# Patient Record
Sex: FEMALE | Race: WHITE | NOT HISPANIC OR LATINO | Employment: OTHER | URBAN - METROPOLITAN AREA
[De-identification: names, ages, dates, MRNs, and addresses within clinical notes are randomized per-mention and may not be internally consistent; named-entity substitution may affect disease eponyms.]

---

## 2017-01-25 ENCOUNTER — COMMUNICATION - HEALTHEAST (OUTPATIENT)
Dept: INTERNAL MEDICINE | Facility: CLINIC | Age: 72
End: 2017-01-25

## 2017-02-09 ENCOUNTER — COMMUNICATION - HEALTHEAST (OUTPATIENT)
Dept: SCHEDULING | Facility: CLINIC | Age: 72
End: 2017-02-09

## 2017-02-24 ENCOUNTER — COMMUNICATION - HEALTHEAST (OUTPATIENT)
Dept: INTERNAL MEDICINE | Facility: CLINIC | Age: 72
End: 2017-02-24

## 2017-02-26 ENCOUNTER — COMMUNICATION - HEALTHEAST (OUTPATIENT)
Dept: INTERNAL MEDICINE | Facility: CLINIC | Age: 72
End: 2017-02-26

## 2017-03-02 ENCOUNTER — COMMUNICATION - HEALTHEAST (OUTPATIENT)
Dept: INTERNAL MEDICINE | Facility: CLINIC | Age: 72
End: 2017-03-02

## 2017-03-20 ENCOUNTER — COMMUNICATION - HEALTHEAST (OUTPATIENT)
Dept: PULMONOLOGY | Facility: OTHER | Age: 72
End: 2017-03-20

## 2017-04-07 ENCOUNTER — RECORDS - HEALTHEAST (OUTPATIENT)
Dept: ADMINISTRATIVE | Facility: OTHER | Age: 72
End: 2017-04-07

## 2017-04-09 ENCOUNTER — COMMUNICATION - HEALTHEAST (OUTPATIENT)
Dept: INTERNAL MEDICINE | Facility: CLINIC | Age: 72
End: 2017-04-09

## 2017-04-16 ENCOUNTER — COMMUNICATION - HEALTHEAST (OUTPATIENT)
Dept: INTERNAL MEDICINE | Facility: CLINIC | Age: 72
End: 2017-04-16

## 2017-04-21 ENCOUNTER — RECORDS - HEALTHEAST (OUTPATIENT)
Dept: ADMINISTRATIVE | Facility: OTHER | Age: 72
End: 2017-04-21

## 2017-05-05 ENCOUNTER — COMMUNICATION - HEALTHEAST (OUTPATIENT)
Dept: PULMONOLOGY | Facility: OTHER | Age: 72
End: 2017-05-05

## 2017-05-12 ENCOUNTER — OFFICE VISIT - HEALTHEAST (OUTPATIENT)
Dept: PULMONOLOGY | Facility: OTHER | Age: 72
End: 2017-05-12

## 2017-05-12 DIAGNOSIS — J47.9 BRONCHIECTASIS WITHOUT COMPLICATION (H): ICD-10-CM

## 2017-05-12 DIAGNOSIS — J96.11 CHRONIC RESPIRATORY FAILURE WITH HYPOXIA (H): ICD-10-CM

## 2017-05-12 DIAGNOSIS — R06.09 DYSPNEA ON EXERTION: ICD-10-CM

## 2017-05-24 ENCOUNTER — COMMUNICATION - HEALTHEAST (OUTPATIENT)
Dept: INTERNAL MEDICINE | Facility: CLINIC | Age: 72
End: 2017-05-24

## 2017-05-31 ENCOUNTER — COMMUNICATION - HEALTHEAST (OUTPATIENT)
Dept: INTERNAL MEDICINE | Facility: CLINIC | Age: 72
End: 2017-05-31

## 2017-06-10 ENCOUNTER — COMMUNICATION - HEALTHEAST (OUTPATIENT)
Dept: SCHEDULING | Facility: CLINIC | Age: 72
End: 2017-06-10

## 2017-06-12 ENCOUNTER — COMMUNICATION - HEALTHEAST (OUTPATIENT)
Dept: INTERNAL MEDICINE | Facility: CLINIC | Age: 72
End: 2017-06-12

## 2017-06-13 ENCOUNTER — OFFICE VISIT - HEALTHEAST (OUTPATIENT)
Dept: INTERNAL MEDICINE | Facility: CLINIC | Age: 72
End: 2017-06-13

## 2017-06-13 ENCOUNTER — COMMUNICATION - HEALTHEAST (OUTPATIENT)
Dept: INTERNAL MEDICINE | Facility: CLINIC | Age: 72
End: 2017-06-13

## 2017-06-13 ENCOUNTER — RECORDS - HEALTHEAST (OUTPATIENT)
Dept: GENERAL RADIOLOGY | Facility: CLINIC | Age: 72
End: 2017-06-13

## 2017-06-13 DIAGNOSIS — M25.552 PAIN IN LEFT HIP: ICD-10-CM

## 2017-06-13 DIAGNOSIS — M19.90 OSTEOARTHRITIS: ICD-10-CM

## 2017-06-13 DIAGNOSIS — J47.9 BRONCHIECTASIS WITHOUT COMPLICATION (H): ICD-10-CM

## 2017-06-13 DIAGNOSIS — M25.552 LEFT HIP PAIN: ICD-10-CM

## 2017-06-13 DIAGNOSIS — M85.80 OSTEOPENIA: ICD-10-CM

## 2017-06-13 DIAGNOSIS — Z00.00 ROUTINE GENERAL MEDICAL EXAMINATION AT A HEALTH CARE FACILITY: ICD-10-CM

## 2017-06-13 DIAGNOSIS — Z79.899 MEDICATION MANAGEMENT: ICD-10-CM

## 2017-06-13 LAB
CHOLEST SERPL-MCNC: 172 MG/DL
FASTING STATUS PATIENT QL REPORTED: YES
HDLC SERPL-MCNC: 57 MG/DL
LDLC SERPL CALC-MCNC: 97 MG/DL
TRIGL SERPL-MCNC: 89 MG/DL

## 2017-06-13 ASSESSMENT — MIFFLIN-ST. JEOR: SCORE: 1035.66

## 2017-06-14 ENCOUNTER — AMBULATORY - HEALTHEAST (OUTPATIENT)
Dept: INTERNAL MEDICINE | Facility: CLINIC | Age: 72
End: 2017-06-14

## 2017-06-14 DIAGNOSIS — M81.0 POSTMENOPAUSAL OSTEOPOROSIS: ICD-10-CM

## 2017-06-17 ENCOUNTER — COMMUNICATION - HEALTHEAST (OUTPATIENT)
Dept: INTERNAL MEDICINE | Facility: CLINIC | Age: 72
End: 2017-06-17

## 2017-07-05 ENCOUNTER — COMMUNICATION - HEALTHEAST (OUTPATIENT)
Dept: INTERNAL MEDICINE | Facility: CLINIC | Age: 72
End: 2017-07-05

## 2017-07-05 ENCOUNTER — RECORDS - HEALTHEAST (OUTPATIENT)
Dept: BONE DENSITY | Facility: CLINIC | Age: 72
End: 2017-07-05

## 2017-07-05 DIAGNOSIS — M81.0 AGE-RELATED OSTEOPOROSIS WITHOUT CURRENT PATHOLOGICAL FRACTURE: ICD-10-CM

## 2017-07-15 ENCOUNTER — COMMUNICATION - HEALTHEAST (OUTPATIENT)
Dept: INTERNAL MEDICINE | Facility: CLINIC | Age: 72
End: 2017-07-15

## 2017-07-23 ENCOUNTER — COMMUNICATION - HEALTHEAST (OUTPATIENT)
Dept: INTERNAL MEDICINE | Facility: CLINIC | Age: 72
End: 2017-07-23

## 2017-07-27 ENCOUNTER — RECORDS - HEALTHEAST (OUTPATIENT)
Dept: ADMINISTRATIVE | Facility: OTHER | Age: 72
End: 2017-07-27

## 2017-07-29 ENCOUNTER — COMMUNICATION - HEALTHEAST (OUTPATIENT)
Dept: INTERNAL MEDICINE | Facility: CLINIC | Age: 72
End: 2017-07-29

## 2017-07-31 ENCOUNTER — COMMUNICATION - HEALTHEAST (OUTPATIENT)
Dept: INTERNAL MEDICINE | Facility: CLINIC | Age: 72
End: 2017-07-31

## 2017-07-31 ENCOUNTER — RECORDS - HEALTHEAST (OUTPATIENT)
Dept: ADMINISTRATIVE | Facility: OTHER | Age: 72
End: 2017-07-31

## 2017-07-31 RX ORDER — SUMATRIPTAN 50 MG/1
50 TABLET, FILM COATED ORAL
Qty: 30 TABLET | Refills: 1 | Status: SHIPPED | OUTPATIENT
Start: 2017-07-31 | End: 2022-10-10

## 2017-08-01 ENCOUNTER — COMMUNICATION - HEALTHEAST (OUTPATIENT)
Dept: INTERNAL MEDICINE | Facility: CLINIC | Age: 72
End: 2017-08-01

## 2017-08-09 ENCOUNTER — COMMUNICATION - HEALTHEAST (OUTPATIENT)
Dept: PULMONOLOGY | Facility: OTHER | Age: 72
End: 2017-08-09

## 2017-08-09 DIAGNOSIS — J47.9 BRONCHIECTASIS (H): ICD-10-CM

## 2017-08-25 ENCOUNTER — COMMUNICATION - HEALTHEAST (OUTPATIENT)
Dept: INTERNAL MEDICINE | Facility: CLINIC | Age: 72
End: 2017-08-25

## 2017-08-26 ENCOUNTER — COMMUNICATION - HEALTHEAST (OUTPATIENT)
Dept: INTERNAL MEDICINE | Facility: CLINIC | Age: 72
End: 2017-08-26

## 2017-08-26 DIAGNOSIS — J47.9 BRONCHIECTASIS (H): ICD-10-CM

## 2017-08-27 ENCOUNTER — COMMUNICATION - HEALTHEAST (OUTPATIENT)
Dept: INTERNAL MEDICINE | Facility: CLINIC | Age: 72
End: 2017-08-27

## 2017-08-28 ENCOUNTER — RECORDS - HEALTHEAST (OUTPATIENT)
Dept: MAMMOGRAPHY | Facility: CLINIC | Age: 72
End: 2017-08-28

## 2017-08-28 ENCOUNTER — RECORDS - HEALTHEAST (OUTPATIENT)
Dept: ADMINISTRATIVE | Facility: OTHER | Age: 72
End: 2017-08-28

## 2017-08-28 DIAGNOSIS — Z12.31 ENCOUNTER FOR SCREENING MAMMOGRAM FOR MALIGNANT NEOPLASM OF BREAST: ICD-10-CM

## 2017-09-02 ENCOUNTER — COMMUNICATION - HEALTHEAST (OUTPATIENT)
Dept: INTERNAL MEDICINE | Facility: CLINIC | Age: 72
End: 2017-09-02

## 2017-09-08 ENCOUNTER — COMMUNICATION - HEALTHEAST (OUTPATIENT)
Dept: INTERNAL MEDICINE | Facility: CLINIC | Age: 72
End: 2017-09-08

## 2017-09-19 ENCOUNTER — COMMUNICATION - HEALTHEAST (OUTPATIENT)
Dept: INTERNAL MEDICINE | Facility: CLINIC | Age: 72
End: 2017-09-19

## 2017-09-20 ENCOUNTER — COMMUNICATION - HEALTHEAST (OUTPATIENT)
Dept: INTERNAL MEDICINE | Facility: CLINIC | Age: 72
End: 2017-09-20

## 2017-10-07 ENCOUNTER — COMMUNICATION - HEALTHEAST (OUTPATIENT)
Dept: INTERNAL MEDICINE | Facility: CLINIC | Age: 72
End: 2017-10-07

## 2017-10-12 ENCOUNTER — COMMUNICATION - HEALTHEAST (OUTPATIENT)
Dept: INTERNAL MEDICINE | Facility: CLINIC | Age: 72
End: 2017-10-12

## 2017-10-12 ENCOUNTER — RECORDS - HEALTHEAST (OUTPATIENT)
Dept: ADMINISTRATIVE | Facility: OTHER | Age: 72
End: 2017-10-12

## 2017-10-14 ENCOUNTER — COMMUNICATION - HEALTHEAST (OUTPATIENT)
Dept: PULMONOLOGY | Facility: OTHER | Age: 72
End: 2017-10-14

## 2017-10-15 ENCOUNTER — COMMUNICATION - HEALTHEAST (OUTPATIENT)
Dept: INTERNAL MEDICINE | Facility: CLINIC | Age: 72
End: 2017-10-15

## 2017-10-16 ENCOUNTER — COMMUNICATION - HEALTHEAST (OUTPATIENT)
Dept: PULMONOLOGY | Facility: OTHER | Age: 72
End: 2017-10-16

## 2017-10-16 ENCOUNTER — COMMUNICATION - HEALTHEAST (OUTPATIENT)
Dept: INTERNAL MEDICINE | Facility: CLINIC | Age: 72
End: 2017-10-16

## 2017-10-17 ENCOUNTER — RECORDS - HEALTHEAST (OUTPATIENT)
Dept: ADMINISTRATIVE | Facility: OTHER | Age: 72
End: 2017-10-17

## 2017-11-02 ENCOUNTER — RECORDS - HEALTHEAST (OUTPATIENT)
Dept: ADMINISTRATIVE | Facility: OTHER | Age: 72
End: 2017-11-02

## 2017-11-08 ENCOUNTER — COMMUNICATION - HEALTHEAST (OUTPATIENT)
Dept: INTERNAL MEDICINE | Facility: CLINIC | Age: 72
End: 2017-11-08

## 2017-11-08 ENCOUNTER — AMBULATORY - HEALTHEAST (OUTPATIENT)
Dept: INTERNAL MEDICINE | Facility: CLINIC | Age: 72
End: 2017-11-08

## 2017-11-15 ENCOUNTER — RECORDS - HEALTHEAST (OUTPATIENT)
Dept: ADMINISTRATIVE | Facility: OTHER | Age: 72
End: 2017-11-15

## 2017-11-17 ENCOUNTER — OFFICE VISIT - HEALTHEAST (OUTPATIENT)
Dept: PULMONOLOGY | Facility: OTHER | Age: 72
End: 2017-11-17

## 2017-11-17 DIAGNOSIS — J96.11 CHRONIC RESPIRATORY FAILURE WITH HYPOXIA (H): ICD-10-CM

## 2017-11-17 DIAGNOSIS — J47.1 BRONCHIECTASIS WITH ACUTE EXACERBATION (H): ICD-10-CM

## 2017-11-26 ENCOUNTER — COMMUNICATION - HEALTHEAST (OUTPATIENT)
Dept: INTERNAL MEDICINE | Facility: CLINIC | Age: 72
End: 2017-11-26

## 2018-02-14 ENCOUNTER — COMMUNICATION - HEALTHEAST (OUTPATIENT)
Dept: INTERNAL MEDICINE | Facility: CLINIC | Age: 73
End: 2018-02-14

## 2018-02-16 ENCOUNTER — COMMUNICATION - HEALTHEAST (OUTPATIENT)
Dept: INTERNAL MEDICINE | Facility: CLINIC | Age: 73
End: 2018-02-16

## 2018-02-18 ENCOUNTER — COMMUNICATION - HEALTHEAST (OUTPATIENT)
Dept: INTERNAL MEDICINE | Facility: CLINIC | Age: 73
End: 2018-02-18

## 2018-02-19 ENCOUNTER — COMMUNICATION - HEALTHEAST (OUTPATIENT)
Dept: INTERNAL MEDICINE | Facility: CLINIC | Age: 73
End: 2018-02-19

## 2018-02-20 ENCOUNTER — COMMUNICATION - HEALTHEAST (OUTPATIENT)
Dept: INTERNAL MEDICINE | Facility: CLINIC | Age: 73
End: 2018-02-20

## 2018-03-13 ENCOUNTER — COMMUNICATION - HEALTHEAST (OUTPATIENT)
Dept: INTERNAL MEDICINE | Facility: CLINIC | Age: 73
End: 2018-03-13

## 2018-03-17 ENCOUNTER — COMMUNICATION - HEALTHEAST (OUTPATIENT)
Dept: INTERNAL MEDICINE | Facility: CLINIC | Age: 73
End: 2018-03-17

## 2018-03-19 ENCOUNTER — AMBULATORY - HEALTHEAST (OUTPATIENT)
Dept: INTERNAL MEDICINE | Facility: CLINIC | Age: 73
End: 2018-03-19

## 2018-03-21 ENCOUNTER — COMMUNICATION - HEALTHEAST (OUTPATIENT)
Dept: INTERNAL MEDICINE | Facility: CLINIC | Age: 73
End: 2018-03-21

## 2018-03-22 ENCOUNTER — RECORDS - HEALTHEAST (OUTPATIENT)
Dept: ADMINISTRATIVE | Facility: OTHER | Age: 73
End: 2018-03-22

## 2018-03-26 ENCOUNTER — COMMUNICATION - HEALTHEAST (OUTPATIENT)
Dept: INTERNAL MEDICINE | Facility: CLINIC | Age: 73
End: 2018-03-26

## 2018-04-04 ENCOUNTER — COMMUNICATION - HEALTHEAST (OUTPATIENT)
Dept: INTERNAL MEDICINE | Facility: CLINIC | Age: 73
End: 2018-04-04

## 2018-04-06 ENCOUNTER — COMMUNICATION - HEALTHEAST (OUTPATIENT)
Dept: INTERNAL MEDICINE | Facility: CLINIC | Age: 73
End: 2018-04-06

## 2018-04-11 ENCOUNTER — COMMUNICATION - HEALTHEAST (OUTPATIENT)
Dept: INTERNAL MEDICINE | Facility: CLINIC | Age: 73
End: 2018-04-11

## 2018-04-11 ENCOUNTER — AMBULATORY - HEALTHEAST (OUTPATIENT)
Dept: INTERNAL MEDICINE | Facility: CLINIC | Age: 73
End: 2018-04-11

## 2018-04-19 ENCOUNTER — AMBULATORY - HEALTHEAST (OUTPATIENT)
Dept: INTERNAL MEDICINE | Facility: CLINIC | Age: 73
End: 2018-04-19

## 2018-04-19 ENCOUNTER — COMMUNICATION - HEALTHEAST (OUTPATIENT)
Dept: INTERNAL MEDICINE | Facility: CLINIC | Age: 73
End: 2018-04-19

## 2018-04-20 ENCOUNTER — RECORDS - HEALTHEAST (OUTPATIENT)
Dept: ADMINISTRATIVE | Facility: OTHER | Age: 73
End: 2018-04-20

## 2018-05-04 ENCOUNTER — OFFICE VISIT - HEALTHEAST (OUTPATIENT)
Dept: PULMONOLOGY | Facility: OTHER | Age: 73
End: 2018-05-04

## 2018-05-04 DIAGNOSIS — J96.11 CHRONIC RESPIRATORY FAILURE WITH HYPOXIA (H): ICD-10-CM

## 2018-05-04 DIAGNOSIS — J47.9 BRONCHIECTASIS WITHOUT COMPLICATION (H): ICD-10-CM

## 2018-05-04 DIAGNOSIS — R06.09 DYSPNEA ON EXERTION: ICD-10-CM

## 2018-05-11 ENCOUNTER — COMMUNICATION - HEALTHEAST (OUTPATIENT)
Dept: PULMONOLOGY | Facility: OTHER | Age: 73
End: 2018-05-11

## 2018-05-12 ENCOUNTER — COMMUNICATION - HEALTHEAST (OUTPATIENT)
Dept: PULMONOLOGY | Facility: OTHER | Age: 73
End: 2018-05-12

## 2018-05-13 ENCOUNTER — COMMUNICATION - HEALTHEAST (OUTPATIENT)
Dept: PULMONOLOGY | Facility: OTHER | Age: 73
End: 2018-05-13

## 2018-05-14 ENCOUNTER — AMBULATORY - HEALTHEAST (OUTPATIENT)
Dept: INTENSIVE CARE | Facility: CLINIC | Age: 73
End: 2018-05-14

## 2018-05-14 DIAGNOSIS — J47.9 BRONCHIECTASIS WITHOUT COMPLICATION (H): ICD-10-CM

## 2018-05-15 ENCOUNTER — COMMUNICATION - HEALTHEAST (OUTPATIENT)
Dept: PULMONOLOGY | Facility: OTHER | Age: 73
End: 2018-05-15

## 2018-05-17 ENCOUNTER — COMMUNICATION - HEALTHEAST (OUTPATIENT)
Dept: PULMONOLOGY | Facility: OTHER | Age: 73
End: 2018-05-17

## 2018-05-18 ENCOUNTER — AMBULATORY - HEALTHEAST (OUTPATIENT)
Dept: PULMONOLOGY | Facility: OTHER | Age: 73
End: 2018-05-18

## 2018-05-19 ENCOUNTER — COMMUNICATION - HEALTHEAST (OUTPATIENT)
Dept: INTERNAL MEDICINE | Facility: CLINIC | Age: 73
End: 2018-05-19

## 2018-05-21 ENCOUNTER — COMMUNICATION - HEALTHEAST (OUTPATIENT)
Dept: PULMONOLOGY | Facility: OTHER | Age: 73
End: 2018-05-21

## 2018-05-24 ENCOUNTER — RECORDS - HEALTHEAST (OUTPATIENT)
Dept: ADMINISTRATIVE | Facility: OTHER | Age: 73
End: 2018-05-24

## 2018-05-29 ENCOUNTER — COMMUNICATION - HEALTHEAST (OUTPATIENT)
Dept: PULMONOLOGY | Facility: OTHER | Age: 73
End: 2018-05-29

## 2018-05-29 ENCOUNTER — COMMUNICATION - HEALTHEAST (OUTPATIENT)
Dept: INTERNAL MEDICINE | Facility: CLINIC | Age: 73
End: 2018-05-29

## 2018-06-07 ENCOUNTER — COMMUNICATION - HEALTHEAST (OUTPATIENT)
Dept: INTERNAL MEDICINE | Facility: CLINIC | Age: 73
End: 2018-06-07

## 2018-06-11 ENCOUNTER — AMBULATORY - HEALTHEAST (OUTPATIENT)
Dept: INTERNAL MEDICINE | Facility: CLINIC | Age: 73
End: 2018-06-11

## 2018-06-11 DIAGNOSIS — G62.9 PERIPHERAL NEUROPATHY: ICD-10-CM

## 2018-06-14 ENCOUNTER — COMMUNICATION - HEALTHEAST (OUTPATIENT)
Dept: INTERNAL MEDICINE | Facility: CLINIC | Age: 73
End: 2018-06-14

## 2018-06-15 ENCOUNTER — COMMUNICATION - HEALTHEAST (OUTPATIENT)
Dept: INTERNAL MEDICINE | Facility: CLINIC | Age: 73
End: 2018-06-15

## 2018-06-17 ENCOUNTER — COMMUNICATION - HEALTHEAST (OUTPATIENT)
Dept: INTERNAL MEDICINE | Facility: CLINIC | Age: 73
End: 2018-06-17

## 2018-06-18 ENCOUNTER — COMMUNICATION - HEALTHEAST (OUTPATIENT)
Dept: INTERNAL MEDICINE | Facility: CLINIC | Age: 73
End: 2018-06-18

## 2018-06-18 ENCOUNTER — AMBULATORY - HEALTHEAST (OUTPATIENT)
Dept: INTERNAL MEDICINE | Facility: CLINIC | Age: 73
End: 2018-06-18

## 2018-06-18 ENCOUNTER — OFFICE VISIT - HEALTHEAST (OUTPATIENT)
Dept: INTERNAL MEDICINE | Facility: CLINIC | Age: 73
End: 2018-06-18

## 2018-06-18 DIAGNOSIS — R53.82 CHRONIC FATIGUE: ICD-10-CM

## 2018-06-18 DIAGNOSIS — G62.9 PERIPHERAL NEUROPATHY: ICD-10-CM

## 2018-06-18 DIAGNOSIS — Z00.00 ROUTINE GENERAL MEDICAL EXAMINATION AT A HEALTH CARE FACILITY: ICD-10-CM

## 2018-06-18 DIAGNOSIS — J47.9 BRONCHIECTASIS WITHOUT COMPLICATION (H): ICD-10-CM

## 2018-06-18 DIAGNOSIS — M85.89 OSTEOPENIA OF MULTIPLE SITES: ICD-10-CM

## 2018-06-18 LAB
ALBUMIN SERPL-MCNC: 3.7 G/DL (ref 3.5–5)
ALBUMIN UR-MCNC: NEGATIVE MG/DL
ALP SERPL-CCNC: 93 U/L (ref 45–120)
ALT SERPL W P-5'-P-CCNC: 18 U/L (ref 0–45)
ANION GAP SERPL CALCULATED.3IONS-SCNC: 11 MMOL/L (ref 5–18)
APPEARANCE UR: CLEAR
AST SERPL W P-5'-P-CCNC: 28 U/L (ref 0–40)
BASOPHILS # BLD AUTO: 0 THOU/UL (ref 0–0.2)
BASOPHILS NFR BLD AUTO: 1 % (ref 0–2)
BILIRUB SERPL-MCNC: 0.6 MG/DL (ref 0–1)
BILIRUB UR QL STRIP: NEGATIVE
BUN SERPL-MCNC: 15 MG/DL (ref 8–28)
CALCIUM SERPL-MCNC: 10.5 MG/DL (ref 8.5–10.5)
CHLORIDE BLD-SCNC: 103 MMOL/L (ref 98–107)
CHOLEST SERPL-MCNC: 181 MG/DL
CO2 SERPL-SCNC: 27 MMOL/L (ref 22–31)
COLOR UR AUTO: YELLOW
CREAT SERPL-MCNC: 0.72 MG/DL (ref 0.6–1.1)
EOSINOPHIL # BLD AUTO: 0.3 THOU/UL (ref 0–0.4)
EOSINOPHIL NFR BLD AUTO: 4 % (ref 0–6)
ERYTHROCYTE [DISTWIDTH] IN BLOOD BY AUTOMATED COUNT: 11.8 % (ref 11–14.5)
FASTING STATUS PATIENT QL REPORTED: YES
GFR SERPL CREATININE-BSD FRML MDRD: >60 ML/MIN/1.73M2
GLUCOSE BLD-MCNC: 90 MG/DL (ref 70–125)
GLUCOSE UR STRIP-MCNC: NEGATIVE MG/DL
HCT VFR BLD AUTO: 41.2 % (ref 35–47)
HDLC SERPL-MCNC: 59 MG/DL
HGB BLD-MCNC: 14 G/DL (ref 12–16)
HGB UR QL STRIP: NEGATIVE
KETONES UR STRIP-MCNC: NEGATIVE MG/DL
LDLC SERPL CALC-MCNC: 104 MG/DL
LEUKOCYTE ESTERASE UR QL STRIP: NEGATIVE
LYMPHOCYTES # BLD AUTO: 1.1 THOU/UL (ref 0.8–4.4)
LYMPHOCYTES NFR BLD AUTO: 17 % (ref 20–40)
MCH RBC QN AUTO: 30.4 PG (ref 27–34)
MCHC RBC AUTO-ENTMCNC: 34 G/DL (ref 32–36)
MCV RBC AUTO: 89 FL (ref 80–100)
MONOCYTES # BLD AUTO: 0.5 THOU/UL (ref 0–0.9)
MONOCYTES NFR BLD AUTO: 8 % (ref 2–10)
NEUTROPHILS # BLD AUTO: 4.7 THOU/UL (ref 2–7.7)
NEUTROPHILS NFR BLD AUTO: 71 % (ref 50–70)
NITRATE UR QL: NEGATIVE
PH UR STRIP: 7.5 [PH] (ref 5–8)
PLATELET # BLD AUTO: 324 THOU/UL (ref 140–440)
PMV BLD AUTO: 7.2 FL (ref 7–10)
POTASSIUM BLD-SCNC: 4.1 MMOL/L (ref 3.5–5)
PROT SERPL-MCNC: 7.4 G/DL (ref 6–8)
RBC # BLD AUTO: 4.6 MILL/UL (ref 3.8–5.4)
SODIUM SERPL-SCNC: 141 MMOL/L (ref 136–145)
SP GR UR STRIP: 1.01 (ref 1–1.03)
TRIGL SERPL-MCNC: 88 MG/DL
TSH SERPL DL<=0.005 MIU/L-ACNC: 3.92 UIU/ML (ref 0.3–5)
UROBILINOGEN UR STRIP-ACNC: NORMAL
VIT B12 SERPL-MCNC: 645 PG/ML (ref 213–816)
WBC: 6.7 THOU/UL (ref 4–11)

## 2018-06-18 ASSESSMENT — MIFFLIN-ST. JEOR: SCORE: 1019.22

## 2018-06-19 ENCOUNTER — COMMUNICATION - HEALTHEAST (OUTPATIENT)
Dept: INTERNAL MEDICINE | Facility: CLINIC | Age: 73
End: 2018-06-19

## 2018-06-19 LAB — 25(OH)D3 SERPL-MCNC: 58 NG/ML (ref 30–80)

## 2018-06-20 ENCOUNTER — AMBULATORY - HEALTHEAST (OUTPATIENT)
Dept: PULMONOLOGY | Facility: OTHER | Age: 73
End: 2018-06-20

## 2018-06-20 LAB
ALBUMIN PERCENT: 59.4 % (ref 51–67)
ALBUMIN SERPL ELPH-MCNC: 4.2 G/DL (ref 3.2–4.7)
ALPHA 1 PERCENT: 2.5 % (ref 2–4)
ALPHA 2 PERCENT: 11.6 % (ref 5–13)
ALPHA1 GLOB SERPL ELPH-MCNC: 0.2 G/DL (ref 0.1–0.3)
ALPHA2 GLOB SERPL ELPH-MCNC: 0.8 G/DL (ref 0.4–0.9)
B-GLOBULIN SERPL ELPH-MCNC: 1 G/DL (ref 0.7–1.2)
BETA PERCENT: 13.9 % (ref 10–17)
GAMMA GLOB SERPL ELPH-MCNC: 0.9 G/DL (ref 0.6–1.4)
GAMMA GLOBULIN PERCENT: 12.6 % (ref 9–20)
PATH ICD:: NORMAL
PROT PATTERN SERPL ELPH-IMP: NORMAL
PROT SERPL-MCNC: 7.1 G/DL (ref 6–8)
REVIEWING PATHOLOGIST: NORMAL

## 2018-06-21 ENCOUNTER — RECORDS - HEALTHEAST (OUTPATIENT)
Dept: ADMINISTRATIVE | Facility: OTHER | Age: 73
End: 2018-06-21

## 2018-06-21 ENCOUNTER — COMMUNICATION - HEALTHEAST (OUTPATIENT)
Dept: INTERNAL MEDICINE | Facility: CLINIC | Age: 73
End: 2018-06-21

## 2018-06-21 ENCOUNTER — COMMUNICATION - HEALTHEAST (OUTPATIENT)
Dept: PULMONOLOGY | Facility: OTHER | Age: 73
End: 2018-06-21

## 2018-07-01 ENCOUNTER — COMMUNICATION - HEALTHEAST (OUTPATIENT)
Dept: INTERNAL MEDICINE | Facility: CLINIC | Age: 73
End: 2018-07-01

## 2018-07-01 ENCOUNTER — COMMUNICATION - HEALTHEAST (OUTPATIENT)
Dept: PULMONOLOGY | Facility: OTHER | Age: 73
End: 2018-07-01

## 2018-07-02 ENCOUNTER — COMMUNICATION - HEALTHEAST (OUTPATIENT)
Dept: INTERNAL MEDICINE | Facility: CLINIC | Age: 73
End: 2018-07-02

## 2018-07-05 ENCOUNTER — OFFICE VISIT - HEALTHEAST (OUTPATIENT)
Dept: INTERNAL MEDICINE | Facility: CLINIC | Age: 73
End: 2018-07-05

## 2018-07-05 DIAGNOSIS — H81.13 BENIGN PAROXYSMAL POSITIONAL VERTIGO DUE TO BILATERAL VESTIBULAR DISORDER: ICD-10-CM

## 2018-07-05 DIAGNOSIS — J47.9 BRONCHIECTASIS WITHOUT COMPLICATION (H): ICD-10-CM

## 2018-07-05 ASSESSMENT — MIFFLIN-ST. JEOR: SCORE: 1015.22

## 2018-07-06 ENCOUNTER — COMMUNICATION - HEALTHEAST (OUTPATIENT)
Dept: INTERNAL MEDICINE | Facility: CLINIC | Age: 73
End: 2018-07-06

## 2018-07-10 ENCOUNTER — RECORDS - HEALTHEAST (OUTPATIENT)
Dept: ADMINISTRATIVE | Facility: OTHER | Age: 73
End: 2018-07-10

## 2018-07-10 ENCOUNTER — COMMUNICATION - HEALTHEAST (OUTPATIENT)
Dept: PULMONOLOGY | Facility: OTHER | Age: 73
End: 2018-07-10

## 2018-07-11 ENCOUNTER — AMBULATORY - HEALTHEAST (OUTPATIENT)
Dept: PULMONOLOGY | Facility: OTHER | Age: 73
End: 2018-07-11

## 2018-07-11 DIAGNOSIS — J47.9 BRONCHIECTASIS (H): ICD-10-CM

## 2018-07-14 ENCOUNTER — COMMUNICATION - HEALTHEAST (OUTPATIENT)
Dept: INTERNAL MEDICINE | Facility: CLINIC | Age: 73
End: 2018-07-14

## 2018-07-16 ENCOUNTER — COMMUNICATION - HEALTHEAST (OUTPATIENT)
Dept: INTERNAL MEDICINE | Facility: CLINIC | Age: 73
End: 2018-07-16

## 2018-07-19 ENCOUNTER — COMMUNICATION - HEALTHEAST (OUTPATIENT)
Dept: PULMONOLOGY | Facility: OTHER | Age: 73
End: 2018-07-19

## 2018-07-19 DIAGNOSIS — J47.9 BRONCHIECTASIS WITHOUT COMPLICATION (H): ICD-10-CM

## 2018-07-30 ENCOUNTER — COMMUNICATION - HEALTHEAST (OUTPATIENT)
Dept: PULMONOLOGY | Facility: OTHER | Age: 73
End: 2018-07-30

## 2018-08-03 ENCOUNTER — RECORDS - HEALTHEAST (OUTPATIENT)
Dept: ADMINISTRATIVE | Facility: OTHER | Age: 73
End: 2018-08-03

## 2018-08-07 ENCOUNTER — COMMUNICATION - HEALTHEAST (OUTPATIENT)
Dept: INTERNAL MEDICINE | Facility: CLINIC | Age: 73
End: 2018-08-07

## 2018-08-08 ENCOUNTER — COMMUNICATION - HEALTHEAST (OUTPATIENT)
Dept: INTERNAL MEDICINE | Facility: CLINIC | Age: 73
End: 2018-08-08

## 2018-08-11 ENCOUNTER — COMMUNICATION - HEALTHEAST (OUTPATIENT)
Dept: INTERNAL MEDICINE | Facility: CLINIC | Age: 73
End: 2018-08-11

## 2018-08-22 ENCOUNTER — COMMUNICATION - HEALTHEAST (OUTPATIENT)
Dept: INTERNAL MEDICINE | Facility: CLINIC | Age: 73
End: 2018-08-22

## 2018-08-27 ENCOUNTER — COMMUNICATION - HEALTHEAST (OUTPATIENT)
Dept: INTERNAL MEDICINE | Facility: CLINIC | Age: 73
End: 2018-08-27

## 2018-08-31 ENCOUNTER — RECORDS - HEALTHEAST (OUTPATIENT)
Dept: MAMMOGRAPHY | Facility: CLINIC | Age: 73
End: 2018-08-31

## 2018-08-31 ENCOUNTER — OFFICE VISIT - HEALTHEAST (OUTPATIENT)
Dept: PULMONOLOGY | Facility: OTHER | Age: 73
End: 2018-08-31

## 2018-08-31 ENCOUNTER — HOSPITAL ENCOUNTER (OUTPATIENT)
Dept: LAB | Age: 73
Setting detail: SPECIMEN
Discharge: HOME | End: 2018-08-31

## 2018-08-31 DIAGNOSIS — J44.9 CHRONIC OBSTRUCTIVE PULMONARY DISEASE, UNSPECIFIED COPD TYPE (H): ICD-10-CM

## 2018-08-31 DIAGNOSIS — Z12.31 ENCOUNTER FOR SCREENING MAMMOGRAM FOR MALIGNANT NEOPLASM OF BREAST: ICD-10-CM

## 2018-08-31 DIAGNOSIS — K44.9 HIATAL HERNIA WITH GERD: ICD-10-CM

## 2018-08-31 DIAGNOSIS — J47.9 BRONCHIECTASIS WITHOUT ACUTE EXACERBATION (H): ICD-10-CM

## 2018-08-31 DIAGNOSIS — K21.9 HIATAL HERNIA WITH GERD: ICD-10-CM

## 2018-08-31 LAB
BASOPHILS # BLD AUTO: 0 THOU/UL (ref 0–0.2)
BASOPHILS NFR BLD AUTO: 0 % (ref 0–2)
EOSINOPHIL # BLD AUTO: 0.2 THOU/UL (ref 0–0.4)
EOSINOPHIL NFR BLD AUTO: 2 % (ref 0–6)
ERYTHROCYTE [DISTWIDTH] IN BLOOD BY AUTOMATED COUNT: 13.2 % (ref 11–14.5)
HCT VFR BLD AUTO: 41.6 % (ref 35–47)
HGB BLD-MCNC: 13.5 G/DL (ref 12–16)
IGA SERPL-MCNC: 1040 MG/DL (ref 700–1700)
IGA SERPL-MCNC: 510 MG/DL (ref 65–400)
IGM SERPL-MCNC: 79 MG/DL (ref 60–280)
LYMPHOCYTES # BLD AUTO: 1.1 THOU/UL (ref 0.8–4.4)
LYMPHOCYTES NFR BLD AUTO: 15 % (ref 20–40)
MCH RBC QN AUTO: 29.9 PG (ref 27–34)
MCHC RBC AUTO-ENTMCNC: 32.5 G/DL (ref 32–36)
MCV RBC AUTO: 92 FL (ref 80–100)
MONOCYTES # BLD AUTO: 0.7 THOU/UL (ref 0–0.9)
MONOCYTES NFR BLD AUTO: 9 % (ref 2–10)
NEUTROPHILS # BLD AUTO: 5.2 THOU/UL (ref 2–7.7)
NEUTROPHILS NFR BLD AUTO: 73 % (ref 50–70)
PLATELET # BLD AUTO: 316 THOU/UL (ref 140–440)
PMV BLD AUTO: 10.5 FL (ref 8.5–12.5)
RBC # BLD AUTO: 4.52 MILL/UL (ref 3.8–5.4)
RHEUMATOID FACT SERPL-ACNC: <15 IU/ML (ref 0–30)
WBC: 7.2 THOU/UL (ref 4–11)

## 2018-08-31 ASSESSMENT — MIFFLIN-ST. JEOR: SCORE: 992

## 2018-09-01 ENCOUNTER — COMMUNICATION - HEALTHEAST (OUTPATIENT)
Dept: PULMONOLOGY | Facility: OTHER | Age: 73
End: 2018-09-01

## 2018-09-03 ENCOUNTER — COMMUNICATION - HEALTHEAST (OUTPATIENT)
Dept: INTERNAL MEDICINE | Facility: CLINIC | Age: 73
End: 2018-09-03

## 2018-09-04 ENCOUNTER — COMMUNICATION - HEALTHEAST (OUTPATIENT)
Dept: PULMONOLOGY | Facility: OTHER | Age: 73
End: 2018-09-04

## 2018-09-04 DIAGNOSIS — J47.1 BRONCHIECTASIS WITH ACUTE EXACERBATION (H): ICD-10-CM

## 2018-09-04 LAB
BACTERIA SPEC CULT: ABNORMAL
BACTERIA SPEC CULT: ABNORMAL
CCP AB SER IA-ACNC: 0.6 U/ML
GRAM STAIN RESULT: ABNORMAL

## 2018-09-17 ENCOUNTER — COMMUNICATION - HEALTHEAST (OUTPATIENT)
Dept: PULMONOLOGY | Facility: OTHER | Age: 73
End: 2018-09-17

## 2018-09-17 LAB — BACTERIA SPEC CULT: ABNORMAL

## 2018-09-18 ENCOUNTER — AMBULATORY - HEALTHEAST (OUTPATIENT)
Dept: PULMONOLOGY | Facility: OTHER | Age: 73
End: 2018-09-18

## 2018-09-18 DIAGNOSIS — J47.1 BRONCHIECTASIS WITH ACUTE EXACERBATION (H): ICD-10-CM

## 2018-09-19 ENCOUNTER — COMMUNICATION - HEALTHEAST (OUTPATIENT)
Dept: INTERNAL MEDICINE | Facility: CLINIC | Age: 73
End: 2018-09-19

## 2018-10-04 ENCOUNTER — RECORDS - HEALTHEAST (OUTPATIENT)
Dept: ADMINISTRATIVE | Facility: OTHER | Age: 73
End: 2018-10-04

## 2018-10-14 LAB
ACID FAST STN SPEC QL: NORMAL
BACTERIA SPEC CULT: NORMAL

## 2018-10-15 ENCOUNTER — COMMUNICATION - HEALTHEAST (OUTPATIENT)
Dept: PULMONOLOGY | Facility: OTHER | Age: 73
End: 2018-10-15

## 2018-11-15 ENCOUNTER — COMMUNICATION - HEALTHEAST (OUTPATIENT)
Dept: INTERNAL MEDICINE | Facility: CLINIC | Age: 73
End: 2018-11-15

## 2018-11-16 ENCOUNTER — COMMUNICATION - HEALTHEAST (OUTPATIENT)
Dept: INTERNAL MEDICINE | Facility: CLINIC | Age: 73
End: 2018-11-16

## 2018-11-16 ENCOUNTER — OFFICE VISIT - HEALTHEAST (OUTPATIENT)
Dept: PULMONOLOGY | Facility: OTHER | Age: 73
End: 2018-11-16

## 2018-11-16 ENCOUNTER — RECORDS - HEALTHEAST (OUTPATIENT)
Dept: ADMINISTRATIVE | Facility: OTHER | Age: 73
End: 2018-11-16

## 2018-11-16 DIAGNOSIS — J44.9 CHRONIC OBSTRUCTIVE PULMONARY DISEASE, UNSPECIFIED COPD TYPE (H): ICD-10-CM

## 2018-11-16 DIAGNOSIS — J84.10 PULMONARY FIBROSIS (H): ICD-10-CM

## 2018-11-17 ENCOUNTER — COMMUNICATION - HEALTHEAST (OUTPATIENT)
Dept: INTERNAL MEDICINE | Facility: CLINIC | Age: 73
End: 2018-11-17

## 2018-11-19 ENCOUNTER — COMMUNICATION - HEALTHEAST (OUTPATIENT)
Dept: INTERNAL MEDICINE | Facility: CLINIC | Age: 73
End: 2018-11-19

## 2018-11-19 ENCOUNTER — RECORDS - HEALTHEAST (OUTPATIENT)
Dept: ADMINISTRATIVE | Facility: OTHER | Age: 73
End: 2018-11-19

## 2018-11-19 DIAGNOSIS — J47.9 BRONCHIECTASIS (H): ICD-10-CM

## 2018-11-29 ENCOUNTER — COMMUNICATION - HEALTHEAST (OUTPATIENT)
Dept: PULMONOLOGY | Facility: OTHER | Age: 73
End: 2018-11-29

## 2018-11-29 ENCOUNTER — AMBULATORY - HEALTHEAST (OUTPATIENT)
Dept: PULMONOLOGY | Facility: OTHER | Age: 73
End: 2018-11-29

## 2018-11-30 ENCOUNTER — HOSPITAL ENCOUNTER (OUTPATIENT)
Dept: CT IMAGING | Facility: CLINIC | Age: 73
Discharge: HOME | End: 2018-11-30
Attending: INTERNAL MEDICINE

## 2018-11-30 DIAGNOSIS — J84.10 PULMONARY FIBROSIS (H): ICD-10-CM

## 2018-12-03 ENCOUNTER — COMMUNICATION - HEALTHEAST (OUTPATIENT)
Dept: PULMONOLOGY | Facility: OTHER | Age: 73
End: 2018-12-03

## 2018-12-05 ENCOUNTER — COMMUNICATION - HEALTHEAST (OUTPATIENT)
Dept: PULMONOLOGY | Facility: OTHER | Age: 73
End: 2018-12-05

## 2018-12-06 ENCOUNTER — RECORDS - HEALTHEAST (OUTPATIENT)
Dept: ADMINISTRATIVE | Facility: OTHER | Age: 73
End: 2018-12-06

## 2018-12-13 ENCOUNTER — RECORDS - HEALTHEAST (OUTPATIENT)
Dept: ADMINISTRATIVE | Facility: OTHER | Age: 73
End: 2018-12-13

## 2019-01-03 ENCOUNTER — COMMUNICATION - HEALTHEAST (OUTPATIENT)
Dept: PULMONOLOGY | Facility: OTHER | Age: 74
End: 2019-01-03

## 2019-01-03 DIAGNOSIS — J44.9 COPD (CHRONIC OBSTRUCTIVE PULMONARY DISEASE) (H): ICD-10-CM

## 2019-01-11 ENCOUNTER — COMMUNICATION - HEALTHEAST (OUTPATIENT)
Dept: INTERNAL MEDICINE | Facility: CLINIC | Age: 74
End: 2019-01-11

## 2019-02-05 ENCOUNTER — COMMUNICATION - HEALTHEAST (OUTPATIENT)
Dept: PULMONOLOGY | Facility: OTHER | Age: 74
End: 2019-02-05

## 2019-02-05 DIAGNOSIS — J47.9 BRONCHIECTASIS WITHOUT ACUTE EXACERBATION (H): ICD-10-CM

## 2019-02-09 ENCOUNTER — AMBULATORY - HEALTHEAST (OUTPATIENT)
Dept: OTHER | Facility: CLINIC | Age: 74
End: 2019-02-09

## 2019-02-11 ENCOUNTER — COMMUNICATION - HEALTHEAST (OUTPATIENT)
Dept: PULMONOLOGY | Facility: OTHER | Age: 74
End: 2019-02-11

## 2019-02-11 DIAGNOSIS — J47.9 BRONCHIECTASIS (H): ICD-10-CM

## 2019-02-11 DIAGNOSIS — J44.1 COPD EXACERBATION (H): ICD-10-CM

## 2019-02-12 ENCOUNTER — COMMUNICATION - HEALTHEAST (OUTPATIENT)
Dept: PULMONOLOGY | Facility: OTHER | Age: 74
End: 2019-02-12

## 2019-02-12 ENCOUNTER — COMMUNICATION - HEALTHEAST (OUTPATIENT)
Dept: INTERNAL MEDICINE | Facility: CLINIC | Age: 74
End: 2019-02-12

## 2019-02-15 ENCOUNTER — COMMUNICATION - HEALTHEAST (OUTPATIENT)
Dept: INTERNAL MEDICINE | Facility: CLINIC | Age: 74
End: 2019-02-15

## 2019-02-16 ENCOUNTER — COMMUNICATION - HEALTHEAST (OUTPATIENT)
Dept: INTERNAL MEDICINE | Facility: CLINIC | Age: 74
End: 2019-02-16

## 2019-02-16 DIAGNOSIS — J47.9 BRONCHIECTASIS WITHOUT COMPLICATION (H): ICD-10-CM

## 2019-02-18 ENCOUNTER — COMMUNICATION - HEALTHEAST (OUTPATIENT)
Dept: INTERNAL MEDICINE | Facility: CLINIC | Age: 74
End: 2019-02-18

## 2019-02-18 DIAGNOSIS — J47.9 BRONCHIECTASIS (H): ICD-10-CM

## 2019-02-20 ENCOUNTER — COMMUNICATION - HEALTHEAST (OUTPATIENT)
Dept: INTERNAL MEDICINE | Facility: CLINIC | Age: 74
End: 2019-02-20

## 2019-02-23 ENCOUNTER — COMMUNICATION - HEALTHEAST (OUTPATIENT)
Dept: PULMONOLOGY | Facility: OTHER | Age: 74
End: 2019-02-23

## 2019-02-23 ENCOUNTER — COMMUNICATION - HEALTHEAST (OUTPATIENT)
Dept: INTERNAL MEDICINE | Facility: CLINIC | Age: 74
End: 2019-02-23

## 2019-02-25 ENCOUNTER — AMBULATORY - HEALTHEAST (OUTPATIENT)
Dept: PULMONOLOGY | Facility: OTHER | Age: 74
End: 2019-02-25

## 2019-02-25 ENCOUNTER — COMMUNICATION - HEALTHEAST (OUTPATIENT)
Dept: INTERNAL MEDICINE | Facility: CLINIC | Age: 74
End: 2019-02-25

## 2019-02-25 DIAGNOSIS — J47.9 BRONCHIECTASIS WITHOUT COMPLICATION (H): ICD-10-CM

## 2019-02-28 ENCOUNTER — RECORDS - HEALTHEAST (OUTPATIENT)
Dept: ADMINISTRATIVE | Facility: OTHER | Age: 74
End: 2019-02-28

## 2019-03-04 ENCOUNTER — HOSPITAL ENCOUNTER (OUTPATIENT)
Dept: LAB | Age: 74
Setting detail: SPECIMEN
Discharge: HOME | End: 2019-03-04

## 2019-03-04 ENCOUNTER — OFFICE VISIT - HEALTHEAST (OUTPATIENT)
Dept: PULMONOLOGY | Facility: OTHER | Age: 74
End: 2019-03-04

## 2019-03-04 DIAGNOSIS — J47.1 BRONCHIECTASIS WITH ACUTE EXACERBATION (H): ICD-10-CM

## 2019-03-07 ENCOUNTER — RECORDS - HEALTHEAST (OUTPATIENT)
Dept: ADMINISTRATIVE | Facility: OTHER | Age: 74
End: 2019-03-07

## 2019-03-07 ENCOUNTER — AMBULATORY - HEALTHEAST (OUTPATIENT)
Dept: INTENSIVE CARE | Facility: CLINIC | Age: 74
End: 2019-03-07

## 2019-03-07 ENCOUNTER — COMMUNICATION - HEALTHEAST (OUTPATIENT)
Dept: PULMONOLOGY | Facility: OTHER | Age: 74
End: 2019-03-07

## 2019-03-07 DIAGNOSIS — J47.9 BRONCHIECTASIS WITHOUT ACUTE EXACERBATION (H): ICD-10-CM

## 2019-03-07 LAB
BACTERIA SPEC CULT: ABNORMAL
BACTERIA SPEC CULT: ABNORMAL
GRAM STAIN RESULT: ABNORMAL

## 2019-03-08 ENCOUNTER — COMMUNICATION - HEALTHEAST (OUTPATIENT)
Dept: PULMONOLOGY | Facility: OTHER | Age: 74
End: 2019-03-08

## 2019-03-21 ENCOUNTER — AMBULATORY - HEALTHEAST (OUTPATIENT)
Dept: PULMONOLOGY | Facility: OTHER | Age: 74
End: 2019-03-21

## 2019-03-21 ENCOUNTER — OFFICE VISIT - HEALTHEAST (OUTPATIENT)
Dept: INFECTIOUS DISEASES | Facility: CLINIC | Age: 74
End: 2019-03-21

## 2019-03-21 ENCOUNTER — AMBULATORY - HEALTHEAST (OUTPATIENT)
Dept: INFECTIOUS DISEASES | Facility: CLINIC | Age: 74
End: 2019-03-21

## 2019-03-21 DIAGNOSIS — J47.9 BRONCHIECTASIS WITHOUT COMPLICATION (H): ICD-10-CM

## 2019-03-26 ENCOUNTER — COMMUNICATION - HEALTHEAST (OUTPATIENT)
Dept: PULMONOLOGY | Facility: OTHER | Age: 74
End: 2019-03-26

## 2019-03-26 LAB — BACTERIA SPEC CULT: ABNORMAL

## 2019-03-27 ENCOUNTER — COMMUNICATION - HEALTHEAST (OUTPATIENT)
Dept: INFECTIOUS DISEASES | Facility: CLINIC | Age: 74
End: 2019-03-27

## 2019-03-27 ENCOUNTER — COMMUNICATION - HEALTHEAST (OUTPATIENT)
Dept: PULMONOLOGY | Facility: OTHER | Age: 74
End: 2019-03-27

## 2019-03-28 ENCOUNTER — COMMUNICATION - HEALTHEAST (OUTPATIENT)
Dept: PULMONOLOGY | Facility: OTHER | Age: 74
End: 2019-03-28

## 2019-03-29 ENCOUNTER — AMBULATORY - HEALTHEAST (OUTPATIENT)
Dept: INTENSIVE CARE | Facility: CLINIC | Age: 74
End: 2019-03-29

## 2019-03-29 ENCOUNTER — COMMUNICATION - HEALTHEAST (OUTPATIENT)
Dept: INFECTIOUS DISEASES | Facility: CLINIC | Age: 74
End: 2019-03-29

## 2019-03-29 ENCOUNTER — COMMUNICATION - HEALTHEAST (OUTPATIENT)
Dept: PULMONOLOGY | Facility: OTHER | Age: 74
End: 2019-03-29

## 2019-03-29 DIAGNOSIS — J47.9 BRONCHIECTASIS (H): ICD-10-CM

## 2019-03-29 DIAGNOSIS — J44.1 COPD EXACERBATION (H): ICD-10-CM

## 2019-03-29 LAB
BACTERIA SPEC CULT: ABNORMAL
BACTERIA SPEC CULT: ABNORMAL
GRAM STAIN RESULT: ABNORMAL
GRAM STAIN RESULT: ABNORMAL

## 2019-04-01 ENCOUNTER — RECORDS - HEALTHEAST (OUTPATIENT)
Dept: ADMINISTRATIVE | Facility: OTHER | Age: 74
End: 2019-04-01

## 2019-04-01 ENCOUNTER — COMMUNICATION - HEALTHEAST (OUTPATIENT)
Dept: INFECTIOUS DISEASES | Facility: CLINIC | Age: 74
End: 2019-04-01

## 2019-04-01 ENCOUNTER — COMMUNICATION - HEALTHEAST (OUTPATIENT)
Dept: PULMONOLOGY | Facility: OTHER | Age: 74
End: 2019-04-01

## 2019-04-01 DIAGNOSIS — J47.1 BRONCHIECTASIS WITH ACUTE EXACERBATION (H): ICD-10-CM

## 2019-04-16 ENCOUNTER — COMMUNICATION - HEALTHEAST (OUTPATIENT)
Dept: PULMONOLOGY | Facility: OTHER | Age: 74
End: 2019-04-16

## 2019-04-16 LAB
ACID FAST STN SPEC QL: NORMAL
BACTERIA SPEC CULT: NORMAL

## 2019-04-17 ENCOUNTER — COMMUNICATION - HEALTHEAST (OUTPATIENT)
Dept: PULMONOLOGY | Facility: OTHER | Age: 74
End: 2019-04-17

## 2019-04-18 ENCOUNTER — AMBULATORY - HEALTHEAST (OUTPATIENT)
Dept: PULMONOLOGY | Facility: OTHER | Age: 74
End: 2019-04-18

## 2019-04-18 DIAGNOSIS — J47.9 BRONCHIECTASIS (H): ICD-10-CM

## 2019-04-27 ENCOUNTER — COMMUNICATION - HEALTHEAST (OUTPATIENT)
Dept: PULMONOLOGY | Facility: OTHER | Age: 74
End: 2019-04-27

## 2019-04-29 ENCOUNTER — COMMUNICATION - HEALTHEAST (OUTPATIENT)
Dept: PULMONOLOGY | Facility: OTHER | Age: 74
End: 2019-04-29

## 2019-05-13 ENCOUNTER — COMMUNICATION - HEALTHEAST (OUTPATIENT)
Dept: INTERNAL MEDICINE | Facility: CLINIC | Age: 74
End: 2019-05-13

## 2019-05-13 DIAGNOSIS — G62.9 PERIPHERAL POLYNEUROPATHY: ICD-10-CM

## 2019-05-29 ENCOUNTER — COMMUNICATION - HEALTHEAST (OUTPATIENT)
Dept: PULMONOLOGY | Facility: OTHER | Age: 74
End: 2019-05-29

## 2019-05-29 DIAGNOSIS — J47.9 BRONCHIECTASIS (H): ICD-10-CM

## 2019-06-05 ENCOUNTER — COMMUNICATION - HEALTHEAST (OUTPATIENT)
Dept: PULMONOLOGY | Facility: OTHER | Age: 74
End: 2019-06-05

## 2019-06-07 ENCOUNTER — COMMUNICATION - HEALTHEAST (OUTPATIENT)
Dept: PULMONOLOGY | Facility: OTHER | Age: 74
End: 2019-06-07

## 2019-06-07 ENCOUNTER — COMMUNICATION - HEALTHEAST (OUTPATIENT)
Dept: INTERNAL MEDICINE | Facility: CLINIC | Age: 74
End: 2019-06-07

## 2019-06-07 DIAGNOSIS — J47.9 BRONCHIECTASIS (H): ICD-10-CM

## 2019-06-07 DIAGNOSIS — J44.9 COPD (CHRONIC OBSTRUCTIVE PULMONARY DISEASE) (H): ICD-10-CM

## 2019-06-18 ENCOUNTER — COMMUNICATION - HEALTHEAST (OUTPATIENT)
Dept: INTERNAL MEDICINE | Facility: CLINIC | Age: 74
End: 2019-06-18

## 2019-06-19 ENCOUNTER — COMMUNICATION - HEALTHEAST (OUTPATIENT)
Dept: INTERNAL MEDICINE | Facility: CLINIC | Age: 74
End: 2019-06-19

## 2019-06-19 ENCOUNTER — OFFICE VISIT - HEALTHEAST (OUTPATIENT)
Dept: INTERNAL MEDICINE | Facility: CLINIC | Age: 74
End: 2019-06-19

## 2019-06-19 DIAGNOSIS — G60.9 IDIOPATHIC PERIPHERAL NEUROPATHY: ICD-10-CM

## 2019-06-19 DIAGNOSIS — J47.9 BRONCHIECTASIS WITHOUT COMPLICATION (H): ICD-10-CM

## 2019-06-19 DIAGNOSIS — M85.89 OSTEOPENIA OF MULTIPLE SITES: ICD-10-CM

## 2019-06-19 DIAGNOSIS — R06.09 DYSPNEA ON EXERTION: ICD-10-CM

## 2019-06-19 DIAGNOSIS — Z00.00 ROUTINE GENERAL MEDICAL EXAMINATION AT A HEALTH CARE FACILITY: ICD-10-CM

## 2019-06-19 LAB
ALBUMIN SERPL-MCNC: 3.6 G/DL (ref 3.5–5)
ALBUMIN UR-MCNC: NEGATIVE MG/DL
ALP SERPL-CCNC: 84 U/L (ref 45–120)
ALT SERPL W P-5'-P-CCNC: 17 U/L (ref 0–45)
ANION GAP SERPL CALCULATED.3IONS-SCNC: 7 MMOL/L (ref 5–18)
APPEARANCE UR: CLEAR
AST SERPL W P-5'-P-CCNC: 24 U/L (ref 0–40)
BASOPHILS # BLD AUTO: 0.1 THOU/UL (ref 0–0.2)
BASOPHILS NFR BLD AUTO: 1 % (ref 0–2)
BILIRUB SERPL-MCNC: 0.6 MG/DL (ref 0–1)
BILIRUB UR QL STRIP: NEGATIVE
BUN SERPL-MCNC: 17 MG/DL (ref 8–28)
CALCIUM SERPL-MCNC: 10.5 MG/DL (ref 8.5–10.5)
CHLORIDE BLD-SCNC: 104 MMOL/L (ref 98–107)
CHOLEST SERPL-MCNC: 192 MG/DL
CO2 SERPL-SCNC: 30 MMOL/L (ref 22–31)
COLOR UR AUTO: YELLOW
CREAT SERPL-MCNC: 0.75 MG/DL (ref 0.6–1.1)
EOSINOPHIL # BLD AUTO: 0.2 THOU/UL (ref 0–0.4)
EOSINOPHIL NFR BLD AUTO: 2 % (ref 0–6)
ERYTHROCYTE [DISTWIDTH] IN BLOOD BY AUTOMATED COUNT: 11.8 % (ref 11–14.5)
FASTING STATUS PATIENT QL REPORTED: YES
GFR SERPL CREATININE-BSD FRML MDRD: >60 ML/MIN/1.73M2
GLUCOSE BLD-MCNC: 84 MG/DL (ref 70–125)
GLUCOSE UR STRIP-MCNC: NEGATIVE MG/DL
HCT VFR BLD AUTO: 40.5 % (ref 35–47)
HDLC SERPL-MCNC: 69 MG/DL
HGB BLD-MCNC: 13.6 G/DL (ref 12–16)
HGB UR QL STRIP: NEGATIVE
KETONES UR STRIP-MCNC: NEGATIVE MG/DL
LDLC SERPL CALC-MCNC: 105 MG/DL
LEUKOCYTE ESTERASE UR QL STRIP: NEGATIVE
LYMPHOCYTES # BLD AUTO: 1.5 THOU/UL (ref 0.8–4.4)
LYMPHOCYTES NFR BLD AUTO: 14 % (ref 20–40)
MCH RBC QN AUTO: 29.9 PG (ref 27–34)
MCHC RBC AUTO-ENTMCNC: 33.5 G/DL (ref 32–36)
MCV RBC AUTO: 89 FL (ref 80–100)
MONOCYTES # BLD AUTO: 0.9 THOU/UL (ref 0–0.9)
MONOCYTES NFR BLD AUTO: 9 % (ref 2–10)
NEUTROPHILS # BLD AUTO: 7.7 THOU/UL (ref 2–7.7)
NEUTROPHILS NFR BLD AUTO: 74 % (ref 50–70)
NITRATE UR QL: NEGATIVE
PH UR STRIP: 7 [PH] (ref 5–8)
PLATELET # BLD AUTO: 322 THOU/UL (ref 140–440)
PMV BLD AUTO: 7 FL (ref 7–10)
POTASSIUM BLD-SCNC: 3.7 MMOL/L (ref 3.5–5)
PROT SERPL-MCNC: 7.1 G/DL (ref 6–8)
RBC # BLD AUTO: 4.55 MILL/UL (ref 3.8–5.4)
SODIUM SERPL-SCNC: 141 MMOL/L (ref 136–145)
SP GR UR STRIP: 1.01 (ref 1–1.03)
TRIGL SERPL-MCNC: 92 MG/DL
TSH SERPL DL<=0.005 MIU/L-ACNC: 3.94 UIU/ML (ref 0.3–5)
UROBILINOGEN UR STRIP-ACNC: NORMAL
WBC: 10.3 THOU/UL (ref 4–11)

## 2019-06-19 ASSESSMENT — MIFFLIN-ST. JEOR: SCORE: 991.54

## 2019-06-20 ENCOUNTER — COMMUNICATION - HEALTHEAST (OUTPATIENT)
Dept: INTERNAL MEDICINE | Facility: CLINIC | Age: 74
End: 2019-06-20

## 2019-06-20 LAB — 25(OH)D3 SERPL-MCNC: 40.5 NG/ML (ref 30–80)

## 2019-06-24 LAB — A1AT SERPL-MCNC: 115 MG/DL (ref 80–200)

## 2019-07-17 ENCOUNTER — RECORDS - HEALTHEAST (OUTPATIENT)
Dept: ADMINISTRATIVE | Facility: OTHER | Age: 74
End: 2019-07-17

## 2019-07-19 ENCOUNTER — COMMUNICATION - HEALTHEAST (OUTPATIENT)
Dept: PULMONOLOGY | Facility: OTHER | Age: 74
End: 2019-07-19

## 2019-07-19 DIAGNOSIS — K44.9 HIATAL HERNIA WITH GERD: ICD-10-CM

## 2019-07-19 DIAGNOSIS — K21.9 HIATAL HERNIA WITH GERD: ICD-10-CM

## 2019-07-25 ENCOUNTER — COMMUNICATION - HEALTHEAST (OUTPATIENT)
Dept: PULMONOLOGY | Facility: OTHER | Age: 74
End: 2019-07-25

## 2019-07-29 ENCOUNTER — AMBULATORY - HEALTHEAST (OUTPATIENT)
Dept: PULMONOLOGY | Facility: OTHER | Age: 74
End: 2019-07-29

## 2019-07-29 ENCOUNTER — COMMUNICATION - HEALTHEAST (OUTPATIENT)
Dept: PULMONOLOGY | Facility: OTHER | Age: 74
End: 2019-07-29

## 2019-07-29 DIAGNOSIS — J47.9 BRONCHIECTASIS (H): ICD-10-CM

## 2019-08-07 ENCOUNTER — RECORDS - HEALTHEAST (OUTPATIENT)
Dept: PULMONOLOGY | Facility: OTHER | Age: 74
End: 2019-08-07

## 2019-08-07 ENCOUNTER — OFFICE VISIT - HEALTHEAST (OUTPATIENT)
Dept: PULMONOLOGY | Facility: OTHER | Age: 74
End: 2019-08-07

## 2019-08-07 ENCOUNTER — COMMUNICATION - HEALTHEAST (OUTPATIENT)
Dept: PULMONOLOGY | Facility: OTHER | Age: 74
End: 2019-08-07

## 2019-08-07 ENCOUNTER — RECORDS - HEALTHEAST (OUTPATIENT)
Dept: ADMINISTRATIVE | Facility: OTHER | Age: 74
End: 2019-08-07

## 2019-08-07 DIAGNOSIS — J47.1 BRONCHIECTASIS WITH (ACUTE) EXACERBATION (H): ICD-10-CM

## 2019-08-07 DIAGNOSIS — J47.9 BRONCHIECTASIS WITHOUT COMPLICATION (H): ICD-10-CM

## 2019-08-07 DIAGNOSIS — J96.11 CHRONIC RESPIRATORY FAILURE WITH HYPOXIA (H): ICD-10-CM

## 2019-08-07 DIAGNOSIS — R06.09 DYSPNEA ON EXERTION: ICD-10-CM

## 2019-08-07 LAB — HGB BLD-MCNC: 12.7 G/DL

## 2019-08-08 ENCOUNTER — COMMUNICATION - HEALTHEAST (OUTPATIENT)
Dept: INTERNAL MEDICINE | Facility: CLINIC | Age: 74
End: 2019-08-08

## 2019-08-08 ENCOUNTER — COMMUNICATION - HEALTHEAST (OUTPATIENT)
Dept: PULMONOLOGY | Facility: OTHER | Age: 74
End: 2019-08-08

## 2019-08-26 ENCOUNTER — COMMUNICATION - HEALTHEAST (OUTPATIENT)
Dept: PULMONOLOGY | Facility: OTHER | Age: 74
End: 2019-08-26

## 2019-08-26 DIAGNOSIS — J47.9 BRONCHIECTASIS (H): ICD-10-CM

## 2019-08-30 ENCOUNTER — COMMUNICATION - HEALTHEAST (OUTPATIENT)
Dept: PULMONOLOGY | Facility: OTHER | Age: 74
End: 2019-08-30

## 2019-08-31 ENCOUNTER — COMMUNICATION - HEALTHEAST (OUTPATIENT)
Dept: INTERNAL MEDICINE | Facility: CLINIC | Age: 74
End: 2019-08-31

## 2019-09-03 ENCOUNTER — AMBULATORY - HEALTHEAST (OUTPATIENT)
Dept: PULMONOLOGY | Facility: OTHER | Age: 74
End: 2019-09-03

## 2019-09-03 DIAGNOSIS — K44.9 HIATAL HERNIA WITH GERD: ICD-10-CM

## 2019-09-03 DIAGNOSIS — K21.9 HIATAL HERNIA WITH GERD: ICD-10-CM

## 2019-09-05 ENCOUNTER — RECORDS - HEALTHEAST (OUTPATIENT)
Dept: BONE DENSITY | Facility: CLINIC | Age: 74
End: 2019-09-05

## 2019-09-05 ENCOUNTER — RECORDS - HEALTHEAST (OUTPATIENT)
Dept: ADMINISTRATIVE | Facility: OTHER | Age: 74
End: 2019-09-05

## 2019-09-05 ENCOUNTER — RECORDS - HEALTHEAST (OUTPATIENT)
Dept: MAMMOGRAPHY | Facility: CLINIC | Age: 74
End: 2019-09-05

## 2019-09-05 DIAGNOSIS — M85.89 OTHER SPECIFIED DISORDERS OF BONE DENSITY AND STRUCTURE, MULTIPLE SITES: ICD-10-CM

## 2019-09-05 DIAGNOSIS — Z12.31 ENCOUNTER FOR SCREENING MAMMOGRAM FOR MALIGNANT NEOPLASM OF BREAST: ICD-10-CM

## 2019-09-15 ENCOUNTER — COMMUNICATION - HEALTHEAST (OUTPATIENT)
Dept: INTERNAL MEDICINE | Facility: CLINIC | Age: 74
End: 2019-09-15

## 2019-09-16 ENCOUNTER — AMBULATORY - HEALTHEAST (OUTPATIENT)
Dept: INTERNAL MEDICINE | Facility: CLINIC | Age: 74
End: 2019-09-16

## 2019-09-16 DIAGNOSIS — M85.88 OSTEOPENIA OF LUMBAR SPINE: ICD-10-CM

## 2019-09-18 ENCOUNTER — COMMUNICATION - HEALTHEAST (OUTPATIENT)
Dept: PULMONOLOGY | Facility: OTHER | Age: 74
End: 2019-09-18

## 2019-09-20 ENCOUNTER — COMMUNICATION - HEALTHEAST (OUTPATIENT)
Dept: PULMONOLOGY | Facility: OTHER | Age: 74
End: 2019-09-20

## 2019-09-24 ENCOUNTER — COMMUNICATION - HEALTHEAST (OUTPATIENT)
Dept: PULMONOLOGY | Facility: OTHER | Age: 74
End: 2019-09-24

## 2019-09-30 ENCOUNTER — COMMUNICATION - HEALTHEAST (OUTPATIENT)
Dept: PULMONOLOGY | Facility: OTHER | Age: 74
End: 2019-09-30

## 2019-09-30 ENCOUNTER — AMBULATORY - HEALTHEAST (OUTPATIENT)
Dept: LAB | Facility: CLINIC | Age: 74
End: 2019-09-30

## 2019-09-30 DIAGNOSIS — J47.9 BRONCHIECTASIS (H): ICD-10-CM

## 2019-10-01 ENCOUNTER — AMBULATORY - HEALTHEAST (OUTPATIENT)
Dept: LAB | Facility: CLINIC | Age: 74
End: 2019-10-01

## 2019-10-01 ENCOUNTER — COMMUNICATION - HEALTHEAST (OUTPATIENT)
Dept: PULMONOLOGY | Facility: OTHER | Age: 74
End: 2019-10-01

## 2019-10-01 DIAGNOSIS — J47.9 BRONCHIECTASIS (H): ICD-10-CM

## 2019-10-02 LAB
BACTERIA SPEC CULT: ABNORMAL
BACTERIA SPEC CULT: ABNORMAL
GRAM STAIN RESULT: ABNORMAL

## 2019-10-09 ENCOUNTER — COMMUNICATION - HEALTHEAST (OUTPATIENT)
Dept: PULMONOLOGY | Facility: OTHER | Age: 74
End: 2019-10-09

## 2019-10-09 ENCOUNTER — AMBULATORY - HEALTHEAST (OUTPATIENT)
Dept: PULMONOLOGY | Facility: OTHER | Age: 74
End: 2019-10-09

## 2019-10-09 DIAGNOSIS — J44.9 COPD (CHRONIC OBSTRUCTIVE PULMONARY DISEASE) (H): ICD-10-CM

## 2019-10-09 DIAGNOSIS — J47.9 BRONCHIECTASIS (H): ICD-10-CM

## 2019-10-21 ENCOUNTER — RECORDS - HEALTHEAST (OUTPATIENT)
Dept: ADMINISTRATIVE | Facility: OTHER | Age: 74
End: 2019-10-21

## 2019-10-21 ENCOUNTER — COMMUNICATION - HEALTHEAST (OUTPATIENT)
Dept: INTERNAL MEDICINE | Facility: CLINIC | Age: 74
End: 2019-10-21

## 2019-10-21 DIAGNOSIS — G62.9 PERIPHERAL POLYNEUROPATHY: ICD-10-CM

## 2019-11-11 ENCOUNTER — RECORDS - HEALTHEAST (OUTPATIENT)
Dept: ADMINISTRATIVE | Facility: OTHER | Age: 74
End: 2019-11-11

## 2019-11-13 ENCOUNTER — COMMUNICATION - HEALTHEAST (OUTPATIENT)
Dept: PULMONOLOGY | Facility: OTHER | Age: 74
End: 2019-11-13

## 2019-11-13 LAB
ACID FAST STN SPEC QL: NORMAL
ACID FAST STN SPEC QL: NORMAL
BACTERIA SPEC CULT: NORMAL
BACTERIA SPEC CULT: NORMAL

## 2019-11-17 ENCOUNTER — COMMUNICATION - HEALTHEAST (OUTPATIENT)
Dept: INTERNAL MEDICINE | Facility: CLINIC | Age: 74
End: 2019-11-17

## 2019-11-17 DIAGNOSIS — J47.9 BRONCHIECTASIS (H): ICD-10-CM

## 2019-11-17 DIAGNOSIS — M15.0 PRIMARY OSTEOARTHRITIS INVOLVING MULTIPLE JOINTS: ICD-10-CM

## 2019-11-19 ENCOUNTER — DOCUMENTATION ONLY (OUTPATIENT)
Dept: OTHER | Facility: CLINIC | Age: 74
End: 2019-11-19

## 2019-11-19 ENCOUNTER — AMBULATORY - HEALTHEAST (OUTPATIENT)
Dept: OTHER | Facility: CLINIC | Age: 74
End: 2019-11-19

## 2019-12-05 ENCOUNTER — COMMUNICATION - HEALTHEAST (OUTPATIENT)
Dept: INTERNAL MEDICINE | Facility: CLINIC | Age: 74
End: 2019-12-05

## 2019-12-05 DIAGNOSIS — G89.29 CHRONIC MIDLINE LOW BACK PAIN WITHOUT SCIATICA: ICD-10-CM

## 2019-12-05 DIAGNOSIS — M54.50 CHRONIC MIDLINE LOW BACK PAIN WITHOUT SCIATICA: ICD-10-CM

## 2019-12-15 ENCOUNTER — COMMUNICATION - HEALTHEAST (OUTPATIENT)
Dept: INTERNAL MEDICINE | Facility: CLINIC | Age: 74
End: 2019-12-15

## 2019-12-16 ENCOUNTER — COMMUNICATION - HEALTHEAST (OUTPATIENT)
Dept: INTERNAL MEDICINE | Facility: CLINIC | Age: 74
End: 2019-12-16

## 2019-12-16 ENCOUNTER — AMBULATORY - HEALTHEAST (OUTPATIENT)
Dept: INTERNAL MEDICINE | Facility: CLINIC | Age: 74
End: 2019-12-16

## 2019-12-16 DIAGNOSIS — L28.0 NEURODERMATITIS: ICD-10-CM

## 2020-01-24 ENCOUNTER — COMMUNICATION - HEALTHEAST (OUTPATIENT)
Dept: PULMONOLOGY | Facility: OTHER | Age: 75
End: 2020-01-24

## 2020-01-24 DIAGNOSIS — J47.9 BRONCHIECTASIS WITHOUT COMPLICATION (H): ICD-10-CM

## 2020-01-30 ENCOUNTER — COMMUNICATION - HEALTHEAST (OUTPATIENT)
Dept: PULMONOLOGY | Facility: OTHER | Age: 75
End: 2020-01-30

## 2020-02-19 ENCOUNTER — RECORDS - HEALTHEAST (OUTPATIENT)
Dept: ADMINISTRATIVE | Facility: OTHER | Age: 75
End: 2020-02-19

## 2020-02-24 ENCOUNTER — COMMUNICATION - HEALTHEAST (OUTPATIENT)
Dept: INTERNAL MEDICINE | Facility: CLINIC | Age: 75
End: 2020-02-24

## 2020-02-24 ENCOUNTER — COMMUNICATION - HEALTHEAST (OUTPATIENT)
Dept: PULMONOLOGY | Facility: OTHER | Age: 75
End: 2020-02-24

## 2020-02-24 DIAGNOSIS — G62.9 PERIPHERAL POLYNEUROPATHY: ICD-10-CM

## 2020-02-24 DIAGNOSIS — J47.9 BRONCHIECTASIS WITHOUT COMPLICATION (H): ICD-10-CM

## 2020-03-04 ENCOUNTER — RECORDS - HEALTHEAST (OUTPATIENT)
Dept: ADMINISTRATIVE | Facility: OTHER | Age: 75
End: 2020-03-04

## 2020-05-16 ENCOUNTER — COMMUNICATION - HEALTHEAST (OUTPATIENT)
Dept: INTERNAL MEDICINE | Facility: CLINIC | Age: 75
End: 2020-05-16

## 2020-05-16 DIAGNOSIS — M85.88 OSTEOPENIA OF LUMBAR SPINE: ICD-10-CM

## 2020-06-11 ENCOUNTER — COMMUNICATION - HEALTHEAST (OUTPATIENT)
Dept: PULMONOLOGY | Facility: OTHER | Age: 75
End: 2020-06-11

## 2020-06-26 ENCOUNTER — RECORDS - HEALTHEAST (OUTPATIENT)
Dept: ADMINISTRATIVE | Facility: OTHER | Age: 75
End: 2020-06-26

## 2020-07-10 ENCOUNTER — COMMUNICATION - HEALTHEAST (OUTPATIENT)
Dept: PULMONOLOGY | Facility: OTHER | Age: 75
End: 2020-07-10

## 2020-07-20 ENCOUNTER — COMMUNICATION - HEALTHEAST (OUTPATIENT)
Dept: INTERNAL MEDICINE | Facility: CLINIC | Age: 75
End: 2020-07-20

## 2020-07-20 ENCOUNTER — AMBULATORY - HEALTHEAST (OUTPATIENT)
Dept: PULMONOLOGY | Facility: OTHER | Age: 75
End: 2020-07-20

## 2020-07-20 ENCOUNTER — COMMUNICATION - HEALTHEAST (OUTPATIENT)
Dept: PULMONOLOGY | Facility: OTHER | Age: 75
End: 2020-07-20

## 2020-07-20 DIAGNOSIS — J47.9 BRONCHIECTASIS WITHOUT COMPLICATION (H): ICD-10-CM

## 2020-07-20 DIAGNOSIS — G62.9 PERIPHERAL POLYNEUROPATHY: ICD-10-CM

## 2020-07-22 ENCOUNTER — COMMUNICATION - HEALTHEAST (OUTPATIENT)
Dept: INTERNAL MEDICINE | Facility: CLINIC | Age: 75
End: 2020-07-22

## 2020-07-27 ENCOUNTER — AMBULATORY - HEALTHEAST (OUTPATIENT)
Dept: INTERNAL MEDICINE | Facility: CLINIC | Age: 75
End: 2020-07-27

## 2020-07-27 DIAGNOSIS — F41.0 PANIC ATTACK: ICD-10-CM

## 2020-08-03 ENCOUNTER — COMMUNICATION - HEALTHEAST (OUTPATIENT)
Dept: PULMONOLOGY | Facility: OTHER | Age: 75
End: 2020-08-03

## 2020-08-03 DIAGNOSIS — J47.9 BRONCHIECTASIS (H): ICD-10-CM

## 2020-08-03 DIAGNOSIS — J44.9 COPD (CHRONIC OBSTRUCTIVE PULMONARY DISEASE) (H): ICD-10-CM

## 2020-08-06 ENCOUNTER — OFFICE VISIT - HEALTHEAST (OUTPATIENT)
Dept: PULMONOLOGY | Facility: OTHER | Age: 75
End: 2020-08-06

## 2020-08-06 ENCOUNTER — COMMUNICATION - HEALTHEAST (OUTPATIENT)
Dept: INTERNAL MEDICINE | Facility: CLINIC | Age: 75
End: 2020-08-06

## 2020-08-06 DIAGNOSIS — R06.09 DYSPNEA ON EXERTION: ICD-10-CM

## 2020-08-06 DIAGNOSIS — J47.9 BRONCHIECTASIS WITHOUT COMPLICATION (H): ICD-10-CM

## 2020-08-18 ENCOUNTER — COMMUNICATION - HEALTHEAST (OUTPATIENT)
Dept: INTERNAL MEDICINE | Facility: CLINIC | Age: 75
End: 2020-08-18

## 2020-08-18 ENCOUNTER — COMMUNICATION - HEALTHEAST (OUTPATIENT)
Dept: PULMONOLOGY | Facility: OTHER | Age: 75
End: 2020-08-18

## 2020-08-18 DIAGNOSIS — J47.9 BRONCHIECTASIS (H): ICD-10-CM

## 2020-09-03 ENCOUNTER — COMMUNICATION - HEALTHEAST (OUTPATIENT)
Dept: INTERNAL MEDICINE | Facility: CLINIC | Age: 75
End: 2020-09-03

## 2020-09-20 ENCOUNTER — COMMUNICATION - HEALTHEAST (OUTPATIENT)
Dept: PULMONOLOGY | Facility: OTHER | Age: 75
End: 2020-09-20

## 2020-09-20 DIAGNOSIS — J47.9 BRONCHIECTASIS (H): ICD-10-CM

## 2020-09-23 ENCOUNTER — COMMUNICATION - HEALTHEAST (OUTPATIENT)
Dept: INTERNAL MEDICINE | Facility: CLINIC | Age: 75
End: 2020-09-23

## 2020-09-23 ENCOUNTER — OFFICE VISIT - HEALTHEAST (OUTPATIENT)
Dept: INTERNAL MEDICINE | Facility: CLINIC | Age: 75
End: 2020-09-23

## 2020-09-23 DIAGNOSIS — G60.9 IDIOPATHIC PERIPHERAL NEUROPATHY: ICD-10-CM

## 2020-09-23 DIAGNOSIS — R06.09 DYSPNEA ON EXERTION: ICD-10-CM

## 2020-09-23 DIAGNOSIS — M54.50 CHRONIC BILATERAL LOW BACK PAIN WITHOUT SCIATICA: ICD-10-CM

## 2020-09-23 DIAGNOSIS — Z11.59 ENCOUNTER FOR HEPATITIS C SCREENING TEST FOR LOW RISK PATIENT: ICD-10-CM

## 2020-09-23 DIAGNOSIS — G89.29 CHRONIC BILATERAL LOW BACK PAIN WITHOUT SCIATICA: ICD-10-CM

## 2020-09-23 DIAGNOSIS — Z00.00 HEALTHCARE MAINTENANCE: ICD-10-CM

## 2020-09-23 DIAGNOSIS — J47.9 BRONCHIECTASIS WITHOUT COMPLICATION (H): ICD-10-CM

## 2020-09-23 DIAGNOSIS — M85.89 OSTEOPENIA OF MULTIPLE SITES: ICD-10-CM

## 2020-09-23 LAB
ALBUMIN SERPL-MCNC: 3.9 G/DL (ref 3.5–5)
ALBUMIN UR-MCNC: NEGATIVE MG/DL
ALP SERPL-CCNC: 96 U/L (ref 45–120)
ALT SERPL W P-5'-P-CCNC: 21 U/L (ref 0–45)
ANION GAP SERPL CALCULATED.3IONS-SCNC: 8 MMOL/L (ref 5–18)
APPEARANCE UR: CLEAR
AST SERPL W P-5'-P-CCNC: 25 U/L (ref 0–40)
BASOPHILS # BLD AUTO: 0 THOU/UL (ref 0–0.2)
BASOPHILS NFR BLD AUTO: 0 % (ref 0–2)
BILIRUB SERPL-MCNC: 0.5 MG/DL (ref 0–1)
BILIRUB UR QL STRIP: NEGATIVE
BUN SERPL-MCNC: 16 MG/DL (ref 8–28)
CALCIUM SERPL-MCNC: 9.9 MG/DL (ref 8.5–10.5)
CHLORIDE BLD-SCNC: 100 MMOL/L (ref 98–107)
CHOLEST SERPL-MCNC: 191 MG/DL
CO2 SERPL-SCNC: 30 MMOL/L (ref 22–31)
COLOR UR AUTO: YELLOW
CREAT SERPL-MCNC: 0.66 MG/DL (ref 0.6–1.1)
EOSINOPHIL # BLD AUTO: 0 THOU/UL (ref 0–0.4)
EOSINOPHIL NFR BLD AUTO: 0 % (ref 0–6)
ERYTHROCYTE [DISTWIDTH] IN BLOOD BY AUTOMATED COUNT: 13 % (ref 11–14.5)
FASTING STATUS PATIENT QL REPORTED: YES
GFR SERPL CREATININE-BSD FRML MDRD: >60 ML/MIN/1.73M2
GLUCOSE BLD-MCNC: 98 MG/DL (ref 70–125)
GLUCOSE UR STRIP-MCNC: NEGATIVE MG/DL
HCT VFR BLD AUTO: 42.4 % (ref 35–47)
HDLC SERPL-MCNC: 78 MG/DL
HGB BLD-MCNC: 13.6 G/DL (ref 12–16)
HGB UR QL STRIP: NEGATIVE
IMM GRANULOCYTES # BLD: 0.1 THOU/UL
IMM GRANULOCYTES NFR BLD: 1 %
KETONES UR STRIP-MCNC: NEGATIVE MG/DL
LDLC SERPL CALC-MCNC: 91 MG/DL
LEUKOCYTE ESTERASE UR QL STRIP: NEGATIVE
LYMPHOCYTES # BLD AUTO: 0.8 THOU/UL (ref 0.8–4.4)
LYMPHOCYTES NFR BLD AUTO: 5 % (ref 20–40)
MCH RBC QN AUTO: 29.7 PG (ref 27–34)
MCHC RBC AUTO-ENTMCNC: 32.1 G/DL (ref 32–36)
MCV RBC AUTO: 93 FL (ref 80–100)
MONOCYTES # BLD AUTO: 0.5 THOU/UL (ref 0–0.9)
MONOCYTES NFR BLD AUTO: 3 % (ref 2–10)
NEUTROPHILS # BLD AUTO: 13.9 THOU/UL (ref 2–7.7)
NEUTROPHILS NFR BLD AUTO: 91 % (ref 50–70)
NITRATE UR QL: NEGATIVE
PH UR STRIP: 7 [PH] (ref 5–8)
PLATELET # BLD AUTO: 312 THOU/UL (ref 140–440)
PMV BLD AUTO: 10.2 FL (ref 8.5–12.5)
POTASSIUM BLD-SCNC: 4.1 MMOL/L (ref 3.5–5)
PROT SERPL-MCNC: 7.4 G/DL (ref 6–8)
RBC # BLD AUTO: 4.58 MILL/UL (ref 3.8–5.4)
SODIUM SERPL-SCNC: 138 MMOL/L (ref 136–145)
SP GR UR STRIP: 1.01 (ref 1–1.03)
TRIGL SERPL-MCNC: 111 MG/DL
UROBILINOGEN UR STRIP-ACNC: NORMAL
WBC: 15.3 THOU/UL (ref 4–11)

## 2020-09-23 ASSESSMENT — MIFFLIN-ST. JEOR: SCORE: 959.78

## 2020-09-24 ENCOUNTER — COMMUNICATION - HEALTHEAST (OUTPATIENT)
Dept: INTERNAL MEDICINE | Facility: CLINIC | Age: 75
End: 2020-09-24

## 2020-09-24 LAB — HCV AB SERPL QL IA: NEGATIVE

## 2020-09-29 ENCOUNTER — COMMUNICATION - HEALTHEAST (OUTPATIENT)
Dept: PULMONOLOGY | Facility: OTHER | Age: 75
End: 2020-09-29

## 2020-09-29 ENCOUNTER — COMMUNICATION - HEALTHEAST (OUTPATIENT)
Dept: INTERNAL MEDICINE | Facility: CLINIC | Age: 75
End: 2020-09-29

## 2020-09-29 DIAGNOSIS — J44.9 COPD (CHRONIC OBSTRUCTIVE PULMONARY DISEASE) (H): ICD-10-CM

## 2020-10-13 ENCOUNTER — COMMUNICATION - HEALTHEAST (OUTPATIENT)
Dept: PULMONOLOGY | Facility: OTHER | Age: 75
End: 2020-10-13

## 2020-10-14 ENCOUNTER — COMMUNICATION - HEALTHEAST (OUTPATIENT)
Dept: PULMONOLOGY | Facility: OTHER | Age: 75
End: 2020-10-14

## 2020-10-14 DIAGNOSIS — J47.1 BRONCHIECTASIS WITH (ACUTE) EXACERBATION (H): ICD-10-CM

## 2020-10-21 ENCOUNTER — HOSPITAL ENCOUNTER (OUTPATIENT)
Dept: CT IMAGING | Facility: CLINIC | Age: 75
Discharge: HOME | End: 2020-10-21
Attending: INTERNAL MEDICINE

## 2020-10-21 DIAGNOSIS — J47.1 BRONCHIECTASIS WITH (ACUTE) EXACERBATION (H): ICD-10-CM

## 2020-10-22 ENCOUNTER — COMMUNICATION - HEALTHEAST (OUTPATIENT)
Dept: PULMONOLOGY | Facility: OTHER | Age: 75
End: 2020-10-22

## 2020-10-22 ENCOUNTER — AMBULATORY - HEALTHEAST (OUTPATIENT)
Dept: PULMONOLOGY | Facility: OTHER | Age: 75
End: 2020-10-22

## 2020-10-22 DIAGNOSIS — J47.1 BRONCHIECTASIS WITH ACUTE EXACERBATION (H): ICD-10-CM

## 2020-10-23 ENCOUNTER — AMBULATORY - HEALTHEAST (OUTPATIENT)
Dept: LAB | Facility: CLINIC | Age: 75
End: 2020-10-23

## 2020-10-23 DIAGNOSIS — J47.1 BRONCHIECTASIS WITH ACUTE EXACERBATION (H): ICD-10-CM

## 2020-10-26 ENCOUNTER — COMMUNICATION - HEALTHEAST (OUTPATIENT)
Dept: PULMONOLOGY | Facility: OTHER | Age: 75
End: 2020-10-26

## 2020-10-26 DIAGNOSIS — J47.1 BRONCHIECTASIS WITH ACUTE EXACERBATION (H): ICD-10-CM

## 2020-10-26 LAB
BACTERIA SPEC CULT: ABNORMAL
BACTERIA SPEC CULT: ABNORMAL
GRAM STAIN RESULT: ABNORMAL

## 2020-10-27 ENCOUNTER — AMBULATORY - HEALTHEAST (OUTPATIENT)
Dept: PULMONOLOGY | Facility: OTHER | Age: 75
End: 2020-10-27

## 2020-10-28 ENCOUNTER — COMMUNICATION - HEALTHEAST (OUTPATIENT)
Dept: PULMONOLOGY | Facility: OTHER | Age: 75
End: 2020-10-28

## 2020-10-30 ENCOUNTER — COMMUNICATION - HEALTHEAST (OUTPATIENT)
Dept: PULMONOLOGY | Facility: OTHER | Age: 75
End: 2020-10-30

## 2020-10-31 ENCOUNTER — COMMUNICATION - HEALTHEAST (OUTPATIENT)
Dept: INTERNAL MEDICINE | Facility: CLINIC | Age: 75
End: 2020-10-31

## 2020-10-31 DIAGNOSIS — M85.88 OSTEOPENIA OF LUMBAR SPINE: ICD-10-CM

## 2020-11-02 RX ORDER — ALENDRONATE SODIUM 70 MG/1
70 TABLET ORAL
Qty: 12 TABLET | Refills: 3 | Status: SHIPPED | OUTPATIENT
Start: 2020-11-02 | End: 2021-10-04

## 2020-11-09 ENCOUNTER — COMMUNICATION - HEALTHEAST (OUTPATIENT)
Dept: INTERNAL MEDICINE | Facility: CLINIC | Age: 75
End: 2020-11-09

## 2020-11-09 DIAGNOSIS — J47.9 BRONCHIECTASIS (H): ICD-10-CM

## 2020-11-14 ENCOUNTER — COMMUNICATION - HEALTHEAST (OUTPATIENT)
Dept: INTERNAL MEDICINE | Facility: CLINIC | Age: 75
End: 2020-11-14

## 2020-11-14 DIAGNOSIS — M15.0 PRIMARY OSTEOARTHRITIS INVOLVING MULTIPLE JOINTS: ICD-10-CM

## 2020-11-16 ENCOUNTER — RECORDS - HEALTHEAST (OUTPATIENT)
Dept: ADMINISTRATIVE | Facility: OTHER | Age: 75
End: 2020-11-16

## 2020-11-27 ENCOUNTER — COMMUNICATION - HEALTHEAST (OUTPATIENT)
Dept: INTERNAL MEDICINE | Facility: CLINIC | Age: 75
End: 2020-11-27

## 2020-11-28 ENCOUNTER — AMBULATORY - HEALTHEAST (OUTPATIENT)
Dept: INTERNAL MEDICINE | Facility: CLINIC | Age: 75
End: 2020-11-28

## 2020-11-28 DIAGNOSIS — M54.50 CHRONIC MIDLINE LOW BACK PAIN WITHOUT SCIATICA: ICD-10-CM

## 2020-11-28 DIAGNOSIS — M15.0 PRIMARY OSTEOARTHRITIS INVOLVING MULTIPLE JOINTS: ICD-10-CM

## 2020-11-28 DIAGNOSIS — G62.9 PERIPHERAL POLYNEUROPATHY: ICD-10-CM

## 2020-11-28 DIAGNOSIS — G89.29 CHRONIC MIDLINE LOW BACK PAIN WITHOUT SCIATICA: ICD-10-CM

## 2020-11-28 DIAGNOSIS — J47.9 BRONCHIECTASIS (H): ICD-10-CM

## 2020-11-28 RX ORDER — TRAMADOL HYDROCHLORIDE 50 MG/1
TABLET ORAL
Qty: 60 TABLET | Refills: 0 | Status: SHIPPED | OUTPATIENT
Start: 2020-11-28 | End: 2021-11-08

## 2020-11-28 RX ORDER — CELECOXIB 200 MG/1
200 CAPSULE ORAL DAILY
Qty: 90 CAPSULE | Refills: 3 | Status: SHIPPED | OUTPATIENT
Start: 2020-11-28 | End: 2021-12-17

## 2020-11-30 ENCOUNTER — COMMUNICATION - HEALTHEAST (OUTPATIENT)
Dept: INTERNAL MEDICINE | Facility: CLINIC | Age: 75
End: 2020-11-30

## 2020-12-07 LAB
ACID FAST STN SPEC QL: NORMAL
BACTERIA SPEC CULT: NORMAL

## 2020-12-08 ENCOUNTER — COMMUNICATION - HEALTHEAST (OUTPATIENT)
Dept: INTERNAL MEDICINE | Facility: CLINIC | Age: 75
End: 2020-12-08

## 2020-12-16 ENCOUNTER — COMMUNICATION - HEALTHEAST (OUTPATIENT)
Dept: PULMONOLOGY | Facility: OTHER | Age: 75
End: 2020-12-16

## 2020-12-28 ENCOUNTER — COMMUNICATION - HEALTHEAST (OUTPATIENT)
Dept: PULMONOLOGY | Facility: OTHER | Age: 75
End: 2020-12-28

## 2021-01-04 ENCOUNTER — OFFICE VISIT - HEALTHEAST (OUTPATIENT)
Dept: INTERNAL MEDICINE | Facility: CLINIC | Age: 76
End: 2021-01-04

## 2021-01-04 DIAGNOSIS — M54.50 CHRONIC MIDLINE LOW BACK PAIN WITHOUT SCIATICA: ICD-10-CM

## 2021-01-04 DIAGNOSIS — G43.809 OTHER MIGRAINE WITHOUT STATUS MIGRAINOSUS, NOT INTRACTABLE: ICD-10-CM

## 2021-01-04 DIAGNOSIS — R90.89 ABNORMAL BRAIN MRI: ICD-10-CM

## 2021-01-04 DIAGNOSIS — J96.11 CHRONIC RESPIRATORY FAILURE WITH HYPOXIA, ON HOME O2 THERAPY (H): ICD-10-CM

## 2021-01-04 DIAGNOSIS — J47.9 BRONCHIECTASIS WITHOUT COMPLICATION (H): ICD-10-CM

## 2021-01-04 DIAGNOSIS — G89.29 CHRONIC MIDLINE LOW BACK PAIN WITHOUT SCIATICA: ICD-10-CM

## 2021-01-04 DIAGNOSIS — M85.89 OSTEOPENIA OF MULTIPLE SITES: ICD-10-CM

## 2021-01-04 DIAGNOSIS — J44.9 CHRONIC OBSTRUCTIVE PULMONARY DISEASE, UNSPECIFIED COPD TYPE (H): ICD-10-CM

## 2021-01-04 DIAGNOSIS — Z99.81 CHRONIC RESPIRATORY FAILURE WITH HYPOXIA, ON HOME O2 THERAPY (H): ICD-10-CM

## 2021-01-11 ENCOUNTER — AMBULATORY - HEALTHEAST (OUTPATIENT)
Dept: PULMONOLOGY | Facility: OTHER | Age: 76
End: 2021-01-11

## 2021-01-11 ENCOUNTER — COMMUNICATION - HEALTHEAST (OUTPATIENT)
Dept: PULMONOLOGY | Facility: OTHER | Age: 76
End: 2021-01-11

## 2021-01-11 ENCOUNTER — COMMUNICATION - HEALTHEAST (OUTPATIENT)
Dept: INTERNAL MEDICINE | Facility: CLINIC | Age: 76
End: 2021-01-11

## 2021-01-11 DIAGNOSIS — J47.9 BRONCHIECTASIS (H): ICD-10-CM

## 2021-01-18 ENCOUNTER — COMMUNICATION - HEALTHEAST (OUTPATIENT)
Dept: PULMONOLOGY | Facility: OTHER | Age: 76
End: 2021-01-18

## 2021-01-18 ENCOUNTER — AMBULATORY - HEALTHEAST (OUTPATIENT)
Dept: PULMONOLOGY | Facility: OTHER | Age: 76
End: 2021-01-18

## 2021-01-18 DIAGNOSIS — J44.1 COPD EXACERBATION (H): ICD-10-CM

## 2021-01-21 ENCOUNTER — OFFICE VISIT - HEALTHEAST (OUTPATIENT)
Dept: PULMONOLOGY | Facility: OTHER | Age: 76
End: 2021-01-21

## 2021-01-21 DIAGNOSIS — J47.1 BRONCHIECTASIS WITH ACUTE EXACERBATION (H): ICD-10-CM

## 2021-01-21 DIAGNOSIS — J44.9 CHRONIC OBSTRUCTIVE PULMONARY DISEASE, UNSPECIFIED COPD TYPE (H): ICD-10-CM

## 2021-01-21 RX ORDER — SODIUM CHLORIDE FOR INHALATION 3 %
3 VIAL, NEBULIZER (ML) INHALATION 2 TIMES DAILY
Qty: 180 ML | Refills: 11 | Status: SHIPPED | OUTPATIENT
Start: 2021-01-21 | End: 2022-02-22

## 2021-01-21 ASSESSMENT — MIFFLIN-ST. JEOR: SCORE: 959.78

## 2021-01-22 ENCOUNTER — COMMUNICATION - HEALTHEAST (OUTPATIENT)
Dept: PULMONOLOGY | Facility: OTHER | Age: 76
End: 2021-01-22

## 2021-01-25 ENCOUNTER — AMBULATORY - HEALTHEAST (OUTPATIENT)
Dept: PULMONOLOGY | Facility: OTHER | Age: 76
End: 2021-01-25

## 2021-01-25 DIAGNOSIS — J44.1 COPD EXACERBATION (H): ICD-10-CM

## 2021-01-25 DIAGNOSIS — J47.9 BRONCHIECTASIS (H): ICD-10-CM

## 2021-01-25 RX ORDER — PREDNISONE 20 MG/1
TABLET ORAL
Qty: 7 TABLET | Refills: 0 | Status: SHIPPED | OUTPATIENT
Start: 2021-01-25 | End: 2021-10-13

## 2021-01-28 ENCOUNTER — COMMUNICATION - HEALTHEAST (OUTPATIENT)
Dept: PULMONOLOGY | Facility: OTHER | Age: 76
End: 2021-01-28

## 2021-02-01 ENCOUNTER — COMMUNICATION - HEALTHEAST (OUTPATIENT)
Dept: PULMONOLOGY | Facility: OTHER | Age: 76
End: 2021-02-01

## 2021-02-06 ENCOUNTER — COMMUNICATION - HEALTHEAST (OUTPATIENT)
Dept: INTERNAL MEDICINE | Facility: CLINIC | Age: 76
End: 2021-02-06

## 2021-02-06 DIAGNOSIS — J47.9 BRONCHIECTASIS (H): ICD-10-CM

## 2021-02-08 RX ORDER — MONTELUKAST SODIUM 10 MG/1
TABLET ORAL
Qty: 90 TABLET | Refills: 3 | Status: SHIPPED | OUTPATIENT
Start: 2021-02-08 | End: 2021-12-08

## 2021-02-10 ENCOUNTER — COMMUNICATION - HEALTHEAST (OUTPATIENT)
Dept: PULMONOLOGY | Facility: OTHER | Age: 76
End: 2021-02-10

## 2021-02-10 ENCOUNTER — COMMUNICATION - HEALTHEAST (OUTPATIENT)
Dept: INTERNAL MEDICINE | Facility: CLINIC | Age: 76
End: 2021-02-10

## 2021-02-15 ENCOUNTER — COMMUNICATION - HEALTHEAST (OUTPATIENT)
Dept: PULMONOLOGY | Facility: OTHER | Age: 76
End: 2021-02-15

## 2021-02-16 ENCOUNTER — COMMUNICATION - HEALTHEAST (OUTPATIENT)
Dept: PULMONOLOGY | Facility: OTHER | Age: 76
End: 2021-02-16

## 2021-02-17 ENCOUNTER — COMMUNICATION - HEALTHEAST (OUTPATIENT)
Dept: PULMONOLOGY | Facility: OTHER | Age: 76
End: 2021-02-17

## 2021-02-22 ENCOUNTER — AMBULATORY - HEALTHEAST (OUTPATIENT)
Dept: PULMONOLOGY | Facility: OTHER | Age: 76
End: 2021-02-22

## 2021-02-22 ENCOUNTER — COMMUNICATION - HEALTHEAST (OUTPATIENT)
Dept: PULMONOLOGY | Facility: OTHER | Age: 76
End: 2021-02-22

## 2021-02-22 DIAGNOSIS — J47.9 BRONCHIECTASIS WITHOUT COMPLICATION (H): ICD-10-CM

## 2021-02-22 RX ORDER — ALBUTEROL SULFATE 90 UG/1
2 AEROSOL, METERED RESPIRATORY (INHALATION) EVERY 6 HOURS PRN
Qty: 18 G | Refills: 11 | Status: SHIPPED | OUTPATIENT
Start: 2021-02-22 | End: 2022-03-03

## 2021-02-24 ENCOUNTER — COMMUNICATION - HEALTHEAST (OUTPATIENT)
Dept: PULMONOLOGY | Facility: OTHER | Age: 76
End: 2021-02-24

## 2021-03-14 ENCOUNTER — COMMUNICATION - HEALTHEAST (OUTPATIENT)
Dept: PULMONOLOGY | Facility: OTHER | Age: 76
End: 2021-03-14

## 2021-03-15 ENCOUNTER — COMMUNICATION - HEALTHEAST (OUTPATIENT)
Dept: PULMONOLOGY | Facility: OTHER | Age: 76
End: 2021-03-15

## 2021-03-15 ENCOUNTER — AMBULATORY - HEALTHEAST (OUTPATIENT)
Dept: MULTI SPECIALTY CLINIC | Facility: CLINIC | Age: 76
End: 2021-03-15

## 2021-03-15 DIAGNOSIS — J47.9 BRONCHIECTASIS WITHOUT COMPLICATION (H): ICD-10-CM

## 2021-03-28 ENCOUNTER — COMMUNICATION - HEALTHEAST (OUTPATIENT)
Dept: PULMONOLOGY | Facility: OTHER | Age: 76
End: 2021-03-28

## 2021-03-31 ENCOUNTER — COMMUNICATION - HEALTHEAST (OUTPATIENT)
Dept: PULMONOLOGY | Facility: OTHER | Age: 76
End: 2021-03-31

## 2021-03-31 ENCOUNTER — AMBULATORY - HEALTHEAST (OUTPATIENT)
Dept: PULMONOLOGY | Facility: OTHER | Age: 76
End: 2021-03-31

## 2021-03-31 DIAGNOSIS — J44.1 COPD EXACERBATION (H): ICD-10-CM

## 2021-04-17 ENCOUNTER — COMMUNICATION - HEALTHEAST (OUTPATIENT)
Dept: INTERNAL MEDICINE | Facility: CLINIC | Age: 76
End: 2021-04-17

## 2021-04-17 DIAGNOSIS — G62.9 PERIPHERAL POLYNEUROPATHY: ICD-10-CM

## 2021-04-20 RX ORDER — PREGABALIN 50 MG/1
CAPSULE ORAL
Qty: 90 CAPSULE | Refills: 4 | Status: SHIPPED | OUTPATIENT
Start: 2021-04-20 | End: 2021-09-15

## 2021-04-22 ENCOUNTER — COMMUNICATION - HEALTHEAST (OUTPATIENT)
Dept: PULMONOLOGY | Facility: OTHER | Age: 76
End: 2021-04-22

## 2021-04-26 ENCOUNTER — COMMUNICATION - HEALTHEAST (OUTPATIENT)
Dept: PULMONOLOGY | Facility: OTHER | Age: 76
End: 2021-04-26

## 2021-04-27 ENCOUNTER — OFFICE VISIT - HEALTHEAST (OUTPATIENT)
Dept: PULMONOLOGY | Facility: OTHER | Age: 76
End: 2021-04-27

## 2021-04-27 DIAGNOSIS — J47.9 BRONCHIECTASIS WITHOUT COMPLICATION (H): ICD-10-CM

## 2021-04-27 ASSESSMENT — MIFFLIN-ST. JEOR: SCORE: 959.78

## 2021-05-03 ENCOUNTER — COMMUNICATION - HEALTHEAST (OUTPATIENT)
Dept: INTERNAL MEDICINE | Facility: CLINIC | Age: 76
End: 2021-05-03

## 2021-05-16 ENCOUNTER — COMMUNICATION - HEALTHEAST (OUTPATIENT)
Dept: INTERNAL MEDICINE | Facility: CLINIC | Age: 76
End: 2021-05-16

## 2021-05-25 ENCOUNTER — RECORDS - HEALTHEAST (OUTPATIENT)
Dept: ADMINISTRATIVE | Facility: CLINIC | Age: 76
End: 2021-05-25

## 2021-05-26 ENCOUNTER — RECORDS - HEALTHEAST (OUTPATIENT)
Dept: ADMINISTRATIVE | Facility: CLINIC | Age: 76
End: 2021-05-26

## 2021-05-27 NOTE — TELEPHONE ENCOUNTER
I called the pt.  She is likely colonized.  She feels ok now.  She can take macrolide prn in future if gets worse.     KEELEY tierney

## 2021-05-27 NOTE — TELEPHONE ENCOUNTER
Pt called back, she has not been able to collect any sputum, however is aware we are awaiting this for the culture.

## 2021-05-28 NOTE — TELEPHONE ENCOUNTER
Fiordaliza is going to wait until Dr. Lorenz comes back to schedule. I offered her appointments in June and she will be on a retreat. She will call us in late June to see if Dr. Lorenz schedule is open.

## 2021-05-28 NOTE — TELEPHONE ENCOUNTER
Controlled Substance Refill Request  Medication:   Requested Prescriptions     Pending Prescriptions Disp Refills     pregabalin (LYRICA) 50 MG capsule [Pharmacy Med Name: LYRICA 50 MG CAPSULE 50 CAP] 90 capsule 11     Sig: TAKE THREE CAPSULES BY MOUTH DAILY AS DIRECTED.     Date Last Fill: 1/14/19  Pharmacy: [id number]   Submit electronically to pharmacy  Controlled Substance Agreement on File:   Encounter-Level CSA Scan Date:    There are no encounter-level csa scan date.       Last office visit: Last office visit pertaining to requested medication was 7/5/18.

## 2021-05-29 NOTE — PROGRESS NOTES
Assessment and Plan:   74-year-old female in for annual wellness visit and exam.  Generally doing well.  She has bronchiectasis to COPD and does have some mild shortness of breath with exertion.  She uses oxygen 2 L/min as needed.    1. Routine general medical examination at a health care facility  Still has significant spinal deformities but she gets along okay.  She uses a walker most of the time.  - Lipid Cascade  - Urinalysis-UC if Indicated    2. Bronchiectasis without complication (H)  Oxygen with activity.  She uses antibiotics PRN for any fever or increased sputum.  Prednisone is also use as needed.  She sees a pulmonologist.  - HM1(CBC and Differential)  - HM1 (CBC with Diff)    3. Dyspnea on exertion  This is related to her bronchiectasis.  No recent changes.    4. Osteopenia of multiple sites  He is due for a bone density.  She takes vitamin D daily.  No fractures  - Vitamin D, Total (25-Hydroxy)  - DXA Bone Density Scan; Future    5. Idiopathic peripheral neuropathy  Uses Lyrica with good relief of some of her symptoms.  Still has burning discomfort in the buttock region.  - Comprehensive Metabolic Panel  - Thyroid Cascade        The patient's current medical problems were reviewed.    I have had an Advance Directives discussion with the patient.  The following health maintenance schedule was reviewed with the patient and provided in printed form in the after visit summary:   Health Maintenance   Topic Date Due     FALL RISK ASSESSMENT  06/18/2019     DXA SCAN  08/28/2019     TD 18+ HE  07/21/2020     MAMMOGRAM  08/31/2020     COLONOSCOPY  06/24/2021     ADVANCE DIRECTIVES DISCUSSED WITH PATIENT  02/09/2024     PNEUMOCOCCAL POLYSACCHARIDE VACCINE AGE 65 AND OVER  Completed     INFLUENZA VACCINE RULE BASED  Completed     PNEUMOCOCCAL CONJUGATE VACCINE FOR ADULTS (PCV13 OR PREVNAR)  Completed     ZOSTER VACCINES  Completed        Subjective:   Chief Complaint: Fiordaliza Najera is an 74 y.o. female here for  an Annual Wellness visit.   HPI: 74-year-old woman for annual well exam.  No chest pain or orthopnea  Positive bronchiectasis-COPD.  Chronic sputum production.  Uses albuterol-saline nebs daily  No dysphasia.  No change in bowel habits.  No melena  Minor stress incontinence no hematuria dysuria  No significant tremor, TIAs, epilepsy.  Does have peripheral neuropathy.  No dermatologic issues.  No polydipsia or polyuria.  No history of diabetes      Review of Systems:    Please see above.  The rest of the review of systems are negative for all systems.    Patient Care Team:  Tavo Bassett MD as PCP - General (Internal Medicine)     Patient Active Problem List   Diagnosis     Osteopenia     Bronchiectasis without complication (H)     Dyspnea on exertion     Past Medical History:   Diagnosis Date     Chronic low back pain      COPD (chronic obstructive pulmonary disease) (H)      Diverticulosis      Hiatal hernia      Mild asthma      Neuropathy (H)      Osteopenia      Temporomandibular joint (TMJ) pain       Past Surgical History:   Procedure Laterality Date     ANTERIOR / POSTERIOR COMBINED FUSION LUMBAR SPINE       HYSTERECTOMY       RETINAL LASER PROCEDURE Left 10/04/2016     SALPINGOOPHORECTOMY       TOTAL KNEE ARTHROPLASTY  2008      Family History   Problem Relation Age of Onset     Dementia Mother         passed age 88     COPD Father         passed age 78      Social History     Socioeconomic History     Marital status: Single     Spouse name: Not on file     Number of children: Not on file     Years of education: Not on file     Highest education level: Not on file   Occupational History     Not on file   Social Needs     Financial resource strain: Not on file     Food insecurity:     Worry: Not on file     Inability: Not on file     Transportation needs:     Medical: Not on file     Non-medical: Not on file   Tobacco Use     Smoking status: Never Smoker     Smokeless tobacco: Never Used   Substance and  Sexual Activity     Alcohol use: No     Drug use: Not on file     Sexual activity: Not on file   Lifestyle     Physical activity:     Days per week: Not on file     Minutes per session: Not on file     Stress: Not on file   Relationships     Social connections:     Talks on phone: Not on file     Gets together: Not on file     Attends L[employer]-day service: Not on file     Active member of club or organization: Not on file     Attends meetings of clubs or organizations: Not on file     Relationship status: Not on file     Intimate partner violence:     Fear of current or ex partner: Not on file     Emotionally abused: Not on file     Physically abused: Not on file     Forced sexual activity: Not on file   Other Topics Concern     Not on file   Social History Narrative    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 1 Inhaler 3     BIOTIN ORAL Take by mouth.       calcium-vitamin D (CALCIUM-VITAMIN D) 500 mg(1,250mg) -200 unit per tablet Take 1 tablet by mouth daily.       celecoxib (CELEBREX) 200 MG capsule Take 1 capsule (200 mg total) by mouth daily. 90 capsule 3     cholecalciferol, vitamin D3, (VITAMIN D3) 1,000 unit capsule Take 1,000 Units by mouth daily.       famotidine (PEPCID) 40 MG tablet Take 1 tablet (40 mg total) by mouth at bedtime. 30 tablet 11     INCRUSE ELLIPTA 62.5 mcg/actuation DsDv inhaler INHALE ONE PUFF BY MOUTH ONCE DAILY 30 each 11     Lactobacillus rhamnosus GG (CULTURELLE) 10-15 Billion cell capsule Take 1 capsule by mouth daily.       montelukast (SINGULAIR) 10 mg tablet TAKE 1 TABLET(10 MG) BY MOUTH AT BEDTIME 90 tablet 3     multivitamin therapeutic (THERAGRAN) tablet Take 1 tablet by mouth daily.       naproxen sodium (ALEVE) 220 MG tablet Take 220 mg by mouth as needed for pain.        "nystatin (MYCOSTATIN) cream   1     pregabalin (LYRICA) 50 MG capsule TAKE THREE CAPSULES BY MOUTH DAILY AS DIRECTED. 90 capsule 11     sodium chloride 3 % nebulizer solution Take 3 mL by nebulization daily. 15 mL 11     SUMAtriptan (IMITREX) 50 MG tablet Take 1 tablet (50 mg total) by mouth every 2 (two) hours as needed for migraine. 30 tablet 1     traMADol (ULTRAM) 50 mg tablet Take 1 tablet (50 mg total) by mouth every 8 (eight) hours as needed. 60 tablet 0     amoxicillin (AMOXIL) 500 MG capsule Take 4 tabs 1 hour prior to dental procedure 8 capsule 1     fluticasone-salmeterol (ADVAIR) 100-50 mcg/dose DISKUS Inhale 1 puff 2 (two) times a day. 60 each 11     No current facility-administered medications for this visit.       Objective:   Vital Signs:   Visit Vitals  Pulse 80   Ht 5' 2.25\" (1.581 m)   Wt 120 lb (54.4 kg)   LMP  (LMP Unknown)   SpO2 91%   BMI 21.77 kg/m         VisionScreening:  No exam data present     PHYSICAL EXAM    EYES: Eyelids, conjunctiva, and sclera were normal. Pupils were normal. Cornea, iris, and lens were normal bilaterally.  HEAD, EARS, NOSE, MOUTH, AND THROAT: Head and face were normal. Hearing was normal to voice and the ears were normal to external exam. Nose appearance was normal and there was no discharge. Oropharynx was normal.  Cataract surgery in the past  NECK: Neck appearance was normal. There were no neck masses and the thyroid was not enlarged.  No bruits.  RESPIRATORY: Breathing pattern was normal and the chest moved symmetrically.  Percussion/auscultatory percussion was normal.  Lung sounds are abnormal.  Bilateral squeaks and rhonchi at the lung bases consistent with her diagnosis of bronchiectasis.  CARDIOVASCULAR: Heart rate and rhythm were normal.  S1 and S2 were normal and there were no extra sounds or murmurs. Peripheral pulses in arms and legs were normal.  Jugular venous pressure was normal.  There was no peripheral edema.  Probable mid systolic " click  GASTROINTESTINAL: The abdomen was normal in contour.  Bowel sounds were present.  Percussion detected no organ enlargement or tenderness.  Palpation detected no tenderness, mass, or enlarged organs.   MUSCULOSKELETAL: Skeletal configuration was normal and muscle mass was normal for age. Joint appearance was overall normal.  Both knees surgically replaced.  Good range of motion to hips and knees.  LYMPHATIC: There were no enlarged nodes.  SKIN/HAIR/NAILS: Skin color was normal.  There were no skin lesions.  Hair and nails were normal.  NEUROLOGIC: The patient was alert and oriented to person, place, time, and circumstance. Speech was normal. Cranial nerves were normal. Motor strength was normal for age. The patient was normally coordinated.  Normal monofilament fiber sensation in hands and feet.  PSYCHIATRIC:  Mood and affect were normal and the patient had normal recent and remote memory. The patient's judgment and insight were normal.    ADDITIONAL VITAL SIGNS: Oxygen saturation 91%  CHEST WALL/BREASTS: No palpable masses  RECTAL: Not checked  GENITAL/URINARY: Normal female; manual pelvic exam not done    Thin woman who is in no distress.  She ambulates with a walker.  She uses oxygen with activity.  2 L/min.  Blood pressure 124/78.  Pulse is 80.  Oxygen saturations on room air 91%.    Assessment Results 6/19/2019   Activities of Daily Living No help needed   Instrumental Activities of Daily Living No help needed   Get Up and Go Score Less than 12 seconds   Mini Cog Total Score 5   Some recent data might be hidden     A Mini-Cog score of 0-2 suggests the possibility of dementia, score of 3-5 suggests no dementia    Identified Health Risks:     The patient was provided with suggestions to help her develop a healthy physical lifestyle.   Information on urinary incontinence and treatment options given to patient.  Patient's advanced directive was discussed and I am comfortable with the patient's wishes.

## 2021-05-29 NOTE — PATIENT INSTRUCTIONS - HE
Patient Education     Your Health Risk Assessment indicates you feel you are not in good physical health.    A healthy lifestyle helps keep the body fit and the mind alert. It helps protect you from disease, helps you fight disease, and helps prevent chronic disease (disease that doesn't go away) from getting worse. This is important as you get older and begin to notice twinges in muscles and joints and a decline in the strength and stamina you once took for granted. A healthy lifestyle includes good healthcare, good nutrition, weight control, recreation, and regular exercise. Avoid harmful substances and do what you can to keep safe. Another part of a healthy lifestyle is stay mentally active and socially involved.    Good healthcare     Have a wellness visit every year.     If you have new symptoms, let us know right away. Don't wait until the next checkup.     Take medicines exactly as prescribed and keep your medicines in a safe place. Tell us if your medicine causes problems.   Healthy diet and weight control     Eat 3 or 4 small, nutritious, low-fat, high-fiber meals a day. Include a variety of fruits, vegetables, and whole-grain foods.     Make sure you get enough calcium in your diet. Calcium, vitamin D, and exercise help prevent osteoporosis (bone thinning).     If you live alone, try eating with others when you can. That way you get a good meal and have company while you eat it.     Try to keep a healthy weight. If you eat more calories than your body uses for energy, it will be stored as fat and you will gain weight.     Recreation   Recreation is not limited to sports and team events. It includes any activity that provides relaxation, interest, enjoyment, and exercise. Recreation provides an outlet for physical, mental, and social energy. It can give a sense of worth and achievement. It can help you stay healthy.       Patient Education   Urinary Incontinence, Female (Adult)  Urinary incontinence means  loss of control of the bladder. This problem affects many women, especially as they get older. If you have incontinence, you may be embarrassed to ask for help. But know that this problem can be treated.  Types of Incontinence  There are different types of incontinence. Two of the main types are described here. You can have more than one type.    Stress incontinence. With this type, urine leaks when pressure (stress) is put on the bladder. This may happen when you cough, sneeze, or laugh. Stress incontinence most often occurs because the pelvic floor muscles that support the bladder and urethra are weak. This can happen after pregnancy and vaginal childbirth or a hysterectomy. It can also be due to excess body weight or hormone changes.    Urge incontinence (also called overactive bladder). With this type, a sudden urge to urinate is felt often. This may happen even though there may not be much urine in the bladder. The need to urinate often during the night is common. Urge incontinence most often occurs because of bladder spasms. This may be due to bladder irritation or infection. Damage to bladder nerves or pelvic muscles, constipation, and certain medicines can also lead to urge incontinence.  Treatment of urinary incontinence depends on the cause. Further evaluation is needed to find the type you have. This will likely include an exam and certain tests. Based on the results, you and your healthcare provider can then plan treatment. Until a diagnosis is made, the home care tips below can help relieve symptoms.  Home care    Do pelvic floor muscle exercises, if they are prescribed. The pelvic floor muscles help support the bladder and urethra. Many women find that their symptoms improve when doing special exercises that strengthen these muscles. To do the exercises contract the muscles you would use to stop your stream of urine, but do this when you re not urinating. Hold for 10 seconds, then relax. Repeat 10 to  20 times in a row, at least 3 times a day. Your provider may give you other instructions for how to do the exercises and how often.    Keep a bladder diary. This helps track how often and how much you urinate over a set period of time. Bring this diary with you to your next visit with the provider. The information can help your provider learn more about your bladder problem.    Lose weight, if advised to by your provider. Excess weight puts pressure on the bladder. Your provider can help you create a weight-loss plan that s right for you. This may include exercising more and making certain diet changes.    Don't consume foods and drinks that may irritate the bladder. These can include alcohol and caffeinated drinks.    Quit smoking. Smoking and other tobacco use can lead to chronic cough that strains the pelvic floor muscles. Smoking may also damage the bladder and urethra. Talk with your provider about treatments or methods you can use to quit smoking.    If drinking large amounts of fluid causes you to have symptoms, you may be advised to limit your fluid intake. You may also be advised to drink most of your fluids during the day and to limit fluids at night.    If you re worried about urine leakage or accidents, you may wear absorbent pads to catch urine. Change the pads often. This helps reduce discomfort. It may also reduce the risk of skin or bladder infections.  Follow-up care  Follow up with your healthcare provider, or as directed. It may take some to find the right treatment for your problem. Your treatment plan may include special therapies or medicines. Certain procedures or surgery may also be options. Be sure to discuss any questions you have with your provider.  When to seek medical advice  Call the healthcare provider right away if any of these occur:    Fever of 100.4 F (38 C) or higher, or as directed by your provider    Bladder pain or fullness    Abdominal swelling    Nausea or vomiting    Back  pain    Weakness, dizziness or fainting  Date Last Reviewed: 10/1/2017    1766-8397 [employer]. [address] [place] [address], [place] [address]. All rights reserved. This information is not intended as a substitute for professional medical care. Always follow your healthcare professional's instructions.       Advance Directive  Patient s advance directive was discussed and I am comfortable with the patient s wishes.  Patient Education   Personalized Prevention Plan  You are due for the preventive services outlined below.  Your care team is available to assist you in scheduling these services.  If you have already completed any of these items, please share that information with your care team to update in your medical record.  Health Maintenance   Topic Date Due     FALL RISK ASSESSMENT  06/18/2019     DXA SCAN  08/28/2019     TD 18+ HE  07/21/2020     MAMMOGRAM  08/31/2020     COLONOSCOPY  06/24/2021     ADVANCE DIRECTIVES DISCUSSED WITH PATIENT  02/09/2024     PNEUMOCOCCAL POLYSACCHARIDE VACCINE AGE 65 AND OVER  Completed     INFLUENZA VACCINE RULE BASED  Completed     PNEUMOCOCCAL CONJUGATE VACCINE FOR ADULTS (PCV13 OR PREVNAR)  Completed     ZOSTER VACCINES  Completed

## 2021-05-30 VITALS — BODY MASS INDEX: 22.32 KG/M2 | WEIGHT: 126 LBS

## 2021-05-31 ENCOUNTER — RECORDS - HEALTHEAST (OUTPATIENT)
Dept: ADMINISTRATIVE | Facility: CLINIC | Age: 76
End: 2021-05-31

## 2021-05-31 VITALS — BODY MASS INDEX: 22.32 KG/M2 | WEIGHT: 126 LBS | HEIGHT: 63 IN

## 2021-05-31 VITALS — WEIGHT: 125.3 LBS | BODY MASS INDEX: 22.2 KG/M2

## 2021-05-31 NOTE — PROGRESS NOTES
Assessment/Plan:        Diagnoses and all orders for this visit:    Bronchiectasis without complication (H)    Chronic respiratory failure with hypoxia (H)    Dyspnea on exertion     71-year-old female with COPD and bronchiectasis with respiratory failure.  She is clinically been doing okay but continued to have slowly worsening exercise capacity.    Continue control inhalers.  Continue oxygen 2 L/min with activity and at night.  Continue regular exercise.    Greater than 25 minutes spent, greater than 50% counseling and coordination of care.                Subjective:    Patient ID: Fiordaliza Najera is a 74 y.o. female.        Bronchiectasis: Overall this is stable.  She is having a lot of sputum production.  Symptoms typically get worse with humid weather and heat.  Symptoms localized to the chest.  No other provocative palliative or localizing factors.  No hemoptysis or fevers.    Chronic hypoxic respiratory failure: The setting of exercise.  Tolerates portable concentrator.  At this point she cannot walk very far at all without oxygen.  She is using her oxygen a little bit more.    Shortness of breath: This has progressed.  She feels like she is a little bit more limited.  This is due in part to have more difficulty breathing, and is also due in part to more logistics of having a walker and an oxygen concentrator.  She is frustrated with this.      Review of Systems positive for cough shortness of breath joint muscle pain.  Remainder of a 12 point review systems is negative.          Objective:    Physical Exam   Constitutional: She is oriented to person, place, and time. She appears well-developed and well-nourished. No distress.   HENT:   Head: Normocephalic and atraumatic.   Nose: Nose normal.   Eyes: Pupils are equal, round, and reactive to light. Conjunctivae are normal. Right eye exhibits no discharge. Left eye exhibits no discharge. No scleral icterus.   Neck: Normal range of motion. No JVD present. No  tracheal deviation present. No thyromegaly present.   Cardiovascular: Normal rate, regular rhythm and normal heart sounds. Exam reveals no gallop.   No murmur heard.  Pulmonary/Chest: Effort normal and breath sounds normal. No stridor. No respiratory distress.   Mild crackles.   Musculoskeletal: She exhibits no edema or deformity.   Lymphadenopathy:     She has no cervical adenopathy.   Neurological: She is alert and oriented to person, place, and time. No cranial nerve deficit. Coordination normal.   Skin: Skin is warm and dry. She is not diaphoretic. No erythema.   Psychiatric: She has a normal mood and affect. Her behavior is normal. Thought content normal.   Nursing note and vitals reviewed.  BP 98/58   Pulse 76   Resp 20   Wt 120 lb 1.6 oz (54.5 kg)   LMP  (LMP Unknown)   SpO2 90% Comment: MIGUEL  BMI 21.79 kg/m            Current Outpatient Medications on File Prior to Visit   Medication Sig Dispense Refill     albuterol (PROAIR HFA;PROVENTIL HFA;VENTOLIN HFA) 90 mcg/actuation inhaler Inhale 2 puffs every 6 (six) hours as needed for wheezing. 1 Inhaler 3     amoxicillin (AMOXIL) 500 MG capsule Take 4 tabs 1 hour prior to dental procedure 8 capsule 1     BIOTIN ORAL Take by mouth.       calcium-vitamin D (CALCIUM-VITAMIN D) 500 mg(1,250mg) -200 unit per tablet Take 1 tablet by mouth daily.       celecoxib (CELEBREX) 200 MG capsule Take 1 capsule (200 mg total) by mouth daily. 90 capsule 3     cholecalciferol, vitamin D3, (VITAMIN D3) 1,000 unit capsule Take 1,000 Units by mouth daily.       famotidine (PEPCID) 40 MG tablet TAKE 1 TABLET BY MOUTH AT BEDTIME 30 tablet 5     Lactobacillus rhamnosus GG (CULTURELLE) 10-15 Billion cell capsule Take 1 capsule by mouth daily.       montelukast (SINGULAIR) 10 mg tablet TAKE 1 TABLET(10 MG) BY MOUTH AT BEDTIME 90 tablet 3     multivitamin therapeutic (THERAGRAN) tablet Take 1 tablet by mouth daily.       naproxen sodium (ALEVE) 220 MG tablet Take 220 mg by mouth as  needed for pain.       nystatin (MYCOSTATIN) cream   1     OXYGEN-AIR DELIVERY SYSTEMS [facility] Use 2 L As Directed. 2L with activity and at night  [employer]       pregabalin (LYRICA) 50 MG capsule TAKE THREE CAPSULES BY MOUTH DAILY AS DIRECTED. 90 capsule 11     sodium chloride 3 % nebulizer solution Take 3 mL by nebulization daily. 15 mL 11     SUMAtriptan (IMITREX) 50 MG tablet Take 1 tablet (50 mg total) by mouth every 2 (two) hours as needed for migraine. 30 tablet 1     traMADol (ULTRAM) 50 mg tablet Take 1 tablet (50 mg total) by mouth every 8 (eight) hours as needed. 60 tablet 0     umeclidinium (INCRUSE ELLIPTA) 62.5 mcg/actuation DsDv inhaler Inhale 1 puff daily. 30 each 1     fluticasone-salmeterol (ADVAIR) 100-50 mcg/dose DISKUS Inhale 1 puff 2 (two) times a day. 60 each 11     No current facility-administered medications on file prior to visit.      BP 98/58   Pulse 76   Resp 20   Wt 120 lb 1.6 oz (54.5 kg)   LMP  (LMP Unknown)   SpO2 90% Comment: RA  BMI 21.79 kg/m      Medical History  Active Ambulatory (Non-Hospital) Problems    Diagnosis     Idiopathic peripheral neuropathy     Bronchiectasis without complication (H)     Dyspnea on exertion     Osteopenia     Past Medical History:   Diagnosis Date     Chronic low back pain      COPD (chronic obstructive pulmonary disease) (H)      Diverticulosis      Hiatal hernia      Mild asthma      Neuropathy      Osteopenia      Temporomandibular joint (TMJ) pain            Social history reviewed today: She is still teaching but only as a volunteer..   Allergies  Allergies   Allergen Reactions     Codeine      Morphine Itching                              Data Review - imaging, labs, and ekgs listed below were reviewed by me.  Chest XRay and chest CT images and EKG tracings interpreted personally.     Past Labs  Ancillary Procedure on 08/07/2019   Component Date Value     Hgb 08/07/2019 12.7    Physical on 06/19/2019   Component Date Value     Sodium  06/19/2019 141      Potassium 06/19/2019 3.7      Chloride 06/19/2019 104      CO2 06/19/2019 30      Anion Gap, Calculation 06/19/2019 7      Glucose 06/19/2019 84      BUN 06/19/2019 17      Creatinine 06/19/2019 0.75      GFR MDRD Af Amer 06/19/2019 >60      GFR MDRD Non Af Amer 06/19/2019 >60      Bilirubin, Total 06/19/2019 0.6      Calcium 06/19/2019 10.5      Protein, Total 06/19/2019 7.1      Albumin 06/19/2019 3.6      Alkaline Phosphatase 06/19/2019 84      AST 06/19/2019 24      ALT 06/19/2019 17      Cholesterol 06/19/2019 192      Triglycerides 06/19/2019 92      HDL Cholesterol 06/19/2019 69      LDL Calculated 06/19/2019 105      Patient Fasting > 8hrs? 06/19/2019 Yes      TSH 06/19/2019 3.94      Color, UA 06/19/2019 Yellow      Clarity, UA 06/19/2019 Clear      Glucose, UA 06/19/2019 Negative      Bilirubin, UA 06/19/2019 Negative      Ketones, UA 06/19/2019 Negative      Specific Gravity, UA 06/19/2019 1.015      Blood, UA 06/19/2019 Negative      pH, UA 06/19/2019 7.0      Protein, UA 06/19/2019 Negative      Urobilinogen, UA 06/19/2019 0.2 E.U./dL      Nitrite, UA 06/19/2019 Negative      Leukocytes, UA 06/19/2019 Negative      Vitamin D, Total (25-Hyd* 06/19/2019 40.5      WBC 06/19/2019 10.3      RBC 06/19/2019 4.55      Hemoglobin 06/19/2019 13.6      Hematocrit 06/19/2019 40.5      MCV 06/19/2019 89      MCH 06/19/2019 29.9      MCHC 06/19/2019 33.5      RDW 06/19/2019 11.8      Platelets 06/19/2019 322      MPV 06/19/2019 7.0      Neutrophils % 06/19/2019 74*     Lymphocytes % 06/19/2019 14*     Monocytes % 06/19/2019 9      Eosinophils % 06/19/2019 2      Basophils % 06/19/2019 1      Neutrophils Absolute 06/19/2019 7.7      Lymphocytes Absolute 06/19/2019 1.5      Monocytes Absolute 06/19/2019 0.9      Eosinophils Absolute 06/19/2019 0.2      Basophils Absolute 06/19/2019 0.1        Pulmonary function test raw data and tracings reviewed and interpreted by me: Severe obstruction, preserved  lung volumes, severe reduction in diffusion capacity.

## 2021-06-01 VITALS — BODY MASS INDEX: 22.84 KG/M2 | HEIGHT: 62 IN | WEIGHT: 124.12 LBS

## 2021-06-01 VITALS — BODY MASS INDEX: 21.9 KG/M2 | HEIGHT: 62 IN | WEIGHT: 119 LBS

## 2021-06-01 VITALS — WEIGHT: 125 LBS | HEIGHT: 62 IN | BODY MASS INDEX: 23 KG/M2

## 2021-06-01 VITALS — WEIGHT: 124.3 LBS | BODY MASS INDEX: 22.02 KG/M2

## 2021-06-01 NOTE — TELEPHONE ENCOUNTER
Call from Sr. Asking about orders for sputum?      Per Dr. Lorenz:  Please get 2 sputums for AFB and one for culture.

## 2021-06-01 NOTE — TELEPHONE ENCOUNTER
Called and spoke with patient. Per Dr. Lorenz.  Cultures show H influenza.  She should take 10 days of doxycycline. She started this on Monday and will continue for the 10 days as instructed.

## 2021-06-02 ENCOUNTER — RECORDS - HEALTHEAST (OUTPATIENT)
Dept: ADMINISTRATIVE | Facility: CLINIC | Age: 76
End: 2021-06-02

## 2021-06-02 VITALS — BODY MASS INDEX: 22.03 KG/M2 | WEIGHT: 121.4 LBS

## 2021-06-02 VITALS — WEIGHT: 120 LBS | BODY MASS INDEX: 21.77 KG/M2

## 2021-06-02 VITALS — WEIGHT: 122 LBS | BODY MASS INDEX: 22.14 KG/M2

## 2021-06-02 NOTE — TELEPHONE ENCOUNTER
Controlled Substance Refill Request  Medication:   Requested Prescriptions     Pending Prescriptions Disp Refills     pregabalin (LYRICA) 50 MG capsule [Pharmacy Med Name: PREGABALIN 50 MG CAPS 50 CAP] 90 capsule 4     Sig: TAKE THREE CAPSULES BY MOUTH DAILY AS DIRECTED.     Date Last Fill: 5.13.19  Pharmacy: [facility]   Submit electronically to pharmacy  Controlled Substance Agreement on File:   Encounter-Level CSA Scan Date:    There are no encounter-level csa scan date.       Last office visit: Last office visit pertaining to requested medication was 6.19.19.

## 2021-06-03 VITALS — WEIGHT: 120.1 LBS | BODY MASS INDEX: 21.79 KG/M2

## 2021-06-03 VITALS — WEIGHT: 120 LBS | BODY MASS INDEX: 22.08 KG/M2 | HEIGHT: 62 IN

## 2021-06-03 NOTE — TELEPHONE ENCOUNTER
RN cannot approve Refill Request    RN can NOT refill this medication med is not covered by policy/route to provider. Last office visit: 7/5/2018 Tavo Bassett MD Last Physical: 6/19/2019 Last MTM visit: Visit date not found Last visit same specialty: 7/5/2018 Tavo Bassett MD.  Next visit within 3 mo: Visit date not found  Next physical within 3 mo: Visit date not found      Nicky Edwards, [facility] Triage/Med Refill 11/17/2019    Requested Prescriptions   Pending Prescriptions Disp Refills     celecoxib (CELEBREX) 200 MG capsule [Pharmacy Med Name: CELECOXIB 200 MG CAPSULE** 200 CAP] 90 capsule 3     Sig: TAKE 1 CAPSULE (200 MG TOTAL) BY MOUTH DAILY.       There is no refill protocol information for this order        montelukast (SINGULAIR) 10 mg tablet [Pharmacy Med Name: MONTELUKAST SOD 10 MG TAB** 10 TAB] 90 tablet 3     Sig: TAKE 1 TABLET(10 MG) BY MOUTH AT BEDTIME       There is no refill protocol information for this order

## 2021-06-05 VITALS
WEIGHT: 113 LBS | DIASTOLIC BLOOD PRESSURE: 60 MMHG | OXYGEN SATURATION: 96 % | SYSTOLIC BLOOD PRESSURE: 102 MMHG | BODY MASS INDEX: 20.8 KG/M2 | HEIGHT: 62 IN | HEART RATE: 105 BPM

## 2021-06-05 VITALS — BODY MASS INDEX: 20.8 KG/M2 | WEIGHT: 113 LBS | HEIGHT: 62 IN

## 2021-06-05 VITALS
OXYGEN SATURATION: 92 % | HEIGHT: 62 IN | WEIGHT: 113 LBS | DIASTOLIC BLOOD PRESSURE: 82 MMHG | HEART RATE: 68 BPM | SYSTOLIC BLOOD PRESSURE: 138 MMHG | BODY MASS INDEX: 20.8 KG/M2

## 2021-06-06 NOTE — TELEPHONE ENCOUNTER
Controlled Substance Refill Request  Medication Name:   Requested Prescriptions     Pending Prescriptions Disp Refills     pregabalin (LYRICA) 50 MG capsule [Pharmacy Med Name: PREGABALIN 50 MG CAPS 50 CAP] 90 capsule 4     Sig: TAKE 3 CAPSULES BY MOUTH DAILY AS DIRECTED. USE DATES: 1/27/20-2/26/20     Date Last Fill: 10/21/2019 with 4 refills  Requested Pharmacy: [facility]  Submit electronically to pharmacy  Controlled Substance Agreement on file:   Encounter-Level CSA Scan Date:    There are no encounter-level csa scan date.        Last office visit:  6/19/2019 with PCP Dr KEELEY Bassett

## 2021-06-08 NOTE — TELEPHONE ENCOUNTER
Refill Approved    Rx renewed per Medication Renewal Policy. Medication was last renewed on 9/16/19.    Antonette Miles, [facility] Triage/Med Refill 5/19/2020     Requested Prescriptions   Pending Prescriptions Disp Refills     alendronate (FOSAMAX) 70 MG tablet [Pharmacy Med Name: ALENDRONATE NA 70 MG TABLET 70 TAB] 12 tablet 3     Sig: TAKE 1 TABLET (70 MG TOTAL) BY MOUTH EVERY 7 DAYS. TAKE IN THE MORNING ON AN EMPTY STOMACH WITH A FULL GLASS OF WATER 30 MINUTES BEFORE FOOD       Biphosphonates Refill Protocol Passed - 5/16/2020  1:48 PM        Passed - PCP or prescribing provider visit in last 12 months     Last office visit with prescriber/PCP: 7/5/2018 Tavo Bassett MD OR same dept: Visit date not found OR same specialty: 7/5/2018 Tavo Bassett MD  Last physical: 6/19/2019 Last MTM visit: Visit date not found   Next visit within 3 mo: Visit date not found  Next physical within 3 mo: Visit date not found  Prescriber OR PCP: Tavo Bassett MD  Last diagnosis associated with med order: 1. Osteopenia of lumbar spine  - alendronate (FOSAMAX) 70 MG tablet [Pharmacy Med Name: ALENDRONATE NA 70 MG TABLET 70 TAB]; Take 1 tablet (70 mg total) by mouth every 7 days. Take in the morning on an empty stomach with a full glass of water 30 minutes before food  Dispense: 12 tablet; Refill: 3    If protocol passes may refill for 12 months if within 3 months of last provider visit (or a total of 15 months).             Passed - Serum creatinine in last 12 months     Creatinine   Date Value Ref Range Status   06/19/2019 0.75 0.60 - 1.10 mg/dL Final

## 2021-06-09 NOTE — TELEPHONE ENCOUNTER
Controlled Substance Refill Request  Medication Name:   Requested Prescriptions     Pending Prescriptions Disp Refills     pregabalin (LYRICA) 50 MG capsule [Pharmacy Med Name: PREGABALIN 50 MG CAPS 50 CAP] 90 capsule 4     Sig: TAKE 3 CAPSULES BY MOUTH DAILY AS DIRECTED.     Date Last Fill: 2/25/20  Requested Pharmacy: [facility]  Submit electronically to pharmacy  Controlled Substance Agreement on file:   Encounter-Level CSA Scan Date:    There are no encounter-level csa scan date.        Last office visit:  6/19/19

## 2021-06-09 NOTE — TELEPHONE ENCOUNTER
Patient asking for this to be done by tomorrow. She also sent another message stating:    PS I only want something I can take at the time I feel anxious,   not that I have to take all the time; with no side effects.  Thank you,  Sr.  MARYAM Javier

## 2021-06-10 NOTE — PROGRESS NOTES
Patient oxygen saturation on RA at rest is 93%.  Oxygen saturation after ambulating 300ft on RA is 87-88%.    After ambulating 300ft on 2LPM oxygen saturation is 92%.    Patient is ambulatory within his/her home.     Uses [facility] for O2 supplies.    ...........................Sy Maral MOREL 5/12/2017 9:10 AM

## 2021-06-10 NOTE — PROGRESS NOTES
Assessment/Plan:        Diagnoses and all orders for this visit:    Bronchiectasis without complication  -     doxycycline (ADOXA) 100 MG tablet; Take 1 tablet (100 mg total) by mouth 2 (two) times a day.  Dispense: 20 tablet; Refill: 2  -     predniSONE (DELTASONE) 20 MG tablet; Take 1 tablet (20 mg total) by mouth daily for 10 days.  Dispense: 10 tablet; Refill: 0  -     Oxygen via concentrator    Dyspnea on exertion    Chronic respiratory failure with hypoxia  -     Oxygen via concentrator   71-year-old female with COPD and bronchiectasis with respiratory failure.  Overall she is doing fairly well.  Her decreased exercise tolerance is possibly due to her decreased exercise level.  We will make sure she has an emergency plan she will continue her current inhalers.    Continue increased, Advair, Singulair, albuterol.    Emergency Plan - Bronchitis or Chest Cold  Doxycycline 1 tab twice daily for 10 days    If you are very wheezy or bringing up lots of sputum or otherwise feel quite sick  Prednisone 1 tab daily for 10 days    Oxygen Plan  2 lpm with activity          Due to desaturation of SaO2 less than or equal to 88% on room air at rest from COPD and bronchiectasis, home oxygen therapy will benefit my patient's condition.  The patient has tried (or considered) other medications with limited success and oxygen is still required. The patient is mobile in the home and requires portability.      Subjective:    Patient ID: Fiordaliza Najera is a 71 y.o. female.    HPI Comments: 71F here for follow up of bronchiectasis and COPD.    COPD: She had a relatively uneventful winter and spring.  However she had new onset of laryngitis and chest tightness about a week ago.  The setting of possible allergic exposure.  Symptoms localized to her chest.  She started on doxycycline.  She is feeling a little bit better.  Pertinent positives include small amount of sputum production.  Pertinent negatives include no sinus stuffiness no  hemoptysis.  Medications below  Incruse daily  Advair twice daily  Montelukast    Uses albuterol only occasionally.    Chronic hypoxic respiratory failure: She uses 2 L/min for exertion.  She is a concentrator.  She does not have any issues with her oxygen.    Dyspnea: She feels like her exercise tolerance is decreased.  Her dyspnea is worse with exertion.  Her dyspnea is better with rest.  Symptoms localized to the chest.  Separate from the laryngitis and chest tightness episode noted above.  She is walking inside instead of outside to avoid allergic exposure.  She has probably had a little bit less exercise than previous.      --- from previous  Was doing really well.    Became sick over thanksgiving.  Primarily GI and fatigue.  Now using more oxygen.    Coughing more but not coughing up a lot.    Her oxygen number    --- from previous  71F here for follow up.    She has noticed that her breathing is still difficult.  She is on an aerobic machine and then walks for 20-30 minutes.   The rescue inhaler and the oxygen don't seem to change her symptoms.    She does not have much of a productive cough.    Both cataract surgeries were done without difficulty.    No plans for orthopedic surgery.      --- from previous  71F here for of follow up of dyspnea, bronchiectasis.  She unfortunately fell in [place] and broke her tailbone.  This limited her activity quite a bit so she didn't use her oxygen.    She has some questions regarding how to arrange her nebulizer with flutter valve..    No chest colds since last visit. No new cough.  No sputum.    She has cataract surgery scheduled this month x2.    --- from previous  71F here with progressive dyspnea.  She has had dyspnea for years but it seems to have worsened since December.  It is worse with exertion. It improved with rest.   It is associated with frequent bouts of bronchitis in the past.  This is different than previous because she has not been able to mobilize sputum.   She has been treated with inhaler changes and prednisone pulse and low dose prednisone without much benefit.  Her symptoms localize to her chest.    She had at least one episode of pneumonia in the past.        Review of Systems    activity change fatigue congestion voice change chest tightness cough shortness of breath eye itching chest pain joint pain environmental allergies.  The remainder of a 15 system review of systems is negative.          Objective:    Physical Exam   Constitutional: She is oriented to person, place, and time. She appears well-developed and well-nourished. No distress.   HENT:   Head: Normocephalic and atraumatic.   Nose: Nose normal.   Eyes: Conjunctivae are normal. Pupils are equal, round, and reactive to light. Right eye exhibits no discharge. Left eye exhibits no discharge. No scleral icterus.   Neck: Normal range of motion. No JVD present. No tracheal deviation present. No thyromegaly present.   Cardiovascular: Normal rate, regular rhythm and normal heart sounds.  Exam reveals no gallop.    No murmur heard.  Pulmonary/Chest: Effort normal and breath sounds normal. No stridor. No respiratory distress. She has no wheezes.   Very small left basilar crackles.   Musculoskeletal: She exhibits no edema or deformity.   Lymphadenopathy:     She has no cervical adenopathy.   Neurological: She is alert and oriented to person, place, and time. No cranial nerve deficit. Coordination normal.   Skin: Skin is warm and dry. She is not diaphoretic. No erythema.   Psychiatric: She has a normal mood and affect. Her behavior is normal. Thought content normal.   Nursing note and vitals reviewed.          Current Outpatient Prescriptions on File Prior to Visit   Medication Sig Dispense Refill     ADVAIR DISKUS 100-50 mcg/dose DISKUS INAHLE 1 PUFF TWICE DAILY 1 each 3     albuterol (PROVENTIL HFA;VENTOLIN HFA) 90 mcg/actuation inhaler Inhale 2 puffs every 6 (six) hours as needed for wheezing.       alendronate  (FOSAMAX) 70 MG tablet TAKE 1 TABLET BY MOUTH 1 TIME WEEKLY AS DIRECTED 12 tablet 0     BIOTIN ORAL Take by mouth.       calcium-vitamin D (CALCIUM-VITAMIN D) 500 mg(1,250mg) -200 unit per tablet Take 1 tablet by mouth daily.       celecoxib (CELEBREX) 200 MG capsule TAKE 1 CAPSULE BY MOUTH DAILY 90 capsule 0     cholecalciferol, vitamin D3, (VITAMIN D3) 1,000 unit capsule Take 1,000 Units by mouth daily.       gelatin 600 mg cap Take by mouth.       Lactobacillus rhamnosus GG (CULTURELLE) 10-15 Billion cell capsule Take 1 capsule by mouth daily.       LYRICA 50 mg capsule TAKE 1 CAPSULE BY MOUTH TWICE DAILY 180 capsule 2     montelukast (SINGULAIR) 10 mg tablet Take 1 tablet (10 mg total) by mouth bedtime. 90 tablet 3     multivitamin therapeutic (THERAGRAN) tablet Take 1 tablet by mouth daily.       naproxen sodium (ALEVE) 220 MG tablet Take 220 mg by mouth as needed for pain.       nystatin (MYCOSTATIN) cream   1     oxyCODONE (OXY-IR) 5 mg capsule Take 1 capsule (5 mg total) by mouth daily as needed. 30 capsule 0     SUMAtriptan (IMITREX) 50 MG tablet Take 50 mg by mouth every 2 (two) hours as needed for migraine.       traMADol (ULTRAM) 50 mg tablet TAKE 1 TABLET BY MOUTH EVERY 8 HOURS AS NEEDED FOR PAIN 30 tablet 0     umeclidinium 62.5 mcg/actuation DsDv Inhale 1 puff daily. 30 each 11     No current facility-administered medications on file prior to visit.      /58  Pulse 90  Resp 16  Wt 126 lb (57.2 kg)  SpO2 93% Comment: RA  BMI 22.32 kg/m2    Medical History  Active Ambulatory (Non-Hospital) Problems    Diagnosis     Bronchiectasis without complication     Dyspnea on exertion     Osteopenia     Past Medical History:   Diagnosis Date     Chronic low back pain      COPD (chronic obstructive pulmonary disease)      Diverticulosis      Hiatal hernia      Mild asthma      Neuropathy      Osteopenia      Temporomandibular joint (TMJ) pain            Social history reviewed today: She is still  volunteering actively.  She has a sick niece who she will be helping support over the course of the spring and summer.     Allergies  Allergies   Allergen Reactions     Codeine      Morphine Itching                              Data Review - imaging, labs, and ekgs listed below were reviewed by me.  Chest XRay and chest CT images and EKG tracings interpreted personally.     Past Labs  No visits with results within 2 Month(s) from this visit.  Latest known visit with results is:    Physical on 10/04/2016   Component Date Value     WBC 10/04/2016 10.2      RBC 10/04/2016 4.39      Hemoglobin 10/04/2016 13.2      Hematocrit 10/04/2016 38.8      MCV 10/04/2016 88      MCH 10/04/2016 30.0      MCHC 10/04/2016 34.0      RDW 10/04/2016 12.4      Platelets 10/04/2016 338      MPV 10/04/2016 7.0      Neutrophils % 10/04/2016 70      Lymphocytes % 10/04/2016 19*     Monocytes % 10/04/2016 8      Eosinophils % 10/04/2016 2      Basophils % 10/04/2016 1      Neutrophils Absolute 10/04/2016 7.1      Lymphocytes Absolute 10/04/2016 2.0      Monocytes Absolute 10/04/2016 0.8      Eosinophils Absolute 10/04/2016 0.2      Basophils Absolute 10/04/2016 0.1        Past Imaging  No results found.

## 2021-06-10 NOTE — TELEPHONE ENCOUNTER
Let patient know that her flu shot will be due anytime after September 1.  She can get that at her pharmacist.  She will require the high dose since she is over 65.  Tetanus shot is due every 10 years.  That should again be administered at her pharmacist.

## 2021-06-10 NOTE — PROGRESS NOTES
"Fiordaliza Najera is a 75 y.o. female who is being evaluated via a billable video visit.      The patient has been notified of following:     \"This video visit will be conducted via a call between you and your physician/provider. We have found that certain health care needs can be provided without the need for an in-person physical exam.  This service lets us provide the care you need with a video conversation.  If a prescription is necessary we can send it directly to your pharmacy.  If lab work is needed we can place an order for that and you can then stop by our lab to have the test done at a later time.    Video visits are billed at different rates depending on your insurance coverage. Please reach out to your insurance provider with any questions.    If during the course of the call the physician/provider feels a video visit is not appropriate, you will not be charged for this service.\"    Patient has given verbal consent to a Video visit? Yes  How would you like to obtain your AVS? AVS Preference: MyChart.  If dropped by the video visit, the video invitation should be sent to: Text to cell phone: [phone]  Will anyone else be joining your video visit? No        Video Start Time: 1255    Additional provider notes: -  GENERAL: Healthy, alert and no distress  EYES: Eyes grossly normal to inspection. No discharge or erythema, or obvious scleral/conjunctival abnormalities.  HENT: Normal voice  RESP: No audible wheeze, cough, or visible cyanosis.  No visible retractions or increased work of breathing.    NEURO: Cranial nerves grossly intact. Mentation and speech appropriate for age.  PSYCH: Mentation appears normal, affect normal/bright, judgement and insight intact, normal speech and appearance well-groomed      Video-Visit Details    Type of service:  Video Visit    Video End Time (time video stopped): 125  Originating Location (pt. Location): Home    Distant Location (provider location):  [facility] [place] "     Platform used for Video Visit: Tracy  Chief complaint: Bronchiectasis    Events and HPI:  Ayse went to [place] with her family did well.  She had some anxiety prior to this and this seemed to make her breathing more difficult.  She took an action plan and this improved things.  She was also prescribed alprazolam but she is concerned about respiratory depression and did not take it.    She continues to bring up a small amount of sputum.  No hemoptysis.  She continues to feel like her breathing is getting a little bit worse.  She is using oxygen more.  Using a mask is really been a challenge for her.  She is not had any clear chest colds or bronchitis.  She did note that she had much better energy when she was on the steroids and antibiotics.    I discussed with her the possibility of a more prolonged prednisone taper.  She is on inhaled corticosteroid.  She is concerned about the possibility of bone adverse effects which I agree there is a risk of this.    I also discussed suppressive azithromycin 3 times weekly.  She is also uncomfortable this.    She is on continuous flow now which may be helping a little bit.    After an extensive discussion of the options available we decided to keep her with her current plan.  I discussed that unfortunately the course of bronchiectasis is progressive.  This should not be a life shortening illness but it does cause unfortunate limitation in exercise tolerance.  This is been exacerbated in the setting of needing to wear a mask and being more homebound during coronavirus.    Overall we will keep her plan the same.  I discussed the risks and benefits of alprazolam but I explained that used appropriately I think this may be helpful during periods of acute anxiety.  These happen very infrequently for her but it would have been unfortunate if she had not gone to [place] because of stress related to getting ready.    Greater than 25-minute spent on this visit.  Greater than  50% counseling and coordination of care.    Randall Lorenz MD

## 2021-06-11 NOTE — PROGRESS NOTES
Medicare Annual Preventive Physical Exam (Wellness Assessment)   Fiordaliza Najera   72 y.o.  female in for annual wellness exam.  Still works part-time as a teacher.  She is having some arthritic symptoms but otherwise has been well.      Date of visit: 6/13/2017  Physician: Tavo Bassett MD     Assessment and Plan   1. Routine general medical examination at a health care facility    - Lipid Cascade  - Urinalysis-UC if Indicated    2. Bronchiectasis without complication  Is on inhalers.  Uses doxycycline as needed for any infections.    3. Osteoarthritis  Interval forms.  Both shoulders have pain.  She is having some pain in her left hip at this time.  There is pain radiating from the buttocks down to the outer left leg.  No weakness.  We will x-ray the hip.    4. Osteopenia  He is on calcium and vitamin D supplements.    5. Medication management  No obvious medication  problems  - HM1(CBC and Differential)  - Comprehensive Metabolic Panel  - HM1 (CBC with Diff)    6. Left hip pain  3 left hip.  Celebrex as needed  - C-Reactive Protein  - XR Hip Left 2 or More VWS; Future      Advanced Care Planning discussed today: yes  Advanced Care Directive discussed and scanned into EMR: No  Medical Decision Maker, if patient is unable: Patient does her own medical decision making  A total of 16 minutes was spent discussing advanced care planning today    No Follow-up on file.     Chief Complaint   Chief Complaint   Patient presents with     Medicare Annual Wellness Visit Initial     fasting     Pain     left hip and leg pain X 1 week, has to use a cane because of the limping. 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     History of Present Illness   This 72 y.o. old female in for annual wellness exam.  She has been well.  She does have chronic bronchiectasis.  Is troubled by osteoarthritis.  No recent infections.  No cardiopulmonary symptoms at present except for coughing.  Denies dysphasia.  No change in bowel habit  No dysuria hematuria no incontinence  Positive for osteoarthritic symptoms of the thumbs knees shoulders.  No strokes no TIAs no epilepsy.  She does have peripheral neuropathy with tingling of the feet.  She takes Lyrica for that.  No orthopnea.  No chest pain with exertion.  No claudication.      Review of Systems: A comprehensive review of systems was negative except as noted.     Risk Assessment   Depression: PHQ-9 reviewed; score is 0    Cognitive Impairment:  Mini-Cog reviewed, number of words correctly recalled + clock score: 3 work recall suggesting no dementia    Falls Risk Assessment:  Please see the nursing progress note for questionnaire response  Hearing Assessment:   Please see the nursing progress note for questionnaire response  Home Safety Review:  Please see the nursing progress note for questionnaire response  Diet and exercise reviewed: yes     Medications, Allergies and Immunizations    Current Outpatient Prescriptions   Medication Sig Dispense Refill     ADVAIR DISKUS 100-50 mcg/dose DISKUS USE 1 INHALATION BY MOUTH TWICE DAILY 1 each 4     albuterol (PROVENTIL HFA;VENTOLIN HFA) 90 mcg/actuation inhaler Inhale 2 puffs every 6 (six) hours as needed for wheezing.       alendronate (FOSAMAX) 70 MG tablet TAKE 1 TABLET BY MOUTH 1 TIME WEEKLY AS DIRECTED 12 tablet 0     BIOTIN ORAL Take by mouth.       calcium-vitamin D (CALCIUM-VITAMIN D) 500 mg(1,250mg) -200 unit per tablet Take 1 tablet by mouth daily.       celecoxib (CELEBREX) 200 MG capsule  TAKE 1 CAPSULE BY MOUTH DAILY 90 capsule 0     cholecalciferol, vitamin D3, (VITAMIN D3) 1,000 unit capsule Take 1,000 Units by mouth daily.       doxycycline (ADOXA) 100 MG tablet Take 1 tablet (100 mg total) by mouth 2 (two) times a day. 20 tablet 2     gelatin 600 mg cap Take by mouth.       Lactobacillus rhamnosus GG (CULTURELLE) 10-15 Billion cell capsule Take 1 capsule by mouth daily.       LYRICA 50 mg capsule TAKE 1 CAPSULE BY MOUTH TWICE DAILY 180 capsule 0     multivitamin therapeutic (THERAGRAN) tablet Take 1 tablet by mouth daily.       naproxen sodium (ALEVE) 220 MG tablet Take 220 mg by mouth as needed for pain.       nystatin (MYCOSTATIN) cream   1     oxyCODONE (OXY-IR) 5 mg capsule Take 1 capsule (5 mg total) by mouth daily as needed. 30 capsule 0     SUMAtriptan (IMITREX) 50 MG tablet Take 50 mg by mouth every 2 (two) hours as needed for migraine.       traMADol (ULTRAM) 50 mg tablet TAKE 1 TABLET BY MOUTH EVERY 8 HOURS AS NEEDED FOR PAIN 30 tablet 0     triamcinolone (KENALOG) 0.1 % cream KRYSTAL TOPICALLY AA HS  11     umeclidinium 62.5 mcg/actuation DsDv Inhale 1 puff daily. 30 each 11     No current facility-administered medications for this visit.      Allergies   Allergen Reactions     Codeine      Morphine Itching     Immunization History   Administered Date(s) Administered     Influenza high dose, seasonal 09/01/2016     Pneumo Conj 13-V (2010&after) 12/14/2015     Pneumo Polysac 23-V 01/04/1999, 06/21/2010     Td, historic 1945, 10/27/2003, 05/23/2008     Tdap 07/21/2010     ZOSTER 06/25/2009        Family and Social History   Family History   Problem Relation Age of Onset     Dementia Mother      passed age 88     COPD Father      passed age 78        Social History   Substance Use Topics     Smoking status: Never Smoker     Smokeless tobacco: Never Used     Alcohol use None        Physical Exam   General Appearance:   The appearing female.  Slight congested cough.  Blood pressures  "130/82.    Pulse 70  Ht 5' 3\" (1.6 m)  Wt 126 lb (57.2 kg)  SpO2 94%  BMI 22.32 kg/m2 Body mass index is 22.32 kg/(m^2).    EYES: Eyelids, conjunctiva, and sclera were normal. Pupils were normal. Cornea, iris, and lens were normal bilaterally.  She complains of a black line in the left eye.  HEAD, EARS, NOSE, MOUTH, AND THROAT: Head and face were normal. Hearing was normal to voice and the ears were normal to external exam. Nose appearance was normal and there was no discharge. Oropharynx was normal.  NECK: Neck appearance was normal. There were no neck masses and the thyroid was not enlarged.  No bruits.  RESPIRATORY: Breathing pattern was normal and the chest moved symmetrically.  Percussion/auscultatory percussion was normal.  Lung sounds were normal and there were no abnormal sounds.  Bilateral coarse rhonchi.  Only partial clearing with cough.  Patient does have a known history of bronchiectasis.  CARDIOVASCULAR: Heart rate and rhythm were normal.  S1 and S2 were normal and there were no extra sounds or murmurs. Peripheral pulses in arms and legs were normal.  Jugular venous pressure was normal.  There was no peripheral edema.  GASTROINTESTINAL: The abdomen was normal in contour.  Bowel sounds were present.  Percussion detected no organ enlargement or tenderness.  Palpation detected no tenderness, mass, or enlarged organs.   MUSCULOSKELETAL: Skeletal configuration was normal and muscle mass was normal for age. Joint appearance was overall normal.  Osteoarthritic changes of both thumbs.  Both knees have been surgically replaced.  LYMPHATIC: There were no enlarged nodes.  SKIN/HAIR/NAILS: Skin color was normal.  There were no skin lesions.  Hair and nails were normal.  NEUROLOGIC: The patient was alert and oriented to person, place, time, and circumstance. Speech was normal. Cranial nerves were normal. Motor strength was normal for age. The patient was normally coordinated.  PSYCHIATRIC:  Mood and affect were " normal and the patient had normal recent and remote memory. The patient's judgment and insight were normal.  Get up and go test is less than 15 seconds.  No signs of any memory impairment    ADDITIONAL VITAL SIGNS: Pulse 68.  CHEST WALL/BREASTS: Normal female.  No masses palpated.    RECTAL: Not checked.    GENITAL/URINARY: Normal female.    Functional status: direct observation reveals these concerns: independent with cane  Safety status: direct observation reveals these concerns: none     Additional Information   Counseling and education provided today includes proper nutrition and body habitus, fall prevention, and for those items ordered above. Plan for future preventive services in Patient Instructions, printed on After Visit Summary and given to patient.    In addition to the time spent completing the annual wellness/health maintenance assessments, a total of 35  minutes of which more than 50% of that time was spent counseling patient on 2 arthritis and/or coordinating care for back and hip pain..  Patient questions answered.  Will x-ray the left hip.  For now, medications will include Celebrex.  As needed use of pain medicines.  Minimize use of narcotics.       Tavo Bassett MD  Internal Medicine  Contact me at [phone]

## 2021-06-11 NOTE — PROGRESS NOTES
Assessment and Plan:   75-year-old woman here for annual wellness visit and exam.  He has COPD and bronchiectasis.  Feels she is slowly getting a little more short of breath.  No recent infections.  She stays active.  Patient has been advised of split billing requirements and indicates understanding: Yes  1. Bronchiectasis without complication (H)  Currently taking doxycycline and prednisone for recent flare of infection.    2. Chronic bilateral low back pain without sciatica  She is has extensive back surgery.  Pain is not much of an issue at this time.    3. Idiopathic peripheral neuropathy  Minor symptoms in her arms and legs.    4. Dyspnea on exertion  This is related to her chronic obstructive lung disease and bronchiectasis.    5. Osteopenia of multiple sites  On calcium and vitamin D supplements.        The patient's current medical problems were reviewed.    I have had an Advance Directives discussion with the patient.  The following health maintenance schedule was reviewed with the patient and provided in printed form in the after visit summary:   Health Maintenance   Topic Date Due     HEPATITIS C SCREENING  1945     COPD ACTION PLAN  1945     MEDICARE ANNUAL WELLNESS VISIT  06/19/2020     FALL RISK ASSESSMENT  06/19/2020     TD 18+ HE  07/21/2020     COLORECTAL CANCER SCREENING  06/24/2021     DXA SCAN  09/05/2021     LIPID  06/19/2024     ADVANCE CARE PLANNING  11/19/2024     SPIROMETRY  Completed     PNEUMOCOCCAL IMMUNIZATION 65+ LOW/MEDIUM RISK  Completed     INFLUENZA VACCINE RULE BASED  Completed     ZOSTER VACCINES  Completed     HEPATITIS B VACCINES  Aged Out        Subjective:   Chief Complaint: Fiordaliza Najera is an 75 y.o. female here for an Annual Wellness visit.   HPI: Annual wellness visit and exam.  Comprehensive system review is done.  Only positive response is for dyspnea on exertion related to her COPD-bronchiectasis.  No chest pain.  No syncope.  No TIAs.  No skin  infections.  No bladder or bowel problems.  Both knees have been surgically replaced.  Her joints are otherwise well.    Review of Systems:    Please see above.  The rest of the review of systems are negative for all systems.    Patient Care Team:  Tavo Bassett MD as PCP - General (Internal Medicine)  Tavo Bassett MD as Assigned PCP     Patient Active Problem List   Diagnosis     Osteopenia     Bronchiectasis without complication (H)     Dyspnea on exertion     Idiopathic peripheral neuropathy     COPD (chronic obstructive pulmonary disease) (H)     Abnormality of gait     Anemia due to acute blood loss     Back pain     Diaphragmatic hernia     Hypotension     Other kyphoscoliosis and scoliosis     Past Medical History:   Diagnosis Date     Chronic low back pain      COPD (chronic obstructive pulmonary disease) (H)      Diverticulosis      Hiatal hernia      Mild asthma      Neuropathy      Osteopenia      Temporomandibular joint (TMJ) pain       Past Surgical History:   Procedure Laterality Date     ANTERIOR / POSTERIOR COMBINED FUSION LUMBAR SPINE       HYSTERECTOMY       RETINAL LASER PROCEDURE Left 10/04/2016     SALPINGOOPHORECTOMY       TOTAL KNEE ARTHROPLASTY  2008      Family History   Problem Relation Age of Onset     Dementia Mother         passed age 88     COPD Father         passed age 78      Social History     Socioeconomic History     Marital status: Single     Spouse name: Not on file     Number of children: Not on file     Years of education: Not on file     Highest education level: Not on file   Occupational History     Not on file   Social Needs     Financial resource strain: Not on file     Food insecurity     Worry: Not on file     Inability: Not on file     Transportation needs     Medical: Not on file     Non-medical: Not on file   Tobacco Use     Smoking status: Never Smoker     Smokeless tobacco: Never Used   Substance and Sexual Activity     Alcohol use: No     Drug use: Not  on file     Sexual activity: Not on file   Lifestyle     Physical activity     Days per week: Not on file     Minutes per session: Not on file     Stress: Not on file   Relationships     Social connections     Talks on phone: Not on file     Gets together: Not on file     Attends Scientologist service: Not on file     Active member of club or organization: Not on file     Attends meetings of clubs or organizations: Not on file     Relationship status: Not on file     Intimate partner violence     Fear of current or ex partner: Not on file     Emotionally abused: Not on file     Physically abused: Not on file     Forced sexual activity: Not on file   Other Topics Concern     Not on file   Social History Narrative    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 18 g 11     alendronate (FOSAMAX) 70 MG tablet TAKE 1 TABLET (70 MG TOTAL) BY MOUTH EVERY 7 DAYS. TAKE IN THE MORNING ON AN EMPTY STOMACH WITH A FULL GLASS OF WATER 30 MINUTES BEFORE FOOD 12 tablet 0     ALPRAZolam (XANAX) 0.25 MG tablet as needed.       amoxicillin (AMOXIL) 500 MG capsule Take 4 tabs 1 hour prior to dental procedure 8 capsule 1     BIOTIN ORAL Take by mouth.       calcium-vitamin D (CALCIUM-VITAMIN D) 500 mg(1,250mg) -200 unit per tablet Take 1 tablet by mouth daily.       celecoxib (CELEBREX) 200 MG capsule TAKE 1 CAPSULE (200 MG TOTAL) BY MOUTH DAILY. 90 capsule 3     cholecalciferol, vitamin D3, (VITAMIN D3) 1,000 unit capsule Take 1,000 Units by mouth daily.       doxycycline (VIBRAMYCIN) 100 MG capsule Take 1 capsule (100 mg total) by mouth 2 (two) times a day for 10 days. 20 capsule 0     fluticasone propion-salmeteroL (ADVAIR DISKUS) 100-50 mcg/dose DISKUS Inhale 1 puff 2 (two) times a day.       INCRUSE ELLIPTA 62.5 mcg/actuation DsDv  "inhaler INHALE 1 PUFF DAILY. 30 each 11     Lactobacillus rhamnosus GG (CULTURELLE) 10-15 Billion cell capsule Take 1 capsule by mouth daily.       montelukast (SINGULAIR) 10 mg tablet TAKE 1 TABLET(10 MG) BY MOUTH AT BEDTIME 90 tablet 3     multivitamin therapeutic (THERAGRAN) tablet Take 1 tablet by mouth daily.       naproxen sodium (ALEVE) 220 MG tablet Take 220 mg by mouth as needed for pain.       nystatin (MYCOSTATIN) cream   1     OXYGEN-AIR DELIVERY SYSTEMS [facility] Use 2 L As Directed. 2L with activity and at night  [employer]       predniSONE (DELTASONE) 20 MG tablet Take 2 tabs daily x 5 days. 10 tablet 0     pregabalin (LYRICA) 50 MG capsule TAKE 3 CAPSULES BY MOUTH DAILY AS DIRECTED. 90 capsule 4     SUMAtriptan (IMITREX) 50 MG tablet Take 1 tablet (50 mg total) by mouth every 2 (two) hours as needed for migraine. 30 tablet 1     traMADol (ULTRAM) 50 mg tablet Take 1 tablet (50 mg total) by mouth every 8 (eight) hours as needed. 60 tablet 0     triamcinolone (KENALOG) 0.5 % cream Apply to rash twice daily. 30 g 0     No current facility-administered medications for this visit.       Objective:   Vital Signs:   Visit Vitals  Ht 5' 2.25\" (1.581 m)   Wt 113 lb (51.3 kg)   LMP  (LMP Unknown)   BMI 20.50 kg/m           VisionScreening:  No exam data present     PHYSICAL EXAM blood pressure is 142/82.  Pulse is 68.  Oxygen saturations 92%  .EYES: Eyelids, conjunctiva, and sclera were normal. Pupils were normal. Cornea, iris, and lens were normal bilaterally.  Cataract surgery dependence HEAD, EARS, NOSE, MOUTH, AND THROAT: Head and face were normal. Hearing was normal to voice and the ears were normal to external exam. Nose appearance was normal and there was no discharge. Oropharynx was normal.  NECK: Neck appearance was normal. There were no neck masses and the thyroid was not enlarged.  RESPIRATORY: Breathing pattern was normal and the chest moved symmetrically.  Percussion/auscultatory percussion was normal.  " Lung sounds bilaterally show rhonchi and some coarse rales consistent with a diagnosis of bronchiectasis.  No wheezing.  She is in no respiratory distress.  CARDIOVASCULAR: Heart rate and rhythm were normal.  S1 and S2 were normal and there were no extra sounds or murmurs. Peripheral pulses in arms and legs were normal.  Jugular venous pressure was normal.  There was no peripheral edema.  GASTROINTESTINAL: The abdomen was normal in contour.  Bowel sounds were present.  Percussion detected no organ enlargement or tenderness.  Palpation detected no tenderness, mass, or enlarged organs.  She is complaining of some minor left lower quadrant tenderness but no abnormalities palpated in this area.  No hernias.  MUSCULOSKELETAL: Skeletal configuration was normal and muscle mass was normal for age. Joint appearance was overall normal.  Significant kyphoscoliosis of the spine despite surgeries.  LYMPHATIC: There were no enlarged nodes.  SKIN/HAIR/NAILS: Skin color was normal.  There were no skin lesions.  Hair and nails were normal.  NEUROLOGIC: The patient was alert and oriented to person, place, time, and circumstance. Speech was normal. Cranial nerves were normal. Motor strength was normal for age. The patient was normally coordinated.  PSYCHIATRIC:  Mood and affect were normal and the patient had normal recent and remote memory. The patient's judgment and insight were normal.    ADDITIONAL VITAL SIGNS: Oxygen saturation 92%  CHEST WALL/BREASTS: Normal female with no masses.  RECTAL: Not done.  GENITAL/URINARY: Pelvic exam is deferred.        Assessment Results 9/23/2020   Activities of Daily Living No help needed   Instrumental Activities of Daily Living 2-4 - Moderate impairment   Get Up and Go Score Less than 12 seconds   Mini Cog Total Score 5   Some recent data might be hidden     A Mini-Cog score of 0-2 suggests the possibility of dementia, score of 3-5 suggests no dementia    Cognitively she is quite normal.  She  still does some part-time teaching on Zoom.    Identified Health Risks:     The patient was provided with suggestions to help her develop a healthy physical lifestyle.      Information on urinary incontinence and treatment options given to patient.  Patient's advanced directive was discussed and I am comfortable with the patient's wishes.

## 2021-06-11 NOTE — TELEPHONE ENCOUNTER
I added the flu shot information. The chart does not show she is due for a pneumonia vaccine, but wanted to verify with you.     Rosario Isabel, CMA

## 2021-06-11 NOTE — PATIENT INSTRUCTIONS - HE
Patient Education     Your Health Risk Assessment indicates you feel you are not in good physical health.    A healthy lifestyle helps keep the body fit and the mind alert. It helps protect you from disease, helps you fight disease, and helps prevent chronic disease (disease that doesn't go away) from getting worse. This is important as you get older and begin to notice twinges in muscles and joints and a decline in the strength and stamina you once took for granted. A healthy lifestyle includes good healthcare, good nutrition, weight control, recreation, and regular exercise. Avoid harmful substances and do what you can to keep safe. Another part of a healthy lifestyle is stay mentally active and socially involved.    Good healthcare     Have a wellness visit every year.     If you have new symptoms, let us know right away. Don't wait until the next checkup.     Take medicines exactly as prescribed and keep your medicines in a safe place. Tell us if your medicine causes problems.   Healthy diet and weight control     Eat 3 or 4 small, nutritious, low-fat, high-fiber meals a day. Include a variety of fruits, vegetables, and whole-grain foods.     Make sure you get enough calcium in your diet. Calcium, vitamin D, and exercise help prevent osteoporosis (bone thinning).     If you live alone, try eating with others when you can. That way you get a good meal and have company while you eat it.     Try to keep a healthy weight. If you eat more calories than your body uses for energy, it will be stored as fat and you will gain weight.     Recreation   Recreation is not limited to sports and team events. It includes any activity that provides relaxation, interest, enjoyment, and exercise. Recreation provides an outlet for physical, mental, and social energy. It can give a sense of worth and achievement. It can help you stay healthy.       Patient Education   Instrumental Activities of Daily Living  Your Health Risk  Assessment indicates you have difficulties with instrumental activities of daily living which include laundry, housekeeping, banking, shopping, using the telephone, food preparation, transportation, or taking your own medications. Please make a follow up appointment for us to address this issue in more detail.    [employer] has resources available on the following website: [email or web address]     Also, here is a local agency that provides help with meals and other assistance:   [facility] Line: [phone]     Patient Education   Urinary Incontinence, Female (Adult)  Urinary incontinence means loss of control of the bladder. This problem affects many women, especially as they get older. If you have incontinence, you may be embarrassed to ask for help. But know that this problem can be treated.  Types of Incontinence  There are different types of incontinence. Two of the main types are described here. You can have more than one type.    Stress incontinence. With this type, urine leaks when pressure (stress) is put on the bladder. This may happen when you cough, sneeze, or laugh. Stress incontinence most often occurs because the pelvic floor muscles that support the bladder and urethra are weak. This can happen after pregnancy and vaginal childbirth or a hysterectomy. It can also be due to excess body weight or hormone changes.    Urge incontinence (also called overactive bladder). With this type, a sudden urge to urinate is felt often. This may happen even though there may not be much urine in the bladder. The need to urinate often during the night is common. Urge incontinence most often occurs because of bladder spasms. This may be due to bladder irritation or infection. Damage to bladder nerves or pelvic muscles, constipation, and certain medicines can also lead to urge incontinence.  Treatment of urinary incontinence depends on the cause. Further evaluation is needed to find the type  you have. This will likely include an exam and certain tests. Based on the results, you and your healthcare provider can then plan treatment. Until a diagnosis is made, the home care tips below can help relieve symptoms.  Home care    Do pelvic floor muscle exercises, if they are prescribed. The pelvic floor muscles help support the bladder and urethra. Many women find that their symptoms improve when doing special exercises that strengthen these muscles. To do the exercises contract the muscles you would use to stop your stream of urine, but do this when you re not urinating. Hold for 10 seconds, then relax. Repeat 10 to 20 times in a row, at least 3 times a day. Your provider may give you other instructions for how to do the exercises and how often.    Keep a bladder diary. This helps track how often and how much you urinate over a set period of time. Bring this diary with you to your next visit with the provider. The information can help your provider learn more about your bladder problem.    Lose weight, if advised to by your provider. Excess weight puts pressure on the bladder. Your provider can help you create a weight-loss plan that s right for you. This may include exercising more and making certain diet changes.    Don't consume foods and drinks that may irritate the bladder. These can include alcohol and caffeinated drinks.    Quit smoking. Smoking and other tobacco use can lead to chronic cough that strains the pelvic floor muscles. Smoking may also damage the bladder and urethra. Talk with your provider about treatments or methods you can use to quit smoking.    If drinking large amounts of fluid causes you to have symptoms, you may be advised to limit your fluid intake. You may also be advised to drink most of your fluids during the day and to limit fluids at night.    If you re worried about urine leakage or accidents, you may wear absorbent pads to catch urine. Change the pads often. This helps reduce  discomfort. It may also reduce the risk of skin or bladder infections.  Follow-up care  Follow up with your healthcare provider, or as directed. It may take some to find the right treatment for your problem. Your treatment plan may include special therapies or medicines. Certain procedures or surgery may also be options. Be sure to discuss any questions you have with your provider.  When to seek medical advice  Call the healthcare provider right away if any of these occur:    Fever of 100.4 F (38 C) or higher, or as directed by your provider    Bladder pain or fullness    Abdominal swelling    Nausea or vomiting    Back pain    Weakness, dizziness or fainting  Date Last Reviewed: 10/1/2017    [id number] [employer]. [address] [place] [address], [place] [address]. All rights reserved. This information is not intended as a substitute for professional medical care. Always follow your healthcare professional's instructions.       Advance Directive  Patient s advance directive was discussed and I am comfortable with the patient s wishes.  Patient Education   Personalized Prevention Plan  You are due for the preventive services outlined below.  Your care team is available to assist you in scheduling these services.  If you have already completed any of these items, please share that information with your care team to update in your medical record.  Health Maintenance   Topic Date Due     HEPATITIS C SCREENING  1945     COPD ACTION PLAN  1945     MEDICARE ANNUAL WELLNESS VISIT  06/19/2020     FALL RISK ASSESSMENT  06/19/2020     TD 18+ HE  07/21/2020     COLORECTAL CANCER SCREENING  06/24/2021     DXA SCAN  09/05/2021     LIPID  06/19/2024     ADVANCE CARE PLANNING  11/19/2024     SPIROMETRY  Completed     PNEUMOCOCCAL IMMUNIZATION 65+ LOW/MEDIUM RISK  Completed     INFLUENZA VACCINE RULE BASED  Completed     ZOSTER VACCINES  Completed     HEPATITIS B VACCINES  Aged Out

## 2021-06-12 NOTE — TELEPHONE ENCOUNTER
Phone VM message from Sister.  States she just completed her action plan 2 days ago and is still struggling with the shortness of breath.  Does she need to be seen? Or can dr. Lorenz prescribe something??      (no clinic openings until end of Nov:()

## 2021-06-12 NOTE — PROGRESS NOTES
Called the patient today to go over the results of her Chest CT scan. This later demonstrates the followin.  Two nodular opacities on the prior exam are now mildly thick-walled cavities on the current exam. A right lower lobe cavity shows mild increased size to prior.  2.  Moderate-severe bronchiectasis and bilateral airway thickening, without significant change. Overall slight improvement of endobronchial mucous plugging.  3.  Little overall change of tree-in-bud and nodular opacities.   4.  Cumulative findings are consistent with chronic infection and airways disease, such as nontuberculous mycobacterium.    I explained to the patient that under normal circumstances we would likely perform a bronchoscopy with a lavage to look for bacterial and mycobacterial illnesses.  Given that she is on 2 L of supplemental oxygen with severe obstruction and severe reduction in diffusion capacity I am not keen on doing this.    I requested that she bring up sputum sample which we can send for culture with Gram stain and AFB.  We will proceed based on the results of this.    Shanika Messina MD  Pulmonary and Critical Care  P) [phone]    Total time spent on the telephone: 25 minutes.

## 2021-06-12 NOTE — TELEPHONE ENCOUNTER
Phone call from patient asking about results of her sputum culture from 10/23/20.    Sputum shows 4+ Haemophilus influenzae.    Reviewed with Dr. Theodore in clinic and will prescribe Augmentin 875-125mg x 10 days.     Patient states she is having lots of dryness in her nose from the oxygen as well as some light blood occasionally when she wipes her nose.  Recommended a non-petroleum based lubricant to use in her nares for the dryness.

## 2021-06-12 NOTE — TELEPHONE ENCOUNTER
Called patient with new orders from Dr. Lorenz:   Doxycycline for another 10 days  Nebulizer if she would like to do it - 0.9% saline and albuterol twice daily  CT chest non contrast no prone images  Get into our clinic use urgent slot to discuss vest  If unwell at rest she needs to go to ED.      Spoke with patient.  She will start the doxy and  new neb solutions.  Feeling some better this am.  I will have someone call to help her get CT chest scheduled.   Will look for available spots for OV.

## 2021-06-13 NOTE — TELEPHONE ENCOUNTER
Okay to wait for Dr. Bassett.  Mary STOCKTON, CMA/CMT....................12:56 PM          Dr. Bassett,  Please review patient's message and advise on refills requested and additional questions.  Thank you.  Mary STOCKTON, CMA/CMT....................12:59 PM

## 2021-06-13 NOTE — TELEPHONE ENCOUNTER
RN cannot approve Refill Request    RN can NOT refill this medication med is not covered by policy/route to provider. Last office visit: 7/5/2018 Tavo Bassett MD Last Physical: 9/23/2020 Last MTM visit: Visit date not found Last visit same specialty: 7/5/2018 Tavo Bassett MD.  Next visit within 3 mo: Visit date not found  Next physical within 3 mo: Visit date not found      Ashley Huff, [facility] Triage/Med Refill 11/10/2020    Requested Prescriptions   Pending Prescriptions Disp Refills     montelukast (SINGULAIR) 10 mg tablet [Pharmacy Med Name: MONTELUKAST SOD 10 MG TAB** 10 Tablet] 90 tablet 3     Sig: TAKE 1 TABLET(10 MG) BY MOUTH AT BEDTIME       There is no refill protocol information for this order

## 2021-06-13 NOTE — TELEPHONE ENCOUNTER
RN cannot approve Refill Request    RN can NOT refill this medication med is not covered by policy/route to provider. Last office visit: 7/5/2018 Tavo Bassett MD Last Physical: 9/23/2020 Last MTM visit: Visit date not found Last visit same specialty: 7/5/2018 Tavo Bassett MD.  Next visit within 3 mo: Visit date not found  Next physical within 3 mo: Visit date not found      Laura Saucedo, [facility] Triage/Med Refill 11/14/2020    Requested Prescriptions   Pending Prescriptions Disp Refills     celecoxib (CELEBREX) 200 MG capsule [Pharmacy Med Name: CELECOXIB 200 MG CAPS** 200 Capsule] 90 capsule 3     Sig: TAKE 1 CAPSULE (200 MG TOTAL) BY MOUTH DAILY.       There is no refill protocol information for this order

## 2021-06-14 NOTE — PROGRESS NOTES
Oxygen saturation walk test    Patient oxygen saturation on 2 lpm cont 95%.  Oxygen saturation while ambulating 300ft on 2lpm cont  is 93%.      Patient is ambulatory within his/her home.

## 2021-06-14 NOTE — PROGRESS NOTES
Fiordaliza Najera is a 75 y.o. female who is being evaluated via a billable video visit.      How would you like to obtain your AVS? MyChart.  If dropped from the video visit, the video invitation should be resent by: Send to e-mail at: [email or web address]  Will anyone else be joining your video visit? No      Video Start Time: 8:15 AM  Assessment & Plan     Chronic obstructive pulmonary disease, unspecified COPD type (H)  Bronchiectasis without complication (H)  Chronic respiratory failure with hypoxia, on home O2 therapy (H)  Patient follows with Dr. Lorenz of pulmonology.  She does get hypoxic with exertion and uses 2 L of oxygen with exertion, none at rest.  She is compliant with her inhalers including Advair, Incruse Ellipta, and as needed albuterol.  She does have prednisone and doxycycline on hand for flareups which she gets in the spring and fall.    Osteopenia of multiple sites  She is on Fosamax as well as vitamin D and calcium supplementation.    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  She does see a neurologist at [facility] who recommended a CTA for further characterization, but she declined at that time.  She is to follow-up with them as needed.  At today she declines a CTA brain again.    Chronic midline low back pain without sciatica  She has a history of lumbar fusion as well as bilateral knee replacement.  She does have tramadol on her med list, but she uses it very infrequently per her report.    Other migraine without status migrainosus, not intractable  She has a longstanding history of migraines and she does have Imitrex which she uses infrequently.      Review of external notes as documented above   Review of prior external note(s) from - Outside records from [place] [id number]  minutes spent on the date of the encounter doing chart review, history and exam, documentation and further activities as noted above           Return for Annual physical.    Ekaterina Koehler MD  [facility]     Fiordaliza Najera is 75 y.o. and presents to clinic today for the following health issues   HPI     Ayse is here to establish care.  Her doctor has retired and so she would like to establish care with a new physician today.    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.  She has a sister and brother-in-law, 2 nieces, 3 grand nieces and nephews in [place].  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  She lives alone in a long-term facility.    She has a history of COPD.  She sees a pulmonologist Dr. Lorenz.  She is compliant with her Advair, Incruse Ellipta, and she does have prednisone and doxycycline on hand for when she has flareups.  She reports that she normally gets flareups in the spring and fall.  She does have allergies and she takes montelukast for that.  She reports that at baseline her breathing is not very good.  When moving around, she needs to wear 2 L of oxygen.  She uses a walker to get around.  At rest, she does not need any oxygen.    She has a history of migraines and does have a prescription for Imitrex, though she reports that she has very rare migraines.  She thinks it may be more related to drainage of her sinuses more than anything else.    She does have history of osteoporosis and is on Fosamax, as well as a calcium and vitamin D supplement.  She does daily exercises.    She has a history of bilateral knee replacement as well as fusion of her lumbar spine.  She does have tramadol for occasional pain she gets, which she uses it very rarely.    She missed her mammogram last year due to Covid.  She has not had any concerning  history with her breasts or in her family history.    A few years ago she fell while on escalator.  She was having hip issues.  She saw a neurologist who did a brain MRI to evaluate for stroke.  This showed a 2 mm small infundibulum versus aneurysm.  She did see neuro neurology who recommended a CTA for better characterization of this lesion, however she declined at that time.  She continues to decline today.      Review of Systems  See above        Objective       Vitals:  No vitals were obtained today due to virtual visit.    Physical Exam  GENERAL: Healthy, alert and no distress  EYES: Eyes grossly normal to inspection. No discharge or erythema, or obvious scleral/conjunctival abnormalities.  RESP: No audible wheeze, cough, or visible cyanosis.  No visible retractions or increased work of breathing.    NEURO: Cranial nerves grossly intact. Mentation and speech appropriate for age.  PSYCH: Mentation appears normal, affect normal/bright, judgement and insight intact, normal speech and appearance well-groomed          Video-Visit Details    Type of service:  Video Visit    Video End Time (time video stopped): 8:33 AM  Originating Location (pt. Location): Home    Distant Location (provider location):  [facility]     Platform used for Video Visit: [employer]

## 2021-06-14 NOTE — PROGRESS NOTES
"/60   Pulse (!) 105   Ht 5' 2.25\" (1.581 m)   Wt 113 lb (51.3 kg)   LMP  (LMP Unknown)   SpO2 96% Comment: 2 cont  Breastfeeding No   BMI 20.50 kg/m      Extensive visit with Ayse today.    Bronchiectasis with long history of shortness of breath and some fatigue without significant hypoxia.  She has had multiple exacerbations in the last few months.    She has been checked for nontuberculous mycobacteria multiple times as her imaging has often been concerning for this.  5-6 AFB sputum over the last few years have been negative.  Nearly everyone she is positive for Haemophilus influenza.  We do not have data for whether it is resistant to macrolides.    She has had multiple back surgeries and is very hesitant about using a vest.    I think we need to advance her treatments for bronchiectasis.    Multiple CT scans reviewed and interpreted by me.  Enlarging cavitary areas.  Multiple labs reviewed going back several years for cultures.    On exam she is not in distress  No extrinsic muscle use  She is slim but not cachectic  No crackles no wheezes.    Plan  Change saline nebs to 3%  We will need to consider every 6 weeks Augmentin for chronic H. influenzae infection  Hold off vest treatment for now  Continue triple inhaler therapy  Continue oxygen with activity.    Greater than 50 minutes spent on this visit including detailed discussion of Covid vaccination protocols, availability and methods.  Detailed discussion of her last 5 years and trying to consider future plans.  Detailed discussion of oxygen use requirement.  "

## 2021-06-14 NOTE — PATIENT INSTRUCTIONS - HE
Medication Plan    Stop sodium chloride 0.9%    Start sodium chloride 3% nebs twice daily    Try using your albuterol before the nebulizer.    If the 3% nebulizer causes too much trouble - uncomfortable cough or wheezing, let us know.    Complete your augmentin.    Bring a sputum sample in to [place] when you can collect one.    Most likely we will plan on retreating with augmentin in mid March to prevent flares.    If this doesn't work we will need to consider vest therapy or other medications.

## 2021-06-14 NOTE — PROGRESS NOTES
Assessment/Plan:        Diagnoses and all orders for this visit:    Bronchiectasis with acute exacerbation    Chronic respiratory failure with hypoxia     71-year-old female with COPD and bronchiectasis with respiratory failure.  She is doing well although unfortunately had a bronchiectasis exacerbation.  She is improving with doxycycline.    Medication Plan  Advair 1 puff twice daily  Incruse 1 puff daily  Singulair daily  Albuterol as needed    Emergency Plan - Bronchitis or Chest Cold  Doxycycline 1 tab twice daily for 10 days    If you are very wheezy or bringing up lots of sputum or otherwise feel quite sick  Prednisone 1 tab daily for 10 days    Oxygen Plan  2 lpm with activity              Subjective:    Patient ID: Fiordaliza Najera is a 72 y.o. female.    HPI Comments: Cough: chronic, ongoing for years.  He is stable.  Provocative factors include humid weather.  Possible help with antihistamine.    Acute exacerbation of bronchiectasis: increase secretions and cough about 3 weeks ago.  She started doxycycline and this improved her symptoms.  No provocative factors. Symptoms localize to the chest.  No hemoptysis or fever.  She did have increased sputum with a little bit of yellowing it.    Chronic hypoxic respiratory failure: In the setting of exercise.  She uses a portable concentrator and this is working well for her.  She has a permanent concentrator that she does not use at all.            Review of Systems activity change fatigue congestion voice change chest tightness cough shortness of breath eye itching chest pain joint pain environmental allergies.  The remainder of a 15 system review of systems is negative.          Objective:    Physical Exam   Constitutional: She is oriented to person, place, and time. She appears well-developed and well-nourished. No distress.   HENT:   Head: Normocephalic and atraumatic.   Nose: Nose normal.   Eyes: Conjunctivae are normal. Pupils are equal, round, and reactive to  light. Right eye exhibits no discharge. Left eye exhibits no discharge. No scleral icterus.   Neck: Normal range of motion. No JVD present. No tracheal deviation present. No thyromegaly present.   Cardiovascular: Normal rate, regular rhythm and normal heart sounds.  Exam reveals no gallop.    No murmur heard.  Pulmonary/Chest: Effort normal and breath sounds normal. No stridor. No respiratory distress. She has no wheezes.   Musculoskeletal: She exhibits no edema or deformity.   Lymphadenopathy:     She has no cervical adenopathy.   Neurological: She is alert and oriented to person, place, and time. No cranial nerve deficit. Coordination normal.   Skin: Skin is warm and dry. She is not diaphoretic. No erythema.   Psychiatric: She has a normal mood and affect. Her behavior is normal. Thought content normal.   Nursing note and vitals reviewed.          Current Outpatient Prescriptions on File Prior to Visit   Medication Sig Dispense Refill     albuterol (PROAIR HFA;PROVENTIL HFA;VENTOLIN HFA) 90 mcg/actuation inhaler Inhale 2 puffs every 6 (six) hours as needed for wheezing. 1 Inhaler 3     BIOTIN ORAL Take by mouth.       calcium-vitamin D (CALCIUM-VITAMIN D) 500 mg(1,250mg) -200 unit per tablet Take 1 tablet by mouth daily.       celecoxib (CELEBREX) 200 MG capsule TAKE 1 CAPSULE BY MOUTH DAILY 90 capsule 0     cholecalciferol, vitamin D3, (VITAMIN D3) 1,000 unit capsule Take 1,000 Units by mouth daily.       doxycycline (ADOXA) 100 MG tablet Take 1 tablet (100 mg total) by mouth 2 (two) times a day. 20 tablet 2     fluticasone-salmeterol (ADVAIR DISKUS) 100-50 mcg/dose DISKUS USE 1 INHALATION BY MOUTH TWICE DAILY 60 each 11     gelatin 600 mg cap Take by mouth.       Lactobacillus rhamnosus GG (CULTURELLE) 10-15 Billion cell capsule Take 1 capsule by mouth daily.       montelukast (SINGULAIR) 10 mg tablet TAKE 1 TABLET(10 MG) BY MOUTH AT BEDTIME 90 tablet 3     multivitamin therapeutic (THERAGRAN) tablet Take 1  tablet by mouth daily.       naproxen sodium (ALEVE) 220 MG tablet Take 220 mg by mouth as needed for pain.       nystatin (MYCOSTATIN) cream   1     oxyCODONE (OXY-IR) 5 mg capsule Take 1 capsule (5 mg total) by mouth daily as needed. 30 capsule 0     pregabalin (LYRICA) 50 MG capsule 3 capsules daily as directed. 90 capsule 2     SUMAtriptan (IMITREX) 50 MG tablet Take 1 tablet (50 mg total) by mouth every 2 (two) hours as needed for migraine. 30 tablet 1     traMADol (ULTRAM) 50 mg tablet Take 1 tablet (50 mg total) by mouth every 8 (eight) hours as needed for pain. 30 tablet 0     triamcinolone (KENALOG) 0.1 % cream KRYSTAL TOPICALLY AA HS  11     umeclidinium (INCRUSE ELLIPTA) 62.5 mcg/actuation DsDv inhaler Inhale 1 puff daily. 30 each 11     [DISCONTINUED] alendronate (FOSAMAX) 70 MG tablet TAKE 1 TABLET BY MOUTH 1 TIME WEEKLY AS DIRECTED 12 tablet 0     [DISCONTINUED] traMADol (ULTRAM) 50 mg tablet TAKE 1 TABLET BY MOUTH EVERY 8 HOURS AS NEEDED FOR PAIN 30 tablet 0     No current facility-administered medications on file prior to visit.      /64  Pulse 72  Resp 20  Wt 125 lb 4.8 oz (56.8 kg)  SpO2 91% Comment: RA  BMI 22.2 kg/m2    Medical History  Active Ambulatory (Non-Hospital) Problems    Diagnosis     Bronchiectasis without complication     Dyspnea on exertion     Osteopenia     Past Medical History:   Diagnosis Date     Chronic low back pain      COPD (chronic obstructive pulmonary disease)      Diverticulosis      Hiatal hernia      Mild asthma      Neuropathy      Osteopenia      Temporomandibular joint (TMJ) pain            Social history reviewed today: She is still volunteering actively.  She is going to [place] for thanksgiving   Allergies  Allergies   Allergen Reactions     Codeine      Morphine Itching                              Data Review - imaging, labs, and ekgs listed below were reviewed by me.  Chest XRay and chest CT images and EKG tracings interpreted personally.     Past Labs  No  visits with results within 2 Month(s) from this visit.  Latest known visit with results is:    Physical on 06/13/2017   Component Date Value     Sodium 06/13/2017 142      Potassium 06/13/2017 3.7      Chloride 06/13/2017 104      CO2 06/13/2017 29      Anion Gap, Calculation 06/13/2017 9      Glucose 06/13/2017 91      BUN 06/13/2017 16      Creatinine 06/13/2017 0.70      GFR MDRD Af Amer 06/13/2017 >60      GFR MDRD Non Af Amer 06/13/2017 >60      Bilirubin, Total 06/13/2017 0.5      Calcium 06/13/2017 10.6*     Protein, Total 06/13/2017 7.4      Albumin 06/13/2017 3.8      Alkaline Phosphatase 06/13/2017 83      AST 06/13/2017 28      ALT 06/13/2017 17      CRP 06/13/2017 0.3      Cholesterol 06/13/2017 172      Triglycerides 06/13/2017 89      HDL Cholesterol 06/13/2017 57      LDL Calculated 06/13/2017 97      Patient Fasting > 8hrs? 06/13/2017 Yes      Color, UA 06/13/2017 Yellow      Clarity, UA 06/13/2017 Clear      Glucose, UA 06/13/2017 Negative      Bilirubin, UA 06/13/2017 Negative      Ketones, UA 06/13/2017 Negative      Specific Gravity, UA 06/13/2017 1.015      Blood, UA 06/13/2017 Negative      pH, UA 06/13/2017 8.5*     Protein, UA 06/13/2017 Negative      Urobilinogen, UA 06/13/2017 0.2 E.U./dL      Nitrite, UA 06/13/2017 Negative      Leukocytes, UA 06/13/2017 Negative      WBC 06/13/2017 5.6      RBC 06/13/2017 4.43      Hemoglobin 06/13/2017 13.7      Hematocrit 06/13/2017 41.1      MCV 06/13/2017 93      MCH 06/13/2017 30.9      MCHC 06/13/2017 33.3      RDW 06/13/2017 13.1      Platelets 06/13/2017 288      MPV 06/13/2017 11.0      Neutrophils % 06/13/2017 67      Lymphocytes % 06/13/2017 20      Monocytes % 06/13/2017 10      Eosinophils % 06/13/2017 3      Basophils % 06/13/2017 1      Neutrophils Absolute 06/13/2017 3.7      Lymphocytes Absolute 06/13/2017 1.1      Monocytes Absolute 06/13/2017 0.6      Eosinophils Absolute 06/13/2017 0.2      Basophils Absolute 06/13/2017 0.1

## 2021-06-15 NOTE — TELEPHONE ENCOUNTER
RN cannot approve Refill Request    RN can NOT refill this medication No established PCP. Last office visit: 7/5/2018 Taov Bassett MD Last Physical: 9/23/2020 Last MTM visit: Visit date not found Last visit same specialty: 7/5/2018 Tavo Bassett MD.  Next visit within 3 mo: Visit date not found  Next physical within 3 mo: Visit date not found      Laura Saucedo, [facility] Triage/Med Refill 2/6/2021    Requested Prescriptions   Pending Prescriptions Disp Refills     montelukast (SINGULAIR) 10 mg tablet [Pharmacy Med Name: MONTELUKAST SOD 10 MG TAB** 10 Tablet] 90 tablet 3     Sig: TAKE 1 TABLET(10 MG) BY MOUTH AT BEDTIME       There is no refill protocol information for this order

## 2021-06-16 PROBLEM — Z99.81 CHRONIC RESPIRATORY FAILURE WITH HYPOXIA, ON HOME O2 THERAPY (H): Status: ACTIVE | Noted: 2021-01-04

## 2021-06-16 PROBLEM — G60.9 IDIOPATHIC PERIPHERAL NEUROPATHY: Chronic | Status: ACTIVE | Noted: 2019-06-19

## 2021-06-16 PROBLEM — J96.11 CHRONIC RESPIRATORY FAILURE WITH HYPOXIA, ON HOME O2 THERAPY (H): Status: ACTIVE | Noted: 2021-01-04

## 2021-06-16 PROBLEM — J44.9 COPD (CHRONIC OBSTRUCTIVE PULMONARY DISEASE) (H): Status: ACTIVE | Noted: 2020-07-29

## 2021-06-16 NOTE — TELEPHONE ENCOUNTER
Last Office Visit  1/4/2021 Dr Julia Koehler    Notes:  Chronic obstructive pulmonary disease, unspecified COPD type (H)  Bronchiectasis without complication (H)  Chronic respiratory failure with hypoxia, on home O2 therapy (H)  Patient follows with Dr. Lorenz of pulmonology.  She does get hypoxic with exertion and uses 2 L of oxygen with exertion, none at rest.  She is compliant with her inhalers including Advair, Incruse Ellipta, and as needed albuterol.  She does have prednisone and doxycycline on hand for flareups which she gets in the spring and fall.     Osteopenia of multiple sites  She is on Fosamax as well as vitamin D and calcium supplementation.     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  She does see a neurologist at [facility] who recommended a CTA for further characterization, but she declined at that time.  She is to follow-up with them as needed.  At today she declines a CTA brain again.     Chronic midline low back pain without sciatica  She has a history of lumbar fusion as well as bilateral knee replacement.  She does have tramadol on her med list, but she uses it very infrequently per her report.     Other migraine without status migrainosus, not intractable  She has a longstanding history of migraines and she does have Imitrex which she uses infrequently.    Last Filled:  pregabalin (LYRICA) 50 MG capsule 90 capsule 4 11/28/2020  No   Sig: TAKE 3 CAPSULES BY MOUTH DAILY AS DIRECTED.   Sent to pharmacy as: pregabalin 50 mg capsule (LYRICA)   E-Prescribing Status: Receipt confirmed by pharmacy (11/28/2020 11:54 AM CST)       Next OV:  Visit date not found        Medication teed up for provider signature

## 2021-06-17 NOTE — TELEPHONE ENCOUNTER
Last seen 01/04/21  [facility]         pregabalin (LYRICA) 50 MG capsule 90 capsule 4 4/20/2021     Called to pharmacy - they do have refills on file

## 2021-06-17 NOTE — PROGRESS NOTES
Fiordaliza Najera is a 75 y.o. female who is being evaluated via a billable video visit.      How would you like to obtain your AVS? MyChart.  If dropped from the video visit, the video invitation should be resent by: Send to e-mail at: [email or web address]  Will anyone else be joining your video visit? No      Video Start Time: 225    Subjective   Fiordaliza Najera is 75 y.o. and presents today for the following health issues   HPI       Additional provider notes:         Video-Visit Details    Type of service:  Video Visit    Video End Time (time video stopped): 240  Originating Location (pt. Location): Home    Distant Location (provider location):  [facility]     Platform used for Video Visit: [place]   Assessment and plan  75-year-old woman with history of bronchiectasis, chronic hypoxic respiratory failure here for follow-up.  She is doing fairly well.  No exacerbations since our last visit together.  We are empirically treating her with Augmentin every few months because of historically recurrent Haemophilus influenza during exacerbations.  She still is quite limited by shortness of breath but will slowly increase her exercise.    Continue current inhaler regimen.  Plan on repeating course of Augmentin in mid summer.    Problems chronic hypoxic respiratory failure  Bronchiectasis    Chief complaint: Shortness of breath    HPI: 75-year-old woman with bronchiectasis and chronic hypoxic respiratory failure here for follow-up.  She has not had any exacerbations since her last visit.  She is fairly short of breath and has not been leaving the house much due to COVID-19.  She is concerned that she may have lost some strength.  She has plans to slowly increase her activity.  She is still using her oxygen and not having any issues with it.  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    Medical history reviewed, family and social history reviewed    Medications and allergies reviewed    On  exam  Calm  No anxiety  Normal pattern of breathing  Normal voice, no stridor  No evident tremor

## 2021-06-17 NOTE — PROGRESS NOTES
Assessment/Plan:        Diagnoses and all orders for this visit:    Bronchiectasis without complication    Dyspnea on exertion    Chronic respiratory failure with hypoxia     71-year-old female with COPD and bronchiectasis with respiratory failure.  She is clinically been doing okay but has reduced exercise tolerance in the setting of recent injury and decreased activity.    Stop your advair.    If you have more wheezing or chest congestion, or if you have a chest cold in the next 1-2 months after stopping, we should consider restarting advair.  Please give us a call if you need to restart.    Continue your oxygen at 2 lpm with strenuous exercise.    Continue with your exercise plan.              Subjective:    Patient ID: Fiordaliza Najera is a 72 y.o. female.    HPI Comments:     Bronchiectasis: Overall this is stable.  She has had no exacerbation since her last visit.  Provocative factors include humid weather.  Her symptoms localized to her chest.  No other provocative, palliative or localizing factors.  She has occasional sputum.  No hemoptysis or fevers.    Chronic hypoxic respiratory failure: The setting of exercise.  Tolerates portable concentrator.  However, improving her saturations does not improve her breathing.    Shortness of breath: This is worsened recently.  It is worse with exertion.  It improves with rest.  It occurred in the setting of a knee injury that has limited her activity.      Review of Systems activity change fatigue congestion voice change chest tightness cough shortness of breath eye itching chest pain joint pain environmental allergies.  The remainder of a 15 system review of systems is negative.          Objective:    Physical Exam   Constitutional: She is oriented to person, place, and time. She appears well-developed and well-nourished. No distress.   HENT:   Head: Normocephalic and atraumatic.   Nose: Nose normal.   Eyes: Conjunctivae are normal. Pupils are equal, round, and reactive  to light. Right eye exhibits no discharge. Left eye exhibits no discharge. No scleral icterus.   Neck: Normal range of motion. No JVD present. No tracheal deviation present. No thyromegaly present.   Cardiovascular: Normal rate, regular rhythm and normal heart sounds.  Exam reveals no gallop.    No murmur heard.  Pulmonary/Chest: Effort normal and breath sounds normal. No stridor. No respiratory distress. She has no wheezes.   Musculoskeletal: She exhibits no edema or deformity.   Lymphadenopathy:     She has no cervical adenopathy.   Neurological: She is alert and oriented to person, place, and time. No cranial nerve deficit. Coordination normal.   Skin: Skin is warm and dry. She is not diaphoretic. No erythema.   Psychiatric: She has a normal mood and affect. Her behavior is normal. Thought content normal.   Nursing note and vitals reviewed.  /64  Pulse 88  Resp 18  Wt 124 lb 4.8 oz (56.4 kg)  SpO2 91% Comment: MIGUEL  BMI 22.02 kg/m2          Current Outpatient Prescriptions on File Prior to Visit   Medication Sig Dispense Refill     albuterol (PROAIR HFA;PROVENTIL HFA;VENTOLIN HFA) 90 mcg/actuation inhaler Inhale 2 puffs every 6 (six) hours as needed for wheezing. 1 Inhaler 3     amoxicillin (AMOXIL) 500 MG capsule Take 4 tabs 1 hour prior to dental procedure 8 capsule 1     BIOTIN ORAL Take by mouth.       calcium-vitamin D (CALCIUM-VITAMIN D) 500 mg(1,250mg) -200 unit per tablet Take 1 tablet by mouth daily.       celecoxib (CELEBREX) 200 MG capsule TAKE 1 CAPSULE BY MOUTH DAILY 90 capsule 0     cholecalciferol, vitamin D3, (VITAMIN D3) 1,000 unit capsule Take 1,000 Units by mouth daily.       doxycycline (ADOXA) 100 MG tablet Take 1 tablet (100 mg total) by mouth 2 (two) times a day. 20 tablet 2     fluticasone-salmeterol (ADVAIR DISKUS) 100-50 mcg/dose DISKUS USE 1 INHALATION BY MOUTH TWICE DAILY 60 each 11     gelatin 600 mg cap Take by mouth.       Lactobacillus rhamnosus GG (CULTURELLE) 10-15  Billion cell capsule Take 1 capsule by mouth daily.       montelukast (SINGULAIR) 10 mg tablet TAKE 1 TABLET(10 MG) BY MOUTH AT BEDTIME 90 tablet 3     multivitamin therapeutic (THERAGRAN) tablet Take 1 tablet by mouth daily.       naproxen sodium (ALEVE) 220 MG tablet Take 220 mg by mouth as needed for pain.       nystatin (MYCOSTATIN) cream   1     oxyCODONE (OXY-IR) 5 mg capsule Take 1 capsule (5 mg total) by mouth daily as needed. 30 capsule 0     pregabalin (LYRICA) 50 MG capsule TAKE 3 CAPSULES BY MOUTH DAILY AS DIRECTED 90 capsule 0     SUMAtriptan (IMITREX) 50 MG tablet Take 1 tablet (50 mg total) by mouth every 2 (two) hours as needed for migraine. 30 tablet 1     traMADol (ULTRAM) 50 mg tablet TAKE 1 TABLET BY MOUTH EVERY 8 HOURS AS NEEDED FOR PAIN 30 tablet 0     triamcinolone (KENALOG) 0.1 % cream KRYSTAL TOPICALLY AA HS  11     umeclidinium (INCRUSE ELLIPTA) 62.5 mcg/actuation DsDv inhaler Inhale 1 puff daily. 30 each 11     [DISCONTINUED] traMADol (ULTRAM) 50 mg tablet Take 1 tablet (50 mg total) by mouth every 8 (eight) hours as needed for pain. 30 tablet 0     No current facility-administered medications on file prior to visit.      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   Allergies  Allergies   Allergen Reactions     Codeine      Morphine Itching                              Data Review - imaging, labs, and ekgs listed below were reviewed by me.  Chest XRay and chest CT images and  EKG tracings interpreted personally.     Past Labs  No visits with results within 2 Month(s) from this visit.  Latest known visit with results is:    Physical on 06/13/2017   Component Date Value     Sodium 06/13/2017 142      Potassium 06/13/2017 3.7      Chloride 06/13/2017 104      CO2 06/13/2017 29      Anion Gap, Calculation 06/13/2017 9      Glucose 06/13/2017 91      BUN 06/13/2017 16      Creatinine 06/13/2017 0.70      GFR MDRD Af Amer 06/13/2017 >60      GFR MDRD Non Af Amer 06/13/2017 >60      Bilirubin, Total 06/13/2017 0.5      Calcium 06/13/2017 10.6*     Protein, Total 06/13/2017 7.4      Albumin 06/13/2017 3.8      Alkaline Phosphatase 06/13/2017 83      AST 06/13/2017 28      ALT 06/13/2017 17      CRP 06/13/2017 0.3      Cholesterol 06/13/2017 172      Triglycerides 06/13/2017 89      HDL Cholesterol 06/13/2017 57      LDL Calculated 06/13/2017 97      Patient Fasting > 8hrs? 06/13/2017 Yes      Color, UA 06/13/2017 Yellow      Clarity, UA 06/13/2017 Clear      Glucose, UA 06/13/2017 Negative      Bilirubin, UA 06/13/2017 Negative      Ketones, UA 06/13/2017 Negative      Specific Gravity, UA 06/13/2017 1.015      Blood, UA 06/13/2017 Negative      pH, UA 06/13/2017 8.5*     Protein, UA 06/13/2017 Negative      Urobilinogen, UA 06/13/2017 0.2 E.U./dL      Nitrite, UA 06/13/2017 Negative      Leukocytes, UA 06/13/2017 Negative      WBC 06/13/2017 5.6      RBC 06/13/2017 4.43      Hemoglobin 06/13/2017 13.7      Hematocrit 06/13/2017 41.1      MCV 06/13/2017 93      MCH 06/13/2017 30.9      MCHC 06/13/2017 33.3      RDW 06/13/2017 13.1      Platelets 06/13/2017 288      MPV 06/13/2017 11.0      Neutrophils % 06/13/2017 67      Lymphocytes % 06/13/2017 20      Monocytes % 06/13/2017 10      Eosinophils % 06/13/2017 3      Basophils % 06/13/2017 1      Neutrophils Absolute 06/13/2017 3.7      Lymphocytes Absolute 06/13/2017 1.1      Monocytes Absolute 06/13/2017 0.6      Eosinophils Absolute 06/13/2017  0.2      Basophils Absolute 06/13/2017 0.1

## 2021-06-18 NOTE — PROGRESS NOTES
Assessment and Plan:   73-year-old woman in for annual wellness exam.  Stable she is having more more trouble with clumsy legs with some tingling in the feet.  She was told she might have peripheral neuropathy.    1. Routine general medical examination at a health care facility      2. Bronchiectasis without complication (H)  Sees a pulmonologist.  She uses I doxycycline the first sign of infection.  She supposed to use oxygen with exercise but she is currently not on it.    3. Osteopenia of multiple sites  Vitamin D.    4. Peripheral neuropathy (H)  Has an appointment to see neurology this week.  I am checking a B12 TSH protein electrophoresis etc. on exam, sensation seems normal.  Circulation is normal.  Absent ankle reflexes.  She is continuing with Lyrica 50 mg 3 capsules daily      The patient's current medical problems were reviewed.    I have had an Advance Directives discussion with the patient.  The following health maintenance schedule was reviewed with the patient and provided in printed form in the after visit summary:   Health Maintenance   Topic Date Due     FALL RISK ASSESSMENT  12/14/2016     ADVANCE DIRECTIVES DISCUSSED WITH PATIENT  01/01/2017     MAMMOGRAM  08/28/2019     DXA SCAN  08/28/2019     TD 18+ HE  07/21/2020     COLONOSCOPY  06/24/2021     PNEUMOCOCCAL POLYSACCHARIDE VACCINE AGE 65 AND OVER  Completed     INFLUENZA VACCINE RULE BASED  Completed     PNEUMOCOCCAL CONJUGATE VACCINE FOR ADULTS (PCV13 OR PREVNAR)  Completed     ZOSTER VACCINE  Completed        Subjective:   Chief Complaint: Fiordaliza Najera is an 73 y.o. female here for an Annual Wellness visit.   HPI: 73-year-old woman in for annual wellness exam.  Long history of musculoskeletal problems.  She underwent significant spinal surgery in the past but the last 6 months she has been having trouble with some clumsiness of her legs and some pain in the legs as well.  She was told she might have peripheral neuropathy.  She has an  appointment to see a neurologist later this week.  No chest pain or any orthopnea.  Chronic bronchiectasis.  Antibiotics are needed to 3 times per year.  No dysphasia no change in bowel habits.  Gets her mammogram yearly.  No tumors.  No TIAs.  No epilepsy.  No migraine.  Has had bilateral total knee replacements in the past.  She has bilateral torn rotator cuff tears.  Not interested in any major surgery.    Review of Systems:    Please see above.  The rest of the review of systems are negative for all systems.    Patient Care Team:  Tavo Bassett MD as PCP - General (Internal Medicine)     Patient Active Problem List   Diagnosis     Osteopenia     Bronchiectasis without complication (H)     Dyspnea on exertion     Past Medical History:   Diagnosis Date     Chronic low back pain      COPD (chronic obstructive pulmonary disease) (H)      Diverticulosis      Hiatal hernia      Mild asthma      Neuropathy (H)      Osteopenia      Temporomandibular joint (TMJ) pain       Past Surgical History:   Procedure Laterality Date     ANTERIOR / POSTERIOR COMBINED FUSION LUMBAR SPINE       HYSTERECTOMY       RETINAL LASER PROCEDURE Left 10/04/2016     SALPINGOOPHORECTOMY       TOTAL KNEE ARTHROPLASTY  2008      Family History   Problem Relation Age of Onset     Dementia Mother      passed age 88     COPD Father      passed age 78      Social History     Social History     Marital status: Single     Spouse name: N/A     Number of children: N/A     Years of education: N/A     Occupational History     Not on file.     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 1 Inhaler 3     amoxicillin (AMOXIL) 500 MG capsule Take 4 tabs 1 hour prior to dental procedure 8 capsule 1     BIOTIN ORAL Take by mouth.       calcium-vitamin D (CALCIUM-VITAMIN D) 500 mg(1,250mg) -200 unit per tablet Take 1 tablet by mouth daily.       celecoxib (CELEBREX) 200 MG capsule TAKE 1 CAPSULE BY MOUTH DAILY 90 capsule 0     cholecalciferol, vitamin D3, (VITAMIN D3) 1,000 unit capsule Take 1,000 Units by mouth daily.       doxycycline (ADOXA) 100 MG tablet Take 1 tablet (100 mg total) by mouth 2 (two) times a day. 20 tablet 2     fluticasone-salmeterol (ADVAIR) 100-50 mcg/dose DISKUS Inhale 1 puff 2 (two) times a day. 60 each 11     gelatin 600 mg cap Take by mouth.       Lactobacillus rhamnosus GG (CULTURELLE) 10-15 Billion cell capsule Take 1 capsule by mouth daily.       montelukast (SINGULAIR) 10 mg tablet TAKE 1 TABLET(10 MG) BY MOUTH AT BEDTIME 90 tablet 3     multivitamin therapeutic (THERAGRAN) tablet Take 1 tablet by mouth daily.       naproxen sodium (ALEVE) 220 MG tablet Take 220 mg by mouth as needed for pain.       nystatin (MYCOSTATIN) cream   1     oxyCODONE (OXY-IR) 5 mg capsule Take 1 capsule (5 mg total) by mouth daily as needed. 30 capsule 0     predniSONE (DELTASONE) 20 MG tablet Take 1 tablet (20 mg total) by mouth daily. 10 tablet 2     SUMAtriptan (IMITREX) 50 MG tablet Take 1 tablet (50 mg total) by mouth every 2 (two) hours as needed for migraine. 30 tablet 1     traMADol (ULTRAM) 50 mg tablet Take 1 tablet (50 mg total) by mouth every 8 (eight) hours as needed. 60 tablet 0     triamcinolone (KENALOG) 0.1 % cream KRYSTAL TOPICALLY AA HS  11     umeclidinium (INCRUSE ELLIPTA) 62.5 mcg/actuation DsDv inhaler Inhale 1 puff daily. 30 each 11     pregabalin (LYRICA) 50 MG capsule TAKE 3 CAPSULES BY MOUTH DAILY AS DIRECTED 90 capsule 1     No current facility-administered medications for this visit.      "  Objective:   Vital Signs:   Visit Vitals     Pulse 69     Ht 5' 2.25\" (1.581 m)     Wt 125 lb (56.7 kg)     SpO2 91%     BMI 22.68 kg/m2        VisionScreening:  No exam data present     PHYSICAL EXAM  Blood pressure is 138/76.  Oxygen saturations 92%.  EYES: Eyelids, conjunctiva, and sclera were normal. Pupils were normal. Cornea, iris, and lens were normal bilaterally.  Cataract surgery in the past.  HEAD, EARS, NOSE, MOUTH, AND THROAT: Head and face were normal. Hearing was normal to voice and the ears were normal to external exam. Nose appearance was normal and there was no discharge. Oropharynx was normal.  NECK: Neck appearance was normal. There were no neck masses and the thyroid was not enlarged.  No bruits.  RESPIRATORY: Breathing pattern was normal and the chest moved symmetrically.  Percussion/auscultatory percussion was normal.  Lung sounds were normal and there were no abnormal sounds.  A few rales at the left lung base.  No wheezing  CARDIOVASCULAR: Heart rate and rhythm were normal.  S1 and S2 were normal and there were no extra sounds or murmurs. Peripheral pulses in arms and legs were normal.  Jugular venous pressure was normal.  There was no peripheral edema.  GASTROINTESTINAL: The abdomen was normal in contour.  Bowel sounds were present.  Percussion detected no organ enlargement or tenderness.  Palpation detected no tenderness, mass, or enlarged organs.   MUSCULOSKELETAL: Skeletal configuration was normal and muscle mass was normal for age. Joint appearance was overall normal.  Bilateral knee replacement.  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  LYMPHATIC: There were no enlarged nodes.  SKIN/HAIR/NAILS: Skin color was normal.  There were no skin lesions.  Hair and nails were normal.  NEUROLOGIC: The patient was alert and oriented to person, place, time, and circumstance. Speech was normal. Cranial nerves were normal. Motor strength was normal for age. The patient was normally " coordinated.  PSYCHIATRIC:  Mood and affect were normal and the patient had normal recent and remote memory. The patient's judgment and insight were normal.    ADDITIONAL VITAL SIGNS: O2 sats 92%  CHEST WALL/BREASTS: Normal female.  No masses visualized or palpated.  RECTAL: Not checked  GENITAL/URINARY: Hysterectomy salpingo-oophorectomy        Assessment Results 6/18/2018   Activities of Daily Living No help needed   Instrumental Activities of Daily Living No help needed   Get Up and Go Score Less than 12 seconds   Mini Cog Total Score 5   Some recent data might be hidden     A Mini-Cog score of 0-2 suggests the possibility of dementia, score of 3-5 suggests no dementia    Identified Health Risks:     The patient was provided with suggestions to help her develop a healthy lifestyle.   Patient's advanced directive was discussed and I am comfortable with the patient's wishes.

## 2021-06-18 NOTE — PROGRESS NOTES
"Call from patient.  States she was out in humidity yesterday and became shakey and \"sick\" feeling.  Put her oxygen on and left on all day and night.  Drank some gatorade. Feels some better today, but notices her oxygen levels dropping some (from 94% to 90%) when she takes the oxygen off and she seems to feel more shakey when it is off. Not sure if this is a lung issue or if it may be due to her neuropathy.  She has appt with neurology tomorrow, but is wondering if she should start action plan as she has a trip planned, leaving this Friday.    States she has been feeling more shortness of breath this past week, but no cough or congestion like she usually has when action plan is needed.    She will see neuro tomorrow and get their input.  Instructed that if she was concerned, should just start her action plan.  She will keep oxygen on for today as well.  Also suggested checking in with her PCP.  "

## 2021-06-19 NOTE — LETTER
Letter by Tavo Bassett MD at      Author: Tavo Bassett MD Service: -- Author Type: --    Filed:  Encounter Date: 6/20/2019 Status: (Other)         Fiordaliza Najera  [address]  [address]  [place] [address]             June 20, 2019         Dear Ms. Najera,    Below are the results from your recent visit:    Resulted Orders   Comprehensive Metabolic Panel   Result Value Ref Range    Sodium 141 136 - 145 mmol/L    Potassium 3.7 3.5 - 5.0 mmol/L    Chloride 104 98 - 107 mmol/L    CO2 30 22 - 31 mmol/L    Anion Gap, Calculation 7 5 - 18 mmol/L    Glucose 84 70 - 125 mg/dL    BUN 17 8 - 28 mg/dL    Creatinine 0.75 0.60 - 1.10 mg/dL    GFR MDRD Af Amer >60 >60 mL/min/1.73m2    GFR MDRD Non Af Amer >60 >60 mL/min/1.73m2    Bilirubin, Total 0.6 0.0 - 1.0 mg/dL    Calcium 10.5 8.5 - 10.5 mg/dL    Protein, Total 7.1 6.0 - 8.0 g/dL    Albumin 3.6 3.5 - 5.0 g/dL    Alkaline Phosphatase 84 45 - 120 U/L    AST 24 0 - 40 U/L    ALT 17 0 - 45 U/L    Narrative    Fasting Glucose reference range is 70-99 mg/dL per  American Diabetes Association (ADA) guidelines.   Lipid Cascade   Result Value Ref Range    Cholesterol 192 <=199 mg/dL    Triglycerides 92 <=149 mg/dL    HDL Cholesterol 69 >=50 mg/dL    LDL Calculated 105 <=129 mg/dL    Patient Fasting > 8hrs? Yes    Thyroid Cascade   Result Value Ref Range    TSH 3.94 0.30 - 5.00 uIU/mL   Urinalysis-UC if Indicated   Result Value Ref Range    Color, UA Yellow Colorless, Yellow, Straw, Light Yellow    Clarity, UA Clear Clear    Glucose, UA Negative Negative    Bilirubin, UA Negative Negative    Ketones, UA Negative Negative    Specific Gravity, UA 1.015 1.005 - 1.030    Blood, UA Negative Negative    pH, UA 7.0 5.0 - 8.0    Protein, UA Negative Negative mg/dL    Urobilinogen, UA 0.2 E.U./dL 0.2 E.U./dL, 1.0 E.U./dL    Nitrite, UA Negative Negative    Leukocytes, UA Negative Negative    Narrative    Microscopic not indicated  UC not indicated   HM1 (CBC with Diff)    Result Value Ref Range    WBC 10.3 4.0 - 11.0 thou/uL    RBC 4.55 3.80 - 5.40 mill/uL    Hemoglobin 13.6 12.0 - 16.0 g/dL    Hematocrit 40.5 35.0 - 47.0 %    MCV 89 80 - 100 fL    MCH 29.9 27.0 - 34.0 pg    MCHC 33.5 32.0 - 36.0 g/dL    RDW 11.8 11.0 - 14.5 %    Platelets 322 140 - 440 thou/uL    MPV 7.0 7.0 - 10.0 fL    Neutrophils % 74 (H) 50 - 70 %    Lymphocytes % 14 (L) 20 - 40 %    Monocytes % 9 2 - 10 %    Eosinophils % 2 0 - 6 %    Basophils % 1 0 - 2 %    Neutrophils Absolute 7.7 2.0 - 7.7 thou/uL    Lymphocytes Absolute 1.5 0.8 - 4.4 thou/uL    Monocytes Absolute 0.9 0.0 - 0.9 thou/uL    Eosinophils Absolute 0.2 0.0 - 0.4 thou/uL    Basophils Absolute 0.1 0.0 - 0.2 thou/uL       Sister Ayse, recent labs are reviewed.  Your electrolytes, blood sugar, kidney functions, and all liver function tests, are normal.  Lipids are excellent.  TSH, a measure of thyroid hormone levels, is normal the urine analysis looks okay without any blood loss or signs of infection.  Your hemoglobin is good at 13.6.  White blood count normal at 10,300.  You have a few extra neutrophils and a slight decrease in lymphocytes.  This is not significant as these changes are trivial and not clinically important.  White blood count and neutrophils plus lymphocytes were about the same 1 year ago.  In summary, your labs look quite good.    Please call with questions or contact us using [employer].    Sincerely,        Electronically signed by Tavo Bassett MD

## 2021-06-19 NOTE — PROGRESS NOTES
OFFICE VISIT NOTE    Subjective:   Chief Complaint:  Hospital Visit Follow Up (ER follow up) and Orders Request (2 walker orders)    73-year-old woman whose recent emergency room with some dizziness.  Felt most likely had benign vertigo.  MRI did not show any evidence of stroke etc.  She is feeling okay now though she occasionally gets minor dizziness.  No loss of speech.  No double vision.  No falls or injuries.  She is interested in getting a lifeline at home    Current Outpatient Prescriptions   Medication Sig     albuterol (PROAIR HFA;PROVENTIL HFA;VENTOLIN HFA) 90 mcg/actuation inhaler Inhale 2 puffs every 6 (six) hours as needed for wheezing.     amoxicillin (AMOXIL) 500 MG capsule Take 4 tabs 1 hour prior to dental procedure     BIOTIN ORAL Take by mouth.     calcium-vitamin D (CALCIUM-VITAMIN D) 500 mg(1,250mg) -200 unit per tablet Take 1 tablet by mouth daily.     celecoxib (CELEBREX) 200 MG capsule TAKE 1 CAPSULE BY MOUTH DAILY     cholecalciferol, vitamin D3, (VITAMIN D3) 1,000 unit capsule Take 1,000 Units by mouth daily.     doxycycline (ADOXA) 100 MG tablet Take 1 tablet (100 mg total) by mouth 2 (two) times a day.     fluticasone-salmeterol (ADVAIR) 100-50 mcg/dose DISKUS Inhale 1 puff 2 (two) times a day.     gelatin 600 mg cap Take by mouth.     Lactobacillus rhamnosus GG (CULTURELLE) 10-15 Billion cell capsule Take 1 capsule by mouth daily.     montelukast (SINGULAIR) 10 mg tablet TAKE 1 TABLET(10 MG) BY MOUTH AT BEDTIME     multivitamin therapeutic (THERAGRAN) tablet Take 1 tablet by mouth daily.     naproxen sodium (ALEVE) 220 MG tablet Take 220 mg by mouth as needed for pain.     nystatin (MYCOSTATIN) cream      predniSONE (DELTASONE) 20 MG tablet Take 1 tablet (20 mg total) by mouth daily.     pregabalin (LYRICA) 50 MG capsule TAKE 3 CAPSULES BY MOUTH DAILY AS DIRECTED     SUMAtriptan (IMITREX) 50 MG tablet Take 1 tablet (50 mg total) by mouth every 2 (two) hours as needed for migraine.      "traMADol (ULTRAM) 50 mg tablet Take 1 tablet (50 mg total) by mouth every 8 (eight) hours as needed.     triamcinolone (KENALOG) 0.1 % cream KRYSTAL TOPICALLY AA HS     umeclidinium (INCRUSE ELLIPTA) 62.5 mcg/actuation DsDv inhaler Inhale 1 puff daily.     oxyCODONE (OXY-IR) 5 mg capsule Take 1 capsule (5 mg total) by mouth daily as needed.       PSFHx: Tobacco Status:  She  reports that she has never smoked. She has never used smokeless tobacco.    Review of Systems:  A comprehensive review of systems is negative except for the comments above    Objective:    Pulse 76  Ht 5' 2.25\" (1.581 m)  Wt 124 lb 1.9 oz (56.3 kg)  LMP  (LMP Unknown)  SpO2 98%  Breastfeeding? No  BMI 22.52 kg/m2  GENERAL: No acute distress.  Resident and ambulates with use of cane.  She is using oxygen.  Blood pressures 124/66 pulse is 76.  Speech is normal without any slurring.  Memory is intact.  Visual fields intact.  No nystagmus.  Finger to nose test is normal.  Heel to shin test is normal.  Romberg test is normal.  Ears to have moderate amount of cerumen.  I do not see any signs of otitis.  No carotid bruits.  Heart shows a regular sinus rhythm today.    Assessment & Plan   Fiordaliza Najera is a 73 y.o. female.    Vertigo which I think is benign.  Hopefully this will resolve.  She has chronic bronchiectasis and is now using oxygen.  He is being followed by pulmonary.  Prescription was written for a light weight walker.  Also walker with wheels and brakes.    Diagnoses and all orders for this visit:    Benign paroxysmal positional vertigo due to bilateral vestibular disorder    Bronchiectasis without complication (H)            Tavo Bassett MD  Transcription using voice recognition software, may contain typographical errors.    "

## 2021-06-20 NOTE — PROGRESS NOTES
Assessment and Plan:Fiordaliza Najera is a 73 y.o. with a past medical history significant for COPD and bronchiectasis who presents to clinic today for follow-up.  She was previously followed by Dr. Lorenz and last saw him on May 4, 2018.  A chest x-ray performed during an ED visit on July 1, 2018 for neuropathy showed evidence of lung scarring, which prompted this visit.  Most recent PFTs in 2016 showed severe obstruction that was not reversible with inhaled bronchodilators.  She reports that she has been having increased dyspnea on exertion this summer.  She also reports a chronic cough that is usually dry during the daytime but occasionally productive in the morning and whenever lying flat, however not recently.  She takes courses of doxycycline and  prednisone whenever the symptoms worsen, which seem to help.  She has tried nebulizers and flutter valves in the past, however stopped using them because it did not help. She does not experience any heartburn, however does have a hiatal hernia.  She continues to use Advair, increase, and as needed Ventolin occasionally.    1.  Repeat pulmonary function tests to check for restriction  2.  Bronchiectasis workup labs ordered.  Patient declined a sputum culture as she stated that her cough is not currently productive.  3.  Start Pepcid  4.  Patient advised on aspiration precautions including elevating the head of her bed    Return to clinic in 2-3 months to follow-up on the above.  We will consider a CT scan at that time if there is evidence of restriction on PFTs, the patient wished to hold off on it for now..      CCx: Evidence of pulmonary fibrosis noted on chest x-ray performed during ED visit    HPI: Ms. Najera states kathya she has a long-standing cough that is usually dry during the daytime, however occasionally productive when lying flat at night and in the morning.  However she states that it has not been productive lately.  She denies any recent heartburn.  She  denies fevers, chills, or other infectious symptoms.  She has noted an increase in her long-standing dyspnea on exertion that summer, now getting short of breath even with mild activity.    ROS:  A review of 12 organ systems was performed with pertinent positives and negatives noted in the HPI.      Current Meds:  Current Outpatient Prescriptions   Medication Sig Note     albuterol (PROAIR HFA;PROVENTIL HFA;VENTOLIN HFA) 90 mcg/actuation inhaler Inhale 2 puffs every 6 (six) hours as needed for wheezing.      amoxicillin (AMOXIL) 500 MG capsule Take 4 tabs 1 hour prior to dental procedure      BIOTIN ORAL Take by mouth.      calcium-vitamin D (CALCIUM-VITAMIN D) 500 mg(1,250mg) -200 unit per tablet Take 1 tablet by mouth daily.      celecoxib (CELEBREX) 200 MG capsule TAKE 1 CAPSULE BY MOUTH DAILY      cholecalciferol, vitamin D3, (VITAMIN D3) 1,000 unit capsule Take 1,000 Units by mouth daily.      fluticasone-salmeterol (ADVAIR) 100-50 mcg/dose DISKUS Inhale 1 puff 2 (two) times a day.      gelatin 600 mg cap Take by mouth.      Lactobacillus rhamnosus GG (CULTURELLE) 10-15 Billion cell capsule Take 1 capsule by mouth daily.      montelukast (SINGULAIR) 10 mg tablet TAKE 1 TABLET(10 MG) BY MOUTH AT BEDTIME      montelukast (SINGULAIR) 10 mg tablet TAKE 1 TABLET(10 MG) BY MOUTH AT BEDTIME      multivitamin therapeutic (THERAGRAN) tablet Take 1 tablet by mouth daily.      naproxen sodium (ALEVE) 220 MG tablet Take 220 mg by mouth as needed for pain.      nystatin (MYCOSTATIN) cream  3/30/2016: Received from: External Pharmacy     pregabalin (LYRICA) 50 MG capsule TAKE 3 CAPSULES BY MOUTH DAILY AS DIRECTED      SUMAtriptan (IMITREX) 50 MG tablet Take 1 tablet (50 mg total) by mouth every 2 (two) hours as needed for migraine.      traMADol (ULTRAM) 50 mg tablet Take 1 tablet (50 mg total) by mouth every 8 (eight) hours as needed.      triamcinolone (KENALOG) 0.1 % cream KRYSTAL TOPICALLY AA  6/13/2017: Received from:  External Pharmacy     umeclidinium (INCRUSE ELLIPTA) 62.5 mcg/actuation DsDv inhaler Inhale 1 puff daily.      doxycycline (ADOXA) 100 MG tablet Take 1 tablet (100 mg total) by mouth 2 (two) times a day.      famotidine (PEPCID) 40 MG tablet Take 1 tablet (40 mg total) by mouth at bedtime.      predniSONE (DELTASONE) 20 MG tablet Take 1 tablet (20 mg total) by mouth daily.        Labs:  Recent Results (from the past 72 hour(s))   Immunoglobulins IgG, IgA, IgM   Result Value Ref Range    Immunoglobulin G 1040 700 - 1700 mg/dL    Immunoglobulin M 79 60 - 280 mg/dL    Immunoglobulin A 510 (H) 65 - 400 mg/dL   Rheumatoid Factor Quant   Result Value Ref Range    Rheumatoid Factor Quantitative <15.0 0 - 30 IU/mL   HM1 (CBC with Diff)   Result Value Ref Range    WBC 7.2 4.0 - 11.0 thou/uL    RBC 4.52 3.80 - 5.40 mill/uL    Hemoglobin 13.5 12.0 - 16.0 g/dL    Hematocrit 41.6 35.0 - 47.0 %    MCV 92 80 - 100 fL    MCH 29.9 27.0 - 34.0 pg    MCHC 32.5 32.0 - 36.0 g/dL    RDW 13.2 11.0 - 14.5 %    Platelets 316 140 - 440 thou/uL    MPV 10.5 8.5 - 12.5 fL    Neutrophils % 73 (H) 50 - 70 %    Lymphocytes % 15 (L) 20 - 40 %    Monocytes % 9 2 - 10 %    Eosinophils % 2 0 - 6 %    Basophils % 0 0 - 2 %    Neutrophils Absolute 5.2 2.0 - 7.7 thou/uL    Lymphocytes Absolute 1.1 0.8 - 4.4 thou/uL    Monocytes Absolute 0.7 0.0 - 0.9 thou/uL    Eosinophils Absolute 0.2 0.0 - 0.4 thou/uL    Basophils Absolute 0.0 0.0 - 0.2 thou/uL   Culture/Gram Stain: Sputum   Result Value Ref Range    Gram Stain Result 3+ Polymorphonuclear leukocytes     Gram Stain Result 3+ Gram positive cocci in clusters     Gram Stain Result 3+ Gram positive cocci in chains     Gram Stain Result 2+ Gram positive cocci in pairs        I have personally reviewed all pertinent imaging studies and PFT results unless otherwise noted.    Imaging studies:  Mr Brain Cow With Without Contrast    Result Date: 7/1/2018  [facility] 1. HEAD MRI WITHOUT and with IV  CONTRAST 2. HEAD MRA WITHOUT IV CONTRAST 7/1/2018 11:11 AM INDICATION: Desiquilbrium desiquilibrium TECHNIQUE: 1. Head MRI without and with intravenous contrast. 2. 3D time-of-flight head MRA without intravenous contrast. COMPARISON: None FINDINGS: HEAD MRI:  No acute infarct/restricted diffusion. No mass lesion, hemorrhage, or extra-axial fluid collections. The ventricles and sulci are normal in size, shape, and position. The cerebellum is unremarkable. Periventricular and subcortical FLAIR hyperintense foci which are nonspecific but may relate to sequela of chronic microvascular ischemic change. The pituitary gland is unremarkable. The major intracranial vascular flow voids are maintained. The orbits are unremarkable. The calvarium and skull base are unremarkable.  The paranasal sinuses are clear. The mastoid air cells are clear. HEAD MRA: There is a 2 mm inferomedially directed outpouching involving the left paraclinoid ICA. No significant intracranial stenosis Balanced vertebral arteries supply a normal basilar artery.  The major intracranial arterial anatomy is within normal limits.     CONCLUSION: HEAD MRI: 1.  Normal Head MRI. 2.  No acute infarcts, mass lesions or hemorrhage. HEAD MRA: 1.  There is 2 mm inferomedially directed outpouching involving the distal left internal carotid artery which may represent either a small infundibulum versus a paraclinoid aneurysm. Patent intracranial arterial vasculature without flow-limiting stenosis.     Xr Chest 2 Views    Result Date: 7/1/2018  XR CHEST 2 VIEWS 7/1/2018 9:46 AM INDICATION: Dizziness COMPARISON: 05/16/2016 FINDINGS: Heart size and pulmonary vascularity normal. Emphysema with hyperinflated lungs. Scattered areas of scarring both lungs has progressed. No airspace infiltrates or effusions. Spinal stabilization rods.        Physical Exam:  BP 98/62 (Patient Site: Left Arm, Patient Position: Sitting, Cuff Size: Adult Regular)  Pulse 70  Resp 16  Ht 5'  "2.25\" (1.581 m)  Wt 130 lb (59 kg)  LMP  (LMP Unknown)  SpO2 92%  BMI 23.59 kg/m2  General - Well nourished  Ears/Mouth -  OP pink moist, no thrush  Neck - no cervical lymphadenopathy  Lungs - Clear to ausculation bilaterally, no crackles or wheezes appreciated  CVS - regular rhythm with no murmurs, rubs or gallups  Abdomen - soft, NT, ND, NABS  Ext - no cyanosis, clubbing or edema  Skin - no rash  Psychology - alert and oriented, answers appropriate        Electronically signed by:    Garth Grossman MD  [facility] Pulmonary and Critical Care Medicine  "

## 2021-06-20 NOTE — LETTER
Letter by Tavo Bassett MD at      Author: Tavo Bassett MD Service: -- Author Type: --    Filed:  Encounter Date: 9/24/2020 Status: (Other)         Fiordaliza Najera  [address]  [address]  [place] [address]             September 24, 2020         Dear Ms. Najera,    Below are the results from your recent visit:    Resulted Orders   Comprehensive Metabolic Panel   Result Value Ref Range    Sodium 138 136 - 145 mmol/L    Potassium 4.1 3.5 - 5.0 mmol/L    Chloride 100 98 - 107 mmol/L    CO2 30 22 - 31 mmol/L    Anion Gap, Calculation 8 5 - 18 mmol/L    Glucose 98 70 - 125 mg/dL    BUN 16 8 - 28 mg/dL    Creatinine 0.66 0.60 - 1.10 mg/dL    GFR MDRD Af Amer >60 >60 mL/min/1.73m2    GFR MDRD Non Af Amer >60 >60 mL/min/1.73m2    Bilirubin, Total 0.5 0.0 - 1.0 mg/dL    Calcium 9.9 8.5 - 10.5 mg/dL    Protein, Total 7.4 6.0 - 8.0 g/dL    Albumin 3.9 3.5 - 5.0 g/dL    Alkaline Phosphatase 96 45 - 120 U/L    AST 25 0 - 40 U/L    ALT 21 0 - 45 U/L    Narrative    Fasting Glucose reference range is 70-99 mg/dL per  American Diabetes Association (ADA) guidelines.   Lipid Cascade   Result Value Ref Range    Cholesterol 191 <=199 mg/dL    Triglycerides 111 <=149 mg/dL    HDL Cholesterol 78 >=50 mg/dL    LDL Calculated 91 <=129 mg/dL    Patient Fasting > 8hrs? Yes    Urinalysis-UC if Indicated   Result Value Ref Range    Color, UA Yellow Colorless, Yellow, Straw, Light Yellow    Clarity, UA Clear Clear    Glucose, UA Negative Negative    Bilirubin, UA Negative Negative    Ketones, UA Negative Negative    Specific Gravity, UA 1.015 1.005 - 1.030    Blood, UA Negative Negative    pH, UA 7.0 5.0 - 8.0    Protein, UA Negative Negative mg/dL    Urobilinogen, UA 0.2 E.U./dL 0.2 E.U./dL, 1.0 E.U./dL    Nitrite, UA Negative Negative    Leukocytes, UA Negative Negative    Narrative    Microscopic not indicated  UC not indicated   Hepatitis C Antibody (Anti-HCV)   Result Value Ref Range    Hepatitis C Ab Negative Negative    HM1 (CBC with Diff)   Result Value Ref Range    WBC 15.3 (H) 4.0 - 11.0 thou/uL    RBC 4.58 3.80 - 5.40 mill/uL    Hemoglobin 13.6 12.0 - 16.0 g/dL    Hematocrit 42.4 35.0 - 47.0 %    MCV 93 80 - 100 fL    MCH 29.7 27.0 - 34.0 pg    MCHC 32.1 32.0 - 36.0 g/dL    RDW 13.0 11.0 - 14.5 %    Platelets 312 140 - 440 thou/uL    MPV 10.2 8.5 - 12.5 fL    Neutrophils % 91 (H) 50 - 70 %    Lymphocytes % 5 (L) 20 - 40 %    Monocytes % 3 2 - 10 %    Eosinophils % 0 0 - 6 %    Basophils % 0 0 - 2 %    Immature Granulocyte % 1 (H) <=0 %    Neutrophils Absolute 13.9 (H) 2.0 - 7.7 thou/uL    Lymphocytes Absolute 0.8 0.8 - 4.4 thou/uL    Monocytes Absolute 0.5 0.0 - 0.9 thou/uL    Eosinophils Absolute 0.0 0.0 - 0.4 thou/uL    Basophils Absolute 0.0 0.0 - 0.2 thou/uL    Immature Granulocyte Absolute 0.1 (H) <=0.0 thou/uL       Sister Ayse, recent labs are reviewed.  Electrolytes, blood sugar, kidney functions, and all liver function tests, were normal.  Lipids looked excellent.  I screened you for hepatitis C and that was negative, with no evidence of previous hepatitis infection.  The urine analysis was normal without any blood loss or signs of infection.  Overall, labs look excellent.  It was good to see you again.  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   Dr Bassett  Please call with questions or contact us using [employer].    Sincerely,        Electronically signed by Tavo Bassett MD

## 2021-06-21 NOTE — PROGRESS NOTES
Assessment and Plan:  Fiordaliza Najera is a 73 y.o. with a past medical history significant for COPD and bronchiectasis who presented to my clinic 8/31/2018 for follow-up.  She was previously followed by Dr. Lorenz and last saw him on May 4, 2018.  A chest x-ray performed during an ED visit on July 1, 2018 for neuropathy showed evidence of lung scarring, which prompted this visit.  Most recent PFTs in 2016 showed severe obstruction that was not reversible with inhaled bronchodilators.  She reported that she has been having increased dyspnea on exertion over the summer.  She also reported a chronic cough that is usually dry during the daytime but occasionally productive in the morning and whenever lying flat.  She takes courses of doxycycline and prednisone whenever the symptoms worsen, which seem to help.  She has tried nebulizers and flutter valves in the past, however stopped using them because it did not help. She does not experience any heartburn, however does have a hiatal hernia.  She continued to use Advair, Incruse, and as needed Ventolin occasionally.  We ordered bronchiectasis workup labs which were negative.  We also started her on Pepcid for GERD and advised her on aspiration precautions    8/31/2018 sputum culture: H influenza and Candida, a 5-day course of azithromycin was prescribed after discussing the pros and cons with the patient    11/16/2018 follow-up appointment:     1. Bronchiectasis: Sputum culture positive for H influenza. Breathing has improved since she completed the Z-Satish we prescribed.   -Flutter valve 3 times daily    2. GERD: She is taking the Pepcid we prescribed and avoiding food or drink before bedtime.  Her cough at night and in the morning has improved somewhat.  She is however continuing to sleep lying flat at night.   -Continue Pepcid twice daily and avoiding oral intake within 3 hours of bedtime   -Patient again advised to elevate her head of bed with blocks under the front two  legs or with a wedge pillow    3. COPD: Severe obstructive lung disease is more than would be expected from her low smoking history (has not smoked or lived with a smoker since high school or had any significant known occupational exposures).     - We will therefore check an alpha-1-antitrypsin blood test today.    4. Possible concurrent restrictive lung disease/pulmonary fibrosis: pulmonary functions test show no significant decrease in her severe obstruction or FEV1.  TLC is still within normal limit range, however has decreased considerably from 5.29 L or 107% of predicted to 4.39 L or 92% of predicted over the past 2 years, concerning for possible  restrictive disease maxed by concurrent obstructive disease.  Recent chest x-rays have also shown possible fibrosis   -Noncontrast high resolution chest CT scan to evaluate for pulmonary fibrosis    Follow-up appt with me in 3 months with new spirometry      CCx: Shortness of breath and cough    HPI: Patient states her breathing has improved after she completed the Z-Satish that we prescribed.  She is taking the Pepcid regularly and avoiding oral intake before bedtime and has noted that her evening and morning cough has improved.  She is however continuing to sleep lying flat at night and states she forgot about the recommendation to elevate her head of bed.  She denies any fevers or chills, chest pain, or acute dyspnea.    ROS:  A review of 12 organ systems was performed with pertinent positives and negatives noted in the HPI.      Current Meds:  Current Outpatient Medications   Medication Sig Note     albuterol (PROAIR HFA;PROVENTIL HFA;VENTOLIN HFA) 90 mcg/actuation inhaler Inhale 2 puffs every 6 (six) hours as needed for wheezing.      amoxicillin (AMOXIL) 500 MG capsule Take 4 tabs 1 hour prior to dental procedure      BIOTIN ORAL Take by mouth.      calcium-vitamin D (CALCIUM-VITAMIN D) 500 mg(1,250mg) -200 unit per tablet Take 1 tablet by mouth daily.       celecoxib (CELEBREX) 200 MG capsule TAKE 1 CAPSULE BY MOUTH DAILY      cholecalciferol, vitamin D3, (VITAMIN D3) 1,000 unit capsule Take 1,000 Units by mouth daily.      famotidine (PEPCID) 40 MG tablet Take 1 tablet (40 mg total) by mouth at bedtime.      fluticasone-salmeterol (ADVAIR) 100-50 mcg/dose DISKUS Inhale 1 puff 2 (two) times a day.      gelatin 600 mg cap Take by mouth.      Lactobacillus rhamnosus GG (CULTURELLE) 10-15 Billion cell capsule Take 1 capsule by mouth daily.      montelukast (SINGULAIR) 10 mg tablet TAKE 1 TABLET(10 MG) BY MOUTH AT BEDTIME      multivitamin therapeutic (THERAGRAN) tablet Take 1 tablet by mouth daily.      naproxen sodium (ALEVE) 220 MG tablet Take 220 mg by mouth as needed for pain.      nystatin (MYCOSTATIN) cream  3/30/2016: Received from: External Pharmacy     pregabalin (LYRICA) 50 MG capsule TAKE 3 CAPSULES BY MOUTH DAILY AS DIRECTED      SUMAtriptan (IMITREX) 50 MG tablet Take 1 tablet (50 mg total) by mouth every 2 (two) hours as needed for migraine.      traMADol (ULTRAM) 50 mg tablet Take 1 tablet (50 mg total) by mouth every 8 (eight) hours as needed.      umeclidinium (INCRUSE ELLIPTA) 62.5 mcg/actuation DsDv inhaler Inhale 1 puff daily.        I have personally reviewed all pertinent imaging studies and PFT results unless otherwise noted.    Imaging studies:  Mammo Screening Bilateral    Result Date: 9/4/2018  BILATERAL FULL FIELD DIGITAL SCREENING MAMMOGRAM Performed on: 8/31/18. Compared to: 08/28/2017 Mammo Screening Bilateral, 08/26/2016 MAMMO SCREENING BILATERAL, and 08/24/2015 MAMMO SCREENING BILATERAL Findings: The breasts have scattered areas of fibroglandular densities. There is no radiographic evidence of malignancy. This study was evaluated with the assistance of Computer-Aided Detection. Continue routine screening mammogram as recommended. ACR BI-RADS Category 1: Negative        Physical Exam:  /58   Pulse 76   Resp 24   Wt 121 lb 6.4 oz  (55.1 kg)   LMP  (LMP Unknown)   SpO2 90% Comment: RA  BMI 22.03 kg/m    General - Well nourished  Ears/Mouth -  OP pink moist, no thrush  Neck - no cervical lymphadenopathy  Lungs -fine bibasilar crackles, no wheezes  CVS - regular rhythm with no murmurs, rubs or gallups  Abdomen - soft, NT, ND, NABS  Ext - no cyanosis, clubbing or edema  Skin - no rash  Psychology - alert and oriented, answers appropriate        Electronically signed by:    Garth Grossman MD  [facility] Pulmonary and Critical Care Medicine

## 2021-06-23 NOTE — TELEPHONE ENCOUNTER
Controlled Substance Refill Request  Medication:   Requested Prescriptions     Pending Prescriptions Disp Refills     pregabalin (LYRICA) 50 MG capsule [Pharmacy Med Name: LYRICA 50MG CAPSULES] 90 capsule 0     Sig: TAKE 3 CAPSULES BY MOUTH DAILY AS DIRECTED     Date Last Fill: 9/19/18  Pharmacy: [id number]   Submit electronically to pharmacy  Controlled Substance Agreement on File:   Encounter-Level CSA Scan Date:    There are no encounter-level csa scan date.       Last office visit: Last office visit pertaining to requested medication was 7/5/18.

## 2021-06-23 NOTE — TELEPHONE ENCOUNTER
Call from patient. States she started her action plan (prednisone 20mg x 10 days and doxycycline two times a day x 10 days) on Feb 2.  She has one more day left, but still not feeling better.  Has continued cough with green phlegm.  No fever.  Felt really exhausted and light-headed this am upon waking up with some pain on her right side.  Pain on her side has now gone away and she is feeling better this afternoon.  Home nurse visited and took her vitals, which were fine as well. Home nurse stated she heard some rales in the bases of her lungs.      Reviewed with Dr. Palomo in clinic.  Will prescribe additional Zpack and albuterol neb treatments.  Patient will call if this is not helping her.  (wants to avoid going out of the house with this bad weather).

## 2021-06-24 NOTE — PROGRESS NOTES
Patient instructed on use of flutter valve device.  Flutter and new tubings sent home to use with neb machine and how to use them together, as well as phone numbers to call with any questions.      .

## 2021-06-24 NOTE — TELEPHONE ENCOUNTER
Controlled Substance Refill Request  Medication:   Requested Prescriptions     Pending Prescriptions Disp Refills     traMADol (ULTRAM) 50 mg tablet 60 tablet 0     Sig: Take 1 tablet (50 mg total) by mouth every 8 (eight) hours as needed.     Date Last Fill: 11/19/18  Pharmacy: [facility]   Submit electronically  Controlled Substance Agreement on File:   Encounter-Level CSA Scan Date:    There are no encounter-level csa scan date.       Last office visit: 7/5/2018 Tavo Bassett MD

## 2021-06-24 NOTE — TELEPHONE ENCOUNTER
Refill Approved    Rx renewed per Medication Renewal Policy. Medication was last renewed on 10/16/17.    Antonette Miles, [facility] Triage/Med Refill 2/18/2019     Requested Prescriptions   Pending Prescriptions Disp Refills     albuterol (PROAIR HFA;PROVENTIL HFA;VENTOLIN HFA) 90 mcg/actuation inhaler 1 Inhaler 3     Sig: Inhale 2 puffs every 6 (six) hours as needed for wheezing.    Albuterol/Levalbuterol Refill Protocol Passed - 2/16/2019  8:39 PM       Passed - PCP or prescribing provider visit in last year    Last office visit with prescriber/PCP: 7/5/2018 Tavo Bassett MD OR same dept: 7/5/2018 Tavo Bassett MD OR same specialty: 7/5/2018 Tavo Bassett MD Last physical: 6/18/2018       Next appt within 3 mo: Visit date not found  Next physical within 3 mo: Visit date not found  Prescriber OR PCP: Tavo Bassett MD  Last diagnosis associated with med order: There are no diagnoses linked to this encounter.  If protocol passes may refill for 6 months if within 3 months of last provider visit (or a total of 9 months). If patient requesting >1 inhaler per month refill x 6 months and have patient make appointment with provider.

## 2021-06-24 NOTE — PROGRESS NOTES
Assessment and Plan:  Fiordaliza Najera is a 73 y.o. with a past medical history significant for COPD and bronchiectasis who presented to my clinic 8/31/2018 for follow-up.  She was previously followed by Dr. Lorenz and last saw him on May 4, 2018.  A chest x-ray performed during an ED visit on July 1, 2018 for neuropathy showed evidence of lung scarring, which prompted this visit.  Most recent PFTs in 2016 showed severe obstruction that was not reversible with inhaled bronchodilators.  She reported that she has been having increased dyspnea on exertion over the summer.  She also reported a chronic cough that is usually dry during the daytime but occasionally productive in the morning and whenever lying flat.  She takes courses of doxycycline and prednisone whenever the symptoms worsen, which seem to help.  She has tried nebulizers and flutter valves in the past, however stopped using them because it did not help. She does not experience any heartburn, however does have a hiatal hernia.  She continued to use Advair, Incruse, and as needed Ventolin occasionally.  We ordered bronchiectasis workup labs which were negative.  We also started her on Pepcid for GERD and advised her on aspiration precautions     8/31/2018 sputum culture: H influenza and Candida, a 5-day course of azithromycin was prescribed after discussing the pros and cons with the patient     11/16/2018 follow-up appointment:      1. Bronchiectasis: Sputum culture positive for H influenza. Breathing has improved since she completed the Z-Satish we prescribed.              -Start flutter valve daily with hypertonic saline neb                    -repeat sputum culture sent today     2. GERD: She is taking the Pepcid we prescribed and avoiding food or drink before bedtime.  Her cough at night and in the morning has improved somewhat.  She is however continuing to sleep lying flat at night.              -Continue Pepcid twice daily and avoiding oral intake within  3 hours of bedtime              -Patient has been advised to elevate her head of bed with blocks under the front two legs or with a wedge pillow but does not tolerate it due to chronic neck & back pain     3. COPD: Severe obstructive lung disease is more than would be expected from her low smoking history (has not smoked or lived with a smoker since high school or had any significant known occupational exposures).  11/2018 alpha-1-antitrypsin blood test was negative              - stop scheduled albuterol nebs now theat AECOPD resolved, cont prn inhaler              - cont Advair and Incruse              - cont O2 prn with exertion     4. Possible concurrent restrictive lung disease/pulmonary fibrosis: pulmonary functions test show no significant decrease in her severe obstruction or FEV1.  TLC is still within normal limit range, however has decreased considerably from 5.29 L or 107% of predicted to 4.39 L or 92% of predicted over the past 2 years, concerning for possible  restrictive disease maxed by concurrent obstructive disease.  Recent chest x-rays have also shown possible fibrosis              -Noncontrast high resolution chest CT scan negative for pulmonary fibrosis but does show bronciechtasis and persistent diffuse/patchy GGOs concerning for chronic atypical infection and/or colonization     Follow-up appt with me in 2 months with new full PFTs       CCx: COPD and bronchiectasis    HPI: Patient states that her breathing is feeling much better than on her last visit.  She had some increased shortness of breath and coughing for about 3 weeks in early February, that resolved with nebulizers and a Z-Satish.  She denies any current shortness of breath, fevers or chills, or other respiratory complaints.    ROS:  A review of 12 organ systems was performed with pertinent positives and negatives noted in the HPI.      Current Meds:  Current Outpatient Medications   Medication Sig Note     albuterol (PROAIR  HFA;PROVENTIL HFA;VENTOLIN HFA) 90 mcg/actuation inhaler Inhale 2 puffs every 6 (six) hours as needed for wheezing.      albuterol (PROVENTIL) 2.5 mg /3 mL (0.083 %) nebulizer solution Take 3 mL (2.5 mg total) by nebulization every 6 (six) hours as needed for wheezing.      amoxicillin (AMOXIL) 500 MG capsule Take 4 tabs 1 hour prior to dental procedure      BIOTIN ORAL Take by mouth.      calcium-vitamin D (CALCIUM-VITAMIN D) 500 mg(1,250mg) -200 unit per tablet Take 1 tablet by mouth daily.      celecoxib (CELEBREX) 200 MG capsule Take 1 capsule (200 mg total) by mouth daily.      cholecalciferol, vitamin D3, (VITAMIN D3) 1,000 unit capsule Take 1,000 Units by mouth daily.      famotidine (PEPCID) 40 MG tablet Take 1 tablet (40 mg total) by mouth at bedtime.      fluticasone-salmeterol (ADVAIR) 100-50 mcg/dose DISKUS Inhale 1 puff 2 (two) times a day.      gelatin 600 mg cap Take by mouth.      Lactobacillus rhamnosus GG (CULTURELLE) 10-15 Billion cell capsule Take 1 capsule by mouth daily.      montelukast (SINGULAIR) 10 mg tablet TAKE 1 TABLET(10 MG) BY MOUTH AT BEDTIME      multivitamin therapeutic (THERAGRAN) tablet Take 1 tablet by mouth daily.      naproxen sodium (ALEVE) 220 MG tablet Take 220 mg by mouth as needed for pain.      nystatin (MYCOSTATIN) cream  3/30/2016: Received from: External Pharmacy     pregabalin (LYRICA) 50 MG capsule TAKE 3 CAPSULES BY MOUTH DAILY AS DIRECTED      SUMAtriptan (IMITREX) 50 MG tablet Take 1 tablet (50 mg total) by mouth every 2 (two) hours as needed for migraine.      traMADol (ULTRAM) 50 mg tablet Take 1 tablet (50 mg total) by mouth every 8 (eight) hours as needed.      umeclidinium (INCRUSE ELLIPTA) 62.5 mcg/actuation DsDv inhaler Inhale 1 puff daily.      sodium chloride 3 % nebulizer solution Take 3 mL by nebulization daily.        Labs:  No results found for this or any previous visit (from the past 72 hour(s)).    I have personally reviewed all pertinent imaging  studies and PFT results unless otherwise noted.    Imaging studies:  Ct Chest High Resolution Without Contrast    Result Date: 11/30/2018  CT CHEST HIGH RESOLUTION 11/30/2018 10:15 AM INDICATION: Evaluate interstitial lung disease. History of COPD and bronchiectasis. Obstructive pulmonary function tests. Dyspnea. Chronic cough. TECHNIQUE: High-resolution chest with supine inspiration. High-resolution entire chest, select expiratory supine high-resolution images, select inspiratory prone high-resolution images, and coronal reconstruction. Dose reduction techniques were used. IV CONTRAST: None. COMPARISON: 06/08/2016. FINDINGS: LUNGS AND PLEURA: Mild worsening of findings compared to June 2016. Mild generalized bronchiectasis and airway thickening with scattered endobronchial mucous plugging present. Regions of moderate to severe more focal bronchiectasis noted, including the left apex, middle lobe and lingula. Slightly increased thick-walled cavities, two examples including in the left lower lobes superior segment measuring 10 mm versus 7 mm previously (series 2, image 22) and right lower lobe mildly at 13 mm versus 10 mm previously (series 2, image 50). Mild scattered bilateral tree-in-bud nodules and nodular opacities have also mildly worsened. Regions of scarring and volume loss present, mostly left upper lobe, middle lobe and lingula. Hyperexpanded lungs with diffuse mosaicism. Diffuse gas trapping on expiration. MEDIASTINUM: No adenopathy. Nonaneurysmal aorta. LIMITED UPPER ABDOMEN: Negative. MUSCULOSKELETAL: Bony demineralization. Surgical changes spine with screws and fixation rods.     CONCLUSION: 1.  Mild worsening of infectious / inflammatory airways disease and presumed infectious / inflammatory nodules compared to 2016. Findings most suggestive of chronic infection, such as nontuberculous mycobacterium. 2.  Mild generalized bronchiectasis and airway thickening is seen. Regions of moderate to severe  bronchiectasis also present. Findings are mildly worsened. 3.  Slight increased size of a few thick-walled cavities, presumed infectious / inflammatory. 4.  Extensive gas trapping seen on expiration. 5.  Regions of scarring and volume loss present, though fibrotic interstitial lung disease is not seen. NOTE: ABNORMAL REPORT THE DICTATION ABOVE DESCRIBES AN ABNORMALITY FOR WHICH FOLLOW-UP IS NEEDED.         Physical Exam:  /68   Pulse 72   Resp 20   Wt 122 lb (55.3 kg)   LMP  (LMP Unknown)   SpO2 93% Comment: RA  BMI 22.14 kg/m    General - Well nourished  Ears/Mouth -  OP pink moist, no thrush  Neck - no cervical lymphadenopathy  Lungs - Clear to ausculation bilaterally   CVS - regular rhythm with no murmurs, rubs or gallups  Abdomen - soft, NT, ND, NABS  Ext - no cyanosis, clubbing or edema  Skin - no rash  Psychology - alert and oriented, answers appropriate        Electronically signed by:    Garth Grossman MD  [facility]

## 2021-06-24 NOTE — PATIENT INSTRUCTIONS - HE
1) Stop scheduled albuterol nebulizers    2) Start using flutter valve once daily with hypertonic saline nebulizer     Return in 2 months with new Pulmonary Function Tests and to follow-up on sputum culture

## 2021-06-24 NOTE — TELEPHONE ENCOUNTER
Prescription Monitoring Program activity reviewed with no discrepancies noted.  Last fill per : 11/19/18 20 day supply    Controlled Substance Agreement on file: No  Date:     Last office visit with provider:  7/5/2018 Tavo Bassett MD    Please advise.

## 2021-06-25 NOTE — PROGRESS NOTES
Patient had appt with Dr. Jaimes and was told to come over to the lung clinic for sputum induction.      Patient given 3% sodium chloride with albuterol nebulizer treatment with a flutter valve for sputum induction.    Patient tolerated induction well, but not able to expectorate any sputum.  Dry cough.   Sent home with instructions on sputum collection and extra cups for collection.

## 2021-06-27 NOTE — PROGRESS NOTES
Progress Notes by Joseph Jaimes MD at 3/21/2019  2:45 PM     Author: Joseph Jaimes MD Service: -- Author Type: Physician    Filed: 3/22/2019  7:18 PM Encounter Date: 3/21/2019 Status: Signed    : Joseph Jaimes MD (Physician)       Infectious Disease Consultation:  Requesting MD:lynn  Reason for consult:bronchiectasis      HISTORY:  Pt with cyclic bronchiectical episodes here as has grown H Flu in sept 2018 and march 2019.  Had sxs around first week feb took steroid and doxy as always does and didn't get much better.  Then took macrolide and now feels ok.  I think she then had a sputum done march 5 after the z lucille was given and grew the h flu again.  She is either colonized or getting reinfected.  Not sick today.  Unable to give office sputum (which would prove colonized).      Pertinent past history, past infectious disease history:  Medical History   Collapse by Default   Collapse by Default           Date Unknown Chronic low back pain   Date Unknown COPD (chronic obstructive pulmonary disease) (H)   Date Unknown Diverticulosis   Date Unknown Hiatal hernia   Date Unknown Mild asthma   Date Unknown Neuropathy (H)   Date Unknown Osteopenia   Date Unknown Temporomandibular joint (TMJ) pain   Jose J as Reviewed Reviewed by [facility] on 8/31/2018       Expand to view 8 items     Surgical History   Collapse by Default   Collapse by Default           10/04/2016 Retinal laser procedure (Left)   2008 Total knee arthroplasty   Date Unknown Anterior / posterior combined fusion lumbar spine   Date Unknown Hysterectomy   Date Unknown Salpingoophorectomy   Jose J as Reviewed Reviewed by CMA on 8/31/2018       Expand to view 5 items     Family History   Collapse by Default   Collapse by Default           Mother Dementia             Father COPD             Jose J as Reviewed Reviewed by CMA on 8/31/2018       Expand to view 2 items     Social History   Collapse by Default   Collapse by Default           Smoking Status Never Smoker  "  Smokeless Tobacco Status Never Used   Alcohol Use No   Drug Use Not Asked   Sexually Active Not Asked   Expand to view 5 items  None            Medications:  Reviewed prior to admission meds as applicable in chart review.  Current meds are reviewed in the EMR listed MAR.     ANTIBIOTICS:    Current:none   Prior:above   Allergy to:none    SH/FH and  travel history(if applicable to consult):above    REVIEW OF SYSTEMS:  All other systems negative    EXAMINATION:  Vitals:    03/21/19 1448   BP: 104/60   Pulse: 76   Temp: 98.8  F (37.1  C)     Alert, awake  Vitals tabulated above, reviewed  HEENT:  Neck supple without lymphadenopathy  Sclera clear  CARDIOVASCULAR regular rate and rhythm, no murmur  Lungs CLEAR TO AUSCULTATION with faint rales  Abdomen soft, NT/ND, absent HEPATOSPLENOMEGALY  Skin normal  Joints normal  Neurologic exam non focal  Wound:  NA        CLINICAL DATABASE FOR---LAB/MICRO/CULTURES/IMAGING STUDIES:          reviewed    IMPRESSION:  Bronchiectasis  Has the \"look\" of RAJI but hasn't ever grown it    PLAN:  Induce sputum if H Flu there she IS colonized  If not, treat as prior with theory doxy isn't working anymore (only thought to have 2-3% resistance to this pathogen in study I googled) and give courses of macrolide.  Wood made this was reason for consult, call if not        RADHA LOPEZ MD  [facility]  Office [phone]           "

## 2021-06-28 ENCOUNTER — RECORDS - HEALTHEAST (OUTPATIENT)
Dept: ADMINISTRATIVE | Facility: OTHER | Age: 76
End: 2021-06-28

## 2021-07-03 NOTE — ADDENDUM NOTE
Addendum Note by Annalisa Oliver RN at 9/4/2018  9:32 AM     Author: Annalisa Oliver RN Service: -- Author Type: Registered Nurse    Filed: 9/4/2018  9:32 AM Encounter Date: 8/31/2018 Status: Signed    : Annalisa Oliver RN (Registered Nurse)    Addended by: ANNALISA OLIVER on: 9/4/2018 09:32 AM        Modules accepted: Orders

## 2021-07-05 ENCOUNTER — COMMUNICATION - HEALTHEAST (OUTPATIENT)
Dept: INTERNAL MEDICINE | Facility: CLINIC | Age: 76
End: 2021-07-05

## 2021-07-05 DIAGNOSIS — M85.88 OSTEOPENIA OF LUMBAR SPINE: ICD-10-CM

## 2021-07-05 RX ORDER — ALENDRONATE SODIUM 70 MG/1
TABLET ORAL
Qty: 12 TABLET | Refills: 1 | Status: SHIPPED | OUTPATIENT
Start: 2021-07-05 | End: 2021-08-12

## 2021-07-06 NOTE — TELEPHONE ENCOUNTER
Telephone Encounter by Laura Saucedo, RN at 7/5/2021  7:25 PM     Author: Laura Saucedo, RN Service: -- Author Type: Registered Nurse    Filed: 7/5/2021  7:29 PM Encounter Date: 7/5/2021 Status: Signed    : Laura Saucedo RN (Registered Nurse)       Refill Approved    Rx renewed per Medication Renewal Policy. Medication was last renewed on 11/2/20, last OV 1/4/21.    Laura Saucedo, [facility] Triage/Med Refill 7/5/2021     Requested Prescriptions   Pending Prescriptions Disp Refills   ? alendronate (FOSAMAX) 70 MG tablet [Pharmacy Med Name: ALENDRONATE NA 70 MG TABLET 70 Tablet] 12 tablet 3     Sig: TAKE 1 TABLET (70 MG) BY MOUTH EVERY 7 DAYS. TAKE IN THE MORNING ON AN EMPTY STOMACH WITH A FULL GLASS OF WATER 30 MINUTES BEFORE FOOD.       Biphosphonates Refill Protocol Passed - 7/5/2021  6:01 PM        Passed - PCP or prescribing provider visit in last 12 months     Last office visit with prescriber/PCP: 7/5/2018 Tavo Bassett MD OR same dept: Visit date not found OR same specialty: 7/5/2018 Tavo Bassett MD  Last physical: 9/23/2020 Last MTM visit: Visit date not found   Next visit within 3 mo: Visit date not found  Next physical within 3 mo: Visit date not found  Prescriber OR PCP: Tavo Bassett MD  Last diagnosis associated with med order: 1. Osteopenia of lumbar spine  - alendronate (FOSAMAX) 70 MG tablet [Pharmacy Med Name: ALENDRONATE NA 70 MG TABLET 70 Tablet]; TAKE 1 TABLET (70 MG) BY MOUTH EVERY 7 DAYS. TAKE IN THE MORNING ON AN EMPTY STOMACH WITH A FULL GLASS OF WATER 30 MINUTES BEFORE FOOD.  Dispense: 12 tablet; Refill: 3    If protocol passes may refill for 12 months if within 3 months of last provider visit (or a total of 15 months).             Passed - Serum creatinine in last 12 months     Creatinine   Date Value Ref Range Status   09/23/2020 0.66 0.60 - 1.10 mg/dL Final

## 2021-07-09 ENCOUNTER — COMMUNICATION - HEALTHEAST (OUTPATIENT)
Dept: PULMONOLOGY | Facility: OTHER | Age: 76
End: 2021-07-09

## 2021-07-09 DIAGNOSIS — J47.9 BRONCHIECTASIS WITHOUT COMPLICATION (H): ICD-10-CM

## 2021-07-10 ENCOUNTER — HEALTH MAINTENANCE LETTER (OUTPATIENT)
Age: 76
End: 2021-07-10

## 2021-08-12 ENCOUNTER — VIRTUAL VISIT (OUTPATIENT)
Dept: PULMONOLOGY | Facility: OTHER | Age: 76
End: 2021-08-12
Payer: MEDICARE

## 2021-08-12 VITALS — WEIGHT: 111 LBS | BODY MASS INDEX: 20.43 KG/M2 | HEIGHT: 62 IN

## 2021-08-12 DIAGNOSIS — R06.09 DYSPNEA ON EXERTION: Primary | ICD-10-CM

## 2021-08-12 DIAGNOSIS — J47.1 BRONCHIECTASIS WITH ACUTE EXACERBATION (H): ICD-10-CM

## 2021-08-12 PROCEDURE — 99214 OFFICE O/P EST MOD 30 MIN: CPT | Mod: 95 | Performed by: INTERNAL MEDICINE

## 2021-08-12 RX ORDER — PREDNISONE 20 MG/1
20 TABLET ORAL DAILY
Qty: 7 TABLET | Refills: 1 | Status: SHIPPED | OUTPATIENT
Start: 2021-08-12 | End: 2021-08-24

## 2021-08-12 ASSESSMENT — MIFFLIN-ST. JEOR: SCORE: 950.71

## 2021-08-12 NOTE — PROGRESS NOTES
Fiordaliza Najera is a 75 y.o. female who is being evaluated via a billable video visit.      How would you like to obtain your AVS? MyChart.  If dropped from the video visit, the video invitation should be resent by: Send to e-mail at: [email or web address]  Will anyone else be joining your video visit? No      Video Start Time: 1115    Subjective   Fiordaliza Najera is 75 y.o. and presents today for the following health issues   HPI       Additional provider notes:         Video-Visit Details    Type of service:  Video Visit    Video End Time (time video stopped): 1130  Originating Location (pt. Location): Home    Distant Location (provider location):  [facility]     Platform used for Video Visit: [facility]     Assessment and plan  75-year-old woman with history of bronchiectasis, chronic hypoxic respiratory failure here for follow-up.  She has been tolerating periodic Augmentin to suppress exacerbations due to Haemophilus influenza.  The cause of her shortness of breath is not clear.  We will trial a short course of prednisone.    We discussed the possibility of Covid infection.  She will get tested if she feels like she is getting worse.    We discussed booster shots.  Not currently available but she will likely qualify when it is time.    Continue current inhaler regimen.     Continue current oxygen prescription.    Problems chronic hypoxic respiratory failure  Bronchiectasis    Chief complaint: Shortness of breath    HPI: 76-year-old woman with bronchiectasis and chronic hypoxic respiratory failure here for follow-up.  She recently traveled to [place] without any issues.  She took her Augmentin as planned in mid summer.  Unfortunately she is feeling a little bit more short of breath over the last few days.  Worse with exertion, improved with rest.  No new sinus issues, reflux.  No leg swelling.  No fevers.    There are some new Covid cases in her building.    Medical history reviewed,  family and social history reviewed    Medications and allergies reviewed    On exam  Calm  No anxiety  Normal pattern of breathing  Normal voice, no stridor  No evident tremor    I certify that this patient, Fiordaliza Najera Sr. has been under my care (or a nurse practitioner or physican's assistant working with me). This is the face-to-face encounter for oxygen medical necessity.      Fiordaliza Najera Sr. is now in a chronic stable state and continues to require supplemental oxygen. Patient has continued oxygen desaturation due to Bronchiectasis J47.9.    Alternative treatment(s) tried or considered and deemed clinically infective for treatment of Bronchiectasis J47.9 include nebulizers, inhalers and steroids.  If portability is ordered, is the patient mobile within the home? yes    **Patients who qualify for home O2 coverage under the CMS guidelines require ABG tests or O2 sat readings obtained closest to, but no earlier than 2 days prior to the discharge, as evidence of the need for home oxygen therapy. Testing must be performed while patient is in the chronic stable state. See notes for O2 sats.**

## 2021-08-16 ENCOUNTER — DOCUMENTATION ONLY (OUTPATIENT)
Dept: PULMONOLOGY | Facility: OTHER | Age: 76
End: 2021-08-16

## 2021-08-16 DIAGNOSIS — J47.9 BRONCHIECTASIS (H): ICD-10-CM

## 2021-08-16 NOTE — PROGRESS NOTES
DME Provider: Sherri  Date Faxed: 8/16/21  Ordering Provider: Dr. Lorenz  Equipment ordered: oxygen

## 2021-08-18 DIAGNOSIS — J47.9 BRONCHIECTASIS (H): Primary | ICD-10-CM

## 2021-08-18 RX ORDER — PREDNISONE 20 MG/1
20 TABLET ORAL DAILY
Qty: 7 TABLET | Refills: 0 | Status: SHIPPED | OUTPATIENT
Start: 2021-08-18 | End: 2021-08-24

## 2021-08-24 ENCOUNTER — VIRTUAL VISIT (OUTPATIENT)
Dept: PULMONOLOGY | Facility: OTHER | Age: 76
End: 2021-08-24
Payer: MEDICARE

## 2021-08-24 VITALS — BODY MASS INDEX: 20.61 KG/M2 | HEIGHT: 62 IN | WEIGHT: 112 LBS

## 2021-08-24 DIAGNOSIS — J47.1 BRONCHIECTASIS WITH ACUTE EXACERBATION (H): Primary | ICD-10-CM

## 2021-08-24 DIAGNOSIS — R06.09 DYSPNEA ON EXERTION: ICD-10-CM

## 2021-08-24 DIAGNOSIS — J96.11 CHRONIC RESPIRATORY FAILURE WITH HYPOXIA (H): ICD-10-CM

## 2021-08-24 PROCEDURE — 99214 OFFICE O/P EST MOD 30 MIN: CPT | Mod: 95 | Performed by: INTERNAL MEDICINE

## 2021-08-24 RX ORDER — DOXYCYCLINE 100 MG/1
CAPSULE ORAL 2 TIMES DAILY
COMMUNITY
End: 2021-09-14

## 2021-08-24 ASSESSMENT — MIFFLIN-ST. JEOR: SCORE: 951.28

## 2021-08-24 NOTE — PROGRESS NOTES
Fiordaliza is a 76 year old who is being evaluated via a billable video visit.      How would you like to obtain your AVS? MyChart  If the video visit is dropped, the invitation should be resent by: Send to e-mail at: [email or web address]  Will anyone else be joining your video visit? No      Video Start Time: 3  Pulmonary progress note    Assessment and plan: 76-year-old woman with history of bronchiectasis now with exacerbation after ER visit.    She is previously grown out H. influenzae.  She is started on doxycycline which is not typically been helpful.    Change to Augmentin  Continue oxygen for goal saturation 89% or greater    Chief complaint: Exacerbation    HPI: 76-year-old woman began to have more shortness of breath.  Started over the weekend.  She went into the [facility] ER and was told that she had mucus in her airways and needed to clear things out.  She is feeling a little bit better since then.        Physical Exam   GENERAL: Healthy, alert and no distress  EYES: Eyes grossly normal to inspection.  No discharge or erythema, or obvious scleral/conjunctival abnormalities.  RESP: No audible wheeze, cough, or visible cyanosis.  No visible retractions or increased work of breathing.    SKIN: Visible skin clear. No significant rash, abnormal pigmentation or lesions.  NEURO: Cranial nerves grossly intact.  Mentation and speech appropriate for age.  PSYCH: Mentation appears normal, affect normal/bright, judgement and insight intact, normal speech and appearance well-groomed.    CT chest reviewed interpreted by me: Bronchiectasis, still with mucous plugging    Records reviewed from [facility]: Visit in the ER where was determined she did not seem to have a pulmonary embolism or MI.  They diagnosed her with an exacerbation of bronchiectasis and started her on antibiotics.    Video-Visit Details    Type of service:  Video Visit    Video End Time:315    Originating Location (pt. Location): Home    Distant  Location (provider location):  [facility]     Platform used for Video Visit: [facility]

## 2021-08-31 ENCOUNTER — MYC MEDICAL ADVICE (OUTPATIENT)
Dept: PULMONOLOGY | Facility: OTHER | Age: 76
End: 2021-08-31

## 2021-09-02 DIAGNOSIS — J47.1 BRONCHIECTASIS WITH ACUTE EXACERBATION (H): ICD-10-CM

## 2021-09-04 ENCOUNTER — HEALTH MAINTENANCE LETTER (OUTPATIENT)
Age: 76
End: 2021-09-04

## 2021-09-13 ENCOUNTER — MYC MEDICAL ADVICE (OUTPATIENT)
Dept: PULMONOLOGY | Facility: OTHER | Age: 76
End: 2021-09-13

## 2021-09-13 ENCOUNTER — ANCILLARY PROCEDURE (OUTPATIENT)
Dept: MAMMOGRAPHY | Facility: CLINIC | Age: 76
End: 2021-09-13
Attending: PEDIATRICS
Payer: MEDICARE

## 2021-09-13 DIAGNOSIS — Z12.31 VISIT FOR SCREENING MAMMOGRAM: ICD-10-CM

## 2021-09-13 PROCEDURE — 77067 SCR MAMMO BI INCL CAD: CPT | Mod: TC | Performed by: RADIOLOGY

## 2021-09-14 ENCOUNTER — PREP FOR PROCEDURE (OUTPATIENT)
Dept: PULMONOLOGY | Facility: CLINIC | Age: 76
End: 2021-09-14

## 2021-09-14 ENCOUNTER — VIRTUAL VISIT (OUTPATIENT)
Dept: PULMONOLOGY | Facility: OTHER | Age: 76
End: 2021-09-14
Payer: MEDICARE

## 2021-09-14 VITALS — WEIGHT: 112 LBS | HEIGHT: 62 IN | BODY MASS INDEX: 20.61 KG/M2

## 2021-09-14 DIAGNOSIS — G62.9 PERIPHERAL POLYNEUROPATHY: ICD-10-CM

## 2021-09-14 DIAGNOSIS — J47.1 BRONCHIECTASIS WITH ACUTE EXACERBATION (H): Primary | ICD-10-CM

## 2021-09-14 PROCEDURE — 99215 OFFICE O/P EST HI 40 MIN: CPT | Mod: 95 | Performed by: INTERNAL MEDICINE

## 2021-09-14 RX ORDER — ACETYLCYSTEINE 200 MG/ML
2 SOLUTION ORAL; RESPIRATORY (INHALATION) DAILY
Qty: 120 ML | Refills: 11 | Status: SHIPPED | OUTPATIENT
Start: 2021-09-14 | End: 2021-10-13

## 2021-09-14 RX ORDER — ALBUTEROL SULFATE 0.83 MG/ML
2.5 SOLUTION RESPIRATORY (INHALATION) 2 TIMES DAILY PRN
Qty: 150 ML | Refills: 11 | Status: SHIPPED | OUTPATIENT
Start: 2021-09-14 | End: 2021-10-04

## 2021-09-14 ASSESSMENT — MIFFLIN-ST. JEOR: SCORE: 951.28

## 2021-09-14 NOTE — PROGRESS NOTES
Ayse is a 76 year old who is being evaluated via a billable video visit.      How would you like to obtain your AVS? MyChart  If the video visit is dropped, the invitation should be resent by: Send to e-mail at: [email or web address]  Will anyone else be joining your video visit? No      Video Start Time: 215          Video-Visit Details    Type of service:  Video Visit    Video End Time:247    Originating Location (pt. Location): Home    Distant Location (provider location):  [facility]     Platform used for Video Visit: [facility]     Assessment and plan: 76-year-old woman with bronchiectasis and chronic hypoxic respiratory failure with persistent worsening of symptoms.  Concern for atypical Mycobacterium or other indolent infection.    Add nebulizer and Mucomyst to medication regimen.    Bronchoscopy in the next few weeks by me.    HPI: Unfortunately more difficulty with shortness of breath and cough.  Symptoms have progressed over the last few weeks.  Not responsive to Augmentin which has been helpful in the past.  She has had H. influenzae grew out of sputum cultures and responded very well previously.  Was told in the [facility] emergency department that she had a fungal infection and has been concerned about this since that conversation.    She is hesitant to start the vest because of concerns for fracture.    Social history, family history, medical history reviewed.  Medications and allergies reviewed.     On exam  Mildly anxious  Normal voice  Normal pattern of breathing  Normal appearing eyes  No tremor, normal mental status    We had a very long and detailed discussion regarding the pathophysiology of bronchiectasis, the treatment options, the method of action of several medications.  The risks and benefits of vest treatment.  We discussed her symptoms and how they may be explained by her bronchiectasis versus other issues.    Essentially we need to rule out indolent infection.  We  may need to consider other issues including cardiac or anxiety.    More than 40 minutes spent on this visit.  Significant majority spent in face-to-face time with the patient.  Remainder spent in documentation, chart review, coordination of care.

## 2021-09-14 NOTE — TELEPHONE ENCOUNTER
Routing refill request to provider for review/approval because:  Controlled medication refill request.  Routing to provider's care team.    Last Written Prescription Date:  4/20/21  Last Fill Quantity: 90,  # refills: 4   Last office visit provider:  4/27/21       Requested Prescriptions   Pending Prescriptions Disp Refills     pregabalin (LYRICA) 50 MG capsule [Pharmacy Med Name: PREGABALIN 50 MG CAPS 50 Capsule] 90 capsule 4     Sig: TAKE 3 CAPSULES BY MOUTH DAILY AS DIRECTED.       There is no refill protocol information for this order            Raine Shepard RN 09/14/21 6:41 PM

## 2021-09-15 RX ORDER — PREGABALIN 50 MG/1
CAPSULE ORAL
Qty: 90 CAPSULE | Refills: 4 | Status: SHIPPED | OUTPATIENT
Start: 2021-09-15 | End: 2021-10-20

## 2021-09-16 ENCOUNTER — TELEPHONE (OUTPATIENT)
Dept: PULMONOLOGY | Facility: CLINIC | Age: 76
End: 2021-09-16

## 2021-09-16 NOTE — TELEPHONE ENCOUNTER
Message received from Dr. Lorenz to call patient regarding scheduling her procedure with him:    Bronch with BAL   Endo   Needs to be with  me   27 or 28 sept AM, prefer 28     Nervous, can you give her a ring in the next few days?     Thank you     CR and orders in      Patient is scheduled for a procedure with Dr. Loernz    Spoke with: Ayse Ervin Date of Procedure: Mon 9/27 vs Tues 9/28 in the a.m. - waiting on block release in endoscopy 7 days prior    Location: [facility] - Endoscopy      Informed patient they will need an adult  Yes    Pre-procedure COVID-19 Test: [facility] on Fri 9/24 at 12:00 PM    Surgery packet: will send via [employer]     Additional comments: waiting on block release to schedule in Endoscopy [facility] the morning of 9/27 vs 9/28 - Dr. Lorenz prefers 9/28

## 2021-09-20 ENCOUNTER — ANCILLARY PROCEDURE (OUTPATIENT)
Dept: MAMMOGRAPHY | Facility: CLINIC | Age: 76
End: 2021-09-20
Attending: PEDIATRICS
Payer: MEDICARE

## 2021-09-20 ENCOUNTER — MYC MEDICAL ADVICE (OUTPATIENT)
Dept: PULMONOLOGY | Facility: OTHER | Age: 76
End: 2021-09-20

## 2021-09-20 DIAGNOSIS — Z11.59 ENCOUNTER FOR SCREENING FOR OTHER VIRAL DISEASES: ICD-10-CM

## 2021-09-20 DIAGNOSIS — R92.0 MICROCALCIFICATION OF BREAST: ICD-10-CM

## 2021-09-20 PROCEDURE — 77065 DX MAMMO INCL CAD UNI: CPT | Mod: RT

## 2021-09-21 ENCOUNTER — TELEPHONE (OUTPATIENT)
Dept: PULMONOLOGY | Facility: OTHER | Age: 76
End: 2021-09-21

## 2021-09-21 DIAGNOSIS — J47.9 BRONCHIECTASIS (H): Primary | ICD-10-CM

## 2021-09-21 RX ORDER — ACETYLCYSTEINE 100 MG/ML
4 SOLUTION ORAL; RESPIRATORY (INHALATION) DAILY
Qty: 120 ML | Refills: 11 | Status: SHIPPED | OUTPATIENT
Start: 2021-09-21 | End: 2022-08-16

## 2021-09-21 NOTE — TELEPHONE ENCOUNTER
Spoke with patient to schedule procedure with Randall Lorenz    Procedure was scheduled on 09/28 at [facility]  Patient will have H&P with: not needed    Patient is aware a COVID-19 test is needed before their procedure. The test should be with-in 4 days of their procedure.   Test Details: Date 09/24  Location [facility]    Patient is aware a / is needed day of surgery.   Surgery letter was sent via [employer], patient has my direct contact information for any further questions.

## 2021-09-21 NOTE — TELEPHONE ENCOUNTER
Called and spoke with Ayse.  She has not been able to get acetylcysteine.  20% solution is on back order.  [facility] near her does have the 10% solution.  Dr. Lorenz is ok with her using the 10% solution instead.    Notified Ayse of the prescription change and new prescription sent to [facility].

## 2021-09-23 ENCOUNTER — MYC MEDICAL ADVICE (OUTPATIENT)
Dept: PULMONOLOGY | Facility: OTHER | Age: 76
End: 2021-09-23

## 2021-09-24 ENCOUNTER — LAB (OUTPATIENT)
Dept: LAB | Facility: CLINIC | Age: 76
End: 2021-09-24
Attending: INTERNAL MEDICINE

## 2021-09-24 DIAGNOSIS — Z11.59 ENCOUNTER FOR SCREENING FOR OTHER VIRAL DISEASES: ICD-10-CM

## 2021-09-24 LAB — SARS-COV-2 RNA RESP QL NAA+PROBE: NEGATIVE

## 2021-09-24 PROCEDURE — U0003 INFECTIOUS AGENT DETECTION BY NUCLEIC ACID (DNA OR RNA); SEVERE ACUTE RESPIRATORY SYNDROME CORONAVIRUS 2 (SARS-COV-2) (CORONAVIRUS DISEASE [COVID-19]), AMPLIFIED PROBE TECHNIQUE, MAKING USE OF HIGH THROUGHPUT TECHNOLOGIES AS DESCRIBED BY CMS-2020-01-R: HCPCS | Performed by: INTERNAL MEDICINE

## 2021-09-28 ENCOUNTER — HOSPITAL ENCOUNTER (OUTPATIENT)
Facility: CLINIC | Age: 76
Setting detail: OBSERVATION
Discharge: HOME | End: 2021-09-30
Attending: FAMILY MEDICINE | Admitting: PEDIATRICS
Payer: MEDICARE

## 2021-09-28 ENCOUNTER — HOSPITAL ENCOUNTER (OUTPATIENT)
Facility: CLINIC | Age: 76
Discharge: HOME | End: 2021-09-28
Attending: INTERNAL MEDICINE | Admitting: INTERNAL MEDICINE
Payer: MEDICARE

## 2021-09-28 ENCOUNTER — MYC MEDICAL ADVICE (OUTPATIENT)
Dept: PULMONOLOGY | Facility: OTHER | Age: 76
End: 2021-09-28

## 2021-09-28 ENCOUNTER — APPOINTMENT (OUTPATIENT)
Dept: CT IMAGING | Facility: CLINIC | Age: 76
End: 2021-09-28
Attending: FAMILY MEDICINE
Payer: MEDICARE

## 2021-09-28 ENCOUNTER — APPOINTMENT (OUTPATIENT)
Dept: GENERAL RADIOLOGY | Facility: CLINIC | Age: 76
End: 2021-09-28
Attending: FAMILY MEDICINE
Payer: MEDICARE

## 2021-09-28 VITALS
OXYGEN SATURATION: 98 % | HEIGHT: 62 IN | BODY MASS INDEX: 20.77 KG/M2 | TEMPERATURE: 98 F | DIASTOLIC BLOOD PRESSURE: 62 MMHG | RESPIRATION RATE: 15 BRPM | SYSTOLIC BLOOD PRESSURE: 106 MMHG | HEART RATE: 62 BPM | WEIGHT: 112.88 LBS

## 2021-09-28 DIAGNOSIS — Z99.81 DEPENDENCE ON SUPPLEMENTAL OXYGEN: ICD-10-CM

## 2021-09-28 DIAGNOSIS — R91.8 GROUND GLASS OPACITY PRESENT ON IMAGING OF LUNG: ICD-10-CM

## 2021-09-28 DIAGNOSIS — J96.11 CHRONIC RESPIRATORY FAILURE WITH HYPOXIA, ON HOME O2 THERAPY (H): ICD-10-CM

## 2021-09-28 DIAGNOSIS — J96.11 CHRONIC HYPOXEMIC RESPIRATORY FAILURE (H): ICD-10-CM

## 2021-09-28 DIAGNOSIS — J44.1 COPD EXACERBATION (H): ICD-10-CM

## 2021-09-28 DIAGNOSIS — R09.02 HYPOXIA: ICD-10-CM

## 2021-09-28 DIAGNOSIS — R91.8 PULMONARY INFILTRATES: ICD-10-CM

## 2021-09-28 DIAGNOSIS — J47.1 BRONCHIECTASIS WITH ACUTE EXACERBATION (H): ICD-10-CM

## 2021-09-28 DIAGNOSIS — Z20.822 CONTACT WITH AND (SUSPECTED) EXPOSURE TO COVID-19: ICD-10-CM

## 2021-09-28 DIAGNOSIS — R06.09 DOE (DYSPNEA ON EXERTION): ICD-10-CM

## 2021-09-28 DIAGNOSIS — J44.1 CHRONIC OBSTRUCTIVE PULMONARY DISEASE WITH ACUTE EXACERBATION (H): ICD-10-CM

## 2021-09-28 DIAGNOSIS — R06.02 SHORTNESS OF BREATH: Primary | ICD-10-CM

## 2021-09-28 DIAGNOSIS — Z99.81 CHRONIC RESPIRATORY FAILURE WITH HYPOXIA, ON HOME O2 THERAPY (H): ICD-10-CM

## 2021-09-28 DIAGNOSIS — F41.9 ANXIETY: ICD-10-CM

## 2021-09-28 LAB
ALBUMIN SERPL-MCNC: 2.9 G/DL (ref 3.4–5)
ALP SERPL-CCNC: 96 U/L (ref 40–150)
ALT SERPL W P-5'-P-CCNC: 22 U/L (ref 0–50)
ANION GAP SERPL CALCULATED.3IONS-SCNC: 3 MMOL/L (ref 3–14)
APPEARANCE FLD: ABNORMAL
APTT PPP: 34 SECONDS (ref 22–38)
AST SERPL W P-5'-P-CCNC: 22 U/L (ref 0–45)
BASOPHILS # BLD AUTO: 0.1 10E3/UL (ref 0–0.2)
BASOPHILS NFR BLD AUTO: 0 %
BILIRUB SERPL-MCNC: 0.4 MG/DL (ref 0.2–1.3)
BUN SERPL-MCNC: 17 MG/DL (ref 7–30)
CALCIUM SERPL-MCNC: 9.2 MG/DL (ref 8.5–10.1)
CHLORIDE BLD-SCNC: 106 MMOL/L (ref 94–109)
CO2 SERPL-SCNC: 30 MMOL/L (ref 20–32)
COLOR FLD: COLORLESS
CREAT SERPL-MCNC: 0.54 MG/DL (ref 0.52–1.04)
CRP SERPL-MCNC: 24 MG/L (ref 0–8)
EOSINOPHIL # BLD AUTO: 0 10E3/UL (ref 0–0.7)
EOSINOPHIL NFR BLD AUTO: 0 %
ERYTHROCYTE [DISTWIDTH] IN BLOOD BY AUTOMATED COUNT: 13.4 % (ref 10–15)
GFR SERPL CREATININE-BSD FRML MDRD: >90 ML/MIN/1.73M2
GLUCOSE BLD-MCNC: 104 MG/DL (ref 70–99)
GRAM STAIN RESULT: ABNORMAL
GRAM STAIN RESULT: ABNORMAL
HCO3 BLDV-SCNC: 36 MMOL/L (ref 21–28)
HCT VFR BLD AUTO: 38.1 % (ref 35–47)
HGB BLD-MCNC: 12.1 G/DL (ref 11.7–15.7)
HOLD SPECIMEN: NORMAL
IMM GRANULOCYTES # BLD: 0.2 10E3/UL
IMM GRANULOCYTES NFR BLD: 1 %
INR PPP: 1.12 (ref 0.85–1.15)
LACTATE BLD-SCNC: 0.6 MMOL/L
LYMPHOCYTES # BLD AUTO: 0.6 10E3/UL (ref 0.8–5.3)
LYMPHOCYTES NFR BLD AUTO: 2 %
LYMPHOCYTES NFR FLD MANUAL: NORMAL %
MCH RBC QN AUTO: 30.2 PG (ref 26.5–33)
MCHC RBC AUTO-ENTMCNC: 31.8 G/DL (ref 31.5–36.5)
MCV RBC AUTO: 95 FL (ref 78–100)
MONOCYTES # BLD AUTO: 1.5 10E3/UL (ref 0–1.3)
MONOCYTES NFR BLD AUTO: 6 %
MONOS+MACROS NFR FLD MANUAL: 5 %
NEUTROPHILS # BLD AUTO: 23.5 10E3/UL (ref 1.6–8.3)
NEUTROPHILS NFR BLD AUTO: 91 %
NEUTS BAND NFR FLD MANUAL: 96 %
NRBC # BLD AUTO: 0 10E3/UL
NRBC BLD AUTO-RTO: 0 /100
NT-PROBNP SERPL-MCNC: 222 PG/ML (ref 0–1800)
PATH REPORT.COMMENTS IMP SPEC: ABNORMAL
PATH REPORT.COMMENTS IMP SPEC: ABNORMAL
PATH REPORT.COMMENTS IMP SPEC: YES
PATH REPORT.FINAL DX SPEC: ABNORMAL
PATH REPORT.GROSS SPEC: ABNORMAL
PATH REPORT.RELEVANT HX SPEC: ABNORMAL
PCO2 BLDV: 63 MM HG (ref 40–50)
PH BLDV: 7.37 [PH] (ref 7.32–7.43)
PLATELET # BLD AUTO: 295 10E3/UL (ref 150–450)
PO2 BLDV: 25 MM HG (ref 25–47)
POTASSIUM BLD-SCNC: 3.8 MMOL/L (ref 3.4–5.3)
PROT SERPL-MCNC: 7.3 G/DL (ref 6.8–8.8)
RBC # BLD AUTO: 4.01 10E6/UL (ref 3.8–5.2)
SAO2 % BLDV: 40 % (ref 94–100)
SODIUM SERPL-SCNC: 139 MMOL/L (ref 133–144)
TROPONIN I SERPL-MCNC: <0.015 UG/L (ref 0–0.04)
WBC # BLD AUTO: 25.9 10E3/UL (ref 4–11)
WBC # FLD AUTO: 1700 /UL

## 2021-09-28 PROCEDURE — 82040 ASSAY OF SERUM ALBUMIN: CPT | Performed by: FAMILY MEDICINE

## 2021-09-28 PROCEDURE — 250N000013 HC RX MED GY IP 250 OP 250 PS 637: Performed by: FAMILY MEDICINE

## 2021-09-28 PROCEDURE — 88312 SPECIAL STAINS GROUP 1: CPT | Mod: TC | Performed by: INTERNAL MEDICINE

## 2021-09-28 PROCEDURE — 93005 ELECTROCARDIOGRAM TRACING: CPT | Performed by: FAMILY MEDICINE

## 2021-09-28 PROCEDURE — 87633 RESP VIRUS 12-25 TARGETS: CPT | Performed by: INTERNAL MEDICINE

## 2021-09-28 PROCEDURE — 99285 EMERGENCY DEPT VISIT HI MDM: CPT | Mod: 25 | Performed by: FAMILY MEDICINE

## 2021-09-28 PROCEDURE — 84484 ASSAY OF TROPONIN QUANT: CPT | Performed by: FAMILY MEDICINE

## 2021-09-28 PROCEDURE — 82803 BLOOD GASES ANY COMBINATION: CPT

## 2021-09-28 PROCEDURE — G1004 CDSM NDSC: HCPCS | Performed by: RADIOLOGY

## 2021-09-28 PROCEDURE — 87206 SMEAR FLUORESCENT/ACID STAI: CPT | Performed by: INTERNAL MEDICINE

## 2021-09-28 PROCEDURE — 85610 PROTHROMBIN TIME: CPT | Performed by: FAMILY MEDICINE

## 2021-09-28 PROCEDURE — 85730 THROMBOPLASTIN TIME PARTIAL: CPT | Performed by: FAMILY MEDICINE

## 2021-09-28 PROCEDURE — 88312 SPECIAL STAINS GROUP 1: CPT | Mod: 26 | Performed by: PATHOLOGY

## 2021-09-28 PROCEDURE — 31624 DX BRONCHOSCOPE/LAVAGE: CPT | Performed by: INTERNAL MEDICINE

## 2021-09-28 PROCEDURE — 71045 X-RAY EXAM CHEST 1 VIEW: CPT | Mod: 26 | Performed by: RADIOLOGY

## 2021-09-28 PROCEDURE — 71275 CT ANGIOGRAPHY CHEST: CPT | Mod: MG

## 2021-09-28 PROCEDURE — 84145 PROCALCITONIN (PCT): CPT | Performed by: FAMILY MEDICINE

## 2021-09-28 PROCEDURE — 93010 ELECTROCARDIOGRAM REPORT: CPT | Performed by: FAMILY MEDICINE

## 2021-09-28 PROCEDURE — 250N000009 HC RX 250: Performed by: INTERNAL MEDICINE

## 2021-09-28 PROCEDURE — 87102 FUNGUS ISOLATION CULTURE: CPT | Performed by: INTERNAL MEDICINE

## 2021-09-28 PROCEDURE — 71045 X-RAY EXAM CHEST 1 VIEW: CPT

## 2021-09-28 PROCEDURE — 87205 SMEAR GRAM STAIN: CPT | Performed by: INTERNAL MEDICINE

## 2021-09-28 PROCEDURE — 89050 BODY FLUID CELL COUNT: CPT | Performed by: INTERNAL MEDICINE

## 2021-09-28 PROCEDURE — 89051 BODY FLUID CELL COUNT: CPT | Performed by: INTERNAL MEDICINE

## 2021-09-28 PROCEDURE — 250N000009 HC RX 250: Performed by: FAMILY MEDICINE

## 2021-09-28 PROCEDURE — G0500 MOD SEDAT ENDO SERVICE >5YRS: HCPCS | Performed by: INTERNAL MEDICINE

## 2021-09-28 PROCEDURE — C9803 HOPD COVID-19 SPEC COLLECT: HCPCS | Performed by: FAMILY MEDICINE

## 2021-09-28 PROCEDURE — 250N000011 HC RX IP 250 OP 636: Performed by: INTERNAL MEDICINE

## 2021-09-28 PROCEDURE — 99152 MOD SED SAME PHYS/QHP 5/>YRS: CPT | Performed by: INTERNAL MEDICINE

## 2021-09-28 PROCEDURE — 88108 CYTOPATH CONCENTRATE TECH: CPT | Mod: 26 | Performed by: PATHOLOGY

## 2021-09-28 PROCEDURE — 71275 CT ANGIOGRAPHY CHEST: CPT | Mod: 26 | Performed by: RADIOLOGY

## 2021-09-28 PROCEDURE — 83880 ASSAY OF NATRIURETIC PEPTIDE: CPT | Performed by: FAMILY MEDICINE

## 2021-09-28 PROCEDURE — 250N000011 HC RX IP 250 OP 636: Performed by: FAMILY MEDICINE

## 2021-09-28 PROCEDURE — 87077 CULTURE AEROBIC IDENTIFY: CPT | Performed by: INTERNAL MEDICINE

## 2021-09-28 PROCEDURE — 86140 C-REACTIVE PROTEIN: CPT | Performed by: FAMILY MEDICINE

## 2021-09-28 PROCEDURE — 87116 MYCOBACTERIA CULTURE: CPT | Performed by: INTERNAL MEDICINE

## 2021-09-28 PROCEDURE — 85025 COMPLETE CBC W/AUTO DIFF WBC: CPT | Performed by: FAMILY MEDICINE

## 2021-09-28 PROCEDURE — 36415 COLL VENOUS BLD VENIPUNCTURE: CPT | Performed by: FAMILY MEDICINE

## 2021-09-28 RX ORDER — MAGNESIUM HYDROXIDE 1200 MG/15ML
LIQUID ORAL PRN
Status: COMPLETED | OUTPATIENT
Start: 2021-09-28 | End: 2021-09-28

## 2021-09-28 RX ORDER — IOPAMIDOL 755 MG/ML
51 INJECTION, SOLUTION INTRAVASCULAR ONCE
Status: COMPLETED | OUTPATIENT
Start: 2021-09-28 | End: 2021-09-28

## 2021-09-28 RX ORDER — ACETAMINOPHEN 500 MG
500 TABLET ORAL ONCE
Status: COMPLETED | OUTPATIENT
Start: 2021-09-28 | End: 2021-09-28

## 2021-09-28 RX ORDER — LIDOCAINE HYDROCHLORIDE 40 MG/ML
INJECTION, SOLUTION RETROBULBAR PRN
Status: COMPLETED | OUTPATIENT
Start: 2021-09-28 | End: 2021-09-28

## 2021-09-28 RX ORDER — LIDOCAINE 40 MG/G
CREAM TOPICAL
Status: DISCONTINUED | OUTPATIENT
Start: 2021-09-28 | End: 2021-09-29

## 2021-09-28 RX ORDER — LIDOCAINE HYDROCHLORIDE 10 MG/ML
INJECTION, SOLUTION INFILTRATION; PERINEURAL PRN
Status: COMPLETED | OUTPATIENT
Start: 2021-09-28 | End: 2021-09-28

## 2021-09-28 RX ORDER — FENTANYL CITRATE 50 UG/ML
INJECTION, SOLUTION INTRAMUSCULAR; INTRAVENOUS PRN
Status: COMPLETED | OUTPATIENT
Start: 2021-09-28 | End: 2021-09-28

## 2021-09-28 RX ADMIN — IOPAMIDOL 51 ML: 755 INJECTION, SOLUTION INTRAVENOUS at 23:29

## 2021-09-28 RX ADMIN — ACETAMINOPHEN 500 MG: 500 TABLET, FILM COATED ORAL at 22:53

## 2021-09-28 RX ADMIN — SODIUM CHLORIDE, PRESERVATIVE FREE 82 ML: 5 INJECTION INTRAVENOUS at 23:30

## 2021-09-28 RX ADMIN — FENTANYL CITRATE 25 MCG: 50 INJECTION, SOLUTION INTRAMUSCULAR; INTRAVENOUS at 09:05

## 2021-09-28 RX ADMIN — MIDAZOLAM 1 MG: 1 INJECTION INTRAMUSCULAR; INTRAVENOUS at 09:07

## 2021-09-28 RX ADMIN — MIDAZOLAM 1 MG: 1 INJECTION INTRAMUSCULAR; INTRAVENOUS at 09:05

## 2021-09-28 RX ADMIN — FENTANYL CITRATE 50 MCG: 50 INJECTION, SOLUTION INTRAMUSCULAR; INTRAVENOUS at 09:07

## 2021-09-28 RX ADMIN — SODIUM CHLORIDE 120 ML: 900 IRRIGANT IRRIGATION at 09:14

## 2021-09-28 RX ADMIN — LIDOCAINE HYDROCHLORIDE 9 ML: 40 INJECTION, SOLUTION RETROBULBAR; TOPICAL at 09:12

## 2021-09-28 RX ADMIN — LIDOCAINE HYDROCHLORIDE 3 ML: 10 INJECTION, SOLUTION INFILTRATION; PERINEURAL at 09:13

## 2021-09-28 ASSESSMENT — MIFFLIN-ST. JEOR
SCORE: 955.25
SCORE: 951.28

## 2021-09-28 ASSESSMENT — ENCOUNTER SYMPTOMS: SHORTNESS OF BREATH: 1

## 2021-09-28 NOTE — OR NURSING
Pt underwent bronchoscopy with BAL under conscious sedation. Specimens sent to lab via RT. Pt transferred to recovery and report given to 3C.       Karo Colon RN

## 2021-09-28 NOTE — DISCHARGE INSTRUCTIONS
Discharge Instructions after Bronchoscopy    Findings  A lot of thin secretions concerning for infection.  We have sent a lot of tests to check for infection.  I do not recommend starting any antibiotics now.    Continue your home medications.  Continue mucomyst neb once daily for the next 3 days.    We will be in touch with your results.  Annalisa's number is [phone] or [employer].    Thank you    Activity  You had medicine to relax and for pain. You may feel dizzy or sleepy.  For 24 hours:    Do not drive or use heavy equipment.    Do not make important decisions.    Do not drink any alcohol.    Diet  NOTHING TO EAT OR DRINK UNTIL 11:00AM TODAY. When you can swallow easily, you may go back to your regular diet, medicines  and light exercise.\    Call right away if you have:    Unusual chest pain    Temperature above 100.6  F (37.5  C)    Coughing that does not stop.    If you have severe pain, bleeding, or shortness of breath, go to an emergency room.

## 2021-09-28 NOTE — PROCEDURES
INTERVENTIONAL PULMONOLOGY     Procedure(s):    A flexible bronchoscopy  Airway exam  BAL  Therapeutic suctioning (2 sites)    Indication:  Bronchiectasis exacerbation.    Attending of Record:  Randall Lorenz MD    Interventional Pulmonary Fellow   None    Trainees Present:   None     Medications:    1.5 mg versed  100 mcg fentanyl    Sedation Time:   Total sedation time was Choose Duration: 15 minutes of continuous bedside 1:1 monitoring.    Time Out:  Performed    The patient's medical record has been reviewed.  The indication for the procedure was reviewed.  The necessary history and physical examination was performed and reviewed.  The risks, benefits and alternatives of the procedure were discussed with the the patient in detail and she had the opportunity to ask questions.  I discussed in particular the potential complications including risks of minor or life-threatening bleeding and/or infection, respiratory failure, vocal cord trauma / paralysis, pneumothorax, and discomfort. Sedation risks were also discussed including abnormal heart rhythms, low blood pressure, and respiratory failure. All questions were answered to the best of my ability.  Verbal and written informed consent was obtained.  The proposed procedure and the patient's identification were verified prior to the procedure by the physician and the nurse, respiratory therapist.    The patient was assessed for the adequacy for the procedure and to receive medications.   Mental Status:  Alert and oriented x 3  Airway examination:  Class II (Complete visualization of uvula)  Pulmonary:  Decreased breathsound throughout  CV:  RRR, no murmurs or gallops  ASA Grade:  (II)  Mild systemic disease    After clinical evaluation and reviewing the indication, risks, alternatives and benefits of the procedure the patient was deemed to be in satisfactory condition to undergo the procedure.      Immediately before administration of medications the patient was  re-assessed for adequacy to receive sedatives including the heart rate, respiratory rate, mental status, oxygen saturation, blood pressure and adequacy of pulmonary ventilation. These same parameters were continuously monitored throughout the procedure.    A Tuberculosis risk assessment was performed:  The patient has no known RISK of Tuberculosis    The procedure was performed in a negative airflow room: Yes    Maneuvers / Procedure:      A Flexible  bronchoscope was used for the procedure. The flexible bronchoscope was inserted through the mouth observing the epiglottis and posterior pharyngeal structures. The VC were anesthetized with 1% and 4% lidocaine. The scope was then advanced throught the cords and into the trachea.      Airway Examination: A complete airway examination was performed from the distal trachea to the subsegmental level in each lobe of both lungs.  Pertinent findings include normal airway anatomy. Copious thin purulent secretions.         BAL: The bronchoscope was wedged into the RML bronchus. A total of 120cc of saline was instilled and a total of 45 ml was aspirated. This was sent for analysis.     Any disposable equipment was visually inspected and deemed to be intact immediately post procedure.      Relevant Pictures      Recommendations:   -->  I will follow up the results in my clinic.    Randall Lorenz MD

## 2021-09-29 ENCOUNTER — TELEPHONE (OUTPATIENT)
Dept: PULMONOLOGY | Facility: OTHER | Age: 76
End: 2021-09-29

## 2021-09-29 PROBLEM — R91.8 GROUND GLASS OPACITY PRESENT ON IMAGING OF LUNG: Status: ACTIVE | Noted: 2021-09-29

## 2021-09-29 PROBLEM — R06.09 DOE (DYSPNEA ON EXERTION): Status: ACTIVE | Noted: 2021-09-29

## 2021-09-29 PROBLEM — R09.02 HYPOXIA: Status: ACTIVE | Noted: 2021-09-29

## 2021-09-29 PROBLEM — R06.02 SHORTNESS OF BREATH: Status: ACTIVE | Noted: 2021-09-29

## 2021-09-29 LAB
ATRIAL RATE - MUSE: 86 BPM
DIASTOLIC BLOOD PRESSURE - MUSE: NORMAL MMHG
FLUAV H1 2009 PAND RNA SPEC QL NAA+PROBE: NEGATIVE
FLUAV H1 RNA SPEC QL NAA+PROBE: NEGATIVE
FLUAV H3 RNA SPEC QL NAA+PROBE: NEGATIVE
FLUAV RNA SPEC QL NAA+PROBE: NEGATIVE
FLUBV RNA SPEC QL NAA+PROBE: NEGATIVE
HADV DNA SPEC QL NAA+PROBE: NEGATIVE
HADV DNA SPEC QL NAA+PROBE: NEGATIVE
HMPV RNA SPEC QL NAA+PROBE: NEGATIVE
HPIV1 RNA SPEC QL NAA+PROBE: NEGATIVE
HPIV2 RNA SPEC QL NAA+PROBE: NEGATIVE
HPIV3 RNA SPEC QL NAA+PROBE: NEGATIVE
INTERPRETATION ECG - MUSE: NORMAL
LACTATE SERPL-SCNC: 0.9 MMOL/L (ref 0.7–2)
NT-PROBNP SERPL-MCNC: 337 PG/ML (ref 0–1800)
P AXIS - MUSE: 72 DEGREES
PR INTERVAL - MUSE: 168 MS
PROCALCITONIN SERPL-MCNC: 0.09 NG/ML
PROCALCITONIN SERPL-MCNC: 0.3 NG/ML
QRS DURATION - MUSE: 90 MS
QT - MUSE: 376 MS
QTC - MUSE: 449 MS
R AXIS - MUSE: -8 DEGREES
RHINOVIRUS RNA SPEC QL NAA+PROBE: NEGATIVE
RSV RNA SPEC QL NAA+PROBE: NEGATIVE
RSV RNA SPEC QL NAA+PROBE: NEGATIVE
SARS-COV-2 RNA RESP QL NAA+PROBE: NEGATIVE
SYSTOLIC BLOOD PRESSURE - MUSE: NORMAL MMHG
T AXIS - MUSE: 48 DEGREES
VENTRICULAR RATE- MUSE: 86 BPM

## 2021-09-29 PROCEDURE — 36415 COLL VENOUS BLD VENIPUNCTURE: CPT | Performed by: FAMILY MEDICINE

## 2021-09-29 PROCEDURE — 96372 THER/PROPH/DIAG INJ SC/IM: CPT | Performed by: STUDENT IN AN ORGANIZED HEALTH CARE EDUCATION/TRAINING PROGRAM

## 2021-09-29 PROCEDURE — 999N000033 HC STATISTIC CHRONIC PULMONARY DISEASE SPECIALIST

## 2021-09-29 PROCEDURE — G0378 HOSPITAL OBSERVATION PER HR: HCPCS

## 2021-09-29 PROCEDURE — 250N000013 HC RX MED GY IP 250 OP 250 PS 637: Performed by: STUDENT IN AN ORGANIZED HEALTH CARE EDUCATION/TRAINING PROGRAM

## 2021-09-29 PROCEDURE — U0005 INFEC AGEN DETEC AMPLI PROBE: HCPCS | Performed by: FAMILY MEDICINE

## 2021-09-29 PROCEDURE — 84145 PROCALCITONIN (PCT): CPT | Performed by: STUDENT IN AN ORGANIZED HEALTH CARE EDUCATION/TRAINING PROGRAM

## 2021-09-29 PROCEDURE — 96366 THER/PROPH/DIAG IV INF ADDON: CPT | Performed by: FAMILY MEDICINE

## 2021-09-29 PROCEDURE — 250N000009 HC RX 250: Performed by: STUDENT IN AN ORGANIZED HEALTH CARE EDUCATION/TRAINING PROGRAM

## 2021-09-29 PROCEDURE — 250N000011 HC RX IP 250 OP 636: Performed by: STUDENT IN AN ORGANIZED HEALTH CARE EDUCATION/TRAINING PROGRAM

## 2021-09-29 PROCEDURE — 83605 ASSAY OF LACTIC ACID: CPT | Performed by: INTERNAL MEDICINE

## 2021-09-29 PROCEDURE — 250N000011 HC RX IP 250 OP 636: Performed by: FAMILY MEDICINE

## 2021-09-29 PROCEDURE — 99223 1ST HOSP IP/OBS HIGH 75: CPT | Performed by: INTERNAL MEDICINE

## 2021-09-29 PROCEDURE — 96365 THER/PROPH/DIAG IV INF INIT: CPT | Mod: 59 | Performed by: FAMILY MEDICINE

## 2021-09-29 PROCEDURE — 999N000157 HC STATISTIC RCP TIME EA 10 MIN

## 2021-09-29 PROCEDURE — 94640 AIRWAY INHALATION TREATMENT: CPT | Mod: 76

## 2021-09-29 PROCEDURE — 96375 TX/PRO/DX INJ NEW DRUG ADDON: CPT | Performed by: FAMILY MEDICINE

## 2021-09-29 PROCEDURE — G0463 HOSPITAL OUTPT CLINIC VISIT: HCPCS

## 2021-09-29 PROCEDURE — 999N000032 HC STATISTIC CHRONIC DISEASE SPECIALIST RT CONSULT

## 2021-09-29 PROCEDURE — 36415 COLL VENOUS BLD VENIPUNCTURE: CPT | Performed by: INTERNAL MEDICINE

## 2021-09-29 PROCEDURE — 94799 UNLISTED PULMONARY SVC/PX: CPT

## 2021-09-29 PROCEDURE — 250N000009 HC RX 250

## 2021-09-29 PROCEDURE — 99220 PR INITIAL OBSERVATION CARE,LEVEL III: CPT | Mod: GC | Performed by: INTERNAL MEDICINE

## 2021-09-29 PROCEDURE — 99207 PR CDG-CODE CATEGORY CHANGED: CPT | Performed by: INTERNAL MEDICINE

## 2021-09-29 PROCEDURE — 87040 BLOOD CULTURE FOR BACTERIA: CPT | Performed by: FAMILY MEDICINE

## 2021-09-29 PROCEDURE — 272N000202 HC AEROBIKA WITH MANOMETER

## 2021-09-29 PROCEDURE — 83880 ASSAY OF NATRIURETIC PEPTIDE: CPT | Performed by: STUDENT IN AN ORGANIZED HEALTH CARE EDUCATION/TRAINING PROGRAM

## 2021-09-29 PROCEDURE — 36415 COLL VENOUS BLD VENIPUNCTURE: CPT | Performed by: STUDENT IN AN ORGANIZED HEALTH CARE EDUCATION/TRAINING PROGRAM

## 2021-09-29 RX ORDER — LEVOFLOXACIN 250 MG/1
250 TABLET, FILM COATED ORAL EVERY 24 HOURS
Status: DISCONTINUED | OUTPATIENT
Start: 2021-09-30 | End: 2021-09-29

## 2021-09-29 RX ORDER — ALBUTEROL SULFATE 0.83 MG/ML
2.5 SOLUTION RESPIRATORY (INHALATION) 2 TIMES DAILY
Status: DISCONTINUED | OUTPATIENT
Start: 2021-09-29 | End: 2021-09-30 | Stop reason: HOSPADM

## 2021-09-29 RX ORDER — POLYETHYLENE GLYCOL 3350 17 G/17G
17 POWDER, FOR SOLUTION ORAL DAILY PRN
Status: DISCONTINUED | OUTPATIENT
Start: 2021-09-29 | End: 2021-09-30 | Stop reason: HOSPADM

## 2021-09-29 RX ORDER — VITAMIN B COMPLEX
25 TABLET ORAL DAILY
Status: DISCONTINUED | OUTPATIENT
Start: 2021-09-29 | End: 2021-09-30 | Stop reason: HOSPADM

## 2021-09-29 RX ORDER — PREGABALIN 75 MG/1
75 CAPSULE ORAL 2 TIMES DAILY
Status: DISCONTINUED | OUTPATIENT
Start: 2021-09-29 | End: 2021-09-30 | Stop reason: HOSPADM

## 2021-09-29 RX ORDER — LEVOFLOXACIN 750 MG/1
750 TABLET, FILM COATED ORAL EVERY 24 HOURS
Status: DISCONTINUED | OUTPATIENT
Start: 2021-09-29 | End: 2021-09-30 | Stop reason: HOSPADM

## 2021-09-29 RX ORDER — METHYLPREDNISOLONE SODIUM SUCCINATE 125 MG/2ML
125 INJECTION, POWDER, LYOPHILIZED, FOR SOLUTION INTRAMUSCULAR; INTRAVENOUS ONCE
Status: COMPLETED | OUTPATIENT
Start: 2021-09-29 | End: 2021-09-29

## 2021-09-29 RX ORDER — LIDOCAINE 40 MG/G
CREAM TOPICAL
Status: DISCONTINUED | OUTPATIENT
Start: 2021-09-29 | End: 2021-09-30 | Stop reason: HOSPADM

## 2021-09-29 RX ORDER — LEVOFLOXACIN 5 MG/ML
750 INJECTION, SOLUTION INTRAVENOUS ONCE
Status: COMPLETED | OUTPATIENT
Start: 2021-09-29 | End: 2021-09-29

## 2021-09-29 RX ORDER — TRAMADOL HYDROCHLORIDE 50 MG/1
50 TABLET ORAL EVERY 12 HOURS PRN
Status: DISCONTINUED | OUTPATIENT
Start: 2021-09-29 | End: 2021-09-30 | Stop reason: HOSPADM

## 2021-09-29 RX ORDER — ACETYLCYSTEINE 200 MG/ML
2 SOLUTION ORAL; RESPIRATORY (INHALATION) DAILY
Status: DISCONTINUED | OUTPATIENT
Start: 2021-09-29 | End: 2021-09-30 | Stop reason: HOSPADM

## 2021-09-29 RX ORDER — SODIUM CHLORIDE FOR INHALATION 3 %
3 VIAL, NEBULIZER (ML) INHALATION 2 TIMES DAILY
Status: DISCONTINUED | OUTPATIENT
Start: 2021-09-29 | End: 2021-09-30 | Stop reason: HOSPADM

## 2021-09-29 RX ORDER — ALBUTEROL SULFATE 90 UG/1
2 AEROSOL, METERED RESPIRATORY (INHALATION) EVERY 6 HOURS PRN
Status: DISCONTINUED | OUTPATIENT
Start: 2021-09-29 | End: 2021-09-30 | Stop reason: HOSPADM

## 2021-09-29 RX ORDER — ONDANSETRON 4 MG/1
4 TABLET, ORALLY DISINTEGRATING ORAL EVERY 6 HOURS PRN
Status: DISCONTINUED | OUTPATIENT
Start: 2021-09-29 | End: 2021-09-30 | Stop reason: HOSPADM

## 2021-09-29 RX ORDER — ONDANSETRON 2 MG/ML
4 INJECTION INTRAMUSCULAR; INTRAVENOUS EVERY 6 HOURS PRN
Status: DISCONTINUED | OUTPATIENT
Start: 2021-09-29 | End: 2021-09-30 | Stop reason: HOSPADM

## 2021-09-29 RX ORDER — MONTELUKAST SODIUM 10 MG/1
10 TABLET ORAL AT BEDTIME
Status: DISCONTINUED | OUTPATIENT
Start: 2021-09-29 | End: 2021-09-30 | Stop reason: HOSPADM

## 2021-09-29 RX ADMIN — LEVOFLOXACIN 750 MG: 5 INJECTION, SOLUTION INTRAVENOUS at 00:59

## 2021-09-29 RX ADMIN — SODIUM CHLORIDE SOLN NEBU 3% 3 ML: 3 NEBU SOLN at 07:49

## 2021-09-29 RX ADMIN — Medication 25 MCG: at 09:20

## 2021-09-29 RX ADMIN — ENOXAPARIN SODIUM 40 MG: 40 INJECTION SUBCUTANEOUS at 09:20

## 2021-09-29 RX ADMIN — PREGABALIN 75 MG: 75 CAPSULE ORAL at 12:57

## 2021-09-29 RX ADMIN — UMECLIDINIUM 1 PUFF: 62.5 AEROSOL, POWDER ORAL at 09:20

## 2021-09-29 RX ADMIN — LEVOFLOXACIN 750 MG: 750 TABLET, FILM COATED ORAL at 22:02

## 2021-09-29 RX ADMIN — ALBUTEROL SULFATE 2.5 MG: 2.5 SOLUTION RESPIRATORY (INHALATION) at 19:46

## 2021-09-29 RX ADMIN — ACETYLCYSTEINE 2 ML: 200 SOLUTION ORAL; RESPIRATORY (INHALATION) at 07:49

## 2021-09-29 RX ADMIN — FLUTICASONE FUROATE AND VILANTEROL TRIFENATATE 1 PUFF: 100; 25 POWDER RESPIRATORY (INHALATION) at 09:20

## 2021-09-29 RX ADMIN — Medication 1 TABLET: at 09:20

## 2021-09-29 RX ADMIN — ALBUTEROL SULFATE 2 PUFF: 90 AEROSOL, METERED RESPIRATORY (INHALATION) at 05:36

## 2021-09-29 RX ADMIN — PREGABALIN 75 MG: 75 CAPSULE ORAL at 19:47

## 2021-09-29 RX ADMIN — MONTELUKAST 10 MG: 10 TABLET, FILM COATED ORAL at 19:47

## 2021-09-29 RX ADMIN — METHYLPREDNISOLONE SODIUM SUCCINATE 125 MG: 125 INJECTION, POWDER, FOR SOLUTION INTRAMUSCULAR; INTRAVENOUS at 00:59

## 2021-09-29 ASSESSMENT — ENCOUNTER SYMPTOMS
ABDOMINAL PAIN: 0
VOMITING: 0
BRUISES/BLEEDS EASILY: 0
SINUS PRESSURE: 0
DYSPHORIC MOOD: 0
CHILLS: 0
CHOKING: 0
CHEST TIGHTNESS: 1
TROUBLE SWALLOWING: 0
ACTIVITY CHANGE: 1
FEVER: 0
HEADACHES: 0
COUGH: 1
WHEEZING: 0
DYSURIA: 0
FATIGUE: 1
CONFUSION: 0
NUMBNESS: 0
WEAKNESS: 1
BACK PAIN: 0
DECREASED CONCENTRATION: 0
LIGHT-HEADEDNESS: 1
NAUSEA: 0

## 2021-09-29 NOTE — PROGRESS NOTES
Admitted/transferred from: ED  2 RN full   skin assessment completed by Orlando Swartz, RN and Arlene GONZALEZ RN.  Skin assessment finding: small scabs from scratching RU posterior    Interventions/actions: None     Will continue to monitor.

## 2021-09-29 NOTE — ED NOTES
[facility]   ED Nurse to Floor Handoff     Fiordaliza Najera Sr. is a 76 year old female who speaks English and lives alone,  in an assisted living  They arrived in the ED by ambulance from home    ED Chief Complaint: Shortness of Breath    ED Dx;   Final diagnoses:   Hypoxia   GARCIA (dyspnea on exertion)   Ground glass opacity present on imaging of lung   Chronic respiratory failure with hypoxia, on home O2 therapy (H)   Bronchiectasis with acute exacerbation (H)   Chronic obstructive pulmonary disease with acute exacerbation (H)         Needed?: No    Allergies:   Allergies   Allergen Reactions     Codeine Unknown     Morphine Itching   .  Past Medical Hx:   Past Medical History:   Diagnosis Date     COPD (chronic obstructive pulmonary disease) (H)      Diverticulosis      Hiatal hernia      Mild asthma      Neuropathy      Osteopenia      Temporomandibular joint (TMJ) pain       Baseline Mental status: WDL  Current Mental Status changes: at basesline    Infection present or suspected this encounter: yes respiratory  Sepsis suspected: No  Isolation type: No active isolations  Patient tested for COVID 19 prior to admission: YES     Activity level - Baseline/Home:  Independent  Activity Level - Current:   Independent    Bariatric equipment needed?: No    In the ED these meds were given:   Medications   lidocaine 1 % 0.1-1 mL (has no administration in time range)   lidocaine (LMX4) cream (has no administration in time range)   sodium chloride (PF) 0.9% PF flush 3 mL ( Intracatheter Canceled Entry 9/29/21 0050)   sodium chloride (PF) 0.9% PF flush 3 mL (has no administration in time range)   lidocaine 1 % 0.1-1 mL (has no administration in time range)   lidocaine (LMX4) cream (has no administration in time range)   sodium chloride (PF) 0.9% PF flush 3 mL ( Intracatheter Canceled Entry 9/29/21 0301)   sodium chloride (PF) 0.9% PF flush 3 mL (has no  administration in time range)   melatonin tablet 1 mg (has no administration in time range)   enoxaparin ANTICOAGULANT (LOVENOX) injection 40 mg (has no administration in time range)   polyethylene glycol (MIRALAX) Packet 17 g (has no administration in time range)   ondansetron (ZOFRAN-ODT) ODT tab 4 mg (has no administration in time range)     Or   ondansetron (ZOFRAN) injection 4 mg (has no administration in time range)   acetylcysteine (MUCOMYST) 20 % nebulizer solution 2 mL (has no administration in time range)   fluticasone-vilanterol (BREO ELLIPTA) 100-25 MCG/INH inhaler 1 puff (has no administration in time range)   albuterol (PROAIR HFA/PROVENTIL HFA/VENTOLIN HFA) 108 (90 Base) MCG/ACT inhaler 2 puff (has no administration in time range)   calcium carbonate-vitamin D (OSCAL w/D) per tablet 1 tablet (has no administration in time range)   Vitamin D3 (CHOLECALCIFEROL) tablet 25 mcg (has no administration in time range)   montelukast (SINGULAIR) tablet 10 mg (has no administration in time range)   sodium chloride (NEBUSAL) 3 % neb solution 3 mL (has no administration in time range)   traMADol (ULTRAM) tablet 50 mg (has no administration in time range)   umeclidinium (INCRUSE ELLIPTA) 62.5 MCG/INH inhaler 1 puff (has no administration in time range)   levofloxacin (LEVAQUIN) tablet 250 mg (has no administration in time range)   acetaminophen (TYLENOL) tablet 500 mg (500 mg Oral Given 9/28/21 2253)   iopamidol (ISOVUE-370) solution 51 mL (51 mLs Intravenous Given 9/28/21 2329)   sodium chloride 0.9 % bag 500mL for CT scan flush use (82 mLs Intravenous Given 9/28/21 2330)   levofloxacin (LEVAQUIN) infusion 750 mg (0 mg Intravenous Stopped 9/29/21 0250)   methylPREDNISolone sodium succinate (solu-MEDROL) injection 125 mg (125 mg Intravenous Given 9/29/21 0059)       Drips running?  No    Home pump  No    Current LDAs  Peripheral IV 09/28/21 Anterior;Right Upper forearm (Active)   Number of days: 1       Labs  results:   Labs Ordered and Resulted from Time of ED Arrival Up to the Time of Departure from the ED   COMPREHENSIVE METABOLIC PANEL - Abnormal; Notable for the following components:       Result Value    Glucose 104 (*)     Albumin 2.9 (*)     All other components within normal limits   CBC WITH PLATELETS AND DIFFERENTIAL - Abnormal; Notable for the following components:    WBC Count 25.9 (*)     Absolute Neutrophils 23.5 (*)     Absolute Lymphocytes 0.6 (*)     Absolute Monocytes 1.5 (*)     Absolute Immature Granulocytes 0.2 (*)     All other components within normal limits   CRP INFLAMMATION - Abnormal; Notable for the following components:    CRP Inflammation 24.0 (*)     All other components within normal limits   ISTAT GASES LACTATE VENOUS POCT - Abnormal; Notable for the following components:    Bicarbonate Venous POCT 36 (*)     O2 Sat, Venous POCT 40 (*)     pCO2V Venous POCT 63 (*)     All other components within normal limits   INR - Normal   PARTIAL THROMBOPLASTIN TIME - Normal   TROPONIN I - Normal   NT PROBNP INPATIENT - Normal   PROCALCITONIN - Normal   COVID-19 VIRUS (CORONAVIRUS) BY PCR - Normal    Narrative:     Testing was performed using the Xpert Xpress SARS-CoV-2 Assay on the  Cepheid Gene-Xpert Instrument Systems. Additional information about  this Emergency Use Authorization (EUA) assay can be found via the Lab  Guide. This test should be ordered for the detection of SARS-CoV-2 in  individuals who meet SARS-CoV-2 clinical and/or epidemiological  criteria. Test performance is unknown in asymptomatic patients. This  test is for in vitro diagnostic use under the FDA EUA for  laboratories certified under CLIA to perform high complexity testing.  This test has not been FDA cleared or approved. A negative result  does not rule out the presence of PCR inhibitors in the specimen or  target RNA in concentration below the limit of detection for the  assay. The possibility of a false negative should be  considered if  the patient's recent exposure or clinical presentation suggests  COVID-19. This test was validated by the [facility]. This laboratory is certified under  the Clinical Laboratory Improvement Amendments of 1988 (CLIA-88) as  qualified to perform high complexity laboratory testing.     NT PROBNP INPATIENT - Normal   PROCALCITONIN - Normal   EXTRA BLUE TOP TUBE   EXTRA RED TOP TUBE   EXTRA GREEN TOP (LITHIUM HEPARIN) TUBE   EXTRA PURPLE TOP TUBE   IP ASSIGN PROVIDER TEAM TO TREATMENT TEAM   PULSE OXIMETRY NURSING   PERIPHERAL IV CATHETER   ISTAT CG4 GASES LACTATE HANNAH NURSING POCT   OBSERVATION GOALS   VITAL SIGNS   PERIPHERAL IV CATHETER   PULSE OXIMETRY NURSING   BLOOD CULTURE   BLOOD CULTURE   EXTRA TUBE    Narrative:     The following orders were created for panel order [facility] Draw.  Procedure                               Abnormality         Status                     ---------                               -----------         ------                     Extra Blue Top Tube[[id number]]                              Final result               Extra Red Top Tube[[id number]]                               Final result               Extra Green Top (Lithium...[[id number]]                      Final result               Extra Purple Top Tube[[id number]]                            Final result                 Please view results for these tests on the individual orders.   CBC WITH PLATELETS & DIFFERENTIAL    Narrative:     The following orders were created for panel order CBC with platelets differential.  Procedure                               Abnormality         Status                     ---------                               -----------         ------                     CBC with platelets and d...[[id number]]  Abnormal            Final result                 Please view results for these tests on the individual orders.   CBC WITH PLATELETS & DIFFERENTIAL        Imaging Studies:   Recent Results (from the past 24 hour(s))   XR Chest Port 1 View    Narrative    EXAM: XR CHEST PORT 1 VIEW  LOCATION: [facility]  DATE/TIME: 9/28/2021 9:12 PM    INDICATION: sob  COMPARISON: 7/1/2018      Impression    IMPRESSION: No significant change. Lung thoracic spinal rods. Mild rightward scoliosis. Heart size and pulmonary vessels normal. Mild coarse interstitial pattern similar to previous study. No focal pneumonia. Minimal peripheral scarring lateral right   lung.   CT Chest Pulmonary Embolism w Contrast    Narrative    EXAM: CT CHEST PULMONARY EMBOLISM W CONTRAST  LOCATION: [facility]  DATE/TIME: 9/28/2021 11:29 PM    INDICATION: Shortness of breath, hypoxia  COMPARISON: CT chest 10/21/2020  TECHNIQUE: CT chest pulmonary angiogram during arterial phase injection of IV contrast. Multiplanar reformats and MIP reconstructions were performed. Dose reduction techniques were used.   CONTRAST: 51 ml isovue 370     FINDINGS:  ANGIOGRAM CHEST: Pulmonary arteries are normal caliber and negative for pulmonary emboli. Thoracic aorta is negative for dissection. No CT evidence of right heart strain.    LUNGS AND PLEURA: Unchanged apical scarring and bronchiectasis in the middle lobe and lingula. New multifocal groundglass opacities throughout both lungs. Minimal change in scattered bilateral tree-in-bud and nodular opacities.    MEDIASTINUM/AXILLAE: No thoracic aortic aneurysm. No lymphadenopathy.    CORONARY ARTERY CALCIFICATION: None.    UPPER ABDOMEN: Normal.    MUSCULOSKELETAL: Posterior spinal fusion hardware.      Impression    IMPRESSION:  1.  No pulmonary embolism.    2.  New multifocal groundglass opacities throughout both lungs, likely infectious. Consider COVID 19 pneumonia.    3.  Minimal change in scattered tree-in-bud and nodular opacities.       Recent vital signs:   /67   Pulse 92   " Temp 98  F (36.7  C) (Oral)   Resp 18   Ht 1.575 m (5' 2\")   Wt 50.8 kg (112 lb)   SpO2 98%   BMI 20.49 kg/m      Maribel Coma Scale Score: 15 (09/28/21 2038)       Cardiac Rhythm: Normal Sinus  Pt needs tele? No  Skin/wound Issues: None    Code Status: Full Code    Pain control: good    Nausea control: good    Abnormal labs/tests/findings requiring intervention: CT PE; CBC    Family present during ED course? No   Family Comments/Social Situation comments: na    Tasks needing completion: None    Lizett Duggan, RN  [facility] -- [phone]  [phone] [facility]  "

## 2021-09-29 NOTE — ED TRIAGE NOTES
BIBA from home. Had bronch today. Went home at noon, has been experiencing increased SOB since then. Did nebs at home without any improvement. On home o2 at 2 liters, had to increase her o2 to 4 liters.

## 2021-09-29 NOTE — ED PROVIDER NOTES
ED Provider Note  [facility]      History     Chief Complaint   Patient presents with     Shortness of Breath     The history is provided by the patient and medical records.     Fiordaliza Najera Sr. is a 76 year old female with history of COPD on 2 L home oxygen, bronchiectasis and chronic hypoxic respiratory failure with persistent worsening of symptoms who underwent bronchoscopy with BAL under conscious sedation today presenting via EMS for increasing shortness of breath. Patient reports that she did a Mucomyst and albuterol neb when she arrived home and then walked down the [place]vedo, but became so out of breath that she could barely get back to her apartment. She states that she sort of panicked and called on-call pulmonology. She was able to get back to her apartment and sit down. After sitting she caught her breath. She attempted to get up again, but with any little exertion became winded again and could not catch her breath. She states that she is not short of breath now while laying down and still. She received both doses of the Moderna vaccine, completed 3/10/21. She tested negative for Covid Friday. She states she went to the [facility] ED 3 weeks ago and was found to have a fungal infection. She was put on doxycycline, augmentin, and prednisone. She states that she finished these and they did not improve symptoms.   Patient denies abdominal pain leg pain leg swelling neuro changes.  Patient feels better at this point laying still.  No abdominal pain nausea vomiting.    Past Medical History  Past Medical History:   Diagnosis Date     COPD (chronic obstructive pulmonary disease) (H)      Diverticulosis      Hiatal hernia      Mild asthma      Neuropathy      Osteopenia      Temporomandibular joint (TMJ) pain      Past Surgical History:   Procedure Laterality Date     ANTERIOR / POSTERIOR COMBINED FUSION LUMBAR SPINE       BRONCHOSCOPY (RIGID OR FLEXIBLE), DIAGNOSTIC N/A 9/28/2021     Procedure: BRONCHOSCOPY, WITH BRONCHOALVEOLAR LAVAGE;  Surgeon: Randall Lorenz MD;  Location:  GI     HYSTERECTOMY       RETINAL LASER PROCEDURE Left 10/04/2016     SALPINGOOPHORECTOMY       TOTAL KNEE ARTHROPLASTY  2008     No current outpatient medications on file.    Allergies   Allergen Reactions     Codeine Unknown     Morphine Itching     Family History  Family History   Problem Relation Age of Onset     Dementia Mother         passed age 88     Chronic Obstructive Pulmonary Disease Father         passed age 78     Social History   Social History     Tobacco Use     Smoking status: Never Smoker     Smokeless tobacco: Never Used   Substance Use Topics     Alcohol use: No     Drug use: Never      Past medical history, past surgical history, medications, allergies, family history, and social history were reviewed with the patient. No additional pertinent items.       Review of Systems   Constitutional: Positive for activity change and fatigue. Negative for chills and fever.   HENT: Negative for sinus pressure and trouble swallowing.    Eyes: Negative for visual disturbance.   Respiratory: Positive for cough, chest tightness and shortness of breath. Negative for choking and wheezing.    Cardiovascular: Negative for chest pain and leg swelling.   Gastrointestinal: Negative for abdominal pain, nausea and vomiting.   Genitourinary: Negative for dysuria.   Musculoskeletal: Positive for gait problem. Negative for back pain.        Due to shortness of breath with activity   Skin: Negative for rash.   Allergic/Immunologic: Negative for immunocompromised state.   Neurological: Positive for weakness and light-headedness. Negative for syncope, numbness and headaches.   Hematological: Does not bruise/bleed easily.   Psychiatric/Behavioral: Negative for confusion, decreased concentration and dysphoric mood.   All other systems reviewed and are negative.    A complete review of systems was performed with pertinent  "positives and negatives noted in the HPI, and all other systems negative.    Physical Exam   BP: 122/88  Pulse: 98  Temp: 98  F (36.7  C)  Resp: 18  Height: 157.5 cm (5' 2\")  Weight: 50.8 kg (112 lb)  SpO2: 98 %  Physical Exam  Vitals and nursing note reviewed.   Constitutional:       General: She is in acute distress.      Appearance: She is well-developed. She is not toxic-appearing or diaphoretic.      Comments: Patient is on 4 L typically is on 2 L ox saturations 98% on room air.  Alert and oriented x3.   HENT:      Head: Normocephalic and atraumatic.      Nose: Nose normal.      Mouth/Throat:      Mouth: Mucous membranes are moist.   Eyes:      General: No scleral icterus.     Extraocular Movements: Extraocular movements intact.      Conjunctiva/sclera: Conjunctivae normal.      Pupils: Pupils are equal, round, and reactive to light.   Cardiovascular:      Rate and Rhythm: Normal rate and regular rhythm.   Pulmonary:      Effort: Respiratory distress present.      Breath sounds: No stridor. Rales present. No wheezing.      Comments: Breath sounds noted to be equal with some mild crackles heard bilateral bases  Abdominal:      General: There is no distension.      Palpations: Abdomen is soft.      Tenderness: There is no abdominal tenderness.      Comments: No distention no rigidity no mass   Musculoskeletal:         General: No swelling or tenderness.      Cervical back: Normal range of motion and neck supple. No rigidity.      Comments: Negative Homans' sign   Skin:     General: Skin is warm and dry.      Capillary Refill: Capillary refill takes less than 2 seconds.      Coloration: Skin is pale.      Findings: No erythema or rash.   Neurological:      General: No focal deficit present.      Mental Status: She is alert and oriented to person, place, and time. Mental status is at baseline.   Psychiatric:         Mood and Affect: Mood normal.         Behavior: Behavior normal.         Thought Content: Thought " content normal.         Judgment: Judgment normal.           ED Course       Patient valuate here in the ER is been stable with 4 L O2.  Alert and orient x3.  Portable chest x-ray was done COPD changes but no sign of any pneumothorax or marked effusion or infiltrate seen.  EKG done sinus rhythm without ischemic changes.  CRP is 24.  White count is 25,900.  Hemoglobin 12.1.  Platelets 295  Lactic acid 0.6.  Bicarb 36.  pH 737.    Troponin negative.  BNP within normal limits.  Sodium 139 potassium 3.8.  Creatinine 0.54.  Glucose 104.  INR 1.12.    The ER discussed findings and symptoms with pulmonary fellow who agreed this point we did do a CT scan of chest with contrast.  There is no PE but there is multiple areas of groundglass opacity seen throughout both lungs.  We repeated Covid testing which was negative.  Patient treated with a dose of Solu-Medrol IV along with Levaquin 750 mg IV.  Discussed with medicine will admit for bilateral infectious etiology with COPD exacerbation status post recent bronchoscopy with BAL.  Patient agrees      Procedures             EKG Interpretation:      Interpreted by Jhonny Ramires MD  Time reviewed:21:21  Symptoms at time of EKG: shortness of breath   Rhythm: Normal sinus   Rate: 86  Axis: Normal  Ectopy: None  Conduction: Normal  ST Segments/ T Waves: No ST-T wave changes and No acute ischemic changes  Q Waves: None  Comparison to prior: Unchanged from 7/1/18    Clinical Impression: possible left atrial enlargement, no acute changes              Results for orders placed or performed during the hospital encounter of 09/28/21   XR Chest Port 1 View     Status: None    Narrative    EXAM: XR CHEST PORT 1 VIEW  LOCATION: [facility]  DATE/TIME: 9/28/2021 9:12 PM    INDICATION: sob  COMPARISON: 7/1/2018      Impression    IMPRESSION: No significant change. Lung thoracic spinal rods. Mild rightward scoliosis. Heart size and pulmonary vessels  normal. Mild coarse interstitial pattern similar to previous study. No focal pneumonia. Minimal peripheral scarring lateral right   lung.   CT Chest Pulmonary Embolism w Contrast     Status: None    Narrative    EXAM: CT CHEST PULMONARY EMBOLISM W CONTRAST  LOCATION: [facility]  DATE/TIME: 9/28/2021 11:29 PM    INDICATION: Shortness of breath, hypoxia  COMPARISON: CT chest 10/21/2020  TECHNIQUE: CT chest pulmonary angiogram during arterial phase injection of IV contrast. Multiplanar reformats and MIP reconstructions were performed. Dose reduction techniques were used.   CONTRAST: 51 ml isovue 370     FINDINGS:  ANGIOGRAM CHEST: Pulmonary arteries are normal caliber and negative for pulmonary emboli. Thoracic aorta is negative for dissection. No CT evidence of right heart strain.    LUNGS AND PLEURA: Unchanged apical scarring and bronchiectasis in the middle lobe and lingula. New multifocal groundglass opacities throughout both lungs. Minimal change in scattered bilateral tree-in-bud and nodular opacities.    MEDIASTINUM/AXILLAE: No thoracic aortic aneurysm. No lymphadenopathy.    CORONARY ARTERY CALCIFICATION: None.    UPPER ABDOMEN: Normal.    MUSCULOSKELETAL: Posterior spinal fusion hardware.      Impression    IMPRESSION:  1.  No pulmonary embolism.    2.  New multifocal groundglass opacities throughout both lungs, likely infectious. Consider COVID 19 pneumonia.    3.  Minimal change in scattered tree-in-bud and nodular opacities.   Extra Blue Top Tube     Status: None   Result Value Ref Range    Hold Specimen JIC    Extra Red Top Tube     Status: None   Result Value Ref Range    Hold Specimen JIC    Extra Green Top (Lithium Heparin) Tube     Status: None   Result Value Ref Range    Hold Specimen JIC    Extra Purple Top Tube     Status: None   Result Value Ref Range    Hold Specimen JIC    INR     Status: Normal   Result Value Ref Range    INR 1.12 0.85 - 1.15    Partial thromboplastin time     Status: Normal   Result Value Ref Range    aPTT 34 22 - 38 Seconds   Comprehensive metabolic panel     Status: Abnormal   Result Value Ref Range    Sodium 139 133 - 144 mmol/L    Potassium 3.8 3.4 - 5.3 mmol/L    Chloride 106 94 - 109 mmol/L    Carbon Dioxide (CO2) 30 20 - 32 mmol/L    Anion Gap 3 3 - 14 mmol/L    Urea Nitrogen 17 7 - 30 mg/dL    Creatinine 0.54 0.52 - 1.04 mg/dL    Calcium 9.2 8.5 - 10.1 mg/dL    Glucose 104 (H) 70 - 99 mg/dL    Alkaline Phosphatase 96 40 - 150 U/L    AST 22 0 - 45 U/L    ALT 22 0 - 50 U/L    Protein Total 7.3 6.8 - 8.8 g/dL    Albumin 2.9 (L) 3.4 - 5.0 g/dL    Bilirubin Total 0.4 0.2 - 1.3 mg/dL    GFR Estimate >90 >60 mL/min/1.73m2   Troponin I     Status: Normal   Result Value Ref Range    Troponin I <0.015 0.000 - 0.045 ug/L   Nt probnp inpatient (BNP)     Status: Normal   Result Value Ref Range    N terminal Pro BNP Inpatient 222 0-1,800 pg/mL   CBC with platelets and differential     Status: Abnormal   Result Value Ref Range    WBC Count 25.9 (H) 4.0 - 11.0 10e3/uL    RBC Count 4.01 3.80 - 5.20 10e6/uL    Hemoglobin 12.1 11.7 - 15.7 g/dL    Hematocrit 38.1 35.0 - 47.0 %    MCV 95 78 - 100 fL    MCH 30.2 26.5 - 33.0 pg    MCHC 31.8 31.5 - 36.5 g/dL    RDW 13.4 10.0 - 15.0 %    Platelet Count 295 150 - 450 10e3/uL    % Neutrophils 91 %    % Lymphocytes 2 %    % Monocytes 6 %    % Eosinophils 0 %    % Basophils 0 %    % Immature Granulocytes 1 %    NRBCs per 100 WBC 0 <1 /100    Absolute Neutrophils 23.5 (H) 1.6 - 8.3 10e3/uL    Absolute Lymphocytes 0.6 (L) 0.8 - 5.3 10e3/uL    Absolute Monocytes 1.5 (H) 0.0 - 1.3 10e3/uL    Absolute Eosinophils 0.0 0.0 - 0.7 10e3/uL    Absolute Basophils 0.1 0.0 - 0.2 10e3/uL    Absolute Immature Granulocytes 0.2 (H) <=0.0 10e3/uL    Absolute NRBCs 0.0 10e3/uL   Procalcitonin     Status: Normal   Result Value Ref Range    Procalcitonin 0.09 <5.00 ng/mL   CRP inflammation     Status: Abnormal   Result Value Ref  Range    CRP Inflammation 24.0 (H) 0.0 - 8.0 mg/L   iStat Gases (lactate) venous, POCT     Status: Abnormal   Result Value Ref Range    Lactic Acid POCT 0.6 <=2.0 mmol/L    Bicarbonate Venous POCT 36 (H) 21 - 28 mmol/L    O2 Sat, Venous POCT 40 (L) 94 - 100 %    pCO2V Venous POCT 63 (H) 40 - 50 mm Hg    pH Venous POCT 7.37 7.32 - 7.43    pO2 Venous POCT 25 25 - 47 mm Hg   Symptomatic COVID-19 Virus (Coronavirus) by PCR Nasopharyngeal     Status: Normal    Specimen: Nasopharyngeal; Swab   Result Value Ref Range    SARS CoV2 PCR Negative Negative    Narrative    Testing was performed using the [employer] Xpress SARS-CoV-2 Assay on the  Cepheid Gene-Xpert Instrument Systems. Additional information about  this Emergency Use Authorization (EUA) assay can be found via the Lab  Guide. This test should be ordered for the detection of SARS-CoV-2 in  individuals who meet SARS-CoV-2 clinical and/or epidemiological  criteria. Test performance is unknown in asymptomatic patients. This  test is for in vitro diagnostic use under the FDA EUA for  laboratories certified under CLIA to perform high complexity testing.  This test has not been FDA cleared or approved. A negative result  does not rule out the presence of PCR inhibitors in the specimen or  target RNA in concentration below the limit of detection for the  assay. The possibility of a false negative should be considered if  the patient's recent exposure or clinical presentation suggests  COVID-19. This test was validated by the [facility]. This laboratory is certified under  the Clinical Laboratory Improvement Amendments of 1988 (CLIA-88) as  qualified to perform high complexity laboratory testing.     Nt probnp inpatient     Status: Normal   Result Value Ref Range    N terminal Pro BNP Inpatient 337 0-1,800 pg/mL   Procalcitonin     Status: Normal   Result Value Ref Range    Procalcitonin 0.30 <5.00 ng/mL   EKG 12-lead, tracing  only     Status: None   Result Value Ref Range    Systolic Blood Pressure  mmHg    Diastolic Blood Pressure  mmHg    Ventricular Rate 86 BPM    Atrial Rate 86 BPM    KS Interval 168 ms    QRS Duration 90 ms     ms    QTc 449 ms    P Axis 72 degrees    R AXIS -8 degrees    T Axis 48 degrees    Interpretation ECG       Sinus rhythm  Possible Left atrial enlargement  Borderline ECG  Unconfirmed report - interpretation of this ECG is computer generated - see medical record for final interpretation    Confirmed by - EMERGENCY ROOM, PHYSICIAN ([id number]),  DIANA NGUYEN ([id number]) on 9/29/2021 9:49:07 AM     [place] Draw     Status: None    Narrative    The following orders were created for panel order [place] Draw.  Procedure                               Abnormality         Status                     ---------                               -----------         ------                     Extra Blue Top Tube[[id number]]                              Final result               Extra Red Top Tube[[id number]]                               Final result               Extra Green Top (Lithium...[[id number]]                      Final result               Extra Purple Top Tube[[id number]]                            Final result                 Please view results for these tests on the individual orders.   CBC with platelets differential     Status: Abnormal    Narrative    The following orders were created for panel order CBC with platelets differential.  Procedure                               Abnormality         Status                     ---------                               -----------         ------                     CBC with platelets and d...[[id number]]  Abnormal            Final result                 Please view results for these tests on the individual orders.     Medications   sodium chloride (PF) 0.9% PF flush 3 mL (3 mLs Intracatheter Given 9/29/21 0650)   sodium chloride (PF) 0.9% PF flush 3 mL (has no  administration in time range)   lidocaine 1 % 0.1-1 mL (has no administration in time range)   lidocaine (LMX4) cream (has no administration in time range)   melatonin tablet 1 mg (has no administration in time range)   enoxaparin ANTICOAGULANT (LOVENOX) injection 40 mg (40 mg Subcutaneous Given 9/29/21 0920)   polyethylene glycol (MIRALAX) Packet 17 g (has no administration in time range)   ondansetron (ZOFRAN-ODT) ODT tab 4 mg (has no administration in time range)     Or   ondansetron (ZOFRAN) injection 4 mg (has no administration in time range)   acetylcysteine (MUCOMYST) 20 % nebulizer solution 2 mL (2 mLs Nebulization Given 9/29/21 0749)   fluticasone-vilanterol (BREO ELLIPTA) 100-25 MCG/INH inhaler 1 puff (1 puff Inhalation Given 9/29/21 0920)   albuterol (PROAIR HFA/PROVENTIL HFA/VENTOLIN HFA) 108 (90 Base) MCG/ACT inhaler 2 puff (2 puffs Inhalation Given 9/29/21 0536)   calcium carbonate-vitamin D (OSCAL w/D) per tablet 1 tablet (1 tablet Oral Given 9/29/21 0920)   Vitamin D3 (CHOLECALCIFEROL) tablet 25 mcg (25 mcg Oral Given 9/29/21 0920)   montelukast (SINGULAIR) tablet 10 mg (has no administration in time range)   sodium chloride (NEBUSAL) 3 % neb solution 3 mL (3 mLs Nebulization Given 9/29/21 0749)   traMADol (ULTRAM) tablet 50 mg (has no administration in time range)   umeclidinium (INCRUSE ELLIPTA) 62.5 MCG/INH inhaler 1 puff (1 puff Inhalation Given 9/29/21 0920)   levofloxacin (LEVAQUIN) tablet 250 mg (has no administration in time range)   acetaminophen (TYLENOL) tablet 500 mg (500 mg Oral Given 9/28/21 2253)   iopamidol (ISOVUE-370) solution 51 mL (51 mLs Intravenous Given 9/28/21 2479)   sodium chloride 0.9 % bag 500mL for CT scan flush use (82 mLs Intravenous Given 9/28/21 2330)   levofloxacin (LEVAQUIN) infusion 750 mg (0 mg Intravenous Stopped 9/29/21 0250)   methylPREDNISolone sodium succinate (solu-MEDROL) injection 125 mg (125 mg Intravenous Given 9/29/21 0059)        Assessments & Plan  (with Medical Decision Making)  76-year-old female with history of bronchiectasis COPD is on 2 L of oxygen at home has had some natural progression of her disease they did a bronchoscopy earlier today with BAL.  Patient was feeling fine went home and then noted sudden tightness shortness of breath dyspnea on exertion but no chest pain.  She requires 4 L to maintain sats in the upper 90s.  Patient denies chest pain other findings etc. presented the ER stable otherwise at rest able to lay flat.  Her chest x-ray did not show any pneumothorax or any obvious of infiltrate effusion or pneumomediastinum.  Her EKG was normal troponin otherwise negative.  White count was 25,000 blood culture sent.  Lactic acid within normal limits.  CT scan of the chest was negative for PE but did show bilateral marked areas of groundglass opacities concerning for infection.  Treated with Levaquin and Solu-Medrol IV will be admitted to medicine for further evaluation whether these are true infection or if it is reaction post bronchoscopy with BAL patient agrees with plan.  As noted repeated Covid test was negative.         I have reviewed the nursing notes. I have reviewed the findings, diagnosis, plan and need for follow up with the patient.    Current Discharge Medication List          Final diagnoses:   Hypoxia   GARCIA (dyspnea on exertion)   Ground glass opacity present on imaging of lung   Chronic respiratory failure with hypoxia, on home O2 therapy (H)   Bronchiectasis with acute exacerbation (H)   Chronic obstructive pulmonary disease with acute exacerbation (H)   I, Jazzmine Willoughby, am serving as a trained medical scribe to document services personally performed by Jhonny Ramires MD, based on the provider's statements to me.     I, Jhonny Ramires MD, was physically present and have reviewed and verified the accuracy of this note documented by Jazzmine Willoughby.      --  Jhonny Ramires MD  [facility] EMERGENCY  DEPARTMENT  9/28/2021    This note was created at least in part by the use of dragon voice dictation system. Inadvertent typographical errors may still exist.  Jhonny Ramires MD.    Patient evaluated in the emergency department during the COVID-19 pandemic period. Careful attention to patients safety was addressed throughout the evaluation. Evaluation and treatment management was initiated with disposition made efficiently and appropriate as possible to minimize any risk of potential exposure to patient during this evaluation.       Jhonny Ramires MD  09/29/21 1028

## 2021-09-29 NOTE — CONSULTS
LUNG NODULE & INTERVENTIONAL PULMONARY CLINIC  [facility], [facility], [facility]     Fiordaliza Najera Sr. MRN# [id number]   Age: 76 year old YOB: 1945       Requesting Physician: No referring provider defined for this encounter.       Assessment and Plan:    1. Bronchiectasis exacerbation. Presently this looks to be the primary issue. Bronchoscopy may have made her more aware of her symptoms but at this point she is back to where she was preprocedure.    Treat to cultures. Do not treat Candida for now.    Chronic respiratory team has been changing her inhalers. Any changes that you make need to be checked with her pharmacy is to determine out-of-pocket cost. Any prior authorizations due to changing of inpatient inhalers need to be taken care prior to discharge.    When I am in the room she is 89 to 90% on room air.    2. Chronic hypoxic respiratory failure. Continue oxygen with above goal.    3. Weakness. Likely due to deconditioning. Inpatient PT and OT may be beneficial.    At this point the decision to syndrome depends primarily on how she is feeling. No further testing needs to be done in house.    Thank you for take care of her. I will continue to follow.             History:     Fiordaliza Najera Sr. is a 76 year old female with sig h/o for bronchiectasis who is here for evaluation/followup of bronchiectasis exacerbation and shortness of breath after bronchoscopy.    Sister Ayse is a long history of bronchiectasis with recurrent exacerbations. Previously this responded to Augmentin-directed towards culture data from the past sputum culture. More recently she is having difficulty with shortness of breath but was not able to bring up the sputum sample. AFB negative several times in the past.    She had a bronchoscopy without event yesterday. She began to be more short of breath and came in last night. She is now feeling better.      - My  interpretation of the images relevant for this visit includes: Bronchiectasis changes as well as groundglass consistent with pneumonia.    Patient has daily productive cough lasting greater than 6 months.  No skilled caregiver available to provide manual CPT.  Aerobika failed to effectively mobilize secretions.  I recommend trying vest therapy.  CT performed on 9/28 confirms Bronchiectasis.                Past Medical History:      Past Medical History:   Diagnosis Date     COPD (chronic obstructive pulmonary disease) (H)      Diverticulosis      Hiatal hernia      Mild asthma      Neuropathy      Osteopenia      Temporomandibular joint (TMJ) pain            Past Surgical History:      Past Surgical History:   Procedure Laterality Date     ANTERIOR / POSTERIOR COMBINED FUSION LUMBAR SPINE       BRONCHOSCOPY (RIGID OR FLEXIBLE), DIAGNOSTIC N/A 9/28/2021    Procedure: BRONCHOSCOPY, WITH BRONCHOALVEOLAR LAVAGE;  Surgeon: Randall Lorenz MD;  Location:  GI     HYSTERECTOMY       RETINAL LASER PROCEDURE Left 10/04/2016     SALPINGOOPHORECTOMY       TOTAL KNEE ARTHROPLASTY  2008          Social History:     Social History     Tobacco Use     Smoking status: Never Smoker     Smokeless tobacco: Never Used   Substance Use Topics     Alcohol use: No          Family History:     Family History   Problem Relation Age of Onset     Dementia Mother         passed age 88     Chronic Obstructive Pulmonary Disease Father         passed age 78           Allergies:      Allergies   Allergen Reactions     Codeine Unknown     Morphine Itching          Medications:     Current Facility-Administered Medications   Medication     acetylcysteine (MUCOMYST) 20 % nebulizer solution 2 mL     albuterol (PROAIR HFA/PROVENTIL HFA/VENTOLIN HFA) 108 (90 Base) MCG/ACT inhaler 2 puff     calcium carbonate-vitamin D (OSCAL w/D) per tablet 1 tablet     enoxaparin ANTICOAGULANT (LOVENOX) injection 40 mg     fluticasone-vilanterol (BREO ELLIPTA) 100-25  "MCG/INH inhaler 1 puff     [START ON 9/30/2021] levofloxacin (LEVAQUIN) tablet 250 mg     lidocaine (LMX4) cream     lidocaine 1 % 0.1-1 mL     melatonin tablet 1 mg     montelukast (SINGULAIR) tablet 10 mg     ondansetron (ZOFRAN-ODT) ODT tab 4 mg    Or     ondansetron (ZOFRAN) injection 4 mg     polyethylene glycol (MIRALAX) Packet 17 g     pregabalin (LYRICA) capsule 75 mg     sodium chloride (NEBUSAL) 3 % neb solution 3 mL     sodium chloride (PF) 0.9% PF flush 3 mL     sodium chloride (PF) 0.9% PF flush 3 mL     traMADol (ULTRAM) tablet 50 mg     umeclidinium (INCRUSE ELLIPTA) 62.5 MCG/INH inhaler 1 puff     Vitamin D3 (CHOLECALCIFEROL) tablet 25 mcg          Review of Systems:     Comprehensive ROS is negative except as in HPI.         Physical Exam:   BP 93/49 (BP Location: Left arm)   Pulse 95   Temp 98.4  F (36.9  C) (Oral)   Resp 20   Ht 1.575 m (5' 2\")   Wt 50.8 kg (112 lb)   SpO2 97%   BMI 20.49 kg/m      Constitutional - looks well, in no apparent distress  Neck supple, no lymph nodes  Eyes - no redness or discharge  Respiratory -breathing appears comfortable. Bilateral crackles  Cardiac -- Normal rate, rhythm.   Skin - No appreciable discoloration or lesions (very limited exam)  Abdomen soft  Extremities without clubbing  Neurological - No apparent tremors. Speech fluent and articlate  Psychiatric - no signs of delirium or anxiety          Current Laboratory Data:   All laboratory and imaging data reviewed.    Results for orders placed or performed during the hospital encounter of 09/28/21 (from the past 24 hour(s))   [place] Draw    Narrative    The following orders were created for panel order [place] Draw.  Procedure                               Abnormality         Status                     ---------                               -----------         ------                     Extra Blue Top Tube[[id number]]                              Final result               Extra Red Top Tube[[id number]] "                               Final result               Extra Green Top (Lithium...[[id number]]                      Final result               Extra Purple Top Tube[[id number]]                            Final result                 Please view results for these tests on the individual orders.   Extra Blue Top Tube   Result Value Ref Range    Hold Specimen JIC    Extra Red Top Tube   Result Value Ref Range    Hold Specimen JIC    Extra Green Top (Lithium Heparin) Tube   Result Value Ref Range    Hold Specimen JIC    Extra Purple Top Tube   Result Value Ref Range    Hold Specimen JIC    CBC with platelets differential    Narrative    The following orders were created for panel order CBC with platelets differential.  Procedure                               Abnormality         Status                     ---------                               -----------         ------                     CBC with platelets and d...[[id number]]  Abnormal            Final result                 Please view results for these tests on the individual orders.   INR   Result Value Ref Range    INR 1.12 0.85 - 1.15   Partial thromboplastin time   Result Value Ref Range    aPTT 34 22 - 38 Seconds   Comprehensive metabolic panel   Result Value Ref Range    Sodium 139 133 - 144 mmol/L    Potassium 3.8 3.4 - 5.3 mmol/L    Chloride 106 94 - 109 mmol/L    Carbon Dioxide (CO2) 30 20 - 32 mmol/L    Anion Gap 3 3 - 14 mmol/L    Urea Nitrogen 17 7 - 30 mg/dL    Creatinine 0.54 0.52 - 1.04 mg/dL    Calcium 9.2 8.5 - 10.1 mg/dL    Glucose 104 (H) 70 - 99 mg/dL    Alkaline Phosphatase 96 40 - 150 U/L    AST 22 0 - 45 U/L    ALT 22 0 - 50 U/L    Protein Total 7.3 6.8 - 8.8 g/dL    Albumin 2.9 (L) 3.4 - 5.0 g/dL    Bilirubin Total 0.4 0.2 - 1.3 mg/dL    GFR Estimate >90 >60 mL/min/1.73m2   Troponin I   Result Value Ref Range    Troponin I <0.015 0.000 - 0.045 ug/L   Nt probnp inpatient (BNP)   Result Value Ref Range    N terminal Pro BNP Inpatient 222  0-1,800 pg/mL   CBC with platelets and differential   Result Value Ref Range    WBC Count 25.9 (H) 4.0 - 11.0 10e3/uL    RBC Count 4.01 3.80 - 5.20 10e6/uL    Hemoglobin 12.1 11.7 - 15.7 g/dL    Hematocrit 38.1 35.0 - 47.0 %    MCV 95 78 - 100 fL    MCH 30.2 26.5 - 33.0 pg    MCHC 31.8 31.5 - 36.5 g/dL    RDW 13.4 10.0 - 15.0 %    Platelet Count 295 150 - 450 10e3/uL    % Neutrophils 91 %    % Lymphocytes 2 %    % Monocytes 6 %    % Eosinophils 0 %    % Basophils 0 %    % Immature Granulocytes 1 %    NRBCs per 100 WBC 0 <1 /100    Absolute Neutrophils 23.5 (H) 1.6 - 8.3 10e3/uL    Absolute Lymphocytes 0.6 (L) 0.8 - 5.3 10e3/uL    Absolute Monocytes 1.5 (H) 0.0 - 1.3 10e3/uL    Absolute Eosinophils 0.0 0.0 - 0.7 10e3/uL    Absolute Basophils 0.1 0.0 - 0.2 10e3/uL    Absolute Immature Granulocytes 0.2 (H) <=0.0 10e3/uL    Absolute NRBCs 0.0 10e3/uL   Procalcitonin   Result Value Ref Range    Procalcitonin 0.09 <5.00 ng/mL   CRP inflammation   Result Value Ref Range    CRP Inflammation 24.0 (H) 0.0 - 8.0 mg/L   EKG 12-lead, tracing only   Result Value Ref Range    Systolic Blood Pressure  mmHg    Diastolic Blood Pressure  mmHg    Ventricular Rate 86 BPM    Atrial Rate 86 BPM    OH Interval 168 ms    QRS Duration 90 ms     ms    QTc 449 ms    P Axis 72 degrees    R AXIS -8 degrees    T Axis 48 degrees    Interpretation ECG       Sinus rhythm  Possible Left atrial enlargement  Borderline ECG  Unconfirmed report - interpretation of this ECG is computer generated - see medical record for final interpretation    Confirmed by - EMERGENCY ROOM, PHYSICIAN ([id number]),  DIANA NGUYEN ([id number]) on 9/29/2021 9:49:07 AM     XR Chest Port 1 View    Narrative    EXAM: XR CHEST PORT 1 VIEW  LOCATION: [facility]  DATE/TIME: 9/28/2021 9:12 PM    INDICATION: sob  COMPARISON: 7/1/2018      Impression    IMPRESSION: No significant change. Lung thoracic spinal rods. Mild rightward  scoliosis. Heart size and pulmonary vessels normal. Mild coarse interstitial pattern similar to previous study. No focal pneumonia. Minimal peripheral scarring lateral right   lung.   CT Chest Pulmonary Embolism w Contrast    Narrative    EXAM: CT CHEST PULMONARY EMBOLISM W CONTRAST  LOCATION: [facility]  DATE/TIME: 9/28/2021 11:29 PM    INDICATION: Shortness of breath, hypoxia  COMPARISON: CT chest 10/21/2020  TECHNIQUE: CT chest pulmonary angiogram during arterial phase injection of IV contrast. Multiplanar reformats and MIP reconstructions were performed. Dose reduction techniques were used.   CONTRAST: 51 ml isovue 370     FINDINGS:  ANGIOGRAM CHEST: Pulmonary arteries are normal caliber and negative for pulmonary emboli. Thoracic aorta is negative for dissection. No CT evidence of right heart strain.    LUNGS AND PLEURA: Unchanged apical scarring and bronchiectasis in the middle lobe and lingula. New multifocal groundglass opacities throughout both lungs. Minimal change in scattered bilateral tree-in-bud and nodular opacities.    MEDIASTINUM/AXILLAE: No thoracic aortic aneurysm. No lymphadenopathy.    CORONARY ARTERY CALCIFICATION: None.    UPPER ABDOMEN: Normal.    MUSCULOSKELETAL: Posterior spinal fusion hardware.      Impression    IMPRESSION:  1.  No pulmonary embolism.    2.  New multifocal groundglass opacities throughout both lungs, likely infectious. Consider COVID 19 pneumonia.    3.  Minimal change in scattered tree-in-bud and nodular opacities.   iStat Gases (lactate) venous, POCT   Result Value Ref Range    Lactic Acid POCT 0.6 <=2.0 mmol/L    Bicarbonate Venous POCT 36 (H) 21 - 28 mmol/L    O2 Sat, Venous POCT 40 (L) 94 - 100 %    pCO2V Venous POCT 63 (H) 40 - 50 mm Hg    pH Venous POCT 7.37 7.32 - 7.43    pO2 Venous POCT 25 25 - 47 mm Hg   Symptomatic COVID-19 Virus (Coronavirus) by PCR Nasopharyngeal    Specimen: Nasopharyngeal; Swab   Result Value  Ref Range    SARS CoV2 PCR Negative Negative    Narrative    Testing was performed using the Xpert Xpress SARS-CoV-2 Assay on the  Cepheid Gene-Xpert Instrument Systems. Additional information about  this Emergency Use Authorization (EUA) assay can be found via the Lab  Guide. This test should be ordered for the detection of SARS-CoV-2 in  individuals who meet SARS-CoV-2 clinical and/or epidemiological  criteria. Test performance is unknown in asymptomatic patients. This  test is for in vitro diagnostic use under the FDA EUA for  laboratories certified under CLIA to perform high complexity testing.  This test has not been FDA cleared or approved. A negative result  does not rule out the presence of PCR inhibitors in the specimen or  target RNA in concentration below the limit of detection for the  assay. The possibility of a false negative should be considered if  the patient's recent exposure or clinical presentation suggests  COVID-19. This test was validated by the [facility]. This laboratory is certified under  the Clinical Laboratory Improvement Amendments of 1988 (CLIA-88) as  qualified to perform high complexity laboratory testing.     Nt probnp inpatient   Result Value Ref Range    N terminal Pro BNP Inpatient 337 0-1,800 pg/mL   Procalcitonin   Result Value Ref Range    Procalcitonin 0.30 <5.00 ng/mL

## 2021-09-29 NOTE — PROGRESS NOTES
Observations Goals     1. Return to baseline Oxygen requirements: No-requiring oxygen at rest. 2L  2. Evaluation & plan made by inpatient Pulmonology team: No-pending.     Will continue with POC until all observation goals are met and disposition plan is made by team.

## 2021-09-29 NOTE — PROGRESS NOTES
Observations Goals     1. Return to baseline Oxygen requirements: No-requiring oxygen at rest. 2L. Up to 4L when ambulating.   2. Evaluation & plan made by inpatient Pulmonology team: No-pending.     Will continue with POC until all observation goals are met and disposition plan is made by team

## 2021-09-29 NOTE — PROGRESS NOTES
Brief Progress Note    Sister Ayse was admitted overnight with increased shortness of breath following bronchoscopy, presumed bronchiectasis exacerbation. Treating with Levaquin (dose increased to 750mg every day per pharm recs), PTA inhalers/nebs. She is currently at baseline respiratory status. Assessed by Dr. Lorenz inpatient. Plan for discharge tomorrow if patient comfortable with this plan.    Patient will need new nebulizer compressor for discharge    Naresh Maloney MD  PGY-2 Internal Medicine  09/29/21

## 2021-09-29 NOTE — PLAN OF CARE
Assumed care of Patient from [id number]. Soft BPs-not within notifying parameters. Nonsymptomatic. Hypoxic- requiring 2L O2 at rest and 4L O2 with activity. OVSS. Patient denies pain. Requested to have her Lexapro ordered 2x daily. Tolerating regular diet. Nausea denied throughout shift. Patient reports passing flatus and having a last BM on 9/28/2021. Pt voiding spontaneously with adequate output. Up in halls with SBA walker/gait belt. Using IS and RT breathing exercises. Remains on observation status-signed forms and educated on this. Continue plan of care.

## 2021-09-29 NOTE — ED NOTES
Bed: ED26  Expected date:   Expected time:   Means of arrival: Ambulance  Comments:  [id number] difficulty breathing after bronch today. Original sats in low 90s on 2L and placed on 4L with sats in upper 90s.

## 2021-09-29 NOTE — TELEPHONE ENCOUNTER
Another phone message from Ayse about 7:00pm last night.  Had a second episode of sob after the one she sent a [employer] message regarding.

## 2021-09-29 NOTE — PROGRESS NOTES
Observations Goals    1. Return to baseline Oxygen requirements: No  2. Evaluation & plan made by inpatient Pulmonology team: No    Will continue with POC until all observation goals are met and disposition plan is made by team.

## 2021-09-29 NOTE — CONSULTS
Chronic Pulmonary Disease Specialist Consult   COPD Initial Interview    2021    Patient: Fiordaliza Najera Sr.      :  1945                    MRN:[id number]      Reason for Consult:  Patient with severe COPD being followed by COPD Readmission Reduction Program    History of Present Illness: Fiordaliza Najera Sr. is a 76 year old female with history of COPD on 2 L home oxygen, bronchiectasis and chronic hypoxic respiratory failure with persistent worsening of symptoms who underwent bronchoscopy with BAL under conscious sedation today presenting via EMS for increasing shortness of breath    Smoking Hx: Never Smoker       Pulmonologist/Last office visit   21    Most recent  PFT/interpretation on:19  FEV1/FVC is 0.50 and is reduced.  FEV1 is 39% predicted and is reduced.  FVC is 60% predicted and is reduced.    severely reduced diffusion capacity             Home Oxygen Use: 2L NC     Most recent Chest X-ray:  21-                                                                    IMPRESSION: No significant change. Lung thoracic spinal rods. Mild rightward scoliosis. Heart size and pulmonary vessels normal. Mild coarse interstitial pattern similar to previous study. No focal pneumonia. Minimal peripheral scarring lateral right   lung.    Most recent CT:  21 LUNGS AND PLEURA: Unchanged apical scarring and bronchiectasis in the middle lobe and lingula. New multifocal groundglass opacities throughout both lungs. Minimal change in scattered bilateral tree-in-bud and nodular opacities.    Home respiratory medications include:      -Albuterol (Proair/Ventolin/Proventolin)  Umeclidinium (Incruse Ellipta)  Serevent/Fluticasone (Advair)  -Mucomyst, Hypertonic Saline  -Albuterol nebulizer solution     Assessment: Paitient on 4L NC with sats of 96%,  Dyspnea with exertion walking to bathroom, strong-non productive cough, RR 20, HR 95, BP 93/49      Action:        Evaluated patients  inspiratory flow using In-Check device:     --Low resistance setting: Patient able to generate 50 LPM, which is adequate for her albuterol inhaler.      -Evaluated patients coordination and technique with inhaler: Patient did not demonstrate good technique with  her rescue inhaler. Educated patient on proper inspiratory flows needed for all of her inhalers. Provided and instructed patient on proper use of Aerochamber spacer with inhaler; reiterated that it should always be used with her rescue inhaler.      Patient with very poor expiratory flow.   -Patient only able to generate a pressure of 5 cm H2O on Aerobika OPEP device for 1 second generating good non-productive cough. The goal for each breath, for a total of 3 sets of 10 breaths, is to exhale at a of pressure 10-20 for 3-4 seconds without fatigue. Educated patient to perform 2 to 3 'reyes coughs'  to clear airway after each set. Shared that device can be used with or without nebs. Shared that consistent use of this device will help open smaller airways, improve mucus clearance, decrease cough frequency, and improve exercise tolerance.     -Spoke to patients pulmonologist, Dr. Andrea, who is supportive of trial of vest therapy if patient is agreeable.     Recommendations:  -Did test claim with discharge pharmacy-copay for Trelegy Ellipta will be $15 dollars saving patient $15 by combining Advair and Incruse Ellipta for ICS/LABA/LAMA coverage. Discontinue Advair and Incruse at discharge.     --Patient needs nebulizer compressor at discharge with prescription for tubing, cups and mask. Per insurance requirements, Physician documentation in F2F notes needs to include dx diagnosis and need for nebulizer and medication frequency.    -Use Aerobika Oscillating PEP Device for 3 sets of 10 breaths two times daily as directed above    -Will initiate free trial of vest therapy for patient with in home Respiratory Liaison support at discharge if patient agreeable.    I  spent 60 minutes with the patient.    -Will continue to follow patient, assist bedside RT, RN CC, SW, and treatment team with specific respiratory discharge needs and IP recommendations.       Stephanie Alvarado, RRT, CTTS  Chronic Pulmonary Disease Specialist  Office: [phone]  Pager: [phone]  Hours: M-F 8-4:30

## 2021-09-29 NOTE — PROGRESS NOTES
Transition Planning Update/Home Oxygen    D:  Sherri () will deliver portable tank for discharge.  MAL today or tomorrow.  Patient already was prescribed home oxygen at time of admission.  Per bedside RN, will need transport arranged to return to her home setting on day of discharge.    A/P:  Inpatient cared continue per MD team plans of care.  Inpatient Care Coordinator will continue to follow for updated transition needs prn.    Pricila Barker,  RIA.S.N., R.N., P.H.N..  Care Coordinator     Pager   [facility]

## 2021-09-29 NOTE — H&P
[facility]    History and Physical - [employer] Night Service        Date of Admission:  9/28/2021    Assessment & Plan      Fiordaliza Najera Sr. is a 76 year old female PMHx COPD on 2L baseline, bronchiectasis, chronic hypoxic respiratory failure (last prednisone burst 8/7/2021), hx H influenza, who presents to the ED with shortness of breath after bronchoscopy.    Dyspnea on exertion  Chronic hypoxic respiratory failure  Bronchiectasis with baseline oxygen requirement  Patient with approximately 1 month of worsening shortness of breath with exertion.  Has home O2 requirement (2L) - has remained on same requirement.  Worsened dyspnea x 1 day s/p bronchoscopy today.  Etiology of subacute GARCIA atypical or fungal pneumonia, deconditioning, or progression of chronic COPD/bronchiectasis.  Presented after bronchoscopy - likely with residual inflammation that caused acute worsening of dyspnea, though now back to her baseline.  Bronchoscopy results from today with significant neutrophilic predominance, and patient with leukocytosis to 26, from 15 on 9/23 - could be explained by bacterial vs fungal infection, though acute leukocytosis possibly 2/2 inflammation caused by bronchoscopy.  CT PE negative for PE - does show some new groundglass opacities, but unclear if this would be sequelae from today's bronchosopy.  COPD exacerbation also on the differential, though appears less likely.  Will also order BNP, though no signs of heart failure besides GARCIA.  Bronch cultures pending.  -Pulmonology consulted   -Levofloxacin 750mg, 5-7 days depending on severity of symptoms.   -Methylpred 125 x 1 in ED.  Prednisone taper per pulmonology  -Bronchoscopy cultures pending  -BNP, procalcitonin ordered  -PTA advair BID  -PTA Montelukast at bedtime  -PTA mucomyst  -PTA sodium chloride nebulizer  -PTA ellipta    Chronic medical problems  #Chronic pain - tramadol, pregabalin     Diet:   Regular  DVT Prophylaxis: Enoxaparin (Lovenox) SQ  Mobley Catheter: Not present  Fluids: none  Central Lines: None  Code Status:  DNR/DNI    Clinically Significant Risk Factors Present on Admission                   Disposition Plan   Observation goals  1) Return to baseline oxygen requirement  2) Evaluation and plan made by inpatient pulmonology team    Patient to be formally staffed in AM    Keith Quan MD  [facility]  Securely message with the Vocera Web Console (learn more here)  Text page via [employer] Paging/Directory  Please see sign in/sign out for up to date coverage information  ______________________________________________________________________    Chief Complaint   Dyspnea on exertion    History is obtained from the patient    History of Present Illness   Fiordaliza Collazo Reinaldo Garcia. is a 76 year old female PMHx COPD on 2L baseline, bronchiectasis, chronic hypoxic respiratory failure (last prednisone burst 8/7/2021), who presents to the ED with shortness of breath after bronchoscopy.    Patient overall with worsened dyspnea x 1 month.  Was evaluated at [facility] at that time.  [facility] pulmonology team had some suspicion of fungal etiology, and wanted to do a bronchoscopy at that time.  Patient preferred to stay within [facility] system, and so had bronchoscopy scheduled with Dr. Lorenz on 9/28 (day of presentation).  Was sent out with two weeks of doxycycline, augmentin, and prednisone.  Finished this course about a week or so ago.  Since discharge from [facility], patient feels her baseline SOB has worsened with exertion.  Used to be able to get around her house with ease (with 2L O2) and do house chores, such as dusting or cleaning dishes.  However, now feels she gets short of breath even walking from her bedroom to the bathroom (still on her baseline 2L).  Able to lie flat at night.  No peripheral swelling.  Denies cough.  Also denies fevers,  chills, n/v/d, though occasionally does have diarrhea after taking antibiotics.    Had bronchoscopy with BAL 9/28, and feels her shortness of breath acutely worsened.  Had to use her rescue inhaler several times, but became very anxious about her worsened shortness of breath, so presented to the ED.      ED course:  - Pulmonology curbsided - started patient on levofloxacin and methylpred  - Patient started on 4L NC, though quickly returned to baseline O2 requirement    Review of Systems    The 10 point Review of Systems is negative other than noted in the HPI or here.     Past Medical History    I have reviewed this patient's medical history and updated it with pertinent information if needed.   Past Medical History:   Diagnosis Date     COPD (chronic obstructive pulmonary disease) (H)      Diverticulosis      Hiatal hernia      Mild asthma      Neuropathy      Osteopenia      Temporomandibular joint (TMJ) pain         Past Surgical History   I have reviewed this patient's surgical history and updated it with pertinent information if needed.  Past Surgical History:   Procedure Laterality Date     ANTERIOR / POSTERIOR COMBINED FUSION LUMBAR SPINE       HYSTERECTOMY       RETINAL LASER PROCEDURE Left 10/04/2016     SALPINGOOPHORECTOMY       TOTAL KNEE ARTHROPLASTY  2008        Social History   I have reviewed this patient's social history and updated it with pertinent information if needed. Fiordaliza Najera Sr.  reports that she has never smoked. She has never used smokeless tobacco. She reports that she does not drink alcohol and does not use drugs.    Family History   I have reviewed this patient's family history and updated it with pertinent information if needed.  Family History   Problem Relation Age of Onset     Dementia Mother         passed age 88     Chronic Obstructive Pulmonary Disease Father         passed age 78       Prior to Admission Medications   Prior to Admission Medications   Prescriptions  Last Dose Informant Patient Reported? Taking?   ADVAIR DISKUS 100-50 mcg/dose DISKUS   No No   Sig: [ADVAIR DISKUS 100-50 MCG/DOSE DISKUS] INHALE 1 DOSE TWICE DAILY   BIOTIN ORAL   Yes No   Sig: [BIOTIN ORAL] Take by mouth.   Lactobacillus rhamnosus GG (CULTURELLE) 10-15 Billion cell capsule   Yes No   Sig: [LACTOBACILLUS RHAMNOSUS GG (CULTURELLE) 10-15 BILLION CELL CAPSULE] Take 1 capsule by mouth daily.   OXYGEN-AIR DELIVERY SYSTEMS MISC   Yes No   Sig: [OXYGEN-AIR DELIVERY SYSTEMS MISC] Use 2 L As Directed. 2L with activity and at night  [employer]   SUMAtriptan (IMITREX) 50 MG tablet   No No   Sig: [SUMATRIPTAN (IMITREX) 50 MG TABLET] Take 1 tablet (50 mg total) by mouth every 2 (two) hours as needed for migraine.   acetylcysteine (MUCOMYST) 10 % nebulizer solution   No No   Sig: Inhale 4 mLs into the lungs daily   acetylcysteine (MUCOMYST) 20 % neb solution   No No   Sig: Take 2 mLs by nebulization daily   albuterol (PROAIR HFA;PROVENTIL HFA;VENTOLIN HFA) 90 mcg/actuation inhaler   No No   Sig: [ALBUTEROL (PROAIR HFA;PROVENTIL HFA;VENTOLIN HFA) 90 MCG/ACTUATION INHALER] Inhale 2 puffs every 6 (six) hours as needed for wheezing.   albuterol (PROVENTIL) (2.5 MG/3ML) 0.083% neb solution   No No   Sig: Take 1 vial (2.5 mg) by nebulization 2 times daily as needed for shortness of breath / dyspnea or wheezing   alendronate (FOSAMAX) 70 MG tablet   No No   Sig: [ALENDRONATE (FOSAMAX) 70 MG TABLET] Take 1 tablet (70 mg total) by mouth every 7 days. Take in the morning on an empty stomach with a full glass of water 30 minutes before food   amoxicillin-clavulanate (AUGMENTIN) 875-125 MG tablet   Yes No   calcium-vitamin D (CALCIUM-VITAMIN D) 500 mg(1,250mg) -200 unit per tablet   Yes No   Sig: [CALCIUM-VITAMIN D (CALCIUM-VITAMIN D) 500 MG(1,250MG) -200 UNIT PER TABLET] Take 1 tablet by mouth daily.   celecoxib (CELEBREX) 200 MG capsule   No No   Sig: [CELECOXIB (CELEBREX) 200 MG CAPSULE] Take 1 capsule (200 mg total) by  mouth daily.   cholecalciferol, vitamin D3, (VITAMIN D3) 1,000 unit capsule   Yes No   Sig: [CHOLECALCIFEROL, VITAMIN D3, (VITAMIN D3) 1,000 UNIT CAPSULE] Take 1,000 Units by mouth daily.   montelukast (SINGULAIR) 10 mg tablet   No No   Sig: [MONTELUKAST (SINGULAIR) 10 MG TABLET] TAKE 1 TABLET(10 MG) BY MOUTH AT BEDTIME   multivitamin therapeutic (THERAGRAN) tablet   Yes No   Sig: [MULTIVITAMIN THERAPEUTIC (THERAGRAN) TABLET] Take 1 tablet by mouth daily.   naproxen sodium (ALEVE) 220 MG tablet   Yes No   Sig: [NAPROXEN SODIUM (ALEVE) 220 MG TABLET] Take 220 mg by mouth as needed for pain.   nystatin (MYCOSTATIN) cream   Yes No   Sig: [NYSTATIN (MYCOSTATIN) CREAM]    predniSONE (DELTASONE) 20 MG tablet   No No   Sig: [PREDNISONE (DELTASONE) 20 MG TABLET] Take 1 tab daily x 7 days.   pregabalin (LYRICA) 50 MG capsule   No No   Sig: TAKE 3 CAPSULES BY MOUTH DAILY AS DIRECTED.   sodium chloride 3 % nebulizer solution   No No   Sig: [SODIUM CHLORIDE 3 % NEBULIZER SOLUTION] Take 3 mL by nebulization 2 (two) times a day.   traMADoL (ULTRAM) 50 mg tablet   No No   Sig: [TRAMADOL (ULTRAM) 50 MG TABLET] ONE TAB every 12 hours as needed for pain   umeclidinium (INCRUSE ELLIPTA) 62.5 MCG/INH inhaler   No No   Sig: [INCRUSE ELLIPTA 62.5 MCG/ACTUATION DSDV INHALER] INHALE 1 PUFF DAILY.      Facility-Administered Medications: None     Allergies   Allergies   Allergen Reactions     Codeine Unknown     Morphine Itching       Physical Exam   Vital Signs: Temp: 98  F (36.7  C) Temp src: Oral BP: 117/67 Pulse: 92   Resp: 18 SpO2: 97 % O2 Device: Nasal cannula Oxygen Delivery: 4 LPM  Weight: 112 lbs 0 oz    Constitutional: NAD, pleasant, cooperative, appears stated age.  Able to sit up in bed without difficulty.  HEENT: Sclera anicteric, Normal oropharynx without ulcers or exudate, MMM  CV: RRR, no murmurs, gallops or rubs. JVD normal   Respiratory: Coarse lung sounds bilaterally, but not localized or labored.  Abd: Soft, NT/ND, +bs,  no HSM  Skin: No lesions or rashes over exposed areas, normal color, texture and turgor  Neuro: AAO x 3      Data   Data reviewed today: I reviewed all medications, new labs and imaging results over the last 24 hours.    Recent Labs   Lab 09/28/21 2048   WBC 25.9*   HGB 12.1   MCV 95      INR 1.12      POTASSIUM 3.8   CHLORIDE 106   CO2 30   BUN 17   CR 0.54   ANIONGAP 3   MAGAN 9.2   *   ALBUMIN 2.9*   PROTTOTAL 7.3   BILITOTAL 0.4   ALKPHOS 96   ALT 22   AST 22   TROPONIN <0.015

## 2021-09-30 ENCOUNTER — PATIENT OUTREACH (OUTPATIENT)
Dept: CARE COORDINATION | Facility: CLINIC | Age: 76
End: 2021-09-30

## 2021-09-30 VITALS
TEMPERATURE: 97.9 F | HEART RATE: 134 BPM | RESPIRATION RATE: 18 BRPM | OXYGEN SATURATION: 93 % | WEIGHT: 112 LBS | SYSTOLIC BLOOD PRESSURE: 117 MMHG | HEIGHT: 62 IN | DIASTOLIC BLOOD PRESSURE: 59 MMHG | BODY MASS INDEX: 20.61 KG/M2

## 2021-09-30 DIAGNOSIS — J47.1 BRONCHIECTASIS WITH ACUTE EXACERBATION (H): Primary | ICD-10-CM

## 2021-09-30 PROBLEM — F41.9 ANXIETY: Status: ACTIVE | Noted: 2021-09-30

## 2021-09-30 LAB
ALBUMIN SERPL-MCNC: 2.4 G/DL (ref 3.4–5)
ALP SERPL-CCNC: 71 U/L (ref 40–150)
ALT SERPL W P-5'-P-CCNC: 20 U/L (ref 0–50)
ANION GAP SERPL CALCULATED.3IONS-SCNC: 2 MMOL/L (ref 3–14)
AST SERPL W P-5'-P-CCNC: 20 U/L (ref 0–45)
BASOPHILS # BLD AUTO: 0 10E3/UL (ref 0–0.2)
BASOPHILS NFR BLD AUTO: 0 %
BILIRUB SERPL-MCNC: 0.3 MG/DL (ref 0.2–1.3)
BUN SERPL-MCNC: 21 MG/DL (ref 7–30)
CALCIUM SERPL-MCNC: 8.6 MG/DL (ref 8.5–10.1)
CHLORIDE BLD-SCNC: 108 MMOL/L (ref 94–109)
CO2 SERPL-SCNC: 30 MMOL/L (ref 20–32)
CREAT SERPL-MCNC: 0.73 MG/DL (ref 0.52–1.04)
EOSINOPHIL # BLD AUTO: 0 10E3/UL (ref 0–0.7)
EOSINOPHIL NFR BLD AUTO: 0 %
ERYTHROCYTE [DISTWIDTH] IN BLOOD BY AUTOMATED COUNT: 13.7 % (ref 10–15)
GFR SERPL CREATININE-BSD FRML MDRD: 80 ML/MIN/1.73M2
GLUCOSE BLD-MCNC: 92 MG/DL (ref 70–99)
HCT VFR BLD AUTO: 32.9 % (ref 35–47)
HGB BLD-MCNC: 10.4 G/DL (ref 11.7–15.7)
IMM GRANULOCYTES # BLD: 0.1 10E3/UL
IMM GRANULOCYTES NFR BLD: 0 %
LYMPHOCYTES # BLD AUTO: 1.4 10E3/UL (ref 0.8–5.3)
LYMPHOCYTES NFR BLD AUTO: 9 %
MCH RBC QN AUTO: 29.6 PG (ref 26.5–33)
MCHC RBC AUTO-ENTMCNC: 31.6 G/DL (ref 31.5–36.5)
MCV RBC AUTO: 94 FL (ref 78–100)
MONOCYTES # BLD AUTO: 1.2 10E3/UL (ref 0–1.3)
MONOCYTES NFR BLD AUTO: 7 %
NEUTROPHILS # BLD AUTO: 13 10E3/UL (ref 1.6–8.3)
NEUTROPHILS NFR BLD AUTO: 84 %
NRBC # BLD AUTO: 0 10E3/UL
NRBC BLD AUTO-RTO: 0 /100
PLATELET # BLD AUTO: 240 10E3/UL (ref 150–450)
POTASSIUM BLD-SCNC: 4.2 MMOL/L (ref 3.4–5.3)
PROT SERPL-MCNC: 6.2 G/DL (ref 6.8–8.8)
RBC # BLD AUTO: 3.51 10E6/UL (ref 3.8–5.2)
SODIUM SERPL-SCNC: 140 MMOL/L (ref 133–144)
WBC # BLD AUTO: 15.8 10E3/UL (ref 4–11)

## 2021-09-30 PROCEDURE — 80053 COMPREHEN METABOLIC PANEL: CPT | Performed by: STUDENT IN AN ORGANIZED HEALTH CARE EDUCATION/TRAINING PROGRAM

## 2021-09-30 PROCEDURE — 93010 ELECTROCARDIOGRAM REPORT: CPT | Performed by: INTERNAL MEDICINE

## 2021-09-30 PROCEDURE — 36415 COLL VENOUS BLD VENIPUNCTURE: CPT | Performed by: STUDENT IN AN ORGANIZED HEALTH CARE EDUCATION/TRAINING PROGRAM

## 2021-09-30 PROCEDURE — 250N000009 HC RX 250

## 2021-09-30 PROCEDURE — 99217 PR OBSERVATION CARE DISCHARGE: CPT | Mod: GC | Performed by: INTERNAL MEDICINE

## 2021-09-30 PROCEDURE — 250N000013 HC RX MED GY IP 250 OP 250 PS 637: Performed by: STUDENT IN AN ORGANIZED HEALTH CARE EDUCATION/TRAINING PROGRAM

## 2021-09-30 PROCEDURE — 94640 AIRWAY INHALATION TREATMENT: CPT

## 2021-09-30 PROCEDURE — 96372 THER/PROPH/DIAG INJ SC/IM: CPT | Performed by: STUDENT IN AN ORGANIZED HEALTH CARE EDUCATION/TRAINING PROGRAM

## 2021-09-30 PROCEDURE — 85004 AUTOMATED DIFF WBC COUNT: CPT | Performed by: STUDENT IN AN ORGANIZED HEALTH CARE EDUCATION/TRAINING PROGRAM

## 2021-09-30 PROCEDURE — G0378 HOSPITAL OBSERVATION PER HR: HCPCS

## 2021-09-30 PROCEDURE — 250N000013 HC RX MED GY IP 250 OP 250 PS 637: Performed by: INTERNAL MEDICINE

## 2021-09-30 PROCEDURE — 99233 SBSQ HOSP IP/OBS HIGH 50: CPT | Performed by: INTERNAL MEDICINE

## 2021-09-30 PROCEDURE — 999N000157 HC STATISTIC RCP TIME EA 10 MIN

## 2021-09-30 PROCEDURE — 250N000009 HC RX 250: Performed by: STUDENT IN AN ORGANIZED HEALTH CARE EDUCATION/TRAINING PROGRAM

## 2021-09-30 PROCEDURE — 93005 ELECTROCARDIOGRAM TRACING: CPT

## 2021-09-30 PROCEDURE — 94640 AIRWAY INHALATION TREATMENT: CPT | Mod: 76

## 2021-09-30 PROCEDURE — 250N000013 HC RX MED GY IP 250 OP 250 PS 637

## 2021-09-30 PROCEDURE — 250N000011 HC RX IP 250 OP 636: Performed by: STUDENT IN AN ORGANIZED HEALTH CARE EDUCATION/TRAINING PROGRAM

## 2021-09-30 RX ORDER — ESCITALOPRAM OXALATE 5 MG/1
5 TABLET ORAL DAILY
Status: DISCONTINUED | OUTPATIENT
Start: 2021-09-30 | End: 2021-09-30 | Stop reason: HOSPADM

## 2021-09-30 RX ORDER — ACETAMINOPHEN 325 MG/1
650 TABLET ORAL EVERY 6 HOURS PRN
Status: DISCONTINUED | OUTPATIENT
Start: 2021-09-30 | End: 2021-09-30 | Stop reason: HOSPADM

## 2021-09-30 RX ORDER — LEVOFLOXACIN 750 MG/1
750 TABLET, FILM COATED ORAL DAILY
Qty: 6 TABLET | Refills: 0 | Status: SHIPPED | OUTPATIENT
Start: 2021-09-30 | End: 2021-10-04

## 2021-09-30 RX ORDER — ESCITALOPRAM OXALATE 5 MG/1
5 TABLET ORAL DAILY
Qty: 30 TABLET | Refills: 3 | Status: SHIPPED | OUTPATIENT
Start: 2021-09-30 | End: 2022-02-22

## 2021-09-30 RX ADMIN — ALBUTEROL SULFATE 2.5 MG: 2.5 SOLUTION RESPIRATORY (INHALATION) at 07:49

## 2021-09-30 RX ADMIN — ENOXAPARIN SODIUM 40 MG: 40 INJECTION SUBCUTANEOUS at 08:20

## 2021-09-30 RX ADMIN — FLUTICASONE FUROATE AND VILANTEROL TRIFENATATE 1 PUFF: 100; 25 POWDER RESPIRATORY (INHALATION) at 08:18

## 2021-09-30 RX ADMIN — Medication 1 TABLET: at 08:20

## 2021-09-30 RX ADMIN — PREGABALIN 75 MG: 75 CAPSULE ORAL at 08:20

## 2021-09-30 RX ADMIN — SODIUM CHLORIDE SOLN NEBU 3% 3 ML: 3 NEBU SOLN at 11:30

## 2021-09-30 RX ADMIN — ACETAMINOPHEN 650 MG: 325 TABLET, FILM COATED ORAL at 02:17

## 2021-09-30 RX ADMIN — ESCITALOPRAM OXALATE 5 MG: 5 TABLET, FILM COATED ORAL at 13:12

## 2021-09-30 RX ADMIN — Medication 25 MCG: at 08:20

## 2021-09-30 RX ADMIN — UMECLIDINIUM 1 PUFF: 62.5 AEROSOL, POWDER ORAL at 08:18

## 2021-09-30 RX ADMIN — ACETYLCYSTEINE 2 ML: 200 SOLUTION ORAL; RESPIRATORY (INHALATION) at 07:49

## 2021-09-30 NOTE — DISCHARGE SUMMARY
[facility]  Discharge Summary - Medicine & Pediatrics       Date of Admission:  9/28/2021  Date of Discharge:  9/30/2021  1:46 PM  Discharging Provider: Charles Spencer MD  Discharge Service: Palma Lawler    Discharge Diagnoses   Bronchiectasis exacerbation  Chronic hypoxemic respiratory failure    Follow-ups Needed After Discharge   Follow-up Appointments     Adult [facility] Follow-up and recommended labs and tests      Follow up with primary care provider, Dr. Randall Lorenz, within 14 days   for hospital follow- up and to follow up on results.  No follow up labs or   test are needed.      Appointments on [place] and/or [facility] (with [facility] or [facility]   provider or service). Call [phone] if you haven't heard regarding   these appointments within 7 days of discharge.             Unresulted Labs Ordered in the Past 30 Days of this Admission     Date and Time Order Name Status Description    9/28/2021 11:06 PM Blood Culture Peripheral Blood Preliminary     9/28/2021 11:06 PM Blood Culture Peripheral Blood Preliminary     9/28/2021  9:18 AM Respiratory Aerobic Bacterial Culture Preliminary     9/28/2021  9:18 AM Fungal or Yeast Culture Routine Preliminary     9/28/2021  9:18 AM Nocardia species culture Preliminary     9/28/2021  9:18 AM Acid-Fast Bacilli Culture and Stain In process       These results will be followed up by  Dr. Randall Lorenz    Discharge Disposition   Discharged to home  Condition at discharge: Stable    Hospital Course   Fiordaliza Najera Sr. is a 76 year old female PMHx COPD on 2L baseline, bronchiectasis, chronic hypoxic respiratory failure (last prednisone burst 8/7/2021), hx H influenza, who presents to the ED with shortness of breath after bronchoscopy. The following problems were addressed during her hospitalization:    Dyspnea on exertion  Chronic hypoxic respiratory failure  Bronchiectasis with baseline oxygen requirement  Patient  with approximately 1 month of worsening shortness of breath with exertion.  Has home O2 requirement (2L) - has remained on same requirement.  Worsened dyspnea x 1 day s/p bronchoscopy today.  Etiology of subacute GARCIA atypical or fungal pneumonia, deconditioning, or progression of chronic COPD/bronchiectasis.  Presented after bronchoscopy - likely with residual inflammation that caused acute worsening of dyspnea, though now back to her baseline.  Bronchoscopy results from today with significant neutrophilic predominance, and patient with leukocytosis to 26, from 15 on 9/23 - could be explained by bacterial vs fungal infection, though acute leukocytosis possibly 2/2 inflammation caused by bronchoscopy.  CT PE negative for PE - does show some new groundglass opacities, but unclear if this would be sequelae from today's bronchosopy.  COPD exacerbation also on the differential, though appears less likely. Assessed by outpatient pulmonologist (Dr. Lorenz) while inpatient who was very helpful at helping coordinate care. Ultimately treating with 7-day course of Levaquin. Switched to Trelegy inhaler on discharge (from Breo Ellipta/Incruse Ellipta)    Consultations This Hospital Stay   PULMONARY GENERAL ADULT IP CONSULT  IP RESPIRATORY CARE CHRONIC PULMONARY DISEASE SPECIALIST  CARE MANAGEMENT / SOCIAL WORK IP CONSULT    Code Status   No CPR- Do NOT Intubate     The patient was discussed with Dr. Charles Maloney MD  [address] [address]  Phone: [phone]  ______________________________________________________________________    Physical Exam   Vital Signs: Temp: 97.9  F (36.6  C) Temp src: Temporal BP: 117/59 Pulse: (!) 134   Resp: 18 SpO2: 93 % O2 Device: Nasal cannula Oxygen Delivery: 2 LPM  Weight: 112 lbs 0 oz    Physical Exam  Vital signs:  Temp: 97.9  F (36.6  C) Temp src: Temporal BP: 117/59 Pulse: (!) 134   Resp: 18 SpO2:  "93 % O2 Device: Nasal cannula Oxygen Delivery: 2 LPM Height: 157.5 cm (5' 2\") Weight: 50.8 kg (112 lb)  Estimated body mass index is 20.49 kg/m  as calculated from the following:    Height as of this encounter: 1.575 m (5' 2\").    Weight as of this encounter: 50.8 kg (112 lb).    General: Alert, conversational, no apparent distress  Head: Normocephalic, atraumatic  Eyes: EOM grossly intact, sclera anicteric, no conjunctivitis  ENT: Trachea midline  Cardiovascular: Normal S1 and S2 with regular rate and rhythm, no murmurs, no friction rub  Pulmonary: Lungs clear to auscultation across bilateral lung fields, no crackles, no wheezes  Abdomen: Soft, nontender, nondistended, +BS  Musculoskeletal: Grossly normal ROM of bilateral upper and lower extremities, no bony deformities, no palpable joint effusions  Neuro: Alert and oriented x3,speech is clear and fluent, CNII through XII grossly intact bilaterally   Psychiatric: Reactive affect, good personal hygiene, no visual or auditory hallucinations        Primary Care Physician   Ekaterina Koehler    Discharge Orders      NEBULIZER, WITH COMPRESSOR    With nebulizer cups, tubing, and mask supplies     OXYGEN TUBING - MASK     Care Coordination Referral      Reason for your hospital stay    Dear Fiordaliza Najera Sr.,    Your were hospitalized at [facility] with shortness of breath and treated with antibiotics.  Over your hospitalization your condition improved and today you are ready to be discharged home.  You should continue to improve but if you develop fever, shortness of breath, increased cough, difficulty breathing please seek medical attention.    We are suggesting the following medication changes:  Start levofloxacin for pneumonia  Stop Incruse Ellipta and Advair, Start Trelegy Ellipta instead.     Please get the following tests done:  N/A    Please set up an appointment with:  Follow up with Dr. Lorenz as scheduled by  " Kele[place].    It was a pleasure meeting with you today. Thank you for allowing me and my team the privilege of caring for you during your hospitalization. You are the reason we are here, and I truly hope we provided you with the excellent service you deserve. Please let us know if there is anything else we can do for you so that we can be sure you are leaving completely satisfied with your care experience.    Your hospital unit at the time of discharge is 7C so if you have any questions please call the hospital at [phone] and ask to talk to a nurse on 7C.     Sincerely,    Charles Spencer  Internal Medicine Hospitalist  [facility]     Activity    Your activity upon discharge: activity as tolerated     Adult [facility] Follow-up and recommended labs and tests    Follow up with primary care provider, Dr. Randall Lorenz, within 14 days for hospital follow- up and to follow up on results.  No follow up labs or test are needed.      Appointments on [place] and/or [facility] (with [facility] or [facility] provider or service). Call [phone] if you haven't heard regarding these appointments within 7 days of discharge.     No CPR- Do NOT Intubate     Diet    Follow this diet upon discharge: Orders Placed This Encounter      Combination Diet Regular Diet Adult       Significant Results and Procedures   Most Recent 3 CBC's:Recent Labs   Lab Test 09/30/21  0547 09/28/21 2048 09/23/20  1127   WBC 15.8* 25.9* 15.3*   HGB 10.4* 12.1 13.6   MCV 94 95 93    295 312       Discharge Medications   Discharge Medication List as of 9/30/2021 12:41 PM      START taking these medications    Details   escitalopram (LEXAPRO) 5 MG tablet Take 1 tablet (5 mg) by mouth daily, Disp-30 tablet, R-3, E-Prescribe      Fluticasone-Umeclidin-Vilanterol (TRELEGY ELLIPTA) 100-62.5-25 MCG/INH oral inhaler Inhale 1 puff into the lungs daily, Disp-28 each, R-0, E-Prescribe      levofloxacin (LEVAQUIN) 750 MG tablet Take 1 tablet (750  mg) by mouth daily, Disp-6 tablet, R-0, E-Prescribe         CONTINUE these medications which have NOT CHANGED    Details   acetylcysteine (MUCOMYST) 10 % nebulizer solution Inhale 4 mLs into the lungs daily, Disp-120 mL, R-11, E-Prescribe      acetylcysteine (MUCOMYST) 20 % neb solution Take 2 mLs by nebulization daily, Disp-120 mL, R-11, E-Prescribe      albuterol (PROAIR HFA;PROVENTIL HFA;VENTOLIN HFA) 90 mcg/actuation inhaler [ALBUTEROL (PROAIR HFA;PROVENTIL HFA;VENTOLIN HFA) 90 MCG/ACTUATION INHALER] Inhale 2 puffs every 6 (six) hours as needed for wheezing., Disp-18 g, R-11, NormalMay substitute the equivalent medication per insurance preference.      albuterol (PROVENTIL) (2.5 MG/3ML) 0.083% neb solution Take 1 vial (2.5 mg) by nebulization 2 times daily as needed for shortness of breath / dyspnea or wheezing, Disp-150 mL, R-11, E-Prescribe      alendronate (FOSAMAX) 70 MG tablet [ALENDRONATE (FOSAMAX) 70 MG TABLET] Take 1 tablet (70 mg total) by mouth every 7 days. Take in the morning on an empty stomach with a full glass of water 30 minutes before food, Disp-12 tablet, R-3, Normal      BIOTIN ORAL [BIOTIN ORAL] Take by mouth., Historical      calcium-vitamin D (CALCIUM-VITAMIN D) 500 mg(1,250mg) -200 unit per tablet [CALCIUM-VITAMIN D (CALCIUM-VITAMIN D) 500 MG(1,250MG) -200 UNIT PER TABLET] Take 1 tablet by mouth daily., Historical      celecoxib (CELEBREX) 200 MG capsule [CELECOXIB (CELEBREX) 200 MG CAPSULE] Take 1 capsule (200 mg total) by mouth daily., Disp-90 capsule, R-3, Normal      cholecalciferol, vitamin D3, (VITAMIN D3) 1,000 unit capsule [CHOLECALCIFEROL, VITAMIN D3, (VITAMIN D3) 1,000 UNIT CAPSULE] Take 1,000 Units by mouth daily., Historical      Lactobacillus rhamnosus GG (CULTURELLE) 10-15 Billion cell capsule [LACTOBACILLUS RHAMNOSUS GG (CULTURELLE) 10-15 BILLION CELL CAPSULE] Take 1 capsule by mouth daily., Historical      montelukast (SINGULAIR) 10 mg tablet [MONTELUKAST (SINGULAIR) 10 MG  TABLET] TAKE 1 TABLET(10 MG) BY MOUTH AT BEDTIME, Disp-90 tablet, R-3, Normal      multivitamin therapeutic (THERAGRAN) tablet [MULTIVITAMIN THERAPEUTIC (THERAGRAN) TABLET] Take 1 tablet by mouth daily., Historical      naproxen sodium (ALEVE) 220 MG tablet [NAPROXEN SODIUM (ALEVE) 220 MG TABLET] Take 220 mg by mouth as needed for pain., Historical      nystatin (MYCOSTATIN) cream [NYSTATIN (MYCOSTATIN) CREAM] R-1Historical      OXYGEN-AIR DELIVERY SYSTEMS MISC [OXYGEN-AIR DELIVERY SYSTEMS MISC] Use 2 L As Directed. 2L with activity and at night  [employer]      predniSONE (DELTASONE) 20 MG tablet [PREDNISONE (DELTASONE) 20 MG TABLET] Take 1 tab daily x 7 days., Disp-7 tablet, R-0, Normal      pregabalin (LYRICA) 50 MG capsule TAKE 3 CAPSULES BY MOUTH DAILY AS DIRECTED., Disp-90 capsule, R-4, E-Prescribe      sodium chloride 3 % nebulizer solution [SODIUM CHLORIDE 3 % NEBULIZER SOLUTION] Take 3 mL by nebulization 2 (two) times a day., Disp-180 mL, R-11, Normal      SUMAtriptan (IMITREX) 50 MG tablet [SUMATRIPTAN (IMITREX) 50 MG TABLET] Take 1 tablet (50 mg total) by mouth every 2 (two) hours as needed for migraine., Disp-30 tablet, R-1, Normal      traMADoL (ULTRAM) 50 mg tablet [TRAMADOL (ULTRAM) 50 MG TABLET] ONE TAB every 12 hours as needed for pain, Disp-60 tablet, R-0, Normalchronic use for pain         STOP taking these medications       ADVAIR DISKUS 100-50 mcg/dose DISKUS Comments:   Reason for Stopping:         amoxicillin-clavulanate (AUGMENTIN) 875-125 MG tablet Comments:   Reason for Stopping:         umeclidinium (INCRUSE ELLIPTA) 62.5 MCG/INH inhaler Comments:   Reason for Stopping:             Allergies   Allergies   Allergen Reactions     Codeine Unknown     Morphine Itching

## 2021-09-30 NOTE — PROGRESS NOTES
Patient has been assessed for Home Oxygen needs. Oxygen readings:    *Pulse oximetry (SpO2) = 94% on  at rest while awake.    *SpO2 improved to 96% on 2 liters/minute at rest.    *SpO2 = 92% on room air during activity/with exercise.    95% on 2 liters/minute during activity/with exercise.

## 2021-09-30 NOTE — PROGRESS NOTES
Observation goals  1) Return to baseline oxygen requirement- patient is on 2L satting 97%, no SOB or increase of O2 needed with walking to and from the bathroom. Still has not walked in the halls overnight so undetermined if 4L is needed with a longer walk. Not met.  2) Evaluation and plan made by inpatient pulmonology team: Met

## 2021-09-30 NOTE — PROGRESS NOTES
LUNG NODULE & INTERVENTIONAL PULMONARY CLINIC  CLINICS & SURGERY CENTER, [facility], [facility]     Fiordaliza Najera Sr. MRN# [id number]   Age: 76 year old YOB: 1945       Requesting Physician: No referring provider defined for this encounter.       Assessment and Plan:    1. Bronchiectasis exacerbation. Continue Levaquin. Continue with other nebulizers and inhalers as discussed in chronic pulmonary recommendations.     2. Chronic hypoxic respiratory failure. She is nearing her baseline level. Agree with oxygen titration.    3. Shortness of breath. This is been a chronic issue for the patient in a because of some frustration as her shortness of breath does not correlate with her oxygen levels in general. Her shortness of breath is due to a combination of underlying pulmonary problems as well as deconditioning in the setting of her current illness as well as activity restrictions in the setting of her very vigilant precautions in the setting of this Covid pandemic. Anxiety is also a comorbid factor that complicates her picture.  -Physical therapy as possible in house. Evaluate for home health options in this regard.    4. Anxiety. Certainly limiting factor. Has been difficult to fully evaluate as an outpatient as most of our visits have been virtual. Discussed with her today starting something like Celexa. She is willing to give this a try.  -Celexa or Lexapro once daily    5. Activities of daily life. Sister Ayse had some concerns that she may not be able to accomplish everything right away like taking a shower or being able to take care of herself if she began to get short of breath again. I explained that if she did not feel that she was safe to go home then I would recommend her going to a transitional care unit. She prefers to have an evaluation for home health options and recuperate at home. Her living situation has a transitional care unit in house, I  believe.    I appreciate the thoughtful cares and great communication with her nursing and hospitalist teams.    Randall Lorenz MD    greater than 35 minutes spent in the care of this patient today. Greater than 50 percent counseling and coordination of care.           Subjective     Sister Ayse is feeling okay today. Was feeling well this morning but became short of breath with walking. She had normal saturations. She also occasionally feels tight in her throat.    She is very nervous about going home.             Past Medical History:      Past Medical History:   Diagnosis Date     COPD (chronic obstructive pulmonary disease) (H)      Diverticulosis      Hiatal hernia      Mild asthma      Neuropathy      Osteopenia      Temporomandibular joint (TMJ) pain            Past Surgical History:      Past Surgical History:   Procedure Laterality Date     ANTERIOR / POSTERIOR COMBINED FUSION LUMBAR SPINE       BRONCHOSCOPY (RIGID OR FLEXIBLE), DIAGNOSTIC N/A 9/28/2021    Procedure: BRONCHOSCOPY, WITH BRONCHOALVEOLAR LAVAGE;  Surgeon: Randall Lorenz MD;  Location:  GI     HYSTERECTOMY       RETINAL LASER PROCEDURE Left 10/04/2016     SALPINGOOPHORECTOMY       TOTAL KNEE ARTHROPLASTY  2008          Social History:     Social History     Tobacco Use     Smoking status: Never Smoker     Smokeless tobacco: Never Used   Substance Use Topics     Alcohol use: No          Family History:     Family History   Problem Relation Age of Onset     Dementia Mother         passed age 88     Chronic Obstructive Pulmonary Disease Father         passed age 78           Allergies:      Allergies   Allergen Reactions     Codeine Unknown     Morphine Itching          Medications:     Current Facility-Administered Medications   Medication     acetaminophen (TYLENOL) tablet 650 mg     acetylcysteine (MUCOMYST) 20 % nebulizer solution 2 mL     albuterol (PROAIR HFA/PROVENTIL HFA/VENTOLIN HFA) 108 (90 Base) MCG/ACT inhaler 2 puff      "albuterol (PROVENTIL) neb solution 2.5 mg     calcium carbonate-vitamin D (OSCAL w/D) per tablet 1 tablet     enoxaparin ANTICOAGULANT (LOVENOX) injection 40 mg     escitalopram (LEXAPRO) tablet 5 mg     fluticasone-vilanterol (BREO ELLIPTA) 100-25 MCG/INH inhaler 1 puff     levofloxacin (LEVAQUIN) tablet 750 mg     lidocaine (LMX4) cream     lidocaine 1 % 0.1-1 mL     melatonin tablet 1 mg     montelukast (SINGULAIR) tablet 10 mg     ondansetron (ZOFRAN-ODT) ODT tab 4 mg    Or     ondansetron (ZOFRAN) injection 4 mg     polyethylene glycol (MIRALAX) Packet 17 g     pregabalin (LYRICA) capsule 75 mg     sodium chloride (NEBUSAL) 3 % neb solution 3 mL     sodium chloride (PF) 0.9% PF flush 3 mL     sodium chloride (PF) 0.9% PF flush 3 mL     traMADol (ULTRAM) tablet 50 mg     umeclidinium (INCRUSE ELLIPTA) 62.5 MCG/INH inhaler 1 puff     Vitamin D3 (CHOLECALCIFEROL) tablet 25 mcg          Review of Systems:     Comprehensive ROS is negative except as in HPI.         Physical Exam:   /59   Pulse (!) 134   Temp 97.9  F (36.6  C) (Temporal)   Resp 18   Ht 1.575 m (5' 2\")   Wt 50.8 kg (112 lb)   SpO2 93%   BMI 20.49 kg/m      Constitutional - looks well, in no apparent distress  Neck supple, no lymph nodes  Eyes - no redness or discharge  Respiratory -breathing appears comfortable. Bilateral crackles  Cardiac -- Normal rate, rhythm.   Skin - No appreciable discoloration or lesions (very limited exam)  Abdomen soft  Extremities without clubbing  Neurological - No apparent tremors. Speech fluent and articlate  Psychiatric - no signs of delirium or anxiety          Current Laboratory Data:   All laboratory and imaging data reviewed.    Results for orders placed or performed during the hospital encounter of 09/28/21 (from the past 24 hour(s))   Lactic Acid STAT   Result Value Ref Range    Lactic Acid 0.9 0.7 - 2.0 mmol/L   CBC with platelets differential    Narrative    The following orders were created for " panel order CBC with platelets differential.  Procedure                               Abnormality         Status                     ---------                               -----------         ------                     CBC with platelets and d...[[id number]]  Abnormal            Final result                 Please view results for these tests on the individual orders.   Comprehensive metabolic panel   Result Value Ref Range    Sodium 140 133 - 144 mmol/L    Potassium 4.2 3.4 - 5.3 mmol/L    Chloride 108 94 - 109 mmol/L    Carbon Dioxide (CO2) 30 20 - 32 mmol/L    Anion Gap 2 (L) 3 - 14 mmol/L    Urea Nitrogen 21 7 - 30 mg/dL    Creatinine 0.73 0.52 - 1.04 mg/dL    Calcium 8.6 8.5 - 10.1 mg/dL    Glucose 92 70 - 99 mg/dL    Alkaline Phosphatase 71 40 - 150 U/L    AST 20 0 - 45 U/L    ALT 20 0 - 50 U/L    Protein Total 6.2 (L) 6.8 - 8.8 g/dL    Albumin 2.4 (L) 3.4 - 5.0 g/dL    Bilirubin Total 0.3 0.2 - 1.3 mg/dL    GFR Estimate 80 >60 mL/min/1.73m2   CBC with platelets and differential   Result Value Ref Range    WBC Count 15.8 (H) 4.0 - 11.0 10e3/uL    RBC Count 3.51 (L) 3.80 - 5.20 10e6/uL    Hemoglobin 10.4 (L) 11.7 - 15.7 g/dL    Hematocrit 32.9 (L) 35.0 - 47.0 %    MCV 94 78 - 100 fL    MCH 29.6 26.5 - 33.0 pg    MCHC 31.6 31.5 - 36.5 g/dL    RDW 13.7 10.0 - 15.0 %    Platelet Count 240 150 - 450 10e3/uL    % Neutrophils 84 %    % Lymphocytes 9 %    % Monocytes 7 %    % Eosinophils 0 %    % Basophils 0 %    % Immature Granulocytes 0 %    NRBCs per 100 WBC 0 <1 /100    Absolute Neutrophils 13.0 (H) 1.6 - 8.3 10e3/uL    Absolute Lymphocytes 1.4 0.8 - 5.3 10e3/uL    Absolute Monocytes 1.2 0.0 - 1.3 10e3/uL    Absolute Eosinophils 0.0 0.0 - 0.7 10e3/uL    Absolute Basophils 0.0 0.0 - 0.2 10e3/uL    Absolute Immature Granulocytes 0.1 (H) <=0.0 10e3/uL    Absolute NRBCs 0.0 10e3/uL   Care Management / Social Work IP Consult    Narrative    Pricila Barker RN     9/30/2021 10:34 AM  Care Management Initial  Consult    General Information  Assessment completed with: Patient, Care Team Member, -chart   review,    Type of CM/SW Visit: Offer D/C Planning    Primary Care Provider verified and updated as needed: Yes   Readmission within the last 30 days:        Reason for Consult: discharge planning  Advance Care Planning:       General Information Comments:  (Resume Home Oxygen and request   fornew home nebulizer machine)    Communication Assessment  Patient's communication style: spoken language (English or   Bilingual)    Hearing Difficulty or Deaf: no   Wear Glasses or Blind: yes    Cognitive  Cognitive/Neuro/Behavioral: WDL                      Living Environment:   People in home:   self    Current living Arrangements:        Able to return to prior arrangements:                 Patient receiving home care services: No     Community Resources:  [employer] for home oxygen.    Lifestyle & Psychosocial Needs:  (from chart flow sheet)  Social Determinants of Health     Tobacco Use: Low Risk      Smoking Tobacco Use: Never Smoker     Smokeless Tobacco Use: Never Used   Alcohol Use:      Frequency of Alcohol Consumption:      Average Number of Drinks:      Frequency of Binge Drinking:    Financial Resource Strain:      Difficulty of Paying Living Expenses:    Food Insecurity:      Worried About Running Out of Food in the Last Year:      Ran Out of Food in the Last Year:    Transportation Needs:      Lack of Transportation (Medical):      Lack of Transportation (Non-Medical):    Physical Activity:      Days of Exercise per Week:      Minutes of Exercise per Session:    Stress:      Feeling of Stress :    Social Connections:      Frequency of Communication with Friends and Family:      Frequency of Social Gatherings with Friends and Family:      Attends Faith Services:      Active Member of Clubs or Organizations:      Attends Club or Organization Meetings:      Marital Status:    Intimate Partner Violence:      Fear of  Current or Ex-Partner:      Emotionally Abused:      Physically Abused:      Sexually Abused:    Depression:      PHQ-2 Score:    Housing Stability:      Unable to Pay for Housing in the Last Year:      Number of Places Lived in the Last Year:      Unstable Housing in the Last Year:                  Values/Beliefs:  Spiritual, Cultural Beliefs, Druze Practices, Values that   affect care: no               Care Management Discharge Note    Discharge Date: 10/01/2021/TBD/Waiting for Observation Goals to   be met.     Discharge Disposition:  Home    Discharge Services:  Resumption of home oxygen with [employer].    Patient arrived via ambulance    Discharge DME:  Home oxygen and request for a new home nebulizer   machine.    Discharge Transportation:  Patient states she will need   assistance with transportation home secondary to arriving to the   [facility] via ambulance.    Private pay costs discussed: Not applicable    Handoff Referral Completed: To be set to clinic.    Additional Information: Request for [employer] Neb Machine    Patient's oxygen for home use/transport to home has arrived from   her oxygen company [employer]. Request for new nebulizer machine   late yesterday afternoon.  This writer phoned [facility] ([phone]) this morning and they stated they can deliver patient a   nebulizer to her home in [place] after the following information   has been faxed to them at Fax # [phone]:    -Prescription for New Neb Machine (the one she has at home is 5   years old per interdisciplinary team).    -Supporting docmentation in a note and qualifying diagnosis.  _______________________    Care Coordinator RN will continue to follow for transition needs,   as well as transportation to home.      Pricila Barker,  B.S.N., R.N., P.H.N..  Care Coordinator     Pager   [facility]

## 2021-09-30 NOTE — PROGRESS NOTES
Clinic Care Coordination Contact  [facility]/Voicemail       Clinical Data: Care Coordinator Outreach  Outreach attempted x 1.  Left message on patient's voicemail with call back information and requested return call.  Plan: Care Coordinator Follow up on hospital discharge, patient at home with O2    Care Coordinator will try to reach patient again in 1-2 business days.  10/1

## 2021-09-30 NOTE — PLAN OF CARE
Pt vss, with 97% O2 sats at 2-3L. Pt requires 4L O2 with activity. Pt ambulating to bathroom and in [place]vedo with walker and stand by assist. Pt denies pain. Pt voiding spontaneously and had BM this shift. Pt tolerating regular diet with no nausea reported. Sepsis protocol activated, but lactic acid came back as 0.9. Will continue plan of care.

## 2021-09-30 NOTE — PROGRESS NOTES
Documentation of Face to Face and Certification for Home Health Services    I certify that patient: Fiordaliza Najera Sr. is under my care and that I, or a nurse practitioner or physician's assistant working with me, had a face-to-face encounter that meets the physician face-to-face encounter requirements with this patient on: 9/30/2021.    This encounter with the patient was in whole, or in part, for the following medical condition, which is the primary reason for home health care: Nebulizer Compressor.    I certify that, based on my findings, the following services are medically necessary home health services: Nebulizer Compressor.    My clinical findings support the need for the above services because: significant lung disease    Further, I certify that my clinical findings support that this patient is homebound (i.e. absences from home require considerable and taxing effort and are for medical reasons or [facility] services or infrequently or of short duration when for other reasons) because: Requires assistance of another person or specialized equipment to access medical services because patient:  high oxygen requirements..    Based on the above findings. I certify that this patient is confined to the home and needs intermittent skilled nursing care, physical therapy and/or speech therapy.  The patient is under my care, and I have initiated the establishment of the plan of care.  This patient will be followed by a physician who will periodically review the plan of care.  Physician/Provider to provide follow up care: Ekaterina Koehler    Attending hospital physician (the Medicare certified [facility] provider): Charles Spencer MD  Physician Signature: See electronic signature associated with these discharge orders.  Date: 9/30/2021

## 2021-09-30 NOTE — PLAN OF CARE
Assumed care of Patient from [id number]. A&Ox4. Soft BPs-not within notifying parameters. Completed walk test this AM. Pt overall less hypoxic during ambulation. On 2L of oxygen during rest and activity. Became very anxious about discharge-anti-anxiety medication ordered and administered prior to discharge. OVSS on RA. Patient denies pain. Tolerating regular diet. Nausea denied throughout shift. Patient reports passing flatus and having a last BM on 9/30/2021. Pt voiding spontaneously with adequate output. Up in halls with SBA walker/gait belt. Using IS and RT breathing exercises-COPD specialist met with pt to review respiratory discharge needs. Patient discharged home to her facility. Oxygen set up prior to discharge. All discharge material reviewed with patient, all questions answered. IV removed prior to discharge.

## 2021-09-30 NOTE — PROGRESS NOTES
Observation goals  1) Return to baseline oxygen requirement- patient is on 2L satting 97%, no SOB or increase of O2 needed with walking to and from the bathroom. Has not walked in the halls so undetermined if 4L is needed with a longer walk. Not met.  2) Evaluation and plan made by inpatient pulmonology team: Met

## 2021-09-30 NOTE — CONSULTS
Care Management Initial Consult    General Information  Assessment completed with: Patient, Care Team Member, -chart review,    Type of CM/SW Visit: Offer D/C Planning    Primary Care Provider verified and updated as needed: Yes   Readmission within the last 30 days:        Reason for Consult: discharge planning  Advance Care Planning:       General Information Comments:  (Resume Home Oxygen and request fornew home nebulizer machine)    Communication Assessment  Patient's communication style: spoken language (English or Bilingual)    Hearing Difficulty or Deaf: no   Wear Glasses or Blind: yes    Cognitive  Cognitive/Neuro/Behavioral: WDL                      Living Environment:   People in home:   self    Current living Arrangements:        Able to return to prior arrangements:                 Patient receiving home care services: No     Community Resources:  [facility] for home oxygen.    Lifestyle & Psychosocial Needs:  (from chart flow sheet)  Social Determinants of Health     Tobacco Use: Low Risk      Smoking Tobacco Use: Never Smoker     Smokeless Tobacco Use: Never Used   Alcohol Use:      Frequency of Alcohol Consumption:      Average Number of Drinks:      Frequency of Binge Drinking:    Financial Resource Strain:      Difficulty of Paying Living Expenses:    Food Insecurity:      Worried About Running Out of Food in the Last Year:      Ran Out of Food in the Last Year:    Transportation Needs:      Lack of Transportation (Medical):      Lack of Transportation (Non-Medical):    Physical Activity:      Days of Exercise per Week:      Minutes of Exercise per Session:    Stress:      Feeling of Stress :    Social Connections:      Frequency of Communication with Friends and Family:      Frequency of Social Gatherings with Friends and Family:      Attends Baptism Services:      Active Member of Clubs or Organizations:      Attends Club or Organization Meetings:      Marital Status:    Intimate Partner  Violence:      Fear of Current or Ex-Partner:      Emotionally Abused:      Physically Abused:      Sexually Abused:    Depression:      PHQ-2 Score:    Housing Stability:      Unable to Pay for Housing in the Last Year:      Number of Places Lived in the Last Year:      Unstable Housing in the Last Year:                  Values/Beliefs:  Spiritual, Cultural Beliefs, Restorationist Practices, Values that affect care: no               Care Management Discharge Note    Discharge Date: 10/01/2021/TBD/Waiting for Observation Goals to be met.     Discharge Disposition:  Home    Discharge Services:  Resumption of home oxygen with [employer].  Patient arrived via ambulance    Discharge DME:  Home oxygen and request for a new home nebulizer machine.    Discharge Transportation:  Patient states she will need assistance with transportation home secondary to arriving to the [facility] via ambulance.    Private pay costs discussed: Not applicable    Handoff Referral Completed: To be set to clinic.    Additional Information: Request for [place] Neb Machine    Patient's oxygen for home use/transport to home has arrived from her oxygen company [employer]. Request for new nebulizer machine late yesterday afternoon.  This writer phoned [facility] () this morning and they stated they can deliver patient a nebulizer to her home in [place] after the following information has been faxed to them at Fax # [phone]:    -Prescription for New Neb Machine (the one she has at home is 5 years old per interdisciplinary team).    -Supporting docmentation in a note and qualifying diagnosis.  _______________________    Care Coordinator RN will continue to follow for transition needs, as well as transportation to home.      Pricila Barker,  B.S.N., R.N., P.H.N..  Care Coordinator     Pager   [facility]    Addendum:  Staff at [place] will arrive at 2 pm to transport  [facility] to her home setting.  They will arrive with her cane and walker per her request.  Nebulizer Rx and supporting F2F was faxed to [facility] and they will deliver to patients. Home address.  Patient will follow up in clinic as designated in discharge orders.

## 2021-09-30 NOTE — PROGRESS NOTES
Observations Goals     1. Baseline respiratory status (satting 90s on her baseline 2L)- Not met: Needs up to 4L with activity.     2. Evaluation by pulmonology: Met

## 2021-09-30 NOTE — PLAN OF CARE
Patient is alert and oriented x 4. VS within parameters on 2L O2 nasal cannula, baseline. Headache noted, pain managed with prn Tylenol. R FA PIV saline locked. Regular diet, no intake besides water overnight. Denies any nausea. No BM overnight, +BS, passing flatus. Up with SBA to BR, voids spontaneously with adequate output. Patient notes less SOB while walking to BR, did not need to increase O2.

## 2021-09-30 NOTE — PROGRESS NOTES
Observation goals  1) Return to baseline oxygen requirement- patient is on 2L walking and at rest. Met  2) Evaluation and plan made by inpatient pulmonology team: Met

## 2021-10-01 ENCOUNTER — PATIENT OUTREACH (OUTPATIENT)
Dept: CARE COORDINATION | Facility: CLINIC | Age: 76
End: 2021-10-01

## 2021-10-01 ENCOUNTER — TELEPHONE (OUTPATIENT)
Dept: RESPIRATORY THERAPY | Facility: CLINIC | Age: 76
End: 2021-10-01

## 2021-10-01 LAB
ATRIAL RATE - MUSE: 120 BPM
BACTERIA BRONCH: ABNORMAL
BACTERIA BRONCH: ABNORMAL
DIASTOLIC BLOOD PRESSURE - MUSE: NORMAL MMHG
INTERPRETATION ECG - MUSE: NORMAL
P AXIS - MUSE: 68 DEGREES
PR INTERVAL - MUSE: 162 MS
QRS DURATION - MUSE: 82 MS
QT - MUSE: 314 MS
QTC - MUSE: 443 MS
R AXIS - MUSE: -16 DEGREES
SYSTOLIC BLOOD PRESSURE - MUSE: NORMAL MMHG
T AXIS - MUSE: 53 DEGREES
VENTRICULAR RATE- MUSE: 120 BPM

## 2021-10-01 NOTE — TELEPHONE ENCOUNTER
Chronic Pulmonary Disease Specialist   Post-Hospital Discharge Follow Up     Called patient after hospital discharge on 9/30821.   -Patient reports breathing is no better, but no worse. Patient reports she is taking all her respiratory medications as prescribed. Patient reports she has been doing laundry, washed her hair and is doing some walking but is getting winded. Advised patient to take albuterol nebulizer now and then again at 4:00 with her scheduled mucomyst. Advised her to take it easy the rest of the weekend.   -Patient stated that a representative from [facility] called her to initiate free trial of vest therapy for airway clearance. She will schedule something for next week as she would like to rest.   -Patient stated that she did not receive her new nebulizer compressor from [facility]. Told patient that I would reach out to them.    Will call again to check in next week.    Stephanie Alvarado, RRT, CTTS  Chronic Pulmonary Disease Specialist  Office: [phone]  Pager: [phone]  Hours: M-F 8-4:30

## 2021-10-01 NOTE — PROGRESS NOTES
Clinic Care Coordination Contact  [facility]/Voicemail       Clinical Data: Care Coordinator Outreach  Outreach attempted x 2.  Left message on patient's voicemail with call back information and requested return call.  Plan: Care Coordinator will send care coordination introduction letter with care coordinator contact information and explanation of care coordination services via [employer]. Care Coordinator will do no further outreaches at this time.

## 2021-10-01 NOTE — LETTER
[facility] CARE COORDINATION  [facility]     October 1, 2021    Fiordaliza Najera Sr.  [address] [place] [address]      Dear Ayse,    I am a clinic care coordinator who works with Ekaterina Koehler MD at the [facility]. I wanted to introduce myself and provide you with my contact information for you to be able to call me with any questions or concerns. Below is a description of clinic care coordination and how I can further assist you.      The clinic care coordination team is made up of a registered nurse,  and community health worker who understand the health care system. The goal of clinic care coordination is to help you manage your health and improve access to the health care system in the most efficient manner. The team can assist you in meeting your health care goals by providing education, coordinating services, strengthening the communication among your providers and supporting you with any resource needs.    Please feel free to contact me at [phone] with any questions or concerns. We are focused on providing you with the highest-quality healthcare experience possible and that all starts with you.     Sincerely,     David Myhre, RN

## 2021-10-04 ENCOUNTER — VIRTUAL VISIT (OUTPATIENT)
Dept: PULMONOLOGY | Facility: CLINIC | Age: 76
End: 2021-10-04
Attending: INTERNAL MEDICINE
Payer: MEDICARE

## 2021-10-04 DIAGNOSIS — M85.88 OSTEOPENIA OF LUMBAR SPINE: ICD-10-CM

## 2021-10-04 DIAGNOSIS — J47.1 BRONCHIECTASIS WITH ACUTE EXACERBATION (H): ICD-10-CM

## 2021-10-04 PROBLEM — R91.8 GROUND GLASS OPACITY PRESENT ON IMAGING OF LUNG: Status: RESOLVED | Noted: 2021-09-29 | Resolved: 2021-10-04

## 2021-10-04 LAB
BACTERIA BLD CULT: NO GROWTH
BACTERIA BLD CULT: NO GROWTH

## 2021-10-04 PROCEDURE — 999N001193 HC VIDEO/TELEPHONE VISIT; NO CHARGE

## 2021-10-04 PROCEDURE — 99442 PR PHYSICIAN TELEPHONE EVALUATION 11-20 MIN: CPT | Mod: 95 | Performed by: INTERNAL MEDICINE

## 2021-10-04 RX ORDER — ALBUTEROL SULFATE 0.83 MG/ML
2.5 SOLUTION RESPIRATORY (INHALATION) EVERY 4 HOURS PRN
Qty: 150 ML | Refills: 11 | Status: SHIPPED | OUTPATIENT
Start: 2021-10-04 | End: 2022-10-10

## 2021-10-04 RX ORDER — ALENDRONATE SODIUM 70 MG/1
TABLET ORAL
Qty: 12 TABLET | Refills: 0 | Status: SHIPPED | OUTPATIENT
Start: 2021-10-04 | End: 2022-02-01

## 2021-10-04 RX ORDER — LEVOFLOXACIN 750 MG/1
750 TABLET, FILM COATED ORAL DAILY
Qty: 7 TABLET | Refills: 0 | Status: SHIPPED | OUTPATIENT
Start: 2021-10-04 | End: 2021-10-13

## 2021-10-04 RX ORDER — LEVOFLOXACIN 500 MG/1
500 TABLET, FILM COATED ORAL DAILY
Qty: 7 TABLET | Refills: 3 | Status: SHIPPED | OUTPATIENT
Start: 2021-10-04 | End: 2021-10-04

## 2021-10-04 NOTE — TELEPHONE ENCOUNTER
"Routing refill request to provider for review/approval because:  Dexa on file  Early refill requested.    Last Written Prescription Date:  11/2/20  Last Fill Quantity: 12,  # refills: 3   Last office visit provider:  1/4/21     Requested Prescriptions   Pending Prescriptions Disp Refills     alendronate (FOSAMAX) 70 MG tablet [Pharmacy Med Name: ALENDRONATE NA 70 MG TABLET 70 Tablet] 12 tablet 3     Sig: TAKE 1 TABLET (70 MG) BY MOUTH EVERY 7 DAYS. TAKE IN THE MORNING ON AN EMPTY STOMACH WITH A FULL GLASS OF WATER 30 MINUTES BEFORE FOOD.       Bisphosphonates Failed - 10/4/2021 12:06 PM        Failed - Dexa on file within past 2 years     Please review last Dexa result.           Passed - Recent (12 mo) or future (30 days) visit within the authorizing provider's specialty     Patient has had an office visit with the authorizing provider or a provider within the authorizing providers department within the previous 12 mos or has a future within next 30 days. See \"Patient Info\" tab in inbasket, or \"Choose Columns\" in Meds & Orders section of the refill encounter.              Passed - Medication is active on med list        Passed - Patient is age 18 or older        Passed - Normal serum creatinine on file within past 12 months     Recent Labs   Lab Test 09/30/21  0547   CR 0.73       Ok to refill medication if creatinine is low               Jose J Marcelo RN 10/04/21 3:20 PM  "

## 2021-10-04 NOTE — PROGRESS NOTES
Ayse is a 76 year old who is being evaluated via a billable video visit.      How would you like to obtain your AVS? Mail a copy  If the video visit is dropped, the invitation should be resent by: Send to e-mail at: [email or web address]  Will anyone else be joining your video visit? No      Video Start Time: 145  Video-Visit Details    Type of service:  Video Visit    Video End Time:205    Originating Location (pt. Location): Home    Distant Location (provider location):  [facility]     Platform used for Video Visit: Tracy Tavera is a 76 year old who is being evaluated via a billable video visit.      How would you like to obtain your AVS? MyChart  If the video visit is dropped, the invitation should be resent by: Send to e-mail at: [email or web address]  Will anyone else be joining your video visit? No          Assessment and plan: 76-year-old woman with bronchiectasis and chronic hypoxic respiratory failure with persistent exacerbation.  Achromobacter and Candida on sputum.    Achromobacter can represent a complicated polymicrobial environment.  We will plan on treating for sensitivities-she is currently tolerating Levaquin.  Given the complexity of her situation and the copious amounts of Candida in her sputum we will also treat with fluconazole for 2 weeks if the Levaquin does not improve things.    At that point if she still having issues we will have to consider home IV therapy.  I will also ask my colleague Domo Theodore to see her in consultation if she is still having issues at that point.    We discussed the importance of other ancillary factors including keeping her weight up, staying active, improving body strength and physical conditioning.    We also discussed her other inhalers nebulizers.  She is on airway clearance regimen and is being evaluated for vest.  She is followed by the chronic airway team at [place].  Appreciate their help.    HPI: 76-year-old  woman with bronchiectasis.  She is feeling about the same since discharge from the hospital.  She was admitted for shortness of breath without a change in oxygen requirement after her bronchoscopy.  She was started on treatment while in the hospital and has not felt the significant provement since then.  She did start citalopram at that point then feels like this is helped her anxiety level quite a bit.    She is engaging with the COPD educators from the [facility] regarding airway clearance program.    Social history, family history, medical history reviewed.  Medications and allergies reviewed.     On exam  Mildly anxious  Normal voice  Normal pattern of breathing  Normal appearing eyes  No tremor, normal mental status      More than 40 minutes spent on this visit today.  majority spent in face-to-face time with the patient.  Remainder spent in documentation, chart review, coordination of care.  This also includes discussion with a bronchiectasis specialist regarding her history and possible options for further care.

## 2021-10-04 NOTE — LETTER
10/4/2021       RE: Fiordaliza Najera Sr.  [address]  [place] [address]     Dear Colleague,    Thank you for referring your patient, Fiordaliza Najera Sr., to the [facility] at [facility]. Please see a copy of my visit note below.    Ayse is a 76 year old who is being evaluated via a billable video visit.      How would you like to obtain your AVS? Mail a copy  If the video visit is dropped, the invitation should be resent by: Send to e-mail at: [email or web address]  Will anyone else be joining your video visit? No      Video Start Time: 145  Video-Visit Details    Type of service:  Video Visit    Video End Time:205    Originating Location (pt. Location): Home    Distant Location (provider location):  [facility]     Platform used for Video Visit: Tracy Tavera is a 76 year old who is being evaluated via a billable video visit.      How would you like to obtain your AVS? MyChart  If the video visit is dropped, the invitation should be resent by: Send to e-mail at: [email or web address]  Will anyone else be joining your video visit? No          Assessment and plan: 76-year-old woman with bronchiectasis and chronic hypoxic respiratory failure with persistent exacerbation.  Achromobacter and Candida on sputum.    Achromobacter can represent a complicated polymicrobial environment.  We will plan on treating for sensitivities-she is currently tolerating Levaquin.  Given the complexity of her situation and the copious amounts of Candida in her sputum we will also treat with fluconazole for 2 weeks if the Levaquin does not improve things.    At that point if she still having issues we will have to consider home IV therapy.  I will also ask my colleague Domo Theodore to see her in consultation if she is still having issues at that point.    We discussed the importance of other ancillary  factors including keeping her weight up, staying active, improving body strength and physical conditioning.    We also discussed her other inhalers nebulizers.  She is on airway clearance regimen and is being evaluated for vest.  She is followed by the chronic airway team at [facility].  Appreciate their help.    HPI: 76-year-old woman with bronchiectasis.  She is feeling about the same since discharge from the hospital.  She was admitted for shortness of breath without a change in oxygen requirement after her bronchoscopy.  She was started on treatment while in the hospital and has not felt the significant provement since then.  She did start citalopram at that point then feels like this is helped her anxiety level quite a bit.    She is engaging with the COPD educators from the [facility] regarding airway clearance program.    Social history, family history, medical history reviewed.  Medications and allergies reviewed.     On exam  Mildly anxious  Normal voice  Normal pattern of breathing  Normal appearing eyes  No tremor, normal mental status      More than 40 minutes spent on this visit today.  majority spent in face-to-face time with the patient.  Remainder spent in documentation, chart review, coordination of care.  This also includes discussion with a bronchiectasis specialist regarding her history and possible options for further care.        Again, thank you for allowing me to participate in the care of your patient.      Sincerely,    Randall Lorenz MD

## 2021-10-08 ENCOUNTER — MEDICAL CORRESPONDENCE (OUTPATIENT)
Dept: HEALTH INFORMATION MANAGEMENT | Facility: CLINIC | Age: 76
End: 2021-10-08

## 2021-10-13 ENCOUNTER — VIRTUAL VISIT (OUTPATIENT)
Dept: PULMONOLOGY | Facility: OTHER | Age: 76
End: 2021-10-13
Payer: MEDICARE

## 2021-10-13 DIAGNOSIS — J47.1 BRONCHIECTASIS WITH ACUTE EXACERBATION (H): Primary | ICD-10-CM

## 2021-10-13 DIAGNOSIS — B37.9 CANDIDA INFECTION: ICD-10-CM

## 2021-10-13 PROCEDURE — 99214 OFFICE O/P EST MOD 30 MIN: CPT | Mod: 95 | Performed by: INTERNAL MEDICINE

## 2021-10-13 RX ORDER — FLUCONAZOLE 200 MG/1
400 TABLET ORAL DAILY
Qty: 60 TABLET | Refills: 11 | Status: SHIPPED | OUTPATIENT
Start: 2021-10-13 | End: 2021-10-20

## 2021-10-13 NOTE — PROGRESS NOTES
Ayse is a 76 year old who is being evaluated via a billable video visit.      How would you like to obtain your AVS? MyChart  If the video visit is dropped, the invitation should be resent by: Text to cell phone: [phone]  Will anyone else be joining your video visit? No      Video Start Time: 1020    Video-Visit Details    Type of service:  Video Visit    Video End Time:1040    Originating Location (pt. Location): Home    Distant Location (provider location):  [facility]     Platform used for Video Visit: [facility]     Assessment and plan: 76-year-old woman here for persistent bronchiectasis exacerbation. Achromobacter and Candida on sputum culture. High suspicion for polymicrobial infection. Treatment for Achromobacter by sensitivity did not result in much improvement. Candida is typically considered a contaminant but with gross purulent secretions as well as very high levels of candidal organisms on bronchoscopy will go forward with treatment with fluconazole for 2 weeks.    If she does not respond to these oral treatments we will have to consider home IV antibiotics.    She continues with maximal pulmonary hygiene therapy-aerobika, 3%, Mucomyst, albuterol. She is also on Trelegy.    Comorbid anxiety being treated with citalopram. She may have some degree of vocal cord dysfunction as well.    Some portion of her dyspnea is also due to deconditioning which have asked her to continue to exercise regularly.    Follow-up in 2 weeks after treatment with fluconazole 400 mg daily    HPI: Continues to have shortness of breath with exertion. Some purulent secretions. Not much response to levofloxacin. No other changes in her health.    Medications and history reviewed    On exam  Comfortable and calm  Breathing comfortably  Normal voice  Normal mental status    Culture data from bronchoscopy reviewed. No new data.

## 2021-10-14 ENCOUNTER — TELEPHONE (OUTPATIENT)
Dept: RESPIRATORY THERAPY | Facility: CLINIC | Age: 76
End: 2021-10-14

## 2021-10-14 NOTE — TELEPHONE ENCOUNTER
"Chronic Pulmonary Disease Specialist Progress Note      -Called patient today to ask about respiratory status, new nebulizer compressor and trial of vest therapy. Patient states she still gets short of breath with any activity but is going to be starting PT tomorrow.    -Patient stated she could not tolerate or operate the vest system, \"It was just too much\" Provided support and endorsed that she at least tried it out and it was alright that it did not work out.     -Encouraged her to continue to use the Aerobika OPEP device for airway clearance    -Patient endorsed receiving new nebulizer compressor and said treatments are about 5 minutes shorter.     -Will continue to follow patient.     Stephanie Alvarado, RRT, CTTS  Chronic Pulmonary Disease Specialist  Office: [phone]  Pager: [phone]  Hours: M-F 8-4:30      "

## 2021-10-15 ENCOUNTER — MEDICAL CORRESPONDENCE (OUTPATIENT)
Dept: HEALTH INFORMATION MANAGEMENT | Facility: CLINIC | Age: 76
End: 2021-10-15
Payer: MEDICARE

## 2021-10-18 ENCOUNTER — MYC MEDICAL ADVICE (OUTPATIENT)
Dept: PULMONOLOGY | Facility: OTHER | Age: 76
End: 2021-10-18

## 2021-10-18 DIAGNOSIS — J44.1 CHRONIC OBSTRUCTIVE PULMONARY DISEASE WITH ACUTE EXACERBATION (H): ICD-10-CM

## 2021-10-18 DIAGNOSIS — J47.1 BRONCHIECTASIS WITH ACUTE EXACERBATION (H): ICD-10-CM

## 2021-10-20 ENCOUNTER — OFFICE VISIT (OUTPATIENT)
Dept: INTERNAL MEDICINE | Facility: CLINIC | Age: 76
End: 2021-10-20
Payer: MEDICARE

## 2021-10-20 VITALS
HEART RATE: 70 BPM | SYSTOLIC BLOOD PRESSURE: 112 MMHG | OXYGEN SATURATION: 90 % | DIASTOLIC BLOOD PRESSURE: 60 MMHG | BODY MASS INDEX: 20 KG/M2 | WEIGHT: 109.38 LBS

## 2021-10-20 DIAGNOSIS — Z12.11 SCREENING FOR COLON CANCER: ICD-10-CM

## 2021-10-20 DIAGNOSIS — M81.0 AGE-RELATED OSTEOPOROSIS WITHOUT CURRENT PATHOLOGICAL FRACTURE: ICD-10-CM

## 2021-10-20 DIAGNOSIS — Z99.81 CHRONIC RESPIRATORY FAILURE WITH HYPOXIA, ON HOME O2 THERAPY (H): ICD-10-CM

## 2021-10-20 DIAGNOSIS — R21 RASH AND NONSPECIFIC SKIN ERUPTION: ICD-10-CM

## 2021-10-20 DIAGNOSIS — J44.9 CHRONIC OBSTRUCTIVE PULMONARY DISEASE, UNSPECIFIED COPD TYPE (H): ICD-10-CM

## 2021-10-20 DIAGNOSIS — J96.11 CHRONIC RESPIRATORY FAILURE WITH HYPOXIA, ON HOME O2 THERAPY (H): ICD-10-CM

## 2021-10-20 DIAGNOSIS — M85.80 OSTEOPENIA, UNSPECIFIED LOCATION: ICD-10-CM

## 2021-10-20 DIAGNOSIS — K44.9 DIAPHRAGMATIC HERNIA WITHOUT OBSTRUCTION AND WITHOUT GANGRENE: ICD-10-CM

## 2021-10-20 DIAGNOSIS — I72.0 CAROTID ARTERY ANEURYSM (H): ICD-10-CM

## 2021-10-20 DIAGNOSIS — J34.89 NASAL LESION: ICD-10-CM

## 2021-10-20 DIAGNOSIS — R26.9 ABNORMALITY OF GAIT: ICD-10-CM

## 2021-10-20 DIAGNOSIS — G43.009 MIGRAINE WITHOUT AURA AND WITHOUT STATUS MIGRAINOSUS, NOT INTRACTABLE: ICD-10-CM

## 2021-10-20 DIAGNOSIS — G60.9 IDIOPATHIC PERIPHERAL NEUROPATHY: Chronic | ICD-10-CM

## 2021-10-20 DIAGNOSIS — M54.9 BACK PAIN, UNSPECIFIED BACK LOCATION, UNSPECIFIED BACK PAIN LATERALITY, UNSPECIFIED CHRONICITY: ICD-10-CM

## 2021-10-20 DIAGNOSIS — Z00.00 ENCOUNTER FOR MEDICARE ANNUAL WELLNESS EXAM: Primary | ICD-10-CM

## 2021-10-20 DIAGNOSIS — R09.02 HYPOXIA: ICD-10-CM

## 2021-10-20 DIAGNOSIS — N89.8 VAGINAL IRRITATION: ICD-10-CM

## 2021-10-20 DIAGNOSIS — F41.9 ANXIETY: ICD-10-CM

## 2021-10-20 PROBLEM — R06.09 DOE (DYSPNEA ON EXERTION): Status: RESOLVED | Noted: 2021-09-29 | Resolved: 2021-10-20

## 2021-10-20 PROBLEM — R06.02 SHORTNESS OF BREATH: Status: RESOLVED | Noted: 2021-09-29 | Resolved: 2021-10-20

## 2021-10-20 LAB
ALBUMIN SERPL-MCNC: 3.7 G/DL (ref 3.5–5)
ALBUMIN UR-MCNC: NEGATIVE MG/DL
ALP SERPL-CCNC: 94 U/L (ref 45–120)
ALT SERPL W P-5'-P-CCNC: 17 U/L (ref 0–45)
ANION GAP SERPL CALCULATED.3IONS-SCNC: 12 MMOL/L (ref 5–18)
APPEARANCE UR: CLEAR
AST SERPL W P-5'-P-CCNC: 28 U/L (ref 0–40)
BASOPHILS # BLD AUTO: 0 10E3/UL (ref 0–0.2)
BASOPHILS NFR BLD AUTO: 0 %
BILIRUB SERPL-MCNC: 0.6 MG/DL (ref 0–1)
BILIRUB UR QL STRIP: NEGATIVE
BUN SERPL-MCNC: 12 MG/DL (ref 8–28)
CALCIUM SERPL-MCNC: 10.1 MG/DL (ref 8.5–10.5)
CHLORIDE BLD-SCNC: 99 MMOL/L (ref 98–107)
CHOLEST SERPL-MCNC: 172 MG/DL
CLUE CELLS: ABNORMAL
CO2 SERPL-SCNC: 27 MMOL/L (ref 22–31)
COLOR UR AUTO: YELLOW
CREAT SERPL-MCNC: 0.64 MG/DL (ref 0.6–1.1)
EOSINOPHIL # BLD AUTO: 0.1 10E3/UL (ref 0–0.7)
EOSINOPHIL NFR BLD AUTO: 1 %
ERYTHROCYTE [DISTWIDTH] IN BLOOD BY AUTOMATED COUNT: 13.1 % (ref 10–15)
FASTING STATUS PATIENT QL REPORTED: YES
GFR SERPL CREATININE-BSD FRML MDRD: 87 ML/MIN/1.73M2
GLUCOSE BLD-MCNC: 88 MG/DL (ref 70–125)
GLUCOSE UR STRIP-MCNC: NEGATIVE MG/DL
HCT VFR BLD AUTO: 39.6 % (ref 35–47)
HDLC SERPL-MCNC: 61 MG/DL
HGB BLD-MCNC: 12.6 G/DL (ref 11.7–15.7)
HGB UR QL STRIP: NEGATIVE
IMM GRANULOCYTES # BLD: 0 10E3/UL
IMM GRANULOCYTES NFR BLD: 0 %
KETONES UR STRIP-MCNC: NEGATIVE MG/DL
LDLC SERPL CALC-MCNC: 95 MG/DL
LEUKOCYTE ESTERASE UR QL STRIP: NEGATIVE
LYMPHOCYTES # BLD AUTO: 1.1 10E3/UL (ref 0.8–5.3)
LYMPHOCYTES NFR BLD AUTO: 9 %
MCH RBC QN AUTO: 29.2 PG (ref 26.5–33)
MCHC RBC AUTO-ENTMCNC: 31.8 G/DL (ref 31.5–36.5)
MCV RBC AUTO: 92 FL (ref 78–100)
MONOCYTES # BLD AUTO: 1 10E3/UL (ref 0–1.3)
MONOCYTES NFR BLD AUTO: 9 %
NEUTROPHILS # BLD AUTO: 9.5 10E3/UL (ref 1.6–8.3)
NEUTROPHILS NFR BLD AUTO: 81 %
NITRATE UR QL: NEGATIVE
PH UR STRIP: 6 [PH] (ref 5–8)
PLATELET # BLD AUTO: 344 10E3/UL (ref 150–450)
POTASSIUM BLD-SCNC: 4.5 MMOL/L (ref 3.5–5)
PROT SERPL-MCNC: 7.5 G/DL (ref 6–8)
RBC # BLD AUTO: 4.32 10E6/UL (ref 3.8–5.2)
SODIUM SERPL-SCNC: 138 MMOL/L (ref 136–145)
SP GR UR STRIP: 1.01 (ref 1–1.03)
TRICHOMONAS, WET PREP: ABNORMAL
TRIGL SERPL-MCNC: 78 MG/DL
UROBILINOGEN UR STRIP-ACNC: 0.2 E.U./DL
WBC # BLD AUTO: 11.7 10E3/UL (ref 4–11)
WBC'S/HIGH POWER FIELD, WET PREP: ABNORMAL
YEAST, WET PREP: ABNORMAL

## 2021-10-20 PROCEDURE — 80061 LIPID PANEL: CPT | Performed by: PEDIATRICS

## 2021-10-20 PROCEDURE — 87210 SMEAR WET MOUNT SALINE/INK: CPT | Performed by: PEDIATRICS

## 2021-10-20 PROCEDURE — 36415 COLL VENOUS BLD VENIPUNCTURE: CPT | Performed by: PEDIATRICS

## 2021-10-20 PROCEDURE — 81003 URINALYSIS AUTO W/O SCOPE: CPT | Performed by: PEDIATRICS

## 2021-10-20 PROCEDURE — 82306 VITAMIN D 25 HYDROXY: CPT | Performed by: PEDIATRICS

## 2021-10-20 PROCEDURE — 99214 OFFICE O/P EST MOD 30 MIN: CPT | Mod: 25 | Performed by: PEDIATRICS

## 2021-10-20 PROCEDURE — 85025 COMPLETE CBC W/AUTO DIFF WBC: CPT | Performed by: PEDIATRICS

## 2021-10-20 PROCEDURE — G0439 PPPS, SUBSEQ VISIT: HCPCS | Performed by: PEDIATRICS

## 2021-10-20 PROCEDURE — 80053 COMPREHEN METABOLIC PANEL: CPT | Performed by: PEDIATRICS

## 2021-10-20 RX ORDER — PREGABALIN 50 MG/1
CAPSULE ORAL
Qty: 90 CAPSULE | Refills: 4 | COMMUNITY
Start: 2021-10-20 | End: 2021-11-08

## 2021-10-20 RX ORDER — NYSTATIN 100000 U/G
CREAM TOPICAL
Qty: 30 G | Refills: 1 | Status: SHIPPED | OUTPATIENT
Start: 2021-10-20 | End: 2023-01-01

## 2021-10-20 RX ORDER — CLOBETASOL PROPIONATE 0.5 MG/G
CREAM TOPICAL
Qty: 30 G | Refills: 0 | Status: ON HOLD | OUTPATIENT
Start: 2021-10-20 | End: 2021-11-04

## 2021-10-20 RX ORDER — ECHINACEA PURPUREA EXTRACT 125 MG
2 TABLET ORAL DAILY
Qty: 88 ML | Refills: 0 | Status: ON HOLD | OUTPATIENT
Start: 2021-10-20 | End: 2023-01-01

## 2021-10-20 RX ORDER — MUPIROCIN 20 MG/G
OINTMENT TOPICAL
Qty: 30 G | Refills: 0 | Status: SHIPPED | OUTPATIENT
Start: 2021-10-20 | End: 2022-03-24

## 2021-10-20 ASSESSMENT — ACTIVITIES OF DAILY LIVING (ADL)
CURRENT_FUNCTION: SHOPPING REQUIRES ASSISTANCE
CURRENT_FUNCTION: TRANSPORTATION REQUIRES ASSISTANCE

## 2021-10-20 NOTE — PROGRESS NOTES
The patient was provided with suggestions to help her develop a healthy physical lifestyle.  The patient reports that she has difficulty with activities of daily living. I have asked that the patient make a follow up appointment in *** weeks where this issue will be further evaluated and addressed.  Information on urinary incontinence and treatment options given to patient.

## 2021-10-20 NOTE — ASSESSMENT & PLAN NOTE
Last DXA scan was in 2019. Due to significant hardware in back, often spine DXA inadequate per patient. She has been on fosamax on and off over the years, tolerating it well. On calcium and vitamin D currently. Will order repeat DXA scan.

## 2021-10-20 NOTE — Clinical Note
Hi Dr. Lorenz-- Ayse came to see me today. She says since doing the mucomyst daily, she's been having nasal irritation and bleeding and there does seem to be some mild erosion/scabbing there. I'm having her do nasal saline spray and topical mupirocin, but told her I'd ask you about the mucomyst and if that's a common side effect with it. She's also wondering if you want her to do anything else (antimicrobial wise) before her appt with you on 10/29 since she didn't tolerate the oral fluconazole. Thanks! - Julia

## 2021-10-20 NOTE — ASSESSMENT & PLAN NOTE
Abnormal brain MRI (2018)- 2 mm inferomedially directed outpouching involving the distal left ICA, either small infundibulum vs paraclinoid aneurysm. This was found in work-up for concern for stroke back in 2018 after she had a fall from an escalator.  In the past she saw a neurologist at [facility] who recommended a CTA for further characterization, but she declined at that time.  She is to follow-up with them as needed.  At today's she continues to decline a CTA brain again given her more pressing pulmonary issues.

## 2021-10-20 NOTE — PROGRESS NOTES
"SUBJECTIVE:   Fiordaliza Najera Sr. is a 76 year old female who presents for Preventive Visit.      Patient has been advised of split billing requirements and indicates understanding: Yes   Are you in the first 12 months of your Medicare coverage?  No    She had a fungal infection in her lungs, didn't tolerate the fluconazole. She did let Pulm know, has follow up with them on 10/29.  When exerting, really short of breath but still on normal 2 L via NC.    Lexapro is helping with anxiety when she was having trouble breathing. Pulmonologist started her on that.    Lost a couple of pounds. Trying to eat.    There are some scabs in the nose. Thinks maybe from mucomyst with the nebulizer. No bleeding from the nose, only on kleenex when she wipes.    She had some soreness around rectum, applied nystatin, now noticed some burning on the outer vagina.     Still taking fosamax. Has been on and off over the years    Still hasn't seen Neuro for her carotid artery aneurysm.    Healthy Habits:     In general, how would you rate your overall health?  Fair    Frequency of exercise:  4-5 days/week    Duration of exercise:  45-60 minutes    Do you usually eat at least 4 servings of fruit and vegetables a day, include whole grains    & fiber and avoid regularly eating high fat or \"junk\" foods?  Yes    Taking medications regularly:  Yes    Medication side effects:  None    Ability to successfully perform activities of daily living:  Transportation requires assistance and shopping requires assistance    Home Safety:  No safety concerns identified    Hearing Impairment:  No hearing concerns    In the past 6 months, have you been bothered by leaking of urine? Yes    In general, how would you rate your overall mental or emotional health?  Good      PHQ-2 Total Score: 0    Additional concerns today:  Yes    Do you feel safe in your environment? Yes    Have you ever done Advance Care Planning? (For example, a Health Directive, POLST, " "or a discussion with a medical provider or your loved ones about your wishes): Yes, advance care planning is on file.    Fall risk  Fallen 2 or more times in the past year?: No  Any fall with injury in the past year?: No     Cognitive Screening   1) Repeat 3 items   2) Clock draw: NORMAL  3) 3 item recall: Recalls 3 objects  Results: 3 items recalled: COGNITIVE IMPAIRMENT LESS LIKELY    Mini-CogTM Copyright MARIO Hollins. Licensed by the author for use in [facility]; reprinted with permission (alexander@[facility]). All rights reserved.      Do you have sleep apnea, excessive snoring or daytime drowsiness?: no    Reviewed and updated as needed this visit by clinical staff  Tobacco  Allergies  Meds              Reviewed and updated as needed this visit by Provider    Meds             Social History     Tobacco Use     Smoking status: Never Smoker     Smokeless tobacco: Never Used   Substance Use Topics     Alcohol use: No     If you drink alcohol do you typically have >3 drinks per day or >7 drinks per week? No    Alcohol Use 10/20/2021   Prescreen: >3 drinks/day or >7 drinks/week? No   Prescreen: >3 drinks/day or >7 drinks/week? -         Current providers sharing in care for this patient include:   Patient Care Team:  Ekaterina Koehler MD as PCP - General (Pediatrics)  Ekaterina Koehler MD as Assigned PCP  Randall Lorenz MD as Assigned Pulmonology Provider    The following health maintenance items are reviewed in Epic and correct as of today:  Health Maintenance Due   Topic Date Due     COPD ACTION PLAN  Never done             Pertinent mammograms are reviewed under the imaging tab.    Review of Systems  See above    OBJECTIVE:   /60 (BP Location: Right arm, Patient Position: Sitting)   Pulse 70   Wt 49.6 kg (109 lb 6 oz)   SpO2 90%   BMI 20.00 kg/m   Estimated body mass index is 20 kg/m  as calculated from the following:    Height as of 9/28/21: 1.575 m (5' 2\").    Weight as of this encounter: " 49.6 kg (109 lb 6 oz).  Physical Exam  GENERAL: alert, no distress and on oxygen  NECK: no adenopathy, no asymmetry, masses, or scars and thyroid normal to palpation  RESP: lungs clear to auscultation - no rales, rhonchi or wheezes  CV: regular rate and rhythm, normal S1 S2, no S3 or S4, no murmur, click or rub, no peripheral edema and peripheral pulses strong  ABDOMEN: soft, nontender, no hepatosplenomegaly, no masses and bowel sounds normal   (female):  slight irritation of vulva  MS: no gross musculoskeletal defects noted, no edema        ASSESSMENT / PLAN:     Assessment & Plan   Problem List Items Addressed This Visit     Idiopathic peripheral neuropathy (Chronic)     Stable on lyrica. Takes 50mg in AM and 100mg in PM.         Relevant Medications    pregabalin (LYRICA) 50 MG capsule    Osteopenia     Last DXA scan was in 2019. Due to significant hardware in back, often spine DXA inadequate per patient. She has been on fosamax on and off over the years, tolerating it well. On calcium and vitamin D currently. Will order repeat DXA scan.         Relevant Orders    DX Hip/Pelvis/Spine    DX Wrist Heel Radius    Vitamin D Deficiency (Completed)    COPD (chronic obstructive pulmonary disease) (H)     Followed by Dr. Lorenz with Pulmonology. Recently was on fluconazole, but did not tolerate the medication due to side effects. Has follow up with him coming up soon. I did message him to see if he has any other thoughts for what she should do in meantime, as she mentioned he was considering IV medications even. Symptomatically she appears stable.         Relevant Medications    sodium chloride (OCEAN) 0.65 % nasal spray    Abnormality of gait     Uses a walker to help with gait. Has been having difficulty walking due to recent pneumonia. PT is coming out to the apartment, they work in her building.         Back pain     Has tramadol on hand, but hasn't needed to use in a long time. Usually tylenol is enough.           Diaphragmatic hernia     Asymptomatic, eats bland foods. Not on PPI.         Chronic respiratory failure with hypoxia, on home O2 therapy (H)     Stable on 2L via NC.         Anxiety     Recently started on lexapro. Anxiety stable on low dose.         Relevant Medications    pregabalin (LYRICA) 50 MG capsule    Migraine without aura and without status migrainosus, not intractable     Stable, fairly infrequent migraine. Has triptan on hand, but hasn't needed to use it in a while.         Relevant Medications    pregabalin (LYRICA) 50 MG capsule    Carotid artery aneurysm (H)     Abnormal brain MRI (2018)- 2 mm inferomedially directed outpouching involving the distal left ICA, either small infundibulum vs paraclinoid aneurysm. This was found in work-up for concern for stroke back in 2018 after she had a fall from an escalator.  In the past she saw a neurologist at [facility] who recommended a CTA for further characterization, but she declined at that time.  She is to follow-up with them as needed.  At today's she continues to decline a CTA brain again given her more pressing pulmonary issues.           Screening for colon cancer     Had diverticulosis on 6/2011 colonoscopy. Would prefer cologuard this year. Ordered.         Relevant Orders    COLOGUARD([employer]) (Completed)    Vaginal irritation     There is some erythema today on her vulvar region. Treat with clobetasol x 2 weeks, follow up if not resolved         Relevant Medications    clobetasol (TEMOVATE) 0.05 % external cream    Other Relevant Orders    Wet prep - Clinic Collect (Completed)    UA Macro with Reflex to Micro and Culture - lab collect (Completed)    Nasal lesion     She reports that she has been developing nosebleeds and scab formation since using Mucomyst.  She does have a scab on exam today.  I will have her use nasal saline sprays as well as a topical mupirocin.  I did reach out to her pulmonologist to see if he believes the Mucomyst  "is playing a role.         Relevant Medications    sodium chloride (OCEAN) 0.65 % nasal spray    mupirocin (BACTROBAN) 2 % external ointment    RESOLVED: Hypoxia     Remains on 2L via NC.            Other Visit Diagnoses     Encounter for Medicare annual wellness exam    -  Primary    Relevant Orders    Lipid panel reflex to direct LDL Fasting (Completed)    Comprehensive metabolic panel (BMP + Alb, Alk Phos, ALT, AST, Total. Bili, TP) (Completed)    CBC with platelets and differential (Completed)    Rash and nonspecific skin eruption        Relevant Medications    nystatin (MYCOSTATIN) 260756 UNIT/GM external cream    clobetasol (TEMOVATE) 0.05 % external cream    sodium chloride (OCEAN) 0.65 % nasal spray    mupirocin (BACTROBAN) 2 % external ointment    Age-related osteoporosis without current pathological fracture         Relevant Orders    DX Hip/Pelvis/Spine    DX Wrist Heel Radius                        Return in about 3 months (around 1/20/2022) for Follow up, with me.    Ekaterina Koehler MD  [facility]    Patient has been advised of split billing requirements and indicates understanding: Yes  COUNSELING:  Reviewed preventive health counseling, as reflected in patient instructions    Estimated body mass index is 20 kg/m  as calculated from the following:    Height as of 9/28/21: 1.575 m (5' 2\").    Weight as of this encounter: 49.6 kg (109 lb 6 oz).        She reports that she has never smoked. She has never used smokeless tobacco.      Appropriate preventive services were discussed with this patient, including applicable screening as appropriate for cardiovascular disease, diabetes, osteopenia/osteoporosis, and glaucoma.  As appropriate for age/gender, discussed screening for colorectal cancer, prostate cancer, breast cancer, and cervical cancer. Checklist reviewing preventive services available has been given to the patient.    Reviewed patients plan of care and provided an AVS. " The Basic Care Plan (routine screening as documented in Health Maintenance) for Fiordaliza meets the Care Plan requirement. This Care Plan has been established and reviewed with the Patient.    Counseling Resources:  ATP IV Guidelines  Pooled Cohorts Equation Calculator  Breast Cancer Risk Calculator  Breast Cancer: Medication to Reduce Risk  FRAX Risk Assessment  ICSI Preventive Guidelines  Dietary Guidelines for Americans, 2010  [employer]'s MyPlate  ASA Prophylaxis  Lung CA Screening    Ekaterina Koehler MD  [facility]    Identified Health Risks:    The patient was provided with suggestions to help her develop a healthy physical lifestyle.  The patient reports that she has difficulty with activities of daily living. I have asked that the patient make a follow up appointment in  weeks where this issue will be further evaluated and addressed.  Information on urinary incontinence and treatment options given to patient.

## 2021-10-20 NOTE — ASSESSMENT & PLAN NOTE
Uses a walker to help with gait. Has been having difficulty walking due to recent pneumonia. PT is coming out to the apartment, they work in her building.

## 2021-10-20 NOTE — PATIENT INSTRUCTIONS
Patient Education      Go to the lab today. We will notify you with the results once those are available.    Someone will call to schedule the bone density scan.    Use nasal spray for your nose and apply the topical mupirocin 3 times a day over the next 7-10 days. If not better, let me know and we can have you see ENT.     Use the topical clobetasol (steroid ointment) on your outer vagina for 2 weeks. If not better, please follow up.    I will message your pulmonologist about the fluconazole and the mucomyst.    Let's follow up in 3 months.    GENERAL INFORMATION AND HELPFUL NUMBERS:    If labs were ordered today, please go to the outpatient lab located in the same building. Once the results are back, we will notify you with results.    If imaging was ordered to be done today, please go to [facility] (located in the same building across our [facility]). Once the results are back, we will notify you with results.    If imaging was ordered to be done in the future at an [facility] facility, someone will call to schedule otherwise you may also call [phone] to schedule.    If a specialty referral was placed during today's visit, someone will call to schedule unless you were instructed to call yourself. If you are having issues with this, please message me through [employer] or call our clinic at [phone] (press option 2 to speak with someone from our [place] team).    If a mammogram was ordered during today's visit, someone will call to schedule otherwise you may also call [phone] to schedule     If a heart ultrasound or stress test was ordered today, someone will call to schedule otherwise you may also call the main Cardiology clinic at [phone] to schedule.    If a bone density scan was ordered today, someone will call to schedule otherwise you may also call [phone] to schedule.    If you have any questions or concerns after our visit today, please message me through [employer] or call our clinic  at [phone] (press option 2 to speak with someone from our [place] team).                  Personalized Prevention Plan  You are due for the preventive services outlined below.  Your care team is available to assist you in scheduling these services.  If you have already completed any of these items, please share that information with your care team to update in your medical record.  Health Maintenance Due   Topic Date Due     ANNUAL REVIEW OF HM ORDERS  Never done     COPD Action Plan  Never done     FALL RISK ASSESSMENT  09/23/2021     Your Health Risk Assessment indicates you feel you are not in good health    A healthy lifestyle helps keep the body fit and the mind alert. It helps protect you from disease, helps you fight disease, and helps prevent chronic disease (disease that doesn't go away) from getting worse. This is important as you get older and begin to notice twinges in muscles and joints and a decline in the strength and stamina you once took for granted. A healthy lifestyle includes good healthcare, good nutrition, weight control, recreation, and regular exercise. Avoid harmful substances and do what you can to keep safe. Another part of a healthy lifestyle is stay mentally active and socially involved.    Good healthcare     Have a wellness visit every year.     If you have new symptoms, let us know right away. Don't wait until the next checkup.     Take medicines exactly as prescribed and keep your medicines in a safe place. Tell us if your medicine causes problems.   Healthy diet and weight control     Eat 3 or 4 small, nutritious, low-fat, high-fiber meals a day. Include a variety of fruits, vegetables, and whole-grain foods.     Make sure you get enough calcium in your diet. Calcium, vitamin D, and exercise help prevent osteoporosis (bone thinning).     If you live alone, try eating with others when you can. That way you get a good meal and have company while you eat it.     Try to keep a  healthy weight. If you eat more calories than your body uses for energy, it will be stored as fat and you will gain weight.     Recreation   Recreation is not limited to sports and team events. It includes any activity that provides relaxation, interest, enjoyment, and exercise. Recreation provides an outlet for physical, mental, and social energy. It can give a sense of worth and achievement. It can help you stay healthy.    Mental Exercise and Social Involvement  Mental and emotional health is as important as physical health. Keep in touch with friends and family. Stay as active as possible. Continue to learn and challenge yourself.   Things you can do to stay mentally active are:    Learn something new, like a foreign language or musical instrument.     Play SCRABBLE or do crossword puzzles. If you cannot find people to play these games with you at home, you can play them with others on your computer through the Internet.     Join a games club--anything from card games to chess or checkers or lawn bowling.     Start a new hobby.     Go back to school.     Volunteer.     Read.   Keep up with world events.  Activities of Daily Living    Your Health Risk Assessment indicates you have difficulties with activities of daily living such as housework, bathing, preparing meals, taking medication, etc. Please make a follow up appointment for us to address this issue in more detail.    Urinary Incontinence, Female (Adult)   Urinary incontinence means loss of bladder control. This problem affects many women, especially as they get older. If you have incontinence, you may be embarrassed to ask for help. But know that this problem can be treated.   Types of Incontinence  There are different types of incontinence. Two of the main types are described here. You can have more than one type.     Stress incontinence. With this type, urine leaks when pressure (stress) is put on the bladder. This may happen when you cough, sneeze, or  laugh. Stress incontinence most often occurs because the pelvic floor muscles that support the bladder and urethra are weak. This can happen after pregnancy and vaginal childbirth or a hysterectomy. It can also be due to excess body weight or hormone changes.    Urge incontinence (also called overactive bladder). With this type, a sudden urge to urinate is felt often. This may happen even though there may not be much urine in the bladder. The need to urinate often during the night is common. Urge incontinence most often occurs because of bladder spasms. This may be due to bladder irritation or infection. Damage to bladder nerves or pelvic muscles, constipation, and certain medicines can also lead to urge incontinence.  Treatment depends on the cause. Further evaluation is needed to find the type you have. This will likely include an exam and certain tests. Based on the results, you and your healthcare provider can then plan treatment. Until a diagnosis is made, the home care tips below can help ease symptoms.   Home care    Do pelvic floor muscle exercises, if they are prescribed. The pelvic floor muscles help support the bladder and urethra. Many women find that their symptoms improve when doing special exercises that strengthen these muscles. To do the exercises, contract the muscles you would use to stop your stream of urine. But do this when you re not urinating. Hold for 10 seconds, then relax. Repeat 10 to 20 times in a row, at least 3 times a day. Your healthcare provider may give you other instructions for how to do the exercises and how often.    Keep a bladder diary. This helps track how often and how much you urinate over a set period of time. Bring this diary with you to your next visit with the provider. The information can help your provider learn more about your bladder problem.    Lose weight, if advised to by your provider. Extra weight puts pressure on the bladder. Your provider can help you create  a weight-loss plan that s right for you. This may include exercising more and making certain diet changes.    Don't have foods and drinks that may irritate the bladder. These can include alcohol and caffeinated drinks.    Quit smoking. Smoking and other tobacco use can lead to a long-term (chronic) cough that strains the pelvic floor muscles. Smoking may also damage the bladder and urethra. Talk with your provider about treatments or methods you can use to quit smoking.    If drinking large amounts of fluid makes you have symptoms, you may be advised to limit your fluid intake. You may also be advised to drink most of your fluids during the day and to limit fluids at night.    If you re worried about urine leakage or accidents, you may wear absorbent pads to catch urine. Change the pads often. This helps reduce discomfort. It may also reduce the risk of skin or bladder infections.    Follow-up care  Follow up with your healthcare provider, or as directed. It may take some to find the right treatment for your problem. But healthy lifestyle changes can be made right away. These include such things as exercising on a regular basis, eating a healthy diet, losing weight (if needed), and quitting smoking. Your treatment plan may include special therapies or medicines. Certain procedures or surgery may also be options. Talk about any questions you have with your provider.   When to seek medical advice  Call the healthcare provider right away if any of these occur:    Fever of 100.4 F (38 C) or higher, or as directed by your provider    Bladder pain or fullness    Belly swelling    Nausea or vomiting    Back pain    Weakness, dizziness, or fainting  Crissy last reviewed this educational content on 1/1/2020 2000-2021 [employer]. All rights reserved. This information is not intended as a substitute for professional medical care. Always follow your healthcare professional's instructions.           Patient  Education   Personalized Prevention Plan  You are due for the preventive services outlined below.  Your care team is available to assist you in scheduling these services.  If you have already completed any of these items, please share that information with your care team to update in your medical record.  Health Maintenance Due   Topic Date Due     ANNUAL REVIEW OF HM ORDERS  Never done     COPD Action Plan  Never done     FALL RISK ASSESSMENT  09/23/2021     Your Health Risk Assessment indicates you feel you are not in good health    A healthy lifestyle helps keep the body fit and the mind alert. It helps protect you from disease, helps you fight disease, and helps prevent chronic disease (disease that doesn't go away) from getting worse. This is important as you get older and begin to notice twinges in muscles and joints and a decline in the strength and stamina you once took for granted. A healthy lifestyle includes good healthcare, good nutrition, weight control, recreation, and regular exercise. Avoid harmful substances and do what you can to keep safe. Another part of a healthy lifestyle is stay mentally active and socially involved.    Good healthcare     Have a wellness visit every year.     If you have new symptoms, let us know right away. Don't wait until the next checkup.     Take medicines exactly as prescribed and keep your medicines in a safe place. Tell us if your medicine causes problems.   Healthy diet and weight control     Eat 3 or 4 small, nutritious, low-fat, high-fiber meals a day. Include a variety of fruits, vegetables, and whole-grain foods.     Make sure you get enough calcium in your diet. Calcium, vitamin D, and exercise help prevent osteoporosis (bone thinning).     If you live alone, try eating with others when you can. That way you get a good meal and have company while you eat it.     Try to keep a healthy weight. If you eat more calories than your body uses for energy, it will be  stored as fat and you will gain weight.     Recreation   Recreation is not limited to sports and team events. It includes any activity that provides relaxation, interest, enjoyment, and exercise. Recreation provides an outlet for physical, mental, and social energy. It can give a sense of worth and achievement. It can help you stay healthy.    Mental Exercise and Social Involvement  Mental and emotional health is as important as physical health. Keep in touch with friends and family. Stay as active as possible. Continue to learn and challenge yourself.   Things you can do to stay mentally active are:    Learn something new, like a foreign language or musical instrument.     Play SCRABBLE or do crossword puzzles. If you cannot find people to play these games with you at home, you can play them with others on your computer through the Internet.     Join a games club--anything from card games to chess or checkers or lawn bowling.     Start a new hobby.     Go back to school.     Volunteer.     Read.   Keep up with world events.  Activities of Daily Living    Your Health Risk Assessment indicates you have difficulties with activities of daily living such as housework, bathing, preparing meals, taking medication, etc. Please make a follow up appointment for us to address this issue in more detail.    Urinary Incontinence, Female (Adult)   Urinary incontinence means loss of bladder control. This problem affects many women, especially as they get older. If you have incontinence, you may be embarrassed to ask for help. But know that this problem can be treated.   Types of Incontinence  There are different types of incontinence. Two of the main types are described here. You can have more than one type.     Stress incontinence. With this type, urine leaks when pressure (stress) is put on the bladder. This may happen when you cough, sneeze, or laugh. Stress incontinence most often occurs because the pelvic floor muscles that  support the bladder and urethra are weak. This can happen after pregnancy and vaginal childbirth or a hysterectomy. It can also be due to excess body weight or hormone changes.    Urge incontinence (also called overactive bladder). With this type, a sudden urge to urinate is felt often. This may happen even though there may not be much urine in the bladder. The need to urinate often during the night is common. Urge incontinence most often occurs because of bladder spasms. This may be due to bladder irritation or infection. Damage to bladder nerves or pelvic muscles, constipation, and certain medicines can also lead to urge incontinence.  Treatment depends on the cause. Further evaluation is needed to find the type you have. This will likely include an exam and certain tests. Based on the results, you and your healthcare provider can then plan treatment. Until a diagnosis is made, the home care tips below can help ease symptoms.   Home care    Do pelvic floor muscle exercises, if they are prescribed. The pelvic floor muscles help support the bladder and urethra. Many women find that their symptoms improve when doing special exercises that strengthen these muscles. To do the exercises, contract the muscles you would use to stop your stream of urine. But do this when you re not urinating. Hold for 10 seconds, then relax. Repeat 10 to 20 times in a row, at least 3 times a day. Your healthcare provider may give you other instructions for how to do the exercises and how often.    Keep a bladder diary. This helps track how often and how much you urinate over a set period of time. Bring this diary with you to your next visit with the provider. The information can help your provider learn more about your bladder problem.    Lose weight, if advised to by your provider. Extra weight puts pressure on the bladder. Your provider can help you create a weight-loss plan that s right for you. This may include exercising more and  making certain diet changes.    Don't have foods and drinks that may irritate the bladder. These can include alcohol and caffeinated drinks.    Quit smoking. Smoking and other tobacco use can lead to a long-term (chronic) cough that strains the pelvic floor muscles. Smoking may also damage the bladder and urethra. Talk with your provider about treatments or methods you can use to quit smoking.    If drinking large amounts of fluid makes you have symptoms, you may be advised to limit your fluid intake. You may also be advised to drink most of your fluids during the day and to limit fluids at night.    If you re worried about urine leakage or accidents, you may wear absorbent pads to catch urine. Change the pads often. This helps reduce discomfort. It may also reduce the risk of skin or bladder infections.    Follow-up care  Follow up with your healthcare provider, or as directed. It may take some to find the right treatment for your problem. But healthy lifestyle changes can be made right away. These include such things as exercising on a regular basis, eating a healthy diet, losing weight (if needed), and quitting smoking. Your treatment plan may include special therapies or medicines. Certain procedures or surgery may also be options. Talk about any questions you have with your provider.   When to seek medical advice  Call the healthcare provider right away if any of these occur:    Fever of 100.4 F (38 C) or higher, or as directed by your provider    Bladder pain or fullness    Belly swelling    Nausea or vomiting    Back pain    Weakness, dizziness, or fainting  Crissy last reviewed this educational content on 1/1/2020 2000-2021 [employer]. All rights reserved. This information is not intended as a substitute for professional medical care. Always follow your healthcare professional's instructions.

## 2021-10-20 NOTE — ASSESSMENT & PLAN NOTE
Followed by Dr. Lorenz with Pulmonology. Recently was on fluconazole, but did not tolerate the medication due to side effects. Has follow up with him coming up soon. I did message him to see if he has any other thoughts for what she should do in meantime, as she mentioned he was considering IV medications even. Symptomatically she appears stable.

## 2021-10-21 ENCOUNTER — MYC MEDICAL ADVICE (OUTPATIENT)
Dept: INTERNAL MEDICINE | Facility: CLINIC | Age: 76
End: 2021-10-21

## 2021-10-21 LAB — DEPRECATED CALCIDIOL+CALCIFEROL SERPL-MC: 40 UG/L (ref 30–80)

## 2021-10-21 NOTE — RESULT ENCOUNTER NOTE
José Tavera,  There are no signs of a vaginal or urinary tract infection.  Your white count has come down since the last time it was checked, which is good.  Your electrolytes, kidney function, liver tests are all normal.  Your cholesterol levels are stable.

## 2021-10-22 PROBLEM — N89.8 VAGINAL IRRITATION: Status: ACTIVE | Noted: 2021-10-22

## 2021-10-22 PROBLEM — J34.89 NASAL LESION: Status: ACTIVE | Noted: 2021-10-22

## 2021-10-22 NOTE — ASSESSMENT & PLAN NOTE
She reports that she has been developing nosebleeds and scab formation since using Mucomyst.  She does have a scab on exam today.  I will have her use nasal saline sprays as well as a topical mupirocin.  I did reach out to her pulmonologist to see if he believes the Mucomyst is playing a role.

## 2021-10-22 NOTE — ASSESSMENT & PLAN NOTE
There is some erythema today on her vulvar region. Treat with clobetasol x 2 weeks, follow up if not resolved

## 2021-10-26 LAB
BACTERIA BRONCH: NO GROWTH
BACTERIA BRONCH: NO GROWTH

## 2021-10-29 ENCOUNTER — VIRTUAL VISIT (OUTPATIENT)
Dept: PULMONOLOGY | Facility: OTHER | Age: 76
End: 2021-10-29
Payer: MEDICARE

## 2021-10-29 DIAGNOSIS — F41.9 ANXIETY: ICD-10-CM

## 2021-10-29 DIAGNOSIS — J47.9 BRONCHIECTASIS WITHOUT COMPLICATION (H): Primary | ICD-10-CM

## 2021-10-29 DIAGNOSIS — J96.11 CHRONIC RESPIRATORY FAILURE WITH HYPOXIA (H): ICD-10-CM

## 2021-10-29 PROCEDURE — 99214 OFFICE O/P EST MOD 30 MIN: CPT | Mod: 95 | Performed by: INTERNAL MEDICINE

## 2021-10-29 NOTE — PROGRESS NOTES
Ayse is a 76 year old who is being evaluated via a billable video visit.      How would you like to obtain your AVS? MyChart  If the video visit is dropped, the invitation should be resent by: Text to cell phone: [phone]  Will anyone else be joining your video visit? No      Video Start Time: 932    Video-Visit Details    Type of service:  Video Visit    Video End Time:955    Originating Location (pt. Location): Home    Distant Location (provider location):  [facility]     Platform used for Video Visit: [facility]     LUNG NODULE & INTERVENTIONAL PULMONARY CLINIC  CLINICS & SURGERY [place], [facility], [facility]     Fiordaliza Najera Sr. MRN# [id number]   Age: 76 year old YOB: 1945       Requesting Physician: No referring provider defined for this encounter.       Assessment and Plan:    1.  Bronchiectasis.  Possibly improved.  Continue current medications.  Recheck sputum culture.  Continue current nebulizers.    2.  Chronic hypoxic respiratory failure.  Continue oxygen with goal saturation 89% or greater.      3.  Anxiety.  Continue Lexapro.             History:     Fiordaliza Najera Sr. is a 76 year old female with sig h/o for bronchiectasis who is here for evaluation/followup of same.  She was treated with Levaquin without improvement.  We tried fluconazole because she also had a lot of Candida in her sputum but she did not tolerate this.  Presently she is doing quite a bit better.  She still short of breath with exertion but is having improving exercise capability with physical therapy and also is doing better with from an anxiety standpoint on Lexapro.    She is keeping up with her nebulizers.  She is never brought up a significant amount of sputum but is bringing up a little more now with the Mucomyst.    We discussed what it would look like to have a PICC line and get antibiotics at home.  She is very hesitant to do this.            Past Medical History:      Past Medical History:   Diagnosis Date     Anxiety 09/30/2021     COPD (chronic obstructive pulmonary disease) (H)      Diverticulosis      Hiatal hernia      Mild asthma      Neuropathy      Osteopenia      Temporomandibular joint (TMJ) pain            Past Surgical History:      Past Surgical History:   Procedure Laterality Date     ANTERIOR / POSTERIOR COMBINED FUSION LUMBAR SPINE       BRONCHOSCOPY (RIGID OR FLEXIBLE), DIAGNOSTIC N/A 9/28/2021    Procedure: BRONCHOSCOPY, WITH BRONCHOALVEOLAR LAVAGE;  Surgeon: Randall Lorenz MD;  Location:  GI     HYSTERECTOMY       RETINAL LASER PROCEDURE Left 10/04/2016     SALPINGOOPHORECTOMY       TOTAL KNEE ARTHROPLASTY  2008          Social History:     Social History     Tobacco Use     Smoking status: Never Smoker     Smokeless tobacco: Never Used   Substance Use Topics     Alcohol use: No          Family History:     Family History   Problem Relation Age of Onset     Dementia Mother         passed age 88     Chronic Obstructive Pulmonary Disease Father         passed age 78           Allergies:      Allergies   Allergen Reactions     Codeine Unknown     Morphine Itching          Medications:     Current Outpatient Medications   Medication Sig     acetylcysteine (MUCOMYST) 10 % nebulizer solution Inhale 4 mLs into the lungs daily     albuterol (PROAIR HFA;PROVENTIL HFA;VENTOLIN HFA) 90 mcg/actuation inhaler [ALBUTEROL (PROAIR HFA;PROVENTIL HFA;VENTOLIN HFA) 90 MCG/ACTUATION INHALER] Inhale 2 puffs every 6 (six) hours as needed for wheezing.     albuterol (PROVENTIL) (2.5 MG/3ML) 0.083% neb solution Take 1 vial (2.5 mg) by nebulization every 4 hours as needed for shortness of breath / dyspnea or wheezing     alendronate (FOSAMAX) 70 MG tablet TAKE 1 TABLET (70 MG) BY MOUTH EVERY 7 DAYS. TAKE IN THE MORNING ON AN EMPTY STOMACH WITH A FULL GLASS OF WATER 30 MINUTES BEFORE FOOD.     BIOTIN ORAL [BIOTIN ORAL] Take by mouth.     calcium-vitamin  D (CALCIUM-VITAMIN D) 500 mg(1,250mg) -200 unit per tablet [CALCIUM-VITAMIN D (CALCIUM-VITAMIN D) 500 MG(1,250MG) -200 UNIT PER TABLET] Take 1 tablet by mouth daily.     celecoxib (CELEBREX) 200 MG capsule [CELECOXIB (CELEBREX) 200 MG CAPSULE] Take 1 capsule (200 mg total) by mouth daily.     cholecalciferol, vitamin D3, (VITAMIN D3) 1,000 unit capsule [CHOLECALCIFEROL, VITAMIN D3, (VITAMIN D3) 1,000 UNIT CAPSULE] Take 1,000 Units by mouth daily.     clobetasol (TEMOVATE) 0.05 % external cream Apply thin layer to vulva/outer vagina twice a day for 14 days.     escitalopram (LEXAPRO) 5 MG tablet Take 1 tablet (5 mg) by mouth daily     Fluticasone-Umeclidin-Vilanterol (TRELEGY ELLIPTA) 100-62.5-25 MCG/INH oral inhaler Inhale 1 puff into the lungs daily     Lactobacillus rhamnosus GG (CULTURELLE) 10-15 Billion cell capsule [LACTOBACILLUS RHAMNOSUS GG (CULTURELLE) 10-15 BILLION CELL CAPSULE] Take 1 capsule by mouth daily.     montelukast (SINGULAIR) 10 mg tablet [MONTELUKAST (SINGULAIR) 10 MG TABLET] TAKE 1 TABLET(10 MG) BY MOUTH AT BEDTIME     multivitamin therapeutic (THERAGRAN) tablet [MULTIVITAMIN THERAPEUTIC (THERAGRAN) TABLET] Take 1 tablet by mouth daily.     mupirocin (BACTROBAN) 2 % external ointment Apply thin layer to nasal lesion 3 times a day for 7-10 days.     nystatin (MYCOSTATIN) 614042 UNIT/GM external cream Apply to rash on body 3 times per day as needed.     OXYGEN-AIR DELIVERY SYSTEMS MISC [OXYGEN-AIR DELIVERY SYSTEMS MIS] Use 2 L As Directed. 2L with activity and at night  [employer]     pregabalin (LYRICA) 50 MG capsule Take 50mg in the morning and 100mg in the evening.     sodium chloride (OCEAN) 0.65 % nasal spray Spray 2 sprays in nostril daily     sodium chloride 3 % nebulizer solution [SODIUM CHLORIDE 3 % NEBULIZER SOLUTION] Take 3 mL by nebulization 2 (two) times a day.     SUMAtriptan (IMITREX) 50 MG tablet [SUMATRIPTAN (IMITREX) 50 MG TABLET] Take 1 tablet (50 mg total) by mouth every 2 (two)  hours as needed for migraine.     traMADoL (ULTRAM) 50 mg tablet [TRAMADOL (ULTRAM) 50 MG TABLET] ONE TAB every 12 hours as needed for pain     No current facility-administered medications for this visit.          Review of Systems:     See HPI         Physical Exam:   There were no vitals taken for this visit.    Constitutional - looks well, in no apparent distress  Eyes - no redness or discharge  Respiratory -breathing appears comfortable. No wheeze or rhonchi.   Skin - No appreciable discoloration or lesions (very limited exam)  Neurological - No apparent tremors. Speech fluent and articlate  Psychiatric - no signs of delirium or anxiety          Current Laboratory Data:   All laboratory and imaging data reviewed.    No results found for this or any previous visit (from the past 24 hour(s)).

## 2021-11-02 ENCOUNTER — TRANSFERRED RECORDS (OUTPATIENT)
Dept: HEALTH INFORMATION MANAGEMENT | Facility: CLINIC | Age: 76
End: 2021-11-02
Payer: MEDICARE

## 2021-11-02 LAB — COLOGUARD-ABSTRACT: NEGATIVE

## 2021-11-03 ENCOUNTER — LAB (OUTPATIENT)
Dept: LAB | Facility: CLINIC | Age: 76
End: 2021-11-03
Payer: MEDICARE

## 2021-11-03 DIAGNOSIS — J44.1 COPD EXACERBATION (H): Primary | ICD-10-CM

## 2021-11-03 PROCEDURE — 99000 SPECIMEN HANDLING OFFICE-LAB: CPT

## 2021-11-03 PROCEDURE — 87206 SMEAR FLUORESCENT/ACID STAI: CPT | Mod: 90

## 2021-11-03 PROCEDURE — 87070 CULTURE OTHR SPECIMN AEROBIC: CPT

## 2021-11-03 PROCEDURE — 87077 CULTURE AEROBIC IDENTIFY: CPT

## 2021-11-03 PROCEDURE — 87116 MYCOBACTERIA CULTURE: CPT | Mod: 90

## 2021-11-03 PROCEDURE — 87205 SMEAR GRAM STAIN: CPT

## 2021-11-03 PROCEDURE — 87186 SC STD MICRODIL/AGAR DIL: CPT

## 2021-11-04 ENCOUNTER — VIRTUAL VISIT (OUTPATIENT)
Dept: PULMONOLOGY | Facility: OTHER | Age: 76
End: 2021-11-04
Payer: MEDICARE

## 2021-11-04 ENCOUNTER — HOSPITAL ENCOUNTER (INPATIENT)
Facility: HOSPITAL | Age: 76
LOS: 4 days | Discharge: NURSING, REHAB OR LONG-TERM CARE | DRG: 191 | End: 2021-11-08
Attending: HOSPITALIST | Admitting: FAMILY MEDICINE
Payer: MEDICARE

## 2021-11-04 ENCOUNTER — APPOINTMENT (OUTPATIENT)
Dept: RADIOLOGY | Facility: HOSPITAL | Age: 76
DRG: 191 | End: 2021-11-04
Attending: FAMILY MEDICINE
Payer: MEDICARE

## 2021-11-04 DIAGNOSIS — G89.29 CHRONIC MIDLINE LOW BACK PAIN WITHOUT SCIATICA: ICD-10-CM

## 2021-11-04 DIAGNOSIS — G89.4 CHRONIC PAIN SYNDROME: ICD-10-CM

## 2021-11-04 DIAGNOSIS — J47.1 BRONCHIECTASIS WITH ACUTE EXACERBATION (H): Primary | ICD-10-CM

## 2021-11-04 DIAGNOSIS — M54.50 CHRONIC MIDLINE LOW BACK PAIN WITHOUT SCIATICA: ICD-10-CM

## 2021-11-04 PROBLEM — D72.829 LEUKOCYTOSIS: Status: ACTIVE | Noted: 2021-11-04

## 2021-11-04 PROBLEM — J47.9 BRONCHIECTASIS (H): Status: ACTIVE | Noted: 2021-11-04

## 2021-11-04 LAB
ANION GAP SERPL CALCULATED.3IONS-SCNC: 7 MMOL/L (ref 5–18)
BNP SERPL-MCNC: 33 PG/ML (ref 0–140)
BUN SERPL-MCNC: 12 MG/DL (ref 8–28)
CALCIUM SERPL-MCNC: 9.9 MG/DL (ref 8.5–10.5)
CHLORIDE BLD-SCNC: 99 MMOL/L (ref 98–107)
CO2 SERPL-SCNC: 31 MMOL/L (ref 22–31)
CREAT SERPL-MCNC: 0.58 MG/DL (ref 0.6–1.1)
ERYTHROCYTE [DISTWIDTH] IN BLOOD BY AUTOMATED COUNT: 12.6 % (ref 10–15)
GFR SERPL CREATININE-BSD FRML MDRD: 90 ML/MIN/1.73M2
GLUCOSE BLD-MCNC: 98 MG/DL (ref 70–125)
HCT VFR BLD AUTO: 39.4 % (ref 35–47)
HGB BLD-MCNC: 12.5 G/DL (ref 11.7–15.7)
LACTATE SERPL-SCNC: 0.5 MMOL/L (ref 0.7–2)
MCH RBC QN AUTO: 28.9 PG (ref 26.5–33)
MCHC RBC AUTO-ENTMCNC: 31.7 G/DL (ref 31.5–36.5)
MCV RBC AUTO: 91 FL (ref 78–100)
PLATELET # BLD AUTO: 355 10E3/UL (ref 150–450)
POTASSIUM BLD-SCNC: 4.3 MMOL/L (ref 3.5–5)
RBC # BLD AUTO: 4.33 10E6/UL (ref 3.8–5.2)
SODIUM SERPL-SCNC: 137 MMOL/L (ref 136–145)
WBC # BLD AUTO: 12.4 10E3/UL (ref 4–11)

## 2021-11-04 PROCEDURE — 250N000009 HC RX 250: Performed by: FAMILY MEDICINE

## 2021-11-04 PROCEDURE — 71045 X-RAY EXAM CHEST 1 VIEW: CPT

## 2021-11-04 PROCEDURE — 83880 ASSAY OF NATRIURETIC PEPTIDE: CPT | Performed by: FAMILY MEDICINE

## 2021-11-04 PROCEDURE — 99215 OFFICE O/P EST HI 40 MIN: CPT | Mod: 95 | Performed by: INTERNAL MEDICINE

## 2021-11-04 PROCEDURE — 85027 COMPLETE CBC AUTOMATED: CPT | Performed by: FAMILY MEDICINE

## 2021-11-04 PROCEDURE — 36569 INSJ PICC 5 YR+ W/O IMAGING: CPT

## 2021-11-04 PROCEDURE — 99207 PR CDG-CODE INCORRECT PER BILLING BASED ON TIME: CPT | Performed by: FAMILY MEDICINE

## 2021-11-04 PROCEDURE — 120N000001 HC R&B MED SURG/OB

## 2021-11-04 PROCEDURE — 83605 ASSAY OF LACTIC ACID: CPT | Performed by: FAMILY MEDICINE

## 2021-11-04 PROCEDURE — 93005 ELECTROCARDIOGRAM TRACING: CPT

## 2021-11-04 PROCEDURE — 99222 1ST HOSP IP/OBS MODERATE 55: CPT | Mod: AI | Performed by: FAMILY MEDICINE

## 2021-11-04 PROCEDURE — 36415 COLL VENOUS BLD VENIPUNCTURE: CPT | Performed by: FAMILY MEDICINE

## 2021-11-04 PROCEDURE — 272N000450 HC KIT 4FR POWER PICC SINGLE LUMEN

## 2021-11-04 PROCEDURE — 80048 BASIC METABOLIC PNL TOTAL CA: CPT | Performed by: FAMILY MEDICINE

## 2021-11-04 PROCEDURE — 999N000157 HC STATISTIC RCP TIME EA 10 MIN

## 2021-11-04 PROCEDURE — 94640 AIRWAY INHALATION TREATMENT: CPT

## 2021-11-04 PROCEDURE — 250N000012 HC RX MED GY IP 250 OP 636 PS 637: Performed by: FAMILY MEDICINE

## 2021-11-04 PROCEDURE — 93010 ELECTROCARDIOGRAM REPORT: CPT | Mod: HIP | Performed by: INTERNAL MEDICINE

## 2021-11-04 PROCEDURE — 999N000205 HC STATISTICAL VASC ACCESS NURSE TIME, 46-60 MINUTES

## 2021-11-04 PROCEDURE — 250N000013 HC RX MED GY IP 250 OP 250 PS 637: Performed by: FAMILY MEDICINE

## 2021-11-04 PROCEDURE — 250N000011 HC RX IP 250 OP 636: Performed by: FAMILY MEDICINE

## 2021-11-04 RX ORDER — NALOXONE HYDROCHLORIDE 0.4 MG/ML
0.4 INJECTION, SOLUTION INTRAMUSCULAR; INTRAVENOUS; SUBCUTANEOUS
Status: DISCONTINUED | OUTPATIENT
Start: 2021-11-04 | End: 2021-11-08 | Stop reason: HOSPADM

## 2021-11-04 RX ORDER — PREGABALIN 100 MG/1
100 CAPSULE ORAL AT BEDTIME
Status: DISCONTINUED | OUTPATIENT
Start: 2021-11-04 | End: 2021-11-08 | Stop reason: HOSPADM

## 2021-11-04 RX ORDER — ACETAMINOPHEN 650 MG/1
650 SUPPOSITORY RECTAL EVERY 6 HOURS PRN
Status: DISCONTINUED | OUTPATIENT
Start: 2021-11-04 | End: 2021-11-08 | Stop reason: HOSPADM

## 2021-11-04 RX ORDER — ESCITALOPRAM OXALATE 5 MG/1
5 TABLET ORAL DAILY
Status: DISCONTINUED | OUTPATIENT
Start: 2021-11-05 | End: 2021-11-08 | Stop reason: HOSPADM

## 2021-11-04 RX ORDER — PROCHLORPERAZINE MALEATE 5 MG
5 TABLET ORAL EVERY 6 HOURS PRN
Status: DISCONTINUED | OUTPATIENT
Start: 2021-11-04 | End: 2021-11-08 | Stop reason: HOSPADM

## 2021-11-04 RX ORDER — ACETYLCYSTEINE 100 MG/ML
4 SOLUTION ORAL; RESPIRATORY (INHALATION) 2 TIMES DAILY
Status: DISCONTINUED | OUTPATIENT
Start: 2021-11-05 | End: 2021-11-08 | Stop reason: HOSPADM

## 2021-11-04 RX ORDER — ALBUTEROL SULFATE 90 UG/1
2 AEROSOL, METERED RESPIRATORY (INHALATION) EVERY 6 HOURS PRN
Status: DISCONTINUED | OUTPATIENT
Start: 2021-11-04 | End: 2021-11-08 | Stop reason: HOSPADM

## 2021-11-04 RX ORDER — ALBUTEROL SULFATE 0.83 MG/ML
2.5 SOLUTION RESPIRATORY (INHALATION) EVERY 4 HOURS PRN
Status: DISCONTINUED | OUTPATIENT
Start: 2021-11-04 | End: 2021-11-08 | Stop reason: HOSPADM

## 2021-11-04 RX ORDER — IPRATROPIUM BROMIDE AND ALBUTEROL SULFATE 2.5; .5 MG/3ML; MG/3ML
3 SOLUTION RESPIRATORY (INHALATION)
Status: DISCONTINUED | OUTPATIENT
Start: 2021-11-04 | End: 2021-11-04

## 2021-11-04 RX ORDER — IPRATROPIUM BROMIDE AND ALBUTEROL SULFATE 2.5; .5 MG/3ML; MG/3ML
3 SOLUTION RESPIRATORY (INHALATION) EVERY 4 HOURS PRN
Status: DISCONTINUED | OUTPATIENT
Start: 2021-11-04 | End: 2021-11-08 | Stop reason: HOSPADM

## 2021-11-04 RX ORDER — NALOXONE HYDROCHLORIDE 0.4 MG/ML
0.2 INJECTION, SOLUTION INTRAMUSCULAR; INTRAVENOUS; SUBCUTANEOUS
Status: DISCONTINUED | OUTPATIENT
Start: 2021-11-04 | End: 2021-11-08 | Stop reason: HOSPADM

## 2021-11-04 RX ORDER — ACETYLCYSTEINE 200 MG/ML
2 SOLUTION ORAL; RESPIRATORY (INHALATION) 4 TIMES DAILY
Status: DISCONTINUED | OUTPATIENT
Start: 2021-11-04 | End: 2021-11-04

## 2021-11-04 RX ORDER — ACETAMINOPHEN 325 MG/1
650 TABLET ORAL EVERY 6 HOURS PRN
Status: DISCONTINUED | OUTPATIENT
Start: 2021-11-04 | End: 2021-11-08 | Stop reason: HOSPADM

## 2021-11-04 RX ORDER — SUMATRIPTAN 50 MG/1
50 TABLET, FILM COATED ORAL
Status: DISCONTINUED | OUTPATIENT
Start: 2021-11-04 | End: 2021-11-08 | Stop reason: HOSPADM

## 2021-11-04 RX ORDER — PIPERACILLIN SODIUM, TAZOBACTAM SODIUM 3; .375 G/15ML; G/15ML
3.38 INJECTION, POWDER, LYOPHILIZED, FOR SOLUTION INTRAVENOUS EVERY 8 HOURS
Status: DISCONTINUED | OUTPATIENT
Start: 2021-11-05 | End: 2021-11-08 | Stop reason: HOSPADM

## 2021-11-04 RX ORDER — PROCHLORPERAZINE 25 MG
12.5 SUPPOSITORY, RECTAL RECTAL EVERY 12 HOURS PRN
Status: DISCONTINUED | OUTPATIENT
Start: 2021-11-04 | End: 2021-11-08 | Stop reason: HOSPADM

## 2021-11-04 RX ORDER — PREDNISONE 20 MG/1
40 TABLET ORAL DAILY
Status: DISCONTINUED | OUTPATIENT
Start: 2021-11-04 | End: 2021-11-08 | Stop reason: HOSPADM

## 2021-11-04 RX ORDER — ONDANSETRON 2 MG/ML
4 INJECTION INTRAMUSCULAR; INTRAVENOUS EVERY 6 HOURS PRN
Status: DISCONTINUED | OUTPATIENT
Start: 2021-11-04 | End: 2021-11-08 | Stop reason: HOSPADM

## 2021-11-04 RX ORDER — MUPIROCIN 20 MG/G
OINTMENT TOPICAL 3 TIMES DAILY
Status: DISCONTINUED | OUTPATIENT
Start: 2021-11-04 | End: 2021-11-08 | Stop reason: HOSPADM

## 2021-11-04 RX ORDER — LIDOCAINE 40 MG/G
CREAM TOPICAL
Status: DISCONTINUED | OUTPATIENT
Start: 2021-11-04 | End: 2021-11-08 | Stop reason: HOSPADM

## 2021-11-04 RX ORDER — ONDANSETRON 4 MG/1
4 TABLET, ORALLY DISINTEGRATING ORAL EVERY 6 HOURS PRN
Status: DISCONTINUED | OUTPATIENT
Start: 2021-11-04 | End: 2021-11-08 | Stop reason: HOSPADM

## 2021-11-04 RX ORDER — TRAMADOL HYDROCHLORIDE 50 MG/1
50 TABLET ORAL EVERY 12 HOURS PRN
Status: DISCONTINUED | OUTPATIENT
Start: 2021-11-04 | End: 2021-11-08 | Stop reason: HOSPADM

## 2021-11-04 RX ORDER — LIDOCAINE 40 MG/G
CREAM TOPICAL
Status: ACTIVE | OUTPATIENT
Start: 2021-11-04 | End: 2021-11-07

## 2021-11-04 RX ORDER — CELECOXIB 200 MG/1
200 CAPSULE ORAL DAILY
Status: DISCONTINUED | OUTPATIENT
Start: 2021-11-05 | End: 2021-11-08 | Stop reason: HOSPADM

## 2021-11-04 RX ORDER — NYSTATIN 100000 U/G
CREAM TOPICAL 3 TIMES DAILY PRN
Status: DISCONTINUED | OUTPATIENT
Start: 2021-11-04 | End: 2021-11-08 | Stop reason: HOSPADM

## 2021-11-04 RX ORDER — PIPERACILLIN SODIUM, TAZOBACTAM SODIUM 3; .375 G/15ML; G/15ML
3.38 INJECTION, POWDER, LYOPHILIZED, FOR SOLUTION INTRAVENOUS ONCE
Status: COMPLETED | OUTPATIENT
Start: 2021-11-04 | End: 2021-11-04

## 2021-11-04 RX ORDER — PREGABALIN 25 MG/1
50 CAPSULE ORAL DAILY
Status: DISCONTINUED | OUTPATIENT
Start: 2021-11-05 | End: 2021-11-08 | Stop reason: HOSPADM

## 2021-11-04 RX ORDER — SODIUM CHLORIDE FOR INHALATION 3 %
3 VIAL, NEBULIZER (ML) INHALATION 2 TIMES DAILY
Status: DISCONTINUED | OUTPATIENT
Start: 2021-11-04 | End: 2021-11-08 | Stop reason: HOSPADM

## 2021-11-04 RX ORDER — POLYETHYLENE GLYCOL 3350 17 G/17G
17 POWDER, FOR SOLUTION ORAL DAILY
Status: DISCONTINUED | OUTPATIENT
Start: 2021-11-04 | End: 2021-11-08 | Stop reason: HOSPADM

## 2021-11-04 RX ORDER — MONTELUKAST SODIUM 10 MG/1
10 TABLET ORAL AT BEDTIME
Status: DISCONTINUED | OUTPATIENT
Start: 2021-11-04 | End: 2021-11-08 | Stop reason: HOSPADM

## 2021-11-04 RX ORDER — AMOXICILLIN 250 MG
1 CAPSULE ORAL 2 TIMES DAILY PRN
Status: DISCONTINUED | OUTPATIENT
Start: 2021-11-04 | End: 2021-11-08 | Stop reason: HOSPADM

## 2021-11-04 RX ORDER — AMOXICILLIN 250 MG
2 CAPSULE ORAL 2 TIMES DAILY PRN
Status: DISCONTINUED | OUTPATIENT
Start: 2021-11-04 | End: 2021-11-08 | Stop reason: HOSPADM

## 2021-11-04 RX ORDER — LACTOBACILLUS RHAMNOSUS GG 10B CELL
1 CAPSULE ORAL 2 TIMES DAILY
Status: DISCONTINUED | OUTPATIENT
Start: 2021-11-04 | End: 2021-11-08 | Stop reason: HOSPADM

## 2021-11-04 RX ORDER — VITAMIN B COMPLEX
25 TABLET ORAL DAILY
Status: DISCONTINUED | OUTPATIENT
Start: 2021-11-05 | End: 2021-11-08 | Stop reason: HOSPADM

## 2021-11-04 RX ADMIN — ACETAMINOPHEN 650 MG: 325 TABLET ORAL at 21:50

## 2021-11-04 RX ADMIN — SODIUM CHLORIDE 30 MG/ML INHALATION SOLUTION 3 ML: 30 SOLUTION INHALANT at 21:23

## 2021-11-04 RX ADMIN — PREGABALIN 100 MG: 100 CAPSULE ORAL at 23:14

## 2021-11-04 RX ADMIN — IPRATROPIUM BROMIDE AND ALBUTEROL SULFATE 3 ML: .5; 3 SOLUTION RESPIRATORY (INHALATION) at 21:23

## 2021-11-04 RX ADMIN — ENOXAPARIN SODIUM 40 MG: 40 INJECTION SUBCUTANEOUS at 21:39

## 2021-11-04 RX ADMIN — Medication 1 CAPSULE: at 23:15

## 2021-11-04 RX ADMIN — MUPIROCIN: 20 OINTMENT TOPICAL at 23:15

## 2021-11-04 RX ADMIN — ACETYLCYSTEINE 2 ML: 200 SOLUTION ORAL; RESPIRATORY (INHALATION) at 21:23

## 2021-11-04 RX ADMIN — POLYETHYLENE GLYCOL 3350 17 G: 17 POWDER, FOR SOLUTION ORAL at 21:45

## 2021-11-04 RX ADMIN — PREDNISONE 40 MG: 20 TABLET ORAL at 20:16

## 2021-11-04 RX ADMIN — PIPERACILLIN SODIUM AND TAZOBACTAM SODIUM 3.38 G: 3; .375 INJECTION, POWDER, LYOPHILIZED, FOR SOLUTION INTRAVENOUS at 21:40

## 2021-11-04 RX ADMIN — MONTELUKAST 10 MG: 10 TABLET, FILM COATED ORAL at 23:14

## 2021-11-04 ASSESSMENT — ACTIVITIES OF DAILY LIVING (ADL)
ADLS_ACUITY_SCORE: 7
DRESSING/BATHING_DIFFICULTY: NO
WALKING_OR_CLIMBING_STAIRS_DIFFICULTY: NO
ADLS_ACUITY_SCORE: 12
DOING_ERRANDS_INDEPENDENTLY_DIFFICULTY: YES
DIFFICULTY_EATING/SWALLOWING: NO
FALL_HISTORY_WITHIN_LAST_SIX_MONTHS: NO
TOILETING_ISSUES: NO
HEARING_DIFFICULTY_OR_DEAF: NO
ADLS_ACUITY_SCORE: 12
CONCENTRATING,_REMEMBERING_OR_MAKING_DECISIONS_DIFFICULTY: NO
EQUIPMENT_CURRENTLY_USED_AT_HOME: WALKER, ROLLING
WEAR_GLASSES_OR_BLIND: YES
DIFFICULTY_COMMUNICATING: NO
ADLS_ACUITY_SCORE: 12

## 2021-11-04 NOTE — H&P
Admission History & Physical  Fiordaliza Najera Sr., 1945, [id number]    [facility]  [unfilled]  Ekaterina Koehler, [phone]   Code Status:  No CPR- Do NOT Intubate     Extended Emergency Contact Information  Primary Emergency Contact: Roya Stiles  Address:  [address]           [place] [address] [facility]  Home Phone: [phone]  Mobile Phone: [phone]  Relation: Sister  Secondary Emergency Contact: Fiordaliza Huizar [facility]  Address: [id number]           [place] [address] [facility]  Home Phone: [phone]  Mobile Phone: [phone]  Relation: Other     Assessment and Plan:   76-year-old female with chronic oxygen needs secondary to COPD, bronchiectasis presented as direct admission for treatment of bronchiectasis exacerbation as well as pulmonary bacteria not treatable with oral antibiotics.      Active Problems:    Bronchiectasis with (acute) exacerbation (H)    COPD (chronic obstructive pulmonary disease) (H)    Chronic respiratory failure with hypoxia, on home O2 therapy (H)    Bronchiectasis (H)    Leukocytosis    Mood disorder (H)    Chronic pain    Present on Admission:    Bronchiectasis (H)      Acute bronchiectasis exacerbation  -Patient with persistent symptoms of shortness of breath, cough after bronchoscopy performed 9/28/2021.  Sputum culture from that encounter growing achromobacter xylosoxidans.  Patient [facility] and 9/29/2021 and was admitted for bronchiectasis exacerbation, discharged 9/30/2021.  At that time she was discharged on 7 days of levofloxacin and inhaler therapy switched to Trelegy.  Patient continued to have worsening symptoms and her primary pulmonologist Dr. Randall Lorenz suggested admission for IV antibiotics as has failed outpatient management. EKG sinus rhythm, BNP normal, unlikely cardiac component.  -CXR  -Start Zosyn IV  -Mucomyst 2 times daily  -Sodium chloride neb twice daily  -Prednisone 40 mg po  daily  -Continue home Trelegy  -Continue home montelukast  -Pulmonology recommended PICC line placement, will need 2 weeks -IV antibiotics  -Pulmonology consult placed    Leukocytosis  -Likely secondary to steroids patient has been taking in the outpatient setting. Afebrile, without chills or other signs of infection.    Chronic hypoxic respiratory failure  -Patient uses 2 L oxygen at baseline for her known COPD and bronchiectasis.    Mood disorder  -Citalopram 5 mg p.o. daily    Chronic pain  -Pregabalin 50 mg p.o. daily and 100 mg p.o. q. bedtime  -Celexa zip 200 mg p.o. daily  -Continue home as needed tramadol    Electrolytes: Currently within normal limits  Fluids: P.o.  Diet: Regular  VTE prophylaxis: Lovenox        COVID19 symptom check 11/4/2021  Fevers/Chills/myalgias: NEGATIVE TO ALL  Sick contacts/COVID19 exposure: NEGATIVE TO ALL  Cough/trouble breathing/SOB/sore throat: Positive for difficulty breathing and cough  Diarrhea: NEGATIVE    Patient has received 2 doses of the Moderna COVID-19 vaccine      Chief Complaint:  Shortness of breath     HPI:    Fiordaliza Najera Sr. is a 76 year old old female with chronic hypoxic respiratory failure, bronchiectasis, COPD, mood disorder, chronic pain who presents with worsening shortness of breath and cough. Patient was directly admitted by Dr. Lorenz for worsening bronchiectasis exacerbation. Patient underwent bronchoscopy about 5 weeks ago and ever since that time has had difficulty with breathing and increased cough. She denies any fevers, chills but does denies some soft/loose stool. She becomes easily short of breath and fatigued with ambulation. Denies any chest pain. Patient endorses some unsteadiness on her legs and feet that she started in the past 2 days.    History is provided by patient    Medical History  Present on Admission:    Bronchiectasis (H)    COPD (chronic obstructive pulmonary disease) (H)    Chronic respiratory failure with hypoxia, on  home O2 therapy (H)     Past Medical History:   Diagnosis Date     Anxiety 09/30/2021     COPD (chronic obstructive pulmonary disease) (H)      Diverticulosis      Hiatal hernia      Mild asthma      Neuropathy      Osteopenia      Temporomandibular joint (TMJ) pain      Patient Active Problem List   Diagnosis     Osteopenia     Bronchiectasis with acute exacerbation (H)     Idiopathic peripheral neuropathy     COPD (chronic obstructive pulmonary disease) (H)     Abnormality of gait     Back pain     Diaphragmatic hernia     Other kyphoscoliosis and scoliosis     Chronic respiratory failure with hypoxia, on home O2 therapy (H)     Anxiety     Migraine without aura and without status migrainosus, not intractable     Carotid artery aneurysm (H)     Screening for colon cancer     Vaginal irritation     Nasal lesion     Surgical History  She  has a past surgical history that includes Hysterectomy; Salpingoophorectomy; Anterior / Posterior Combined Fusion Lumbar Spine; Total Knee Arthroplasty (2008); Retinal Laser Procedure (Left, 10/04/2016); and Bronchoscopy (rigid or flexible), diagnostic (N/A, 9/28/2021).     Past Surgical History:   Procedure Laterality Date     ANTERIOR / POSTERIOR COMBINED FUSION LUMBAR SPINE       BRONCHOSCOPY (RIGID OR FLEXIBLE), DIAGNOSTIC N/A 9/28/2021    Procedure: BRONCHOSCOPY, WITH BRONCHOALVEOLAR LAVAGE;  Surgeon: Randall Lorenz MD;  Location:  GI     HYSTERECTOMY       RETINAL LASER PROCEDURE Left 10/04/2016     SALPINGOOPHORECTOMY       TOTAL KNEE ARTHROPLASTY  2008    Social History  Reviewed, and she  reports that she has never smoked. She has never used smokeless tobacco. She reports that she does not drink alcohol and does not use drugs.  Social History     Tobacco Use     Smoking status: Never Smoker     Smokeless tobacco: Never Used   Substance Use Topics     Alcohol use: No      Allergies  Allergies   Allergen Reactions     Codeine Unknown     Morphine Itching    Family  History  Reviewed, and family history includes Chronic Obstructive Pulmonary Disease in her father; Dementia in her mother.   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  She was a [employer] sister with [employer].  She has a sister and brother-in-law, 2 nieces, 3 grand nieces and nephews in [place].  She came to [place] for a sabbatical, and then  stayed on for a teaching job.  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  She lives alone in a long-term facility. - Dr. Nguyễn 01/04/21       Additional psychosocial needs reviewed per nursing assessment.       Prior to Admission Medications   Medications Prior to Admission   Medication Sig Dispense Refill Last Dose     acetylcysteine (MUCOMYST) 10 % nebulizer solution Inhale 4 mLs into the lungs daily 120 mL 11 11/2/2021 at Unknown time     albuterol (PROAIR HFA;PROVENTIL HFA;VENTOLIN HFA) 90 mcg/actuation inhaler [ALBUTEROL (PROAIR HFA;PROVENTIL HFA;VENTOLIN HFA) 90 MCG/ACTUATION INHALER] Inhale 2 puffs every 6 (six) hours as needed for wheezing. 18 g 11 11/4/2021 at Unknown time     albuterol (PROVENTIL) (2.5 MG/3ML) 0.083% neb solution Take 1 vial (2.5 mg) by nebulization every 4 hours as needed for shortness of breath / dyspnea or wheezing 150 mL 11 11/2/2021 at Unknown time     alendronate (FOSAMAX) 70 MG tablet TAKE 1 TABLET (70 MG) BY MOUTH EVERY 7 DAYS. TAKE IN THE MORNING ON AN EMPTY STOMACH WITH A FULL GLASS OF WATER 30 MINUTES BEFORE FOOD. 12 tablet 0 10/31/2021 at Unknown time     BIOTIN ORAL Take 1 tablet by mouth daily    11/4/2021 at Unknown time     calcium-vitamin D (CALCIUM-VITAMIN D) 500 mg(1,250mg) -200 unit per tablet [CALCIUM-VITAMIN D (CALCIUM-VITAMIN D) 500 MG(1,250MG) -200 UNIT PER TABLET] Take 1 tablet by mouth daily.   11/4/2021 at Unknown time     celecoxib (CELEBREX) 200 MG capsule [CELECOXIB (CELEBREX) 200 MG CAPSULE] Take 1  capsule (200 mg total) by mouth daily. 90 capsule 3 11/2/2021 at Unknown time     cholecalciferol, vitamin D3, (VITAMIN D3) 1,000 unit capsule [CHOLECALCIFEROL, VITAMIN D3, (VITAMIN D3) 1,000 UNIT CAPSULE] Take 1,000 Units by mouth daily.   11/4/2021 at Unknown time     escitalopram (LEXAPRO) 5 MG tablet Take 1 tablet (5 mg) by mouth daily 30 tablet 3 11/4/2021 at Unknown time     Fluticasone-Umeclidin-Vilanterol (TRELEGY ELLIPTA) 100-62.5-25 MCG/INH oral inhaler Inhale 1 puff into the lungs daily 60 each 11 11/4/2021 at Unknown time     Lactobacillus rhamnosus GG (CULTURELLE) 10-15 Billion cell capsule Take 1 capsule by mouth every evening    11/3/2021 at Unknown time     montelukast (SINGULAIR) 10 mg tablet [MONTELUKAST (SINGULAIR) 10 MG TABLET] TAKE 1 TABLET(10 MG) BY MOUTH AT BEDTIME 90 tablet 3 11/3/2021 at Unknown time     multivitamin therapeutic (THERAGRAN) tablet [MULTIVITAMIN THERAPEUTIC (THERAGRAN) TABLET] Take 1 tablet by mouth daily.   11/4/2021 at Unknown time     mupirocin (BACTROBAN) 2 % external ointment Apply thin layer to nasal lesion 3 times a day for 7-10 days. 30 g 0 11/3/2021 at Unknown time     nystatin (MYCOSTATIN) 650255 UNIT/GM external cream Apply to rash on body 3 times per day as needed. 30 g 1 Past Month at Unknown time     pregabalin (LYRICA) 50 MG capsule Take 50mg in the morning and 100mg in the evening. 90 capsule 4 11/4/2021 at Unknown time     sodium chloride (OCEAN) 0.65 % nasal spray Spray 2 sprays in nostril daily 88 mL 0 11/4/2021 at Unknown time     sodium chloride 3 % nebulizer solution [SODIUM CHLORIDE 3 % NEBULIZER SOLUTION] Take 3 mL by nebulization 2 (two) times a day. 180 mL 11 11/4/2021 at Unknown time     SUMAtriptan (IMITREX) 50 MG tablet [SUMATRIPTAN (IMITREX) 50 MG TABLET] Take 1 tablet (50 mg total) by mouth every 2 (two) hours as needed for migraine. 30 tablet 1 More than a month at Unknown time     traMADoL (ULTRAM) 50 mg tablet [TRAMADOL (ULTRAM) 50 MG TABLET]  ONE TAB every 12 hours as needed for pain 60 tablet 0 More than a month at Unknown time     OXYGEN-AIR DELIVERY SYSTEMS MISC [OXYGEN-AIR DELIVERY SYSTEMS MISC] Use 2 L As Directed. 2L with activity and at night  [facility]                Review of Systems: History obtained from the patient  General ROS: negative  for  - chills, fever, positive for fatigue  ENT ROS: negative for - headaches, hearing change, nasal congestion, nasal discharge  Hematological and Lymphatic ROS: negative for - bleeding problems, blood clots, bruising  Respiratory ROS: Positive for - cough, shortness of breath, sputum changes  Cardiovascular ROS: negative for - chest pain, edema  Gastrointestinal ROS: negative for - abdominal pain, constipation, nausea, positive for loose stools  Genito-Urinary ROS: negative for - dysuria, hematuria  Musculoskeletal ROS: Patient endorses she feels a little bit wobbly on her legs  Neurological ROS: negative for - behavioral changes, confusion, dizziness, numbness/tingling   Dermatological ROS: negative for pruritus, rash    /65 (BP Location: Left arm)   Pulse 78   Temp 97.6  F (36.4  C) (Oral)   Resp 20   SpO2 97%     Physical Examination:   General appearance - alert, patient appears cachectic and frail, and in no distress and oriented to person, place, and time  Mental status - alert, oriented to person, place, and time, normal mood, behavior, speech, dress, motor activity, and thought processes  HEENT - sclera anicteric, left eye normal, right eye normal, nares normal and patent, no erythema  Lymphatics - no palpable lymphadenopathy, no hepatosplenomegaly  Respiratory -diffuse coarse breath sounds and crackles, no wheezes, rales or rhonchi, symmetric air entry, no tachypnea, retractions or cyanosis, nasal cannula in place  Cardiac - normal rate, regular rhythm, normal S1, S2, no murmurs, rubs, clicks or gallops, no JVD  Abdomen - soft, nontender, nondistended, no masses or organomegaly no rebound  tenderness noted bowel sounds normal, no bladder distension noted  Neurological - alert, oriented, normal speech, no focal findings or movement disorder noted  Musculoskeletal - no joint tenderness, deformity or swelling, full range of motion without pain  Extremities - peripheral pulses normal, no pedal edema, no clubbing or cyanosis  Skin - normal coloration and turgor, no rashes, no suspicious skin lesions noted    Results:  Recent Results (from the past 24 hour(s))   ECG 12-LEAD WITH MUSE ([facility])    Collection Time: 11/04/21  7:29 PM   Result Value Ref Range    Systolic Blood Pressure  mmHg    Diastolic Blood Pressure  mmHg    Ventricular Rate 84 BPM    Atrial Rate 84 BPM    ME Interval 160 ms    QRS Duration 76 ms     ms    QTc 449 ms    P Axis 75 degrees    R AXIS -9 degrees    T Axis 44 degrees    Interpretation ECG       Sinus rhythm  Possible Left atrial enlargement  Borderline ECG  When compared with ECG of 30-SEP-2021 12:29,  No significant change was found     Basic metabolic panel    Collection Time: 11/04/21  7:39 PM   Result Value Ref Range    Sodium 137 136 - 145 mmol/L    Potassium 4.3 3.5 - 5.0 mmol/L    Chloride 99 98 - 107 mmol/L    Carbon Dioxide (CO2) 31 22 - 31 mmol/L    Anion Gap 7 5 - 18 mmol/L    Urea Nitrogen 12 8 - 28 mg/dL    Creatinine 0.58 (L) 0.60 - 1.10 mg/dL    Calcium 9.9 8.5 - 10.5 mg/dL    Glucose 98 70 - 125 mg/dL    GFR Estimate 90 >60 mL/min/1.73m2   CBC with platelets    Collection Time: 11/04/21  7:39 PM   Result Value Ref Range    WBC Count 12.4 (H) 4.0 - 11.0 10e3/uL    RBC Count 4.33 3.80 - 5.20 10e6/uL    Hemoglobin 12.5 11.7 - 15.7 g/dL    Hematocrit 39.4 35.0 - 47.0 %    MCV 91 78 - 100 fL    MCH 28.9 26.5 - 33.0 pg    MCHC 31.7 31.5 - 36.5 g/dL    RDW 12.6 10.0 - 15.0 %    Platelet Count 355 150 - 450 10e3/uL   B-Type Natriuretic Peptide ([facility] Only)    Collection Time: 11/04/21  7:39 PM   Result Value Ref Range    BNP 33 0 - 140 pg/mL   Lactic Acid STAT     Collection Time: 11/04/21  8:12 PM   Result Value Ref Range    Lactic Acid 0.5 (L) 0.7 - 2.0 mmol/L         Lab Results personally reviewed 11/4  Imaging Results personally reviewed 11/4  Discussed with patient  Reviewed old records   Discharge Planning discussed with patient  Discussed care with patient for 55 minutes with greater than 50% of time spent in counseling and coordination of care.        This note was created using dragon dictation, any spelling and grammatical errors are unintentional.

## 2021-11-04 NOTE — PROGRESS NOTES
Ayse is a 76 year old who is being evaluated via a billable video visit.      How would you like to obtain your AVS? MyChart  If the video visit is dropped, the invitation should be resent by: Text to cell phone: [phone]  Will anyone else be joining your video visit? No      Video Start Time: 11      Video-Visit Details    Type of service:  Video Visit    Video End Time:1125    Originating Location (pt. Location): Home    Distant Location (provider location):  [facility]     Platform used for Video Visit: Tracy Tavera is a 76 year old who is being evaluated via a billable video visit.          LUNG NODULE & INTERVENTIONAL PULMONARY CLINIC  CLINICS & SURGERY [place], [facility]     Fiordaliza Najera Sr. MRN# [id number]   Age: 76 year old YOB: 1945       Requesting Physician: No referring provider defined for this encounter.       Assessment and Plan:    1.  Bronchiectasis.  Persistent exacerbation.  Organisms not treatable by oral therapy.  She needs IV antibiotics.  Because of patient factors she will not tolerate administration of IV medications.  We will do a direct admission to the hospital.    2.  Chronic hypoxic respiratory failure.  Continue oxygen with goal saturation 89% or greater.      3.  Anxiety.  Continue Lexapro.    Severe exacerbation requiring hospitalization.         History:     Fiordaliza Najera Sr. is a 76 year old female with sig h/o for bronchiectasis who is here for evaluation/followup of same.  She continues to have progression of symptoms.  She has failed outpatient oral therapy.  She is very frustrated her persistent symptoms.  She continues to grow out gram-negative bacteria from her sputum.  Bronchoscopy in September with significant purulent secretions.    She would like to proceed with IV antibiotics.  She does not think she would tolerate IV therapy           Past Medical History:       Past Medical History:   Diagnosis Date     Anxiety 09/30/2021     COPD (chronic obstructive pulmonary disease) (H)      Diverticulosis      Hiatal hernia      Mild asthma      Neuropathy      Osteopenia      Temporomandibular joint (TMJ) pain            Past Surgical History:      Past Surgical History:   Procedure Laterality Date     ANTERIOR / POSTERIOR COMBINED FUSION LUMBAR SPINE       BRONCHOSCOPY (RIGID OR FLEXIBLE), DIAGNOSTIC N/A 9/28/2021    Procedure: BRONCHOSCOPY, WITH BRONCHOALVEOLAR LAVAGE;  Surgeon: Randall Lorenz MD;  Location:  GI     HYSTERECTOMY       RETINAL LASER PROCEDURE Left 10/04/2016     SALPINGOOPHORECTOMY       TOTAL KNEE ARTHROPLASTY  2008          Social History:     Social History     Tobacco Use     Smoking status: Never Smoker     Smokeless tobacco: Never Used   Substance Use Topics     Alcohol use: No          Family History:     Family History   Problem Relation Age of Onset     Dementia Mother         passed age 88     Chronic Obstructive Pulmonary Disease Father         passed age 78           Allergies:      Allergies   Allergen Reactions     Codeine Unknown     Morphine Itching          Medications:     Current Outpatient Medications   Medication Sig     acetylcysteine (MUCOMYST) 10 % nebulizer solution Inhale 4 mLs into the lungs daily     albuterol (PROAIR HFA;PROVENTIL HFA;VENTOLIN HFA) 90 mcg/actuation inhaler [ALBUTEROL (PROAIR HFA;PROVENTIL HFA;VENTOLIN HFA) 90 MCG/ACTUATION INHALER] Inhale 2 puffs every 6 (six) hours as needed for wheezing.     albuterol (PROVENTIL) (2.5 MG/3ML) 0.083% neb solution Take 1 vial (2.5 mg) by nebulization every 4 hours as needed for shortness of breath / dyspnea or wheezing     alendronate (FOSAMAX) 70 MG tablet TAKE 1 TABLET (70 MG) BY MOUTH EVERY 7 DAYS. TAKE IN THE MORNING ON AN EMPTY STOMACH WITH A FULL GLASS OF WATER 30 MINUTES BEFORE FOOD.     BIOTIN ORAL [BIOTIN ORAL] Take by mouth.     calcium-vitamin D (CALCIUM-VITAMIN D) 500  mg(1,250mg) -200 unit per tablet [CALCIUM-VITAMIN D (CALCIUM-VITAMIN D) 500 MG(1,250MG) -200 UNIT PER TABLET] Take 1 tablet by mouth daily.     celecoxib (CELEBREX) 200 MG capsule [CELECOXIB (CELEBREX) 200 MG CAPSULE] Take 1 capsule (200 mg total) by mouth daily.     cholecalciferol, vitamin D3, (VITAMIN D3) 1,000 unit capsule [CHOLECALCIFEROL, VITAMIN D3, (VITAMIN D3) 1,000 UNIT CAPSULE] Take 1,000 Units by mouth daily.     clobetasol (TEMOVATE) 0.05 % external cream Apply thin layer to vulva/outer vagina twice a day for 14 days.     escitalopram (LEXAPRO) 5 MG tablet Take 1 tablet (5 mg) by mouth daily     Fluticasone-Umeclidin-Vilanterol (TRELEGY ELLIPTA) 100-62.5-25 MCG/INH oral inhaler Inhale 1 puff into the lungs daily     Lactobacillus rhamnosus GG (CULTURELLE) 10-15 Billion cell capsule [LACTOBACILLUS RHAMNOSUS GG (CULTURELLE) 10-15 BILLION CELL CAPSULE] Take 1 capsule by mouth daily.     montelukast (SINGULAIR) 10 mg tablet [MONTELUKAST (SINGULAIR) 10 MG TABLET] TAKE 1 TABLET(10 MG) BY MOUTH AT BEDTIME     multivitamin therapeutic (THERAGRAN) tablet [MULTIVITAMIN THERAPEUTIC (THERAGRAN) TABLET] Take 1 tablet by mouth daily.     mupirocin (BACTROBAN) 2 % external ointment Apply thin layer to nasal lesion 3 times a day for 7-10 days.     nystatin (MYCOSTATIN) 701613 UNIT/GM external cream Apply to rash on body 3 times per day as needed.     OXYGEN-AIR DELIVERY SYSTEMS MISC [OXYGEN-AIR DELIVERY SYSTEMS [facility]] Use 2 L As Directed. 2L with activity and at night  [employer]     pregabalin (LYRICA) 50 MG capsule Take 50mg in the morning and 100mg in the evening.     sodium chloride (OCEAN) 0.65 % nasal spray Spray 2 sprays in nostril daily     sodium chloride 3 % nebulizer solution [SODIUM CHLORIDE 3 % NEBULIZER SOLUTION] Take 3 mL by nebulization 2 (two) times a day.     SUMAtriptan (IMITREX) 50 MG tablet [SUMATRIPTAN (IMITREX) 50 MG TABLET] Take 1 tablet (50 mg total) by mouth every 2 (two) hours as needed for  migraine.     traMADoL (ULTRAM) 50 mg tablet [TRAMADOL (ULTRAM) 50 MG TABLET] ONE TAB every 12 hours as needed for pain     No current facility-administered medications for this visit.          Review of Systems:     See HPI         Physical Exam:   There were no vitals taken for this visit.    Constitutional -mildly anxious but no acute distress  Eyes - no redness or discharge  Respiratory -breathing appears comfortable. No wheeze or rhonchi.   Skin - No appreciable discoloration or lesions (very limited exam)  Neurological - No apparent tremors. Speech fluent and articlate  Psychiatric -no delirium          Current Laboratory Data:   All laboratory and imaging data reviewed.    Results for orders placed or performed in visit on 11/03/21 (from the past 24 hour(s))   Acid-Fast Bacilli Culture and Stain    Specimen: Bronchus; Sputum    Narrative    The following orders were created for panel order Acid-Fast Bacilli Culture and Stain.  Procedure                               Abnormality         Status                     ---------                               -----------         ------                     Acid-Fast Bacilli Cultur...[[id number]]                      In process                   Please view results for these tests on the individual orders.   Respiratory Aerobic Bacterial Culture with Gram Stain    Specimen: Expectorate; Sputum   Result Value Ref Range    Gram Stain Result <10 Squamous epithelial cells/low power field     Gram Stain Result >25 PMNs/low power field     Gram Stain Result 4+ Mixed kenji

## 2021-11-05 ENCOUNTER — APPOINTMENT (OUTPATIENT)
Dept: OCCUPATIONAL THERAPY | Facility: HOSPITAL | Age: 76
DRG: 191 | End: 2021-11-05
Attending: FAMILY MEDICINE
Payer: MEDICARE

## 2021-11-05 ENCOUNTER — APPOINTMENT (OUTPATIENT)
Dept: PHYSICAL THERAPY | Facility: HOSPITAL | Age: 76
DRG: 191 | End: 2021-11-05
Attending: FAMILY MEDICINE
Payer: MEDICARE

## 2021-11-05 LAB
ATRIAL RATE - MUSE: 84 BPM
BASOPHILS # BLD AUTO: 0 10E3/UL (ref 0–0.2)
BASOPHILS NFR BLD AUTO: 0 %
DIASTOLIC BLOOD PRESSURE - MUSE: NORMAL MMHG
EOSINOPHIL # BLD AUTO: 0 10E3/UL (ref 0–0.7)
EOSINOPHIL NFR BLD AUTO: 0 %
ERYTHROCYTE [DISTWIDTH] IN BLOOD BY AUTOMATED COUNT: 12.8 % (ref 10–15)
HCT VFR BLD AUTO: 33.9 % (ref 35–47)
HGB BLD-MCNC: 10.8 G/DL (ref 11.7–15.7)
IMM GRANULOCYTES # BLD: 0 10E3/UL
IMM GRANULOCYTES NFR BLD: 0 %
INR PPP: 1.15 (ref 0.9–1.15)
INTERPRETATION ECG - MUSE: NORMAL
LYMPHOCYTES # BLD AUTO: 0.5 10E3/UL (ref 0.8–5.3)
LYMPHOCYTES NFR BLD AUTO: 7 %
MCH RBC QN AUTO: 29.2 PG (ref 26.5–33)
MCHC RBC AUTO-ENTMCNC: 31.9 G/DL (ref 31.5–36.5)
MCV RBC AUTO: 92 FL (ref 78–100)
MONOCYTES # BLD AUTO: 0.3 10E3/UL (ref 0–1.3)
MONOCYTES NFR BLD AUTO: 3 %
NEUTROPHILS # BLD AUTO: 7.2 10E3/UL (ref 1.6–8.3)
NEUTROPHILS NFR BLD AUTO: 90 %
NRBC # BLD AUTO: 0 10E3/UL
NRBC BLD AUTO-RTO: 0 /100
P AXIS - MUSE: 75 DEGREES
PLATELET # BLD AUTO: 334 10E3/UL (ref 150–450)
PR INTERVAL - MUSE: 160 MS
QRS DURATION - MUSE: 76 MS
QT - MUSE: 380 MS
QTC - MUSE: 449 MS
R AXIS - MUSE: -9 DEGREES
RBC # BLD AUTO: 3.7 10E6/UL (ref 3.8–5.2)
SARS-COV-2 RNA RESP QL NAA+PROBE: NEGATIVE
SYSTOLIC BLOOD PRESSURE - MUSE: NORMAL MMHG
T AXIS - MUSE: 44 DEGREES
VENTRICULAR RATE- MUSE: 84 BPM
WBC # BLD AUTO: 8 10E3/UL (ref 4–11)

## 2021-11-05 PROCEDURE — 97535 SELF CARE MNGMENT TRAINING: CPT | Mod: GO

## 2021-11-05 PROCEDURE — 97116 GAIT TRAINING THERAPY: CPT | Mod: GP

## 2021-11-05 PROCEDURE — 97161 PT EVAL LOW COMPLEX 20 MIN: CPT | Mod: GP

## 2021-11-05 PROCEDURE — 99221 1ST HOSP IP/OBS SF/LOW 40: CPT | Performed by: INTERNAL MEDICINE

## 2021-11-05 PROCEDURE — 250N000012 HC RX MED GY IP 250 OP 636 PS 637: Performed by: FAMILY MEDICINE

## 2021-11-05 PROCEDURE — 99233 SBSQ HOSP IP/OBS HIGH 50: CPT | Performed by: INTERNAL MEDICINE

## 2021-11-05 PROCEDURE — 99207 PR CDG-HISTORY COMP: MEETS EXP. PROBLEM FOCUSED-DOWN CODED LACK OF ROS: CPT | Performed by: INTERNAL MEDICINE

## 2021-11-05 PROCEDURE — G0463 HOSPITAL OUTPT CLINIC VISIT: HCPCS

## 2021-11-05 PROCEDURE — 87635 SARS-COV-2 COVID-19 AMP PRB: CPT | Performed by: FAMILY MEDICINE

## 2021-11-05 PROCEDURE — 272N000202 HC AEROBIKA WITH MANOMETER

## 2021-11-05 PROCEDURE — 250N000009 HC RX 250: Performed by: FAMILY MEDICINE

## 2021-11-05 PROCEDURE — 85025 COMPLETE CBC W/AUTO DIFF WBC: CPT | Performed by: FAMILY MEDICINE

## 2021-11-05 PROCEDURE — 94640 AIRWAY INHALATION TREATMENT: CPT | Mod: 76

## 2021-11-05 PROCEDURE — 999N000157 HC STATISTIC RCP TIME EA 10 MIN

## 2021-11-05 PROCEDURE — 250N000011 HC RX IP 250 OP 636: Performed by: FAMILY MEDICINE

## 2021-11-05 PROCEDURE — 120N000001 HC R&B MED SURG/OB

## 2021-11-05 PROCEDURE — 250N000013 HC RX MED GY IP 250 OP 250 PS 637: Performed by: FAMILY MEDICINE

## 2021-11-05 PROCEDURE — 94799 UNLISTED PULMONARY SVC/PX: CPT

## 2021-11-05 PROCEDURE — 85610 PROTHROMBIN TIME: CPT | Performed by: FAMILY MEDICINE

## 2021-11-05 PROCEDURE — 94640 AIRWAY INHALATION TREATMENT: CPT

## 2021-11-05 PROCEDURE — 97165 OT EVAL LOW COMPLEX 30 MIN: CPT | Mod: GO

## 2021-11-05 RX ADMIN — Medication 1 CAPSULE: at 21:23

## 2021-11-05 RX ADMIN — Medication 25 MCG: at 10:19

## 2021-11-05 RX ADMIN — Medication 1 CAPSULE: at 10:19

## 2021-11-05 RX ADMIN — ACETYLCYSTEINE 4 ML: 100 INHALANT RESPIRATORY (INHALATION) at 22:01

## 2021-11-05 RX ADMIN — PIPERACILLIN SODIUM AND TAZOBACTAM SODIUM 3.38 G: 3; .375 INJECTION, POWDER, LYOPHILIZED, FOR SOLUTION INTRAVENOUS at 10:09

## 2021-11-05 RX ADMIN — MUPIROCIN: 20 OINTMENT TOPICAL at 21:23

## 2021-11-05 RX ADMIN — SODIUM CHLORIDE 30 MG/ML INHALATION SOLUTION 3 ML: 30 SOLUTION INHALANT at 20:29

## 2021-11-05 RX ADMIN — ALBUTEROL SULFATE 2.5 MG: 2.5 SOLUTION RESPIRATORY (INHALATION) at 09:38

## 2021-11-05 RX ADMIN — MONTELUKAST 10 MG: 10 TABLET, FILM COATED ORAL at 21:23

## 2021-11-05 RX ADMIN — PIPERACILLIN SODIUM AND TAZOBACTAM SODIUM 3.38 G: 3; .375 INJECTION, POWDER, LYOPHILIZED, FOR SOLUTION INTRAVENOUS at 01:54

## 2021-11-05 RX ADMIN — ACETYLCYSTEINE 4 ML: 100 INHALANT RESPIRATORY (INHALATION) at 09:45

## 2021-11-05 RX ADMIN — PREGABALIN 50 MG: 25 CAPSULE ORAL at 10:25

## 2021-11-05 RX ADMIN — ALBUTEROL SULFATE 2.5 MG: 2.5 SOLUTION RESPIRATORY (INHALATION) at 20:30

## 2021-11-05 RX ADMIN — ESCITALOPRAM 5 MG: 5 TABLET, FILM COATED ORAL at 10:19

## 2021-11-05 RX ADMIN — CALCIUM CARBONATE-VITAMIN D TAB 500 MG-200 UNIT 1 TABLET: 500-200 TAB at 10:21

## 2021-11-05 RX ADMIN — SALINE NASAL SPRAY 2 SPRAY: 1.5 SOLUTION NASAL at 10:22

## 2021-11-05 RX ADMIN — CELECOXIB 200 MG: 200 CAPSULE ORAL at 10:19

## 2021-11-05 RX ADMIN — PREGABALIN 100 MG: 100 CAPSULE ORAL at 21:23

## 2021-11-05 RX ADMIN — MUPIROCIN: 20 OINTMENT TOPICAL at 10:21

## 2021-11-05 RX ADMIN — PREDNISONE 40 MG: 20 TABLET ORAL at 10:21

## 2021-11-05 RX ADMIN — FLUTICASONE FUROATE AND VILANTEROL TRIFENATATE 1 PUFF: 100; 25 POWDER RESPIRATORY (INHALATION) at 18:03

## 2021-11-05 RX ADMIN — SODIUM CHLORIDE 30 MG/ML INHALATION SOLUTION 3 ML: 30 SOLUTION INHALANT at 09:38

## 2021-11-05 RX ADMIN — ENOXAPARIN SODIUM 40 MG: 40 INJECTION SUBCUTANEOUS at 20:03

## 2021-11-05 RX ADMIN — UMECLIDINIUM 1 PUFF: 62.5 AEROSOL, POWDER ORAL at 18:02

## 2021-11-05 RX ADMIN — PIPERACILLIN SODIUM AND TAZOBACTAM SODIUM 3.38 G: 3; .375 INJECTION, POWDER, LYOPHILIZED, FOR SOLUTION INTRAVENOUS at 18:00

## 2021-11-05 ASSESSMENT — ACTIVITIES OF DAILY LIVING (ADL)
ADLS_ACUITY_SCORE: 7
DEPENDENT_IADLS:: INDEPENDENT
ADLS_ACUITY_SCORE: 7
PREVIOUS_RESPONSIBILITIES: MEAL PREP;HOUSEKEEPING;LAUNDRY;MEDICATION MANAGEMENT;FINANCES

## 2021-11-05 NOTE — TREATMENT PLAN
11/05/21 1100   RCAT Assessment   Reason for Assessment COPD  (Bronchiectasis)   Pulmonary Status 4   Surgical Status 0   Chest X-ray 1   Respiratory Pattern 1   Mental Status 0   Breath Sounds 3   Cough Effectiveness 1   Level of Activity 1   O2 Required for SpO2>=92% 1   Acuity Level (points) 12   Acuity Level  3   Re-eval Interval Guideline    ( Home regimen )   Re-evaluation Date    (RCAT PRN)   RCAT Treatment Plan    Patient Score: 12  Patient Acuity: 3    Clinical Indication for Therapy: home regimen, productive cough, rhonchi on auscultation and history of mucous producing disease    Therapy Ordered: Mucomyst BID, Albuterol and Duoneb Q4 PRN,3% Sodium chloride BID. And Flutter with neb at bedside. RCAT PRN.     Assessment Summary: Breath sound Crackle diminished. Productive cough,  Sat 95, HR 77, RR 20.  This is home regimen. Reassess  if pt condition worsen.     Leslie Acosta, RT  11/5/2021

## 2021-11-05 NOTE — PLAN OF CARE
Problem: Gas Exchange Impaired  Goal: Optimal Gas Exchange  Outcome: No Change       Pt remains on 2 LPM the same as home.

## 2021-11-05 NOTE — CONSULTS
Care Management Initial Consult    General Information  Assessment completed with: Patient, patient  Type of CM/SW Visit: Initial Assessment    Primary Care Provider verified and updated as needed: Yes   Readmission within the last 30 days:        Reason for Consult: discharge planning  Advance Care Planning:            Communication Assessment  Patient's communication style: spoken language (English or Bilingual)    Hearing Difficulty or Deaf: no   Wear Glasses or Blind: yes (glasses)    Cognitive  Cognitive/Neuro/Behavioral: WDL                      Living Environment:   People in home: alone     Current living Arrangements: apartment      Able to return to prior arrangements: no       Family/Social Support:  Care provided by: self  Provides care for: no one                Description of Support System:           Current Resources:   Patient receiving home care services:       Community Resources:    Equipment currently used at home: other (see comments), cane, straight (4WW and home oxygen 2L- uses walker in hallways only)  Supplies currently used at home:      Employment/Financial:  Employment Status:          Financial Concerns:             Lifestyle & Psychosocial Needs:  Social Determinants of Health     Tobacco Use: Low Risk      Smoking Tobacco Use: Never Smoker     Smokeless Tobacco Use: Never Used   Alcohol Use:      Frequency of Alcohol Consumption:      Average Number of Drinks:      Frequency of Binge Drinking:    Financial Resource Strain:      Difficulty of Paying Living Expenses:    Food Insecurity:      Worried About Running Out of Food in the Last Year:      Ran Out of Food in the Last Year:    Transportation Needs:      Lack of Transportation (Medical):      Lack of Transportation (Non-Medical):    Physical Activity:      Days of Exercise per Week:      Minutes of Exercise per Session:    Stress:      Feeling of Stress :    Social Connections:      Frequency of Communication with Friends and  Family:      Frequency of Social Gatherings with Friends and Family:      Attends Tenriism Services:      Active Member of Clubs or Organizations:      Attends Club or Organization Meetings:      Marital Status:    Intimate Partner Violence:      Fear of Current or Ex-Partner:      Emotionally Abused:      Physically Abused:      Sexually Abused:    Depression: Not at risk     PHQ-2 Score: 0   Housing Stability:      Unable to Pay for Housing in the Last Year:      Number of Places Lived in the Last Year:      Unstable Housing in the Last Year:        Functional Status:  Prior to admission patient needed assistance:   Dependent ADLs:: Independent  Dependent IADLs:: Independent       Mental Health Status:          Chemical Dependency Status:                Values/Beliefs:  Spiritual, Cultural Beliefs, Tenriism Practices, Values that affect care:                 Additional Information:  Met with patient. She lives in an independent apartment at [facility]. Says she will need TCU for IV abx as she is not able to manage herself. [facility] TCU does not do IV abx (verified with admissions). Patient wants to go to [facility], second choice is [facility]. Referrals sent to both,.     Spoke to Rita at [facility]. Says they will review but need to know which abx patient will be on and will not be taking admissions until Monday.     Regarding transport- patient says someone named Lianne might be able to transport. She does not have her number but requests I call Ashley at [phone]. Called and left message requesting call back     3:32 PM  Message left for [facility] admissions to let them know that patient will be on zosyn     Karin Esqueda RN

## 2021-11-05 NOTE — PROGRESS NOTES
PULMONARY/CRITICAL CARE CONSULT NOTE    Date / Time of Admission:  11/4/2021  7:11 PM  ASSESSMENT/PLAN:    Active Problems:    Bronchiectasis with (acute) exacerbation (H)    COPD (chronic obstructive pulmonary disease) (H)    Chronic respiratory failure with hypoxia, on home O2 therapy (H)    Bronchiectasis (H)    Leukocytosis    Mood disorder (H)    Chronic pain    76yoF with history of bronchiectasis with progressive worsening of hypoxia and dyspnea and numerous positive sputum cultures concerning for bronchiectasis exacerbation now requiring IV antibiotics.     Plan:   -Discussed with Dr. Lorenz  -Plan for Zosyn for 14 days via PICC that has already been placed  -Repeat sputum pending, no growth currently  -Continuing mucomyst and flutter valve  -Weaned back to her home O2        Subjective:    Cc: worsening dyspnea    HPI: 76 year old female with history of bronchiectasis on home O2 who presents with worsening dyspnea. She is a patient of Dr. Davis who has had several months of worsening secretions and breathing. She underwent bronchoscopy in September, significant mucus that was suctioned. That sample grew acromobacter. She has had levaquin and fluconazole orally but had side effects from the fluconazole (dizziness). She did not feel she could do IV antibiotics at home so is admitted to initiate this process.    Past Medical History:   Diagnosis Date     Anxiety 09/30/2021     COPD (chronic obstructive pulmonary disease) (H)      Diverticulosis      Hiatal hernia      Mild asthma      Neuropathy      Osteopenia      Temporomandibular joint (TMJ) pain        Social History     Tobacco Use     Smoking status: Never Smoker     Smokeless tobacco: Never Used   Substance Use Topics     Alcohol use: No       Family History   Problem Relation Age of Onset     Dementia Mother         passed age 88     Chronic Obstructive Pulmonary Disease Father         passed age 78       Current Facility-Administered Medications    Medication     acetaminophen (TYLENOL) tablet 650 mg    Or     acetaminophen (TYLENOL) Suppository 650 mg     acetylcysteine (MUCOMYST) 10 % nebulizer solution 4 mL     albuterol (PROAIR HFA/PROVENTIL HFA/VENTOLIN HFA) 108 (90 Base) MCG/ACT inhaler 2 puff     albuterol (PROVENTIL) neb solution 2.5 mg     calcium carbonate-vitamin D (OS-MAGAN with D) per tablet 1 tablet     celecoxib (celeBREX) capsule 200 mg     enoxaparin ANTICOAGULANT (LOVENOX) injection 40 mg     escitalopram (LEXAPRO) tablet 5 mg     fluticasone-vilanterol (BREO ELLIPTA) 100-25 MCG/INH inhaler 1 puff    And     umeclidinium (INCRUSE ELLIPTA) 62.5 MCG/INH inhaler 1 puff     ipratropium - albuterol 0.5 mg/2.5 mg/3 mL (DUONEB) neb solution 3 mL     lactobacillus rhamnosus (GG) (CULTURELL) capsule 1 capsule     lidocaine (LMX4) cream     lidocaine (LMX4) cream     lidocaine 1 % 0.1-1 mL     lidocaine 1 % 0.1-5 mL     melatonin tablet 1 mg     montelukast (SINGULAIR) tablet 10 mg     mupirocin (BACTROBAN) 2 % ointment     naloxone (NARCAN) injection 0.2 mg    Or     naloxone (NARCAN) injection 0.4 mg    Or     naloxone (NARCAN) injection 0.2 mg    Or     naloxone (NARCAN) injection 0.4 mg     nystatin (MYCOSTATIN) cream     ondansetron (ZOFRAN-ODT) ODT tab 4 mg    Or     ondansetron (ZOFRAN) injection 4 mg     piperacillin-tazobactam (ZOSYN) 3.375 g vial to attach to  mL bag     polyethylene glycol (MIRALAX) Packet 17 g     predniSONE (DELTASONE) tablet 40 mg     pregabalin (LYRICA) capsule 100 mg     pregabalin (LYRICA) capsule 50 mg     prochlorperazine (COMPAZINE) injection 5 mg    Or     prochlorperazine (COMPAZINE) tablet 5 mg    Or     prochlorperazine (COMPAZINE) suppository 12.5 mg     senna-docusate (SENOKOT-S/PERICOLACE) 8.6-50 MG per tablet 1 tablet    Or     senna-docusate (SENOKOT-S/PERICOLACE) 8.6-50 MG per tablet 2 tablet     sodium chloride (NEBUSAL) 3 % neb solution 3 mL     sodium chloride (OCEAN) 0.65 % nasal spray 2 spray      sodium chloride (PF) 0.9% PF flush 10-40 mL     sodium chloride (PF) 0.9% PF flush 3 mL     sodium chloride (PF) 0.9% PF flush 3 mL     SUMAtriptan (IMITREX) tablet 50 mg     traMADol (ULTRAM) tablet 50 mg     Vitamin D3 (CHOLECALCIFEROL) tablet 25 mcg         Review of Systems: Reviewed and negative aside from that noted in HPI.    Objective:    Vital signs:  /59 (BP Location: Right arm)   Pulse 85   Temp 97.9  F (36.6  C) (Tympanic)   Resp 17   SpO2 96%     General appearance: alert, appears stated age and cooperative  Neck: no adenopathy and supple, symmetrical, trachea midline  Lungs: clear to auscultation bilaterally  Heart: RRR, S1 and S2  Abdomen: soft, non-tender; bowel sounds normal; no masses,  no organomegaly  Extremities: no edema  Skin: Skin color, texture, turgor normal. No rashes or lesions  Neurologic: Grossly normal        Data    CT chest: personally reviewed  EXAM: CT CHEST PULMONARY EMBOLISM W CONTRAST  LOCATION: [facility]  DATE/TIME: 9/28/2021 11:29 PM     INDICATION: Shortness of breath, hypoxia  COMPARISON: CT chest 10/21/2020  TECHNIQUE: CT chest pulmonary angiogram during arterial phase injection of IV contrast. Multiplanar reformats and MIP reconstructions were performed. Dose reduction techniques were used.   CONTRAST: 51 ml isovue 370      FINDINGS:  ANGIOGRAM CHEST: Pulmonary arteries are normal caliber and negative for pulmonary emboli. Thoracic aorta is negative for dissection. No CT evidence of right heart strain.     LUNGS AND PLEURA: Unchanged apical scarring and bronchiectasis in the middle lobe and lingula. New multifocal groundglass opacities throughout both lungs. Minimal change in scattered bilateral tree-in-bud and nodular opacities.     MEDIASTINUM/AXILLAE: No thoracic aortic aneurysm. No lymphadenopathy.     CORONARY ARTERY CALCIFICATION: None.     UPPER ABDOMEN: Normal.     MUSCULOSKELETAL: Posterior spinal fusion  hardware.                                                                      IMPRESSION:  1.  No pulmonary embolism.     2.  New multifocal groundglass opacities throughout both lungs, likely infectious. Consider COVID 19 pneumonia.     3.  Minimal change in scattered tree-in-bud and nodular opacities.    CXR: personally reviewed  EXAM: XR CHEST PORT 1 VIEW  LOCATION: [facility]  DATE/TIME: 11/4/2021 10:54 PM     INDICATION: bronchiectasis exacerbation  COMPARISON: 09/28/2021.                                                                      IMPRESSION: Right PICC terminates in the mid to lower SVC. Lungs appear somewhat hyperexpanded. Chronic coarsening of the interstitial markings similar to previous. No pleural fluid or pneumothorax. Normal heart size. Michael fixation of the spine.    Laboratory:  Results for orders placed or performed during the hospital encounter of 11/04/21   Basic metabolic panel   Result Value Ref Range    Sodium 137 136 - 145 mmol/L    Potassium 4.3 3.5 - 5.0 mmol/L    Chloride 99 98 - 107 mmol/L    Carbon Dioxide (CO2) 31 22 - 31 mmol/L    Anion Gap 7 5 - 18 mmol/L    Urea Nitrogen 12 8 - 28 mg/dL    Creatinine 0.58 (L) 0.60 - 1.10 mg/dL    Calcium 9.9 8.5 - 10.5 mg/dL    Glucose 98 70 - 125 mg/dL    GFR Estimate 90 >60 mL/min/1.73m2     Lab Results   Component Value Date    WBC 8.0 11/05/2021    HGB 10.8 (L) 11/05/2021    HCT 33.9 (L) 11/05/2021    MCV 92 11/05/2021     11/05/2021

## 2021-11-05 NOTE — PROGRESS NOTES
11/05/21 1315   Quick Adds   Type of Visit Initial Occupational Therapy Evaluation   Living Environment   People in home alone   Current Living Arrangements independent living facility;apartment   Home Accessibility no concerns   Self-Care   Usual Activity Tolerance moderate   Current Activity Tolerance fair   Equipment Currently Used at Home shower chair;grab bar, tub/shower   Activity/Exercise/Self-Care Comment wears 2 L O2 at all times   Instrumental Activities of Daily Living (IADL)   Previous Responsibilities meal prep;housekeeping;laundry;medication management;finances   IADL Comments also I with ADL's   Disability/Function   Hearing Difficulty or Deaf no   Wear Glasses or Blind yes   Vision Management glasses   Concentrating, Remembering or Making Decisions Difficulty no   Difficulty Communicating no   Difficulty Eating/Swallowing no   Walking or Climbing Stairs Difficulty no   Dressing/Bathing Difficulty no   Toileting issues no   Fall history within last six months no   General Information   Patient/Family Therapy Goal Statement (OT) go to TCU    Existing Precautions/Restrictions fall   Cognitive Status Examination   Orientation Status orientation to person, place and time   Affect/Mental Status (Cognitive) WNL   Follows Commands WNL   Pain Assessment   Patient Currently in Pain No   Range of Motion Comprehensive   General Range of Motion no range of motion deficits identified   Strength Comprehensive (MMT)   General Manual Muscle Testing (MMT) Assessment no strength deficits identified   Bed Mobility   Bed Mobility supine-sit;sit-supine   Supine-Sit [place] (Bed Mobility) supervision   Sit-Supine [place] (Bed Mobility) supervision   Assistive Device (Bed Mobility) bed rails   Transfers   Transfers sit-stand transfer   Sit-Stand Transfer   Sit-Stand [place] (Transfers) supervision   Assistive Device (Sit-Stand Transfers) walker, front-wheeled   Balance   Balance Assessment no deficits were  identified   Activities of Daily Living   BADL Assessment lower body dressing   Lower Body Dressing Assessment   [place] Level (Lower Body Dressing) supervision   Clinical Impression   Criteria for Skilled Therapeutic Interventions Met (OT) yes;meets criteria   OT Diagnosis decreased endurance for ADL performance   OT Problem List-Impairments impacting ADL activity tolerance impaired;mobility;strength   Assessment of Occupational Performance 1-3 Performance Deficits   Identified Performance Deficits endurance, trsfs   Planned Therapy Interventions (OT) progressive activity/exercise;transfer training   Clinical Decision Making Complexity (OT) low complexity   Therapy Frequency (OT) Daily   Predicted Duration of Therapy 3 days   Risk & Benefits of therapy have been explained evaluation/treatment results reviewed;patient   OT Discharge Planning    OT Discharge Recommendation (DC Rec) Home with assist   OT Rationale for DC Rec pt states she needs TCU due to antibiotics, if pt to D/C to TCU pt could benefit from increasing endurance and safety for ADL independence   Total Evaluation Time (Minutes)   Total Evaluation Time (Minutes) 10

## 2021-11-05 NOTE — PROGRESS NOTES
"   11/05/21 0830   Quick Adds   Type of Visit Initial PT Evaluation   Living Environment   People in home alone   Current Living Arrangements independent living facility  (all levels available. lives I senior apt)   Living Environment Comments apt staff shops for her. hasn't driven in the last month. sets up and takes own medications.    Self-Care   Usual Activity Tolerance moderate   Current Activity Tolerance poor   Regular Exercise   (home PT- 2 sessions left)   Equipment Currently Used at Home other (see comments);cane, straight  (4WW and home oxygen 2L- uses walker in hallways only)   Activity/Exercise/Self-Care Comment patient reports \"i have a lot of hardware in my back and both knees are replaced. both shoulders have torn rotator cuffs L worse than R\"   Disability/Function   Hearing Difficulty or Deaf no   Wear Glasses or Blind yes  (glasses)   Fall history within last six months no  (reports last fall was 5 years ago on an escalator)   General Information   Onset of Illness/Injury or Date of Surgery 11/04/21   Referring Physician  Gloria Aguirre, DO   Patient/Family Therapy Goals Statement (PT) i'm going to TCU for IV medicine   Cognition   Orientation Status (Cognition) oriented x 4   Affect/Mental Status (Cognition) WNL   Follows Commands (Cognition) WNL   Pain Assessment   Patient Currently in Pain No   Range of Motion (ROM)   ROM Comment BLE WNL   Strength   Strength Comments BLE WNL   Bed Mobility   Bed Mobility No deficits identified   Transfers   Transfer Safety Concerns Noted   (small LOB/plopped back into chair)   Transfer Safety Comments sit<>stand SBA. VC to use 4WW brakes for transfers and sit slowly- plopped into chair after walking.   Gait/Stairs (Locomotion)   Distance in Feet (Required for LE Total Joints) 150   Sensory Examination   Sensory Perception Comments BLE quick screen WNL   Clinical Impression   Criteria for Skilled Therapeutic Intervention yes, treatment indicated   PT Diagnosis " (PT) impaired functional mobility   Influenced by the following impairments activity tolerance, SOB   Functional limitations due to impairments gait   Clinical Presentation Stable/Uncomplicated   Clinical Presentation Rationale patient presents as medically diagnosed   Clinical Decision Making (Complexity) low complexity   Therapy Frequency (PT) Daily   Predicted Duration of Therapy Intervention (days/wks) 3 days   Planned Therapy Interventions (PT) gait training   Anticipated Equipment Needs at Discharge (PT) walker, rolling  (has 4WW at home)   Risk & Benefits of therapy have been explained patient   PT Discharge Planning    PT Discharge Recommendation (DC Rec) home with home care physical therapy   PT Rationale for DC Rec Recommend home with home PT. Ambulating house hold distances with 4WW and oxygen.   Total Evaluation Time   Total Evaluation Time (Minutes) 15

## 2021-11-05 NOTE — PLAN OF CARE
Problem: Pain Acute  Goal: Acceptable Pain Control and Functional Ability  Outcome: Improving     Problem: Gas Exchange Impaired  Goal: Optimal Gas Exchange  Outcome: Improving   Pt alert and oriented,Vs stable,tylenol given for discomfort,occasional dry cough noted and gets SOB with movement,sats >97% on 2L ,recived IV ABX,SBA to bathroom. Will continue to monitor.

## 2021-11-05 NOTE — PROGRESS NOTES
Pt on 2 Nasal canula. Alert and oriented.  HX of COPD, Bronchiectasis and gram negative bacteria in her lungs.  not a smoker. Pt given Albuterol, Mucomyst and Sodium chloride 3%. Given BID.  Breath sound Diminished all lung and Crackle, coarse in RML  Congestive non productive cough.  Sat 95, HR 85, RR 17. No respiratory distress at this time.  Rt instructed Pt how to use Flutter, Pt did well, Pt uses at home.   Pt  Stated  to use the flutter every hour.

## 2021-11-05 NOTE — PROCEDURES
"PICC Line Insertion Procedure Note  Pt. Name: Fiordaliza Najera  MRN: [id number]          Procedure: Insertion of a  Single Lumen  4 fr  Bard SOLO (valved) Power PICC, Lot number [id number]    Indications: Antibiotics    Contraindications : n/a    Procedure Details   Patient identified with 2 identifiers and \"Time Out\" conducted.  .     Central line insertion bundle followed: hand hygeine performed prior to procedure, site cleansed with cholraprep, hat, mask, sterile gloves,sterile gown worn, patient draped with maximum barrier head to toe drape, sterile field maintained.    The vein was assessed and found to be compressible and of adequate size. 3 ml 1% Lidocaine administered sq to the insertion site. A 4 Fr PICC was inserted into the Basilic vein of the right arm with ultrasound guidance. 1 attempt(s) required to access vein.   Catheter threaded without difficulty. Good blood return noted.    Modified Seldinger Technique used for insertion.    The 8 sharps that are included in the PICC insertion kit were accounted for and disposed of in the sharps container prior to breakdown of the sterile field.    Catheter secured with Statlock, biopatch and Tegaderm dressing applied.    Findings:  Total catheter length  39 cm, with 2 cm exposed. Mid upper arm circumference is 35 cm. Catheter was flushed with 10 cc NS. Patient  tolerated procedure well.    Tip placement verified by Tip Postioning System. Tip placement in the SVC.    CLABSI prevention brochure left at bedside.    Patient's primary RN notified PICC is ready for use.    Comments:        SEUN Weinberg, RN  [facility]  "

## 2021-11-05 NOTE — PHARMACY-ADMISSION MEDICATION HISTORY
Pharmacy Note - Admission Medication History    Pertinent Provider Information:   Mupirocin - prescription recommended using it for 7-10 days but patient has continued using it for her nasal lesions. She reports sending a message to her provider but did not receive a response about continuing its use.  Fluconazole - medication was prescribed 10/13/21 for a 30-day supply. She took the initial dose but did not tolerate it. Dr. Andrea was okay with discontinuing the course after that first dose.      ______________________________________________________________________    Prior To Admission (PTA) med list completed and updated in EMR.       PTA Med List   Medication Sig Last Dose     acetylcysteine (MUCOMYST) 10 % nebulizer solution Inhale 4 mLs into the lungs daily 11/2/2021 at Unknown time     albuterol (PROAIR HFA;PROVENTIL HFA;VENTOLIN HFA) 90 mcg/actuation inhaler [ALBUTEROL (PROAIR HFA;PROVENTIL HFA;VENTOLIN HFA) 90 MCG/ACTUATION INHALER] Inhale 2 puffs every 6 (six) hours as needed for wheezing. 11/4/2021 at Unknown time     albuterol (PROVENTIL) (2.5 MG/3ML) 0.083% neb solution Take 1 vial (2.5 mg) by nebulization every 4 hours as needed for shortness of breath / dyspnea or wheezing 11/2/2021 at Unknown time     alendronate (FOSAMAX) 70 MG tablet TAKE 1 TABLET (70 MG) BY MOUTH EVERY 7 DAYS. TAKE IN THE MORNING ON AN EMPTY STOMACH WITH A FULL GLASS OF WATER 30 MINUTES BEFORE FOOD. 10/31/2021 at Unknown time     BIOTIN ORAL Take 1 tablet by mouth daily  11/4/2021 at Unknown time     calcium-vitamin D (CALCIUM-VITAMIN D) 500 mg(1,250mg) -200 unit per tablet [CALCIUM-VITAMIN D (CALCIUM-VITAMIN D) 500 MG(1,250MG) -200 UNIT PER TABLET] Take 1 tablet by mouth daily. 11/4/2021 at Unknown time     celecoxib (CELEBREX) 200 MG capsule [CELECOXIB (CELEBREX) 200 MG CAPSULE] Take 1 capsule (200 mg total) by mouth daily. 11/2/2021 at Unknown time     cholecalciferol, vitamin D3, (VITAMIN D3) 1,000 unit capsule  [CHOLECALCIFEROL, VITAMIN D3, (VITAMIN D3) 1,000 UNIT CAPSULE] Take 1,000 Units by mouth daily. 11/4/2021 at Unknown time     escitalopram (LEXAPRO) 5 MG tablet Take 1 tablet (5 mg) by mouth daily 11/4/2021 at Unknown time     Fluticasone-Umeclidin-Vilanterol (TRELEGY ELLIPTA) 100-62.5-25 MCG/INH oral inhaler Inhale 1 puff into the lungs daily 11/4/2021 at Unknown time     Lactobacillus rhamnosus GG (CULTURELLE) 10-15 Billion cell capsule Take 1 capsule by mouth every evening  11/3/2021 at Unknown time     montelukast (SINGULAIR) 10 mg tablet [MONTELUKAST (SINGULAIR) 10 MG TABLET] TAKE 1 TABLET(10 MG) BY MOUTH AT BEDTIME 11/3/2021 at Unknown time     multivitamin therapeutic (THERAGRAN) tablet [MULTIVITAMIN THERAPEUTIC (THERAGRAN) TABLET] Take 1 tablet by mouth daily. 11/4/2021 at Unknown time     mupirocin (BACTROBAN) 2 % external ointment Apply thin layer to nasal lesion 3 times a day for 7-10 days. 11/3/2021 at Unknown time     nystatin (MYCOSTATIN) 047113 UNIT/GM external cream Apply to rash on body 3 times per day as needed. Past Month at Unknown time     pregabalin (LYRICA) 50 MG capsule Take 50mg in the morning and 100mg in the evening. 11/4/2021 at Unknown time     sodium chloride (OCEAN) 0.65 % nasal spray Spray 2 sprays in nostril daily 11/4/2021 at Unknown time     sodium chloride 3 % nebulizer solution [SODIUM CHLORIDE 3 % NEBULIZER SOLUTION] Take 3 mL by nebulization 2 (two) times a day. 11/4/2021 at Unknown time     SUMAtriptan (IMITREX) 50 MG tablet [SUMATRIPTAN (IMITREX) 50 MG TABLET] Take 1 tablet (50 mg total) by mouth every 2 (two) hours as needed for migraine. More than a month at Unknown time     traMADoL (ULTRAM) 50 mg tablet [TRAMADOL (ULTRAM) 50 MG TABLET] ONE TAB every 12 hours as needed for pain More than a month at Unknown time       Information source(s): Patient, Clinic records, Hospital records and CareEverywhere/SureScripts  Method of interview communication: phone    Summary of  Changes to PTA Med List  New: none  Discontinued: clobetasol  Changed: none    Patient was asked about OTC/herbal products specifically.  PTA med list reflects this.    In the past week, patient estimated taking medication this percent of the time:  50-90% due to other.    Allergies were reviewed, assessed, and updated with the patient.      Patient did not bring any medications to the hospital and can't retrieve from home. No multi-dose medications are available for use during hospital stay.     The information provided in this note is only as accurate as the sources available at the time of the update(s).    Thank you for the opportunity to participate in the care of this patient.    Kristel Owens [facility]  11/4/2021 8:02 PM

## 2021-11-05 NOTE — PROGRESS NOTES
[facility]    Medicine Progress Note - Hospitalist Service       Date of Admission:  11/4/2021    Assessment & Plan            76-year-old female with chronic oxygen needs secondary to COPD, bronchiectasis presented as direct admission for treatment of bronchiectasis exacerbation as well as pulmonary bacteria not treatable with oral antibiotics.    Acute bronchiectasis exacerbation  -Patient with persistent symptoms of shortness of breath, cough after bronchoscopy performed 9/28/2021.  Sputum culture from that encounter growing achromobacter xylosoxidans.  Patient initially presented to [facility] and 9/29/2021 and was admitted for bronchiectasis exacerbation, discharged 9/30/2021.  At that time she was discharged on 7 days of levofloxacin and inhaler therapy switched to Trelegy.  Patient continued to have worsening symptoms and her primary pulmonologist Dr. Randall Lorenz suggested admission for IV antibiotics as has failed outpatient management. EKG sinus rhythm, BNP normal, unlikely cardiac component.  -CXR  -Start Zosyn IV s/p PICC placement  -Mucomyst 2 times daily  -Sodium chloride neb twice daily  -Prednisone 40 mg po daily  -Continue home Trelegy  -Continue home montelukast  -Pulmonology recommended PICC line placement, will need 2 weeks -IV antibiotics  -Pulmonology consulted    Leukocytosis  -Likely secondary to steroids patient has been taking in the outpatient setting. Afebrile, without chills or other signs of infection.     Chronic hypoxic respiratory failure  -Patient uses 2 L oxygen at baseline for her known COPD and bronchiectasis.     Mood disorder  -Citalopram 5 mg p.o. daily     Chronic pain  -Pregabalin 50 mg p.o. daily and 100 mg p.o. q. bedtime  -Celexa zip 200 mg p.o. daily  -Continue home as needed tramadol     Electrolytes: Currently within normal limits  Fluids: P.o.  Diet: Regular  VTE prophylaxis: Lovenox     COVID-19 tested negative.       Diet: Combination Diet  Regular Diet Adult    DVT Prophylaxis: Enoxaparin (Lovenox) SQ  Mobley Catheter: Not present  Central Lines: None  Code Status: No CPR- Do NOT Intubate      Disposition Plan   Expected discharge: 11/07/2021   recommended to transitional care unit once antibiotic plan established.     The patient's care was discussed with the Bedside Nurse, Care Coordinator/ and Patient.    Rosalinda Love MD  Hospitalist Service  [facility]  Securely message with the Vocera Web Console (learn more here)  Text page via [employer] Paging/Directory    Clinically Significant Risk Factors Present on Admission                   ______________________________________________________________________    Interval History   Patient is new to me.  Chart reviewed.  Patient was seen and examined.  She has been admitted for PICC placement initiation of long-term course of IV antibiotics.  PICC placed yesterday.  Patient complaining of productive yellow sputum cough.  No associated pleuritic chest pain, dyspnea, nausea, vomiting, fever.  On baseline home O2 of 2 LPM.    Data reviewed today: I reviewed all medications, new labs and imaging results over the last 24 hours. I personally reviewed    Physical Exam   Vital Signs: Temp: 97.9  F (36.6  C) Temp src: Tympanic BP: 113/59 Pulse: 85   Resp: 17 SpO2: 96 % O2 Device: Nasal cannula Oxygen Delivery: 2 LPM  Weight: 0 lbs 0 oz  General: Alert and oriented x 3. Not in obvious distress.  HEENT: NC, AT. Neck- supple, No JVP elevation, lymphadenopathy or thyromegaly. Trachea-central.  Chest: Breath sounds, no rhonchi or wheezes.  Heart: S1S2 regular. No M/R/G.  Abdomen: Soft. NT, ND. No organomegaly. Bowel sounds- active.  Back: No spine tenderness. No CVA tenderness.  Extremities: No leg swelling. Peripheral pulses 2+ bilaterally.  Right arm PICC, site intact.  Neuro: Cranial nerves 1-12 grossly normal. No focal neurological deficit    Data   Recent Labs   Lab  11/05/21  0651 11/04/21  1939   WBC 8.0 12.4*   HGB 10.8* 12.5   MCV 92 91    355   INR 1.15  --    NA  --  137   POTASSIUM  --  4.3   CHLORIDE  --  99   CO2  --  31   BUN  --  12   CR  --  0.58*   ANIONGAP  --  7   MAGAN  --  9.9   GLC  --  98     Recent Results (from the past 24 hour(s))   XR Chest Port 1 View    Narrative    EXAM: XR CHEST PORT 1 VIEW  LOCATION: [facility]  DATE/TIME: 11/4/2021 10:54 PM    INDICATION: bronchiectasis exacerbation  COMPARISON: 09/28/2021.      Impression    IMPRESSION: Right PICC terminates in the mid to lower SVC. Lungs appear somewhat hyperexpanded. Chronic coarsening of the interstitial markings similar to previous. No pleural fluid or pneumothorax. Normal heart size. Michael fixation of the spine.

## 2021-11-06 ENCOUNTER — APPOINTMENT (OUTPATIENT)
Dept: PHYSICAL THERAPY | Facility: HOSPITAL | Age: 76
DRG: 191 | End: 2021-11-06
Attending: HOSPITALIST
Payer: MEDICARE

## 2021-11-06 PROCEDURE — 99232 SBSQ HOSP IP/OBS MODERATE 35: CPT | Performed by: INTERNAL MEDICINE

## 2021-11-06 PROCEDURE — 250N000012 HC RX MED GY IP 250 OP 636 PS 637: Performed by: FAMILY MEDICINE

## 2021-11-06 PROCEDURE — 250N000011 HC RX IP 250 OP 636: Performed by: FAMILY MEDICINE

## 2021-11-06 PROCEDURE — 97116 GAIT TRAINING THERAPY: CPT | Mod: GP

## 2021-11-06 PROCEDURE — 97110 THERAPEUTIC EXERCISES: CPT | Mod: GP

## 2021-11-06 PROCEDURE — 94640 AIRWAY INHALATION TREATMENT: CPT

## 2021-11-06 PROCEDURE — 250N000013 HC RX MED GY IP 250 OP 250 PS 637: Performed by: FAMILY MEDICINE

## 2021-11-06 PROCEDURE — 999N000157 HC STATISTIC RCP TIME EA 10 MIN

## 2021-11-06 PROCEDURE — 94799 UNLISTED PULMONARY SVC/PX: CPT

## 2021-11-06 PROCEDURE — 250N000009 HC RX 250: Performed by: FAMILY MEDICINE

## 2021-11-06 PROCEDURE — 120N000001 HC R&B MED SURG/OB

## 2021-11-06 PROCEDURE — 94640 AIRWAY INHALATION TREATMENT: CPT | Mod: 76

## 2021-11-06 RX ADMIN — MONTELUKAST 10 MG: 10 TABLET, FILM COATED ORAL at 21:26

## 2021-11-06 RX ADMIN — ENOXAPARIN SODIUM 40 MG: 40 INJECTION SUBCUTANEOUS at 20:19

## 2021-11-06 RX ADMIN — SODIUM CHLORIDE 30 MG/ML INHALATION SOLUTION 3 ML: 30 SOLUTION INHALANT at 21:37

## 2021-11-06 RX ADMIN — ACETYLCYSTEINE 4 ML: 100 INHALANT RESPIRATORY (INHALATION) at 21:33

## 2021-11-06 RX ADMIN — ACETYLCYSTEINE 4 ML: 100 INHALANT RESPIRATORY (INHALATION) at 10:32

## 2021-11-06 RX ADMIN — UMECLIDINIUM 1 PUFF: 62.5 AEROSOL, POWDER ORAL at 10:14

## 2021-11-06 RX ADMIN — PREGABALIN 50 MG: 25 CAPSULE ORAL at 10:15

## 2021-11-06 RX ADMIN — Medication 25 MCG: at 10:16

## 2021-11-06 RX ADMIN — Medication 1 CAPSULE: at 10:16

## 2021-11-06 RX ADMIN — CELECOXIB 200 MG: 200 CAPSULE ORAL at 10:16

## 2021-11-06 RX ADMIN — PREGABALIN 100 MG: 100 CAPSULE ORAL at 21:26

## 2021-11-06 RX ADMIN — MUPIROCIN: 20 OINTMENT TOPICAL at 21:26

## 2021-11-06 RX ADMIN — CALCIUM CARBONATE-VITAMIN D TAB 500 MG-200 UNIT 1 TABLET: 500-200 TAB at 10:16

## 2021-11-06 RX ADMIN — PIPERACILLIN SODIUM AND TAZOBACTAM SODIUM 3.38 G: 3; .375 INJECTION, POWDER, LYOPHILIZED, FOR SOLUTION INTRAVENOUS at 02:24

## 2021-11-06 RX ADMIN — MUPIROCIN: 20 OINTMENT TOPICAL at 10:15

## 2021-11-06 RX ADMIN — PIPERACILLIN SODIUM AND TAZOBACTAM SODIUM 3.38 G: 3; .375 INJECTION, POWDER, LYOPHILIZED, FOR SOLUTION INTRAVENOUS at 18:46

## 2021-11-06 RX ADMIN — FLUTICASONE FUROATE AND VILANTEROL TRIFENATATE 1 PUFF: 100; 25 POWDER RESPIRATORY (INHALATION) at 10:14

## 2021-11-06 RX ADMIN — Medication 1 CAPSULE: at 21:26

## 2021-11-06 RX ADMIN — SALINE NASAL SPRAY 2 SPRAY: 1.5 SOLUTION NASAL at 10:15

## 2021-11-06 RX ADMIN — PIPERACILLIN SODIUM AND TAZOBACTAM SODIUM 3.38 G: 3; .375 INJECTION, POWDER, LYOPHILIZED, FOR SOLUTION INTRAVENOUS at 10:29

## 2021-11-06 RX ADMIN — SODIUM CHLORIDE 30 MG/ML INHALATION SOLUTION 3 ML: 30 SOLUTION INHALANT at 10:32

## 2021-11-06 RX ADMIN — PREDNISONE 40 MG: 20 TABLET ORAL at 10:16

## 2021-11-06 RX ADMIN — MUPIROCIN: 20 OINTMENT TOPICAL at 18:46

## 2021-11-06 RX ADMIN — ALBUTEROL SULFATE 2.5 MG: 2.5 SOLUTION RESPIRATORY (INHALATION) at 21:34

## 2021-11-06 RX ADMIN — ESCITALOPRAM 5 MG: 5 TABLET, FILM COATED ORAL at 10:15

## 2021-11-06 ASSESSMENT — ACTIVITIES OF DAILY LIVING (ADL)
ADLS_ACUITY_SCORE: 9
ADLS_ACUITY_SCORE: 9
ADLS_ACUITY_SCORE: 7
ADLS_ACUITY_SCORE: 9
ADLS_ACUITY_SCORE: 9
ADLS_ACUITY_SCORE: 7
ADLS_ACUITY_SCORE: 9
ADLS_ACUITY_SCORE: 7
ADLS_ACUITY_SCORE: 9
ADLS_ACUITY_SCORE: 7
ADLS_ACUITY_SCORE: 9
ADLS_ACUITY_SCORE: 7
ADLS_ACUITY_SCORE: 7
ADLS_ACUITY_SCORE: 9
ADLS_ACUITY_SCORE: 7

## 2021-11-06 NOTE — PROGRESS NOTES
PULMONARY PROGRESS NOTE    Date / Time of Admission:  11/4/2021  7:11 PM  Assessment:   76yoF with history of bronchiectasis presents with worsening dyspnea consistent with acute bronchiectasis exacerbation and has failed PO antibiotics.     Active Problems:    Bronchiectasis with (acute) exacerbation (H)    COPD (chronic obstructive pulmonary disease) (H)    Chronic respiratory failure with hypoxia, on home O2 therapy (H)    Bronchiectasis (H)    Leukocytosis    Mood disorder (H)    Chronic pain      Plan:   Zosyn for 2 weeks via PICC  She is growing Acromobacter and previous sensitivities showed it was sensitive to Zosyn.  Continue bronchial hygiene with flutter valve and mucomyst.  Awaiting TCU placement     Subjective:   HPI:  Fiordailza Najera Sr. is a 76 year old female with bronchiectasis presents after steady decline in respiratory status. She was given PO levaquin but has not improved. She most recently grew acromobacter from her sputum. She has a PICC line and is receiving Zosyn. Remains slightly dyspneic when walking the halls. Secretions are thin and manageable for her currently.         Allergies:   Allergies   Allergen Reactions     Codeine Unknown     Morphine Itching        MEDS:  Scheduled Meds:    acetylcysteine  4 mL Inhalation BID     calcium carbonate-vitamin D  1 tablet Oral Daily     celecoxib  200 mg Oral Daily     enoxaparin ANTICOAGULANT  40 mg Subcutaneous Q24H     escitalopram  5 mg Oral Daily     fluticasone-vilanterol  1 puff Inhalation Daily    And     umeclidinium  1 puff Inhalation Daily     lactobacillus rhamnosus (GG)  1 capsule Oral BID     montelukast  10 mg Oral At Bedtime     mupirocin   Topical TID     piperacillin-tazobactam  3.375 g Intravenous Q8H     polyethylene glycol  17 g Oral Daily     predniSONE  40 mg Oral Daily     pregabalin  100 mg Oral At Bedtime     pregabalin  50 mg Oral Daily     sodium chloride  3 mL Nebulization BID     sodium chloride  2 spray Nasal  Daily     sodium chloride (PF)  3 mL Intracatheter Q8H     Vitamin D3  25 mcg Oral Daily     Continuous Infusions:  PRN Meds:.acetaminophen **OR** acetaminophen, albuterol, albuterol, ipratropium - albuterol 0.5 mg/2.5 mg/3 mL, lidocaine 4%, lidocaine 4%, lidocaine (buffered or not buffered), lidocaine (buffered or not buffered), melatonin, naloxone **OR** naloxone **OR** naloxone **OR** naloxone, nystatin, ondansetron **OR** ondansetron, prochlorperazine **OR** prochlorperazine **OR** prochlorperazine, senna-docusate **OR** senna-docusate, sodium chloride (PF), sodium chloride (PF), SUMAtriptan, traMADol    Objective:   VITALS:  /60 (BP Location: Left arm)   Pulse 74   Temp 99.2  F (37.3  C) (Oral)   Resp 18   SpO2 97%   EXAM:  General appearance: alert, appears stated age and cooperative  Neck: no adenopathy and supple, symmetrical, trachea midline  Lungs: clear to auscultation bilaterally  Heart: RRR, S1 and S2  Abdomen: soft, non-tender; bowel sounds normal; no masses,  no organomegaly  Extremities: no edema  Skin: Skin color, texture, turgor normal. No rashes or lesions  Neurologic: Grossly normal    I&O:      Intake/Output Summary (Last 24 hours) at 11/6/2021 1724  Last data filed at 11/6/2021 1449  Gross per 24 hour   Intake 240 ml   Output --   Net 240 ml       Data Review:    Results for orders placed or performed during the hospital encounter of 11/04/21   Basic metabolic panel   Result Value Ref Range    Sodium 137 136 - 145 mmol/L    Potassium 4.3 3.5 - 5.0 mmol/L    Chloride 99 98 - 107 mmol/L    Carbon Dioxide (CO2) 31 22 - 31 mmol/L    Anion Gap 7 5 - 18 mmol/L    Urea Nitrogen 12 8 - 28 mg/dL    Creatinine 0.58 (L) 0.60 - 1.10 mg/dL    Calcium 9.9 8.5 - 10.5 mg/dL    Glucose 98 70 - 125 mg/dL    GFR Estimate 90 >60 mL/min/1.73m2     Lab Results   Component Value Date    WBC 8.0 11/05/2021    HGB 10.8 (L) 11/05/2021    HCT 33.9 (L) 11/05/2021    MCV 92 11/05/2021     11/05/2021

## 2021-11-06 NOTE — PLAN OF CARE
Pt remains on 2 L O2 NC. Occasionally SOB with activity. LS diminished. Uses O2 2 L at home. Received IV zosyn. Up in chair and working with PT. Tolerated walking to the bathroom fairly well. Was able to hold a conversation while walking and did not become SOB.

## 2021-11-06 NOTE — PROGRESS NOTES
[facility]    Medicine Progress Note - Hospitalist Service       Date of Admission:  11/4/2021    Assessment & Plan            76-year-old female with chronic oxygen needs secondary to COPD, bronchiectasis presented as direct admission for treatment of bronchiectasis exacerbation as well as pulmonary bacteria not treatable with oral antibiotics.    Acute bronchiectasis exacerbation  -Patient with persistent symptoms of shortness of breath, cough after bronchoscopy performed 9/28/2021.  Sputum culture from that encounter growing achromobacter xylosoxidans.  Patient initially presented to [facility] and 9/29/2021 and was admitted for bronchiectasis exacerbation, discharged 9/30/2021.  At that time she was discharged on 7 days of levofloxacin and inhaler therapy switched to Trelegy.  Patient continued to have worsening symptoms and her primary pulmonologist Dr. Randall Lorenz suggested admission for IV antibiotics as has failed outpatient management. EKG sinus rhythm, BNP normal, unlikely cardiac component.  -Start Zosyn IV s/p PICC placement  -Mucomyst 2 times daily  -Sodium chloride neb twice daily  -Prednisone 40 mg po daily  -Continue home Trelegy  -Continue home montelukast  -Pulmonology recommended PICC line placement, will need 2 weeks -IV antibiotics  -Pulmonology consulted, d/w Dr. Heart    Leukocytosis  -Likely secondary to steroids patient has been taking in the outpatient setting. Afebrile, without chills or other signs of infection.     Chronic hypoxic respiratory failure  -Patient uses 2 L oxygen at baseline for her known COPD and bronchiectasis.     Mood disorder  -Citalopram 5 mg p.o. daily     Chronic pain  -Pregabalin 50 mg p.o. daily and 100 mg p.o. q. bedtime  -Celexa zip 200 mg p.o. daily  -Continue home as needed tramadol     Electrolytes: Currently within normal limits  Fluids: P.o.  Diet: Regular  VTE prophylaxis: Lovenox     COVID-19 tested negative.     Diet: Combination Diet  Regular Diet Adult    DVT Prophylaxis: Enoxaparin (Lovenox) SQ  Mobley Catheter: Not present  Central Lines: None  Code Status: No CPR- Do NOT Intubate      Disposition Plan   Expected discharge: 11/07/2021   recommended to transitional care unit , patient is ready for discharge.     The patient's care was discussed with the Bedside Nurse, Care Coordinator/ and Patient.    Rosalinda Love MD  Hospitalist Service  [facility]  Securely message with the Vocera Web Console (learn more here)  Text page via [employer] Paging/Directory    Clinically Significant Risk Factors Present on Admission              ___________________________________________________________    Interval History   Patient is new to me.  Chart reviewed.  Patient was seen and examined.  She has been admitted for PICC placement initiation of long-term course of IV antibiotics.  PICC placed yesterday.  Patient complaining of productive yellow sputum cough.  No associated pleuritic chest pain, dyspnea, nausea, vomiting, fever.  On baseline home O2 of 2 LPM.    Data reviewed today: I reviewed all medications, new labs and imaging results over the last 24 hours. I personally reviewed    Physical Exam   Vital Signs: Temp: 97.6  F (36.4  C) Temp src: Oral BP: 99/52 Pulse: 87  Resp: 18 SpO2: 97 % O2 Device: Nasal cannula Oxygen Delivery: 2 LPM  Weight: 0 lbs 0 oz  General: Alert and oriented x 3. Not in obvious distress.  HEENT: NC, AT. Neck- supple, No JVP elevation, lymphadenopathy or thyromegaly. Trachea-central.  Chest: Breath sounds, no rhonchi or wheezes.  Heart: S1S2 regular. No M/R/G.  Abdomen: Soft. NT, ND. No organomegaly. Bowel sounds- active.  Back: No spine tenderness. No CVA tenderness.  Extremities: No leg swelling. Peripheral pulses 2+ bilaterally.  Right arm PICC, site intact.  Neuro: Cranial nerves 1-12 grossly normal. No focal neurological deficit    Data   Recent Labs   Lab 11/05/21  0651 11/04/21  1939    WBC 8.0 12.4*   HGB 10.8* 12.5   MCV 92 91    355   INR 1.15  --    NA  --  137   POTASSIUM  --  4.3   CHLORIDE  --  99   CO2  --  31   BUN  --  12   CR  --  0.58*   ANIONGAP  --  7   MAGAN  --  9.9   GLC  --  98     No results found for this or any previous visit (from the past 24 hour(s)).

## 2021-11-06 NOTE — PROGRESS NOTES
Respiratory Care Note     Patient is on room air. Breath sound is diminished. NEB given as planned. Will continue therapy as planned.

## 2021-11-06 NOTE — PLAN OF CARE
Problem: Adult Inpatient Plan of Care  Goal: Plan of Care Review  Outcome: Improving     Problem: Pain Acute  Goal: Acceptable Pain Control and Functional Ability  Outcome: Improving     Problem: Gas Exchange Impaired  Goal: Optimal Gas Exchange  Outcome: Improving  Intervention: Optimize Oxygenation and Ventilation  Recent Flowsheet Documentation  Taken 11/5/2021 2034 by Fiordaliza Eng RN  Head of Bed (HOB) Positioning: HOB at 20-30 degrees  Alert, oriented and pleasant. On 2L of oxygen per nasal cannula. Slight shortness of breath noted with ambulation. Hooked to cont pulse oximeter and with good O2 sat(98-99%).  PICC line to the right upper arm. Is receiving and tolerating zosyn IV.   Calls appropriately.  Slept fairly good.

## 2021-11-06 NOTE — PROGRESS NOTES
11:10 AM    ROSA received call from MICHELLE matamoros at [facility] requesting an update, which CM provided. Please update Ashley ([phone]).  Additional referrals faxed.    VIRGINIA Dukes

## 2021-11-06 NOTE — PLAN OF CARE
Remains on 2 L O2 NC. Up walking in [place] with PT and staff. Tolerated fairly well. Stated she got SOB a few times after walking all the way down the [place] but was able to recover fairly quickly. Received IV zosyn per order. Waiting for TCU placement where she can continue IV treatment.

## 2021-11-07 ENCOUNTER — APPOINTMENT (OUTPATIENT)
Dept: OCCUPATIONAL THERAPY | Facility: HOSPITAL | Age: 76
DRG: 191 | End: 2021-11-07
Attending: HOSPITALIST
Payer: MEDICARE

## 2021-11-07 ENCOUNTER — APPOINTMENT (OUTPATIENT)
Dept: PHYSICAL THERAPY | Facility: HOSPITAL | Age: 76
DRG: 191 | End: 2021-11-07
Attending: HOSPITALIST
Payer: MEDICARE

## 2021-11-07 LAB
BACTERIA SPT CULT: ABNORMAL
BACTERIA SPT CULT: ABNORMAL
CREAT SERPL-MCNC: 0.6 MG/DL (ref 0.6–1.1)
GFR SERPL CREATININE-BSD FRML MDRD: 89 ML/MIN/1.73M2
GRAM STAIN RESULT: ABNORMAL
PLATELET # BLD AUTO: 285 10E3/UL (ref 150–450)

## 2021-11-07 PROCEDURE — 97116 GAIT TRAINING THERAPY: CPT | Mod: GP

## 2021-11-07 PROCEDURE — 250N000012 HC RX MED GY IP 250 OP 636 PS 637: Performed by: FAMILY MEDICINE

## 2021-11-07 PROCEDURE — 99232 SBSQ HOSP IP/OBS MODERATE 35: CPT | Performed by: INTERNAL MEDICINE

## 2021-11-07 PROCEDURE — 120N000001 HC R&B MED SURG/OB

## 2021-11-07 PROCEDURE — 82565 ASSAY OF CREATININE: CPT | Performed by: FAMILY MEDICINE

## 2021-11-07 PROCEDURE — 250N000009 HC RX 250: Performed by: FAMILY MEDICINE

## 2021-11-07 PROCEDURE — 250N000013 HC RX MED GY IP 250 OP 250 PS 637: Performed by: FAMILY MEDICINE

## 2021-11-07 PROCEDURE — 94799 UNLISTED PULMONARY SVC/PX: CPT

## 2021-11-07 PROCEDURE — 250N000011 HC RX IP 250 OP 636: Performed by: FAMILY MEDICINE

## 2021-11-07 PROCEDURE — 94640 AIRWAY INHALATION TREATMENT: CPT | Mod: 76

## 2021-11-07 PROCEDURE — 97535 SELF CARE MNGMENT TRAINING: CPT | Mod: GO

## 2021-11-07 PROCEDURE — 999N000157 HC STATISTIC RCP TIME EA 10 MIN

## 2021-11-07 PROCEDURE — 94640 AIRWAY INHALATION TREATMENT: CPT

## 2021-11-07 PROCEDURE — 85049 AUTOMATED PLATELET COUNT: CPT | Performed by: FAMILY MEDICINE

## 2021-11-07 PROCEDURE — 97110 THERAPEUTIC EXERCISES: CPT | Mod: GO

## 2021-11-07 RX ADMIN — SALINE NASAL SPRAY 2 SPRAY: 1.5 SOLUTION NASAL at 09:31

## 2021-11-07 RX ADMIN — CELECOXIB 200 MG: 200 CAPSULE ORAL at 09:32

## 2021-11-07 RX ADMIN — Medication 1 CAPSULE: at 09:32

## 2021-11-07 RX ADMIN — PREGABALIN 50 MG: 25 CAPSULE ORAL at 09:32

## 2021-11-07 RX ADMIN — PREGABALIN 100 MG: 100 CAPSULE ORAL at 20:35

## 2021-11-07 RX ADMIN — PIPERACILLIN SODIUM AND TAZOBACTAM SODIUM 3.38 G: 3; .375 INJECTION, POWDER, LYOPHILIZED, FOR SOLUTION INTRAVENOUS at 09:26

## 2021-11-07 RX ADMIN — MUPIROCIN: 20 OINTMENT TOPICAL at 09:31

## 2021-11-07 RX ADMIN — Medication 1 CAPSULE: at 20:35

## 2021-11-07 RX ADMIN — CALCIUM CARBONATE-VITAMIN D TAB 500 MG-200 UNIT 1 TABLET: 500-200 TAB at 09:32

## 2021-11-07 RX ADMIN — MONTELUKAST 10 MG: 10 TABLET, FILM COATED ORAL at 20:35

## 2021-11-07 RX ADMIN — MUPIROCIN: 20 OINTMENT TOPICAL at 18:09

## 2021-11-07 RX ADMIN — SODIUM CHLORIDE 30 MG/ML INHALATION SOLUTION 3 ML: 30 SOLUTION INHALANT at 20:04

## 2021-11-07 RX ADMIN — UMECLIDINIUM 1 PUFF: 62.5 AEROSOL, POWDER ORAL at 09:30

## 2021-11-07 RX ADMIN — ALBUTEROL SULFATE 2.5 MG: 2.5 SOLUTION RESPIRATORY (INHALATION) at 08:16

## 2021-11-07 RX ADMIN — ACETYLCYSTEINE 4 ML: 100 INHALANT RESPIRATORY (INHALATION) at 08:12

## 2021-11-07 RX ADMIN — FLUTICASONE FUROATE AND VILANTEROL TRIFENATATE 1 PUFF: 100; 25 POWDER RESPIRATORY (INHALATION) at 09:31

## 2021-11-07 RX ADMIN — ALBUTEROL SULFATE 2.5 MG: 2.5 SOLUTION RESPIRATORY (INHALATION) at 20:04

## 2021-11-07 RX ADMIN — PIPERACILLIN SODIUM AND TAZOBACTAM SODIUM 3.38 G: 3; .375 INJECTION, POWDER, LYOPHILIZED, FOR SOLUTION INTRAVENOUS at 18:09

## 2021-11-07 RX ADMIN — MUPIROCIN: 20 OINTMENT TOPICAL at 20:36

## 2021-11-07 RX ADMIN — ACETYLCYSTEINE 4 ML: 100 INHALANT RESPIRATORY (INHALATION) at 20:04

## 2021-11-07 RX ADMIN — Medication 25 MCG: at 09:32

## 2021-11-07 RX ADMIN — ESCITALOPRAM 5 MG: 5 TABLET, FILM COATED ORAL at 09:32

## 2021-11-07 RX ADMIN — PREDNISONE 40 MG: 20 TABLET ORAL at 09:32

## 2021-11-07 RX ADMIN — PIPERACILLIN SODIUM AND TAZOBACTAM SODIUM 3.38 G: 3; .375 INJECTION, POWDER, LYOPHILIZED, FOR SOLUTION INTRAVENOUS at 01:06

## 2021-11-07 RX ADMIN — ENOXAPARIN SODIUM 40 MG: 40 INJECTION SUBCUTANEOUS at 20:35

## 2021-11-07 RX ADMIN — SODIUM CHLORIDE 30 MG/ML INHALATION SOLUTION 3 ML: 30 SOLUTION INHALANT at 08:10

## 2021-11-07 ASSESSMENT — ACTIVITIES OF DAILY LIVING (ADL)
ADLS_ACUITY_SCORE: 9

## 2021-11-07 NOTE — PLAN OF CARE
Problem: Adult Inpatient Plan of Care  Goal: Plan of Care Review  Outcome: Improving     Problem: Pain Acute  Goal: Acceptable Pain Control and Functional Ability  Outcome: Improving     Problem: Gas Exchange Impaired  Goal: Optimal Gas Exchange  Outcome: Improving  Intervention: Optimize Oxygenation and Ventilation  Recent Flowsheet Documentation  Taken 11/6/2021 2141 by Fiordaliza Eng RN  Head of Bed (HOB) Positioning: HOB at 20 degrees  On continuous 2L oxygen per nasal cannula. Lung sounds diminished. Still with some shortness of breath. Denies pain and discomfort.   Alert and oriented and calls appropriately.   Awaiting TCU placement.

## 2021-11-07 NOTE — PLAN OF CARE
Physical Therapy Discharge Summary    Reason for therapy discharge:    All goals and outcomes met, no further needs identified.    Progress towards therapy goal(s). See goals on Care Plan in [facility] electronic health record for goal details.  Goals met    Therapy recommendation(s):    Continue home exercise program.

## 2021-11-07 NOTE — PLAN OF CARE
Problem: Adult Inpatient Plan of Care  Goal: Plan of Care Review  Outcome: Improving     Problem: Pain Acute  Goal: Acceptable Pain Control and Functional Ability  Outcome: Improving     Problem: Gas Exchange Impaired  Goal: Optimal Gas Exchange  Outcome: Improving     Pt alert and oriented. Steady gait, up to bathroom multiple times throuhgout the night. Calls appropriately. Breathing better tonight per patient. On 2L via NC, same as home use. 100% on 2L. Pt does report dyspnea on exertion. Coughing noted, not productive.

## 2021-11-07 NOTE — PROGRESS NOTES
[facility]    Medicine Progress Note - Hospitalist Service       Date of Admission:  11/4/2021    Assessment & Plan            76-year-old female with chronic oxygen needs secondary to COPD, bronchiectasis presented as direct admission for treatment of bronchiectasis exacerbation as well as pulmonary bacteria not treatable with oral antibiotics.    Acute bronchiectasis exacerbation  -Patient with persistent symptoms of shortness of breath, cough after bronchoscopy performed 9/28/2021.  Sputum culture from that encounter growing achromobacter xylosoxidans.  Patient initially presented to [facility] and 9/29/2021 and was admitted for bronchiectasis exacerbation, discharged 9/30/2021.  At that time she was discharged on 7 days of levofloxacin and inhaler therapy switched to Trelegy.  Patient continued to have worsening symptoms and her primary pulmonologist Dr. Randall Lorenz suggested admission for IV antibiotics as has failed outpatient management. EKG sinus rhythm, BNP normal, unlikely cardiac component.  -OnZosyn IV s/p PICC placement  -Mucomyst 2 times daily  -Sodium chloride neb twice daily  -Prednisone 40 mg po daily  -Continue home Trelegy  -Continue home montelukast  -Pulmonology recommended PICC line placement, will need 2 weeks -IV antibiotics  -Pulmonology consulted, d/w Dr. Heart    Leukocytosis  -Likely secondary to steroids patient has been taking in the outpatient setting. Afebrile, without chills or other signs of infection.     Chronic hypoxic respiratory failure  -Patient uses 2 L oxygen at baseline for her known COPD and bronchiectasis.     Mood disorder  -Citalopram 5 mg p.o. daily     Chronic pain  -Pregabalin 50 mg p.o. daily and 100 mg p.o. q. bedtime  -Celexa zip 200 mg p.o. daily  -Continue home as needed tramadol     Electrolytes: Currently within normal limits  Fluids: P.o.  Diet: Regular  VTE prophylaxis: Lovenox     COVID-19 tested negative.     Diet: Combination Diet  Regular Diet Adult    DVT Prophylaxis: Enoxaparin (Lovenox) SQ  Mobley Catheter: Not present  Central Lines: None  Code Status: No CPR- Do NOT Intubate      Disposition Plan   Expected discharge: 11/08/2021   recommended to transitional care unit , patient is ready for discharge.     The patient's care was discussed with the Bedside Nurse, Care Coordinator/ and Patient.    Rosalinda Love MD  Hospitalist Service  [facility]  Securely message with the Vocera Web Console (learn more here)  Text page via [employer] Paging/Directory    Clinically Significant Risk Factors Present on Admission              ___________________________________________________________    Interval History   Nothing acute overnight.  O2 requirement stable at home oxygen of 2 LPM.  Patient states feeling low energy today, with some epigastric discomfort.  Denies epigastric pain, nausea, abdominal pain, bloating.  She had 2 bowel movements today.  No dysuria.  Cough is productive yellow sputum cough.  No associated pleuritic chest pain, dyspnea, nausea, vomiting, fever.     Data reviewed today: I reviewed all medications, new labs and imaging results over the last 24 hours. I personally reviewed    Physical Exam   Vital Signs: Temp: 98.4  F (36.9  C) Temp src: Oral BP: 109/57 Pulse: 79  Resp: 18 SpO2: 96 % O2 Device: Nasal cannula Oxygen Delivery: 2 LPM  Weight: 0 lbs 0 oz  General: Alert and oriented x 3. Not in obvious distress.  HEENT: NC, AT. Neck- supple, No JVP elevation, lymphadenopathy or thyromegaly. Trachea-central.  Chest: Breath sounds, no rhonchi or wheezes.  Heart: S1S2 regular. No M/R/G.  Abdomen: Soft. NT, ND. No organomegaly. Bowel sounds- active.  Back: No spine tenderness. No CVA tenderness.  Extremities: No leg swelling. Peripheral pulses 2+ bilaterally.  Right arm PICC, site intact.  Neuro: Cranial nerves 1-12 grossly normal. No focal neurological deficit    Data   Recent Labs   Lab  11/07/21  0517 11/05/21  0651 11/04/21  1939   WBC  --  8.0 12.4*   HGB  --  10.8* 12.5   MCV  --  92 91    334 355   INR  --  1.15  --    NA  --   --  137   POTASSIUM  --   --  4.3   CHLORIDE  --   --  99   CO2  --   --  31   BUN  --   --  12   CR 0.60  --  0.58*   ANIONGAP  --   --  7   MAGAN  --   --  9.9   GLC  --   --  98     No results found for this or any previous visit (from the past 24 hour(s)).

## 2021-11-07 NOTE — PROGRESS NOTES
Care Management Follow Up    Length of Stay (days): 3    Expected Discharge Date: 11/08/2021     Concerns to be Addressed:     Discharge disposition  Patient plan of care discussed at interdisciplinary rounds: Yes    Anticipated Discharge Disposition:  TCU     Anticipated Discharge Services:  TCU  Anticipated Discharge DME:  Per therapy    Patient/family educated on Medicare website which has current facility and service quality ratings:  yes  Education Provided on the Discharge Plan:  yes  Patient/Family in Agreement with the Plan:  yes    Referrals Placed by CM/SW:  TCU  Private pay costs discussed: Not applicable    Additional Information:    ROSA spoke with Juliet at [facility] ([phone]) who states there is a TCU bed available for Pt on Monday, 11/08.  Pt had expressed concerns about hearing [facility] had multiple COVID cases in the building, ROSA spoke with Juliet to get clarification and Juliet shares that there is no COVID in the TCU side and none of the RNs cross over between the two.    SW to follow up with Pt regarding information and address ride time with Juliet.    10:16 AM  ROSA spoke with Pt regarding TCU plan.  Pt states agreement with plan to discharge to [facility].  Pt requests transportation is set up.  [facility] ride scheduled for 11AM on 11/08.  PAS completed and on chart. ROSA called MICHELLE Ramirez at [facility] where Pt resides, to provide an update.     1:56 PM  ROSA met with Pt to provide an update on placement, ride time, and visitor policy at TCU.        VIRGINIA Dukes

## 2021-11-07 NOTE — PLAN OF CARE
Up in chair. LS clear, diminished. On 2 L O2. Occasional SOB with activity but recovers fairly quickly sats mid 90s. On 2 L at home. Given IV zosyn per order via PICC.    Plan to discharge to TCU Monday to continue IV zosyn. transport at 11am.

## 2021-11-08 VITALS
HEART RATE: 75 BPM | TEMPERATURE: 97.8 F | RESPIRATION RATE: 18 BRPM | SYSTOLIC BLOOD PRESSURE: 133 MMHG | OXYGEN SATURATION: 96 % | DIASTOLIC BLOOD PRESSURE: 65 MMHG

## 2021-11-08 LAB
ANION GAP SERPL CALCULATED.3IONS-SCNC: 5 MMOL/L (ref 5–18)
BUN SERPL-MCNC: 13 MG/DL (ref 8–28)
CALCIUM SERPL-MCNC: 9.3 MG/DL (ref 8.5–10.5)
CHLORIDE BLD-SCNC: 103 MMOL/L (ref 98–107)
CO2 SERPL-SCNC: 32 MMOL/L (ref 22–31)
CREAT SERPL-MCNC: 0.63 MG/DL (ref 0.6–1.1)
ERYTHROCYTE [DISTWIDTH] IN BLOOD BY AUTOMATED COUNT: 12.8 % (ref 10–15)
GFR SERPL CREATININE-BSD FRML MDRD: 87 ML/MIN/1.73M2
GLUCOSE BLD-MCNC: 92 MG/DL (ref 70–125)
HCT VFR BLD AUTO: 35.8 % (ref 35–47)
HGB BLD-MCNC: 11.3 G/DL (ref 11.7–15.7)
MCH RBC QN AUTO: 29 PG (ref 26.5–33)
MCHC RBC AUTO-ENTMCNC: 31.6 G/DL (ref 31.5–36.5)
MCV RBC AUTO: 92 FL (ref 78–100)
PLATELET # BLD AUTO: 320 10E3/UL (ref 150–450)
POTASSIUM BLD-SCNC: 3.6 MMOL/L (ref 3.5–5)
RBC # BLD AUTO: 3.89 10E6/UL (ref 3.8–5.2)
SODIUM SERPL-SCNC: 140 MMOL/L (ref 136–145)
WBC # BLD AUTO: 8.5 10E3/UL (ref 4–11)

## 2021-11-08 PROCEDURE — 80048 BASIC METABOLIC PNL TOTAL CA: CPT | Performed by: INTERNAL MEDICINE

## 2021-11-08 PROCEDURE — 250N000013 HC RX MED GY IP 250 OP 250 PS 637: Performed by: FAMILY MEDICINE

## 2021-11-08 PROCEDURE — 99239 HOSP IP/OBS DSCHRG MGMT >30: CPT | Performed by: INTERNAL MEDICINE

## 2021-11-08 PROCEDURE — 250N000012 HC RX MED GY IP 250 OP 636 PS 637: Performed by: FAMILY MEDICINE

## 2021-11-08 PROCEDURE — 999N000157 HC STATISTIC RCP TIME EA 10 MIN

## 2021-11-08 PROCEDURE — 250N000009 HC RX 250: Performed by: FAMILY MEDICINE

## 2021-11-08 PROCEDURE — 250N000011 HC RX IP 250 OP 636: Performed by: FAMILY MEDICINE

## 2021-11-08 PROCEDURE — 85027 COMPLETE CBC AUTOMATED: CPT | Performed by: INTERNAL MEDICINE

## 2021-11-08 PROCEDURE — 94640 AIRWAY INHALATION TREATMENT: CPT

## 2021-11-08 RX ORDER — TRAMADOL HYDROCHLORIDE 50 MG/1
50 TABLET ORAL EVERY 12 HOURS PRN
Qty: 60 TABLET | Refills: 0 | Status: SHIPPED | OUTPATIENT
Start: 2021-11-08 | End: 2022-05-24

## 2021-11-08 RX ORDER — PREGABALIN 50 MG/1
CAPSULE ORAL
Qty: 90 CAPSULE | Refills: 4 | Status: SHIPPED | OUTPATIENT
Start: 2021-11-08 | End: 2022-03-18

## 2021-11-08 RX ORDER — PIPERACILLIN SODIUM, TAZOBACTAM SODIUM 3; .375 G/15ML; G/15ML
3.38 INJECTION, POWDER, LYOPHILIZED, FOR SOLUTION INTRAVENOUS EVERY 8 HOURS
Start: 2021-11-08 | End: 2021-11-08

## 2021-11-08 RX ORDER — PIPERACILLIN SODIUM, TAZOBACTAM SODIUM 3; .375 G/15ML; G/15ML
3.38 INJECTION, POWDER, LYOPHILIZED, FOR SOLUTION INTRAVENOUS EVERY 8 HOURS
Qty: 495 ML | Refills: 0 | Status: SHIPPED | OUTPATIENT
Start: 2021-11-08 | End: 2021-11-19

## 2021-11-08 RX ADMIN — PIPERACILLIN SODIUM AND TAZOBACTAM SODIUM 3.38 G: 3; .375 INJECTION, POWDER, LYOPHILIZED, FOR SOLUTION INTRAVENOUS at 10:26

## 2021-11-08 RX ADMIN — ESCITALOPRAM 5 MG: 5 TABLET, FILM COATED ORAL at 08:37

## 2021-11-08 RX ADMIN — ACETYLCYSTEINE 4 ML: 100 INHALANT RESPIRATORY (INHALATION) at 08:12

## 2021-11-08 RX ADMIN — FLUTICASONE FUROATE AND VILANTEROL TRIFENATATE 1 PUFF: 100; 25 POWDER RESPIRATORY (INHALATION) at 08:36

## 2021-11-08 RX ADMIN — SALINE NASAL SPRAY 2 SPRAY: 1.5 SOLUTION NASAL at 08:38

## 2021-11-08 RX ADMIN — Medication 1 CAPSULE: at 08:37

## 2021-11-08 RX ADMIN — CELECOXIB 200 MG: 200 CAPSULE ORAL at 08:37

## 2021-11-08 RX ADMIN — IPRATROPIUM BROMIDE AND ALBUTEROL SULFATE 3 ML: 2.5; .5 SOLUTION RESPIRATORY (INHALATION) at 08:13

## 2021-11-08 RX ADMIN — Medication 25 MCG: at 08:37

## 2021-11-08 RX ADMIN — PREDNISONE 40 MG: 20 TABLET ORAL at 08:37

## 2021-11-08 RX ADMIN — POLYETHYLENE GLYCOL 3350 17 G: 17 POWDER, FOR SOLUTION ORAL at 08:36

## 2021-11-08 RX ADMIN — CALCIUM CARBONATE-VITAMIN D TAB 500 MG-200 UNIT 1 TABLET: 500-200 TAB at 08:37

## 2021-11-08 RX ADMIN — SODIUM CHLORIDE 30 MG/ML INHALATION SOLUTION 3 ML: 30 SOLUTION INHALANT at 08:13

## 2021-11-08 RX ADMIN — MUPIROCIN: 20 OINTMENT TOPICAL at 08:38

## 2021-11-08 RX ADMIN — UMECLIDINIUM 1 PUFF: 62.5 AEROSOL, POWDER ORAL at 08:36

## 2021-11-08 RX ADMIN — PIPERACILLIN SODIUM AND TAZOBACTAM SODIUM 3.38 G: 3; .375 INJECTION, POWDER, LYOPHILIZED, FOR SOLUTION INTRAVENOUS at 01:49

## 2021-11-08 RX ADMIN — PREGABALIN 50 MG: 25 CAPSULE ORAL at 08:37

## 2021-11-08 ASSESSMENT — ACTIVITIES OF DAILY LIVING (ADL)
ADLS_ACUITY_SCORE: 9

## 2021-11-08 NOTE — PLAN OF CARE
Problem: Pain Acute  Goal: Acceptable Pain Control and Functional Ability  Outcome: Improving   Pt states has no pain.

## 2021-11-08 NOTE — PLAN OF CARE
Problem: Adult Inpatient Plan of Care  Goal: Plan of Care Review  Outcome: No Change  Goal: Patient-Specific Goal (Individualized)  Outcome: No Change  Goal: Absence of Hospital-Acquired Illness or Injury  Outcome: No Change  Intervention: Identify and Manage Fall Risk  Recent Flowsheet Documentation  Taken 11/8/2021 0000 by Naomi Short RN  Safety Promotion/Fall Prevention: bed alarm on  Goal: Optimal Comfort and Wellbeing  Outcome: No Change  Goal: Readiness for Transition of Care  Outcome: No Change     Problem: Risk for Delirium  Goal: Optimal Coping  Outcome: No Change  Goal: Improved Behavioral Control  Outcome: No Change  Goal: Improved Attention and Thought Clarity  Outcome: No Change  Goal: Improved Sleep  Outcome: No Change     Problem: Pain Acute  Goal: Acceptable Pain Control and Functional Ability  Outcome: No Change     Problem: Gas Exchange Impaired  Goal: Optimal Gas Exchange  Outcome: No Change     Problem: OT General Care Plan  Goal: Transfer (OT)  Description: Transfer (OT)  Outcome: No Change   Pt has been sleeping well throughout the shift, awake only to use bathroom. Pt denied pain. Pt is due to discharge to tcu today for further antibiotics. All due cares done and explained to the patient. Will cont to monitor and treat as needed.

## 2021-11-08 NOTE — PLAN OF CARE
Problem: Adult Inpatient Plan of Care  Goal: Plan of Care Review  Outcome: Improving  Flowsheets (Taken 11/7/2021 2212)  Plan of Care Reviewed With: patient  Progress: improving     Problem: Gas Exchange Impaired  Goal: Optimal Gas Exchange  Outcome: Improving   Denies pain, discharging tomorrow to TCU for continuation of IV abx .

## 2021-11-08 NOTE — PLAN OF CARE
Occupational Therapy Discharge Summary    Reason for therapy discharge:    Discharged to transitional care facility.    Progress towards therapy goal(s). See goals on Care Plan in [facility] electronic health record for goal details.  Goals partially met.  Barriers to achieving goals:   discharge from facility.    Therapy recommendation(s):    Continued therapy is recommended.  Rationale/Recommendations:  To ensure safety/independence with functional transfers/self care tasks. .

## 2021-11-08 NOTE — DISCHARGE SUMMARY
[facility] MEDICINE  DISCHARGE SUMMARY     Primary Care Physician: Ekaterina Koehler              Admission Date: 11/4/2021   Discharge Provider: Leisa Ruiz Discharge Date: 11/8/2021   Diet:   Active Diet and Nourishment Order   Procedures     Combination Diet Regular Diet Adult     Diet         Activity: DCACTIVITY: Activity as tolerated  Fall precaution    Code Status: No CPR- Do NOT Intubate         Condition at Discharge: improving      REASON FOR PRESENTATION(See Admission Note for Details)   sob    PRINCIPAL & ACTIVE DISCHARGE DIAGNOSES     Active Problems:    Bronchiectasis with (acute) exacerbation (H)    COPD (chronic obstructive pulmonary disease) (H)    Chronic respiratory failure with hypoxia, on home O2 therapy (H)    Bronchiectasis (H)    Leukocytosis    Mood disorder (H)    Chronic pain      SIGNIFICANT FINDINGS (Imaging, labs):   EXAM: XR CHEST PORT 1 VIEW  LOCATION: [facility]  DATE/TIME: 11/4/2021 10:54 PM     INDICATION: bronchiectasis exacerbation  COMPARISON: 09/28/2021.                                                                      IMPRESSION: Right PICC terminates in the mid to lower SVC. Lungs appear somewhat hyperexpanded. Chronic coarsening of the interstitial markings similar to previous. No pleural fluid or pneumothorax. Normal heart size. Michael fixation of the spine.    PENDING LABS     none    PROCEDURES ( this hospitalization only)      none    RECOMMENDATION FOR F/U VISIT     Follow up with primary care physician within 1 week  Follow up with pulmonologist within 2 weeks      DISPOSITION     Skilled Nursing Facility    SUMMARY OF HOSPITAL COURSE:      77 yo F with chronic oxygen needs secondary to COPD, bronchiectasis presented as direct admission for treatment of bronchiectasis exacerbation as well as pulmonary bacteria not treatable with oral antibiotics.     Acute bronchiectasis exacerbation  -Patient with persistent symptoms of shortness of  breath, cough after bronchoscopy performed 9/28/2021.  Sputum culture from that encounter growing achromobacter xylosoxidans.  Patient initially presented to [facility] and 9/29/2021 and was admitted for bronchiectasis exacerbation, discharged 9/30/2021.  At that time she was discharged on 7 days of levofloxacin and inhaler therapy switched to Trelegy.  Patient continued to have worsening symptoms and her primary pulmonologist Dr. Randall Lorenz suggested admission for IV antibiotics as has failed outpatient management. EKG sinus rhythm, BNP normal, unlikely cardiac component.  -OnZosyn IV s/p PICC placement  -Mucomyst 2 times daily  -Sodium chloride neb twice daily  -Prednisone 40 mg po daily in hospital; discontinue at discharge  -Continue home Trelegy  -Continue home montelukast  -Pulmonology recommended PICC line placement, will need 2 weeks -IV antibiotics  -Pulmonology consulted, d/w Dr. Heart     Leukocytosis  -Likely secondary to steroids patient has been taking in the outpatient setting. Afebrile, without chills or other signs of infection.     Chronic hypoxic respiratory failure  -Patient uses 2 L oxygen at baseline for her known COPD and bronchiectasis.  -feeling sob with any exertion     Mood disorder  -Citalopram 5 mg p.o. daily     Chronic pain  -Pregabalin 50 mg p.o. daily and 100 mg p.o. q. bedtime  -Celexa zip 200 mg p.o. daily  -Continue home as needed tramadol     Electrolytes: Currently within normal limits  Fluids: P.o.  Diet: Regular  VTE prophylaxis: Lovenox     COVID-19 tested negative.  Diet: Combination Diet Regular Diet Adult    DVT Prophylaxis: Enoxaparin (Lovenox) subcutaneous in hospital  Mobley Catheter: Not present  Central Lines: PICC   Code Status: No CPR- Do NOT Intubate      Patient's other chronic medical conditions are stable and home medications were continued.    Discharge Medications with Med changes:       Current Discharge Medication List      START taking these medications     Details   piperacillin-tazobactam (ZOSYN) 3-0.375 GM vial Inject 3.375 g into the vein every 8 hours for 11 days    Associated Diagnoses: Bronchiectasis with acute exacerbation (H)         CONTINUE these medications which have NOT CHANGED    Details   acetylcysteine (MUCOMYST) 10 % nebulizer solution Inhale 4 mLs into the lungs daily  Qty: 120 mL, Refills: 11    Associated Diagnoses: Bronchiectasis (H)      albuterol (PROAIR HFA;PROVENTIL HFA;VENTOLIN HFA) 90 mcg/actuation inhaler [ALBUTEROL (PROAIR HFA;PROVENTIL HFA;VENTOLIN HFA) 90 MCG/ACTUATION INHALER] Inhale 2 puffs every 6 (six) hours as needed for wheezing.  Qty: 18 g, Refills: 11    Comments: May substitute the equivalent medication per insurance preference.  Associated Diagnoses: Bronchiectasis without complication (H)      albuterol (PROVENTIL) (2.5 MG/3ML) 0.083% neb solution Take 1 vial (2.5 mg) by nebulization every 4 hours as needed for shortness of breath / dyspnea or wheezing  Qty: 150 mL, Refills: 11    Associated Diagnoses: Bronchiectasis with acute exacerbation (H)      alendronate (FOSAMAX) 70 MG tablet TAKE 1 TABLET (70 MG) BY MOUTH EVERY 7 DAYS. TAKE IN THE MORNING ON AN EMPTY STOMACH WITH A FULL GLASS OF WATER 30 MINUTES BEFORE FOOD.  Qty: 12 tablet, Refills: 0    Associated Diagnoses: Osteopenia of lumbar spine      BIOTIN ORAL Take 1 tablet by mouth daily       calcium-vitamin D (CALCIUM-VITAMIN D) 500 mg(1,250mg) -200 unit per tablet [CALCIUM-VITAMIN D (CALCIUM-VITAMIN D) 500 MG(1,250MG) -200 UNIT PER TABLET] Take 1 tablet by mouth daily.      celecoxib (CELEBREX) 200 MG capsule [CELECOXIB (CELEBREX) 200 MG CAPSULE] Take 1 capsule (200 mg total) by mouth daily.  Qty: 90 capsule, Refills: 3    Associated Diagnoses: Primary osteoarthritis involving multiple joints      cholecalciferol, vitamin D3, (VITAMIN D3) 1,000 unit capsule [CHOLECALCIFEROL, VITAMIN D3, (VITAMIN D3) 1,000 UNIT CAPSULE] Take 1,000 Units by mouth daily.      escitalopram  (LEXAPRO) 5 MG tablet Take 1 tablet (5 mg) by mouth daily  Qty: 30 tablet, Refills: 3    Associated Diagnoses: Anxiety      Fluticasone-Umeclidin-Vilanterol (TRELEGY ELLIPTA) 100-62.5-25 MCG/INH oral inhaler Inhale 1 puff into the lungs daily  Qty: 60 each, Refills: 11    Associated Diagnoses: Bronchiectasis with acute exacerbation (H); Chronic obstructive pulmonary disease with acute exacerbation (H)      Lactobacillus rhamnosus GG (CULTURELLE) 10-15 Billion cell capsule Take 1 capsule by mouth every evening       montelukast (SINGULAIR) 10 mg tablet [MONTELUKAST (SINGULAIR) 10 MG TABLET] TAKE 1 TABLET(10 MG) BY MOUTH AT BEDTIME  Qty: 90 tablet, Refills: 3    Associated Diagnoses: Bronchiectasis (H)      multivitamin therapeutic (THERAGRAN) tablet [MULTIVITAMIN THERAPEUTIC (THERAGRAN) TABLET] Take 1 tablet by mouth daily.      mupirocin (BACTROBAN) 2 % external ointment Apply thin layer to nasal lesion 3 times a day for 7-10 days.  Qty: 30 g, Refills: 0    Associated Diagnoses: Nasal lesion      nystatin (MYCOSTATIN) 022997 UNIT/GM external cream Apply to rash on body 3 times per day as needed.  Qty: 30 g, Refills: 1    Associated Diagnoses: Rash and nonspecific skin eruption      pregabalin (LYRICA) 50 MG capsule Take 50mg in the morning and 100mg in the evening.  Qty: 90 capsule, Refills: 4      sodium chloride (OCEAN) 0.65 % nasal spray Spray 2 sprays in nostril daily  Qty: 88 mL, Refills: 0    Associated Diagnoses: Nasal lesion      sodium chloride 3 % nebulizer solution [SODIUM CHLORIDE 3 % NEBULIZER SOLUTION] Take 3 mL by nebulization 2 (two) times a day.  Qty: 180 mL, Refills: 11    Associated Diagnoses: Bronchiectasis with acute exacerbation (H)      SUMAtriptan (IMITREX) 50 MG tablet [SUMATRIPTAN (IMITREX) 50 MG TABLET] Take 1 tablet (50 mg total) by mouth every 2 (two) hours as needed for migraine.  Qty: 30 tablet, Refills: 1      traMADoL (ULTRAM) 50 mg tablet [TRAMADOL (ULTRAM) 50 MG TABLET] ONE TAB  every 12 hours as needed for pain  Qty: 60 tablet, Refills: 0    Comments: chronic use for pain  Associated Diagnoses: Chronic midline low back pain without sciatica      OXYGEN-AIR DELIVERY SYSTEMS MISC [OXYGEN-AIR DELIVERY SYSTEMS MISC] Use 2 L As Directed. 2L with activity and at night  [employer]               Rationale for medication changes:      bronchiectasis    Patient's long term medication were not changed during this hospitalization.    Consults   Pulmonologist/intensivist        Discharge Procedure Orders   General info for SNF   Order Comments: Length of Stay Estimate: Short Term Care: Estimated # of Days <30  Condition at Discharge: Improving  Level of care:skilled   Rehabilitation Potential: Good  Admission H&P remains valid and up-to-date: Yes  Recent Chemotherapy: N/A  Use Nursing Home Standing Orders: Yes     Mantoux instructions   Order Comments: Give two-step Mantoux (PPD) Per Facility Policy Yes     Follow Up and recommended labs and tests   Order Comments: Follow up with Nursing home physician.  No follow up labs or test are needed.     Reason for your hospital stay   Order Comments: Bronchiectasis exacerbation     Activity - Up ad adriana     Order Specific Question Answer Comments   Is discharge order? Yes      No CPR- Do NOT Intubate   Order Comments: In chart     Order Specific Question Answer Comments   Code status determined by: Other (please document)      Physical Therapy Adult Consult   Order Comments: Evaluate and treat as clinically indicated.    Reason:  weakness     Occupational Therapy Adult Consult   Order Comments: Evaluate and treat as clinically indicated.    Reason:  help for iv antibiotics     Fall precautions     Diet   Order Comments: Follow this diet upon discharge: Orders Placed This Encounter      Combination Diet Regular Diet Adult     Order Specific Question Answer Comments   Is discharge order? Yes      Examination     Vital Signs in last 24 hours:   vss    General  appearance: In respiratory distress with any exertion; on 2l NC o2  HEENT: PERRL, EOMI  Neck: Supple, no JVD  Lungs:midl wheezing in bilateral lung fields  Cardiovascular: Regular rate and rhythm, normal S1-S2  Abdomen: Soft, non tender, no distension  Musculoskeletal: No joint swelling  Skin: No rash and no edema  Neurology: AAO ×3.  Cranial nerves II - XII normal.  Normal muscle strength in all four extremities.        Please see EMR for more detailed significant labs, imaging, consultant notes etc.    Total time spent on discharge: 50 minutes    Leisa Richardson) MD Sara  [facility] Service: Ph:[phone]    CC:Ekaterina Koehler    Results for VIOLETTE AMIN SREros (MRN [id number]) as of 11/8/2021 10:19   Ref. Range 11/8/2021 06:01   Sodium Latest Ref Range: 136 - 145 mmol/L 140   Potassium Latest Ref Range: 3.5 - 5.0 mmol/L 3.6   Chloride Latest Ref Range: 98 - 107 mmol/L 103   Carbon Dioxide Latest Ref Range: 22 - 31 mmol/L 32 (H)   Urea Nitrogen Latest Ref Range: 8 - 28 mg/dL 13   Creatinine Latest Ref Range: 0.60 - 1.10 mg/dL 0.63   GFR Estimate Latest Ref Range: >60 mL/min/1.73m2 87   Calcium Latest Ref Range: 8.5 - 10.5 mg/dL 9.3   Anion Gap Latest Ref Range: 5 - 18 mmol/L 5   Glucose Latest Ref Range: 70 - 125 mg/dL 92   WBC Latest Ref Range: 4.0 - 11.0 10e3/uL 8.5   Hemoglobin Latest Ref Range: 11.7 - 15.7 g/dL 11.3 (L)   Hematocrit Latest Ref Range: 35.0 - 47.0 % 35.8   Platelet Count Latest Ref Range: 150 - 450 10e3/uL 320   RBC Count Latest Ref Range: 3.80 - 5.20 10e6/uL 3.89   MCV Latest Ref Range: 78 - 100 fL 92   MCH Latest Ref Range: 26.5 - 33.0 pg 29.0   MCHC Latest Ref Range: 31.5 - 36.5 g/dL 31.6   RDW Latest Ref Range: 10.0 - 15.0 % 12.8

## 2021-11-09 ENCOUNTER — TELEPHONE (OUTPATIENT)
Dept: INTERNAL MEDICINE | Facility: CLINIC | Age: 76
End: 2021-11-09
Payer: MEDICARE

## 2021-11-09 NOTE — TELEPHONE ENCOUNTER
Ayse calling this morning to report that she is unable to access her mychart for the next 2 weeks or so.  She is in a TCU and does not have internet access there.  Would like a call back from Dr Nguyễn or her team regarding any new messages in [employer] regarding results or other information.  Patient can be reached at [phone]

## 2021-11-09 NOTE — LETTER
November 18, 2021      Fiordaliza Najera Sr.  [address] [place] [address]        Dear ,    We are writing to inform you of your test results.    Cologuard result is negative         If you have any questions or concerns, please call the clinic at the number listed above.       Sincerely,      Dr. Julia Koehler

## 2021-11-10 DIAGNOSIS — Z53.9 DIAGNOSIS NOT YET DEFINED: Primary | ICD-10-CM

## 2021-11-10 PROCEDURE — G0180 MD CERTIFICATION HHA PATIENT: HCPCS | Performed by: PEDIATRICS

## 2021-11-11 ENCOUNTER — TRANSITIONAL CARE UNIT VISIT (OUTPATIENT)
Dept: GERIATRICS | Facility: CLINIC | Age: 76
End: 2021-11-11
Payer: MEDICARE

## 2021-11-11 VITALS
TEMPERATURE: 98.1 F | WEIGHT: 112.2 LBS | BODY MASS INDEX: 20.65 KG/M2 | HEART RATE: 72 BPM | RESPIRATION RATE: 18 BRPM | HEIGHT: 62 IN | DIASTOLIC BLOOD PRESSURE: 67 MMHG | OXYGEN SATURATION: 99 % | SYSTOLIC BLOOD PRESSURE: 116 MMHG

## 2021-11-11 DIAGNOSIS — J47.1 BRONCHIECTASIS WITH ACUTE EXACERBATION (H): Primary | ICD-10-CM

## 2021-11-11 DIAGNOSIS — J43.1 PANLOBULAR EMPHYSEMA (H): ICD-10-CM

## 2021-11-11 DIAGNOSIS — Z99.81 CHRONIC RESPIRATORY FAILURE WITH HYPOXIA, ON HOME O2 THERAPY (H): ICD-10-CM

## 2021-11-11 DIAGNOSIS — G89.29 OTHER CHRONIC PAIN: ICD-10-CM

## 2021-11-11 DIAGNOSIS — J96.11 CHRONIC RESPIRATORY FAILURE WITH HYPOXIA, ON HOME O2 THERAPY (H): ICD-10-CM

## 2021-11-11 DIAGNOSIS — F39 MOOD DISORDER (H): ICD-10-CM

## 2021-11-11 DIAGNOSIS — F41.9 ANXIETY: ICD-10-CM

## 2021-11-11 PROCEDURE — 99309 SBSQ NF CARE MODERATE MDM 30: CPT | Performed by: NURSE PRACTITIONER

## 2021-11-11 ASSESSMENT — MIFFLIN-ST. JEOR: SCORE: 952.19

## 2021-11-11 NOTE — TELEPHONE ENCOUNTER
Patient is calling for update when Dr. Nguyễn will be reaching out to her.    Call back number is

## 2021-11-11 NOTE — LETTER
2021        RE: Fiordaliza Najera Sr.  [address]  [place] [address]        [facility]    Name:   Fiordaliza Najera Sr. (Ayse)  :   1945  MRN:    [id number]     Facility:   [facility] (SNF) [[id number]]   Room: [facility] 216  Code Status: DNR/DNI and POLST AVAILABLE -     DOS:  2021  Previous visit:  N/A    PCP:  Ekaterina Koehler    CHIEF COMPLAINT / REASON FOR VISIT:  Chief Complaint   Patient presents with     Hospital F/U     NEW ADMISSION: Brochiectasis with acute exacerbation, COPD, chronic respiratory failure with hypoxia      [facility] from 2021 until 2021 (bronchiectasis with acute exacerbation)      HPI: Fiordaliza (who prefers to be called Ayse) is a 76 year old female, both a former nun and teacher, with chronic oxygen needs secondary to COPD and bronchiectasis, who presented as a direct admission from Dr. Randall Lorenz, pulmonologist, for treatment of bronchiectasis exacerbation as well as pulmonary bacteria not treatable with oral antibiotics.    She had persistent symptoms of dyspnea and cough after bronchoscopy was performed on 2021.  His sputum culture from that encounter grew Achromobacter xylosoxidans.  She initially presented to [facility] on 2021 and was admitted for bronchiectasis exacerbation, discharged on 2021.  At that time, she was discharged on 7 days of levofloxacin and inhaler therapy, switched to Trelegy.  She continues to have worsening symptoms, and her primary pulmonologist, Dr. Lorenz, suggested admission for IV antibiotics as outpatient management had failed.  EKG showed sinus rhythm, BNP normal, unlikely cardiac component.    CT chest showed new multifocal groundglass opacities throughout both lungs, likely infectious.  Minimal change in scattered tree-in-bud and nodular opacities.  She was treated with IV Zosyn, twice daily Mucomyst, twice daily sodium chloride nebs,  "prednisone 40 mg daily, Trelegy, and montelukast, and pulmonology recommended a PICC line placement for 2 weeks of IV antibiotics.  She did have some leukocytosis, felt likely secondary to steroids taken in the outpatient setting.  Otherwise, she was without chills or other signs of infection.  She uses 2 L of oxygen at baseline.    Diagnoses of mood disorder and anxiety for which she says she has benefited from low-dose escitalopram (it \"has made the biggest difference\").  For chronic pain, she has orders for pregabalin 50 mg every morning/100 mg at bedtime for neuropathy (gabapentin made her lightheaded), celecoxib (200 mg daily) and as needed tramadol.      CURRENT/RECENT TCU ISSUES    Disposition: She has had COPD and bronchiectasis for 5 years.  She also has chronic anxiety and has learned belly breathing.  She has a 4 wheeled walker and uses the seat to carry her oxygen.  Breathing is not a problem when sitting but worsens with minimal exertion (standing).    DVT prophylaxis with enoxaparin.    Pain is okay except for \"ordinary arthritis.\"  She does have bilateral rotator cuff injuries mild left greater than right.  She has had bilateral knee replacements (at the same time in 2008) and anterior/posterior combined fusion of the lumbar spine.    She has orders for as needed Imitrex for migraine headaches but has not had one in a long time.    She generally has soft stools and more than one/day.    She has a diaphragmatic hernia but has not complained of any heartburn.  She does prefer to eat bland food.    She had been scheduled him for the Moderna booster, and we will arrange for her to receive it.    Discharge planning: She lives in [facility].  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    ROS: No headaches or chest pains, coughing or congestion, nausea or vomiting, dizziness or dyspnea, dysuria, difficulty chewing or swallowing, integumentary issues, or problems with appetite or " sleep.      Past Medical History:   Diagnosis Date     Anxiety 09/30/2021     COPD (chronic obstructive pulmonary disease) (H)      Diverticulosis      Hiatal hernia      Mild asthma      Neuropathy      Osteopenia      Temporomandibular joint (TMJ) pain               Family History   Problem Relation Age of Onset     Dementia Mother         passed age 88     Chronic Obstructive Pulmonary Disease Father         passed age 78     Social History     Socioeconomic History     Marital status: Single     Spouse name: None     Number of children: None     Years of education: None     Highest education level: None   Occupational History     None   Tobacco Use     Smoking status: Never Smoker     Smokeless tobacco: Never Used   Substance and Sexual Activity     Alcohol use: No     Drug use: Never     Sexual activity: Never   Other Topics Concern     Parent/sibling w/ CABG, MI or angioplasty before 65F 55M? 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.  She has a sister and brother-in-law, 2 nieces, 3 grand nieces and nephews in [place].  She came to [place] for a sabbatical, and then  stayed on for a teaching job.  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  She lives alone in a long-term facility. - Dr. Nguyễn 01/04/21       Social Determinants of Health     Financial Resource Strain: Not on file   Food Insecurity: Not on file   Transportation Needs: Not on file   Physical Activity: Not on file   Stress: Not on file   Social Connections: Not on file   Intimate Partner Violence: Not on file   Housing Stability: Not on file       MEDICATIONS: Reviewed from the MAR, physician orders, and/or earlier progress notes.  Post Discharge Medication Reconciliation Status: discharge medications reconciled, continue medications without change.    Current Outpatient Medications   Medication Sig     acetylcysteine  (MUCOMYST) 10 % nebulizer solution Inhale 4 mLs into the lungs daily     albuterol (PROAIR HFA;PROVENTIL HFA;VENTOLIN HFA) 90 mcg/actuation inhaler [ALBUTEROL (PROAIR HFA;PROVENTIL HFA;VENTOLIN HFA) 90 MCG/ACTUATION INHALER] Inhale 2 puffs every 6 (six) hours as needed for wheezing.     albuterol (PROVENTIL) (2.5 MG/3ML) 0.083% neb solution Take 1 vial (2.5 mg) by nebulization every 4 hours as needed for shortness of breath / dyspnea or wheezing     alendronate (FOSAMAX) 70 MG tablet TAKE 1 TABLET (70 MG) BY MOUTH EVERY 7 DAYS. TAKE IN THE MORNING ON AN EMPTY STOMACH WITH A FULL GLASS OF WATER 30 MINUTES BEFORE FOOD.     BIOTIN ORAL Take 1 tablet by mouth daily      calcium-vitamin D (CALCIUM-VITAMIN D) 500 mg(1,250mg) -200 unit per tablet [CALCIUM-VITAMIN D (CALCIUM-VITAMIN D) 500 MG(1,250MG) -200 UNIT PER TABLET] Take 1 tablet by mouth daily.     celecoxib (CELEBREX) 200 MG capsule [CELECOXIB (CELEBREX) 200 MG CAPSULE] Take 1 capsule (200 mg total) by mouth daily.     cholecalciferol, vitamin D3, (VITAMIN D3) 1,000 unit capsule [CHOLECALCIFEROL, VITAMIN D3, (VITAMIN D3) 1,000 UNIT CAPSULE] Take 1,000 Units by mouth daily.     escitalopram (LEXAPRO) 5 MG tablet Take 1 tablet (5 mg) by mouth daily     Fluticasone-Umeclidin-Vilanterol (TRELEGY ELLIPTA) 100-62.5-25 MCG/INH oral inhaler Inhale 1 puff into the lungs daily     Lactobacillus rhamnosus GG (CULTURELLE) 10-15 Billion cell capsule Take 1 capsule by mouth every evening      montelukast (SINGULAIR) 10 mg tablet [MONTELUKAST (SINGULAIR) 10 MG TABLET] TAKE 1 TABLET(10 MG) BY MOUTH AT BEDTIME     multivitamin therapeutic (THERAGRAN) tablet [MULTIVITAMIN THERAPEUTIC (THERAGRAN) TABLET] Take 1 tablet by mouth daily.     mupirocin (BACTROBAN) 2 % external ointment Apply thin layer to nasal lesion 3 times a day for 7-10 days.     nystatin (MYCOSTATIN) 208276 UNIT/GM external cream Apply to rash on body 3 times per day as needed.     OXYGEN-AIR DELIVERY SYSTEMS [facility]  "[OXYGEN-AIR DELIVERY SYSTEMS [facility]] Use 2 L As Directed. 2L with activity and at night  [place]ana     piperacillin-tazobactam (ZOSYN) 3-0.375 GM vial Inject 3.375 g into the vein every 8 hours for 11 days     pregabalin (LYRICA) 50 MG capsule Take 50mg in the morning and 100mg in the evening.     sodium chloride (OCEAN) 0.65 % nasal spray Spray 2 sprays in nostril daily     sodium chloride 3 % nebulizer solution [SODIUM CHLORIDE 3 % NEBULIZER SOLUTION] Take 3 mL by nebulization 2 (two) times a day.     SUMAtriptan (IMITREX) 50 MG tablet [SUMATRIPTAN (IMITREX) 50 MG TABLET] Take 1 tablet (50 mg total) by mouth every 2 (two) hours as needed for migraine.     traMADol (ULTRAM) 50 MG tablet Take 1 tablet (50 mg) by mouth every 12 hours as needed for moderate pain or severe pain     No current facility-administered medications for this visit.     ALLERGIES:   Allergies   Allergen Reactions     Codeine Unknown     Morphine Itching     DIET: Regular, regular texture, thin liquids.  She prefers bland foods.    Vitals:    11/11/21 1326   BP: 116/67   Pulse: 72   Resp: 18   Temp: 98.1  F (36.7  C)   SpO2: 99%   Weight: 50.9 kg (112 lb 3.2 oz)   Height: 1.575 m (5' 2\")     Body mass index is 20.52 kg/m .    EXAMINATION:   General: Pleasant, alert, and conversant elderly female, in no apparent distress.  Head: Normocephalic and atraumatic.   Eyes: PERRLA, sclerae clear.   ENT: Moist oral mucosa.   Cardiovascular: Sinus tachycardia without appreciable murmur.   Respiratory: Lungs clear to auscultation bilaterally.   Abdomen: Nondistended.  Gurgling appreciated in the left lower quadrant.  Can be palpated and heard faintly across the room.  Musculoskeletal/Extremities: Age-related degenerative joint disease.  No peripheral edema.  Integument: No rashes, clinically significant lesions, or skin breakdown.   Cognitive/Psychiatric: Alert and oriented with euthymic affect.    DIAGNOSTICS:   Recent Results (from the past 240 hour(s)) "   ECG 12-LEAD WITH MUSE (LHE)    Collection Time: 11/04/21  7:29 PM   Result Value Ref Range    Systolic Blood Pressure  mmHg    Diastolic Blood Pressure  mmHg    Ventricular Rate 84 BPM    Atrial Rate 84 BPM    AL Interval 160 ms    QRS Duration 76 ms     ms    QTc 449 ms    P Axis 75 degrees    R AXIS -9 degrees    T Axis 44 degrees    Interpretation ECG       Sinus rhythm  Possible Left atrial enlargement  Borderline ECG  When compared with ECG of 30-SEP-2021 12:29,  No significant change was found  Confirmed by CAROLINA PATEL MD LOC: ([id number]) on 11/5/2021 8:14:52 AM     Basic metabolic panel    Collection Time: 11/04/21  7:39 PM   Result Value Ref Range    Sodium 137 136 - 145 mmol/L    Potassium 4.3 3.5 - 5.0 mmol/L    Chloride 99 98 - 107 mmol/L    Carbon Dioxide (CO2) 31 22 - 31 mmol/L    Anion Gap 7 5 - 18 mmol/L    Urea Nitrogen 12 8 - 28 mg/dL    Creatinine 0.58 (L) 0.60 - 1.10 mg/dL    Calcium 9.9 8.5 - 10.5 mg/dL    Glucose 98 70 - 125 mg/dL    GFR Estimate 90 >60 mL/min/1.73m2   CBC with platelets    Collection Time: 11/04/21  7:39 PM   Result Value Ref Range    WBC Count 12.4 (H) 4.0 - 11.0 10e3/uL    RBC Count 4.33 3.80 - 5.20 10e6/uL    Hemoglobin 12.5 11.7 - 15.7 g/dL    Hematocrit 39.4 35.0 - 47.0 %    MCV 91 78 - 100 fL    MCH 28.9 26.5 - 33.0 pg    MCHC 31.7 31.5 - 36.5 g/dL    RDW 12.6 10.0 - 15.0 %    Platelet Count 355 150 - 450 10e3/uL   B-Type Natriuretic Peptide ( East Only)    Collection Time: 11/04/21  7:39 PM   Result Value Ref Range    BNP 33 0 - 140 pg/mL   Lactic Acid STAT    Collection Time: 11/04/21  8:12 PM   Result Value Ref Range    Lactic Acid 0.5 (L) 0.7 - 2.0 mmol/L   Asymptomatic COVID-19 Virus (Coronavirus) by PCR Nasopharyngeal    Collection Time: 11/05/21  1:58 AM    Specimen: Nasopharyngeal; Swab   Result Value Ref Range    SARS CoV2 PCR Negative Negative   INR    Collection Time: 11/05/21  6:51 AM   Result Value Ref Range    INR 1.15 0.90 - 1.15   CBC with  platelets and differential    Collection Time: 11/05/21  6:51 AM   Result Value Ref Range    WBC Count 8.0 4.0 - 11.0 10e3/uL    RBC Count 3.70 (L) 3.80 - 5.20 10e6/uL    Hemoglobin 10.8 (L) 11.7 - 15.7 g/dL    Hematocrit 33.9 (L) 35.0 - 47.0 %    MCV 92 78 - 100 fL    MCH 29.2 26.5 - 33.0 pg    MCHC 31.9 31.5 - 36.5 g/dL    RDW 12.8 10.0 - 15.0 %    Platelet Count 334 150 - 450 10e3/uL    % Neutrophils 90 %    % Lymphocytes 7 %    % Monocytes 3 %    % Eosinophils 0 %    % Basophils 0 %    % Immature Granulocytes 0 %    NRBCs per 100 WBC 0 <1 /100    Absolute Neutrophils 7.2 1.6 - 8.3 10e3/uL    Absolute Lymphocytes 0.5 (L) 0.8 - 5.3 10e3/uL    Absolute Monocytes 0.3 0.0 - 1.3 10e3/uL    Absolute Eosinophils 0.0 0.0 - 0.7 10e3/uL    Absolute Basophils 0.0 0.0 - 0.2 10e3/uL    Absolute Immature Granulocytes 0.0 <=0.0 10e3/uL    Absolute NRBCs 0.0 10e3/uL   Platelet count    Collection Time: 11/07/21  5:17 AM   Result Value Ref Range    Platelet Count 285 150 - 450 10e3/uL   Creatinine    Collection Time: 11/07/21  5:17 AM   Result Value Ref Range    Creatinine 0.60 0.60 - 1.10 mg/dL    GFR Estimate 89 >60 mL/min/1.73m2   Basic metabolic panel    Collection Time: 11/08/21  6:01 AM   Result Value Ref Range    Sodium 140 136 - 145 mmol/L    Potassium 3.6 3.5 - 5.0 mmol/L    Chloride 103 98 - 107 mmol/L    Carbon Dioxide (CO2) 32 (H) 22 - 31 mmol/L    Anion Gap 5 5 - 18 mmol/L    Urea Nitrogen 13 8 - 28 mg/dL    Creatinine 0.63 0.60 - 1.10 mg/dL    Calcium 9.3 8.5 - 10.5 mg/dL    Glucose 92 70 - 125 mg/dL    GFR Estimate 87 >60 mL/min/1.73m2   CBC with platelets    Collection Time: 11/08/21  6:01 AM   Result Value Ref Range    WBC Count 8.5 4.0 - 11.0 10e3/uL    RBC Count 3.89 3.80 - 5.20 10e6/uL    Hemoglobin 11.3 (L) 11.7 - 15.7 g/dL    Hematocrit 35.8 35.0 - 47.0 %    MCV 92 78 - 100 fL    MCH 29.0 26.5 - 33.0 pg    MCHC 31.6 31.5 - 36.5 g/dL    RDW 12.8 10.0 - 15.0 %    Platelet Count 320 150 - 450 10e3/uL        ASSESSMENT/Plan:      ICD-10-CM    1. Bronchiectasis with acute exacerbation (H)  J47.1    2. Panlobular emphysema (H)  J43.1    3. Chronic respiratory failure with hypoxia, on home O2 therapy (H)  J96.11     Z99.81    4. Mood disorder (H)  F39    5. Anxiety  F41.9    6. Other chronic pain  G89.29        CHANGES:    Patient needs to receive the Moderna booster vaccine for COVID-19.    CARE PLAN:    The care plan, medications, vital signs, orders, and nursing notes have been reviewed, and all orders signed. Changes to care plan, if any, as noted. Otherwise, continue current plan of care.    The above has been created using voice recognition software. Please be aware that this may unintentionally  produce inaccuracies and/or nonsensical sentences.      Electronically signed by: BATOOL Melara CNP            Sincerely,        BATOOL Melara CNP

## 2021-11-12 NOTE — TELEPHONE ENCOUNTER
Spoke with patient and she states she is looking specifically for results of her cologuard testing. I did a review of chart however could not find anything. Called [place] and requested copy be sent to us here in [facility] fax [phone] and to call back if needing additional information   Forwarding to provider and can update when results are received   Du Perez CMA on 11/12/2021 at 2:39 PM

## 2021-11-12 NOTE — TELEPHONE ENCOUNTER
"I sent her message back in October when I saw her. You can let her know my message then:    \"Hi Ayse,  There are no signs of a vaginal or urinary tract infection.  Your white count has come down since the last time it was checked, which is good.  Your electrolytes, kidney function, liver tests are all normal.  Your cholesterol levels are stable. Vitamin D level is normal\"    However since our last visit she was admitted in the hospital, and so her docs at the TCU should have most up to date information if she has ongoing questions or concerns    Dr. Nguyễn  "

## 2021-11-14 NOTE — PROGRESS NOTES
[facility]    Name:   Fiordaliza Najera  (Ayse)  :   1945  MRN:    [id number]     Facility:   [facility] ([facility]) [[id number]]   Room:   Code Status: DNR/DNI and POLST AVAILABLE -     DOS:  2021  Previous visit:  N/A    PCP:  Ekaterina Koehler    CHIEF COMPLAINT / REASON FOR VISIT:  Chief Complaint   Patient presents with     Hospital F/U     NEW ADMISSION: Brochiectasis with acute exacerbation, COPD, chronic respiratory failure with hypoxia      [facility] from 2021 until 2021 (bronchiectasis with acute exacerbation)      HPI: Fiordaliza (who prefers to be called Ayse) is a 76 year old female, both a former nun and teacher, with chronic oxygen needs secondary to COPD and bronchiectasis, who presented as a direct admission from Dr. Randall Lorenz, pulmonologist, for treatment of bronchiectasis exacerbation as well as pulmonary bacteria not treatable with oral antibiotics.    She had persistent symptoms of dyspnea and cough after bronchoscopy was performed on 2021.  His sputum culture from that encounter grew Achromobacter xylosoxidans.  She initially presented to [facility] on 2021 and was admitted for bronchiectasis exacerbation, discharged on 2021.  At that time, she was discharged on 7 days of levofloxacin and inhaler therapy, switched to Trelegy.  She continues to have worsening symptoms, and her primary pulmonologist, Dr. Lorenz, suggested admission for IV antibiotics as outpatient management had failed.  EKG showed sinus rhythm, BNP normal, unlikely cardiac component.    CT chest showed new multifocal groundglass opacities throughout both lungs, likely infectious.  Minimal change in scattered tree-in-bud and nodular opacities.  She was treated with IV Zosyn, twice daily Mucomyst, twice daily sodium chloride nebs, prednisone 40 mg daily, Trelegy, and montelukast, and pulmonology recommended a PICC line placement for  "2 weeks of IV antibiotics.  She did have some leukocytosis, felt likely secondary to steroids taken in the outpatient setting.  Otherwise, she was without chills or other signs of infection.  She uses 2 L of oxygen at baseline.    Diagnoses of mood disorder and anxiety for which she says she has benefited from low-dose escitalopram (it \"has made the biggest difference\").  For chronic pain, she has orders for pregabalin 50 mg every morning/100 mg at bedtime for neuropathy (gabapentin made her lightheaded), celecoxib (200 mg daily) and as needed tramadol.      CURRENT/RECENT TCU ISSUES    Disposition: She has had COPD and bronchiectasis for 5 years.  She also has chronic anxiety and has learned belly breathing.  She has a 4 wheeled walker and uses the seat to carry her oxygen.  Breathing is not a problem when sitting but worsens with minimal exertion (standing).    DVT prophylaxis with enoxaparin.    Pain is okay except for \"ordinary arthritis.\"  She does have bilateral rotator cuff injuries mild left greater than right.  She has had bilateral knee replacements (at the same time in 2008) and anterior/posterior combined fusion of the lumbar spine.    She has orders for as needed Imitrex for migraine headaches but has not had one in a long time.    She generally has soft stools and more than one/day.    She has a diaphragmatic hernia but has not complained of any heartburn.  She does prefer to eat bland food.    She had been scheduled him for the Moderna booster, and we will arrange for her to receive it.    Discharge planning: She lives in [facility].  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    ROS: No headaches or chest pains, coughing or congestion, nausea or vomiting, dizziness or dyspnea, dysuria, difficulty chewing or swallowing, integumentary issues, or problems with appetite or sleep.      Past Medical History:   Diagnosis Date     Anxiety 09/30/2021     COPD (chronic obstructive pulmonary " disease) (H)      Diverticulosis      Hiatal hernia      Mild asthma      Neuropathy      Osteopenia      Temporomandibular joint (TMJ) pain               Family History   Problem Relation Age of Onset     Dementia Mother         passed age 88     Chronic Obstructive Pulmonary Disease Father         passed age 78     Social History     Socioeconomic History     Marital status: Single     Spouse name: None     Number of children: None     Years of education: None     Highest education level: None   Occupational History     None   Tobacco Use     Smoking status: Never Smoker     Smokeless tobacco: Never Used   Substance and Sexual Activity     Alcohol use: No     Drug use: Never     Sexual activity: Never   Other Topics Concern     Parent/sibling w/ CABG, MI or angioplasty before 65F 55M? 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  She was a [employer] sister with [employer].  She has a sister and brother-in-law, 2 nieces, 3 grand nieces and nephews in [place].  She came to [place] for a sabbatical, and then  stayed on for a teaching job.  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  She lives alone in a long-term facility. - Dr. Nguyễn 01/04/21       Social Determinants of Health     Financial Resource Strain: Not on file   Food Insecurity: Not on file   Transportation Needs: Not on file   Physical Activity: Not on file   Stress: Not on file   Social Connections: Not on file   Intimate Partner Violence: Not on file   Housing Stability: Not on file       MEDICATIONS: Reviewed from the MAR, physician orders, and/or earlier progress notes.  Post Discharge Medication Reconciliation Status: discharge medications reconciled, continue medications without change.    Current Outpatient Medications   Medication Sig     acetylcysteine (MUCOMYST) 10 % nebulizer solution Inhale 4 mLs into the lungs daily     albuterol (PROAIR HFA;PROVENTIL  HFA;VENTOLIN HFA) 90 mcg/actuation inhaler [ALBUTEROL (PROAIR HFA;PROVENTIL HFA;VENTOLIN HFA) 90 MCG/ACTUATION INHALER] Inhale 2 puffs every 6 (six) hours as needed for wheezing.     albuterol (PROVENTIL) (2.5 MG/3ML) 0.083% neb solution Take 1 vial (2.5 mg) by nebulization every 4 hours as needed for shortness of breath / dyspnea or wheezing     alendronate (FOSAMAX) 70 MG tablet TAKE 1 TABLET (70 MG) BY MOUTH EVERY 7 DAYS. TAKE IN THE MORNING ON AN EMPTY STOMACH WITH A FULL GLASS OF WATER 30 MINUTES BEFORE FOOD.     BIOTIN ORAL Take 1 tablet by mouth daily      calcium-vitamin D (CALCIUM-VITAMIN D) 500 mg(1,250mg) -200 unit per tablet [CALCIUM-VITAMIN D (CALCIUM-VITAMIN D) 500 MG(1,250MG) -200 UNIT PER TABLET] Take 1 tablet by mouth daily.     celecoxib (CELEBREX) 200 MG capsule [CELECOXIB (CELEBREX) 200 MG CAPSULE] Take 1 capsule (200 mg total) by mouth daily.     cholecalciferol, vitamin D3, (VITAMIN D3) 1,000 unit capsule [CHOLECALCIFEROL, VITAMIN D3, (VITAMIN D3) 1,000 UNIT CAPSULE] Take 1,000 Units by mouth daily.     escitalopram (LEXAPRO) 5 MG tablet Take 1 tablet (5 mg) by mouth daily     Fluticasone-Umeclidin-Vilanterol (TRELEGY ELLIPTA) 100-62.5-25 MCG/INH oral inhaler Inhale 1 puff into the lungs daily     Lactobacillus rhamnosus GG (CULTURELLE) 10-15 Billion cell capsule Take 1 capsule by mouth every evening      montelukast (SINGULAIR) 10 mg tablet [MONTELUKAST (SINGULAIR) 10 MG TABLET] TAKE 1 TABLET(10 MG) BY MOUTH AT BEDTIME     multivitamin therapeutic (THERAGRAN) tablet [MULTIVITAMIN THERAPEUTIC (THERAGRAN) TABLET] Take 1 tablet by mouth daily.     mupirocin (BACTROBAN) 2 % external ointment Apply thin layer to nasal lesion 3 times a day for 7-10 days.     nystatin (MYCOSTATIN) 783505 UNIT/GM external cream Apply to rash on body 3 times per day as needed.     OXYGEN-AIR DELIVERY SYSTEMS MISC [OXYGEN-AIR DELIVERY SYSTEMS MISC] Use 2 L As Directed. 2L with activity and at night  Sherri      "piperacillin-tazobactam (ZOSYN) 3-0.375 GM vial Inject 3.375 g into the vein every 8 hours for 11 days     pregabalin (LYRICA) 50 MG capsule Take 50mg in the morning and 100mg in the evening.     sodium chloride (OCEAN) 0.65 % nasal spray Spray 2 sprays in nostril daily     sodium chloride 3 % nebulizer solution [SODIUM CHLORIDE 3 % NEBULIZER SOLUTION] Take 3 mL by nebulization 2 (two) times a day.     SUMAtriptan (IMITREX) 50 MG tablet [SUMATRIPTAN (IMITREX) 50 MG TABLET] Take 1 tablet (50 mg total) by mouth every 2 (two) hours as needed for migraine.     traMADol (ULTRAM) 50 MG tablet Take 1 tablet (50 mg) by mouth every 12 hours as needed for moderate pain or severe pain     No current facility-administered medications for this visit.     ALLERGIES:   Allergies   Allergen Reactions     Codeine Unknown     Morphine Itching     DIET: Regular, regular texture, thin liquids.  She prefers bland foods.    Vitals:    11/11/21 1326   BP: 116/67   Pulse: 72   Resp: 18   Temp: 98.1  F (36.7  C)   SpO2: 99%   Weight: 50.9 kg (112 lb 3.2 oz)   Height: 1.575 m (5' 2\")     Body mass index is 20.52 kg/m .    EXAMINATION:   General: Pleasant, alert, and conversant elderly female, in no apparent distress.  Head: Normocephalic and atraumatic.   Eyes: PERRLA, sclerae clear.   ENT: Moist oral mucosa.   Cardiovascular: Sinus tachycardia without appreciable murmur.   Respiratory: Lungs clear to auscultation bilaterally.   Abdomen: Nondistended.  Gurgling appreciated in the left lower quadrant.  Can be palpated and heard faintly across the room.  Musculoskeletal/Extremities: Age-related degenerative joint disease.  No peripheral edema.  Integument: No rashes, clinically significant lesions, or skin breakdown.   Cognitive/Psychiatric: Alert and oriented with euthymic affect.    DIAGNOSTICS:   Recent Results (from the past 240 hour(s))   ECG 12-LEAD WITH MUSE (LHE)    Collection Time: 11/04/21  7:29 PM   Result Value Ref Range    " Systolic Blood Pressure  mmHg    Diastolic Blood Pressure  mmHg    Ventricular Rate 84 BPM    Atrial Rate 84 BPM    IA Interval 160 ms    QRS Duration 76 ms     ms    QTc 449 ms    P Axis 75 degrees    R AXIS -9 degrees    T Axis 44 degrees    Interpretation ECG       Sinus rhythm  Possible Left atrial enlargement  Borderline ECG  When compared with ECG of 30-SEP-2021 12:29,  No significant change was found  Confirmed by CAROLINA PATEL MD LOC: ([id number]) on 11/5/2021 8:14:52 AM     Basic metabolic panel    Collection Time: 11/04/21  7:39 PM   Result Value Ref Range    Sodium 137 136 - 145 mmol/L    Potassium 4.3 3.5 - 5.0 mmol/L    Chloride 99 98 - 107 mmol/L    Carbon Dioxide (CO2) 31 22 - 31 mmol/L    Anion Gap 7 5 - 18 mmol/L    Urea Nitrogen 12 8 - 28 mg/dL    Creatinine 0.58 (L) 0.60 - 1.10 mg/dL    Calcium 9.9 8.5 - 10.5 mg/dL    Glucose 98 70 - 125 mg/dL    GFR Estimate 90 >60 mL/min/1.73m2   CBC with platelets    Collection Time: 11/04/21  7:39 PM   Result Value Ref Range    WBC Count 12.4 (H) 4.0 - 11.0 10e3/uL    RBC Count 4.33 3.80 - 5.20 10e6/uL    Hemoglobin 12.5 11.7 - 15.7 g/dL    Hematocrit 39.4 35.0 - 47.0 %    MCV 91 78 - 100 fL    MCH 28.9 26.5 - 33.0 pg    MCHC 31.7 31.5 - 36.5 g/dL    RDW 12.6 10.0 - 15.0 %    Platelet Count 355 150 - 450 10e3/uL   B-Type Natriuretic Peptide ([facility] Only)    Collection Time: 11/04/21  7:39 PM   Result Value Ref Range    BNP 33 0 - 140 pg/mL   Lactic Acid STAT    Collection Time: 11/04/21  8:12 PM   Result Value Ref Range    Lactic Acid 0.5 (L) 0.7 - 2.0 mmol/L   Asymptomatic COVID-19 Virus (Coronavirus) by PCR Nasopharyngeal    Collection Time: 11/05/21  1:58 AM    Specimen: Nasopharyngeal; Swab   Result Value Ref Range    SARS CoV2 PCR Negative Negative   INR    Collection Time: 11/05/21  6:51 AM   Result Value Ref Range    INR 1.15 0.90 - 1.15   CBC with platelets and differential    Collection Time: 11/05/21  6:51 AM   Result Value Ref Range    WBC  Count 8.0 4.0 - 11.0 10e3/uL    RBC Count 3.70 (L) 3.80 - 5.20 10e6/uL    Hemoglobin 10.8 (L) 11.7 - 15.7 g/dL    Hematocrit 33.9 (L) 35.0 - 47.0 %    MCV 92 78 - 100 fL    MCH 29.2 26.5 - 33.0 pg    MCHC 31.9 31.5 - 36.5 g/dL    RDW 12.8 10.0 - 15.0 %    Platelet Count 334 150 - 450 10e3/uL    % Neutrophils 90 %    % Lymphocytes 7 %    % Monocytes 3 %    % Eosinophils 0 %    % Basophils 0 %    % Immature Granulocytes 0 %    NRBCs per 100 WBC 0 <1 /100    Absolute Neutrophils 7.2 1.6 - 8.3 10e3/uL    Absolute Lymphocytes 0.5 (L) 0.8 - 5.3 10e3/uL    Absolute Monocytes 0.3 0.0 - 1.3 10e3/uL    Absolute Eosinophils 0.0 0.0 - 0.7 10e3/uL    Absolute Basophils 0.0 0.0 - 0.2 10e3/uL    Absolute Immature Granulocytes 0.0 <=0.0 10e3/uL    Absolute NRBCs 0.0 10e3/uL   Platelet count    Collection Time: 11/07/21  5:17 AM   Result Value Ref Range    Platelet Count 285 150 - 450 10e3/uL   Creatinine    Collection Time: 11/07/21  5:17 AM   Result Value Ref Range    Creatinine 0.60 0.60 - 1.10 mg/dL    GFR Estimate 89 >60 mL/min/1.73m2   Basic metabolic panel    Collection Time: 11/08/21  6:01 AM   Result Value Ref Range    Sodium 140 136 - 145 mmol/L    Potassium 3.6 3.5 - 5.0 mmol/L    Chloride 103 98 - 107 mmol/L    Carbon Dioxide (CO2) 32 (H) 22 - 31 mmol/L    Anion Gap 5 5 - 18 mmol/L    Urea Nitrogen 13 8 - 28 mg/dL    Creatinine 0.63 0.60 - 1.10 mg/dL    Calcium 9.3 8.5 - 10.5 mg/dL    Glucose 92 70 - 125 mg/dL    GFR Estimate 87 >60 mL/min/1.73m2   CBC with platelets    Collection Time: 11/08/21  6:01 AM   Result Value Ref Range    WBC Count 8.5 4.0 - 11.0 10e3/uL    RBC Count 3.89 3.80 - 5.20 10e6/uL    Hemoglobin 11.3 (L) 11.7 - 15.7 g/dL    Hematocrit 35.8 35.0 - 47.0 %    MCV 92 78 - 100 fL    MCH 29.0 26.5 - 33.0 pg    MCHC 31.6 31.5 - 36.5 g/dL    RDW 12.8 10.0 - 15.0 %    Platelet Count 320 150 - 450 10e3/uL       ASSESSMENT/Plan:      ICD-10-CM    1. Bronchiectasis with acute exacerbation (H)  J47.1    2.  Panlobular emphysema (H)  J43.1    3. Chronic respiratory failure with hypoxia, on home O2 therapy (H)  J96.11     Z99.81    4. Mood disorder (H)  F39    5. Anxiety  F41.9    6. Other chronic pain  G89.29        CHANGES:    Patient needs to receive the Moderna booster vaccine for COVID-19.    CARE PLAN:    The care plan, medications, vital signs, orders, and nursing notes have been reviewed, and all orders signed. Changes to care plan, if any, as noted. Otherwise, continue current plan of care.    The above has been created using voice recognition software. Please be aware that this may unintentionally  produce inaccuracies and/or nonsensical sentences.      Electronically signed by: BATOOL Melara CNP

## 2021-11-15 ENCOUNTER — TRANSITIONAL CARE UNIT VISIT (OUTPATIENT)
Dept: GERIATRICS | Facility: CLINIC | Age: 76
End: 2021-11-15
Payer: MEDICARE

## 2021-11-15 VITALS
SYSTOLIC BLOOD PRESSURE: 125 MMHG | HEIGHT: 62 IN | RESPIRATION RATE: 18 BRPM | BODY MASS INDEX: 20.65 KG/M2 | WEIGHT: 112.2 LBS | HEART RATE: 83 BPM | OXYGEN SATURATION: 95 % | TEMPERATURE: 97.7 F | DIASTOLIC BLOOD PRESSURE: 68 MMHG

## 2021-11-15 DIAGNOSIS — F41.9 ANXIETY: ICD-10-CM

## 2021-11-15 DIAGNOSIS — G89.29 OTHER CHRONIC PAIN: ICD-10-CM

## 2021-11-15 DIAGNOSIS — J43.1 PANLOBULAR EMPHYSEMA (H): ICD-10-CM

## 2021-11-15 DIAGNOSIS — J96.11 CHRONIC RESPIRATORY FAILURE WITH HYPOXIA, ON HOME O2 THERAPY (H): ICD-10-CM

## 2021-11-15 DIAGNOSIS — Z99.81 CHRONIC RESPIRATORY FAILURE WITH HYPOXIA, ON HOME O2 THERAPY (H): ICD-10-CM

## 2021-11-15 DIAGNOSIS — F39 MOOD DISORDER (H): ICD-10-CM

## 2021-11-15 DIAGNOSIS — J47.1 BRONCHIECTASIS WITH ACUTE EXACERBATION (H): Primary | ICD-10-CM

## 2021-11-15 PROCEDURE — 99309 SBSQ NF CARE MODERATE MDM 30: CPT | Performed by: NURSE PRACTITIONER

## 2021-11-15 ASSESSMENT — MIFFLIN-ST. JEOR: SCORE: 952.19

## 2021-11-15 NOTE — TELEPHONE ENCOUNTER
Called [facility] again and asked that they send copy of results again to our fax at [phone]  Du Perez CMA on 11/15/2021 at 11:02 AM

## 2021-11-15 NOTE — LETTER
11/15/2021        RE: Fiordaliza Najera Sr.  [address] [facility]  [place] [address]        [facility]    Name:   Fiordaliza Najera Sr. (Ayse)  :   1945  MRN:    [id number]     Facility:   [facility] (SNF) [[id number]]   Room: [place] [address]  Code Status: DNR/DNI and POLST AVAILABLE -     DOS:  11/15/2021  Previous visit:  2021    PCP:  Ekaterina Koehler    CHIEF COMPLAINT / REASON FOR VISIT:  Chief Complaint   Patient presents with     Clinic Care Coordination - Follow-up     Brochiectasis with acute exacerbation, COPD, chronic respiratory failure with hypoxia      [facility] from 2021 until 2021 (bronchiectasis with acute exacerbation)      HPI: Fiordaliza (who prefers to be called Ayse) is a 76 year old female, 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 Randall Lorenz, pulmonologist, for treatment of bronchiectasis exacerbation as well as pulmonary bacteria not treatable with oral antibiotics.    Patient reports she was never a smoker but was exposed to second hand smoke at a young age and multiple bouts of bronchitis has led to scarring and subsequent COPD.     She had persistent symptoms of dyspnea and cough after bronchoscopy was performed on 2021.  His sputum culture from that encounter grew Achromobacter xylosoxidans.  She initially presented to [facility] on 2021 and was admitted for bronchiectasis exacerbation, discharged on 2021.  At that time, she was discharged on 7 days of levofloxacin and inhaler therapy, switched to Trelegy.  She continued to have worsening symptoms, and her primary pulmonologist, Dr. Lorenz, suggested admission for IV antibiotics as outpatient management had failed.  EKG showed sinus rhythm, BNP normal, unlikely cardiac component.    Patient reports  Reason contacted:  symptom  Information relayed:  Below message   Additional questions:  No  Further follow-up needed:  No  Okay to leave a detailed message:  No     "she lost about 7 months during that illness.    CT chest showed new multifocal groundglass opacities throughout both lungs, likely infectious.  Minimal change in scattered tree-in-bud and nodular opacities.  She was treated with IV Zosyn, twice daily Mucomyst, twice daily sodium chloride nebs, prednisone 40 mg daily, Trelegy, and montelukast, and pulmonology recommended a PICC line placement for 2 weeks of IV antibiotics.  She did have some leukocytosis, felt likely secondary to steroids taken in the outpatient setting.  Otherwise, she was without chills or other signs of infection.  She uses 2 L of oxygen at baseline.    Diagnoses of mood disorder and anxiety for which she says she has benefited from low-dose escitalopram (it \"has made the biggest difference\").  For chronic pain, she has orders for pregabalin 50 mg every morning/100 mg at bedtime for neuropathy (gabapentin made her lightheaded), celecoxib (200 mg daily) and as needed tramadol, which she used to take for her back and shoulder pain but has not had to use it in a \"long time\".      CURRENT/RECENT TCU ISSUES    Disposition: She has had COPD and bronchiectasis for 5 years.  She also has chronic anxiety and has learned belly breathing.  She has a 4 wheeled walker and uses the seat to carry her oxygen.  Breathing is not a problem when sitting but worsens with minimal exertion (standing). She is currently on 2L supplemental oxygen via [place], which is what she uses at baseline. She states she does not feel like she is yet back to baseline and still feels weak.    DVT prophylaxis with enoxaparin.    Pain is okay except for \"ordinary arthritis.\"  She does have bilateral rotator cuff injuries mild left greater than right.  She has had bilateral knee replacements (at the same time in 2008) and anterior/posterior combined fusion of the lumbar spine.    She has orders for as needed Imitrex for migraine headaches but has not had one in a long time.    She generally " has soft stools and more than one/day. She did have the Cologuard test through her PCP recently and has yet to see the results.     She has a diaphragmatic hernia but has not complained of any heartburn.  She does prefer to eat bland food.    She had been scheduled for the Moderna booster this week (available in this facility) but has decided she would rather wait until completing the IV antibiotics.     Discharge planning: She lives in [facility].  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 Care conference has yet to be planned.      ROS: No headaches or chest pains, coughing or congestion, nausea or vomiting, dizziness, dysuria, difficulty chewing or swallowing, integumentary issues, or problems with appetite or sleep.      Past Medical History:   Diagnosis Date     Anxiety 09/30/2021     COPD (chronic obstructive pulmonary disease) (H)      Diverticulosis      Hiatal hernia      Mild asthma      Neuropathy      Osteopenia      Temporomandibular joint (TMJ) pain               Family History   Problem Relation Age of Onset     Dementia Mother         passed age 88     Chronic Obstructive Pulmonary Disease Father         passed age 78     Social History     Socioeconomic History     Marital status: Single     Spouse name: None     Number of children: None     Years of education: None     Highest education level: None   Occupational History     None   Tobacco Use     Smoking status: Never Smoker     Smokeless tobacco: Never Used   Substance and Sexual Activity     Alcohol use: No     Drug use: Never     Sexual activity: Never   Other Topics Concern     Parent/sibling w/ CABG, MI or angioplasty before 65F 55M? 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  She has a sister and brother-in-law, 2 nieces, 3 grand nieces and nephews in [place].  She came to [place] [facility], and then  stayed on for a teaching job.  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  She lives alone in a long-term facility. - Dr. Nguyễn 01/04/21       Social Determinants of Health     Financial Resource Strain: Not on file   Food Insecurity: Not on file   Transportation Needs: Not on file   Physical Activity: Not on file   Stress: Not on file   Social Connections: Not on file   Intimate Partner Violence: Not on file   Housing Stability: Not on file       MEDICATIONS: Reviewed from the MAR, physician orders, and/or earlier progress notes.  Post Discharge Medication Reconciliation Status: medication reconcilation previously completed during another office visit.    Current Outpatient Medications   Medication Sig     acetylcysteine (MUCOMYST) 10 % nebulizer solution Inhale 4 mLs into the lungs daily     albuterol (PROAIR HFA;PROVENTIL HFA;VENTOLIN HFA) 90 mcg/actuation inhaler [ALBUTEROL (PROAIR HFA;PROVENTIL HFA;VENTOLIN HFA) 90 MCG/ACTUATION INHALER] Inhale 2 puffs every 6 (six) hours as needed for wheezing.     albuterol (PROVENTIL) (2.5 MG/3ML) 0.083% neb solution Take 1 vial (2.5 mg) by nebulization every 4 hours as needed for shortness of breath / dyspnea or wheezing     alendronate (FOSAMAX) 70 MG tablet TAKE 1 TABLET (70 MG) BY MOUTH EVERY 7 DAYS. TAKE IN THE MORNING ON AN EMPTY STOMACH WITH A FULL GLASS OF WATER 30 MINUTES BEFORE FOOD.     BIOTIN ORAL Take 1 tablet by mouth daily      calcium-vitamin D (CALCIUM-VITAMIN D) 500 mg(1,250mg) -200 unit per tablet [CALCIUM-VITAMIN D (CALCIUM-VITAMIN D) 500 MG(1,250MG) -200 UNIT PER TABLET] Take 1 tablet by mouth daily.     celecoxib (CELEBREX) 200 MG capsule [CELECOXIB (CELEBREX) 200 MG CAPSULE] Take 1 capsule (200 mg total) by mouth daily.     cholecalciferol, vitamin D3, (VITAMIN D3) 1,000 unit capsule [CHOLECALCIFEROL, VITAMIN D3, (VITAMIN D3) 1,000 UNIT CAPSULE] Take 1,000 Units by mouth daily.     escitalopram (LEXAPRO) 5 MG tablet  "Take 1 tablet (5 mg) by mouth daily     Fluticasone-Umeclidin-Vilanterol (TRELEGY ELLIPTA) 100-62.5-25 MCG/INH oral inhaler Inhale 1 puff into the lungs daily     Lactobacillus rhamnosus GG (CULTURELLE) 10-15 Billion cell capsule Take 1 capsule by mouth every evening      montelukast (SINGULAIR) 10 mg tablet [MONTELUKAST (SINGULAIR) 10 MG TABLET] TAKE 1 TABLET(10 MG) BY MOUTH AT BEDTIME     multivitamin therapeutic (THERAGRAN) tablet [MULTIVITAMIN THERAPEUTIC (THERAGRAN) TABLET] Take 1 tablet by mouth daily.     mupirocin (BACTROBAN) 2 % external ointment Apply thin layer to nasal lesion 3 times a day for 7-10 days.     nystatin (MYCOSTATIN) 510778 UNIT/GM external cream Apply to rash on body 3 times per day as needed.     OXYGEN-AIR DELIVERY SYSTEMS MISC [OXYGEN-AIR DELIVERY SYSTEMS MISC] Use 2 L As Directed. 2L with activity and at night  [employer]     piperacillin-tazobactam (ZOSYN) 3-0.375 GM vial Inject 3.375 g into the vein every 8 hours for 11 days     pregabalin (LYRICA) 50 MG capsule Take 50mg in the morning and 100mg in the evening.     sodium chloride (OCEAN) 0.65 % nasal spray Spray 2 sprays in nostril daily     sodium chloride 3 % nebulizer solution [SODIUM CHLORIDE 3 % NEBULIZER SOLUTION] Take 3 mL by nebulization 2 (two) times a day.     SUMAtriptan (IMITREX) 50 MG tablet [SUMATRIPTAN (IMITREX) 50 MG TABLET] Take 1 tablet (50 mg total) by mouth every 2 (two) hours as needed for migraine.     traMADol (ULTRAM) 50 MG tablet Take 1 tablet (50 mg) by mouth every 12 hours as needed for moderate pain or severe pain     No current facility-administered medications for this visit.     ALLERGIES:   Allergies   Allergen Reactions     Codeine Unknown     Morphine Itching     DIET: Regular, regular texture, thin liquids.  Prefers bland foods.    Vitals:    11/15/21 1352   BP: 125/68   Pulse: 83   Resp: 18   Temp: 97.7  F (36.5  C)   SpO2: 95%   Weight: 50.9 kg (112 lb 3.2 oz)   Height: 1.575 m (5' 2\")     Body mass " index is 20.52 kg/m .    EXAMINATION:   General: Pleasant, alert, and conversant elderly female, in no apparent distress.  Head: Normocephalic and atraumatic.   Eyes: PERRLA, sclerae clear.   ENT: Moist oral mucosa.   Cardiovascular: Sinus tachycardia without appreciable murmur.   Respiratory: Lungs clear to auscultation bilaterally.   Abdomen: Nondistended.  Gurgling appreciated in the left lower quadrant.  Can be palpated and heard faintly across the room.  Musculoskeletal/Extremities: Age-related degenerative joint disease.  No peripheral edema.  Integument: No rashes, clinically significant lesions, or skin breakdown.   Cognitive/Psychiatric: Alert and oriented with euthymic affect.    DIAGNOSTICS:   Recent Results (from the past 240 hour(s))   Platelet count    Collection Time: 11/07/21  5:17 AM   Result Value Ref Range    Platelet Count 285 150 - 450 10e3/uL   Creatinine    Collection Time: 11/07/21  5:17 AM   Result Value Ref Range    Creatinine 0.60 0.60 - 1.10 mg/dL    GFR Estimate 89 >60 mL/min/1.73m2   Basic metabolic panel    Collection Time: 11/08/21  6:01 AM   Result Value Ref Range    Sodium 140 136 - 145 mmol/L    Potassium 3.6 3.5 - 5.0 mmol/L    Chloride 103 98 - 107 mmol/L    Carbon Dioxide (CO2) 32 (H) 22 - 31 mmol/L    Anion Gap 5 5 - 18 mmol/L    Urea Nitrogen 13 8 - 28 mg/dL    Creatinine 0.63 0.60 - 1.10 mg/dL    Calcium 9.3 8.5 - 10.5 mg/dL    Glucose 92 70 - 125 mg/dL    GFR Estimate 87 >60 mL/min/1.73m2   CBC with platelets    Collection Time: 11/08/21  6:01 AM   Result Value Ref Range    WBC Count 8.5 4.0 - 11.0 10e3/uL    RBC Count 3.89 3.80 - 5.20 10e6/uL    Hemoglobin 11.3 (L) 11.7 - 15.7 g/dL    Hematocrit 35.8 35.0 - 47.0 %    MCV 92 78 - 100 fL    MCH 29.0 26.5 - 33.0 pg    MCHC 31.6 31.5 - 36.5 g/dL    RDW 12.8 10.0 - 15.0 %    Platelet Count 320 150 - 450 10e3/uL       ASSESSMENT/Plan:      ICD-10-CM    1. Bronchiectasis with acute exacerbation (H)  J47.1    2. Panlobular  emphysema (H)  J43.1    3. Chronic respiratory failure with hypoxia, on home O2 therapy (H)  J96.11     Z99.81    4. Mood disorder (H)  F39    5. Anxiety  F41.9    6. Other chronic pain  G89.29        CHANGES:    None.    CARE PLAN:    The care plan, medications, vital signs, orders, and nursing notes have been reviewed, and all orders signed. Changes to care plan, if any, as noted. Otherwise, continue current plan of care.    The above has been created using voice recognition software. Please be aware that this may unintentionally  produce inaccuracies and/or nonsensical sentences.      Electronically signed by:  BATOOL Melara CNP          Sincerely,        BATOOL Melara CNP

## 2021-11-16 NOTE — PROGRESS NOTES
[facility]    Name:   Fiordaliza Najera Sr. (Ayse)  :   1945  MRN:    [id number]     Facility:   [facility] (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 Randall Lorenz, pulmonologist, for treatment of bronchiectasis exacerbation as well as pulmonary bacteria not treatable with oral antibiotics.    Patient reports she was never a smoker but was exposed to second hand smoke at a young age and multiple bouts of bronchitis has led to scarring and subsequent COPD.     She had persistent symptoms of dyspnea and cough after bronchoscopy was performed on 2021.  His sputum culture from that encounter grew Achromobacter xylosoxidans.  She initially presented to [facility] on 2021 and was admitted for bronchiectasis exacerbation, discharged on 2021.  At that time, she was discharged on 7 days of levofloxacin and inhaler therapy, switched to Trelegy.  She continued to have worsening symptoms, and her primary pulmonologist, Dr. Lorenz, suggested admission for IV antibiotics as outpatient management had failed.  EKG showed sinus rhythm, BNP normal, unlikely cardiac component.    Patient reports she lost about 7 months during that illness.    CT chest showed new multifocal groundglass opacities  "throughout both lungs, likely infectious.  Minimal change in scattered tree-in-bud and nodular opacities.  She was treated with IV Zosyn, twice daily Mucomyst, twice daily sodium chloride nebs, prednisone 40 mg daily, Trelegy, and montelukast, and pulmonology recommended a PICC line placement for 2 weeks of IV antibiotics.  She did have some leukocytosis, felt likely secondary to steroids taken in the outpatient setting.  Otherwise, she was without chills or other signs of infection.  She uses 2 L of oxygen at baseline.    Diagnoses of mood disorder and anxiety for which she says she has benefited from low-dose escitalopram (it \"has made the biggest difference\").  For chronic pain, she has orders for pregabalin 50 mg every morning/100 mg at bedtime for neuropathy (gabapentin made her lightheaded), celecoxib (200 mg daily) and as needed tramadol, which she used to take for her back and shoulder pain but has not had to use it in a \"long time\".      CURRENT/RECENT TCU ISSUES    Disposition: She has had COPD and bronchiectasis for 5 years.  She also has chronic anxiety and has learned belly breathing.  She has a 4 wheeled walker and uses the seat to carry her oxygen.  Breathing is not a problem when sitting but worsens with minimal exertion (standing). She is currently on 2L supplemental oxygen via [place], which is what she uses at baseline. She states she does not feel like she is yet back to baseline and still feels weak.    DVT prophylaxis with enoxaparin.    Pain is okay except for \"ordinary arthritis.\"  She does have bilateral rotator cuff injuries mild left greater than right.  She has had bilateral knee replacements (at the same time in 2008) and anterior/posterior combined fusion of the lumbar spine.    She has orders for as needed Imitrex for migraine headaches but has not had one in a long time.    She generally has soft stools and more than one/day. She did have the Cologuard test through her PCP recently and has " yet to see the results.     She has a diaphragmatic hernia but has not complained of any heartburn.  She does prefer to eat bland food.    She had been scheduled for the Moderna booster this week (available in this facility) but has decided she would rather wait until completing the IV antibiotics.     Discharge planning: She lives in [facility].  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 Care conference has yet to be planned.      ROS: No headaches or chest pains, coughing or congestion, nausea or vomiting, dizziness, dysuria, difficulty chewing or swallowing, integumentary issues, or problems with appetite or sleep.      Past Medical History:   Diagnosis Date     Anxiety 09/30/2021     COPD (chronic obstructive pulmonary disease) (H)      Diverticulosis      Hiatal hernia      Mild asthma      Neuropathy      Osteopenia      Temporomandibular joint (TMJ) pain               Family History   Problem Relation Age of Onset     Dementia Mother         passed age 88     Chronic Obstructive Pulmonary Disease Father         passed age 78     Social History     Socioeconomic History     Marital status: Single     Spouse name: None     Number of children: None     Years of education: None     Highest education level: None   Occupational History     None   Tobacco Use     Smoking status: Never Smoker     Smokeless tobacco: Never Used   Substance and Sexual Activity     Alcohol use: No     Drug use: Never     Sexual activity: Never   Other Topics Concern     Parent/sibling w/ CABG, MI or angioplasty before 65F 55M? 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.  She has a sister and brother-in-law, 2 nieces, 3 grand nieces and nephews in [place].  She came to [place] for a sabbatical, and then  stayed on for a teaching job.  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   She lives alone in a long-term facility. - Dr. Nguyễn 01/04/21       Social Determinants of Health     Financial Resource Strain: Not on file   Food Insecurity: Not on file   Transportation Needs: Not on file   Physical Activity: Not on file   Stress: Not on file   Social Connections: Not on file   Intimate Partner Violence: Not on file   Housing Stability: Not on file       MEDICATIONS: Reviewed from the MAR, physician orders, and/or earlier progress notes.  Post Discharge Medication Reconciliation Status: medication reconcilation previously completed during another office visit.    Current Outpatient Medications   Medication Sig     acetylcysteine (MUCOMYST) 10 % nebulizer solution Inhale 4 mLs into the lungs daily     albuterol (PROAIR HFA;PROVENTIL HFA;VENTOLIN HFA) 90 mcg/actuation inhaler [ALBUTEROL (PROAIR HFA;PROVENTIL HFA;VENTOLIN HFA) 90 MCG/ACTUATION INHALER] Inhale 2 puffs every 6 (six) hours as needed for wheezing.     albuterol (PROVENTIL) (2.5 MG/3ML) 0.083% neb solution Take 1 vial (2.5 mg) by nebulization every 4 hours as needed for shortness of breath / dyspnea or wheezing     alendronate (FOSAMAX) 70 MG tablet TAKE 1 TABLET (70 MG) BY MOUTH EVERY 7 DAYS. TAKE IN THE MORNING ON AN EMPTY STOMACH WITH A FULL GLASS OF WATER 30 MINUTES BEFORE FOOD.     BIOTIN ORAL Take 1 tablet by mouth daily      calcium-vitamin D (CALCIUM-VITAMIN D) 500 mg(1,250mg) -200 unit per tablet [CALCIUM-VITAMIN D (CALCIUM-VITAMIN D) 500 MG(1,250MG) -200 UNIT PER TABLET] Take 1 tablet by mouth daily.     celecoxib (CELEBREX) 200 MG capsule [CELECOXIB (CELEBREX) 200 MG CAPSULE] Take 1 capsule (200 mg total) by mouth daily.     cholecalciferol, vitamin D3, (VITAMIN D3) 1,000 unit capsule [CHOLECALCIFEROL, VITAMIN D3, (VITAMIN D3) 1,000 UNIT CAPSULE] Take 1,000 Units by mouth daily.     escitalopram (LEXAPRO) 5 MG tablet Take 1 tablet (5 mg) by mouth daily     Fluticasone-Umeclidin-Vilanterol (TRELEGY ELLIPTA) 100-62.5-25 MCG/INH  "oral inhaler Inhale 1 puff into the lungs daily     Lactobacillus rhamnosus GG (CULTURELLE) 10-15 Billion cell capsule Take 1 capsule by mouth every evening      montelukast (SINGULAIR) 10 mg tablet [MONTELUKAST (SINGULAIR) 10 MG TABLET] TAKE 1 TABLET(10 MG) BY MOUTH AT BEDTIME     multivitamin therapeutic (THERAGRAN) tablet [MULTIVITAMIN THERAPEUTIC (THERAGRAN) TABLET] Take 1 tablet by mouth daily.     mupirocin (BACTROBAN) 2 % external ointment Apply thin layer to nasal lesion 3 times a day for 7-10 days.     nystatin (MYCOSTATIN) 978994 UNIT/GM external cream Apply to rash on body 3 times per day as needed.     OXYGEN-AIR DELIVERY SYSTEMS MISC [OXYGEN-AIR DELIVERY SYSTEMS MISC] Use 2 L As Directed. 2L with activity and at night  [employer]     piperacillin-tazobactam (ZOSYN) 3-0.375 GM vial Inject 3.375 g into the vein every 8 hours for 11 days     pregabalin (LYRICA) 50 MG capsule Take 50mg in the morning and 100mg in the evening.     sodium chloride (OCEAN) 0.65 % nasal spray Spray 2 sprays in nostril daily     sodium chloride 3 % nebulizer solution [SODIUM CHLORIDE 3 % NEBULIZER SOLUTION] Take 3 mL by nebulization 2 (two) times a day.     SUMAtriptan (IMITREX) 50 MG tablet [SUMATRIPTAN (IMITREX) 50 MG TABLET] Take 1 tablet (50 mg total) by mouth every 2 (two) hours as needed for migraine.     traMADol (ULTRAM) 50 MG tablet Take 1 tablet (50 mg) by mouth every 12 hours as needed for moderate pain or severe pain     No current facility-administered medications for this visit.     ALLERGIES:   Allergies   Allergen Reactions     Codeine Unknown     Morphine Itching     DIET: Regular, regular texture, thin liquids.  Prefers bland foods.    Vitals:    11/15/21 1352   BP: 125/68   Pulse: 83   Resp: 18   Temp: 97.7  F (36.5  C)   SpO2: 95%   Weight: 50.9 kg (112 lb 3.2 oz)   Height: 1.575 m (5' 2\")     Body mass index is 20.52 kg/m .    EXAMINATION:   General: Pleasant, alert, and conversant elderly female, in no " apparent distress.  Head: Normocephalic and atraumatic.   Eyes: PERRLA, sclerae clear.   ENT: Moist oral mucosa.   Cardiovascular: Sinus tachycardia without appreciable murmur.   Respiratory: Lungs clear to auscultation bilaterally.   Abdomen: Nondistended.  Gurgling appreciated in the left lower quadrant.  Can be palpated and heard faintly across the room.  Musculoskeletal/Extremities: Age-related degenerative joint disease.  No peripheral edema.  Integument: No rashes, clinically significant lesions, or skin breakdown.   Cognitive/Psychiatric: Alert and oriented with euthymic affect.    DIAGNOSTICS:   Recent Results (from the past 240 hour(s))   Platelet count    Collection Time: 11/07/21  5:17 AM   Result Value Ref Range    Platelet Count 285 150 - 450 10e3/uL   Creatinine    Collection Time: 11/07/21  5:17 AM   Result Value Ref Range    Creatinine 0.60 0.60 - 1.10 mg/dL    GFR Estimate 89 >60 mL/min/1.73m2   Basic metabolic panel    Collection Time: 11/08/21  6:01 AM   Result Value Ref Range    Sodium 140 136 - 145 mmol/L    Potassium 3.6 3.5 - 5.0 mmol/L    Chloride 103 98 - 107 mmol/L    Carbon Dioxide (CO2) 32 (H) 22 - 31 mmol/L    Anion Gap 5 5 - 18 mmol/L    Urea Nitrogen 13 8 - 28 mg/dL    Creatinine 0.63 0.60 - 1.10 mg/dL    Calcium 9.3 8.5 - 10.5 mg/dL    Glucose 92 70 - 125 mg/dL    GFR Estimate 87 >60 mL/min/1.73m2   CBC with platelets    Collection Time: 11/08/21  6:01 AM   Result Value Ref Range    WBC Count 8.5 4.0 - 11.0 10e3/uL    RBC Count 3.89 3.80 - 5.20 10e6/uL    Hemoglobin 11.3 (L) 11.7 - 15.7 g/dL    Hematocrit 35.8 35.0 - 47.0 %    MCV 92 78 - 100 fL    MCH 29.0 26.5 - 33.0 pg    MCHC 31.6 31.5 - 36.5 g/dL    RDW 12.8 10.0 - 15.0 %    Platelet Count 320 150 - 450 10e3/uL       ASSESSMENT/Plan:      ICD-10-CM    1. Bronchiectasis with acute exacerbation (H)  J47.1    2. Panlobular emphysema (H)  J43.1    3. Chronic respiratory failure with hypoxia, on home O2 therapy (H)  J96.11     Z99.81     4. Mood disorder (H)  F39    5. Anxiety  F41.9    6. Other chronic pain  G89.29        CHANGES:    None.    CARE PLAN:    The care plan, medications, vital signs, orders, and nursing notes have been reviewed, and all orders signed. Changes to care plan, if any, as noted. Otherwise, continue current plan of care.    The above has been created using voice recognition software. Please be aware that this may unintentionally  produce inaccuracies and/or nonsensical sentences.      Electronically signed by:  BATOOL Melara CNP

## 2021-11-17 NOTE — TELEPHONE ENCOUNTER
Negative cologuard.    Please update patient and also scan to chart and update HCM.    In my OUTBOX    Dr. Nguyễn

## 2021-11-17 NOTE — TELEPHONE ENCOUNTER
Reason for Call:  Results     Detailed comments: Patient calling for the results of her Cologuard test.     Phone Number: [phone]     Best Time: ANY    Can we leave a detailed message on this number? NO    Call taken on 11/17/2021 at 9:22 AM by MONICA Kaur            Patient calling for the results of her Cologuard test.

## 2021-11-17 NOTE — TELEPHONE ENCOUNTER
LMTCB to pt regarding col guard results, please relay message to pt when pt calls back.     Results will be put into pt chart

## 2021-11-18 ENCOUNTER — TRANSITIONAL CARE UNIT VISIT (OUTPATIENT)
Dept: GERIATRICS | Facility: CLINIC | Age: 76
End: 2021-11-18
Payer: MEDICARE

## 2021-11-18 VITALS
SYSTOLIC BLOOD PRESSURE: 101 MMHG | OXYGEN SATURATION: 96 % | RESPIRATION RATE: 16 BRPM | BODY MASS INDEX: 20.57 KG/M2 | WEIGHT: 111.8 LBS | HEART RATE: 85 BPM | HEIGHT: 62 IN | TEMPERATURE: 97.2 F | DIASTOLIC BLOOD PRESSURE: 59 MMHG

## 2021-11-18 DIAGNOSIS — J43.1 PANLOBULAR EMPHYSEMA (H): ICD-10-CM

## 2021-11-18 DIAGNOSIS — G89.29 OTHER CHRONIC PAIN: ICD-10-CM

## 2021-11-18 DIAGNOSIS — F41.9 ANXIETY: ICD-10-CM

## 2021-11-18 DIAGNOSIS — F39 MOOD DISORDER (H): ICD-10-CM

## 2021-11-18 DIAGNOSIS — J96.11 CHRONIC RESPIRATORY FAILURE WITH HYPOXIA, ON HOME O2 THERAPY (H): ICD-10-CM

## 2021-11-18 DIAGNOSIS — J47.1 BRONCHIECTASIS WITH ACUTE EXACERBATION (H): Primary | ICD-10-CM

## 2021-11-18 DIAGNOSIS — Z99.81 CHRONIC RESPIRATORY FAILURE WITH HYPOXIA, ON HOME O2 THERAPY (H): ICD-10-CM

## 2021-11-18 PROCEDURE — 99316 NF DSCHRG MGMT 30 MIN+: CPT | Performed by: NURSE PRACTITIONER

## 2021-11-18 ASSESSMENT — MIFFLIN-ST. JEOR: SCORE: 950.37

## 2021-11-18 NOTE — LETTER
2021        RE: Fiordaliza Najera Sr.  [address]  [place] [address]        [facility]    Name:   Fiordaliza Najera Sr. 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 Randall Lorenz, pulmonologist, for treatment of bronchiectasis exacerbation as well as pulmonary bacteria not treatable with oral antibiotics.    Patient reports she was never a smoker but was exposed to second hand smoke at a young age and multiple bouts of bronchitis has led to scarring and subsequent COPD.     She had persistent symptoms of dyspnea and cough after bronchoscopy was performed on 2021.  His sputum culture from that encounter grew Achromobacter xylosoxidans.  She initially presented to [facility] on 2021 and was admitted for bronchiectasis exacerbation, discharged on 2021.  At that time, she was discharged on 7 days of levofloxacin and inhaler therapy, switched to Trelegy.  She continued to have worsening symptoms, and her primary pulmonologist, Dr. Lorenz, suggested admission for IV antibiotics as outpatient management had failed.  EKG showed sinus rhythm, BNP normal, unlikely cardiac component.    Patient reported 7  "pound weight loss during that illness.    CT chest showed new multifocal groundglass opacities throughout both lungs, likely infectious.  Minimal change in scattered tree-in-bud and nodular opacities.  She was treated with IV Zosyn, twice daily Mucomyst, twice daily sodium chloride nebs, prednisone 40 mg daily, Trelegy, and montelukast, and pulmonology recommended a PICC line placement for 2 weeks of IV antibiotics.  She did have some leukocytosis, felt likely secondary to steroids taken in the outpatient setting.  Otherwise, she was without chills or other signs of infection.  Uses 2 L of oxygen at baseline.    Diagnoses of mood disorder and anxiety for which she says she has benefited from low-dose escitalopram (it \"has made the biggest difference\").  For chronic pain, she has orders for pregabalin 50 mg every morning/100 mg at bedtime for neuropathy (gabapentin made her lightheaded), celecoxib (200 mg daily) and as needed tramadol, which she used to take for her back and shoulder pain but has not had to use it in a \"long time.\"      CURRENT/RECENT TCU ISSUES    Disposition: She has had COPD and bronchiectasis for 5 years.  She also has chronic anxiety and has learned belly breathing.  She has a 4 wheeled walker and uses the seat to carry her oxygen.  Breathing is not a problem when sitting but worsens with minimal exertion (standing).  Currently on 2L supplemental oxygen via [place], which is what she uses at baseline.  Opening she can \"breathe better after all these infusions.\"  She stated, \"it' overall with s hard to tell here, because they do everything for me.\"    Recently received enoxaparin for DVT prophylaxis.    Pain is okay except for \"ordinary arthritis.\"  She does have bilateral rotator cuff injuries mild left greater than right.  She has had bilateral knee replacements (at the same time in 2008) and anterior/posterior combined fusion of the lumbar spine.    She has orders for as needed Imitrex for migraine " headaches but has not had one in a long time, including her stay in the TCU.    She generally has soft stools and more than one/day. She did have the Cologuard test through her PCP recently and has yet to see the results.     She has a diaphragmatic hernia but has not complained of any heartburn.  She does prefer to eat bland food.    She had been scheduled for the Moderna booster this week (available in this facility) but decided she would rather wait until completing the IV antibiotics.     Discharge planning: She lives in [facility].  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  IV antibiotics being completed on 11/19, she will discharge the following day.  PICC line to be removed prior to discharge.  Plan is to receive home PT and OT provided by [facility].  She has been receiving PT prior to her hospitalization.      ROS: No headaches or chest pains, coughing or congestion, nausea or vomiting, dizziness, dysuria, difficulty chewing or swallowing, integumentary issues, or problems with appetite or sleep.      Past Medical History:   Diagnosis Date     Anxiety 09/30/2021     COPD (chronic obstructive pulmonary disease) (H)      Diverticulosis      Hiatal hernia      Mild asthma      Neuropathy      Osteopenia      Temporomandibular joint (TMJ) pain               Family History   Problem Relation Age of Onset     Dementia Mother         passed age 88     Chronic Obstructive Pulmonary Disease Father         passed age 78     Social History     Socioeconomic History     Marital status: Single     Spouse name: None     Number of children: None     Years of education: None     Highest education level: None   Occupational History     None   Tobacco Use     Smoking status: Never Smoker     Smokeless tobacco: Never Used   Substance and Sexual Activity     Alcohol use: No     Drug use: Never     Sexual activity: Never   Other Topics Concern     Parent/sibling w/ CABG, MI or angioplasty before 65F  55M? 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  She was a [employer] sister with [employer].  She has a sister and brother-in-law, 2 nieces, 3 grand nieces and nephews in [place].  She came to [place] for a sabbatical, and then  stayed on for a teaching job.  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  She lives alone in a long-term facility. - Dr. Nguyễn 01/04/21       Social Determinants of Health     Financial Resource Strain: Not on file   Food Insecurity: Not on file   Transportation Needs: Not on file   Physical Activity: Not on file   Stress: Not on file   Social Connections: Not on file   Intimate Partner Violence: Not on file   Housing Stability: Not on file       MEDICATIONS: Reviewed from the MAR, physician orders, and/or earlier progress notes.  Post Discharge Medication Reconciliation Status: medication reconcilation previously completed during another office visit.    Current Outpatient Medications   Medication Sig     acetylcysteine (MUCOMYST) 10 % nebulizer solution Inhale 4 mLs into the lungs daily     albuterol (PROAIR HFA;PROVENTIL HFA;VENTOLIN HFA) 90 mcg/actuation inhaler [ALBUTEROL (PROAIR HFA;PROVENTIL HFA;VENTOLIN HFA) 90 MCG/ACTUATION INHALER] Inhale 2 puffs every 6 (six) hours as needed for wheezing.     albuterol (PROVENTIL) (2.5 MG/3ML) 0.083% neb solution Take 1 vial (2.5 mg) by nebulization every 4 hours as needed for shortness of breath / dyspnea or wheezing     alendronate (FOSAMAX) 70 MG tablet TAKE 1 TABLET (70 MG) BY MOUTH EVERY 7 DAYS. TAKE IN THE MORNING ON AN EMPTY STOMACH WITH A FULL GLASS OF WATER 30 MINUTES BEFORE FOOD.     BIOTIN ORAL Take 1 tablet by mouth daily      calcium-vitamin D (CALCIUM-VITAMIN D) 500 mg(1,250mg) -200 unit per tablet [CALCIUM-VITAMIN D (CALCIUM-VITAMIN D) 500 MG(1,250MG) -200 UNIT PER TABLET] Take 1 tablet by mouth daily.     celecoxib (CELEBREX) 200 MG capsule  [CELECOXIB (CELEBREX) 200 MG CAPSULE] Take 1 capsule (200 mg total) by mouth daily.     cholecalciferol, vitamin D3, (VITAMIN D3) 1,000 unit capsule [CHOLECALCIFEROL, VITAMIN D3, (VITAMIN D3) 1,000 UNIT CAPSULE] Take 1,000 Units by mouth daily.     escitalopram (LEXAPRO) 5 MG tablet Take 1 tablet (5 mg) by mouth daily     Fluticasone-Umeclidin-Vilanterol (TRELEGY ELLIPTA) 100-62.5-25 MCG/INH oral inhaler Inhale 1 puff into the lungs daily     Lactobacillus rhamnosus GG (CULTURELLE) 10-15 Billion cell capsule Take 1 capsule by mouth every evening      montelukast (SINGULAIR) 10 mg tablet [MONTELUKAST (SINGULAIR) 10 MG TABLET] TAKE 1 TABLET(10 MG) BY MOUTH AT BEDTIME     multivitamin therapeutic (THERAGRAN) tablet [MULTIVITAMIN THERAPEUTIC (THERAGRAN) TABLET] Take 1 tablet by mouth daily.     mupirocin (BACTROBAN) 2 % external ointment Apply thin layer to nasal lesion 3 times a day for 7-10 days.     nystatin (MYCOSTATIN) 088446 UNIT/GM external cream Apply to rash on body 3 times per day as needed.     OXYGEN-AIR DELIVERY SYSTEMS MISC [OXYGEN-AIR DELIVERY SYSTEMS MISC] Use 2 L As Directed. 2L with activity and at night  [employer]     pregabalin (LYRICA) 50 MG capsule Take 50mg in the morning and 100mg in the evening.     sodium chloride (OCEAN) 0.65 % nasal spray Spray 2 sprays in nostril daily     sodium chloride 3 % nebulizer solution [SODIUM CHLORIDE 3 % NEBULIZER SOLUTION] Take 3 mL by nebulization 2 (two) times a day.     SUMAtriptan (IMITREX) 50 MG tablet [SUMATRIPTAN (IMITREX) 50 MG TABLET] Take 1 tablet (50 mg total) by mouth every 2 (two) hours as needed for migraine.     traMADol (ULTRAM) 50 MG tablet Take 1 tablet (50 mg) by mouth every 12 hours as needed for moderate pain or severe pain     No current facility-administered medications for this visit.     ALLERGIES:   Allergies   Allergen Reactions     Codeine Unknown     Morphine Itching     DIET: Regular, regular texture, thin liquids.  Prefers bland  "foods.    Vitals:    11/18/21 2200   BP: 101/59   Pulse: 85   Resp: 16   Temp: 97.2  F (36.2  C)   SpO2: 96%   Weight: 50.7 kg (111 lb 12.8 oz)   Height: 1.575 m (5' 2\")     Body mass index is 20.45 kg/m .    EXAMINATION:   General: Pleasant, alert, and conversant elderly female, sitting in a recliner, in no apparent distress.  Head: Normocephalic and atraumatic.   Eyes: PERRLA, sclerae clear.   ENT: Moist oral mucosa.   Cardiovascular: Sinus tachycardia without appreciable murmur.   Respiratory: Lungs clear to auscultation bilaterally.   Abdomen: Nondistended.   Musculoskeletal/Extremities: Age-related degenerative joint disease.  No peripheral edema.  Integument: No rashes, clinically significant lesions, or skin breakdown.   Cognitive/Psychiatric: Alert and oriented with euthymic affect.    DIAGNOSTICS:   No results found for this or any previous visit (from the past 240 hour(s)).   Last Comprehensive Metabolic Panel:  Sodium   Date Value Ref Range Status   11/08/2021 140 136 - 145 mmol/L Final     Potassium   Date Value Ref Range Status   11/08/2021 3.6 3.5 - 5.0 mmol/L Final     Chloride   Date Value Ref Range Status   11/08/2021 103 98 - 107 mmol/L Final     Carbon Dioxide (CO2)   Date Value Ref Range Status   11/08/2021 32 (H) 22 - 31 mmol/L Final     Anion Gap   Date Value Ref Range Status   11/08/2021 5 5 - 18 mmol/L Final     Glucose   Date Value Ref Range Status   11/08/2021 92 70 - 125 mg/dL Final     Urea Nitrogen   Date Value Ref Range Status   11/08/2021 13 8 - 28 mg/dL Final     Creatinine   Date Value Ref Range Status   11/08/2021 0.63 0.60 - 1.10 mg/dL Final     GFR Estimate   Date Value Ref Range Status   11/08/2021 87 >60 mL/min/1.73m2 Final     Comment:     As of July 11, 2021, eGFR is calculated by the CKD-EPI creatinine equation, without race adjustment. eGFR can be influenced by muscle mass, exercise, and diet. The reported eGFR is an estimation only and is only applicable if the renal " function is stable.   09/23/2020 >60 >60 mL/min/1.73m2 Final     Calcium   Date Value Ref Range Status   11/08/2021 9.3 8.5 - 10.5 mg/dL Final     Bilirubin Total   Date Value Ref Range Status   10/20/2021 0.6 0.0 - 1.0 mg/dL Final     Alkaline Phosphatase   Date Value Ref Range Status   10/20/2021 94 45 - 120 U/L Final     ALT   Date Value Ref Range Status   10/20/2021 17 0 - 45 U/L Final     AST   Date Value Ref Range Status   10/20/2021 28 0 - 40 U/L Final     Lab Results   Component Value Date    WBC 8.5 11/08/2021     Lab Results   Component Value Date    RBC 3.89 11/08/2021     Lab Results   Component Value Date    HGB 11.3 11/08/2021     Lab Results   Component Value Date    HCT 35.8 11/08/2021     Lab Results   Component Value Date    MCV 92 11/08/2021     Lab Results   Component Value Date    MCH 29.0 11/08/2021     Lab Results   Component Value Date    MCHC 31.6 11/08/2021     Lab Results   Component Value Date    RDW 12.8 11/08/2021     Lab Results   Component Value Date     11/08/2021       ASSESSMENT/Plan:      ICD-10-CM    1. Bronchiectasis with acute exacerbation (H)  J47.1    2. Panlobular emphysema (H)  J43.1    3. Chronic respiratory failure with hypoxia, on home O2 therapy (H)  J96.11     Z99.81    4. Mood disorder (H)  F39    5. Anxiety  F41.9    6. Other chronic pain  G89.29        DISCHARGE PLAN/FACE TO FACE:  I certify that this patient is under my care and that I had a face-to-face encounter that meets the physician face-to-face encounter requirements with this patient.     Date of Face-to-Face Encounter: 11/18/2021     I certify that, based on my findings, the following services are medically necessary home health services:  Home PT, and home OT    My clinical findings support the need for the above skilled services because: (Please write a brief narrative summary that describes what the RN, PT, SLP, or other services will be doing in the home. A list of diagnoses in this section does  not meet the CMS requirements):  Home PT for strengthening, balance, endurance, and safety with mobility/ambulation; home OT for strengthening, ADL needs, adaptive equipment, and safety.    This patient is homebound because: (Please write a brief narrative summmary describing the functional limitations as to why this patient is homebound and specifically what makes this patient homebound.):   Above services necessarily need to be performed in the home to be of benefit.    The patient is, or has been, under my care and I have initiated the establishment of the plan of care. This patient will be followed by a physician who will periodically review the plan of care.  Initial follow-up should be within 7-10 days.    Approximate time spent with this patient was greater than 30 minutes with greater than 50% spent in discussions regarding services required upon discharge.      The above has been created using voice recognition software. Please be aware that this may unintentionally  produce inaccuracies and/or nonsensical sentences.      Electronically signed by:  BATOOL Melara CNP        Sincerely,        BATOOL Melara CNP

## 2021-11-18 NOTE — TELEPHONE ENCOUNTER
LMTCB to pt regarding col guard results, please relay message to pt when pt calls back.   2nd attempt will also send letter   Du Perez CMA on 11/18/2021 at 1:58 PM

## 2021-11-19 NOTE — TELEPHONE ENCOUNTER
Pt returning call - message relayed.  Pt will call back at another time to schedule appt with PCP for discharge from TCU

## 2021-11-21 NOTE — PROGRESS NOTES
[facility]    Name:   Fiordaliza Najera Sr. (Ayse)  :   1945  MRN:    [id number]     Facility:   [facility] ([facility]) [[id number]]   Room:   Code Status: DNR/DNI and POLST AVAILABLE -     DOS:  2021  Previous visit:  11/15/2021    PCP:  Ekaterina Koehler    CHIEF COMPLAINT / REASON FOR VISIT:  Chief Complaint   Patient presents with     Discharge Summary Nursing Home     Brochiectasis with acute exacerbation, COPD, chronic respiratory failure with hypoxia      [facility] from 2021 until 2021 (bronchiectasis with acute exacerbation)      HPI: Fiordaliza (who prefers to be called Ayse) is a 76 year old female, 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 Randall Lorenz, pulmonologist, for treatment of bronchiectasis exacerbation as well as pulmonary bacteria not treatable with oral antibiotics.    Patient reports she was never a smoker but was exposed to second hand smoke at a young age and multiple bouts of bronchitis has led to scarring and subsequent COPD.     She had persistent symptoms of dyspnea and cough after bronchoscopy was performed on 2021.  His sputum culture from that encounter grew Achromobacter xylosoxidans.  She initially presented to [facility] on 2021 and was admitted for bronchiectasis exacerbation, discharged on 2021.  At that time, she was discharged on 7 days of levofloxacin and inhaler therapy, switched to Trelegy.  She continued to have worsening symptoms, and her primary pulmonologist, Dr. Lorenz, suggested admission for IV antibiotics as outpatient management had failed.  EKG showed sinus rhythm, BNP normal, unlikely cardiac component.    Patient reported 7 pound weight loss during that illness.    CT chest showed new multifocal groundglass opacities throughout  "both lungs, likely infectious.  Minimal change in scattered tree-in-bud and nodular opacities.  She was treated with IV Zosyn, twice daily Mucomyst, twice daily sodium chloride nebs, prednisone 40 mg daily, Trelegy, and montelukast, and pulmonology recommended a PICC line placement for 2 weeks of IV antibiotics.  She did have some leukocytosis, felt likely secondary to steroids taken in the outpatient setting.  Otherwise, she was without chills or other signs of infection.  Uses 2 L of oxygen at baseline.    Diagnoses of mood disorder and anxiety for which she says she has benefited from low-dose escitalopram (it \"has made the biggest difference\").  For chronic pain, she has orders for pregabalin 50 mg every morning/100 mg at bedtime for neuropathy (gabapentin made her lightheaded), celecoxib (200 mg daily) and as needed tramadol, which she used to take for her back and shoulder pain but has not had to use it in a \"long time.\"      CURRENT/RECENT TCU ISSUES    Disposition: She has had COPD and bronchiectasis for 5 years.  She also has chronic anxiety and has learned belly breathing.  She has a 4 wheeled walker and uses the seat to carry her oxygen.  Breathing is not a problem when sitting but worsens with minimal exertion (standing).  Currently on 2L supplemental oxygen via [place], which is what she uses at baseline.  Opening she can \"breathe better after all these infusions.\"  She stated, \"it' overall with s hard to tell here, because they do everything for me.\"    Recently received enoxaparin for DVT prophylaxis.    Pain is okay except for \"ordinary arthritis.\"  She does have bilateral rotator cuff injuries mild left greater than right.  She has had bilateral knee replacements (at the same time in 2008) and anterior/posterior combined fusion of the lumbar spine.    She has orders for as needed Imitrex for migraine headaches but has not had one in a long time, including her stay in the TCU.    She generally has soft " stools and more than one/day. She did have the Cologuard test through her PCP recently and has yet to see the results.     She has a diaphragmatic hernia but has not complained of any heartburn.  She does prefer to eat bland food.    She had been scheduled for the Moderna booster this week (available in this facility) but decided she would rather wait until completing the IV antibiotics.     Discharge planning: She lives in [facility].  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  IV antibiotics being completed on 11/19, she will discharge the following day.  PICC line to be removed prior to discharge.  Plan is to receive home PT and OT provided by [facility].  She has been receiving PT prior to her hospitalization.      ROS: No headaches or chest pains, coughing or congestion, nausea or vomiting, dizziness, dysuria, difficulty chewing or swallowing, integumentary issues, or problems with appetite or sleep.      Past Medical History:   Diagnosis Date     Anxiety 09/30/2021     COPD (chronic obstructive pulmonary disease) (H)      Diverticulosis      Hiatal hernia      Mild asthma      Neuropathy      Osteopenia      Temporomandibular joint (TMJ) pain               Family History   Problem Relation Age of Onset     Dementia Mother         passed age 88     Chronic Obstructive Pulmonary Disease Father         passed age 78     Social History     Socioeconomic History     Marital status: Single     Spouse name: None     Number of children: None     Years of education: None     Highest education level: None   Occupational History     None   Tobacco Use     Smoking status: Never Smoker     Smokeless tobacco: Never Used   Substance and Sexual Activity     Alcohol use: No     Drug use: Never     Sexual activity: Never   Other Topics Concern     Parent/sibling w/ CABG, MI or angioplasty before 65F 55M? 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  She was a Nondenominational sister with [employer].  She has a sister and brother-in-law, 2 nieces, 3 grand nieces and nephews in [place].  She came to [place] for a sabbatical, and then  stayed on for a teaching job.  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  She lives alone in a long-term facility. - Dr. Nguyễn 01/04/21       Social Determinants of Health     Financial Resource Strain: Not on file   Food Insecurity: Not on file   Transportation Needs: Not on file   Physical Activity: Not on file   Stress: Not on file   Social Connections: Not on file   Intimate Partner Violence: Not on file   Housing Stability: Not on file       MEDICATIONS: Reviewed from the MAR, physician orders, and/or earlier progress notes.  Post Discharge Medication Reconciliation Status: medication reconcilation previously completed during another office visit.    Current Outpatient Medications   Medication Sig     acetylcysteine (MUCOMYST) 10 % nebulizer solution Inhale 4 mLs into the lungs daily     albuterol (PROAIR HFA;PROVENTIL HFA;VENTOLIN HFA) 90 mcg/actuation inhaler [ALBUTEROL (PROAIR HFA;PROVENTIL HFA;VENTOLIN HFA) 90 MCG/ACTUATION INHALER] Inhale 2 puffs every 6 (six) hours as needed for wheezing.     albuterol (PROVENTIL) (2.5 MG/3ML) 0.083% neb solution Take 1 vial (2.5 mg) by nebulization every 4 hours as needed for shortness of breath / dyspnea or wheezing     alendronate (FOSAMAX) 70 MG tablet TAKE 1 TABLET (70 MG) BY MOUTH EVERY 7 DAYS. TAKE IN THE MORNING ON AN EMPTY STOMACH WITH A FULL GLASS OF WATER 30 MINUTES BEFORE FOOD.     BIOTIN ORAL Take 1 tablet by mouth daily      calcium-vitamin D (CALCIUM-VITAMIN D) 500 mg(1,250mg) -200 unit per tablet [CALCIUM-VITAMIN D (CALCIUM-VITAMIN D) 500 MG(1,250MG) -200 UNIT PER TABLET] Take 1 tablet by mouth daily.     celecoxib (CELEBREX) 200 MG capsule [CELECOXIB (CELEBREX) 200 MG CAPSULE] Take 1 capsule (200 mg total) by mouth daily.     cholecalciferol,  vitamin D3, (VITAMIN D3) 1,000 unit capsule [CHOLECALCIFEROL, VITAMIN D3, (VITAMIN D3) 1,000 UNIT CAPSULE] Take 1,000 Units by mouth daily.     escitalopram (LEXAPRO) 5 MG tablet Take 1 tablet (5 mg) by mouth daily     Fluticasone-Umeclidin-Vilanterol (TRELEGY ELLIPTA) 100-62.5-25 MCG/INH oral inhaler Inhale 1 puff into the lungs daily     Lactobacillus rhamnosus GG (CULTURELLE) 10-15 Billion cell capsule Take 1 capsule by mouth every evening      montelukast (SINGULAIR) 10 mg tablet [MONTELUKAST (SINGULAIR) 10 MG TABLET] TAKE 1 TABLET(10 MG) BY MOUTH AT BEDTIME     multivitamin therapeutic (THERAGRAN) tablet [MULTIVITAMIN THERAPEUTIC (THERAGRAN) TABLET] Take 1 tablet by mouth daily.     mupirocin (BACTROBAN) 2 % external ointment Apply thin layer to nasal lesion 3 times a day for 7-10 days.     nystatin (MYCOSTATIN) 130077 UNIT/GM external cream Apply to rash on body 3 times per day as needed.     OXYGEN-AIR DELIVERY SYSTEMS MISC [OXYGEN-AIR DELIVERY SYSTEMS MISC] Use 2 L As Directed. 2L with activity and at night  [employer]     pregabalin (LYRICA) 50 MG capsule Take 50mg in the morning and 100mg in the evening.     sodium chloride (OCEAN) 0.65 % nasal spray Spray 2 sprays in nostril daily     sodium chloride 3 % nebulizer solution [SODIUM CHLORIDE 3 % NEBULIZER SOLUTION] Take 3 mL by nebulization 2 (two) times a day.     SUMAtriptan (IMITREX) 50 MG tablet [SUMATRIPTAN (IMITREX) 50 MG TABLET] Take 1 tablet (50 mg total) by mouth every 2 (two) hours as needed for migraine.     traMADol (ULTRAM) 50 MG tablet Take 1 tablet (50 mg) by mouth every 12 hours as needed for moderate pain or severe pain     No current facility-administered medications for this visit.     ALLERGIES:   Allergies   Allergen Reactions     Codeine Unknown     Morphine Itching     DIET: Regular, regular texture, thin liquids.  Prefers bland foods.    Vitals:    11/18/21 2200   BP: 101/59   Pulse: 85   Resp: 16   Temp: 97.2  F (36.2  C)   SpO2: 96%  "  Weight: 50.7 kg (111 lb 12.8 oz)   Height: 1.575 m (5' 2\")     Body mass index is 20.45 kg/m .    EXAMINATION:   General: Pleasant, alert, and conversant elderly female, sitting in a recliner, in no apparent distress.  Head: Normocephalic and atraumatic.   Eyes: PERRLA, sclerae clear.   ENT: Moist oral mucosa.   Cardiovascular: Sinus tachycardia without appreciable murmur.   Respiratory: Lungs clear to auscultation bilaterally.   Abdomen: Nondistended.   Musculoskeletal/Extremities: Age-related degenerative joint disease.  No peripheral edema.  Integument: No rashes, clinically significant lesions, or skin breakdown.   Cognitive/Psychiatric: Alert and oriented with euthymic affect.    DIAGNOSTICS:   No results found for this or any previous visit (from the past 240 hour(s)).   Last Comprehensive Metabolic Panel:  Sodium   Date Value Ref Range Status   11/08/2021 140 136 - 145 mmol/L Final     Potassium   Date Value Ref Range Status   11/08/2021 3.6 3.5 - 5.0 mmol/L Final     Chloride   Date Value Ref Range Status   11/08/2021 103 98 - 107 mmol/L Final     Carbon Dioxide (CO2)   Date Value Ref Range Status   11/08/2021 32 (H) 22 - 31 mmol/L Final     Anion Gap   Date Value Ref Range Status   11/08/2021 5 5 - 18 mmol/L Final     Glucose   Date Value Ref Range Status   11/08/2021 92 70 - 125 mg/dL Final     Urea Nitrogen   Date Value Ref Range Status   11/08/2021 13 8 - 28 mg/dL Final     Creatinine   Date Value Ref Range Status   11/08/2021 0.63 0.60 - 1.10 mg/dL Final     GFR Estimate   Date Value Ref Range Status   11/08/2021 87 >60 mL/min/1.73m2 Final     Comment:     As of July 11, 2021, eGFR is calculated by the CKD-EPI creatinine equation, without race adjustment. eGFR can be influenced by muscle mass, exercise, and diet. The reported eGFR is an estimation only and is only applicable if the renal function is stable.   09/23/2020 >60 >60 mL/min/1.73m2 Final     Calcium   Date Value Ref Range Status   11/08/2021 " 9.3 8.5 - 10.5 mg/dL Final     Bilirubin Total   Date Value Ref Range Status   10/20/2021 0.6 0.0 - 1.0 mg/dL Final     Alkaline Phosphatase   Date Value Ref Range Status   10/20/2021 94 45 - 120 U/L Final     ALT   Date Value Ref Range Status   10/20/2021 17 0 - 45 U/L Final     AST   Date Value Ref Range Status   10/20/2021 28 0 - 40 U/L Final     Lab Results   Component Value Date    WBC 8.5 11/08/2021     Lab Results   Component Value Date    RBC 3.89 11/08/2021     Lab Results   Component Value Date    HGB 11.3 11/08/2021     Lab Results   Component Value Date    HCT 35.8 11/08/2021     Lab Results   Component Value Date    MCV 92 11/08/2021     Lab Results   Component Value Date    MCH 29.0 11/08/2021     Lab Results   Component Value Date    MCHC 31.6 11/08/2021     Lab Results   Component Value Date    RDW 12.8 11/08/2021     Lab Results   Component Value Date     11/08/2021       ASSESSMENT/Plan:      ICD-10-CM    1. Bronchiectasis with acute exacerbation (H)  J47.1    2. Panlobular emphysema (H)  J43.1    3. Chronic respiratory failure with hypoxia, on home O2 therapy (H)  J96.11     Z99.81    4. Mood disorder (H)  F39    5. Anxiety  F41.9    6. Other chronic pain  G89.29        DISCHARGE PLAN/FACE TO FACE:  I certify that this patient is under my care and that I had a face-to-face encounter that meets the physician face-to-face encounter requirements with this patient.     Date of Face-to-Face Encounter: 11/18/2021     I certify that, based on my findings, the following services are medically necessary home health services:  Home PT, and home OT    My clinical findings support the need for the above skilled services because: (Please write a brief narrative summary that describes what the RN, PT, SLP, or other services will be doing in the home. A list of diagnoses in this section does not meet the CMS requirements):  Home PT for strengthening, balance, endurance, and safety with  mobility/ambulation; home OT for strengthening, ADL needs, adaptive equipment, and safety.    This patient is homebound because: (Please write a brief narrative summmary describing the functional limitations as to why this patient is homebound and specifically what makes this patient homebound.):   Above services necessarily need to be performed in the home to be of benefit.    The patient is, or has been, under my care and I have initiated the establishment of the plan of care. This patient will be followed by a physician who will periodically review the plan of care.  Initial follow-up should be within 7-10 days.    Approximate time spent with this patient was greater than 30 minutes with greater than 50% spent in discussions regarding services required upon discharge.      The above has been created using voice recognition software. Please be aware that this may unintentionally  produce inaccuracies and/or nonsensical sentences.      Electronically signed by:  BATOOL Melara CNP

## 2021-11-22 ENCOUNTER — MEDICAL CORRESPONDENCE (OUTPATIENT)
Dept: HEALTH INFORMATION MANAGEMENT | Facility: CLINIC | Age: 76
End: 2021-11-22
Payer: MEDICARE

## 2021-11-23 LAB — ACID FAST STAIN (ARUP): NORMAL

## 2021-11-30 ENCOUNTER — MEDICAL CORRESPONDENCE (OUTPATIENT)
Dept: HEALTH INFORMATION MANAGEMENT | Facility: CLINIC | Age: 76
End: 2021-11-30
Payer: MEDICARE

## 2021-12-08 ENCOUNTER — MYC MEDICAL ADVICE (OUTPATIENT)
Dept: PULMONOLOGY | Facility: OTHER | Age: 76
End: 2021-12-08
Payer: MEDICARE

## 2021-12-08 DIAGNOSIS — J47.9 BRONCHIECTASIS (H): ICD-10-CM

## 2021-12-08 RX ORDER — MONTELUKAST SODIUM 10 MG/1
TABLET ORAL
Qty: 90 TABLET | Refills: 3 | Status: SHIPPED | OUTPATIENT
Start: 2021-12-08 | End: 2022-09-12

## 2021-12-14 DIAGNOSIS — M15.0 PRIMARY OSTEOARTHRITIS INVOLVING MULTIPLE JOINTS: ICD-10-CM

## 2021-12-16 NOTE — TELEPHONE ENCOUNTER
"Routing refill request to provider for review/approval because:  Labs out of range:  CBC  Patient > 64 years of age    Last Written Prescription Date:  11/28/2020  Last Fill Quantity: 90,  # refills: 3   Last office visit provider:  10/20/21     Requested Prescriptions   Pending Prescriptions Disp Refills     celecoxib (CELEBREX) 200 MG capsule [Pharmacy Med Name: CELECOXIB 200 MG CAPS** 200 Capsule] 90 capsule 11     Sig: TAKE 1 CAPSULE (200 MG TOTAL) BY MOUTH DAILY.       NSAID Medications Failed - 12/14/2021  6:12 PM        Failed - Patient is age 6-64 years        Failed - Normal CBC on file in past 12 months     Recent Labs   Lab Test 11/08/21  0601   WBC 8.5   RBC 3.89   HGB 11.3*   HCT 35.8                    Passed - Blood pressure under 140/90 in past 12 months     BP Readings from Last 3 Encounters:   11/18/21 101/59   11/15/21 125/68   11/11/21 116/67                 Passed - Normal ALT on file in past 12 months     Recent Labs   Lab Test 10/20/21  1002   ALT 17             Passed - Normal AST on file in past 12 months     Recent Labs   Lab Test 10/20/21  1002   AST 28             Passed - Recent (12 mo) or future (30 days) visit within the authorizing provider's specialty     Patient has had an office visit with the authorizing provider or a provider within the authorizing providers department within the previous 12 mos or has a future within next 30 days. See \"Patient Info\" tab in inbasket, or \"Choose Columns\" in Meds & Orders section of the refill encounter.              Passed - Medication is active on med list        Passed - No active pregnancy on record        Passed - Normal serum creatinine on file in past 12 months     Recent Labs   Lab Test 11/08/21  0601   CR 0.63       Ok to refill medication if creatinine is low          Passed - No positive pregnancy test in past 12 months             Colleen Lamas RN 12/16/21 2:05 PM  "

## 2021-12-17 ENCOUNTER — MEDICAL CORRESPONDENCE (OUTPATIENT)
Dept: HEALTH INFORMATION MANAGEMENT | Facility: CLINIC | Age: 76
End: 2021-12-17
Payer: MEDICARE

## 2021-12-17 RX ORDER — CELECOXIB 200 MG/1
CAPSULE ORAL
Qty: 90 CAPSULE | Refills: 11 | Status: SHIPPED | OUTPATIENT
Start: 2021-12-17 | End: 2022-11-18

## 2021-12-20 ENCOUNTER — TELEPHONE (OUTPATIENT)
Dept: INTERNAL MEDICINE | Facility: CLINIC | Age: 76
End: 2021-12-20
Payer: MEDICARE

## 2021-12-20 NOTE — TELEPHONE ENCOUNTER
LMCTB to Emanuel regarding forms, I don't have these forms to sign for PCP, if can re-fax seems like fax number is correct

## 2021-12-20 NOTE — TELEPHONE ENCOUNTER
Emanuel with [employer] is calling regarding two outstanding orders that have not been completed, signed and returned to Fax .    The outstanding orders are from 11/23/21 and 12/9/21.  The form has been faxed to  oon 11/23, 12/6 and 12/17/21.    The additional outstanding order is from 12/9/21 and faxed to .    Any questions for Emanuel he can be reached at .

## 2021-12-21 ENCOUNTER — VIRTUAL VISIT (OUTPATIENT)
Dept: PULMONOLOGY | Facility: OTHER | Age: 76
End: 2021-12-21
Payer: MEDICARE

## 2021-12-21 DIAGNOSIS — J96.11 CHRONIC RESPIRATORY FAILURE WITH HYPOXIA (H): ICD-10-CM

## 2021-12-21 DIAGNOSIS — F41.9 ANXIETY: ICD-10-CM

## 2021-12-21 DIAGNOSIS — J47.9 BRONCHIECTASIS WITHOUT COMPLICATION (H): Primary | ICD-10-CM

## 2021-12-21 PROBLEM — D72.829 LEUKOCYTOSIS: Status: RESOLVED | Noted: 2021-11-04 | Resolved: 2021-12-21

## 2021-12-21 PROBLEM — F39 MOOD DISORDER (H): Status: RESOLVED | Noted: 2021-11-04 | Resolved: 2021-12-21

## 2021-12-21 PROCEDURE — 99207 PR CDG-CODE INCORRECT PER BILLING BASED ON TIME: CPT | Performed by: INTERNAL MEDICINE

## 2021-12-21 PROCEDURE — 99213 OFFICE O/P EST LOW 20 MIN: CPT | Mod: 95 | Performed by: INTERNAL MEDICINE

## 2021-12-21 NOTE — PROGRESS NOTES
Ayse is a 76 year old who is being evaluated via a billable video visit.      How would you like to obtain your AVS? MyChart  If the video visit is dropped, the invitation should be resent by: [phone]  Will anyone else be joining your video visit? No      Video Start Time: 830  Video-Visit Details    Type of service:  Video Visit    Video End Time:847    Originating Location (pt. Location): Home    Distant Location (provider location):  [facility]     Platform used for Video Visit: [facility]     LUNG NODULE & INTERVENTIONAL PULMONARY CLINIC  CLINICS & SURGERY [place]     Fiordaliza Najera Sr. MRN# [id number]   Age: 76 year old YOB: 1945       Requesting Physician: No referring provider defined for this encounter.       Assessment and Plan:    1. Bronchiectasis: continue neb regimen.  Continue inhalers.    2. Chronic hypoxic respiratory failure: 2lpm w activity.  Not with rest.  Trial not be on oxygen at night.  I explained that her goal oxygen saturation is 89% or greater at rest.  Desaturation into the 80s with activity are tolerable from a physiology standpoint.  If these cause uncomfortable symptoms she can wear oxygen with activity to prevent.    3.  Anxiety: Continue anxiolytic.  This was started on my recommendation after her hospital stay earlier this fall.  This is really helped.    Follow-up in January.         History:     Fiordaliza Najera Sr. is a 76 year old female with sig h/o for bronchiectasis, recent exacerbation here for follow-up.  She is feeling better since starting her IV antibiotics.  She has been home for about a month now and feeling well.  She has had improved oxygen requirement and improved exercise capacity.    She is taking saline, Mucomyst, albuterol nebs as well as the rest of her inhalers.         Past Medical History:      Past Medical History:   Diagnosis Date     Anxiety  09/30/2021     COPD (chronic obstructive pulmonary disease) (H)      Diverticulosis      Hiatal hernia      Mild asthma      Neuropathy      Osteopenia      Temporomandibular joint (TMJ) pain            Past Surgical History:      Past Surgical History:   Procedure Laterality Date     ANTERIOR / POSTERIOR COMBINED FUSION LUMBAR SPINE       BRONCHOSCOPY (RIGID OR FLEXIBLE), DIAGNOSTIC N/A 9/28/2021    Procedure: BRONCHOSCOPY, WITH BRONCHOALVEOLAR LAVAGE;  Surgeon: Randall Lorenz MD;  Location: [facility]     HYSTERECTOMY       PICC SINGLE LUMEN PLACEMENT  11/4/2021          RETINAL LASER PROCEDURE Left 10/04/2016     SALPINGOOPHORECTOMY       TOTAL KNEE ARTHROPLASTY  2008          Social History:     Social History     Tobacco Use     Smoking status: Never Smoker     Smokeless tobacco: Never Used   Substance Use Topics     Alcohol use: No          Family History:     Family History   Problem Relation Age of Onset     Dementia Mother         passed age 88     Chronic Obstructive Pulmonary Disease Father         passed age 78           Allergies:      Allergies   Allergen Reactions     Codeine Unknown     Morphine Itching          Medications:     Current Outpatient Medications   Medication Sig     acetylcysteine (MUCOMYST) 10 % nebulizer solution Inhale 4 mLs into the lungs daily     albuterol (PROAIR HFA;PROVENTIL HFA;VENTOLIN HFA) 90 mcg/actuation inhaler [ALBUTEROL (PROAIR HFA;PROVENTIL HFA;VENTOLIN HFA) 90 MCG/ACTUATION INHALER] Inhale 2 puffs every 6 (six) hours as needed for wheezing.     albuterol (PROVENTIL) (2.5 MG/3ML) 0.083% neb solution Take 1 vial (2.5 mg) by nebulization every 4 hours as needed for shortness of breath / dyspnea or wheezing     alendronate (FOSAMAX) 70 MG tablet TAKE 1 TABLET (70 MG) BY MOUTH EVERY 7 DAYS. TAKE IN THE MORNING ON AN EMPTY STOMACH WITH A FULL GLASS OF WATER 30 MINUTES BEFORE FOOD.     BIOTIN ORAL Take 1 tablet by mouth daily      calcium-vitamin D (CALCIUM-VITAMIN D) 500  mg(1,250mg) -200 unit per tablet [CALCIUM-VITAMIN D (CALCIUM-VITAMIN D) 500 MG(1,250MG) -200 UNIT PER TABLET] Take 1 tablet by mouth daily.     celecoxib (CELEBREX) 200 MG capsule TAKE 1 CAPSULE (200 MG TOTAL) BY MOUTH DAILY.     cholecalciferol, vitamin D3, (VITAMIN D3) 1,000 unit capsule [CHOLECALCIFEROL, VITAMIN D3, (VITAMIN D3) 1,000 UNIT CAPSULE] Take 1,000 Units by mouth daily.     escitalopram (LEXAPRO) 5 MG tablet Take 1 tablet (5 mg) by mouth daily     Fluticasone-Umeclidin-Vilanterol (TRELEGY ELLIPTA) 100-62.5-25 MCG/INH oral inhaler Inhale 1 puff into the lungs daily     Lactobacillus rhamnosus GG (CULTURELLE) 10-15 Billion cell capsule Take 1 capsule by mouth every evening      montelukast (SINGULAIR) 10 MG tablet [MONTELUKAST (SINGULAIR) 10 MG TABLET] TAKE 1 TABLET(10 MG) BY MOUTH AT BEDTIME     multivitamin therapeutic (THERAGRAN) tablet [MULTIVITAMIN THERAPEUTIC (THERAGRAN) TABLET] Take 1 tablet by mouth daily.     mupirocin (BACTROBAN) 2 % external ointment Apply thin layer to nasal lesion 3 times a day for 7-10 days.     nystatin (MYCOSTATIN) 321618 UNIT/GM external cream Apply to rash on body 3 times per day as needed.     OXYGEN-AIR DELIVERY SYSTEMS MISC [OXYGEN-AIR DELIVERY SYSTEMS MISC] Use 2 L As Directed. 2L with activity and at night  [employer]     pregabalin (LYRICA) 50 MG capsule Take 50mg in the morning and 100mg in the evening.     sodium chloride (OCEAN) 0.65 % nasal spray Spray 2 sprays in nostril daily     sodium chloride 3 % nebulizer solution [SODIUM CHLORIDE 3 % NEBULIZER SOLUTION] Take 3 mL by nebulization 2 (two) times a day.     SUMAtriptan (IMITREX) 50 MG tablet [SUMATRIPTAN (IMITREX) 50 MG TABLET] Take 1 tablet (50 mg total) by mouth every 2 (two) hours as needed for migraine.     traMADol (ULTRAM) 50 MG tablet Take 1 tablet (50 mg) by mouth every 12 hours as needed for moderate pain or severe pain     No current facility-administered medications for this visit.          Review  of Systems:     See HPI         Physical Exam:   There were no vitals taken for this visit.    Constitutional - looks well, in no apparent distress  Eyes - no redness or discharge  Respiratory -breathing appears comfortable. No wheeze or rhonchi.   Skin - No appreciable discoloration or lesions (very limited exam)  Neurological - No apparent tremors. Speech fluent and articlate  Psychiatric - no signs of delirium or anxiety          Current Laboratory Data:   All laboratory and imaging data reviewed.    No results found for this or any previous visit (from the past 24 hour(s)).

## 2021-12-22 NOTE — TELEPHONE ENCOUNTER
Left detailed message for janee that we are still missing the orders for 12/9/21 and to have them refaxed. I did leave the fax number in the message.

## 2021-12-22 NOTE — PROGRESS NOTES
Patient is on 2L nasal canula maintaining sat in the mid to upper 90s. Patient received neb treatment and tolerated it well. Continue with scheduled treatments.    Kenneth H. Kjellberg on 11/7/2021 at 5:35 PM     Spoke to Sonido, returned phone call.  He states he does not live at home with her and her son \"because of the way he is\".  He states he does not want her to go back home with her son because she was not taken care of.  He also spoke about him thinking of changing her code status to DNR, he says \"she did not want to live, where people are having to take care of her\"  States he would like call from case management on where to go from here.

## 2021-12-26 ENCOUNTER — MYC MEDICAL ADVICE (OUTPATIENT)
Dept: PULMONOLOGY | Facility: OTHER | Age: 76
End: 2021-12-26
Payer: MEDICARE

## 2021-12-27 DIAGNOSIS — J47.9 BRONCHIECTASIS (H): Primary | ICD-10-CM

## 2021-12-27 RX ORDER — PREDNISONE 20 MG/1
20 TABLET ORAL DAILY
Qty: 7 TABLET | Refills: 0 | Status: SHIPPED | OUTPATIENT
Start: 2021-12-27 | End: 2022-06-02

## 2021-12-28 NOTE — TELEPHONE ENCOUNTER
"To: [facility] Care Team   12/28/21    Can you make sure this is the correct order? I only see one form and it says \"For Review\" and there is no line for PCP signature, so I just signed the last page anyways    Dr. Nguyễn  "

## 2021-12-28 NOTE — TELEPHONE ENCOUNTER
Spoke with Emanuel regarding forms. He stated this was not the correct form. He will go ahead and refax these to [phone].

## 2021-12-29 ENCOUNTER — MEDICAL CORRESPONDENCE (OUTPATIENT)
Dept: HEALTH INFORMATION MANAGEMENT | Facility: CLINIC | Age: 76
End: 2021-12-29
Payer: MEDICARE

## 2021-12-30 DIAGNOSIS — J47.9 BRONCHIECTASIS (H): Primary | ICD-10-CM

## 2021-12-30 LAB — ACID FAST STAIN (ARUP): NORMAL

## 2022-01-05 DIAGNOSIS — J47.9 BRONCHIECTASIS (H): Primary | ICD-10-CM

## 2022-01-06 ENCOUNTER — MYC MEDICAL ADVICE (OUTPATIENT)
Dept: PULMONOLOGY | Facility: OTHER | Age: 77
End: 2022-01-06
Payer: MEDICARE

## 2022-01-14 ENCOUNTER — MYC MEDICAL ADVICE (OUTPATIENT)
Dept: PULMONOLOGY | Facility: OTHER | Age: 77
End: 2022-01-14
Payer: MEDICARE

## 2022-01-24 ENCOUNTER — TRANSFERRED RECORDS (OUTPATIENT)
Dept: HEALTH INFORMATION MANAGEMENT | Facility: CLINIC | Age: 77
End: 2022-01-24
Payer: MEDICARE

## 2022-01-27 ENCOUNTER — VIRTUAL VISIT (OUTPATIENT)
Dept: PULMONOLOGY | Facility: OTHER | Age: 77
End: 2022-01-27
Payer: MEDICARE

## 2022-01-27 DIAGNOSIS — J96.11 CHRONIC RESPIRATORY FAILURE WITH HYPOXIA (H): ICD-10-CM

## 2022-01-27 DIAGNOSIS — M85.88 OSTEOPENIA OF LUMBAR SPINE: ICD-10-CM

## 2022-01-27 DIAGNOSIS — J47.9 BRONCHIECTASIS WITHOUT COMPLICATION (H): Primary | ICD-10-CM

## 2022-01-27 PROCEDURE — 99214 OFFICE O/P EST MOD 30 MIN: CPT | Mod: 95 | Performed by: INTERNAL MEDICINE

## 2022-01-27 NOTE — PATIENT INSTRUCTIONS
Try just 1/2 a vial of your mucomyst instead of a full vial.    Continue 2 lpm at night for now and you can wean down if you like in the future and see how you do.

## 2022-01-27 NOTE — PROGRESS NOTES
Ayse is a 76 year old who is being evaluated via a billable video visit.      How would you like to obtain your AVS? MyChart  If the video visit is dropped, the invitation should be resent by: Text to cell phone: [phone]  Will anyone else be joining your video visit? No      Video Start Time: 8:41 AM  Video-Visit Details    Type of service:  Video Visit    Video End Time:857    Originating Location (pt. Location): Home    Distant Location (provider location):  [facility]     Platform used for Video Visit: Tracy Tavera is a 76 year old who is being evaluated via a billable video visit.      How would you like to obtain your AVS? MyChart  If the video visit is dropped, the invitation should be resent by: [phone]  Will anyone else be joining your video visit? No        LUNG NODULE & INTERVENTIONAL PULMONARY CLINIC  CLINICS & SURGERY CENTER, [facility], [facility]     Fiordaliza Najera Sr. MRN# [id number]   Age: 76 year old YOB: 1945       Requesting Physician: No referring provider defined for this encounter.       Assessment and Plan:    1. Bronchiectasis: continue neb regimen.  Continue inhalers.  We will back off on dose of Mucomyst in an effort to decrease her neb time.    2. Chronic hypoxic respiratory failure: 2lpm w activity.  He uses rest as needed.  We will keep her on her oxygen at night for now.  She can taper this down if she feels well.    3.  Anxiety: Continue anxiolytic.  This was started on my recommendation after her hospital stay earlier this fall.  This is really helped.    4. Sleep related hypoxia: continue with 2 lpm at night         History:     Fiordaliza Najera Sr. is a 76 year old female with sig h/o for bronchiectasis.  She has been having more symptoms recently, but since starting her night time oxygen she is feeling better.  Her neb regimen is very time consuming and she would like to try to  simplify.    She has had some ups and downs and is having some stress just related to the burden of her therapy visits and inhaled therapies.    She is taking saline, Mucomyst, albuterol nebs as well as the rest of her inhalers.         Past Medical History:      Past Medical History:   Diagnosis Date     Anxiety 09/30/2021     COPD (chronic obstructive pulmonary disease) (H)      Diverticulosis      Hiatal hernia      Mild asthma      Neuropathy      Osteopenia      Temporomandibular joint (TMJ) pain            Past Surgical History:      Past Surgical History:   Procedure Laterality Date     ANTERIOR / POSTERIOR COMBINED FUSION LUMBAR SPINE       BRONCHOSCOPY (RIGID OR FLEXIBLE), DIAGNOSTIC N/A 9/28/2021    Procedure: BRONCHOSCOPY, WITH BRONCHOALVEOLAR LAVAGE;  Surgeon: Randall Lorenz MD;  Location: [facility]     HYSTERECTOMY       PICC SINGLE LUMEN PLACEMENT  11/4/2021          RETINAL LASER PROCEDURE Left 10/04/2016     SALPINGOOPHORECTOMY       TOTAL KNEE ARTHROPLASTY  2008          Social History:     Social History     Tobacco Use     Smoking status: Never Smoker     Smokeless tobacco: Never Used   Substance Use Topics     Alcohol use: No          Family History:     Family History   Problem Relation Age of Onset     Dementia Mother         passed age 88     Chronic Obstructive Pulmonary Disease Father         passed age 78           Allergies:      Allergies   Allergen Reactions     Codeine Unknown     Morphine Itching          Medications:     Current Outpatient Medications   Medication Sig     acetylcysteine (MUCOMYST) 10 % nebulizer solution Inhale 4 mLs into the lungs daily     albuterol (PROAIR HFA;PROVENTIL HFA;VENTOLIN HFA) 90 mcg/actuation inhaler [ALBUTEROL (PROAIR HFA;PROVENTIL HFA;VENTOLIN HFA) 90 MCG/ACTUATION INHALER] Inhale 2 puffs every 6 (six) hours as needed for wheezing.     albuterol (PROVENTIL) (2.5 MG/3ML) 0.083% neb solution Take 1 vial (2.5 mg) by nebulization every 4 hours as needed  for shortness of breath / dyspnea or wheezing     alendronate (FOSAMAX) 70 MG tablet TAKE 1 TABLET (70 MG) BY MOUTH EVERY 7 DAYS. TAKE IN THE MORNING ON AN EMPTY STOMACH WITH A FULL GLASS OF WATER 30 MINUTES BEFORE FOOD.     BIOTIN ORAL Take 500 mg by mouth daily      calcium-vitamin D (CALCIUM-VITAMIN D) 500 mg(1,250mg) -200 unit per tablet Take 1 tablet by mouth 2 times daily      celecoxib (CELEBREX) 200 MG capsule TAKE 1 CAPSULE (200 MG TOTAL) BY MOUTH DAILY.     cholecalciferol, vitamin D3, (VITAMIN D3) 1,000 unit capsule [CHOLECALCIFEROL, VITAMIN D3, (VITAMIN D3) 1,000 UNIT CAPSULE] Take 1,000 Units by mouth daily.     escitalopram (LEXAPRO) 5 MG tablet Take 1 tablet (5 mg) by mouth daily     Fluticasone-Umeclidin-Vilanterol (TRELEGY ELLIPTA) 100-62.5-25 MCG/INH oral inhaler Inhale 1 puff into the lungs daily     Lactobacillus rhamnosus GG (CULTURELLE) 10-15 Billion cell capsule Take 1 capsule by mouth every evening      montelukast (SINGULAIR) 10 MG tablet [MONTELUKAST (SINGULAIR) 10 MG TABLET] TAKE 1 TABLET(10 MG) BY MOUTH AT BEDTIME     multivitamin therapeutic (THERAGRAN) tablet [MULTIVITAMIN THERAPEUTIC (THERAGRAN) TABLET] Take 1 tablet by mouth daily.     nystatin (MYCOSTATIN) 080480 UNIT/GM external cream Apply to rash on body 3 times per day as needed.     OXYGEN-AIR DELIVERY SYSTEMS MISC [OXYGEN-AIR DELIVERY SYSTEMS MISC] Use 2 L As Directed. 2L with activity and at night  [facility]     pregabalin (LYRICA) 50 MG capsule Take 50mg in the morning and 100mg in the evening.     sodium chloride (OCEAN) 0.65 % nasal spray Spray 2 sprays in nostril daily     sodium chloride 3 % nebulizer solution [SODIUM CHLORIDE 3 % NEBULIZER SOLUTION] Take 3 mL by nebulization 2 (two) times a day.     SUMAtriptan (IMITREX) 50 MG tablet [SUMATRIPTAN (IMITREX) 50 MG TABLET] Take 1 tablet (50 mg total) by mouth every 2 (two) hours as needed for migraine.     traMADol (ULTRAM) 50 MG tablet Take 1 tablet (50 mg) by mouth every 12  hours as needed for moderate pain or severe pain     mupirocin (BACTROBAN) 2 % external ointment Apply thin layer to nasal lesion 3 times a day for 7-10 days. (Patient not taking: Reported on 1/27/2022)     No current facility-administered medications for this visit.          Review of Systems:     See HPI         Physical Exam:   There were no vitals taken for this visit.    Constitutional - looks well, in no apparent distress  Eyes - no redness or discharge  Respiratory -breathing appears comfortable. No wheeze or rhonchi.   Skin - No appreciable discoloration or lesions (very limited exam)  Neurological - No apparent tremors. Speech fluent and articlate  Psychiatric - no signs of delirium or anxiety          Current Laboratory Data:   All laboratory and imaging data reviewed.    No results found for this or any previous visit (from the past 24 hour(s)).

## 2022-02-01 ENCOUNTER — MYC MEDICAL ADVICE (OUTPATIENT)
Dept: INTERNAL MEDICINE | Facility: CLINIC | Age: 77
End: 2022-02-01
Payer: MEDICARE

## 2022-02-01 DIAGNOSIS — M85.88 OSTEOPENIA OF LUMBAR SPINE: ICD-10-CM

## 2022-02-01 DIAGNOSIS — Z53.9 DIAGNOSIS NOT YET DEFINED: Primary | ICD-10-CM

## 2022-02-01 PROCEDURE — G0179 MD RECERTIFICATION HHA PT: HCPCS | Performed by: PEDIATRICS

## 2022-02-02 ENCOUNTER — MYC MEDICAL ADVICE (OUTPATIENT)
Dept: PULMONOLOGY | Facility: OTHER | Age: 77
End: 2022-02-02
Payer: MEDICARE

## 2022-02-02 ENCOUNTER — DOCUMENTATION ONLY (OUTPATIENT)
Dept: PULMONOLOGY | Facility: OTHER | Age: 77
End: 2022-02-02
Payer: MEDICARE

## 2022-02-02 RX ORDER — ALENDRONATE SODIUM 70 MG/1
TABLET ORAL
Qty: 12 TABLET | Refills: 0 | Status: SHIPPED | OUTPATIENT
Start: 2022-02-02 | End: 2022-04-11

## 2022-02-02 NOTE — TELEPHONE ENCOUNTER
Please let patient know refill sent, but needs follow up appointment in next 3 months for follow up - Dr. Nguyễn

## 2022-02-04 RX ORDER — ALENDRONATE SODIUM 70 MG/1
TABLET ORAL
Qty: 12 TABLET | Refills: 0 | OUTPATIENT
Start: 2022-02-04

## 2022-02-04 NOTE — TELEPHONE ENCOUNTER
Medication refill request declined: should have refills on file    Disp Refills Start End CATY   alendronate (FOSAMAX) 70 MG tablet 12 tablet 0 2/2/2022  No   Sig: TAKE 1 TABLET (70 MG) BY MOUTH EVERY 7 DAYS. TAKE IN THE MORNING ON AN EMPTY STOMACH WITH A FULL GLASS OF WATER 30 MINUTES BEFORE FOOD.   Sent to pharmacy as: Alendronate Sodium 70 MG Oral Tablet (FOSAMAX)   Class: E-Prescribe   Order: [id number]   E-Prescribing Status: Receipt confirmed by pharmacy (2/2/2022  2:25 PM CST)     Magdalena Paul RN BSN 2/4/2022 2:36 PM

## 2022-02-10 ENCOUNTER — ANCILLARY PROCEDURE (OUTPATIENT)
Dept: BONE DENSITY | Facility: CLINIC | Age: 77
End: 2022-02-10
Attending: PEDIATRICS
Payer: MEDICARE

## 2022-02-10 DIAGNOSIS — Z78.0 POST-MENOPAUSAL: ICD-10-CM

## 2022-02-10 PROCEDURE — 77081 DXA BONE DENSITY APPENDICULR: CPT | Mod: XU | Performed by: INTERNAL MEDICINE

## 2022-02-10 PROCEDURE — 77080 DXA BONE DENSITY AXIAL: CPT | Performed by: INTERNAL MEDICINE

## 2022-02-13 ENCOUNTER — MYC MEDICAL ADVICE (OUTPATIENT)
Dept: INTERNAL MEDICINE | Facility: CLINIC | Age: 77
End: 2022-02-13
Payer: MEDICARE

## 2022-02-13 DIAGNOSIS — M85.80 OSTEOPENIA, UNSPECIFIED LOCATION: Primary | ICD-10-CM

## 2022-02-14 NOTE — RESULT ENCOUNTER NOTE
José Tavera,    There seems to be significant worsening in your bone density compared to your last bone density scan. Since you are already on treatment, I think it is best you see the osteoporosis specialists to see if you may be a candidate for alternate therapies to keep your bones strong.    Please let me know if interested in a referral and I can place the referral.    Please let me know if you have any questions or concerns,  Dr. Nguyễn

## 2022-02-21 ENCOUNTER — MYC MEDICAL ADVICE (OUTPATIENT)
Dept: INTERNAL MEDICINE | Facility: CLINIC | Age: 77
End: 2022-02-21
Payer: MEDICARE

## 2022-02-21 DIAGNOSIS — F41.9 ANXIETY: ICD-10-CM

## 2022-02-22 DIAGNOSIS — J47.1 BRONCHIECTASIS WITH ACUTE EXACERBATION (H): ICD-10-CM

## 2022-02-22 RX ORDER — SODIUM CHLORIDE FOR INHALATION 3 %
3 VIAL, NEBULIZER (ML) INHALATION 2 TIMES DAILY
Qty: 180 ML | Refills: 11 | Status: SHIPPED | OUTPATIENT
Start: 2022-02-22 | End: 2022-09-12

## 2022-02-22 RX ORDER — ESCITALOPRAM OXALATE 5 MG/1
5 TABLET ORAL DAILY
Qty: 30 TABLET | Refills: 3 | Status: SHIPPED | OUTPATIENT
Start: 2022-02-22 | End: 2022-05-17

## 2022-02-22 NOTE — TELEPHONE ENCOUNTER
Tez stringerg:  When I was in the hospital in Sept. with pneumonia I got a prescription for Escitalopram for anxiety, as I would panic when I couldn't breathe.   This medicine helps me a great deal.   [facility] says I need a new prescription and I sent that request to my pulmonologist (Randall Lorenz), but he said I should be getting it from you.   Would you please write a new prescription for Escitalopram 5 MG Tablet once daily  (lexapro) at [facility] [phone]?     Last seen: 10/20/2021    Anxiety  Recently started on lexapro. Anxiety stable on low dose    Tavo Chaudhari Jr., CMA on 2/22/2022 at 9:18 AM

## 2022-03-01 ENCOUNTER — MYC MEDICAL ADVICE (OUTPATIENT)
Dept: PULMONOLOGY | Facility: OTHER | Age: 77
End: 2022-03-01
Payer: MEDICARE

## 2022-03-02 DIAGNOSIS — J47.9 BRONCHIECTASIS (H): Primary | ICD-10-CM

## 2022-03-02 RX ORDER — PREDNISONE 20 MG/1
20 TABLET ORAL DAILY
Qty: 7 TABLET | Refills: 0 | Status: SHIPPED | OUTPATIENT
Start: 2022-03-02 | End: 2022-04-04

## 2022-03-03 DIAGNOSIS — J47.9 BRONCHIECTASIS WITHOUT COMPLICATION (H): ICD-10-CM

## 2022-03-03 RX ORDER — ALBUTEROL SULFATE 90 UG/1
2 AEROSOL, METERED RESPIRATORY (INHALATION) EVERY 6 HOURS PRN
Qty: 18 G | Refills: 11 | Status: SHIPPED | OUTPATIENT
Start: 2022-03-03 | End: 2023-01-01

## 2022-03-03 NOTE — TELEPHONE ENCOUNTER
RECORDS RECEIVED FROM: internal    DATE RECEIVED: 5.5.22    NOTES (FOR ALL VISITS) STATUS DETAILS   OFFICE NOTES from referring provider internal  Ekaterina Koehler MD   OFFICE NOTES from other specialist     ED NOTES internal  11.4.21 [place]   9/28/21 [place]    OPERATIVE REPORT  (thyroid, pituitary, adrenal, parathyroid)     MEDICATION LIST internal     IMAGING      DEXASCAN internal  2.10.22   MRI (BRAIN)     XR (Chest) internal /ce internal 11.4.21  - 8/22/21   CT (HEAD/NECK/CHEST/ABDOMEN) internal  9.28.21, 10.21.20   NUCLEAR      ULTRASOUND (HEAD/NECK)     LABS     DIABETES: HBGA1C, CREATININE, FASTING LIPIDS, MICROALBUMIN URINE, POTASSIUM, TSH, T4    THYROID: TSH, T4, CBC, THYRODLONULIN, TOTAL T3, FREE T4, CALCITONIN, CEA internal  internal

## 2022-03-04 ENCOUNTER — TELEPHONE (OUTPATIENT)
Dept: PULMONOLOGY | Facility: OTHER | Age: 77
End: 2022-03-04
Payer: MEDICARE

## 2022-03-04 NOTE — TELEPHONE ENCOUNTER
Prior Authorization Retail Medication Request    Medication/Dose: Sodium chloride 3% nebulizer solution  ICD code (if different than what is on RX):  J47.9  Previously Tried and Failed:  albuterol  Rationale:  Patient requires this for control of her bronchiectasis symptoms    Insurance Name:  Medicare // [facility]  Insurance ID:  [id number]  //[id number]    Pharmacy Information (if different than what is on RX)  Name:  [facility]  Phone:  [phone]

## 2022-03-11 NOTE — TELEPHONE ENCOUNTER
PRIOR AUTHORIZATION DENIED    Medication: Sodium chloride 3% nebulizer solution    Denial Date:  03/11/2022    Denial Rational:     Appeal Information:

## 2022-03-11 NOTE — TELEPHONE ENCOUNTER
PA Initiation    Medication: Sodium chloride 3% nebulizer solution  Insurance Company: [employer] Part D - Phone [phone] Fax [phone]  Pharmacy Filling the Rx: [id number] - [place], [place] - [address] AT [facility] [address]  Filling Pharmacy Phone: [phone]  Filling Pharmacy Fax:    Start Date: 3/11/2022    CENTRAL PRIOR AUTHORIZATION TEAM  Ph:[phone]

## 2022-03-17 DIAGNOSIS — G89.4 CHRONIC PAIN SYNDROME: ICD-10-CM

## 2022-03-17 NOTE — TELEPHONE ENCOUNTER
Routing refill request to provider for review/approval because:  Drug not on the G refill protocol     Last Written Prescription Date:  11/8/2021  Last Fill Quantity: 90,  # refills: 4   Last office visit provider:  10/20/2021     Requested Prescriptions   Pending Prescriptions Disp Refills     pregabalin (LYRICA) 50 MG capsule [Pharmacy Med Name: PREGABALIN 50 MG CAPS 50 Capsule] 90 capsule 4     Sig: TAKE 3 CAPSULES BY MOUTH DAILY AS DIRECTED.       There is no refill protocol information for this order          Mery Ribera RN 03/17/22 12:21 PM   DVT PPx: Ambulatory, no PPx warranted DVT PPx: Ambulatory, no PPx warranted  Dispo: Will d/c q4H EKG this afternoon, after which will plan to Tx to [facility]. Pending 2 PC. Transfer likely tomorrow. DVT PPx: Ambulatory, no PPx warranted  Dispo: to [facility] when medically cleared

## 2022-03-18 RX ORDER — PREGABALIN 50 MG/1
CAPSULE ORAL
Qty: 90 CAPSULE | Refills: 4 | Status: SHIPPED | OUTPATIENT
Start: 2022-03-18 | End: 2022-07-14

## 2022-03-21 ENCOUNTER — MYC MEDICAL ADVICE (OUTPATIENT)
Dept: INTERNAL MEDICINE | Facility: CLINIC | Age: 77
End: 2022-03-21
Payer: MEDICARE

## 2022-03-21 DIAGNOSIS — R92.8 ABNORMAL MAMMOGRAM: Primary | ICD-10-CM

## 2022-03-23 NOTE — TELEPHONE ENCOUNTER
Tez msg:  Dr. UREÑA,   Please send in a new prescription for Lyrica or Pregabalin 50 MG CAPS  Qty 90  I take 3 a day.  [facility] [phone] - delivery  Thank you very much, MARYAM Benavidez    Last appt: 10/20/2021    Script sent to pharmacy:   E-Prescribing Status: Receipt confirmed by pharmacy (3/18/2022 10:08 AM CDT)

## 2022-03-23 NOTE — TELEPHONE ENCOUNTER
Tez msg:  Dear Dr. UREÑA,   I called to make an appt. today for the short interval follow up 6 months on or about 3/19/22.  Today is 3/21/22.   The  said there was no order from you.   I called for the mammo in [place] at this number [phone].   Would you please send in that order and then I can schedule.  Thank you so much,MARYAM Castro    Order is key'd up for verification and approval, if appropriate    Tavo Chaudhari Jr., CMA on 3/23/2022 at 10:37 AM

## 2022-03-24 ENCOUNTER — VIRTUAL VISIT (OUTPATIENT)
Dept: PULMONOLOGY | Facility: OTHER | Age: 77
End: 2022-03-24
Payer: MEDICARE

## 2022-03-24 DIAGNOSIS — R06.09 DYSPNEA ON EXERTION: ICD-10-CM

## 2022-03-24 DIAGNOSIS — J47.9 BRONCHIECTASIS WITHOUT COMPLICATION (H): Primary | ICD-10-CM

## 2022-03-24 DIAGNOSIS — J96.11 CHRONIC RESPIRATORY FAILURE WITH HYPOXIA (H): ICD-10-CM

## 2022-03-24 PROCEDURE — 99214 OFFICE O/P EST MOD 30 MIN: CPT | Mod: 95 | Performed by: INTERNAL MEDICINE

## 2022-03-24 NOTE — PROGRESS NOTES
LUNG NODULE & INTERVENTIONAL PULMONARY CLINIC  CLINICS & SURGERY CENTER, [facility]     Fiordaliza Najera Sr. MRN# [id number]   Age: 76 year old YOB: 1945       Requesting Physician: No referring provider defined for this encounter.       Assessment and Plan:    1.  Bronchiectasis.  Continue Trelegy.  Continue nebulizer regimen.  Continue physical therapy.  We discussed that shortness of breath in and of itself is not indication for oxygen.  I have asked that she continue to be proactive with increasing physical activity.    2.  Hemoptysis.  Limited.  In setting of bronchiectasis and possibly recent exacerbation.  Continue to monitor.  Can reconsider neb regimen if it recurs.  Very low cancer risk.    3.  Chronic hypoxic respiratory failure and shortness of breath with exertion.  As above.  Goal oxygen saturation 89% or greater at rest.  Continue physical activity.             History:     Fiordaliza Najera Sr. is a 76 year old female with sig h/o for bronchiectasis, recent severe worsening exacerbation requiring hospitalization and IV antibiotics last year here for follow-up.  She continues to have shortness of breath with exertion.  It varies somewhat.  She took some Augmentin for the last episode and this helped somewhat.    She is also having increased sinus secretions and this is happened this time of year in the past.    Hemoptysis once about 5 days ago small amount.                 Past Medical History:      Past Medical History:   Diagnosis Date     Anxiety 09/30/2021     COPD (chronic obstructive pulmonary disease) (H)      Diverticulosis      Hiatal hernia      Mild asthma      Neuropathy      Osteopenia      Temporomandibular joint (TMJ) pain            Past Surgical History:      Past Surgical History:   Procedure Laterality Date     ANTERIOR / POSTERIOR COMBINED FUSION LUMBAR SPINE       BRONCHOSCOPY (RIGID OR FLEXIBLE), DIAGNOSTIC N/A  9/28/2021    Procedure: BRONCHOSCOPY, WITH BRONCHOALVEOLAR LAVAGE;  Surgeon: Randall Lorenz MD;  Location: [facility]     HYSTERECTOMY       PICC SINGLE LUMEN PLACEMENT  11/4/2021          RETINAL LASER PROCEDURE Left 10/04/2016     SALPINGOOPHORECTOMY       TOTAL KNEE ARTHROPLASTY  2008          Social History:     Social History     Tobacco Use     Smoking status: Never Smoker     Smokeless tobacco: Never Used   Substance Use Topics     Alcohol use: No          Family History:     Family History   Problem Relation Age of Onset     Dementia Mother         passed age 88     Chronic Obstructive Pulmonary Disease Father         passed age 78           Allergies:      Allergies   Allergen Reactions     Codeine Unknown     Morphine Itching          Medications:     Current Outpatient Medications   Medication Sig     acetylcysteine (MUCOMYST) 10 % nebulizer solution Inhale 4 mLs into the lungs daily     albuterol (PROAIR HFA/PROVENTIL HFA/VENTOLIN HFA) 108 (90 Base) MCG/ACT inhaler Inhale 2 puffs into the lungs every 6 hours as needed     albuterol (PROVENTIL) (2.5 MG/3ML) 0.083% neb solution Take 1 vial (2.5 mg) by nebulization every 4 hours as needed for shortness of breath / dyspnea or wheezing     alendronate (FOSAMAX) 70 MG tablet TAKE 1 TABLET (70 MG) BY MOUTH EVERY 7 DAYS. TAKE IN THE MORNING ON AN EMPTY STOMACH WITH A FULL GLASS OF WATER 30 MINUTES BEFORE FOOD.     BIOTIN ORAL Take 500 mg by mouth daily      calcium-vitamin D (CALCIUM-VITAMIN D) 500 mg(1,250mg) -200 unit per tablet Take 1 tablet by mouth 2 times daily      celecoxib (CELEBREX) 200 MG capsule TAKE 1 CAPSULE (200 MG TOTAL) BY MOUTH DAILY.     cholecalciferol, vitamin D3, (VITAMIN D3) 1,000 unit capsule [CHOLECALCIFEROL, VITAMIN D3, (VITAMIN D3) 1,000 UNIT CAPSULE] Take 1,000 Units by mouth daily.     escitalopram (LEXAPRO) 5 MG tablet Take 1 tablet (5 mg) by mouth daily     Fluticasone-Umeclidin-Vilanterol (TRELEGY ELLIPTA) 100-62.5-25 MCG/INH oral  inhaler Inhale 1 puff into the lungs daily     Lactobacillus rhamnosus GG (CULTURELLE) 10-15 Billion cell capsule Take 1 capsule by mouth every evening      montelukast (SINGULAIR) 10 MG tablet [MONTELUKAST (SINGULAIR) 10 MG TABLET] TAKE 1 TABLET(10 MG) BY MOUTH AT BEDTIME     multivitamin therapeutic (THERAGRAN) tablet [MULTIVITAMIN THERAPEUTIC (THERAGRAN) TABLET] Take 1 tablet by mouth daily.     nystatin (MYCOSTATIN) 917858 UNIT/GM external cream Apply to rash on body 3 times per day as needed.     OXYGEN-AIR DELIVERY SYSTEMS MISC [OXYGEN-AIR DELIVERY SYSTEMS MISC] Use 2 L As Directed. 2L with activity and at night  [facility]     pregabalin (LYRICA) 50 MG capsule TAKE 3 CAPSULES BY MOUTH DAILY AS DIRECTED.     sodium chloride (NEBUSAL) 3 % neb solution Take 3 mLs by nebulization 2 times daily     sodium chloride (OCEAN) 0.65 % nasal spray Spray 2 sprays in nostril daily     SUMAtriptan (IMITREX) 50 MG tablet [SUMATRIPTAN (IMITREX) 50 MG TABLET] Take 1 tablet (50 mg total) by mouth every 2 (two) hours as needed for migraine.     traMADol (ULTRAM) 50 MG tablet Take 1 tablet (50 mg) by mouth every 12 hours as needed for moderate pain or severe pain     No current facility-administered medications for this visit.          Review of Systems:     See HPI         Physical Exam:   There were no vitals taken for this visit.    Constitutional - looks well, in no apparent distress  Eyes - no redness or discharge  Respiratory -breathing appears comfortable. No wheeze or rhonchi.   Skin - No appreciable discoloration or lesions (very limited exam)  Neurological - No apparent tremors. Speech fluent and articlate  Psychiatric - no signs of delirium or anxiety          Current Laboratory Data:   All laboratory and imaging data reviewed.    No results found for this or any previous visit (from the past 24 hour(s)).

## 2022-03-24 NOTE — PROGRESS NOTES
Ayse is a 76 year old who is being evaluated via a billable video visit.      How would you like to obtain your AVS? MyChart  If the video visit is dropped, the invitation should be resent by: Send to e-mail at: [email or web address]  Will anyone else be joining your video visit? No      Distant Location (provider location):  [facility]     Platform used for Video Visit: Tracy

## 2022-04-01 ENCOUNTER — ANCILLARY PROCEDURE (OUTPATIENT)
Dept: MAMMOGRAPHY | Facility: CLINIC | Age: 77
End: 2022-04-01
Attending: PEDIATRICS
Payer: MEDICARE

## 2022-04-01 DIAGNOSIS — R92.8 ABNORMAL MAMMOGRAM: ICD-10-CM

## 2022-04-01 PROCEDURE — 77065 DX MAMMO INCL CAD UNI: CPT | Mod: RT

## 2022-04-04 ENCOUNTER — MYC MEDICAL ADVICE (OUTPATIENT)
Dept: PULMONOLOGY | Facility: OTHER | Age: 77
End: 2022-04-04
Payer: MEDICARE

## 2022-04-04 DIAGNOSIS — J47.9 BRONCHIECTASIS (H): ICD-10-CM

## 2022-04-04 RX ORDER — PREDNISONE 20 MG/1
20 TABLET ORAL DAILY
Qty: 7 TABLET | Refills: 0 | Status: SHIPPED | OUTPATIENT
Start: 2022-04-04 | End: 2022-05-10

## 2022-04-08 DIAGNOSIS — M85.88 OSTEOPENIA OF LUMBAR SPINE: ICD-10-CM

## 2022-04-11 RX ORDER — ALENDRONATE SODIUM 70 MG/1
TABLET ORAL
Qty: 12 TABLET | Refills: 3 | Status: SHIPPED | OUTPATIENT
Start: 2022-04-11 | End: 2022-06-23

## 2022-04-11 NOTE — TELEPHONE ENCOUNTER
"Routing refill request to provider for review/approval because:  Last DEXA scan 9/5/2019  Early refill request.     Last Written Prescription Date: 2/2/2022    Last Fill Quantity: 12,  # refills: 0   Last office visit provider:  10/20/2021     Requested Prescriptions   Pending Prescriptions Disp Refills     alendronate (FOSAMAX) 70 MG tablet [Pharmacy Med Name: ALENDRONATE NA 70 MG TABLET 70 Tablet] 12 tablet 3     Sig: TAKE 1 TABLET (70 MG) BY MOUTH EVERY 7 DAYS. TAKE IN THE MORNING ON AN EMPTY STOMACH WITH A FULL GLASS OF WATER 30 MINUTES BEFORE FOOD.       Bisphosphonates Passed - 4/11/2022 12:40 PM        Passed - Recent (12 mo) or future (30 days) visit within the authorizing provider's specialty     Patient has had an office visit with the authorizing provider or a provider within the authorizing providers department within the previous 12 mos or has a future within next 30 days. See \"Patient Info\" tab in inbasket, or \"Choose Columns\" in Meds & Orders section of the refill encounter.              Passed - Dexa on file within past 2 years     Please review last Dexa result.           Passed - Medication is active on med list        Passed - Patient is age 18 or older        Passed - Normal serum creatinine on file within past 12 months     Recent Labs   Lab Test 11/08/21  0601   CR 0.63       Ok to refill medication if creatinine is low               Mery Ribera RN 04/11/22 12:43 PM  "

## 2022-05-02 ENCOUNTER — MYC MEDICAL ADVICE (OUTPATIENT)
Dept: INTERNAL MEDICINE | Facility: CLINIC | Age: 77
End: 2022-05-02
Payer: MEDICARE

## 2022-05-02 ASSESSMENT — ENCOUNTER SYMPTOMS
SNORES LOUDLY: 0
DYSPNEA ON EXERTION: 1
HOARSE VOICE: 0
POSTURAL DYSPNEA: 0
WHEEZING: 0
SINUS CONGESTION: 1
COUGH DISTURBING SLEEP: 0
SMELL DISTURBANCE: 0
COUGH: 0
NECK MASS: 0
TASTE DISTURBANCE: 0
SHORTNESS OF BREATH: 1
SORE THROAT: 0
HEMOPTYSIS: 0
TROUBLE SWALLOWING: 0
SINUS PAIN: 0
SPUTUM PRODUCTION: 1

## 2022-05-04 NOTE — PATIENT INSTRUCTIONS - HE
You can stop your nebulizer and only use it as needed.   Patient is a 79 year old male admitted for failure to thrive. History of atrial fibrillation, parkinson's disease, hypertension.

## 2022-05-05 ENCOUNTER — PRE VISIT (OUTPATIENT)
Dept: ENDOCRINOLOGY | Facility: CLINIC | Age: 77
End: 2022-05-05

## 2022-05-05 ENCOUNTER — VIRTUAL VISIT (OUTPATIENT)
Dept: ENDOCRINOLOGY | Facility: CLINIC | Age: 77
End: 2022-05-05
Attending: PEDIATRICS
Payer: MEDICARE

## 2022-05-05 DIAGNOSIS — M85.80 OSTEOPENIA, UNSPECIFIED LOCATION: ICD-10-CM

## 2022-05-05 PROCEDURE — 99417 PROLNG OP E/M EACH 15 MIN: CPT | Performed by: STUDENT IN AN ORGANIZED HEALTH CARE EDUCATION/TRAINING PROGRAM

## 2022-05-05 PROCEDURE — 99205 OFFICE O/P NEW HI 60 MIN: CPT | Mod: 95 | Performed by: STUDENT IN AN ORGANIZED HEALTH CARE EDUCATION/TRAINING PROGRAM

## 2022-05-05 NOTE — LETTER
5/5/2022       RE: Fiordaliza Najera Sr.  [address]  [place] [address]     Dear Colleague,    Thank you for referring your patient, Fiordaliza Najera Sr., to the [facility] ENDOCRINOLOGY CLINIC [place] [facility]. Please see a copy of my visit note below.    Fiordaliza Najera Sr.  is being evaluated via a billable video visit.      How would you like to obtain your AVS? Voxound  For the video visit, send the invitation by: Text to cell phone: [phone]  Will anyone else be joining your video visit? No          Endocrinology Clinic Visit 5/5/2022    Video-Visit Details     Type of service:  Video Visit     Video Start Time: 9:00 am  Video End Time: 9:46 am     I spent a total of 90 minutes on the date of encounter reviewing medical records, evaluating the patient, coordinating care and documenting in the EHR, as detailed above.        Platform used for Video Visit: SolFocus    NAME:  Fiordaliza Najera Sr.  PCP:  Ekaterina Koehler  MRN:  [id number]  Reason for Consult:  osteopororis  Requesting Provider:  Ekaterina Koehler    Chief Complaint     Chief Complaint   Patient presents with     Video Visit       History of Present Illness     Fiordaliza Najera Sr. is a 76 year old female who is seen in clinic for osteoporosis management    She has always been told she has osteopenia. She has been on alendronate for a vinnie time. She can not recall when she was first started on it. I reviewed all her PCP note and I was not able to find the date of starting it. It is possible that she was taken off fosamax in 2017 based on the dxa reading provider suggestion. She was restarted on fosamax in 2019 based on significant bone density decrease on dxa compared to 2017. She has been on fosamax now since ? 9/2019.    The patient had a DXA scan on 2/2022 which showed: lowest T score of -2 at the right femoral neck, bone density has declined a  significant 4.9% left total hip, 5.5% in the right total hip, and 10.2% in the wrist.    She denied any recent falls or fractures. She walks using a walker.    Calcium intake:   - Dietary: yogurt everyday, milk with cereal, cottage cheese everyday.  - Supplements: ca citrate 315mg 2 pills    Vitamin D intake:   - Supplements: vitamin 1000 international unit(s) daily  Last vitamin D level was 40 on 10/2021.    Osteoporosis Risk factors:   - Previous fractures: no fractures, back surgeries and knee replacments  - Family history of fragility fracture in parent: no.   - Current smoking: no  - Steroid Use: intermittent use for copd flairs, prednisone 20 mg daily for 5 days sevral times a year  - Rheumatoid  arthritis: no  - Alcohol (3 or more units per day): no  - Other medications known to affect BMD: no  - Reported loss of height: yes especially after back surgeries  - Menstrual history: age at menopause: she had THBSO in her 40s  - Estrogen use after menopause: yes  - Tendency for falls: no, last fall 5-6 years ago fell in the escalator  - GI history: Malabsorption (IBD, Celiac, gastric bypass ): no  - History of kidney stones: no  - History of thyroid disease: no  - Physical activity: exercise and walk everyday  - Weight history: she lost 7 lbs after recent pneumonia, but started to gain them back.    Social: she lives an independent living. She does not have kids. She is on oxygen due to copd but independent in her daily needs.    Problem List     Patient Active Problem List   Diagnosis     Osteopenia     Bronchiectasis with (acute) exacerbation (H)     Idiopathic peripheral neuropathy     COPD (chronic obstructive pulmonary disease) (H)     Abnormality of gait     Back pain     Diaphragmatic hernia     Other kyphoscoliosis and scoliosis     Chronic respiratory failure with hypoxia, on home O2 therapy (H)     Anxiety     Migraine without aura and without status migrainosus, not intractable     Carotid artery  aneurysm (H)     Screening for colon cancer     Vaginal irritation     Nasal lesion     Bronchiectasis (H)     Chronic pain        Medications     Current Outpatient Medications   Medication     acetylcysteine (MUCOMYST) 10 % nebulizer solution     albuterol (PROAIR HFA/PROVENTIL HFA/VENTOLIN HFA) 108 (90 Base) MCG/ACT inhaler     albuterol (PROVENTIL) (2.5 MG/3ML) 0.083% neb solution     alendronate (FOSAMAX) 70 MG tablet     amoxicillin-clavulanate (AUGMENTIN) 875-125 MG tablet     BIOTIN ORAL     calcium-vitamin D (CALCIUM-VITAMIN D) 500 mg(1,250mg) -200 unit per tablet     celecoxib (CELEBREX) 200 MG capsule     cholecalciferol, vitamin D3, (VITAMIN D3) 1,000 unit capsule     escitalopram (LEXAPRO) 5 MG tablet     Fluticasone-Umeclidin-Vilanterol (TRELEGY ELLIPTA) 100-62.5-25 MCG/INH oral inhaler     Lactobacillus rhamnosus GG (CULTURELLE) 10-15 Billion cell capsule     montelukast (SINGULAIR) 10 MG tablet     multivitamin therapeutic (THERAGRAN) tablet     nystatin (MYCOSTATIN) 221817 UNIT/GM external cream     OXYGEN-AIR DELIVERY SYSTEMS MISC     predniSONE (DELTASONE) 20 MG tablet     pregabalin (LYRICA) 50 MG capsule     sodium chloride (NEBUSAL) 3 % neb solution     sodium chloride (OCEAN) 0.65 % nasal spray     SUMAtriptan (IMITREX) 50 MG tablet     traMADol (ULTRAM) 50 MG tablet     No current facility-administered medications for this visit.        Allergies     Allergies   Allergen Reactions     Codeine Unknown     Morphine Itching       Medical / Surgical History     Past Medical History:   Diagnosis Date     Anxiety 09/30/2021     COPD (chronic obstructive pulmonary disease) (H)      Diverticulosis      Hiatal hernia      Mild asthma      Neuropathy      Osteopenia      Temporomandibular joint (TMJ) pain      Past Surgical History:   Procedure Laterality Date     ANTERIOR / POSTERIOR COMBINED FUSION LUMBAR SPINE       BRONCHOSCOPY (RIGID OR FLEXIBLE), DIAGNOSTIC N/A 9/28/2021    Procedure:  BRONCHOSCOPY, WITH BRONCHOALVEOLAR LAVAGE;  Surgeon: Randall Lorenz MD;  Location: [facility]     HYSTERECTOMY       PICC SINGLE LUMEN PLACEMENT  11/4/2021          RETINAL LASER PROCEDURE Left 10/04/2016     SALPINGOOPHORECTOMY       TOTAL KNEE ARTHROPLASTY  2008       Social History     Social History     Socioeconomic History     Marital status: Single     Spouse name: Not on file     Number of children: Not on file     Years of education: Not on file     Highest education level: Not on file   Occupational History     Not on file   Tobacco Use     Smoking status: Never Smoker     Smokeless tobacco: Never Used   Substance and Sexual Activity     Alcohol use: No     Drug use: Never     Sexual activity: Never   Other Topics Concern     Parent/sibling w/ CABG, MI or angioplasty before 65F 55M? 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  She was a [employer] sister with [employer].  She has a sister and brother-in-law, 2 nieces, 3 grand nieces and nephews in [place].  She came to [place] for a sabbatical, and then  stayed on for a teaching job.  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  She lives alone in a long-term facility. - Dr. Nguyễn 01/04/21       Social Determinants of Health     Financial Resource Strain: Not on file   Food Insecurity: Not on file   Transportation Needs: Not on file   Physical Activity: Not on file   Stress: Not on file   Social Connections: Not on file   Intimate Partner Violence: Not on file   Housing Stability: Not on file       Family History     Family History   Problem Relation Age of Onset     Dementia Mother         passed age 88     Chronic Obstructive Pulmonary Disease Father         passed age 78       ROS     12 ROS completed, pertinent positive and negative in HPI    Physical Exam   There were no vitals taken for this visit.     GENERAL: Healthy, alert and no distress  EYES: Eyes grossly normal to  inspection.  No discharge or erythema, or obvious scleral/conjunctival abnormalities.  RESP: No audible wheeze, cough, or visible cyanosis.  No visible retractions or increased work of breathing.    SKIN: Visible skin clear. No significant rash, abnormal pigmentation or lesions.  NEURO: Cranial nerves grossly intact.  Mentation and speech appropriate for age.  PSYCH: Mentation appears normal, affect normal/bright, judgement and insight intact, normal speech and appearance well-groomed.    Labs/Imaging     Pertinent Labs were reviewed and updated in [facility].  Radiology Results were  reviewed and updated in EPIC.    Summary of recent findings:   No results found for: A1C    TSH   Date Value Ref Range Status   06/19/2019 3.94 0.30 - 5.00 uIU/mL Final   06/18/2018 3.92 0.30 - 5.00 uIU/mL Final       Creatinine   Date Value Ref Range Status   11/08/2021 0.63 0.60 - 1.10 mg/dL Final       Recent Labs   Lab Test 10/20/21  1002 09/23/20  1127   CHOL 172 191   HDL 61 78   LDL 95 91   TRIG 78 111       No results found for: [id number], [id number], [id number]    I personally reviewed the patient's outside records from [facility] EMR and Care Everywhere. Summary of pertinent findings in [facility].    Impression / Plan     1. Osteopenia  2. Long history of intermittent steroid use    She has long hx of osteopenia with high risk for fracture giving her indication for treatment for OP. Her risk factors include age, post menaupose, intermitent chronic steroid use. As far as secondary OP work up, she had normal ca and vitD in the past. She was screened for MM in 2018. No PTH. No celiac screening.  It seems that she was on alendronate longtime before 2015 but I only have DXA and data since 2015 and I am not able to confirm for how long she took fosamax in the past. I did call the pharmacy number she gave me which she said she was using years ago and I was told no fosamax on her list.  She had 2 years off fosamax [id number] and now has been on  fosamax since 9/2019 with evidence of BMD decline on DXA 2/2022 but no fractures.  We discussed that it is possible that bisphosphonate is not working well with her given the decline in BMD but the fact that there is no fracture it is hard to say she is failing treatment. She has been on fosamax now for only 2.5 years continuous. We discussed checking bone turnover markers first and then discuss continuing bisphosphonate PO vs switching to IV or alternative OP medication.          Test and/or medications prescribed today:  Orders Placed This Encounter   Procedures     N telopeptide cross linked urine     Procollagen Type 1 Intact N Terminal Pro     C-Telopeptide, Beta-Cross-Linked     Bone specific alk phosphatase     Parathyroid Hormone Intact     Calcium     Albumin level     Phosphorus     Magnesium     Tissue transglutaminase mary IgA and IgG         Follow up: 2 weeks      Marjan Paul MD  Endocrinology, Diabetes and Metabolism  [facility]

## 2022-05-05 NOTE — NURSING NOTE
Marked as reviewed as Patient denies any changes since echeck-in regarding medication and allergies

## 2022-05-05 NOTE — PROGRESS NOTES
Endocrinology Clinic Visit 5/5/2022    Video-Visit Details     Type of service:  Video Visit     Video Start Time: 9:00 am  Video End Time: 9:46 am     I spent a total of 90 minutes on the date of encounter reviewing medical records, evaluating the patient, coordinating care and documenting in the EHR, as detailed above.        Platform used for Video Visit: Patentspin    NAME:  Fiordaliza Najera Sr.  PCP:  Ekaterina Koehler  MRN:  [id number]  Reason for Consult:  osteopororis  Requesting Provider:  Ekaterina Koehler    Chief Complaint     Chief Complaint   Patient presents with     Video Visit       History of Present Illness     Fiordaliza Najera Sr. is a 76 year old female who is seen in clinic for osteoporosis management    She has always been told she has osteopenia. She has been on alendronate for a vinnie time. She can not recall when she was first started on it. I reviewed all her PCP note and I was not able to find the date of starting it. It is possible that she was taken off fosamax in 2017 based on the dxa reading provider suggestion. She was restarted on fosamax in 2019 based on significant bone density decrease on dxa compared to 2017. She has been on fosamax now since ? 9/2019.    The patient had a DXA scan on 2/2022 which showed: lowest T score of -2 at the right femoral neck, bone density has declined a significant 4.9% left total hip, 5.5% in the right total hip, and 10.2% in the wrist.    She denied any recent falls or fractures. She walks using a walker.    Calcium intake:   - Dietary: yogurt everyday, milk with cereal, cottage cheese everyday.  - Supplements: ca citrate 315mg 2 pills    Vitamin D intake:   - Supplements: vitamin 1000 international unit(s) daily  Last vitamin D level was 40 on 10/2021.    Osteoporosis Risk factors:   - Previous fractures: no fractures, back surgeries and knee replacments  - Family history of fragility fracture in parent: no.   - Current smoking: no  - Steroid  Use: intermittent use for copd flairs, prednisone 20 mg daily for 5 days sevral times a year  - Rheumatoid  arthritis: no  - Alcohol (3 or more units per day): no  - Other medications known to affect BMD: no  - Reported loss of height: yes especially after back surgeries  - Menstrual history: age at menopause: she had THBSO in her 40s  - Estrogen use after menopause: yes  - Tendency for falls: no, last fall 5-6 years ago fell in the escalator  - GI history: Malabsorption (IBD, Celiac, gastric bypass ): no  - History of kidney stones: no  - History of thyroid disease: no  - Physical activity: exercise and walk everyday  - Weight history: she lost 7 lbs after recent pneumonia, but started to gain them back.    Social: she lives an independent living. She does not have kids. She is on oxygen due to copd but independent in her daily needs.    Problem List     Patient Active Problem List   Diagnosis     Osteopenia     Bronchiectasis with (acute) exacerbation (H)     Idiopathic peripheral neuropathy     COPD (chronic obstructive pulmonary disease) (H)     Abnormality of gait     Back pain     Diaphragmatic hernia     Other kyphoscoliosis and scoliosis     Chronic respiratory failure with hypoxia, on home O2 therapy (H)     Anxiety     Migraine without aura and without status migrainosus, not intractable     Carotid artery aneurysm (H)     Screening for colon cancer     Vaginal irritation     Nasal lesion     Bronchiectasis (H)     Chronic pain        Medications     Current Outpatient Medications   Medication     acetylcysteine (MUCOMYST) 10 % nebulizer solution     albuterol (PROAIR HFA/PROVENTIL HFA/VENTOLIN HFA) 108 (90 Base) MCG/ACT inhaler     albuterol (PROVENTIL) (2.5 MG/3ML) 0.083% neb solution     alendronate (FOSAMAX) 70 MG tablet     amoxicillin-clavulanate (AUGMENTIN) 875-125 MG tablet     BIOTIN ORAL     calcium-vitamin D (CALCIUM-VITAMIN D) 500 mg(1,250mg) -200 unit per tablet     celecoxib (CELEBREX) 200 MG  capsule     cholecalciferol, vitamin D3, (VITAMIN D3) 1,000 unit capsule     escitalopram (LEXAPRO) 5 MG tablet     Fluticasone-Umeclidin-Vilanterol (TRELEGY ELLIPTA) 100-62.5-25 MCG/INH oral inhaler     Lactobacillus rhamnosus GG (CULTURELLE) 10-15 Billion cell capsule     montelukast (SINGULAIR) 10 MG tablet     multivitamin therapeutic (THERAGRAN) tablet     nystatin (MYCOSTATIN) 454412 UNIT/GM external cream     OXYGEN-AIR DELIVERY SYSTEMS MISC     predniSONE (DELTASONE) 20 MG tablet     pregabalin (LYRICA) 50 MG capsule     sodium chloride (NEBUSAL) 3 % neb solution     sodium chloride (OCEAN) 0.65 % nasal spray     SUMAtriptan (IMITREX) 50 MG tablet     traMADol (ULTRAM) 50 MG tablet     No current facility-administered medications for this visit.        Allergies     Allergies   Allergen Reactions     Codeine Unknown     Morphine Itching       Medical / Surgical History     Past Medical History:   Diagnosis Date     Anxiety 09/30/2021     COPD (chronic obstructive pulmonary disease) (H)      Diverticulosis      Hiatal hernia      Mild asthma      Neuropathy      Osteopenia      Temporomandibular joint (TMJ) pain      Past Surgical History:   Procedure Laterality Date     ANTERIOR / POSTERIOR COMBINED FUSION LUMBAR SPINE       BRONCHOSCOPY (RIGID OR FLEXIBLE), DIAGNOSTIC N/A 9/28/2021    Procedure: BRONCHOSCOPY, WITH BRONCHOALVEOLAR LAVAGE;  Surgeon: Randall Lorenz MD;  Location: [facility]     HYSTERECTOMY       PICC SINGLE LUMEN PLACEMENT  11/4/2021          RETINAL LASER PROCEDURE Left 10/04/2016     SALPINGOOPHORECTOMY       TOTAL KNEE ARTHROPLASTY  2008       Social History     Social History     Socioeconomic History     Marital status: Single     Spouse name: Not on file     Number of children: Not on file     Years of education: Not on file     Highest education level: Not on file   Occupational History     Not on file   Tobacco Use     Smoking status: Never Smoker     Smokeless tobacco: Never Used    Substance and Sexual Activity     Alcohol use: No     Drug use: Never     Sexual activity: Never   Other Topics Concern     Parent/sibling w/ CABG, MI or angioplasty before 65F 55M? 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  She was a [employer] sister with [employer].  She has a sister and brother-in-law, 2 nieces, 3 grand nieces and nephews in [place].  She came to [place] for a sabbatical, and then  stayed on for a teaching job.  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  She lives alone in a long-term facility. - Dr. Nguyễn 01/04/21       Social Determinants of Health     Financial Resource Strain: Not on file   Food Insecurity: Not on file   Transportation Needs: Not on file   Physical Activity: Not on file   Stress: Not on file   Social Connections: Not on file   Intimate Partner Violence: Not on file   Housing Stability: Not on file       Family History     Family History   Problem Relation Age of Onset     Dementia Mother         passed age 88     Chronic Obstructive Pulmonary Disease Father         passed age 78       ROS     12 ROS completed, pertinent positive and negative in HPI    Physical Exam   There were no vitals taken for this visit.     GENERAL: Healthy, alert and no distress  EYES: Eyes grossly normal to inspection.  No discharge or erythema, or obvious scleral/conjunctival abnormalities.  RESP: No audible wheeze, cough, or visible cyanosis.  No visible retractions or increased work of breathing.    SKIN: Visible skin clear. No significant rash, abnormal pigmentation or lesions.  NEURO: Cranial nerves grossly intact.  Mentation and speech appropriate for age.  PSYCH: Mentation appears normal, affect normal/bright, judgement and insight intact, normal speech and appearance well-groomed.    Labs/Imaging     Pertinent Labs were reviewed and updated in [facility].  Radiology Results were  reviewed and updated in  EPIC.    Summary of recent findings:   No results found for: A1C    TSH   Date Value Ref Range Status   06/19/2019 3.94 0.30 - 5.00 uIU/mL Final   06/18/2018 3.92 0.30 - 5.00 uIU/mL Final       Creatinine   Date Value Ref Range Status   11/08/2021 0.63 0.60 - 1.10 mg/dL Final       Recent Labs   Lab Test 10/20/21  1002 09/23/20  1127   CHOL 172 191   HDL 61 78   LDL 95 91   TRIG 78 111       No results found for: [id number], [id number], [id number]    I personally reviewed the patient's outside records from [facility] EMR and Care Everywhere. Summary of pertinent findings in HPI.    Impression / Plan     1. Osteopenia  2. Long history of intermittent steroid use    She has long hx of osteopenia with high risk for fracture giving her indication for treatment for OP. Her risk factors include age, post menaupose, intermitent chronic steroid use. As far as secondary OP work up, she had normal ca and vitD in the past. She was screened for MM in 2018. No PTH. No celiac screening.  It seems that she was on alendronate longtime before 2015 but I only have DXA and data since 2015 and I am not able to confirm for how long she took fosamax in the past. I did call the pharmacy number she gave me which she said she was using years ago and I was told no fosamax on her list.  She had 2 years off fosamax [id number] and now has been on fosamax since 9/2019 with evidence of BMD decline on DXA 2/2022 but no fractures.  We discussed that it is possible that bisphosphonate is not working well with her given the decline in BMD but the fact that there is no fracture it is hard to say she is failing treatment. She has been on fosamax now for only 2.5 years continuous. We discussed checking bone turnover markers first and then discuss continuing bisphosphonate PO vs switching to IV or alternative OP medication.          Test and/or medications prescribed today:  Orders Placed This Encounter   Procedures     N telopeptide cross linked urine      Procollagen Type 1 Intact N Terminal Pro     C-Telopeptide, Beta-Cross-Linked     Bone specific alk phosphatase     Parathyroid Hormone Intact     Calcium     Albumin level     Phosphorus     Magnesium     Tissue transglutaminase mary IgA and IgG         Follow up: 2 weeks      Marjan Paul MD  Endocrinology, Diabetes and Metabolism  [facility]

## 2022-05-05 NOTE — PATIENT INSTRUCTIONS
It was nice meeting you!    Please schedule a lab visit. You need to do blood draw and urine test to check for bone turnover markers.

## 2022-05-05 NOTE — PROGRESS NOTES
Fiordaliza Najera Sr.  is being evaluated via a billable video visit.      How would you like to obtain your AVS? Sinobpo  For the video visit, send the invitation by: Text to cell phone: [phone]  Will anyone else be joining your video visit? No

## 2022-05-10 ENCOUNTER — VIRTUAL VISIT (OUTPATIENT)
Dept: PULMONOLOGY | Facility: OTHER | Age: 77
End: 2022-05-10
Payer: MEDICARE

## 2022-05-10 DIAGNOSIS — J96.11 CHRONIC RESPIRATORY FAILURE WITH HYPOXIA (H): ICD-10-CM

## 2022-05-10 DIAGNOSIS — J47.9 BRONCHIECTASIS WITHOUT COMPLICATION (H): Primary | ICD-10-CM

## 2022-05-10 PROCEDURE — 99214 OFFICE O/P EST MOD 30 MIN: CPT | Mod: 95 | Performed by: INTERNAL MEDICINE

## 2022-05-10 NOTE — PROGRESS NOTES
Ayse is a 76 year old who is being evaluated via a billable video visit.      Pt is in [place]    How would you like to obtain your AVS? MyChart  If the video visit is dropped, the invitation should be resent by: Send to e-mail at: [email or web address]  Will anyone else be joining your video visit? No      Video Start Time: 1057 AM  Video-Visit Details    Type of service:  Video Visit    Video End Time:1113      Originating Location (pt. Location): Home    Distant Location (provider location):  [facility]     Platform used for Video Visit: Tracy HAM    [facility]G [facility]VENT[facility] PULMONARY [facility]  [facility], [facility], [facility]     Fiordaliza Najera Sr. MRN# [id number]   Age: 76 year old YOB: 1945       Requesting Physician: No referring provider defined for this encounter.       Assessment and Plan:    1.  Bronchiectasis.  Continue Trelegy.  Continue nebulizer regimen. She is tolerating mucomyst and 3%.    2.  Hemoptysis.  Resolved.    3.  Chronic hypoxic respiratory failure and shortness of breath with exertion.  As above.  Goal oxygen saturation 89% or greater at rest.  I explained today that upper 80s and low 90s are likely her new baseline.             History:     Fiordaliza Najera Sr. is a 76 year old female with sig h/o for bronchiectasis.  She is doing well since leaving the hospital. She has used antibiotics once since our last visit.      She is tolerating mucomyst and saline nebs.  She is staying active.               Past Medical History:      Past Medical History:   Diagnosis Date     Anxiety 09/30/2021     COPD (chronic obstructive pulmonary disease) (H)      Diverticulosis      Hiatal hernia      Mild asthma      Neuropathy      Osteopenia      Temporomandibular joint (TMJ) pain            Past Surgical History:      Past Surgical History:   Procedure Laterality Date     ANTERIOR /  POSTERIOR COMBINED FUSION LUMBAR SPINE       BRONCHOSCOPY (RIGID OR FLEXIBLE), DIAGNOSTIC N/A 9/28/2021    Procedure: BRONCHOSCOPY, WITH BRONCHOALVEOLAR LAVAGE;  Surgeon: Randall Lorenz MD;  Location: [facility]     HYSTERECTOMY       PICC SINGLE LUMEN PLACEMENT  11/4/2021          RETINAL LASER PROCEDURE Left 10/04/2016     SALPINGOOPHORECTOMY       TOTAL KNEE ARTHROPLASTY  2008          Social History:     Social History     Tobacco Use     Smoking status: Never Smoker     Smokeless tobacco: Never Used   Substance Use Topics     Alcohol use: No          Family History:     Family History   Problem Relation Age of Onset     Dementia Mother         passed age 88     Chronic Obstructive Pulmonary Disease Father         passed age 78           Allergies:      Allergies   Allergen Reactions     Codeine Unknown     Morphine Itching          Medications:     Current Outpatient Medications   Medication Sig     acetylcysteine (MUCOMYST) 10 % nebulizer solution Inhale 4 mLs into the lungs daily     albuterol (PROAIR HFA/PROVENTIL HFA/VENTOLIN HFA) 108 (90 Base) MCG/ACT inhaler Inhale 2 puffs into the lungs every 6 hours as needed     albuterol (PROVENTIL) (2.5 MG/3ML) 0.083% neb solution Take 1 vial (2.5 mg) by nebulization every 4 hours as needed for shortness of breath / dyspnea or wheezing     alendronate (FOSAMAX) 70 MG tablet TAKE 1 TABLET (70 MG) BY MOUTH EVERY 7 DAYS. TAKE IN THE MORNING ON AN EMPTY STOMACH WITH A FULL GLASS OF WATER 30 MINUTES BEFORE FOOD.     amoxicillin-clavulanate (AUGMENTIN) 875-125 MG tablet Take 1 tablet by mouth 2 times daily     BIOTIN ORAL Take 500 mg by mouth daily      calcium-vitamin D (CALCIUM-VITAMIN D) 500 mg(1,250mg) -200 unit per tablet Take 1 tablet by mouth 2 times daily      celecoxib (CELEBREX) 200 MG capsule TAKE 1 CAPSULE (200 MG TOTAL) BY MOUTH DAILY.     cholecalciferol, vitamin D3, (VITAMIN D3) 1,000 unit capsule [CHOLECALCIFEROL, VITAMIN D3, (VITAMIN D3) 1,000 UNIT CAPSULE]  Take 1,000 Units by mouth daily.     escitalopram (LEXAPRO) 5 MG tablet Take 1 tablet (5 mg) by mouth daily     Fluticasone-Umeclidin-Vilanterol (TRELEGY ELLIPTA) 100-62.5-25 MCG/INH oral inhaler Inhale 1 puff into the lungs daily     Lactobacillus rhamnosus GG (CULTURELLE) 10-15 Billion cell capsule Take 1 capsule by mouth every evening      montelukast (SINGULAIR) 10 MG tablet [MONTELUKAST (SINGULAIR) 10 MG TABLET] TAKE 1 TABLET(10 MG) BY MOUTH AT BEDTIME     multivitamin therapeutic (THERAGRAN) tablet [MULTIVITAMIN THERAPEUTIC (THERAGRAN) TABLET] Take 1 tablet by mouth daily.     nystatin (MYCOSTATIN) 092503 UNIT/GM external cream Apply to rash on body 3 times per day as needed.     OXYGEN-AIR DELIVERY SYSTEMS MISC [OXYGEN-AIR DELIVERY SYSTEMS MISC] Use 2 L As Directed. 2L with activity and at night  [employer]     predniSONE (DELTASONE) 20 MG tablet Take 1 tablet (20 mg) by mouth daily     pregabalin (LYRICA) 50 MG capsule TAKE 3 CAPSULES BY MOUTH DAILY AS DIRECTED.     sodium chloride (NEBUSAL) 3 % neb solution Take 3 mLs by nebulization 2 times daily     sodium chloride (OCEAN) 0.65 % nasal spray Spray 2 sprays in nostril daily     SUMAtriptan (IMITREX) 50 MG tablet [SUMATRIPTAN (IMITREX) 50 MG TABLET] Take 1 tablet (50 mg total) by mouth every 2 (two) hours as needed for migraine.     traMADol (ULTRAM) 50 MG tablet Take 1 tablet (50 mg) by mouth every 12 hours as needed for moderate pain or severe pain     No current facility-administered medications for this visit.          Review of Systems:     See HPI         Physical Exam:   There were no vitals taken for this visit.    Constitutional - looks well, in no apparent distress  Eyes - no redness or discharge  Respiratory -breathing appears comfortable. No wheeze or rhonchi.   Skin - No appreciable discoloration or lesions (very limited exam)  Neurological - No apparent tremors. Speech fluent and articlate  Psychiatric - no signs of delirium or anxiety           Current Laboratory Data:   All laboratory and imaging data reviewed.    No results found for this or any previous visit (from the past 24 hour(s)).       I certify that this patient, Fiordaliza Najera Sr. has been under my care (or a nurse practitioner or physican's assistant working with me). This is the face-to-face encounter for oxygen medical necessity.      Fiordaliza Najera Sr. is now in a chronic stable state and continues to require supplemental oxygen. Patient has continued oxygen desaturation due to Bronchiectasis J47.9.    Alternative treatment(s) tried or considered and deemed clinically infective for treatment of Bronchiectasis J47.9 include nebulizers, inhalers, steroids and pulmonary toileting.  If portability is ordered, is the patient mobile within the home? yes    **Patients who qualify for home O2 coverage under the CMS guidelines require ABG tests or O2 sat readings obtained closest to, but no earlier than 2 days prior to the discharge, as evidence of the need for home oxygen therapy. Testing must be performed while patient is in the chronic stable state. See notes for O2 sats.**

## 2022-05-13 ENCOUNTER — LAB (OUTPATIENT)
Dept: LAB | Facility: CLINIC | Age: 77
End: 2022-05-13
Payer: MEDICARE

## 2022-05-13 DIAGNOSIS — M85.80 OSTEOPENIA, UNSPECIFIED LOCATION: ICD-10-CM

## 2022-05-13 LAB
ALBUMIN SERPL-MCNC: 3.5 G/DL (ref 3.5–5)
CALCIUM SERPL-MCNC: 9.5 MG/DL (ref 8.5–10.5)
MAGNESIUM SERPL-MCNC: 2.1 MG/DL (ref 1.8–2.6)
PHOSPHATE SERPL-MCNC: 3.4 MG/DL (ref 2.5–4.5)
PTH-INTACT SERPL-MCNC: 35 PG/ML (ref 10–86)

## 2022-05-13 PROCEDURE — 83519 RIA NONANTIBODY: CPT | Mod: 90

## 2022-05-13 PROCEDURE — 84080 ASSAY ALKALINE PHOSPHATASES: CPT | Mod: 90

## 2022-05-13 PROCEDURE — 82310 ASSAY OF CALCIUM: CPT

## 2022-05-13 PROCEDURE — 83970 ASSAY OF PARATHORMONE: CPT

## 2022-05-13 PROCEDURE — 82040 ASSAY OF SERUM ALBUMIN: CPT

## 2022-05-13 PROCEDURE — 84100 ASSAY OF PHOSPHORUS: CPT

## 2022-05-13 PROCEDURE — 83735 ASSAY OF MAGNESIUM: CPT

## 2022-05-13 PROCEDURE — 36415 COLL VENOUS BLD VENIPUNCTURE: CPT

## 2022-05-13 PROCEDURE — 82523 COLLAGEN CROSSLINKS: CPT | Mod: 90

## 2022-05-13 PROCEDURE — 99000 SPECIMEN HANDLING OFFICE-LAB: CPT

## 2022-05-13 PROCEDURE — 86364 TISS TRNSGLTMNASE EA IG CLAS: CPT

## 2022-05-15 LAB
ALP BONE SERPL-MCNC: 14.4 UG/L
COLLAGEN NTX/CREAT UR-SRTO: 28
CREAT UR-MCNC: 20 MG/DL

## 2022-05-16 ENCOUNTER — MYC MEDICAL ADVICE (OUTPATIENT)
Dept: INTERNAL MEDICINE | Facility: CLINIC | Age: 77
End: 2022-05-16
Payer: MEDICARE

## 2022-05-16 DIAGNOSIS — F41.9 ANXIETY: ICD-10-CM

## 2022-05-16 LAB — COLLAGEN CTX SERPL-MCNC: 348 PG/ML

## 2022-05-17 DIAGNOSIS — F41.9 ANXIETY: ICD-10-CM

## 2022-05-17 RX ORDER — ESCITALOPRAM OXALATE 5 MG/1
5 TABLET ORAL DAILY
Qty: 90 TABLET | Refills: 0 | Status: SHIPPED | OUTPATIENT
Start: 2022-05-17 | End: 2022-06-23

## 2022-05-18 LAB
TTG IGA SER-ACNC: 2.7 U/ML
TTG IGG SER-ACNC: 1 U/ML

## 2022-05-18 RX ORDER — ESCITALOPRAM OXALATE 5 MG/1
5 TABLET ORAL DAILY
Qty: 30 TABLET | Refills: 3 | OUTPATIENT
Start: 2022-05-18

## 2022-05-20 ENCOUNTER — MYC MEDICAL ADVICE (OUTPATIENT)
Dept: PULMONOLOGY | Facility: OTHER | Age: 77
End: 2022-05-20
Payer: MEDICARE

## 2022-05-21 LAB — PINP SER-MCNC: 45 UG/L

## 2022-05-24 ENCOUNTER — VIRTUAL VISIT (OUTPATIENT)
Dept: FAMILY MEDICINE | Facility: CLINIC | Age: 77
End: 2022-05-24
Payer: MEDICARE

## 2022-05-24 VITALS — OXYGEN SATURATION: 93 % | HEART RATE: 77 BPM

## 2022-05-24 DIAGNOSIS — J96.11 CHRONIC RESPIRATORY FAILURE WITH HYPOXIA (H): ICD-10-CM

## 2022-05-24 DIAGNOSIS — M85.80 OSTEOPENIA, UNSPECIFIED LOCATION: ICD-10-CM

## 2022-05-24 DIAGNOSIS — J47.9 BRONCHIECTASIS WITHOUT COMPLICATION (H): ICD-10-CM

## 2022-05-24 DIAGNOSIS — J44.9 CHRONIC OBSTRUCTIVE PULMONARY DISEASE, UNSPECIFIED COPD TYPE (H): ICD-10-CM

## 2022-05-24 DIAGNOSIS — I72.0 CAROTID ARTERY ANEURYSM (H): ICD-10-CM

## 2022-05-24 DIAGNOSIS — F41.1 GAD (GENERALIZED ANXIETY DISORDER): ICD-10-CM

## 2022-05-24 DIAGNOSIS — G89.29 CHRONIC BILATERAL LOW BACK PAIN WITHOUT SCIATICA: ICD-10-CM

## 2022-05-24 DIAGNOSIS — Z76.89 ENCOUNTER TO ESTABLISH CARE: Primary | ICD-10-CM

## 2022-05-24 DIAGNOSIS — K44.9 DIAPHRAGMATIC HERNIA WITHOUT OBSTRUCTION AND WITHOUT GANGRENE: ICD-10-CM

## 2022-05-24 DIAGNOSIS — M75.100 NONTRAUMATIC TEAR OF ROTATOR CUFF, UNSPECIFIED LATERALITY, UNSPECIFIED TEAR EXTENT: ICD-10-CM

## 2022-05-24 DIAGNOSIS — M54.50 CHRONIC BILATERAL LOW BACK PAIN WITHOUT SCIATICA: ICD-10-CM

## 2022-05-24 PROBLEM — N89.8 VAGINAL IRRITATION: Status: RESOLVED | Noted: 2021-10-22 | Resolved: 2022-05-24

## 2022-05-24 PROBLEM — J34.89 NASAL LESION: Status: RESOLVED | Noted: 2021-10-22 | Resolved: 2022-05-24

## 2022-05-24 PROBLEM — Z12.11 SCREENING FOR COLON CANCER: Status: RESOLVED | Noted: 2021-10-20 | Resolved: 2022-05-24

## 2022-05-24 PROCEDURE — 99215 OFFICE O/P EST HI 40 MIN: CPT | Mod: 95 | Performed by: STUDENT IN AN ORGANIZED HEALTH CARE EDUCATION/TRAINING PROGRAM

## 2022-05-24 NOTE — PROGRESS NOTES
Ayse is a 76 year old who is being evaluated via a billable video visit.      How would you like to obtain your AVS? MyChart  If the video visit is dropped, the invitation should be resent by: Send to e-mail at: [email or web address]  Will anyone else be joining your video visit? No      Video Start Time: 12:37 PM    Assessment & Plan       ICD-10-CM    1. Encounter to establish care  Z76.89    2. Chronic obstructive pulmonary disease, unspecified COPD type (H)  J44.9    3. Carotid artery aneurysm (H)  I72.0 CTA Head with Contrast   4. Diaphragmatic hernia without obstruction and without gangrene  K44.9    5. Chronic bilateral low back pain without sciatica  M54.50     G89.29    6. Osteopenia, unspecified location  M85.80    7. Nontraumatic tear of rotator cuff, unspecified laterality, unspecified tear extent  M75.100    8. ANDREW (generalized anxiety disorder)  F41.1    9. Chronic respiratory failure with hypoxia (H)  J96.11    10. Bronchiectasis without complication (H)  J47.9        COPD  Bronchiectasis  Chronic hypoxic respiratory failure  Follows with Dr. Lorenz  Is on 2 L with movement and at night.  Does not need oxygen at rest  Uses walker when she leaves the apartment but does not need a walking aid when she is in the home  Did make patient aware that I do not prescribe tramadol, she is okay with that  Has history of fungal infection in her lungs  No concerns regarding this today     Diaphragmatic hernia: Stable.  Not on PPI.  Is managing it with dietary changes    Chronic low back pain  Is/s/p several low back surgeries and has chronic pain there. Has had fusions in the area.   Used to be on tramadol as needed but is no longer on these  Usually takes Tylenol for them  Uses walker outside the apartment   Able to ambulate independetly     Carotid artery aneurysm   Abnormal brain MRI (2018)- 2 mm inferomedially directed outpouching involving the distal left ICA, either small infundibulum vs paraclinoid  aneurysm. This was found in work-up for concern for stroke back in 2018 after she had a fall from an escalator.   Used to see neurologist at [facility] who recommended CT for further characterization but she declined at that time.  Today, revisited this with her and patient is amenable for CTA.  We will order it for her today and her aneurysm is still present/enlarged, may send back to neurology    Osteopenia with moderate predicted risk fracture  Recently had DEXA scan done 2/22.  Showed that there have been no improvement in her bone density with Fosamax.  Is currently working with endocrinology and may pursue Prolia or Reclast.    Has history of torn rotator cuffs  Follows with [facility] and is going to be getting steroid injections    ANDREW:  Is on low-dose Lexapro and that really helps with her anxiety, especially when she feels short of breath    Idiopathic peripheral neuropathy:  Is stable      46 minutes spent on the date of the encounter doing chart review, history and exam, documentation and further activities per the note        Return in about 3 months (around 8/24/2022).    [facility] [place]   Ayse is a 76 year old who presents for the following health issues     Patient is a 76-year-old female who presents today for establishing care.  Used to see my colleague Dr. Koehler who is unfortunately leaving the clinic.    Past medical history: Idiopathic peripheral apathy, osteoporosis, COPD, bronchiectasis, gait instability, chronic back pain, diaphragmatic hernia, chronic respiratory failure with hypoxia on 2 L of O2 NC, ANDREW, migraines, carotid artery aneurysm    Has no acute concerns today, she is wanted to make sure she had establish with a PCP for ongoing refills        Review of Systems   As per HPI      Objective           Vitals:  No vitals were obtained today due to virtual visit.    Physical Exam   GENERAL: Healthy, alert and no  distress  EYES: Eyes grossly normal to inspection.  No discharge or erythema, or obvious scleral/conjunctival abnormalities.  RESP: No audible wheeze, cough, or visible cyanosis.  No visible retractions or increased work of breathing.    SKIN: Visible skin clear. No significant rash, abnormal pigmentation or lesions.  NEURO: Cranial nerves grossly intact.  Mentation and speech appropriate for age.  PSYCH: Mentation appears normal, affect normal/bright, judgement and insight intact, normal speech and appearance well-groomed.         Video-Visit Details    Type of service:  Video Visit    Video End Time:12:52 PM    Originating Location (pt. Location): Home    Distant Location (provider location):  [facility]     Platform used for Video Visit: [employer]

## 2022-05-25 ENCOUNTER — TELEPHONE (OUTPATIENT)
Dept: PULMONOLOGY | Facility: CLINIC | Age: 77
End: 2022-05-25
Payer: MEDICARE

## 2022-05-26 ENCOUNTER — MYC MEDICAL ADVICE (OUTPATIENT)
Dept: FAMILY MEDICINE | Facility: CLINIC | Age: 77
End: 2022-05-26
Payer: MEDICARE

## 2022-05-27 PROBLEM — M75.100: Status: ACTIVE | Noted: 2022-05-27

## 2022-05-27 ASSESSMENT — ENCOUNTER SYMPTOMS
EXERCISE INTOLERANCE: 1
MUSCLE WEAKNESS: 1
SORE THROAT: 0
SYNCOPE: 0
HEMOPTYSIS: 0
HYPERTENSION: 0
WHEEZING: 0
SPUTUM PRODUCTION: 1
MYALGIAS: 0
SLEEP DISTURBANCES DUE TO BREATHING: 0
SINUS CONGESTION: 1
TASTE DISTURBANCE: 0
DYSPNEA ON EXERTION: 1
PALPITATIONS: 0
SNORES LOUDLY: 0
COUGH DISTURBING SLEEP: 0
MUSCLE CRAMPS: 0
ORTHOPNEA: 0
HOARSE VOICE: 0
ARTHRALGIAS: 1
JOINT SWELLING: 0
BACK PAIN: 0
SHORTNESS OF BREATH: 1
FLANK PAIN: 0
POSTURAL DYSPNEA: 0
TROUBLE SWALLOWING: 0
SMELL DISTURBANCE: 0
SINUS PAIN: 0
DIFFICULTY URINATING: 0
LIGHT-HEADEDNESS: 0
HYPOTENSION: 0
STIFFNESS: 0
NECK PAIN: 1
HEMATURIA: 0
LEG PAIN: 0
DYSURIA: 0
COUGH: 0
NECK MASS: 0

## 2022-05-31 ENCOUNTER — VIRTUAL VISIT (OUTPATIENT)
Dept: ENDOCRINOLOGY | Facility: CLINIC | Age: 77
End: 2022-05-31
Payer: MEDICARE

## 2022-05-31 DIAGNOSIS — M85.80 OSTEOPENIA, UNSPECIFIED LOCATION: Primary | ICD-10-CM

## 2022-05-31 PROCEDURE — 99214 OFFICE O/P EST MOD 30 MIN: CPT | Mod: 95 | Performed by: STUDENT IN AN ORGANIZED HEALTH CARE EDUCATION/TRAINING PROGRAM

## 2022-05-31 RX ORDER — METHYLPREDNISOLONE SODIUM SUCCINATE 125 MG/2ML
125 INJECTION, POWDER, LYOPHILIZED, FOR SOLUTION INTRAMUSCULAR; INTRAVENOUS
Status: CANCELLED
Start: 2022-05-31

## 2022-05-31 RX ORDER — DIPHENHYDRAMINE HYDROCHLORIDE 50 MG/ML
50 INJECTION INTRAMUSCULAR; INTRAVENOUS
Status: CANCELLED
Start: 2022-05-31

## 2022-05-31 RX ORDER — MEPERIDINE HYDROCHLORIDE 25 MG/ML
25 INJECTION INTRAMUSCULAR; INTRAVENOUS; SUBCUTANEOUS EVERY 30 MIN PRN
Status: CANCELLED | OUTPATIENT
Start: 2022-05-31

## 2022-05-31 RX ORDER — NALOXONE HYDROCHLORIDE 0.4 MG/ML
0.2 INJECTION, SOLUTION INTRAMUSCULAR; INTRAVENOUS; SUBCUTANEOUS
Status: CANCELLED | OUTPATIENT
Start: 2022-05-31

## 2022-05-31 RX ORDER — ALBUTEROL SULFATE 90 UG/1
1-2 AEROSOL, METERED RESPIRATORY (INHALATION)
Status: CANCELLED
Start: 2022-05-31

## 2022-05-31 RX ORDER — HEPARIN SODIUM,PORCINE 10 UNIT/ML
5 VIAL (ML) INTRAVENOUS
Status: CANCELLED | OUTPATIENT
Start: 2022-05-31

## 2022-05-31 RX ORDER — EPINEPHRINE 1 MG/ML
0.3 INJECTION, SOLUTION, CONCENTRATE INTRAVENOUS EVERY 5 MIN PRN
Status: CANCELLED | OUTPATIENT
Start: 2022-05-31

## 2022-05-31 RX ORDER — HEPARIN SODIUM (PORCINE) LOCK FLUSH IV SOLN 100 UNIT/ML 100 UNIT/ML
5 SOLUTION INTRAVENOUS
Status: CANCELLED | OUTPATIENT
Start: 2022-05-31

## 2022-05-31 RX ORDER — ZOLEDRONIC ACID 5 MG/100ML
5 INJECTION, SOLUTION INTRAVENOUS ONCE
Status: CANCELLED
Start: 2022-05-31 | End: 2022-05-31

## 2022-05-31 RX ORDER — ALBUTEROL SULFATE 0.83 MG/ML
2.5 SOLUTION RESPIRATORY (INHALATION)
Status: CANCELLED | OUTPATIENT
Start: 2022-05-31

## 2022-05-31 NOTE — PROGRESS NOTES
Fiordaliza Najera Sr.  is being evaluated via a billable video visit.      How would you like to obtain your AVS? [employer]  For the video visit, send the invitation by: Send to e-mail at: [email or web address]  Will anyone else be joining your video visit? No

## 2022-05-31 NOTE — LETTER
5/31/2022       RE: Fiordaliza Najera Sr.  [address]  [place] [address]     Dear Colleague,    Thank you for referring your patient, Fiordaliza Najera Sr., to the [facility] [place] at [facility]. Please see a copy of my visit note below.    Fiordaliza Najera Sr.  is being evaluated via a billable video visit.      How would you like to obtain your AVS? [employer]  For the video visit, send the invitation by: Send to e-mail at: [email or web address]  Will anyone else be joining your video visit? No          Endocrinology Clinic Visit     Video-Visit Details     Type of service:  Video Visit     Video Start Time: 1:30 pm  Video End Time: 1:50 am     I spent a total of 33 minutes on the date of encounter reviewing medical records, evaluating the patient, coordinating care and documenting in the EHR, as detailed above.        Platform used for Video Visit: [employer]    NAME:  Fiordaliza Najera Sr.  PCP:  Ekaterina Koehler  MRN:  [id number]  Reason for Consult:  osteopororis  Requesting Provider:  Ekaterina Koehler    Chief Complaint     Chief Complaint   Patient presents with     Video Visit     Osteopenia       History of Present Illness     Fiordaliza Najera Sr. is a 76 year old female who is seen in clinic for osteoporosis management    She has always been told she has osteopenia. She has been on alendronate for a vinnie time. She can not recall when she was first started on it. I reviewed all her PCP note and I was not able to find the date of starting it. It is possible that she was taken off fosamax in 2017 based on the dxa reading note. She was restarted on fosamax in 2019. She has been on fosamax now since ? 9/2019. I called her old pahrmacy to confirm prior fosamax use and they said no fosamax on her med list. Today she said she is taking fosamax once weekly in fact she took it on Sunday but not sure for  how long she has been on it, probably 2 years.    The patient had a DXA scan on 2/2022 which showed: lowest T score of -2 at the right femoral neck, bone density has declined a significant 4.9% left total hip, 5.5% in the right total hip, and 10.2% in the wrist.    She denied any recent falls or fractures. She walks using a walker.    Calcium intake:   - Dietary: yogurt everyday, milk with cereal, cottage cheese everyday.  - Supplements: ca citrate 315mg 2 pills    Vitamin D intake:   - Supplements: vitamin 1000 international unit(s) daily  Last vitamin D level was 40 on 10/2021.    Osteoporosis Risk factors:   - Previous fractures: no fractures, back surgeries and knee replacments  - Family history of fragility fracture in parent: no.   - Current smoking: no  - Steroid Use: intermittent use for copd flairs, prednisone 20 mg daily for 5 days sevral times a year  - Rheumatoid  arthritis: no  - Alcohol (3 or more units per day): no  - Other medications known to affect BMD: no  - Reported loss of height: yes especially after back surgeries  - Menstrual history: age at menopause: she had THBSO in her 40s  - Estrogen use after menopause: yes  - Tendency for falls: no, last fall 5-6 years ago fell in the escalator  - GI history: Malabsorption (IBD, Celiac, gastric bypass ): no  - History of kidney stones: no  - History of thyroid disease: no  - Physical activity: exercise and walk everyday  - Weight history: she lost 7 lbs after recent pneumonia, but started to gain them back.    Social: she lives an independent living. She does not have kids. She is on oxygen due to copd but independent in her daily needs.    Problem List     Patient Active Problem List   Diagnosis     Osteopenia     Bronchiectasis with (acute) exacerbation (H)     Idiopathic peripheral neuropathy     COPD (chronic obstructive pulmonary disease) (H)     Abnormality of gait     Back pain     Diaphragmatic hernia     Other kyphoscoliosis and scoliosis      Chronic respiratory failure with hypoxia, on home O2 therapy (H)     Anxiety     Migraine without aura and without status migrainosus, not intractable     Carotid artery aneurysm (H)     Bronchiectasis (H)     Chronic pain     Nontraumatic tear of rotator cuff, unspecified laterality, unspecified tear extent        Medications     Current Outpatient Medications   Medication     acetylcysteine (MUCOMYST) 10 % nebulizer solution     albuterol (PROAIR HFA/PROVENTIL HFA/VENTOLIN HFA) 108 (90 Base) MCG/ACT inhaler     albuterol (PROVENTIL) (2.5 MG/3ML) 0.083% neb solution     alendronate (FOSAMAX) 70 MG tablet     amoxicillin-clavulanate (AUGMENTIN) 875-125 MG tablet     BIOTIN ORAL     calcium-vitamin D (CALCIUM-VITAMIN D) 500 mg(1,250mg) -200 unit per tablet     celecoxib (CELEBREX) 200 MG capsule     cholecalciferol, vitamin D3, (VITAMIN D3) 1,000 unit capsule     escitalopram (LEXAPRO) 5 MG tablet     Fluticasone-Umeclidin-Vilanterol (TRELEGY ELLIPTA) 100-62.5-25 MCG/INH oral inhaler     Lactobacillus rhamnosus GG (CULTURELLE) 10-15 Billion cell capsule     montelukast (SINGULAIR) 10 MG tablet     multivitamin therapeutic (THERAGRAN) tablet     nystatin (MYCOSTATIN) 337323 UNIT/GM external cream     OXYGEN-AIR DELIVERY SYSTEMS MISC     pregabalin (LYRICA) 50 MG capsule     sodium chloride (NEBUSAL) 3 % neb solution     sodium chloride (OCEAN) 0.65 % nasal spray     SUMAtriptan (IMITREX) 50 MG tablet     No current facility-administered medications for this visit.        Allergies     Allergies   Allergen Reactions     Codeine Unknown     Morphine Itching       Medical / Surgical History     Past Medical History:   Diagnosis Date     Anxiety 09/30/2021     COPD (chronic obstructive pulmonary disease) (H)      Diverticulosis      Hiatal hernia      Mild asthma      Neuropathy      Osteopenia      Temporomandibular joint (TMJ) pain      Past Surgical History:   Procedure Laterality Date     ANTERIOR / POSTERIOR COMBINED  FUSION LUMBAR SPINE       BRONCHOSCOPY (RIGID OR FLEXIBLE), DIAGNOSTIC N/A 9/28/2021    Procedure: BRONCHOSCOPY, WITH BRONCHOALVEOLAR LAVAGE;  Surgeon: Randall Lorenz MD;  Location: [facility]     HYSTERECTOMY       PICC SINGLE LUMEN PLACEMENT  11/4/2021          RETINAL LASER PROCEDURE Left 10/04/2016     SALPINGOOPHORECTOMY       TOTAL KNEE ARTHROPLASTY  2008       Social History     Social History     Socioeconomic History     Marital status: Single     Spouse name: Not on file     Number of children: Not on file     Years of education: Not on file     Highest education level: Not on file   Occupational History     Not on file   Tobacco Use     Smoking status: Never Smoker     Smokeless tobacco: Never Used   Substance and Sexual Activity     Alcohol use: No     Drug use: Never     Sexual activity: Never   Other Topics Concern     Parent/sibling w/ CABG, MI or angioplasty before 65F 55M? 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  She was a [employer] sister with [employer].  She has a sister and brother-in-law, 2 nieces, 3 grand nieces and nephews in [place].  She came to [place] for a sabbatical, and then  stayed on for a teaching job.  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  She lives alone in a long-term facility. - Dr. Nguyễn 01/04/21       Social Determinants of Health     Financial Resource Strain: Not on file   Food Insecurity: Not on file   Transportation Needs: Not on file   Physical Activity: Not on file   Stress: Not on file   Social Connections: Not on file   Intimate Partner Violence: Not on file   Housing Stability: Not on file       Family History     Family History   Problem Relation Age of Onset     Dementia Mother         passed age 88     Chronic Obstructive Pulmonary Disease Father         passed age 78       ROS     12 ROS completed, pertinent positive and negative in HPI    Answers for HPI/ROS submitted by  the patient on 5/27/2022  General Symptoms: No  Skin Symptoms: No  HENT Symptoms: Yes  EYE SYMPTOMS: No  HEART SYMPTOMS: Yes  LUNG SYMPTOMS: Yes  INTESTINAL SYMPTOMS: No  URINARY SYMPTOMS: Yes  GYNECOLOGIC SYMPTOMS: No  BREAST SYMPTOMS: No  SKELETAL SYMPTOMS: Yes  BLOOD SYMPTOMS: No  NERVOUS SYSTEM SYMPTOMS: No  MENTAL HEALTH SYMPTOMS: No  Ear pain: No  Ear discharge: No  Hearing loss: No  Tinnitus: No  Nosebleeds: No  Congestion: Yes  Sinus pain: No  Trouble swallowing: No   Voice hoarseness: No  Mouth sores: No  Sore throat: No  Tooth pain: No  Gum tenderness: No  Bleeding gums: No  Change in taste: No  Change in sense of smell: No  Dry mouth: Yes  Hearing aid used: No  Neck lump: No  Chest pain or pressure: No  Fast or irregular heartbeat: No  Pain in legs with walking: No  Trouble breathing while lying down: No  Fingers or toes appear blue: No  High blood pressure: No  Low blood pressure: No  Fainting: No  Murmurs: No  Pacemaker: No  Edema or swelling: No  Wake up at night with shortness of breath: No  Light-headedness: No  Exercise intolerance: Yes  Cough: No  Sputum or phlegm: Yes  Coughing up blood: No  Difficulty breating or shortness of breath: Yes  Snoring: No  Wheezing: No  Difficulty breathing on exertion: Yes  Nighttime Cough: No  Difficulty breathing when lying flat: No  Trouble holding urine or incontinence: Yes  Pain or burning: No  Trouble starting or stopping: No  Increased frequency of urination: No  Blood in urine: No  Decreased frequency of urination: No  Frequent nighttime urination: Yes  Flank pain: No  Difficulty emptying bladder: No  Back pain: No  Muscle aches: No  Neck pain: Yes  Swollen joints: No  Joint pain: Yes  Bone pain: No  Muscle cramps: No  Muscle weakness: Yes  Joint stiffness: No  Bone fracture: No          Physical Exam   There were no vitals taken for this visit.     GENERAL: Healthy, alert and no distress  EYES: Eyes grossly normal to inspection.  No discharge or erythema, or  obvious scleral/conjunctival abnormalities.  RESP: No audible wheeze, cough, or visible cyanosis.  No visible retractions or increased work of breathing.    SKIN: Visible skin clear. No significant rash, abnormal pigmentation or lesions.  NEURO: Cranial nerves grossly intact.  Mentation and speech appropriate for age.  PSYCH: Mentation appears normal, affect normal/bright, judgement and insight intact, normal speech and appearance well-groomed.    Labs/Imaging     Pertinent Labs were reviewed and updated in [facility].  Radiology Results were  reviewed and updated in EPIC.    Summary of recent findings:   No results found for: A1C    TSH   Date Value Ref Range Status   06/19/2019 3.94 0.30 - 5.00 uIU/mL Final   06/18/2018 3.92 0.30 - 5.00 uIU/mL Final       Creatinine   Date Value Ref Range Status   11/08/2021 0.63 0.60 - 1.10 mg/dL Final       Recent Labs   Lab Test 10/20/21  1002 09/23/20  1127   CHOL 172 191   HDL 61 78   LDL 95 91   TRIG 78 111      Latest Reference Range & Units 05/13/22 10:32   Calcium 8.5 - 10.5 mg/dL 9.5   Phosphorus 2.5 - 4.5 mg/dL 3.4   Bone Spec Alk Phosphatase ug/L 14.4 [1]   N TELOPEPTIDE CROSS LINKED URINE  Rpt   Tissue Transglutaminase Antibody IgA <7.0 U/mL 2.7 [2]   Tissue Transglutaminase Debbie IgG <7.0 U/mL 1.0 [3]   C-Telopept B-X-Linked pg/mL 348 [4]   Parathyroid Hormone Intact 10 - 86 pg/mL 35   No results found for: [id number], [id number], [id number]    I personally reviewed the patient's outside records from [facility] EMR and Care Everywhere. Summary of pertinent findings in HPI.    Impression / Plan     1. Osteopenia  2. Long history of intermittent steroid use    She has long hx of osteopenia with high risk for fracture giving her indication for treatment for OP. Her risk factors include age, post menaupose, intermitent chronic steroid use. As far as secondary OP work up, she had normal ca, pth and vitD. She was screened for MM in 2018. Negative celiac.  She thinks she was on  alendronate longtime before 2015 but I only have DXA and data since 2015 and I am not able to confirm for how long she took fosamax in the past. I did call her old pharmacy I was told no fosamax on her list.  She was off fosamax [phone] based on dxa report, probably even since 2015. now has been on fosamax since 9/2019. Bone turnover are suppressed  To low normal range but had evidence of BMD decline on DXA 2/2022 after 2.5 years of fosamax treatment, no fractures. Given her concern of BMD decline, decision made to switch to IV reclast with DEXA in recheck in 2years. Again I Discussed 1/3 risk of flu symptoms (acute phase reaction) after treatment with IV zoledronic acid. Discussed risks of osteonecrosis of the jaw (1/200 after tooth extraction, 1/2500 spontaneous) and atypical femur fractures (1/1000) with chronic bisphosphonates. For every atypical femur fracture, 100 typical femur fractures are prevented.      Plan:  - stop fosamax  - start IV reclast yearly infusion.   - continue calcium and vitd as above  -recheck dxa 5/2024    Follow up : 1 year or earlier if bone fracture happened.            Test and/or medications prescribed today:  No orders of the defined types were placed in this encounter.        Follow up: 1 year      Marjan Paul MD  Endocrinology, Diabetes and Metabolism  [facility]

## 2022-06-01 ENCOUNTER — TRANSFERRED RECORDS (OUTPATIENT)
Dept: HEALTH INFORMATION MANAGEMENT | Facility: CLINIC | Age: 77
End: 2022-06-01
Payer: MEDICARE

## 2022-06-01 ENCOUNTER — TELEPHONE (OUTPATIENT)
Dept: ENDOCRINOLOGY | Facility: CLINIC | Age: 77
End: 2022-06-01
Payer: MEDICARE

## 2022-06-01 NOTE — TELEPHONE ENCOUNTER
Attempted to reach patient to schedule follow up in the Endocrinology Clinic. No answer, LM on VM to call office back.    Schedule with Marjan Paul MD in about 1 year (around 5/31/2023). Rosario Woods on 6/1/2022 at 11:03 AM

## 2022-06-02 ENCOUNTER — TRANSFERRED RECORDS (OUTPATIENT)
Dept: HEALTH INFORMATION MANAGEMENT | Facility: CLINIC | Age: 77
End: 2022-06-02
Payer: MEDICARE

## 2022-06-02 DIAGNOSIS — J47.9 BRONCHIECTASIS (H): ICD-10-CM

## 2022-06-02 DIAGNOSIS — J47.9 BRONCHIECTASIS WITHOUT COMPLICATION (H): ICD-10-CM

## 2022-06-02 RX ORDER — PREDNISONE 20 MG/1
20 TABLET ORAL DAILY
Qty: 7 TABLET | Refills: 0 | Status: SHIPPED | OUTPATIENT
Start: 2022-06-02 | End: 2022-06-23

## 2022-06-20 ENCOUNTER — HOSPITAL ENCOUNTER (OUTPATIENT)
Dept: CT IMAGING | Facility: HOSPITAL | Age: 77
Discharge: HOME | End: 2022-06-20
Attending: STUDENT IN AN ORGANIZED HEALTH CARE EDUCATION/TRAINING PROGRAM | Admitting: STUDENT IN AN ORGANIZED HEALTH CARE EDUCATION/TRAINING PROGRAM
Payer: MEDICARE

## 2022-06-20 ENCOUNTER — MYC MEDICAL ADVICE (OUTPATIENT)
Dept: FAMILY MEDICINE | Facility: CLINIC | Age: 77
End: 2022-06-20

## 2022-06-20 DIAGNOSIS — I72.0 CAROTID ARTERY ANEURYSM (H): ICD-10-CM

## 2022-06-20 LAB
CREAT BLD-MCNC: 0.6 MG/DL (ref 0.6–1.1)
GFR SERPL CREATININE-BSD FRML MDRD: >60 ML/MIN/1.73M2

## 2022-06-20 PROCEDURE — 250N000011 HC RX IP 250 OP 636: Performed by: STUDENT IN AN ORGANIZED HEALTH CARE EDUCATION/TRAINING PROGRAM

## 2022-06-20 PROCEDURE — 70496 CT ANGIOGRAPHY HEAD: CPT

## 2022-06-20 PROCEDURE — 82565 ASSAY OF CREATININE: CPT

## 2022-06-20 RX ORDER — IOPAMIDOL 755 MG/ML
75 INJECTION, SOLUTION INTRAVASCULAR ONCE
Status: COMPLETED | OUTPATIENT
Start: 2022-06-20 | End: 2022-06-20

## 2022-06-20 RX ADMIN — IOPAMIDOL 75 ML: 755 INJECTION, SOLUTION INTRAVENOUS at 13:50

## 2022-06-22 NOTE — PROGRESS NOTES
Ayse is a 77 year old who is being evaluated via a billable video visit.      How would you like to obtain your AVS? MyChart  If the video visit is dropped, the invitation should be resent by: Text to cell phone: [phone]  Will anyone else be joining your video visit? No          Assessment & Plan   Problem List Items Addressed This Visit        Other    Anxiety    Relevant Medications    escitalopram (LEXAPRO) 5 MG tablet      Other Visit Diagnoses     Encounter for long-term (current) use of medications    -  Primary    Abnormal finding on imaging        Relevant Orders    US Renal Complete w Doppler Complete    US Carotid Bilateral    OAB (overactive bladder)        Relevant Medications    oxybutynin ER (DITROPAN XL) 5 MG 24 hr tablet    Arterial fibromuscular dysplasia (H)         Relevant Orders    US Renal Complete w Doppler Complete    Aneurysm of carotid artery (H)         Relevant Orders    US Carotid Bilateral           Carotid artery aneurysm:  2018, had abnormal MRI where she had a 2 mm inferomedially directed outpouching of the distal left ICA.  Repeat CTA brain for interval follow-up of this aneurysm.  The aneurysm has been stable since 2018 and is still 2 mm.  I do not think that it needs further work-up/intervention at this time.  Okay to clinically monitor.    Findings concerning for fibromuscular dysplasia  Patient CTA did note that she had findings of fibromuscular dysplasia in the upper cervical segments of the internal carotids that were visualized on the CTA.  They were not stenotic.  Patient is female, of advanced age and .  Has risk factors for fibromuscular dysplasia.  Will get carotid ultrasound and renal ultrasound to make sure there is no stenosis.  Patient amenable.    Urinary urge incontinence  Patient notes that she has had urge incontinence for many years and notes that the point where she is unable to make it to the bathroom fast enough.  This resulted in  significant anxiety and she becomes upset when she cannot make it to the bathroom.  Is wondering if she can be started on a medication.  Her sister is on oxybutynin and that does well for her.  I think that is reasonable.  We will start with low-dose oxybutynin 5 mg.  Did discuss with patient that it is an anticholinergic medication and can cause dizziness, increased confusion, urinary retention.  Complex the medication.  We will follow-up with her in 2 months    No follow-ups on file.    Neida Maradiaga DO  [facility]    Marietta Tavera is a 77 year old}, presenting for the following health issues: follow up on imaging + urge incontinence   Follow Up (CT scan )    Review of Systems   As per HPI       Objective    Vitals - Patient Reported  Pain Score: No Pain (0)      Vitals:  No vitals were obtained today due to virtual visit.    Physical Exam   GENERAL: Healthy, alert and no distress  EYES: Eyes grossly normal to inspection.  No discharge or erythema, or obvious scleral/conjunctival abnormalities.  RESP: No audible wheeze, cough, or visible cyanosis.  No visible retractions or increased work of breathing.    SKIN: Visible skin clear. No significant rash, abnormal pigmentation or lesions.  NEURO: Cranial nerves grossly intact.  Mentation and speech appropriate for age.  PSYCH: Mentation appears normal, affect normal/bright, judgement and insight intact, normal speech and appearance well-groomed.       Video-Visit Details    Video Start Time: 7:13 AM    Type of service:  Video Visit    Video End Time:7:24 AM    Originating Location (pt. Location): Home    Distant Location (provider location):  [facility]     Platform used for Video Visit: [employer].

## 2022-06-23 ENCOUNTER — MYC MEDICAL ADVICE (OUTPATIENT)
Dept: FAMILY MEDICINE | Facility: CLINIC | Age: 77
End: 2022-06-23

## 2022-06-23 ENCOUNTER — VIRTUAL VISIT (OUTPATIENT)
Dept: FAMILY MEDICINE | Facility: CLINIC | Age: 77
End: 2022-06-23
Payer: MEDICARE

## 2022-06-23 ENCOUNTER — TELEPHONE (OUTPATIENT)
Dept: FAMILY MEDICINE | Facility: CLINIC | Age: 77
End: 2022-06-23

## 2022-06-23 DIAGNOSIS — I72.0 ANEURYSM OF CAROTID ARTERY (H): ICD-10-CM

## 2022-06-23 DIAGNOSIS — R93.89 ABNORMAL FINDING ON IMAGING: ICD-10-CM

## 2022-06-23 DIAGNOSIS — Z79.899 ENCOUNTER FOR LONG-TERM (CURRENT) USE OF MEDICATIONS: Primary | ICD-10-CM

## 2022-06-23 DIAGNOSIS — F41.9 ANXIETY: ICD-10-CM

## 2022-06-23 DIAGNOSIS — I77.3 ARTERIAL FIBROMUSCULAR DYSPLASIA (H): ICD-10-CM

## 2022-06-23 DIAGNOSIS — N32.81 OAB (OVERACTIVE BLADDER): ICD-10-CM

## 2022-06-23 PROCEDURE — 99214 OFFICE O/P EST MOD 30 MIN: CPT | Mod: 95 | Performed by: STUDENT IN AN ORGANIZED HEALTH CARE EDUCATION/TRAINING PROGRAM

## 2022-06-23 RX ORDER — MEPERIDINE HYDROCHLORIDE 25 MG/ML
25 INJECTION INTRAMUSCULAR; INTRAVENOUS; SUBCUTANEOUS EVERY 30 MIN PRN
Status: CANCELLED | OUTPATIENT
Start: 2022-06-23

## 2022-06-23 RX ORDER — ESCITALOPRAM OXALATE 5 MG/1
5 TABLET ORAL DAILY
Qty: 90 TABLET | Refills: 0 | Status: SHIPPED | OUTPATIENT
Start: 2022-06-23 | End: 2022-09-24

## 2022-06-23 RX ORDER — ZOLEDRONIC ACID 5 MG/100ML
5 INJECTION, SOLUTION INTRAVENOUS ONCE
Status: CANCELLED
Start: 2022-06-23 | End: 2022-06-23

## 2022-06-23 RX ORDER — METHYLPREDNISOLONE SODIUM SUCCINATE 125 MG/2ML
125 INJECTION, POWDER, LYOPHILIZED, FOR SOLUTION INTRAMUSCULAR; INTRAVENOUS
Status: CANCELLED
Start: 2022-06-23

## 2022-06-23 RX ORDER — HEPARIN SODIUM (PORCINE) LOCK FLUSH IV SOLN 100 UNIT/ML 100 UNIT/ML
5 SOLUTION INTRAVENOUS
Status: CANCELLED | OUTPATIENT
Start: 2022-06-23

## 2022-06-23 RX ORDER — NALOXONE HYDROCHLORIDE 0.4 MG/ML
0.2 INJECTION, SOLUTION INTRAMUSCULAR; INTRAVENOUS; SUBCUTANEOUS
Status: CANCELLED | OUTPATIENT
Start: 2022-06-23

## 2022-06-23 RX ORDER — EPINEPHRINE 1 MG/ML
0.3 INJECTION, SOLUTION INTRAMUSCULAR; SUBCUTANEOUS EVERY 5 MIN PRN
Status: CANCELLED | OUTPATIENT
Start: 2022-06-23

## 2022-06-23 RX ORDER — ALBUTEROL SULFATE 90 UG/1
1-2 AEROSOL, METERED RESPIRATORY (INHALATION)
Status: CANCELLED
Start: 2022-06-23

## 2022-06-23 RX ORDER — HEPARIN SODIUM,PORCINE 10 UNIT/ML
5 VIAL (ML) INTRAVENOUS
Status: CANCELLED | OUTPATIENT
Start: 2022-06-23

## 2022-06-23 RX ORDER — OXYBUTYNIN CHLORIDE 5 MG/1
5 TABLET, EXTENDED RELEASE ORAL DAILY
Qty: 90 TABLET | Refills: 0 | Status: SHIPPED | OUTPATIENT
Start: 2022-06-23 | End: 2022-09-24

## 2022-06-23 RX ORDER — ALBUTEROL SULFATE 0.83 MG/ML
2.5 SOLUTION RESPIRATORY (INHALATION)
Status: CANCELLED | OUTPATIENT
Start: 2022-06-23

## 2022-06-23 RX ORDER — DIPHENHYDRAMINE HYDROCHLORIDE 50 MG/ML
50 INJECTION INTRAMUSCULAR; INTRAVENOUS
Status: CANCELLED
Start: 2022-06-23

## 2022-06-23 ASSESSMENT — ANXIETY QUESTIONNAIRES
7. FEELING AFRAID AS IF SOMETHING AWFUL MIGHT HAPPEN: NOT AT ALL
GAD7 TOTAL SCORE: 2
7. FEELING AFRAID AS IF SOMETHING AWFUL MIGHT HAPPEN: NOT AT ALL
8. IF YOU CHECKED OFF ANY PROBLEMS, HOW DIFFICULT HAVE THESE MADE IT FOR YOU TO DO YOUR WORK, TAKE CARE OF THINGS AT HOME, OR GET ALONG WITH OTHER PEOPLE?: SOMEWHAT DIFFICULT
1. FEELING NERVOUS, ANXIOUS, OR ON EDGE: SEVERAL DAYS
GAD7 TOTAL SCORE: 2
2. NOT BEING ABLE TO STOP OR CONTROL WORRYING: NOT AT ALL
GAD7 TOTAL SCORE: 2
6. BECOMING EASILY ANNOYED OR IRRITABLE: NOT AT ALL
3. WORRYING TOO MUCH ABOUT DIFFERENT THINGS: SEVERAL DAYS
5. BEING SO RESTLESS THAT IT IS HARD TO SIT STILL: NOT AT ALL
4. TROUBLE RELAXING: NOT AT ALL

## 2022-06-23 NOTE — TELEPHONE ENCOUNTER
Attempted to call patient to schedule her for a follow up urinary incontinence in 2 months. No answer or VM. Will send mychart to patient to give our office a call or to schedule via [employer].

## 2022-06-24 ENCOUNTER — APPOINTMENT (OUTPATIENT)
Dept: LAB | Facility: CLINIC | Age: 77
End: 2022-06-24
Attending: STUDENT IN AN ORGANIZED HEALTH CARE EDUCATION/TRAINING PROGRAM
Payer: MEDICARE

## 2022-06-24 ENCOUNTER — INFUSION THERAPY VISIT (OUTPATIENT)
Dept: INFUSION THERAPY | Facility: CLINIC | Age: 77
End: 2022-06-24
Attending: STUDENT IN AN ORGANIZED HEALTH CARE EDUCATION/TRAINING PROGRAM
Payer: MEDICARE

## 2022-06-24 VITALS
TEMPERATURE: 98 F | SYSTOLIC BLOOD PRESSURE: 122 MMHG | WEIGHT: 107 LBS | RESPIRATION RATE: 16 BRPM | DIASTOLIC BLOOD PRESSURE: 70 MMHG | HEART RATE: 70 BPM | OXYGEN SATURATION: 99 % | BODY MASS INDEX: 19.57 KG/M2

## 2022-06-24 DIAGNOSIS — M85.80 OSTEOPENIA, UNSPECIFIED LOCATION: Primary | ICD-10-CM

## 2022-06-24 LAB
CALCIUM SERPL-MCNC: 9.7 MG/DL (ref 8.5–10.1)
CREAT SERPL-MCNC: 0.54 MG/DL (ref 0.52–1.04)
GFR SERPL CREATININE-BSD FRML MDRD: >90 ML/MIN/1.73M2

## 2022-06-24 PROCEDURE — 250N000011 HC RX IP 250 OP 636: Performed by: STUDENT IN AN ORGANIZED HEALTH CARE EDUCATION/TRAINING PROGRAM

## 2022-06-24 PROCEDURE — 82310 ASSAY OF CALCIUM: CPT | Performed by: STUDENT IN AN ORGANIZED HEALTH CARE EDUCATION/TRAINING PROGRAM

## 2022-06-24 PROCEDURE — 96365 THER/PROPH/DIAG IV INF INIT: CPT

## 2022-06-24 PROCEDURE — 82565 ASSAY OF CREATININE: CPT | Performed by: STUDENT IN AN ORGANIZED HEALTH CARE EDUCATION/TRAINING PROGRAM

## 2022-06-24 RX ORDER — HEPARIN SODIUM,PORCINE 10 UNIT/ML
5 VIAL (ML) INTRAVENOUS
Status: DISCONTINUED | OUTPATIENT
Start: 2022-06-24 | End: 2022-06-24 | Stop reason: HOSPADM

## 2022-06-24 RX ORDER — ZOLEDRONIC ACID 5 MG/100ML
5 INJECTION, SOLUTION INTRAVENOUS ONCE
Status: COMPLETED | OUTPATIENT
Start: 2022-06-24 | End: 2022-06-24

## 2022-06-24 RX ORDER — CETIRIZINE HYDROCHLORIDE 5 MG/1
5 TABLET ORAL DAILY
Status: ON HOLD | COMMUNITY
End: 2024-01-01

## 2022-06-24 RX ADMIN — ZOLEDRONIC ACID 5 MG: 0.05 INJECTION, SOLUTION INTRAVENOUS at 12:42

## 2022-06-24 ASSESSMENT — PAIN SCALES - GENERAL: PAINLEVEL: NO PAIN (0)

## 2022-06-24 NOTE — NURSING NOTE
Chief Complaint   Patient presents with     Blood Draw     Labs drawn with PIV start by RN. Vitals taken.     Labs drawn with PIV start by RN. Pt tolerated well. Vitals taken. Pt checked into next appointment.    Cyndie Sanders RN

## 2022-06-24 NOTE — PROGRESS NOTES
Nursing Note  Fiordaliza Najera presents today to [facility] for:   Chief Complaint   Patient presents with     Blood Draw     Labs drawn with PIV start by RN. Vitals taken.     Infusion     IV Reclast       During today's [facility] appointment, orders from Dr LISA Paul were completed.  Frequency: once    Progress note:  Patient identification verified by name and date of birth.  Assessment completed.  Vitals recorded in Doc Flowsheets.  Patient was provided with education regarding medication/procedure and possible side effects.  Patient verbalized understanding.     present during visit today: Not Applicable.    Treatment Conditions: Patient's Creatinine Clearance and Calcium level from today are within paramaters to administer medication per orders.    Premedications: were not ordered.    Drug Waste Record: No    Infusion length and rate:  infusion given over approximately 15 minutes    Labs: drawn today, prior to appointment in [facility], in second floor lab.    Vascular access: peripheral IV was placed by lab prior to [facility] appointment.    Post infusion, patient stated she felt slightly lightheaded and a warm sensation in her abdominal/vaginal area. Patient then voided and ate some matt crackers and then reported symptoms subsided. Patient discharged home in stable condition with phone number to call to speak with a provider should she have any concerns.     Is the next appt scheduled? NA  Asymptomatic COVID test completed? No    Post Infusion Assessment:  Patient tolerated infusion without incident.  Patient observed for 30 minutes post infusion (first dose).  Blood return noted pre and post infusion.  Site patent and intact, free from redness, edema or discomfort.  No evidence of extravasations.  Access discontinued per protocol.     Discharge Plan:   Follow up plan of care with: ordering provider as scheduled.  Discharge instructions were  reviewed with patient.  Patient/representative verbalized understanding of discharge instructions and all questions answered.  Patient discharged from [facility] in stable condition.    Trinidad Hammonds, MICHELLE    Administrations This Visit     zoledronic Acid (RECLAST) infusion 5 mg     Admin Date  06/24/2022 Action  New Bag Dose  5 mg Rate  400 mL/hr Route  Intravenous Administered By  Trinidad Hammonds RN                /65 (BP Location: Right arm, Patient Position: Sitting, Cuff Size: Adult Regular)   Pulse 79   Temp 98  F (36.7  C) (Oral)   Resp 16   Wt 48.5 kg (107 lb)   SpO2 99%   BMI 19.57 kg/m

## 2022-06-24 NOTE — LETTER
6/24/2022         RE: Fiordaliza Najera  [address] [place] [address]        Dear Colleague,    Thank you for referring your patient, Fiordaliza Najera, to the [facility]. Please see a copy of my visit note below.    Nursing Note  Fiordaliza Najera presents today to [facility] for:   Chief Complaint   Patient presents with     Blood Draw     Labs drawn with PIV start by RN. Vitals taken.     Infusion     IV Reclast       During [facility]'s [facility] appointment, orders from Dr LISA Paul were completed.  Frequency: once    Progress note:  Patient identification verified by name and date of birth.  Assessment completed.  Vitals recorded in Doc Flowsheets.  Patient was provided with education regarding medication/procedure and possible side effects.  Patient verbalized understanding.     present during visit today: Not Applicable.    Treatment Conditions: Patient's Creatinine Clearance and Calcium level from today are within paramaters to administer medication per orders.    Premedications: were not ordered.    Drug Waste Record: No    Infusion length and rate:  infusion given over approximately 15 minutes    Labs: drawn today, prior to appointment in [facility], in second floor lab.    Vascular access: peripheral IV was placed by lab prior to [facility] appointment.    Post infusion, patient stated she felt slightly lightheaded and a warm sensation in her abdominal/vaginal area. Patient then voided and ate some matt crackers and then reported symptoms subsided. Patient discharged home in stable condition with phone number to call to speak with a provider should she have any concerns.     Is the next appt scheduled? NA  Asymptomatic COVID test completed? No    Post Infusion Assessment:  Patient tolerated infusion without incident.  Patient observed for 30 minutes post infusion (first dose).  Blood return noted  pre and post infusion.  Site patent and intact, free from redness, edema or discomfort.  No evidence of extravasations.  Access discontinued per protocol.     Discharge Plan:   Follow up plan of care with: ordering provider as scheduled.  Discharge instructions were reviewed with patient.  Patient/representative verbalized understanding of discharge instructions and all questions answered.  Patient discharged from [facility] in stable condition.    Trinidad Hammonds RN    Administrations This Visit     zoledronic Acid (RECLAST) infusion 5 mg     Admin Date  06/24/2022 Action  New Bag Dose  5 mg Rate  400 mL/hr Route  Intravenous Administered By  Trinidad Hammonds RN                /65 (BP Location: Right arm, Patient Position: Sitting, Cuff Size: Adult Regular)   Pulse 79   Temp 98  F (36.7  C) (Oral)   Resp 16   Wt 48.5 kg (107 lb)   SpO2 99%   BMI 19.57 kg/m            Again, thank you for allowing me to participate in the care of your patient.        Sincerely,        [facility]

## 2022-06-28 ENCOUNTER — NURSE TRIAGE (OUTPATIENT)
Dept: NURSING | Facility: CLINIC | Age: 77
End: 2022-06-28

## 2022-06-28 NOTE — TELEPHONE ENCOUNTER
Telephone call:   Patient is calling to inquire if she can come to her appointment Friday morning.   Exposed to COVID - she is vaccinated. Denies symptoms.     Instructed patient that she should monitor for symptoms and plan on coming to her appointment if she remains asymptomatic.       Ashley Bolanos RN   06/28/22 10:40 AM  [facility] Nurse Advisor    Reason for Disposition    [1] CLOSE CONTACT COVID-19 EXPOSURE within last 14 days AND [2] NO symptoms    Protocols used: CORONAVIRUS (COVID-19) EXPOSURE-A-OH 1.18.2022

## 2022-07-01 ENCOUNTER — HOSPITAL ENCOUNTER (OUTPATIENT)
Dept: ULTRASOUND IMAGING | Facility: HOSPITAL | Age: 77
Discharge: HOME | End: 2022-07-01
Attending: STUDENT IN AN ORGANIZED HEALTH CARE EDUCATION/TRAINING PROGRAM
Payer: MEDICARE

## 2022-07-01 DIAGNOSIS — R93.89 ABNORMAL FINDING ON IMAGING: ICD-10-CM

## 2022-07-01 DIAGNOSIS — I77.3 ARTERIAL FIBROMUSCULAR DYSPLASIA (H): ICD-10-CM

## 2022-07-01 DIAGNOSIS — I72.0 ANEURYSM OF CAROTID ARTERY (H): ICD-10-CM

## 2022-07-01 PROCEDURE — 76770 US EXAM ABDO BACK WALL COMP: CPT

## 2022-07-01 PROCEDURE — 93880 EXTRACRANIAL BILAT STUDY: CPT

## 2022-07-15 ENCOUNTER — VIRTUAL VISIT (OUTPATIENT)
Dept: FAMILY MEDICINE | Facility: CLINIC | Age: 77
End: 2022-07-15
Payer: MEDICARE

## 2022-07-15 DIAGNOSIS — I72.0 CAROTID ARTERY ANEURYSM (H): ICD-10-CM

## 2022-07-15 DIAGNOSIS — R13.10 DYSPHAGIA, UNSPECIFIED TYPE: ICD-10-CM

## 2022-07-15 DIAGNOSIS — R93.89 ABNORMAL FINDING ON IMAGING: Primary | ICD-10-CM

## 2022-07-15 DIAGNOSIS — K80.20 CALCULUS OF GALLBLADDER WITHOUT CHOLECYSTITIS WITHOUT OBSTRUCTION: ICD-10-CM

## 2022-07-15 DIAGNOSIS — R39.15 URINARY URGENCY: ICD-10-CM

## 2022-07-15 PROCEDURE — 99214 OFFICE O/P EST MOD 30 MIN: CPT | Mod: 95 | Performed by: STUDENT IN AN ORGANIZED HEALTH CARE EDUCATION/TRAINING PROGRAM

## 2022-07-20 ENCOUNTER — MYC MEDICAL ADVICE (OUTPATIENT)
Dept: FAMILY MEDICINE | Facility: CLINIC | Age: 77
End: 2022-07-20

## 2022-07-22 ENCOUNTER — HOSPITAL ENCOUNTER (INPATIENT)
Facility: CLINIC | Age: 77
LOS: 3 days | Discharge: HOME | DRG: 193 | End: 2022-07-27
Attending: EMERGENCY MEDICINE | Admitting: STUDENT IN AN ORGANIZED HEALTH CARE EDUCATION/TRAINING PROGRAM
Payer: MEDICARE

## 2022-07-22 ENCOUNTER — APPOINTMENT (OUTPATIENT)
Dept: GENERAL RADIOLOGY | Facility: CLINIC | Age: 77
DRG: 193 | End: 2022-07-22
Attending: EMERGENCY MEDICINE
Payer: MEDICARE

## 2022-07-22 DIAGNOSIS — R07.9 CHEST PAIN IN ADULT: ICD-10-CM

## 2022-07-22 DIAGNOSIS — J15.9 COMMUNITY ACQUIRED BACTERIAL PNEUMONIA: Primary | ICD-10-CM

## 2022-07-22 DIAGNOSIS — R91.8 PULMONARY NODULES: ICD-10-CM

## 2022-07-22 DIAGNOSIS — E63.9 NUTRITIONAL DEFICIENCY: ICD-10-CM

## 2022-07-22 DIAGNOSIS — R09.82 POST-NASAL DRIP: ICD-10-CM

## 2022-07-22 DIAGNOSIS — Z11.52 ENCOUNTER FOR SCREENING LABORATORY TESTING FOR SEVERE ACUTE RESPIRATORY SYNDROME CORONAVIRUS 2 (SARS-COV-2): ICD-10-CM

## 2022-07-22 LAB
ALBUMIN SERPL BCG-MCNC: 4 G/DL (ref 3.5–5.2)
ALP SERPL-CCNC: 109 U/L (ref 35–104)
ALT SERPL W P-5'-P-CCNC: 14 U/L (ref 10–35)
ANION GAP SERPL CALCULATED.3IONS-SCNC: 11 MMOL/L (ref 7–15)
AST SERPL W P-5'-P-CCNC: 35 U/L (ref 10–35)
BASOPHILS # BLD AUTO: 0.1 10E3/UL (ref 0–0.2)
BASOPHILS NFR BLD AUTO: 0 %
BILIRUB SERPL-MCNC: 0.2 MG/DL
BUN SERPL-MCNC: 16.1 MG/DL (ref 8–23)
CALCIUM SERPL-MCNC: 9.6 MG/DL (ref 8.8–10.2)
CHLORIDE SERPL-SCNC: 97 MMOL/L (ref 98–107)
CREAT SERPL-MCNC: 0.43 MG/DL (ref 0.51–0.95)
DEPRECATED HCO3 PLAS-SCNC: 26 MMOL/L (ref 22–29)
EOSINOPHIL # BLD AUTO: 0.1 10E3/UL (ref 0–0.7)
EOSINOPHIL NFR BLD AUTO: 1 %
ERYTHROCYTE [DISTWIDTH] IN BLOOD BY AUTOMATED COUNT: 12.9 % (ref 10–15)
GFR SERPL CREATININE-BSD FRML MDRD: >90 ML/MIN/1.73M2
GLUCOSE SERPL-MCNC: 109 MG/DL (ref 70–99)
HCT VFR BLD AUTO: 37.5 % (ref 35–47)
HGB BLD-MCNC: 12 G/DL (ref 11.7–15.7)
HOLD SPECIMEN: NORMAL
IMM GRANULOCYTES # BLD: 0.1 10E3/UL
IMM GRANULOCYTES NFR BLD: 0 %
LYMPHOCYTES # BLD AUTO: 0.8 10E3/UL (ref 0.8–5.3)
LYMPHOCYTES NFR BLD AUTO: 6 %
MCH RBC QN AUTO: 29.6 PG (ref 26.5–33)
MCHC RBC AUTO-ENTMCNC: 32 G/DL (ref 31.5–36.5)
MCV RBC AUTO: 92 FL (ref 78–100)
MONOCYTES # BLD AUTO: 0.7 10E3/UL (ref 0–1.3)
MONOCYTES NFR BLD AUTO: 5 %
NEUTROPHILS # BLD AUTO: 12 10E3/UL (ref 1.6–8.3)
NEUTROPHILS NFR BLD AUTO: 88 %
NRBC # BLD AUTO: 0 10E3/UL
NRBC BLD AUTO-RTO: 0 /100
PLATELET # BLD AUTO: 298 10E3/UL (ref 150–450)
POTASSIUM SERPL-SCNC: 4.1 MMOL/L (ref 3.4–5.3)
PROT SERPL-MCNC: 7.6 G/DL (ref 6.4–8.3)
RBC # BLD AUTO: 4.06 10E6/UL (ref 3.8–5.2)
SODIUM SERPL-SCNC: 134 MMOL/L (ref 136–145)
WBC # BLD AUTO: 13.7 10E3/UL (ref 4–11)

## 2022-07-22 PROCEDURE — 93010 ELECTROCARDIOGRAM REPORT: CPT | Performed by: EMERGENCY MEDICINE

## 2022-07-22 PROCEDURE — 99285 EMERGENCY DEPT VISIT HI MDM: CPT | Mod: 25

## 2022-07-22 PROCEDURE — 71046 X-RAY EXAM CHEST 2 VIEWS: CPT | Mod: 26 | Performed by: RADIOLOGY

## 2022-07-22 PROCEDURE — 99285 EMERGENCY DEPT VISIT HI MDM: CPT | Mod: 25 | Performed by: EMERGENCY MEDICINE

## 2022-07-22 PROCEDURE — 82947 ASSAY GLUCOSE BLOOD QUANT: CPT | Performed by: EMERGENCY MEDICINE

## 2022-07-22 PROCEDURE — C9803 HOPD COVID-19 SPEC COLLECT: HCPCS

## 2022-07-22 PROCEDURE — 36415 COLL VENOUS BLD VENIPUNCTURE: CPT | Performed by: EMERGENCY MEDICINE

## 2022-07-22 PROCEDURE — 71046 X-RAY EXAM CHEST 2 VIEWS: CPT

## 2022-07-22 PROCEDURE — 96365 THER/PROPH/DIAG IV INF INIT: CPT

## 2022-07-22 PROCEDURE — 83690 ASSAY OF LIPASE: CPT | Performed by: EMERGENCY MEDICINE

## 2022-07-22 PROCEDURE — 85004 AUTOMATED DIFF WBC COUNT: CPT | Performed by: EMERGENCY MEDICINE

## 2022-07-22 PROCEDURE — 85379 FIBRIN DEGRADATION QUANT: CPT | Performed by: EMERGENCY MEDICINE

## 2022-07-22 PROCEDURE — 93005 ELECTROCARDIOGRAM TRACING: CPT

## 2022-07-22 PROCEDURE — 96366 THER/PROPH/DIAG IV INF ADDON: CPT

## 2022-07-22 PROCEDURE — 84484 ASSAY OF TROPONIN QUANT: CPT | Performed by: EMERGENCY MEDICINE

## 2022-07-23 ENCOUNTER — APPOINTMENT (OUTPATIENT)
Dept: ULTRASOUND IMAGING | Facility: CLINIC | Age: 77
DRG: 193 | End: 2022-07-23
Attending: STUDENT IN AN ORGANIZED HEALTH CARE EDUCATION/TRAINING PROGRAM
Payer: MEDICARE

## 2022-07-23 ENCOUNTER — APPOINTMENT (OUTPATIENT)
Dept: CT IMAGING | Facility: CLINIC | Age: 77
DRG: 193 | End: 2022-07-23
Attending: EMERGENCY MEDICINE
Payer: MEDICARE

## 2022-07-23 PROBLEM — R07.9 CHEST PAIN IN ADULT: Status: ACTIVE | Noted: 2022-07-23

## 2022-07-23 LAB
CREAT SERPL-MCNC: 0.43 MG/DL (ref 0.52–1.04)
D DIMER PPP FEU-MCNC: 0.57 UG/ML FEU (ref 0–0.5)
GFR SERPL CREATININE-BSD FRML MDRD: >90 ML/MIN/1.73M2
LIPASE SERPL-CCNC: 183 U/L (ref 13–60)
MRSA DNA SPEC QL NAA+PROBE: NEGATIVE
PROCALCITONIN SERPL IA-MCNC: 0.05 NG/ML
SA TARGET DNA: NEGATIVE
SARS-COV-2 RNA RESP QL NAA+PROBE: NEGATIVE
TROPONIN T BLD-MCNC: 0.01 UG/L
TROPONIN T SERPL HS-MCNC: 6 NG/L
TROPONIN T SERPL HS-MCNC: 7 NG/L

## 2022-07-23 PROCEDURE — 87205 SMEAR GRAM STAIN: CPT | Performed by: STUDENT IN AN ORGANIZED HEALTH CARE EDUCATION/TRAINING PROGRAM

## 2022-07-23 PROCEDURE — 76700 US EXAM ABDOM COMPLETE: CPT | Mod: 26 | Performed by: RADIOLOGY

## 2022-07-23 PROCEDURE — G0378 HOSPITAL OBSERVATION PER HR: HCPCS

## 2022-07-23 PROCEDURE — U0005 INFEC AGEN DETEC AMPLI PROBE: HCPCS | Performed by: EMERGENCY MEDICINE

## 2022-07-23 PROCEDURE — 36415 COLL VENOUS BLD VENIPUNCTURE: CPT

## 2022-07-23 PROCEDURE — 250N000013 HC RX MED GY IP 250 OP 250 PS 637

## 2022-07-23 PROCEDURE — 36415 COLL VENOUS BLD VENIPUNCTURE: CPT | Performed by: EMERGENCY MEDICINE

## 2022-07-23 PROCEDURE — 76700 US EXAM ABDOM COMPLETE: CPT

## 2022-07-23 PROCEDURE — 82565 ASSAY OF CREATININE: CPT

## 2022-07-23 PROCEDURE — 87641 MR-STAPH DNA AMP PROBE: CPT | Performed by: STUDENT IN AN ORGANIZED HEALTH CARE EDUCATION/TRAINING PROGRAM

## 2022-07-23 PROCEDURE — 96372 THER/PROPH/DIAG INJ SC/IM: CPT

## 2022-07-23 PROCEDURE — 99220 PR INITIAL OBSERVATION CARE,LEVEL III: CPT | Mod: GC | Performed by: STUDENT IN AN ORGANIZED HEALTH CARE EDUCATION/TRAINING PROGRAM

## 2022-07-23 PROCEDURE — 71275 CT ANGIOGRAPHY CHEST: CPT | Mod: 26 | Performed by: RADIOLOGY

## 2022-07-23 PROCEDURE — G1010 CDSM STANSON: HCPCS | Performed by: RADIOLOGY

## 2022-07-23 PROCEDURE — 84484 ASSAY OF TROPONIN QUANT: CPT

## 2022-07-23 PROCEDURE — 36415 COLL VENOUS BLD VENIPUNCTURE: CPT | Performed by: STUDENT IN AN ORGANIZED HEALTH CARE EDUCATION/TRAINING PROGRAM

## 2022-07-23 PROCEDURE — 250N000011 HC RX IP 250 OP 636: Performed by: EMERGENCY MEDICINE

## 2022-07-23 PROCEDURE — 87077 CULTURE AEROBIC IDENTIFY: CPT | Performed by: STUDENT IN AN ORGANIZED HEALTH CARE EDUCATION/TRAINING PROGRAM

## 2022-07-23 PROCEDURE — 87040 BLOOD CULTURE FOR BACTERIA: CPT | Performed by: STUDENT IN AN ORGANIZED HEALTH CARE EDUCATION/TRAINING PROGRAM

## 2022-07-23 PROCEDURE — 250N000013 HC RX MED GY IP 250 OP 250 PS 637: Performed by: STUDENT IN AN ORGANIZED HEALTH CARE EDUCATION/TRAINING PROGRAM

## 2022-07-23 PROCEDURE — 250N000011 HC RX IP 250 OP 636

## 2022-07-23 PROCEDURE — 84145 PROCALCITONIN (PCT): CPT

## 2022-07-23 PROCEDURE — 84484 ASSAY OF TROPONIN QUANT: CPT | Performed by: EMERGENCY MEDICINE

## 2022-07-23 PROCEDURE — G1010 CDSM STANSON: HCPCS

## 2022-07-23 RX ORDER — PREGABALIN 75 MG/1
150 CAPSULE ORAL DAILY
Status: DISCONTINUED | OUTPATIENT
Start: 2022-07-23 | End: 2022-07-23

## 2022-07-23 RX ORDER — CELECOXIB 200 MG/1
200 CAPSULE ORAL DAILY
Status: DISCONTINUED | OUTPATIENT
Start: 2022-07-23 | End: 2022-07-27 | Stop reason: HOSPADM

## 2022-07-23 RX ORDER — PREGABALIN 100 MG/1
100 CAPSULE ORAL AT BEDTIME
Status: DISCONTINUED | OUTPATIENT
Start: 2022-07-23 | End: 2022-07-27 | Stop reason: HOSPADM

## 2022-07-23 RX ORDER — OXYBUTYNIN CHLORIDE 5 MG/1
5 TABLET, EXTENDED RELEASE ORAL DAILY
Status: DISCONTINUED | OUTPATIENT
Start: 2022-07-23 | End: 2022-07-27 | Stop reason: HOSPADM

## 2022-07-23 RX ORDER — CETIRIZINE HYDROCHLORIDE 5 MG/1
5 TABLET ORAL DAILY
Status: DISCONTINUED | OUTPATIENT
Start: 2022-07-23 | End: 2022-07-27 | Stop reason: HOSPADM

## 2022-07-23 RX ORDER — CEFTRIAXONE 1 G/1
1 INJECTION, POWDER, FOR SOLUTION INTRAMUSCULAR; INTRAVENOUS EVERY 24 HOURS
Status: DISCONTINUED | OUTPATIENT
Start: 2022-07-23 | End: 2022-07-26

## 2022-07-23 RX ORDER — MONTELUKAST SODIUM 10 MG/1
10 TABLET ORAL AT BEDTIME
Status: DISCONTINUED | OUTPATIENT
Start: 2022-07-23 | End: 2022-07-27 | Stop reason: HOSPADM

## 2022-07-23 RX ORDER — ENOXAPARIN SODIUM 100 MG/ML
40 INJECTION SUBCUTANEOUS EVERY 24 HOURS
Status: DISCONTINUED | OUTPATIENT
Start: 2022-07-23 | End: 2022-07-27 | Stop reason: HOSPADM

## 2022-07-23 RX ORDER — LIDOCAINE 40 MG/G
CREAM TOPICAL
Status: DISCONTINUED | OUTPATIENT
Start: 2022-07-23 | End: 2022-07-27 | Stop reason: HOSPADM

## 2022-07-23 RX ORDER — PREGABALIN 50 MG/1
50 CAPSULE ORAL DAILY
Status: DISCONTINUED | OUTPATIENT
Start: 2022-07-24 | End: 2022-07-27 | Stop reason: HOSPADM

## 2022-07-23 RX ORDER — ACETYLCYSTEINE 100 MG/ML
4 SOLUTION ORAL; RESPIRATORY (INHALATION) DAILY
Status: DISCONTINUED | OUTPATIENT
Start: 2022-07-23 | End: 2022-07-27 | Stop reason: HOSPADM

## 2022-07-23 RX ORDER — ALBUTEROL SULFATE 90 UG/1
2 AEROSOL, METERED RESPIRATORY (INHALATION) EVERY 6 HOURS PRN
Status: DISCONTINUED | OUTPATIENT
Start: 2022-07-23 | End: 2022-07-27 | Stop reason: HOSPADM

## 2022-07-23 RX ORDER — AZITHROMYCIN 250 MG/1
250 TABLET, FILM COATED ORAL DAILY
Status: COMPLETED | OUTPATIENT
Start: 2022-07-24 | End: 2022-07-27

## 2022-07-23 RX ORDER — ACETAMINOPHEN 325 MG/1
975 TABLET ORAL EVERY 6 HOURS PRN
Status: DISCONTINUED | OUTPATIENT
Start: 2022-07-23 | End: 2022-07-27 | Stop reason: HOSPADM

## 2022-07-23 RX ORDER — IOPAMIDOL 755 MG/ML
49 INJECTION, SOLUTION INTRAVASCULAR ONCE
Status: COMPLETED | OUTPATIENT
Start: 2022-07-23 | End: 2022-07-23

## 2022-07-23 RX ORDER — ESCITALOPRAM OXALATE 5 MG/1
5 TABLET ORAL DAILY
Status: DISCONTINUED | OUTPATIENT
Start: 2022-07-23 | End: 2022-07-27 | Stop reason: HOSPADM

## 2022-07-23 RX ORDER — AZITHROMYCIN 250 MG/1
500 TABLET, FILM COATED ORAL ONCE
Status: COMPLETED | OUTPATIENT
Start: 2022-07-23 | End: 2022-07-23

## 2022-07-23 RX ADMIN — ENOXAPARIN SODIUM 40 MG: 40 INJECTION SUBCUTANEOUS at 09:25

## 2022-07-23 RX ADMIN — PREGABALIN 100 MG: 100 CAPSULE ORAL at 21:05

## 2022-07-23 RX ADMIN — Medication 1 TABLET: at 12:32

## 2022-07-23 RX ADMIN — CEFTRIAXONE SODIUM 1 G: 1 INJECTION, POWDER, FOR SOLUTION INTRAMUSCULAR; INTRAVENOUS at 06:15

## 2022-07-23 RX ADMIN — MONTELUKAST 10 MG: 10 TABLET, FILM COATED ORAL at 06:16

## 2022-07-23 RX ADMIN — Medication 1 TABLET: at 21:05

## 2022-07-23 RX ADMIN — ESCITALOPRAM 5 MG: 5 TABLET, FILM COATED ORAL at 09:25

## 2022-07-23 RX ADMIN — OXYBUTYNIN CHLORIDE 5 MG: 5 TABLET, EXTENDED RELEASE ORAL at 09:25

## 2022-07-23 RX ADMIN — SALINE NASAL SPRAY 2 SPRAY: 1.5 SOLUTION NASAL at 09:26

## 2022-07-23 RX ADMIN — MONTELUKAST 10 MG: 10 TABLET, FILM COATED ORAL at 21:05

## 2022-07-23 RX ADMIN — CELECOXIB 200 MG: 200 CAPSULE ORAL at 12:32

## 2022-07-23 RX ADMIN — PREGABALIN 50 MG: 50 CAPSULE ORAL at 09:26

## 2022-07-23 RX ADMIN — IOPAMIDOL 49 ML: 755 INJECTION, SOLUTION INTRAVENOUS at 01:55

## 2022-07-23 RX ADMIN — AZITHROMYCIN MONOHYDRATE 500 MG: 250 TABLET ORAL at 06:15

## 2022-07-23 ASSESSMENT — ENCOUNTER SYMPTOMS
FREQUENCY: 0
BACK PAIN: 0
SHORTNESS OF BREATH: 1
CONFUSION: 0
ARTHRALGIAS: 0
DIARRHEA: 0
MYALGIAS: 0
SORE THROAT: 0
DYSURIA: 0
WEAKNESS: 0
COLOR CHANGE: 0
APPETITE CHANGE: 0
PALPITATIONS: 0
CHEST TIGHTNESS: 0
VOMITING: 0
COUGH: 0
HEADACHES: 0
CHILLS: 0
DIFFICULTY URINATING: 0
EYE PAIN: 0
CONSTIPATION: 0
NAUSEA: 0
DIZZINESS: 0
FATIGUE: 0
ABDOMINAL DISTENTION: 0
ABDOMINAL PAIN: 0
NECK PAIN: 0
FEVER: 0
DIAPHORESIS: 0

## 2022-07-23 NOTE — ED NOTES
"     Emergency Department Patient Sign-out       Brief HPI and ED course:  Patient is a 77 year old female signed out to me by Dr. Mcdaniel.  See initial ED Provider note for details of the presentation. In brief, patient with hx COPD, on 2 L O2 by NC at home. Patient short of breath, started prednisone and Augmentin 2 days ago. Now also having pleuritic pain under left breast. SpO2 at baseline, not in respiratory distress. D-dimer mildly elevated. Low HEART score. HS-Troponin x2 have been drawn but lab has been having issues and results have been delayed, now checking iSTAT troponin.     Vitals:   Patient Vitals for the past 24 hrs:   BP Temp Temp src Pulse Resp SpO2 Height Weight   07/23/22 0030 127/71 -- -- 81 -- 94 % -- --   07/22/22 2212 (!) 140/78 98.3  F (36.8  C) Oral 89 16 -- 1.588 m (5' 2.5\") 47.6 kg (105 lb)       Received Sign-out Plan:    Pending studies include: CT PA, troponin       Plan:   - f/u CT PA, f/u troponin, likely observation admission     Events after assuming care:  After care was assumed, a focused history and physical was performed. Agree with findings relayed by previous provider.     CT without evidence of PE, did show small area of groundglass opacity in the left lower lobe.  I-STAT troponin within normal limits.  High-sensitivity troponin still pending.  After counseling on the diagnosis, work-up, treatment plan the patient was admitted to medicine, observation status.     --  Joel Valle MD   Emergency Medicine       Joel Valle MD  07/23/22 3814    "

## 2022-07-23 NOTE — H&P
[facility]    History and Physical - Medicine Service, ANITA TEAM        Date of Admission:  7/22/2022    Assessment & Plan      Fiordaliza Najera is a 77 year old female admitted on 7/22/2022. She has a history of COPD on 2L home O2 and bronchiectasis who is admitted for pleuritic chest pain concerning for community acquired pneumonia.     #Pleuritis  #Community Acquired Pneumonia  Patient presenting with 1 day history of pleuritic chest pain. Also notes chronic cough that is productive of green sputum. Imaging with ground glass opacities and new nodules present that could represent infectious vs inflammatory pathology. Leading diagnosis would be community acquired pneumonia, however not fully convinced this is her primary concern. Cannot rule out COPD exacerbation however lungs were clear to auscultation and per patient her previous exacerbations did not feel similar. Cardiac etiology seems less likely given normal EKG, negative troponins. PE also less likely with negative CTPE. Will treat for presumed CAP.  -Ceftriaxone 1g daily  -Azithromycin 500mg once, 250mg x4  Days  -Follow up pro calcitonin  -Follow up COVID    #Neuropathy  Patient with neuropathy following spinal surgery and hardware placement.  -Continue lyrica    #Urinary urgency  -Continue oxybutynin      Diet:   Adult diet   DVT Prophylaxis: Enoxaparin (Lovenox) SQ  Mobley Catheter: Not present  Fluids: N/A  Cardiac Monitoring: None  Code Status:   DNR/DNI    This patient will be formally staffed in the morning by the day team.     Eva Holloway MD  Medicine Service, ANITA TEAM   [facility]  Securely message with the Vocera Web Console (learn more here)  Text page via [employer] Paging/Directory   Please see signed in provider for up to date coverage information    ______________________________________________________________________    Chief Complaint   Chest  pain    History is obtained from the patient    History of Present Illness   Fiordaliza Najera is a 77 year old female who has a history of COPD on 2L home O2 and bronchiectasis and is admitted for chest pain. Patient states she has been experiencing pleuritic chest pain since yesterday afternoon. Initially pain was bilateral and radiated to L shoulder however R chest and L shoulder pain have improved. Pain worsens when taking a deep breath and when lying on L side. Patient states she has never experienced pain similar to this previously. She states she has chronic cough following use of inhalers and notes recent green sputum production. She denies fevers, chills, palpitations, shortness of breath, dysuria, hematuria. She has no known sick contacts. She is fully vaccinated for covid x 2 boosters. She took one dose prednisone 20mg and Augment prior to presenting to ED.     In ED, patient afebrile, VSS. Oxygenation is 94% on 4L NC. Labs are remarkable for WBC 13.7, d-dimer 0.57, troponins normal. CXR demonstrated chronic appearing interstitial scarring or fibrosis without consolidation or pleural effusion. CTPE negative for pulmonary embolism however did note ground glass opacity in LLL concerning for acute vs infectious etiology and multiple new nodules along margins of both lungs. EKG not concerning for acute disease process.     Review of Systems    The 10 point Review of Systems is negative other than noted in the HPI or here.     Past Medical History    I have reviewed this patient's medical history and updated it with pertinent information if needed.   Past Medical History:   Diagnosis Date     Anxiety 09/30/2021     COPD (chronic obstructive pulmonary disease) (H)      Diverticulosis      Hiatal hernia      Mild asthma      Neuropathy      Osteopenia      Temporomandibular joint (TMJ) pain         Past Surgical History   I have reviewed this patient's surgical history and updated it with pertinent information if  needed.  Past Surgical History:   Procedure Laterality Date     ANTERIOR / POSTERIOR COMBINED FUSION LUMBAR SPINE       BRONCHOSCOPY (RIGID OR FLEXIBLE), DIAGNOSTIC N/A 9/28/2021    Procedure: BRONCHOSCOPY, WITH BRONCHOALVEOLAR LAVAGE;  Surgeon: Randall Lorenz MD;  Location: [facility]     HYSTERECTOMY       PICC SINGLE LUMEN PLACEMENT  11/4/2021          RETINAL LASER PROCEDURE Left 10/04/2016     SALPINGOOPHORECTOMY       TOTAL KNEE ARTHROPLASTY  2008       Social History   I have reviewed this patient's social history and updated it with pertinent information if needed. Fiordaliza Najera  reports that she has never smoked. She has never used smokeless tobacco. She reports that she does not drink alcohol and does not use drugs.    Family History   I have reviewed this patient's family history and updated it with pertinent information if needed.  Family History   Problem Relation Age of Onset     Dementia Mother         passed age 88     Chronic Obstructive Pulmonary Disease Father         passed age 78       Prior to Admission Medications   Prior to Admission Medications   Prescriptions Last Dose Informant Patient Reported? Taking?   BIOTIN ORAL   Yes No   Sig: Take 500 mg by mouth daily    Fluticasone-Umeclidin-Vilanterol (TRELEGY ELLIPTA) 100-62.5-25 MCG/INH oral inhaler   No No   Sig: Inhale 1 puff into the lungs daily   Lactobacillus rhamnosus GG (CULTURELLE) 10-15 Billion cell capsule   Yes No   Sig: Take 1 capsule by mouth every evening    OXYGEN-AIR DELIVERY SYSTEMS MISC   Yes No   Sig: [OXYGEN-AIR DELIVERY SYSTEMS MISC] Use 2 L As Directed. 2L with activity and at night  [employer]   SUMAtriptan (IMITREX) 50 MG tablet   No No   Sig: [SUMATRIPTAN (IMITREX) 50 MG TABLET] Take 1 tablet (50 mg total) by mouth every 2 (two) hours as needed for migraine.   acetylcysteine (MUCOMYST) 10 % nebulizer solution   No No   Sig: Inhale 4 mLs into the lungs daily   albuterol (PROAIR HFA/PROVENTIL HFA/VENTOLIN HFA) 108 (90 Base)  MCG/ACT inhaler   No No   Sig: Inhale 2 puffs into the lungs every 6 hours as needed   albuterol (PROVENTIL) (2.5 MG/3ML) 0.083% neb solution   No No   Sig: Take 1 vial (2.5 mg) by nebulization every 4 hours as needed for shortness of breath / dyspnea or wheezing   amoxicillin-clavulanate (AUGMENTIN) 875-125 MG tablet   No No   Sig: Take 1 tablet by mouth 2 times daily   Patient not taking: Reported on 7/15/2022   calcium-vitamin D (CALCIUM-VITAMIN D) 500 mg(1,250mg) -200 unit per tablet   Yes No   Sig: Take 1 tablet by mouth 2 times daily    celecoxib (CELEBREX) 200 MG capsule   No No   Sig: TAKE 1 CAPSULE (200 MG TOTAL) BY MOUTH DAILY.   cetirizine (ZYRTEC) 5 MG tablet   Yes No   Sig: Take 5 mg by mouth daily   cholecalciferol, vitamin D3, (VITAMIN D3) 1,000 unit capsule   Yes No   Sig: [CHOLECALCIFEROL, VITAMIN D3, (VITAMIN D3) 1,000 UNIT CAPSULE] Take 1,000 Units by mouth daily.   escitalopram (LEXAPRO) 5 MG tablet   No No   Sig: Take 1 tablet (5 mg) by mouth daily   montelukast (SINGULAIR) 10 MG tablet   No No   Sig: [MONTELUKAST (SINGULAIR) 10 MG TABLET] TAKE 1 TABLET(10 MG) BY MOUTH AT BEDTIME   multivitamin therapeutic (THERAGRAN) tablet   Yes No   Sig: [MULTIVITAMIN THERAPEUTIC (THERAGRAN) TABLET] Take 1 tablet by mouth daily.   nystatin (MYCOSTATIN) 589257 UNIT/GM external cream   No No   Sig: Apply to rash on body 3 times per day as needed.   oxybutynin ER (DITROPAN XL) 5 MG 24 hr tablet   No No   Sig: Take 1 tablet (5 mg) by mouth daily   pregabalin (LYRICA) 50 MG capsule   No No   Sig: TAKE 3 CAPSULES BY MOUTH DAILY AS DIRECTED.   sodium chloride (NEBUSAL) 3 % neb solution   No No   Sig: Take 3 mLs by nebulization 2 times daily   sodium chloride (OCEAN) 0.65 % nasal spray   No No   Sig: Spray 2 sprays in nostril daily      Facility-Administered Medications: None     Allergies   Allergies   Allergen Reactions     Codeine Unknown     Morphine Itching       Physical Exam   Vital Signs: Temp: 98.3  F (36.8   C) Temp src: Oral BP: 127/71 Pulse: 81   Resp: 16 SpO2: 94 % O2 Device: Nasal cannula Oxygen Delivery: 4 LPM  Weight: 105 lbs 0 oz    Physical Exam  Constitutional:       General: She is not in acute distress.  Eyes:      Conjunctiva/sclera: Conjunctivae normal.   Cardiovascular:      Rate and Rhythm: Normal rate and regular rhythm.   Pulmonary:      Effort: Pulmonary effort is normal.      Breath sounds: Normal breath sounds. No wheezing.   Abdominal:      General: There is no distension.      Palpations: Abdomen is soft.      Tenderness: There is abdominal tenderness. Positive signs include Calderón's sign.   Musculoskeletal:      Right lower leg: No edema.      Left lower leg: No edema.   Neurological:      Mental Status: She is alert.       Data   Data reviewed today: I reviewed all medications, new labs and imaging results over the last 24 hours. I personally reviewed the EKG tracing showing normal sinus rhythm.    Recent Labs   Lab 07/22/22  2206   WBC 13.7*   HGB 12.0   MCV 92      *   POTASSIUM 4.1   CHLORIDE 97*   CO2 26   BUN 16.1   CR 0.43*   ANIONGAP 11   MAGAN 9.6   *   ALBUMIN 4.0   PROTTOTAL 7.6   BILITOTAL 0.2   ALKPHOS 109*   ALT 14   AST 35   LIPASE 183*

## 2022-07-23 NOTE — ED TRIAGE NOTES
BIBA SPF 14  Patient has shortness of breath and chest pain over the last 4-5 hours. Hx copd and on 2L o2 at home  12L NSR  93% 2L   96 on 4L  Lung sound has rales     VSS  20g LAC  No fluid or aspirin   No changes from picking up

## 2022-07-23 NOTE — PROGRESS NOTES
"Shift:   VS: Blood pressure 98/59, pulse 87, temperature 98.3  F (36.8  C), temperature source Oral, resp. rate 16, height 1.588 m (5' 2.5\"), weight 47.6 kg (105 lb), SpO2 97 %, not currently breastfeeding.    Pain: denies   Neuro: A&Ox4  Cardiac: WDL  Respiratory: Stating at 95% on 2lpm, at patients baseline  Diet/Appetite:  WDL  /GI: WDL  LDA's: Left PIV saline locked  Skin: WDL  Activity: Up ad adriana    Plan: Continue abx for suspected community acquired pneumonia    "

## 2022-07-23 NOTE — PROGRESS NOTES
Care Management Follow Up    Length of Stay (days): 0    Expected Discharge Date:  TBD     Concerns to be Addressed:     TORRE Document  Patient plan of care discussed at interdisciplinary rounds: No due to weekend    Anticipated Discharge Disposition:  TBD     Anticipated Discharge Services:    Anticipated Discharge DME:      Patient/family educated on Medicare website which has current facility and service quality ratings:  N/A  Education Provided on the Discharge Plan:  N/A  Patient/Family in Agreement with the Plan:  N/A    Referrals Placed by CM/SW:  Not at this time  Private pay costs discussed: insurance costs out of pocket expenses, co-pays and deductibles    Additional Information:    1238 Due to Fiordaliza (Ayse)'s Observation status, SW met with her at bedside to introduce self, explain SW role, review the Medicare Outpatient Observation Notice (MOON) and why pt was receiving it. SW explained the document, and addressed questions and concerns. BEREKET then asked Ayse to sign document. Pt signed TORRE at 1240.      1245 SW faxed TORRE to [facility] ([phone]) then placed TORRE in Ayse's chart.    SW will continue to follow as needed.    LORETO Warren, LGSW  ED/OBS   [facility]  Phone: [phone]  Pager: [phone]  Fax: [phone]     On-call pager, [phone], 4:00 pm to midnight

## 2022-07-23 NOTE — ED PROVIDER NOTES
ED Provider Note  [facility] EMERGENCY DEPARTMENT ([facility])  7/22/22      History     Chief Complaint   Patient presents with     Chest Pain     Shortness of Breath     HPI  Fiordaliza Najera is a 77 year old female with past medical history significant for COPD, chronic respiratory failure with hypoxia on 2 L home O2 therapy, bronchiectasis who presents to the ED for evaluation of shortness of breath and left-sided, pleuritic chest pain.  Patient endorses constant, pleuritic pain underneath her left ribs radiating to her shoulder blade.  She states it has been difficult to breathe since this afternoon.  She states she started prednisone and Augmentin today on her own which she states her doctor gave her for her flareups.  Patient states she called the nurse from her residence who told her to present to the ED.  She states this is different from her normal COPD exacerbations because she does not usually have the left-sided pleuritic pain.  Denies history of cardiac disease, diabetes, or stroke.  States that she used tobacco in high school but has not used since.  Her parents however were smokers and she frequently got bronchitis.  She endorses being on 2 L of oxygen at home. She has inhalers and uses a nebulizer twice per day.  These remedies have not been helping.  No nausea, vomiting, diaphoresis, appetite change, or problems with bowel or bladder.    Past Medical History  Past Medical History:   Diagnosis Date     Anxiety 09/30/2021     COPD (chronic obstructive pulmonary disease) (H)      Diverticulosis      Hiatal hernia      Mild asthma      Neuropathy      Osteopenia      Temporomandibular joint (TMJ) pain      Past Surgical History:   Procedure Laterality Date     ANTERIOR / POSTERIOR COMBINED FUSION LUMBAR SPINE       BRONCHOSCOPY (RIGID OR FLEXIBLE), DIAGNOSTIC N/A 9/28/2021    Procedure: BRONCHOSCOPY, WITH BRONCHOALVEOLAR LAVAGE;  Surgeon: Randall Lorenz  MD MARYSOL;  Location: [facility]     HYSTERECTOMY       PICC SINGLE LUMEN PLACEMENT  11/4/2021          RETINAL LASER PROCEDURE Left 10/04/2016     SALPINGOOPHORECTOMY       TOTAL KNEE ARTHROPLASTY  2008     No current outpatient medications on file.    Allergies   Allergen Reactions     Codeine Unknown     Morphine Itching     Family History  Family History   Problem Relation Age of Onset     Dementia Mother         passed age 88     Chronic Obstructive Pulmonary Disease Father         passed age 78     Social History   Social History     Tobacco Use     Smoking status: Never Smoker     Smokeless tobacco: Never Used   Vaping Use     Vaping Use: Never used   Substance Use Topics     Alcohol use: No     Drug use: Never      Past medical history, past surgical history, medications, allergies, family history, and social history were reviewed with the patient. No additional pertinent items.       Review of Systems   Constitutional: Negative for appetite change, chills, diaphoresis, fatigue and fever.   HENT: Negative for congestion and sore throat.    Eyes: Negative for pain and visual disturbance.   Respiratory: Positive for shortness of breath. Negative for cough and chest tightness.    Cardiovascular: Positive for chest pain (left). Negative for palpitations.   Gastrointestinal: Negative for abdominal distention, abdominal pain, constipation, diarrhea, nausea and vomiting.   Genitourinary: Negative for difficulty urinating, dysuria, frequency and urgency.   Musculoskeletal: Negative for arthralgias, back pain, myalgias and neck pain.   Skin: Negative for color change and rash.   Neurological: Negative for dizziness, weakness and headaches.   Psychiatric/Behavioral: Negative for confusion.     A complete review of systems was performed with pertinent positives and negatives noted in the HPI, and all other systems negative.    Physical Exam   BP: (!) 140/78  Pulse: 89  Temp: 98.3  F (36.8  C)  Resp: 16  Height: 158.8 cm (5'  "2.5\")  Weight: 47.6 kg (105 lb)  SpO2: 94 %  Physical Exam  Vitals and nursing note reviewed.   Constitutional:       General: She is not in acute distress.     Appearance: Normal appearance. She is not ill-appearing or toxic-appearing.   HENT:      Head: Normocephalic and atraumatic.      Nose: Nose normal.      Mouth/Throat:      Mouth: Mucous membranes are moist.   Eyes:      Pupils: Pupils are equal, round, and reactive to light.   Cardiovascular:      Rate and Rhythm: Normal rate.      Pulses: Normal pulses.      Heart sounds: Normal heart sounds.   Pulmonary:      Effort: Pulmonary effort is normal. No respiratory distress.      Breath sounds: Normal breath sounds.   Abdominal:      General: Abdomen is flat. There is no distension.   Musculoskeletal:         General: No swelling or deformity. Normal range of motion.      Cervical back: Normal range of motion. No rigidity.   Skin:     General: Skin is warm.      Capillary Refill: Capillary refill takes less than 2 seconds.   Neurological:      Mental Status: She is alert and oriented to person, place, and time.   Psychiatric:         Mood and Affect: Mood normal.         ED Course   11:46 PM  The patient was seen and examined by Randal Mcdaniel DO in Room ED18.     ED Course as of 07/25/22 1908 Fri Jul 22, 2022   2333 XR Chest 2 Views   2356      EKG Interpretation:     Interpreted by Randal Mcdaniel DO  Time reviewed:2347   Symptoms at time of EKG: chest pain   Rhythm: normal sinus w sinus arrhythima  Rate: normal  Axis: NORMAL  Ectopy: none  Conduction: normal  ST Segments/ T Waves: No ST-T wave changes  Q Waves: none  Comparison to prior: No old EKG available    Clinical Impression: normal EKG    2358 BP(!): 140/78   2358 Temp: 98.3  F (36.8  C)   2358 Pulse: 89   2358 Resp: 16   2358 Sodium(!): 134   2358 Potassium: 4.1   2358 Creatinine(!): 0.43   2358 WBC(!): 13.7   2358 Hemoglobin: 12.0   2358 Absolute Neutrophils(!): 12.0   2358 Patient presents to the " ED for evaluation of pleuritic chest pain.  The pain is located on the left chest wall.  Radiates into the left shoulder blade.    Differential diagnosis includes PE, ACS, costochondritis, pneumothorax, pneumonia, COPD exacerbation, GERD, pancreatitis, cholecystitis, COVID-19   2352 Plan for cardiac work-up eluding CBC, CMP, troponin x2, EKG, chest x-ray, lipase, D-dimer                   Results for orders placed or performed during the hospital encounter of 07/22/22   XR Chest 2 Views     Status: None    Narrative    EXAM: XR CHEST 2 VIEWS  LOCATION: [facility]  DATE/TIME: 7/22/2022 10:43 PM    INDICATION: shortness of breath and chest pain  COMPARISON: 11/04/2021      Impression    IMPRESSION: Heart size is normal. Linear lucencies overlying the right lateral chest are favored to represent skin folds. Chronic appearing interstitial scarring or fibrosis is noted bilaterally without lobar consolidation or pleural effusion.   Thoracolumbar rods fixation.   CT Chest Pulmonary Embolism w Contrast     Status: None    Narrative    EXAM: CT CHEST PULMONARY EMBOLISM W CONTRAST  LOCATION: [facility]  DATE/TIME: 7/23/2022 2:11 AM    INDICATION: Left-sided pleuritic chest pain with elevated D-dimer.  COMPARISON: 9/28/2021  TECHNIQUE: CT chest pulmonary angiogram during arterial phase injection of IV contrast. Multiplanar reformats and MIP reconstructions were performed. Dose reduction techniques were used.   CONTRAST:Iiopamidol (ISOVUE 370) solution 49 mL       FINDINGS:  ANGIOGRAM CHEST: Pulmonary arteries are normal caliber and negative for pulmonary emboli. No aortic dissection. Mild aortic dilatation at 3.4 cm. No CT evidence of right heart strain.    LUNGS AND PLEURA: Bilateral upper lung scarring with bronchiectasis. Interval development of a more focal hazy ill-defined nodularity in the upper lungs.  One of the  largest nodules  measures 0.6 cm in the left upper lobe (7-95). Another left upper lobe   nodule measures 6 mm in mean diameter (7-90). The overall amount of ground-glass opacity is decreased in both lungs. There still remains an area of ground-glass opacity in the anterior aspect of the left lower lobe which is new since the prior and   concerning for a new focal area of a pneumonitis (7-51). Bronchiectasis with bronchial wall thickening which can be seen with bronchitis. No pleural fluid.    MEDIASTINUM/AXILLAE: No pericardial fluid or lymphadenopathy. No esophageal dilatation.    CORONARY ARTERY CALCIFICATION: Mild.    UPPER ABDOMEN: Unremarkable.    MUSCULOSKELETAL: Postoperative changes thoracic spine creating streak artifact.      Impression    IMPRESSION:  1.  No evidence for pulmonary embolism.    2.  No aortic dissection. Mild aortic dilatation at 3.4 cm.    3.  Ground-glass opacity involving the left lower lobe anteriorly is new since prior examination and concerning for an acute infectious or inflammatory etiology but should undergo continued CT follow-up as detailed below at 6 months.    4.  Multiple new nodules which are ill-defined along the margins of throughout both lungs measuring upwards of 6 mm. These may represent infectious or inflammatory nodularity.     5.  Recommend follow-up at 6 months as detailed below.    6.  Bronchiectasis with bronchial wall thickening which can be seen with underlying bronchitis.    7.  Fibrotic scarring involving both lungs.    REFERENCE:  Guidelines for Management of Incidental Pulmonary Nodules Detected on CT Images: From the Fleischner Society 2017.   Guidelines apply to incidental nodules in patients who are 35 years or older.  Guidelines do not apply to lung cancer screening, patients with immunosuppression, or patients with known primary cancer.    SUBSOLID NODULES  Ground glass    Nodule size 6 mm or greater  CT at 6-12 months to confirm persistence, then CT every 2 years until  5 years.   US Abdomen Complete     Status: None    Narrative    EXAMINATION: Limited Abdominal Ultrasound, 7/23/2022 11:27 AM     COMPARISON: None.    HISTORY: Positive Calderón's sign, history of gallstones    FINDINGS:   Fluid: No evidence of ascites or pleural effusions.    Liver: The liver demonstrates normal echotexture, measuring 14.9 cm in  craniocaudal dimension. There is no focal mass.     Gallbladder: There are echogenic mobile stones in the gallbladder  lumen. No pericholecystic fluid, gallbladder wall thickening or  positive sonographic Calderón's sign.    Bile Ducts: Both the intra- and extrahepatic biliary system are of  normal caliber.  The common bile duct measures 4 mm in diameter.    Pancreas: The visualized portions appear unremarkable.    Kidney: The right kidney measures 8.9  cm, and the left kidney  measures 9.2 cm long. There is no hydronephrosis or hydroureter, no  shadowing renal calculi, cystic lesion or mass.       Impression    IMPRESSION:   1.  Cholelithiasis without evidence of acute cholecystitis.    I have personally reviewed the examination and initial interpretation  and I agree with the findings.    ALLEY GUERRERO MD         SYSTEM ID:  [id number]   [place] Draw     Status: None    Narrative    The following orders were created for panel order [place] Draw.  Procedure                               Abnormality         Status                     ---------                               -----------         ------                     Extra Blue Top Tube[[id number]]                              Final result               Extra Red Top Tube[[id number]]                               Final result               Extra Green Top (Lithium...[[id number]]                      Final result               Extra Purple Top Tube[[id number]]                            Final result                 Please view results for these tests on the individual orders.   Comprehensive metabolic panel     Status: Abnormal    Result Value Ref Range    Sodium 134 (L) 136 - 145 mmol/L    Potassium 4.1 3.4 - 5.3 mmol/L    Creatinine 0.43 (L) 0.51 - 0.95 mg/dL    Urea Nitrogen 16.1 8.0 - 23.0 mg/dL    Chloride 97 (L) 98 - 107 mmol/L    Carbon Dioxide (CO2) 26 22 - 29 mmol/L    Anion Gap 11 7 - 15 mmol/L    Glucose 109 (H) 70 - 99 mg/dL    Calcium 9.6 8.8 - 10.2 mg/dL    Protein Total 7.6 6.4 - 8.3 g/dL    Albumin 4.0 3.5 - 5.2 g/dL    Bilirubin Total 0.2 <=1.2 mg/dL    Alkaline Phosphatase 109 (H) 35 - 104 U/L    AST 35 10 - 35 U/L    ALT 14 10 - 35 U/L    GFR Estimate >90 >60 mL/min/1.73m2   Troponin T, High Sensitivity     Status: Normal   Result Value Ref Range    Troponin T, High Sensitivity 6 <=14 ng/L   Extra Blue Top Tube     Status: None   Result Value Ref Range    Hold Specimen JIC    Extra Red Top Tube     Status: None   Result Value Ref Range    Hold Specimen JIC    Extra Green Top (Lithium Heparin) Tube     Status: None   Result Value Ref Range    Hold Specimen JIC    Extra Purple Top Tube     Status: None   Result Value Ref Range    Hold Specimen JIC    CBC with platelets and differential     Status: Abnormal   Result Value Ref Range    WBC Count 13.7 (H) 4.0 - 11.0 10e3/uL    RBC Count 4.06 3.80 - 5.20 10e6/uL    Hemoglobin 12.0 11.7 - 15.7 g/dL    Hematocrit 37.5 35.0 - 47.0 %    MCV 92 78 - 100 fL    MCH 29.6 26.5 - 33.0 pg    MCHC 32.0 31.5 - 36.5 g/dL    RDW 12.9 10.0 - 15.0 %    Platelet Count 298 150 - 450 10e3/uL    % Neutrophils 88 %    % Lymphocytes 6 %    % Monocytes 5 %    % Eosinophils 1 %    % Basophils 0 %    % Immature Granulocytes 0 %    NRBCs per 100 WBC 0 <1 /100    Absolute Neutrophils 12.0 (H) 1.6 - 8.3 10e3/uL    Absolute Lymphocytes 0.8 0.8 - 5.3 10e3/uL    Absolute Monocytes 0.7 0.0 - 1.3 10e3/uL    Absolute Eosinophils 0.1 0.0 - 0.7 10e3/uL    Absolute Basophils 0.1 0.0 - 0.2 10e3/uL    Absolute Immature Granulocytes 0.1 <=0.4 10e3/uL    Absolute NRBCs 0.0 10e3/uL   D dimer quantitative     Status:  Abnormal   Result Value Ref Range    D-Dimer Quantitative 0.57 (H) 0.00 - 0.50 ug/mL FEU    Narrative    This D-dimer assay is intended for use in conjunction with a clinical pretest probability assessment model to exclude pulmonary embolism (PE) and deep venous thrombosis (DVT) in outpatients suspected of PE or DVT. The cut-off value is 0.50 ug/mL FEU.   Lipase     Status: Abnormal   Result Value Ref Range    Lipase 183 (H) 13 - 60 U/L   Troponin T, High Sensitivity     Status: Normal   Result Value Ref Range    Troponin T, High Sensitivity 7 <=14 ng/L   iStat Troponin, POCT     Status: Normal   Result Value Ref Range    TROPPC POCT 0.01 <=0.12 ug/L   Asymptomatic COVID-19 Virus (Coronavirus) by PCR Midturbinate     Status: Normal    Specimen: Midturbinate; Swab   Result Value Ref Range    SARS CoV2 PCR Negative Negative, Testing sent to reference lab. Results will be returned via unsolicited result    Narrative    Testing was performed using the Xpert Xpress SARS-CoV-2 Assay on the  Cepheid Gene-Xpert Instrument Systems. Additional information about  this Emergency Use Authorization (EUA) assay can be found via the Lab  Guide. This test should be ordered for the detection of SARS-CoV-2 in  individuals who meet SARS-CoV-2 clinical and/or epidemiological  criteria. Test performance is unknown in asymptomatic patients. This  test is for in vitro diagnostic use under the FDA EUA for  laboratories certified under CLIA to perform high complexity testing.  This test has not been FDA cleared or approved. A negative result  does not rule out the presence of PCR inhibitors in the specimen or  target RNA in concentration below the limit of detection for the  assay. The possibility of a false negative should be considered if  the patient's recent exposure or clinical presentation suggests  COVID-19. This test was validated by the [facility]. This laboratory is certified  under  the Clinical Laboratory Improvement Amendments of 1988 (CLIA-88) as  qualified to perform high complexity laboratory testing.     Procalcitonin     Status: Abnormal   Result Value Ref Range    Procalcitonin 0.05 (H) <0.05 ng/mL   Creatinine     Status: Abnormal   Result Value Ref Range    Creatinine 0.43 (L) 0.52 - 1.04 mg/dL    GFR Estimate >90 >60 mL/min/1.73m2   Comprehensive metabolic panel     Status: Abnormal   Result Value Ref Range    Sodium 137 136 - 145 mmol/L    Potassium 4.4 3.4 - 5.3 mmol/L    Creatinine 0.52 0.51 - 0.95 mg/dL    Urea Nitrogen 19.2 8.0 - 23.0 mg/dL    Chloride 102 98 - 107 mmol/L    Carbon Dioxide (CO2) 29 22 - 29 mmol/L    Anion Gap 6 (L) 7 - 15 mmol/L    Glucose 89 70 - 99 mg/dL    Calcium 9.3 8.8 - 10.2 mg/dL    Protein Total 6.8 6.4 - 8.3 g/dL    Albumin 3.6 3.5 - 5.2 g/dL    Bilirubin Total 0.4 <=1.2 mg/dL    Alkaline Phosphatase 92 35 - 104 U/L    AST 32 10 - 35 U/L    ALT 11 10 - 35 U/L    GFR Estimate >90 >60 mL/min/1.73m2   CBC with platelets     Status: Normal   Result Value Ref Range    WBC Count 7.1 4.0 - 11.0 10e3/uL    RBC Count 4.09 3.80 - 5.20 10e6/uL    Hemoglobin 12.0 11.7 - 15.7 g/dL    Hematocrit 37.7 35.0 - 47.0 %    MCV 92 78 - 100 fL    MCH 29.3 26.5 - 33.0 pg    MCHC 31.8 31.5 - 36.5 g/dL    RDW 13.2 10.0 - 15.0 %    Platelet Count 294 150 - 450 10e3/uL   Lipase     Status: Normal   Result Value Ref Range    Lipase 47 13 - 60 U/L   CRP inflammation     Status: Abnormal   Result Value Ref Range    CRP Inflammation 16.50 (H) <5.00 mg/L   Magnesium     Status: Normal   Result Value Ref Range    Magnesium 2.1 1.7 - 2.3 mg/dL   Phosphorus     Status: Normal   Result Value Ref Range    Phosphorus 3.0 2.5 - 4.5 mg/dL   EKG 12-lead, tracing only     Status: None   Result Value Ref Range    Systolic Blood Pressure  mmHg    Diastolic Blood Pressure  mmHg    Ventricular Rate 81 BPM    Atrial Rate 81 BPM    DE Interval 162 ms    QRS Duration 90 ms     ms    QTc  446 ms    P Axis 66 degrees    R AXIS -18 degrees    T Axis 39 degrees    Interpretation ECG       Sinus rhythm with sinus arrhythmia  Normal ECG  Unconfirmed report - interpretation of this ECG is computer generated - see medical record for final interpretation    Confirmed by - EMERGENCY ROOM, PHYSICIAN ([id number]),  TOSHA ESTRADA ([id number]) on 7/24/2022 7:35:13 AM     Respiratory Aerobic Bacterial Culture     Status: Abnormal (Preliminary result)    Specimen: Expectorate; Sputum   Result Value Ref Range    Culture Culture in progress     Culture 4+ Normal kenji     Culture 2+ Achromobacter xylosoxidans/denitrificans (A)     Gram Stain Result <10 Squamous epithelial cells/low power field (A)     Gram Stain Result >25 PMNs/low power field (A)     Gram Stain Result 1+ Gram positive cocci (A)    Blood Culture Hand, Left     Status: Normal (Preliminary result)    Specimen: Hand, Left; Blood   Result Value Ref Range    Culture No growth after 2 days    Blood Culture Hand, Right     Status: Normal (Preliminary result)    Specimen: Hand, Right; Blood   Result Value Ref Range    Culture No growth after 2 days    MRSA MSSA PCR, Nasal Swab     Status: None    Specimen: Nare, Left; Swab   Result Value Ref Range    MRSA Target DNA Negative Negative    SA Target DNA Negative     Narrative    The [employer]  Xpert SA Nasal Complete assay performed in the [employer]  Dx System is a qualitative in vitro diagnostic test designed for rapid detection of Staphylococcus aureus (SA) and methicillin-resistant Staphylococcus aureus (MRSA) from nasal swabs in patients at risk for nasal colonization. The test utilizes automated real-time polymerase chain reaction (PCR) to detect MRSA/SA DNA. The Xpert SA Nasal Complete assay is intended to aid in the prevention and control of MRSA/SA infections in healthcare settings. The assay is not intended to diagnose, guide or monitor treatment for MRSA/SA infections, or provide results of susceptibility to  methicillin. A negative result does not preclude MRSA/SA nasal colonization.    CBC with platelets differential     Status: Abnormal    Narrative    The following orders were created for panel order CBC with platelets differential.  Procedure                               Abnormality         Status                     ---------                               -----------         ------                     CBC with platelets and d...[[id number]]  Abnormal            Final result                 Please view results for these tests on the individual orders.     Medications   acetylcysteine (MUCOMYST) 10 % nebulizer solution 4 mL (4 mLs Inhalation Not Given 7/25/22 0731)   albuterol (PROVENTIL HFA/VENTOLIN HFA) inhaler (has no administration in time range)   montelukast (SINGULAIR) tablet 10 mg (10 mg Oral Given 7/24/22 1955)   sodium chloride (OCEAN) 0.65 % nasal spray 2 spray (2 sprays Nasal Given 7/25/22 0819)   cefTRIAXone (ROCEPHIN) 1 g vial to attach to  mL bag for ADULTS or NS 50 mL bag for PEDS (1 g Intravenous New Bag 7/25/22 0601)   azithromycin (ZITHROMAX) tablet 250 mg (250 mg Oral Given 7/25/22 0815)   fluticasone-vilanterol (BREO ELLIPTA) 100-25 MCG/INH inhaler 1 puff (1 puff Inhalation Given 7/25/22 0814)   umeclidinium (INCRUSE ELLIPTA) 62.5 MCG/INH inhaler 1 puff (1 puff Inhalation Given 7/25/22 0814)   lidocaine 1 % 0.1-1 mL (has no administration in time range)   lidocaine (LMX4) cream (has no administration in time range)   sodium chloride (PF) 0.9% PF flush 3 mL (3 mLs Intracatheter Given 7/25/22 1443)   sodium chloride (PF) 0.9% PF flush 3 mL (has no administration in time range)   melatonin tablet 1 mg (has no administration in time range)   enoxaparin ANTICOAGULANT (LOVENOX) injection 40 mg (40 mg Subcutaneous Given 7/25/22 0813)   cetirizine (zyrTEC) tablet 5 mg (5 mg Oral Given 7/24/22 2053)   escitalopram (LEXAPRO) tablet 5 mg (5 mg Oral Given 7/25/22 0815)   oxybutynin ER (DITROPAN XL) 24  hr tablet 5 mg (5 mg Oral Given 7/25/22 0818)   calcium carbonate-vitamin D (OS-MAGAN with D) per tablet 1 tablet (1 tablet Oral Given 7/25/22 0815)   celecoxib (celeBREX) capsule 200 mg (200 mg Oral Given 7/25/22 0817)   pregabalin (LYRICA) capsule 100 mg (100 mg Oral Given 7/24/22 1955)   pregabalin (LYRICA) capsule 50 mg (50 mg Oral Given 7/25/22 0818)   acetaminophen (TYLENOL) tablet 975 mg (975 mg Oral Given 7/24/22 2238)   sodium chloride (NEBUSAL) 3 % neb solution 3 mL (has no administration in time range)   iopamidol (ISOVUE-370) solution 49 mL (49 mLs Intravenous Given 7/23/22 0155)   sodium chloride (PF) 0.9% PF flush 80 mL (80 mLs Intravenous Given 7/23/22 0155)   azithromycin (ZITHROMAX) tablet 500 mg (500 mg Oral Given 7/23/22 0615)        Assessments & Plan (with Medical Decision Making)   EKG shows normal sinus rhythm without signs of acute ischemia.  Troponin negative x1.  Chest x-ray without any acute findings.  Labs notable for a mild leukocytosis of 13.7 with a left shift of 12 neutrophils.  No significant transaminitis or bilirubin elevation just hepatobiliary pathology.  No significant lipase elevation to suggest acute pancreatitis.  D-dimer is elevated.  Plan for CT PE study.  Normal electrolytes.  Normal renal function.    Lungs are clear on exam, doubt COPD exacerbation.    Plan to sign out to overnight provider with plan to follow-up on CT study and disposition accordingly.  If CT negative, anticipate admission for chest pain rule out.    I have reviewed the nursing notes. I have reviewed the findings, diagnosis, plan and need for follow up with the patient.    Current Discharge Medication List          Final diagnoses:   Chest pain in adult     I, Sarah Martin, am serving as a trained medical scribe to document services personally performed by Randal Mcdaniel DO based on the provider's statements to me on July 23, 2022.  This document has been checked and approved by the attending  provider.    I, Randal Mcdaniel DO, was physically present and have reviewed and verified the accuracy of this note documented by Sarah Martin, medical scribe.      Randal Mcdaniel DO      --  Randal Mcdaniel DO  [facility] EMERGENCY DEPARTMENT  7/22/2022     Randal Mcdaniel DO  07/25/22 3420

## 2022-07-23 NOTE — PROGRESS NOTES
Brief Hospital Progress Note    Assessment and plan:  Fiordaliza Najera is a 77 year old female admitted on 7/22/2022. She has a history of COPD on 2L home O2 and bronchiectasis who is admitted for pleuritic chest pain concerning for community acquired pneumonia.   Overall, patient appears to be doing well clinically at this time. Possible discharge tomorrow pending infectious workup.       # Pleuritis  # Leukocytosis - suspect steroid induced   #Community Acquired Pneumonia, suspect   # Hx of MDR Pneumonia w/ Achromobacter  # Acute COPD exacerbation. Pt is on 2L at baseline   Uses home O2 at baseline, 2L. On Albuterol q 6 PRN, Trelegy 1x daily at home, and Singulair 10 mg daily.  CXR showed chronic interstitial scarring without obvious infiltrates. CT-PE w/o PE, does show bronchiectasis,  ground-glass opacity suspicious for acute infectious vs inflammatory process in addition to multiple new nodules. WBC 13.7. COVID swab negative. Serial troponin normal, EKG w/ NSR. Procalcitonin ordered by admitting provider but not collected. Patient reports taking 1x dose of Augmentin and 20 mg of prednisone PTA.  - On 2L/NC at baseline, currently requiring 4 liters.   - She has no wheezing on exam   - Suspect leukocytosis is more likely 2/2 taking prednisone PTA and not a sign of acute PNA  - Patient has significant hx of MDR PNA back in November and September 2021 with Achromobacter that was only sensitive to Zosyn and Meropenem   - Initial sputum gram stain with 1+ Gram (+) cocci which would not be consistent with Achromobacter   - Will check MRSA swab due to gram(+) cocci   - Ceftriaxone 1g daily - transition to oral at time of discharge   - Azithromycin 500mg once, 250mg x4  Days    # Hyponatremia   Sodium 134  - Encourage PO intake     # Multiple pulmonary nodules   Noted on CT PE study 7/23 with largest in size 6 mm.  - 6 month follow up recommended     #Neuropathy  Patient with neuropathy following spinal surgery and  "hardware placement.  -Continue lyrica     #Urinary urgency  -Continue oxybutynin    # Hx of Migraines   - sumatriptan PRN    #Abdominal tenderness w/ positive Calderón's sign   Pt w/ known hx of cholelithiasis on recent US on 7/1. She does have + calderón's sign on exam and she had a mildly elevated lipase at 183  - Rechecked RUQ US which showed cholelithiasis without cholecystitis   - No further work up indicated at this time. Defer to outpatient management     Patient was previously seen this morning by admitting provided. No charge for my services today.     Diet:   Adult diet            DVT Prophylaxis: Enoxaparin (Lovenox) SQ  Mobley Catheter: Not present  Fluids: N/A  Cardiac Monitoring: None  Code Status:   DNR/DNI    Objective:  /69   Pulse 81   Temp 98.3  F (36.8  C) (Oral)   Resp 16   Ht 1.588 m (5' 2.5\")   Wt 47.6 kg (105 lb)   SpO2 96%   BMI 18.90 kg/m      Vitals signs reviewed and noted  Constitutional: awake, alert, cooperative, in no acute distress. A&Ox3  Eyes: EOM, PERRLA. Sclera clear, conjunctiva normal. Anicteric   ENT: Normocephalic, without obvious abnormality, atraumatic.   Respiratory: No increased work of breathing. Distant breath sound bilaterally without any appreciable wheezing   Cardiovascular: RRR. No murmur or friction rub. No edema. No chest wall tenderness.  GI: Soft, non-tender without rebound or guarding. Bowel sounds are active.    Skin: no bruising or bleeding. Normal skin color, No jaundice  Neurologic: Cranial nerves II-XII are grossly intact.   Neuropsychiatric: General: normal, calm and normal eye contact. Normal affect      Pertinent labs and procedures were reviewed     Subjective:     Pt reports doing well on my exam. Breathing slowly improving. She reports taking 1 Augmentin and 1 Prednisone (dose unknown) prior to coming to the hospital yesterday. She keeps these at home in case of COPD exacerbations.     Isaac Woods PA-C  Internal Medicine [facility] Hospitalist  [facility]  Pager [phone]

## 2022-07-24 LAB
ALBUMIN SERPL BCG-MCNC: 3.6 G/DL (ref 3.5–5.2)
ALP SERPL-CCNC: 92 U/L (ref 35–104)
ALT SERPL W P-5'-P-CCNC: 11 U/L (ref 10–35)
ANION GAP SERPL CALCULATED.3IONS-SCNC: 6 MMOL/L (ref 7–15)
AST SERPL W P-5'-P-CCNC: 32 U/L (ref 10–35)
ATRIAL RATE - MUSE: 81 BPM
BILIRUB SERPL-MCNC: 0.4 MG/DL
BUN SERPL-MCNC: 19.2 MG/DL (ref 8–23)
CALCIUM SERPL-MCNC: 9.3 MG/DL (ref 8.8–10.2)
CHLORIDE SERPL-SCNC: 102 MMOL/L (ref 98–107)
CREAT SERPL-MCNC: 0.52 MG/DL (ref 0.51–0.95)
CRP SERPL-MCNC: 16.5 MG/L
DEPRECATED HCO3 PLAS-SCNC: 29 MMOL/L (ref 22–29)
DIASTOLIC BLOOD PRESSURE - MUSE: NORMAL MMHG
ERYTHROCYTE [DISTWIDTH] IN BLOOD BY AUTOMATED COUNT: 13.2 % (ref 10–15)
GFR SERPL CREATININE-BSD FRML MDRD: >90 ML/MIN/1.73M2
GLUCOSE SERPL-MCNC: 89 MG/DL (ref 70–99)
HCT VFR BLD AUTO: 37.7 % (ref 35–47)
HGB BLD-MCNC: 12 G/DL (ref 11.7–15.7)
INTERPRETATION ECG - MUSE: NORMAL
LIPASE SERPL-CCNC: 47 U/L (ref 13–60)
MAGNESIUM SERPL-MCNC: 2.1 MG/DL (ref 1.7–2.3)
MCH RBC QN AUTO: 29.3 PG (ref 26.5–33)
MCHC RBC AUTO-ENTMCNC: 31.8 G/DL (ref 31.5–36.5)
MCV RBC AUTO: 92 FL (ref 78–100)
P AXIS - MUSE: 66 DEGREES
PHOSPHATE SERPL-MCNC: 3 MG/DL (ref 2.5–4.5)
PLATELET # BLD AUTO: 294 10E3/UL (ref 150–450)
POTASSIUM SERPL-SCNC: 4.4 MMOL/L (ref 3.4–5.3)
PR INTERVAL - MUSE: 162 MS
PROT SERPL-MCNC: 6.8 G/DL (ref 6.4–8.3)
QRS DURATION - MUSE: 90 MS
QT - MUSE: 384 MS
QTC - MUSE: 446 MS
R AXIS - MUSE: -18 DEGREES
RBC # BLD AUTO: 4.09 10E6/UL (ref 3.8–5.2)
SODIUM SERPL-SCNC: 137 MMOL/L (ref 136–145)
SYSTOLIC BLOOD PRESSURE - MUSE: NORMAL MMHG
T AXIS - MUSE: 39 DEGREES
VENTRICULAR RATE- MUSE: 81 BPM
WBC # BLD AUTO: 7.1 10E3/UL (ref 4–11)

## 2022-07-24 PROCEDURE — 80053 COMPREHEN METABOLIC PANEL: CPT | Performed by: STUDENT IN AN ORGANIZED HEALTH CARE EDUCATION/TRAINING PROGRAM

## 2022-07-24 PROCEDURE — 96376 TX/PRO/DX INJ SAME DRUG ADON: CPT

## 2022-07-24 PROCEDURE — 85014 HEMATOCRIT: CPT | Performed by: STUDENT IN AN ORGANIZED HEALTH CARE EDUCATION/TRAINING PROGRAM

## 2022-07-24 PROCEDURE — 250N000013 HC RX MED GY IP 250 OP 250 PS 637: Performed by: STUDENT IN AN ORGANIZED HEALTH CARE EDUCATION/TRAINING PROGRAM

## 2022-07-24 PROCEDURE — 120N000011 HC R&B TRANSPLANT UMMC

## 2022-07-24 PROCEDURE — 96372 THER/PROPH/DIAG INJ SC/IM: CPT

## 2022-07-24 PROCEDURE — 86140 C-REACTIVE PROTEIN: CPT | Performed by: STUDENT IN AN ORGANIZED HEALTH CARE EDUCATION/TRAINING PROGRAM

## 2022-07-24 PROCEDURE — G0378 HOSPITAL OBSERVATION PER HR: HCPCS

## 2022-07-24 PROCEDURE — 36415 COLL VENOUS BLD VENIPUNCTURE: CPT | Performed by: STUDENT IN AN ORGANIZED HEALTH CARE EDUCATION/TRAINING PROGRAM

## 2022-07-24 PROCEDURE — 99207 PR APP CREDIT; MD BILLING SHARED VISIT: CPT | Performed by: STUDENT IN AN ORGANIZED HEALTH CARE EDUCATION/TRAINING PROGRAM

## 2022-07-24 PROCEDURE — 999N000128 HC STATISTIC PERIPHERAL IV START W/O US GUIDANCE

## 2022-07-24 PROCEDURE — 83735 ASSAY OF MAGNESIUM: CPT | Performed by: STUDENT IN AN ORGANIZED HEALTH CARE EDUCATION/TRAINING PROGRAM

## 2022-07-24 PROCEDURE — 99233 SBSQ HOSP IP/OBS HIGH 50: CPT | Mod: FS | Performed by: STUDENT IN AN ORGANIZED HEALTH CARE EDUCATION/TRAINING PROGRAM

## 2022-07-24 PROCEDURE — 83690 ASSAY OF LIPASE: CPT | Performed by: STUDENT IN AN ORGANIZED HEALTH CARE EDUCATION/TRAINING PROGRAM

## 2022-07-24 PROCEDURE — 250N000011 HC RX IP 250 OP 636

## 2022-07-24 PROCEDURE — 84100 ASSAY OF PHOSPHORUS: CPT | Performed by: STUDENT IN AN ORGANIZED HEALTH CARE EDUCATION/TRAINING PROGRAM

## 2022-07-24 PROCEDURE — 250N000013 HC RX MED GY IP 250 OP 250 PS 637

## 2022-07-24 RX ADMIN — CETIRIZINE HYDROCHLORIDE 5 MG: 5 TABLET ORAL at 20:53

## 2022-07-24 RX ADMIN — Medication 1 TABLET: at 19:55

## 2022-07-24 RX ADMIN — Medication 1 TABLET: at 08:15

## 2022-07-24 RX ADMIN — OXYBUTYNIN CHLORIDE 5 MG: 5 TABLET, EXTENDED RELEASE ORAL at 08:14

## 2022-07-24 RX ADMIN — ENOXAPARIN SODIUM 40 MG: 40 INJECTION SUBCUTANEOUS at 08:15

## 2022-07-24 RX ADMIN — CEFTRIAXONE SODIUM 1 G: 1 INJECTION, POWDER, FOR SOLUTION INTRAMUSCULAR; INTRAVENOUS at 05:31

## 2022-07-24 RX ADMIN — PREGABALIN 100 MG: 100 CAPSULE ORAL at 19:55

## 2022-07-24 RX ADMIN — PREGABALIN 50 MG: 50 CAPSULE ORAL at 08:15

## 2022-07-24 RX ADMIN — MONTELUKAST 10 MG: 10 TABLET, FILM COATED ORAL at 19:55

## 2022-07-24 RX ADMIN — ACETAMINOPHEN 975 MG: 325 TABLET, FILM COATED ORAL at 22:38

## 2022-07-24 RX ADMIN — AZITHROMYCIN MONOHYDRATE 250 MG: 250 TABLET ORAL at 08:14

## 2022-07-24 RX ADMIN — SALINE NASAL SPRAY 2 SPRAY: 1.5 SOLUTION NASAL at 08:15

## 2022-07-24 RX ADMIN — ESCITALOPRAM 5 MG: 5 TABLET, FILM COATED ORAL at 08:14

## 2022-07-24 RX ADMIN — CELECOXIB 200 MG: 200 CAPSULE ORAL at 08:21

## 2022-07-24 RX ADMIN — ACETAMINOPHEN 975 MG: 325 TABLET, FILM COATED ORAL at 00:12

## 2022-07-24 ASSESSMENT — ACTIVITIES OF DAILY LIVING (ADL)
ADLS_ACUITY_SCORE: 24

## 2022-07-24 NOTE — PROVIDER NOTIFICATION
LIP On call notified about pt needing extra strength tylenol and observation goals.     LIP on call stated he was unsure if he could put in observation goals.

## 2022-07-24 NOTE — PLAN OF CARE
"/68 (BP Location: Right arm)   Pulse 87   Temp 98  F (36.7  C) (Oral)   Resp 17   Ht 1.588 m (5' 2.5\")   Wt 47.6 kg (105 lb)   SpO2 97%   BMI 18.90 kg/m      Shift: 3-730p  VS: Stable on 2LPM of O2 Via NC, afebrile  Cardiac: WDL, HRR  Neuro: Aox4, calm and cooperative  B with AM labs.   Labs: Unremarkable, sputum sample sent to lab and sensitivities pending.   Respiratory: Diminished throughout, does report non-productive cough and using oxygen via NC at 2LPM to maintain saturation > 90%.   Pain/Nausea/PRN: Denies pain or nausea this shift.   Diet: Regular and tolerates well.   LDA: PIV SL  GI/: Continent of bowel and bladder. Last BM was , voids urine without difficulty.   Skin: WDL,   Mobility: UAL.   Plan: Continue to monitor, possible discharge home tomorrow.     Will give report to oncoming nurse. Pt left in stable condition, care relinquished at this time.       "

## 2022-07-24 NOTE — PROGRESS NOTES
Pt aox-4, denies pain, vss. SOB with exertion. 2L NC. Up ad adriana. Neuro intact.     Possible discharge tomorrow, pending antibiotic treatment.       Pt currently lying comfortably in bed with no distress.

## 2022-07-24 NOTE — UTILIZATION REVIEW
Admission Status; Secondary Review Determination       Under the authority of the Utilization Management Committee, the utilization review process indicated a secondary review on the above patient. The review outcome is based on review of the medical records, discussions with staff, and applying clinical experience noted on the date of the review.     (x) Inpatient Status Appropriate - This patient's medical care is consistent with medical management for inpatient care and reasonable inpatient medical practice.     RATIONALE FOR DETERMINATION      Patient requires inpatient admission versus short stay observation or outpatient treatment for the following reasons:  77-year-old woman with past medical history significant for bronchiectasis  with prior multidrug resistance exacerbation ,  COPD, chronic hypoxemia on 2 L of oxygen received po antibiotics 2 days prior to this admission for cough and chest pain.   She presented to emergency room with chest pain, leukocytosis, worsening hypoxemia requiring 4 L of oxygen.  She was diagnosed with new left lower lobe infiltrate and started on IV antibiotics.    The patient has history of multidrug resistance bronchiectasis, has new lung infiltrate, failed outpatient antibiotics, required 4L O2 in ER for hypoxemia.    The patient needs to continue the iv antibiotics for now. The sputum culture and sensitivity will indicate if the patient  will be transitioned to oral antibiotic or there will be a need to arrange for home iv antibiotics.     The expected length of stay at the time of admission was more than 2 nights because of the severity of illness, intensity of service provided, and risk for adverse outcome. Inpatient admission is appropriate.         This document was produced using voice recognition software       The information on this document is developed by the utilization review team in order for the business office to ensure compliance. This only denotes the  appropriateness of proper admission status and does not reflect the quality of care rendered.   The definitions of Inpatient Status and Observation Status used in making the determination above are those provided in the CMS Coverage Manual, Chapter 1 and Chapter 6, section 70.4.   Sincerely,   DANETTE CONSTANTINO MD   Utilization Review  Physician Advisor  [facility]

## 2022-07-24 NOTE — PROGRESS NOTES
Observation Goals:  Hyponatremia improved: goal not met   Infectious workup complete: goal no met  Stability in vital signs and oxygenation: goal met pt is at baseline

## 2022-07-24 NOTE — PLAN OF CARE
A&O times 4. Pt denies nausea and dizziness. Pt has mild CP and shoulder pain prn medication given per MAR. Pt states that her CP has not changed location or feeling but has decreased in pain. Pt told to call if the pain changes or get worse. Pt states it can be intermittent. Pt has SOB but is not different from baseline due to her COPD. Pt has no further needs and I safe with call light in reach.    Plan of Care Reviewed With: patient     Overall Patient Progress: no change

## 2022-07-24 NOTE — PROGRESS NOTES
[facility]    Medicine Progress Note - Hospitalist Service, GOLD TEAM 6    Date of Admission:  7/22/2022    Assessment & Plan    #Community Acquired Pneumonia, suspect   # Leukocytosis - suspect steroid induced   # Hx of MDR Pneumonia w/ Achromobacter  # Acute COPD exacerbation. Pt is on 2L at baseline   Uses home O2 at baseline, 2L. On Albuterol q 6 PRN, Trelegy 1x daily at home, and Singulair 10 mg daily.  CXR showed chronic interstitial scarring without obvious infiltrates. CT-PE w/o PE, does show bronchiectasis,  ground-glass opacity suspicious for acute infectious vs inflammatory process in addition to multiple new nodules. WBC 13.7. COVID swab negative. Procalcitonin ordered by admitting provider but not collected. Patient reports taking 1x dose of Augmentin and 20 mg of prednisone PTA. MRSA swab negative.   - Pt continues to required up to 4 L/NC at rest and with   - Patient has significant hx of MDR PNA back in November and September 2021 with Achromobacter that was only sensitive to Zosyn and Meropenem. At that time, she was discharged to TCU and required ~3 weeks of IV Abx   -Preliminary sputum gram stain with 1+ Gram (+) cocci which would not be consistent with Achromobacter.  However, if final culture results do show MDR organism the patient will require continued IV Abx for an extended perioid of time  - Recommend awaiting final culture results and continuing IV Abx until discharge   - Ceftriaxone 1g daily - transition to oral at time of discharge   - Azithromycin 500mg once, 250mg x4  Days   - If sputum Cx results return negative, we will transition her back to her PTA Augmentin for 7-10 days and discharge on PO Abx in 1-2 days    - No wheezing on exam so will not initiate steroids     # Pleuritis  # Chest pain   Seral troponins were negative. EKG showed NSR. She has not had classic ACS symptoms of radiating pain or exertional chest pain. Suspect pain is  2/2 to pleurisy.  - No further workup at this time     # Multiple pulmonary nodules   Noted on CT PE study 7/23 with largest in size 6 mm.  - 6 month follow up recommended      # Hyponatremia - resolved   - Encourage PO intake      #Neuropathy  Patient with neuropathy following spinal surgery and hardware placement.  -Continue lyrica     #Urinary urgency  -Continue oxybutynin     # Hx of Migraines   - sumatriptan PRN     #Abdominal tenderness w/ positive Calderón's sign   Pt w/ known hx of cholelithiasis on recent US on 7/1. She does have + calderón's sign on exam and she had a mildly elevated lipase at 183  - Rechecked RUQ US which showed cholelithiasis without cholecystitis   - No further work up indicated at this time. Defer to outpatient management          Diet: Combination Diet Regular Diet Adult    DVT Prophylaxis: Enoxaparin (Lovenox) SQ  Mobley Catheter: Not present  Central Lines: None  Cardiac Monitoring: None  Code Status: No CPR- Do NOT Intubate      Disposition Plan     Expected Discharge Date: 07/24/2022      Destination: home          The patient's care was discussed with the Attending Physician, Dr. Lynn and Patient.    Isaac Woods PA-C  Hospitalist Service, GOLD TEAM [address] [place] [address]  Securely message with the Vocera Web Console (learn more here)  Text page via [facility] Paging/Directory   Please see signed in provider for up to date coverage information      Clinically Significant Risk Factors Present on Admission                          ______________________________________________________________________    Interval History   Pt pleasant, doing well. Denies SOB, fevers, chills or sweats today. We discussed pending culture results.    Data reviewed today: I reviewed all medications, new labs and imaging results over the last 24 hours. Please see discussion of these results in the A/P.    Physical Exam     Vital Signs: Temp: 97.9  F (36.6  C) Temp src: Oral  BP: 107/65 Pulse: 71     Resp: 18 SpO2: 99 % O2 Device: Nasal cannula Oxygen Delivery: 2 LPM  Weight: 105 lbs 0 oz    Constitutional: awake, alert, cooperative, in no acute distress. A&Ox3  Eyes: EOM, PERRLA. Sclera clear, conjunctiva normal. Anicteric   ENT: Normocephalic, without obvious abnormality, atraumatic.   Respiratory: No increased work of breathing. Distant breath sound bilaterally without any appreciable wheezing   Cardiovascular: RRR. No murmur or friction rub. No edema. No chest wall tenderness.  GI: Soft, non-tender without rebound or guarding. Bowel sounds are active.   Skin: no bruising or bleeding. Normal skin color, No jaundice  Musculoskeletal:  Full range of motion noted.    Neurologic: Cranial nerves II-XII are grossly intact.   Neuropsychiatric: General: normal, calm and normal eye contact. Normal affect        Data   Recent Labs   Lab 07/24/22  0744 07/23/22  0029 07/22/22  2206   WBC 7.1  --  13.7*   HGB 12.0  --  12.0   MCV 92  --  92     --  298     --  134*   POTASSIUM 4.4  --  4.1   CHLORIDE 102  --  97*   CO2 29  --  26   BUN 19.2  --  16.1   CR 0.52 0.43* 0.43*   ANIONGAP 6*  --  11   MAGAN 9.3  --  9.6   GLC 89  --  109*   ALBUMIN 3.6  --  4.0   PROTTOTAL 6.8  --  7.6   BILITOTAL 0.4  --  0.2   ALKPHOS 92  --  109*   ALT 11  --  14   AST 32  --  35   LIPASE 47  --  183*       No results found for this or any previous visit (from the past 24 hour(s)).

## 2022-07-24 NOTE — PROGRESS NOTES
Pt aox-4, SOB at baseline, currently on 2L oxygen stating 96&.   Up ad adriana in room. Lungs diminished. 2 nurse skin assessment completed with Lizett MARTINEZ, no significant findings.     Pt comfortably sleeping in bed with no distress or safety issues.       Will continue with plan of care.

## 2022-07-25 PROCEDURE — 99207 PR APP CREDIT; MD BILLING SHARED VISIT: CPT | Performed by: PHYSICIAN ASSISTANT

## 2022-07-25 PROCEDURE — 120N000002 HC R&B MED SURG/OB UMMC

## 2022-07-25 PROCEDURE — 250N000013 HC RX MED GY IP 250 OP 250 PS 637: Performed by: EMERGENCY MEDICINE

## 2022-07-25 PROCEDURE — 250N000013 HC RX MED GY IP 250 OP 250 PS 637: Performed by: STUDENT IN AN ORGANIZED HEALTH CARE EDUCATION/TRAINING PROGRAM

## 2022-07-25 PROCEDURE — 99233 SBSQ HOSP IP/OBS HIGH 50: CPT | Mod: FS | Performed by: STUDENT IN AN ORGANIZED HEALTH CARE EDUCATION/TRAINING PROGRAM

## 2022-07-25 PROCEDURE — 250N000011 HC RX IP 250 OP 636

## 2022-07-25 PROCEDURE — 250N000013 HC RX MED GY IP 250 OP 250 PS 637

## 2022-07-25 RX ORDER — SODIUM CHLORIDE FOR INHALATION 3 %
3 VIAL, NEBULIZER (ML) INHALATION 2 TIMES DAILY
Status: DISCONTINUED | OUTPATIENT
Start: 2022-07-25 | End: 2022-07-27 | Stop reason: HOSPADM

## 2022-07-25 RX ADMIN — CETIRIZINE HYDROCHLORIDE 5 MG: 5 TABLET ORAL at 20:43

## 2022-07-25 RX ADMIN — OXYBUTYNIN CHLORIDE 5 MG: 5 TABLET, EXTENDED RELEASE ORAL at 08:18

## 2022-07-25 RX ADMIN — Medication 1 TABLET: at 20:42

## 2022-07-25 RX ADMIN — PREGABALIN 100 MG: 100 CAPSULE ORAL at 20:42

## 2022-07-25 RX ADMIN — CEFTRIAXONE SODIUM 1 G: 1 INJECTION, POWDER, FOR SOLUTION INTRAMUSCULAR; INTRAVENOUS at 06:01

## 2022-07-25 RX ADMIN — ACETAMINOPHEN 975 MG: 325 TABLET, FILM COATED ORAL at 21:37

## 2022-07-25 RX ADMIN — Medication 1 TABLET: at 08:15

## 2022-07-25 RX ADMIN — SALINE NASAL SPRAY 2 SPRAY: 1.5 SOLUTION NASAL at 08:19

## 2022-07-25 RX ADMIN — CELECOXIB 200 MG: 200 CAPSULE ORAL at 08:17

## 2022-07-25 RX ADMIN — AZITHROMYCIN MONOHYDRATE 250 MG: 250 TABLET ORAL at 08:15

## 2022-07-25 RX ADMIN — UMECLIDINIUM 1 PUFF: 62.5 AEROSOL, POWDER ORAL at 08:14

## 2022-07-25 RX ADMIN — MONTELUKAST 10 MG: 10 TABLET, FILM COATED ORAL at 20:43

## 2022-07-25 RX ADMIN — ENOXAPARIN SODIUM 40 MG: 40 INJECTION SUBCUTANEOUS at 08:13

## 2022-07-25 RX ADMIN — PREGABALIN 50 MG: 50 CAPSULE ORAL at 08:18

## 2022-07-25 RX ADMIN — ESCITALOPRAM 5 MG: 5 TABLET, FILM COATED ORAL at 08:15

## 2022-07-25 RX ADMIN — FLUTICASONE FUROATE AND VILANTEROL TRIFENATATE 1 PUFF: 100; 25 POWDER RESPIRATORY (INHALATION) at 08:14

## 2022-07-25 ASSESSMENT — ACTIVITIES OF DAILY LIVING (ADL)
ADLS_ACUITY_SCORE: 24
ADLS_ACUITY_SCORE: 28
ADLS_ACUITY_SCORE: 24
ADLS_ACUITY_SCORE: 28
ADLS_ACUITY_SCORE: 28
ADLS_ACUITY_SCORE: 24
ADLS_ACUITY_SCORE: 24
ADLS_ACUITY_SCORE: 28
ADLS_ACUITY_SCORE: 24

## 2022-07-25 NOTE — PLAN OF CARE
"Status:calm, cooperative, pleasant  Vitals: /57 (BP Location: Left arm)   Pulse 68   Temp 97.8  F (36.6  C) (Oral)   Resp 15   Ht 1.588 m (5' 2.5\")   Wt 47.6 kg (105 lb)   SpO2 97%   BMI 18.90 kg/m    Neuros: A/O x4, follows commands  IV: R PIV SL  Labs/Electrolytes: CRP 16.50  Resp/trach: 1L NC, tolerating  Diet: regular diet, tolerating  Bowel status: continent, no BM this shift  : continent, AUOP  Skin: WDL, heels cracked/peeling  Pain: R Shoulder pain, declined pain medication, tylenol available  Activity:IND/SBA with walker  Plan: possible discharge home                          "

## 2022-07-25 NOTE — PLAN OF CARE
"  Problem: Plan of Care - These are the overarching goals to be used throughout the patient stay.    Goal: Optimal Comfort and Wellbeing  Outcome: Ongoing, Progressing     Problem: Pain Acute  Goal: Acceptable Pain Control and Functional Ability  Outcome: Ongoing, Progressing  Intervention: Prevent or Manage Pain  Recent Flowsheet Documentation  Medication Review/Management: medications reviewed    Pt is alert and oriented. Pala. Call light appropriate. Denies pain at this time. /69 (BP Location: Left arm)   Pulse 74   Temp 98.6  F (37  C) (Oral)   Resp 18   Ht 1.588 m (5' 2.5\")   Wt 47.6 kg (105 lb)   SpO2 97%. Pt will be transferred to 5A. Report given to 5A RN.   "

## 2022-07-25 NOTE — PLAN OF CARE
Goal Outcome Evaluation:    Plan of Care Reviewed With: patient     Overall Patient Progress: improving    Outcome Evaluation: Admitted for chest pain & SOB. Pt denies pain. Administered IV abx. Awaiting sputum culture results to determine abx regimen, then able to discharge home with friend & with home O2. Pt went on several walks today.

## 2022-07-25 NOTE — PROGRESS NOTES
[facility]    Medicine Progress Note - Hospitalist Service, GOLD TEAM 6    Date of Admission:  7/22/2022    Assessment & Plan    #Community Acquired Pneumonia, suspect   # Leukocytosis, suspect steroid induced   # Hx of MDR Pneumonia w/ Achromobacter  # Acute COPD exacerbation. Pt is on 2L at baseline   Uses home O2 at baseline, 2L with exertion. On Albuterol q 6 PRN, Trelegy 1x daily at home, and Singulair 10 mg daily.  CXR showed chronic interstitial scarring without obvious infiltrates. CT-PE w/o PE, does show bronchiectasis,  ground-glass opacity suspicious for acute infectious vs inflammatory process in addition to multiple new nodules. WBC 13.7 and trended down to 7.1. COVID swab negative. Procalcitonin ordered by admitting provider but not collected. Patient reports taking 1x dose of Augmentin and 20 mg of prednisone PTA.  MRSA swab negative.   - Pt required up to 4 L/NC at rest and now back to baseline  - Significant hx of MDR PNA  in November and September 2021 with Achromobacter that was only sensitive to Zosyn and Meropenem. At that time, she was discharged to TCU and required ~3 weeks of IV Abx   -Preliminary sputum gram stain with 1+ Gram (+) cocci which would not be consistent with Achromobacter.  However, called micro lab and Gram (-) bacilli isolated. Awaiting final culture result which will likely be back 7/26.  She will require IV Abx if MDR organism.   - Await final culture results and continuing IV Abx until discharge   - Ceftriaxone 1g daily - transition to oral at time of discharge   - Azithromycin 500mg once, 250mg x4  Days   - If sputum Cx results return negative,  transition back to her PTA Augmentin for 7-10 days    - No wheezing on exam so no steroids indicated  -Restart PTA sodium chloride nebs BID per patient request     # Pleuritis  # Chest pain   Seral troponins were negative. EKG showed NSR. She has not had classic ACS symptoms of  radiating pain or exertional chest pain. Suspect pain is 2/2 to pleurisy.  - No further workup at this time     # Multiple pulmonary nodules   Noted on CT PE study 7/23 with largest in size 6 mm.  - 6 month follow up recommended      # Hyponatremia - resolved   - Encourage PO intake      #Neuropathy  Patient with neuropathy following spinal surgery and hardware placement.  -Continue lyrica     #Urinary urgency  -Continue oxybutynin     # Hx of Migraines   - Sumatriptan PRN     #Abdominal tenderness w/ positive Calderón's sign   Pt w/ known hx of cholelithiasis on recent US on 7/1. She had + calderón's sign on exam and mildly elevated lipase at 183. Now improved  - Rechecked RUQ US which showed cholelithiasis without cholecystitis   - No further work up indicated at this time. Defer to outpatient management         Diet: Combination Diet Regular Diet Adult    DVT Prophylaxis: Enoxaparin (Lovenox) SQ  Mobley Catheter: Not present  Central Lines: None  Cardiac Monitoring: None  Code Status: No CPR- Do NOT Intubate      Disposition Plan    Expected discharge date: 7/26 or 7/27/2022   Destination: Home. Lives at independent living facility but has support.   Discharge Comments: Awaiting final sputum clx results. Anticipate discharge 7/27          The patient's care was discussed with the Attending Physician, Dr. Lynn and Patient.    Carla Blair PA-C  Hospitalist Service, GOLD TEAM [address] [place] [address]  Securely message with the Vocera Web Console (learn more here)  Text page via [facility] Paging/Directory   Please see signed in provider for up to date coverage information                  ______________________________________________________________________    Interval History   Doing well. Chest pain resolved. No cough, wheezing, dizziness or abdominal pain. Uses wheelchair for ambulation.     Data reviewed today: I reviewed all medications, new labs and imaging results over the  last 24 hours. Please see discussion of these results in the A/P.    Physical Exam     Vital Signs: Temp: 97.8  F (36.6  C) Temp src: Oral BP: 116/57 Pulse: 68     Resp: 15 SpO2: 97 % O2 Device: None (Room air) Oxygen Delivery: 2 LPM  Weight: 105 lbs 0 oz    General: Alert and oriented x 3. NAD. Appears comfortable sitting in chair. Oxygen in lap.   HEENT: . Anicteric sclera.  Oral mucosa moist. Neck supple. No JVD.  Resp: No signs of respiratory distress. Lungs diminished but clear to ausculation bilaterally without rales, rhonchi or wheezes.   Cardiac: RRR. S1 and S2 normal intensity. No murmurs appreciated.   GI: Abdomen soft, non-tender, non-distended.  Bowel sounds present. No masses, hepatomegaly or splenomegaly.   : No ledesma  Neuro:  No focal deficits. Moves all extremities. Gait not tested.   Psych: Appropriate mood and affect.  Derm: Skin warm and dry. No rashes or skin breakdown. No jaundice.   Extremities: No cyanosis, clubbing or edema        Data   Recent Labs   Lab 07/24/22  0744 07/23/22  0029 07/22/22  2206   WBC 7.1  --  13.7*   HGB 12.0  --  12.0   MCV 92  --  92     --  298     --  134*   POTASSIUM 4.4  --  4.1   CHLORIDE 102  --  97*   CO2 29  --  26   BUN 19.2  --  16.1   CR 0.52 0.43* 0.43*   ANIONGAP 6*  --  11   MAGAN 9.3  --  9.6   GLC 89  --  109*   ALBUMIN 3.6  --  4.0   PROTTOTAL 6.8  --  7.6   BILITOTAL 0.4  --  0.2   ALKPHOS 92  --  109*   ALT 11  --  14   AST 32  --  35   LIPASE 47  --  183*       No results found for this or any previous visit (from the past 24 hour(s)).

## 2022-07-26 LAB
ANION GAP SERPL CALCULATED.3IONS-SCNC: 6 MMOL/L (ref 7–15)
BUN SERPL-MCNC: 14.8 MG/DL (ref 8–23)
CALCIUM SERPL-MCNC: 8.9 MG/DL (ref 8.8–10.2)
CHLORIDE SERPL-SCNC: 101 MMOL/L (ref 98–107)
CREAT SERPL-MCNC: 0.47 MG/DL (ref 0.51–0.95)
CRP SERPL-MCNC: 5.4 MG/L
DEPRECATED HCO3 PLAS-SCNC: 31 MMOL/L (ref 22–29)
ERYTHROCYTE [DISTWIDTH] IN BLOOD BY AUTOMATED COUNT: 13 % (ref 10–15)
GFR SERPL CREATININE-BSD FRML MDRD: >90 ML/MIN/1.73M2
GLUCOSE SERPL-MCNC: 86 MG/DL (ref 70–99)
HCT VFR BLD AUTO: 34.2 % (ref 35–47)
HGB BLD-MCNC: 11 G/DL (ref 11.7–15.7)
MCH RBC QN AUTO: 29.4 PG (ref 26.5–33)
MCHC RBC AUTO-ENTMCNC: 32.2 G/DL (ref 31.5–36.5)
MCV RBC AUTO: 91 FL (ref 78–100)
PLATELET # BLD AUTO: 259 10E3/UL (ref 150–450)
POTASSIUM SERPL-SCNC: 3.9 MMOL/L (ref 3.4–5.3)
RBC # BLD AUTO: 3.74 10E6/UL (ref 3.8–5.2)
SODIUM SERPL-SCNC: 138 MMOL/L (ref 136–145)
WBC # BLD AUTO: 5.5 10E3/UL (ref 4–11)

## 2022-07-26 PROCEDURE — 250N000011 HC RX IP 250 OP 636: Performed by: PHYSICIAN ASSISTANT

## 2022-07-26 PROCEDURE — 250N000013 HC RX MED GY IP 250 OP 250 PS 637: Performed by: STUDENT IN AN ORGANIZED HEALTH CARE EDUCATION/TRAINING PROGRAM

## 2022-07-26 PROCEDURE — 99232 SBSQ HOSP IP/OBS MODERATE 35: CPT | Mod: FS | Performed by: INTERNAL MEDICINE

## 2022-07-26 PROCEDURE — 94640 AIRWAY INHALATION TREATMENT: CPT

## 2022-07-26 PROCEDURE — 99207 PR APP CREDIT; MD BILLING SHARED VISIT: CPT | Performed by: PHYSICIAN ASSISTANT

## 2022-07-26 PROCEDURE — 36415 COLL VENOUS BLD VENIPUNCTURE: CPT | Performed by: PHYSICIAN ASSISTANT

## 2022-07-26 PROCEDURE — 86140 C-REACTIVE PROTEIN: CPT | Performed by: PHYSICIAN ASSISTANT

## 2022-07-26 PROCEDURE — 99223 1ST HOSP IP/OBS HIGH 75: CPT | Performed by: STUDENT IN AN ORGANIZED HEALTH CARE EDUCATION/TRAINING PROGRAM

## 2022-07-26 PROCEDURE — 999N000157 HC STATISTIC RCP TIME EA 10 MIN

## 2022-07-26 PROCEDURE — 250N000011 HC RX IP 250 OP 636

## 2022-07-26 PROCEDURE — 250N000013 HC RX MED GY IP 250 OP 250 PS 637

## 2022-07-26 PROCEDURE — 85027 COMPLETE CBC AUTOMATED: CPT | Performed by: PHYSICIAN ASSISTANT

## 2022-07-26 PROCEDURE — 250N000009 HC RX 250

## 2022-07-26 PROCEDURE — 80048 BASIC METABOLIC PNL TOTAL CA: CPT | Performed by: PHYSICIAN ASSISTANT

## 2022-07-26 PROCEDURE — 250N000009 HC RX 250: Performed by: PHYSICIAN ASSISTANT

## 2022-07-26 PROCEDURE — 250N000009 HC RX 250: Performed by: INTERNAL MEDICINE

## 2022-07-26 PROCEDURE — 120N000002 HC R&B MED SURG/OB UMMC

## 2022-07-26 RX ORDER — CEFTRIAXONE 1 G/1
1 INJECTION, POWDER, FOR SOLUTION INTRAMUSCULAR; INTRAVENOUS ONCE
Status: COMPLETED | OUTPATIENT
Start: 2022-07-26 | End: 2022-07-26

## 2022-07-26 RX ORDER — IPRATROPIUM BROMIDE AND ALBUTEROL SULFATE 2.5; .5 MG/3ML; MG/3ML
3 SOLUTION RESPIRATORY (INHALATION)
Status: DISCONTINUED | OUTPATIENT
Start: 2022-07-26 | End: 2022-07-27 | Stop reason: HOSPADM

## 2022-07-26 RX ORDER — CEFTRIAXONE 2 G/1
2 INJECTION, POWDER, FOR SOLUTION INTRAMUSCULAR; INTRAVENOUS ONCE
Status: COMPLETED | OUTPATIENT
Start: 2022-07-27 | End: 2022-07-27

## 2022-07-26 RX ORDER — IPRATROPIUM BROMIDE AND ALBUTEROL SULFATE 2.5; .5 MG/3ML; MG/3ML
3 SOLUTION RESPIRATORY (INHALATION)
Status: DISCONTINUED | OUTPATIENT
Start: 2022-07-26 | End: 2022-07-26

## 2022-07-26 RX ADMIN — FLUTICASONE FUROATE AND VILANTEROL TRIFENATATE 1 PUFF: 100; 25 POWDER RESPIRATORY (INHALATION) at 08:36

## 2022-07-26 RX ADMIN — ENOXAPARIN SODIUM 40 MG: 40 INJECTION SUBCUTANEOUS at 08:37

## 2022-07-26 RX ADMIN — OXYBUTYNIN CHLORIDE 5 MG: 5 TABLET, EXTENDED RELEASE ORAL at 08:37

## 2022-07-26 RX ADMIN — SODIUM CHLORIDE SOLN NEBU 3% 3 ML: 3 NEBU SOLN at 11:56

## 2022-07-26 RX ADMIN — SALINE NASAL SPRAY 2 SPRAY: 1.5 SOLUTION NASAL at 08:38

## 2022-07-26 RX ADMIN — ACETYLCYSTEINE 4 ML: 100 SOLUTION ORAL; RESPIRATORY (INHALATION) at 11:37

## 2022-07-26 RX ADMIN — PREGABALIN 50 MG: 50 CAPSULE ORAL at 08:37

## 2022-07-26 RX ADMIN — IPRATROPIUM BROMIDE AND ALBUTEROL SULFATE 3 ML: .5; 3 SOLUTION RESPIRATORY (INHALATION) at 11:37

## 2022-07-26 RX ADMIN — CELECOXIB 200 MG: 200 CAPSULE ORAL at 08:37

## 2022-07-26 RX ADMIN — CEFTRIAXONE SODIUM 1 G: 1 INJECTION, POWDER, FOR SOLUTION INTRAMUSCULAR; INTRAVENOUS at 15:56

## 2022-07-26 RX ADMIN — UMECLIDINIUM 1 PUFF: 62.5 AEROSOL, POWDER ORAL at 08:36

## 2022-07-26 RX ADMIN — IPRATROPIUM BROMIDE AND ALBUTEROL SULFATE 3 ML: .5; 3 SOLUTION RESPIRATORY (INHALATION) at 20:30

## 2022-07-26 RX ADMIN — MONTELUKAST 10 MG: 10 TABLET, FILM COATED ORAL at 20:30

## 2022-07-26 RX ADMIN — AZITHROMYCIN MONOHYDRATE 250 MG: 250 TABLET ORAL at 08:37

## 2022-07-26 RX ADMIN — ACETAMINOPHEN 975 MG: 325 TABLET, FILM COATED ORAL at 22:47

## 2022-07-26 RX ADMIN — Medication 1 TABLET: at 08:38

## 2022-07-26 RX ADMIN — PREGABALIN 100 MG: 100 CAPSULE ORAL at 20:30

## 2022-07-26 RX ADMIN — Medication 1 TABLET: at 20:30

## 2022-07-26 RX ADMIN — CEFTRIAXONE SODIUM 1 G: 1 INJECTION, POWDER, FOR SOLUTION INTRAMUSCULAR; INTRAVENOUS at 05:42

## 2022-07-26 RX ADMIN — CETIRIZINE HYDROCHLORIDE 5 MG: 5 TABLET ORAL at 20:30

## 2022-07-26 RX ADMIN — ESCITALOPRAM 5 MG: 5 TABLET, FILM COATED ORAL at 08:37

## 2022-07-26 ASSESSMENT — ACTIVITIES OF DAILY LIVING (ADL)
ADLS_ACUITY_SCORE: 26
ADLS_ACUITY_SCORE: 24
ADLS_ACUITY_SCORE: 26

## 2022-07-26 NOTE — PLAN OF CARE
Goal Outcome Evaluation:    Plan of Care Reviewed With: patient     Overall Patient Progress: improving    Outcome Evaluation: Pt is A/Ox4, on 2L NC at baseline. Denies chest pain, N/V, has GARCIA. Up ad adriana in room to use the bathroom. Administered IV abx for suspected PNA. Pending sputum cltx  before D/C back to [facility] in [place]. Gave Tylenol for chronic shoulder pain with good relief. Continue with POC, most likely D/C on Wednesday.

## 2022-07-26 NOTE — PLAN OF CARE
Goal Outcome Evaluation:    Plan of Care Reviewed With: patient     Overall Patient Progress: improving     Temp: 98.3  F (36.8  C) Temp src: Oral BP: 118/65 Pulse: (!) 121   Resp: 16 SpO2: 95 % O2 Device: None (Room air) Oxygen Delivery: 1/2 LPM    NEURO: WNL, A&Ox4, able to make needs known  RESPIRATORY: Dyspnea w exertion, 97% on 2L NC, infrequent productive cough w green tinged sputum, pt on 2L O2 @ baseline  CARDIAC: WNL  GI/: WNL, ambulates independently to bathroom, urinary urgency  SKIN: WNL  DIET: Regular  PAIN/NAUSEA: denies  INCISION/DRAINS: N/A  IV ACCESS: (R) arm PIV - SL  ACTIVITY: Up ad adriana in room, SBA w walker in hallway, ambulated in hallway w CNA x3 this shift  LAB: Reviewed  PLAN: Continue POC, pending discharge tomorrow to TCU w PICC if continued need for IV abx

## 2022-07-26 NOTE — CONSULTS
[facility]SULT[facility]     Patient:  Fiordaliza Najera   Date of birth 1945, Medical record number [id number]  Date of Visit:  07/26/2022  Date of Admission: 7/22/2022  Consult Requester:Esequiel Wynn MD          Assessment and Recommendations:   ASSESSMENT:  1. Community-Acquired Pneumonia  2. Leukocytosis, resolved  3. History of MDR Pneumonia with Achromobacter xylosidans  4. Acute COPD exacerbation, on 2L O2 at baseline    DISCUSSION:   Fiordaliza Najera is a 77 year old woman who presents with a history of COPD on 2L O2 at home and bronchiectasis, MDR pneumonia w/ Achromobacter who presents with a worsening chronic productive cough, green sputum, and one day of pleuritic chest pain with a CT PE concerning for LLL infiltrates indicating a potential infectious process along with leukocytosis and elevated CRP.    Here, she has a negative COVID swab, negative MRSA/MSSA screen, and blood cultures with no growth to date. Her CT PE showed a new LLL ground glass opacity concerning for infection vs inflammation. Current positive sputum cultures (7/23) growing Achromobacter xylosoxidans with current susceptibilities pending, though likely similar to prior. WBC and CRP have markedly improved despite the Achromobacter not being covered with the current antibiotic regimen, making it unlikely that the Achromobacter is the cause of this patients symptoms. Given her history of MDR Achromobacter positive cultures in the past, it is possible this represents colonization at this point. Community-acquired pneumonia with more common organisms is likely given that her symptoms and acute phase reactants have improved with the CAP treatment regimen.    She does not have any significant risk factors for fungal infection or Mycobacterium tuberculosis, nor are her symptoms consistent these infections. It will be prudent for her to follow up with her pulmonologist regarding the recent findings noted on the CT.        RECOMMENDATION:  1. Increase the dose of the Ceftriaxone to 2g IV Q24 hours. Today, give one more dose of Ceftriaxone 1g IV for a total of 2g IV Ceftriaxone. Then tomorrow, give Ceftriaxone 2g IV once to finish treatment of community acquired pneumonia (CAP).     2. Finish Azithromycin 250mg PO tomorrow to complete treatment of CAP      3. No need for IV antibiotics targeting Achromobacter as we feel this is likely colonization      4. Follow up with pulmonary to discuss findings on CT       Thank you for this consult. ID will sign off at this time. Please call back if there are additional questions.     Patient was discussed with Dr. Erickson.     Sadie White, BATOOL, CNP  Infectious Diseases  Pager# [phone]  ________________________________________________________________    Consult Question: Guide antibiotic therapy-achromobacter in sputum  Admission Diagnosis: Chest pain in adult [R07.9]         History of Present Illness:     Fiordaliza Najera is a 77 year old woman who presents with a history of COPD on 2L O2 at home and bronchiectasis, MDR pneumonia w/ Achromobacter who presents with a worsening chronic productive cough, green sputum, and one day of pleuritic chest pain.     She has a history of COPD exacerbations and previous episodes of community acquired pneumonia within the last two years with sputum cultures and bronchial lavage revealing H. Influenzae (2020) and Achromobacter xylosoxidans (2021). The Achromobacter showed susceptibility only to Zosyn and Meropenem. Per the patient, she was previously treated with Zosyn in a TCU for the Achromobacter.    States she has had a chronic cough that became productive with green sputum in the last week for which she took her one dose of Azithromycin prescribed for COPD exacerbations. On the 23rd  she felt short of breath and then began to experience a sharp pain with inspiration on the left side of her chest, which radiated to her left shoulder. She was concerned  this was cardiac chest pain and called 911 at this time.     She has been afebrile since hospitalization. States her chest pain and cough have resolved while here and she is able to go for walks around the hospital and outside without feeling short of breath on her oxygen. She feels like she is at her baseline and denies any headaches, fatigue, nausea, loss of appetite, or myalgias.    She lives alone in an assisted living apartment. She does not have any pets and denies any recent travel outside of the country or to the [facility]. She has some house plants but denies any outdoor gardening or farm animal exposure. 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 She has never spent time in a homeless shelter or care home.          Review of Systems:   CONSTITUTIONAL:  negative for fevers, chills, night sweats, fatigue  ENT:  negative sore throat  RESPIRATORY:  Negative for cough with sputum and dyspnea  CARDIOVASCULAR:  negative for chest pain, palpitations  GASTROINTESTINAL:  negative for nausea, vomiting, loss of appetite, diarrhea and constipation  : dysuria  INTEGUMENT:  negative for rash and pruritus, new or old wounds, erythema   NEURO:  negative for headache or change in vision  MUSC: negative for myalgias or arthralgias, joint swelling, erythema, or tenderness             Past Medical History:     Past Medical History:   Diagnosis Date     Anxiety 09/30/2021     COPD (chronic obstructive pulmonary disease) (H)      Diverticulosis      Hiatal hernia      Mild asthma      Neuropathy      Osteopenia      Temporomandibular joint (TMJ) pain             Past Surgical History:     Past Surgical History:   Procedure Laterality Date     ANTERIOR / POSTERIOR COMBINED FUSION LUMBAR SPINE       BRONCHOSCOPY (RIGID OR FLEXIBLE), DIAGNOSTIC N/A 9/28/2021    Procedure: BRONCHOSCOPY, WITH BRONCHOALVEOLAR LAVAGE;  Surgeon:  Randall Lorenz MD;  Location: [facility]     HYSTERECTOMY       PICC SINGLE LUMEN PLACEMENT  11/4/2021          RETINAL LASER PROCEDURE Left 10/04/2016     SALPINGOOPHORECTOMY       TOTAL KNEE ARTHROPLASTY  2008            Family History:   Reviewed and non-contributory.   Family History   Problem Relation Age of Onset     Dementia Mother         passed age 88     Chronic Obstructive Pulmonary Disease Father         passed age 78            Social History:     Social History     Tobacco Use     Smoking status: Never Smoker     Smokeless tobacco: Never Used   Substance Use Topics     Alcohol use: No     History   Sexual Activity     Sexual activity: Never            Current Medications:       acetylcysteine  4 mL Inhalation Daily     azithromycin  250 mg Oral Daily     calcium carbonate-vitamin D  1 tablet Oral BID     cefTRIAXone  1 g Intravenous Q24H     celecoxib  200 mg Oral Daily     cetirizine  5 mg Oral Daily     enoxaparin ANTICOAGULANT  40 mg Subcutaneous Q24H     escitalopram  5 mg Oral Daily     fluticasone-vilanterol  1 puff Inhalation Daily     montelukast  10 mg Oral At Bedtime     oxybutynin ER  5 mg Oral Daily     pregabalin  100 mg Oral At Bedtime     pregabalin  50 mg Oral Daily     sodium chloride  3 mL Nebulization BID     sodium chloride  2 spray Nasal Daily     sodium chloride (PF)  3 mL Intracatheter Q8H     umeclidinium  1 puff Inhalation Daily            Allergies:     Allergies   Allergen Reactions     Codeine Unknown     Morphine Itching            Physical Exam:   Vitals were reviewed  Patient Vitals for the past 24 hrs:   BP Temp Temp src Pulse Resp SpO2   07/26/22 0543 117/63 97.5  F (36.4  C) Oral 63 16 97 %   07/25/22 2137 130/65 97.9  F (36.6  C) Oral 72 15 96 %   07/25/22 1347 125/72 97.9  F (36.6  C) Oral 73 16 99 %       Physical Examination:  GENERAL:  Pleasant and sitting upright in chair finishing her nebulizer treatment, in bed in no acute distress.   HEENT:  Head is  normocephalic, atraumatic   EYES:  Eyes have anicteric sclerae  ENT:  Oropharynx is moist. Tongue is midline  NECK:  Moving neck without limitations or pain  LUNGS:  Barrel-chested, clear to auscultation bilateral, slightly diminished LLL, no wheezes, rhonchi or rales. On 2L O2 per NC, talking in full sentences  CARDIOVASCULAR:  Regular rate and rhythm with no murmurs, gallops or rubs, no swelling in bilateral LE  ABDOMEN:  Normal bowel sounds, soft, nontender  SKIN:  Pale, warm and dry. No acute rashes, erythema, or ecchymoses noted.  Line(s) are in place without any surrounding erythema or exudate.   NEUROLOGIC:  Grossly nonfocal, moving all extremities spontaneously  MUSCULOSKELETAL: Kyphotic         Laboratory Data:     Inflammatory Markers    Recent Labs   Lab Test 07/26/22  0610 07/24/22  0744 09/28/21  2048 07/01/18  0847   SED  --   --   --  12   CRP 5.40* 16.50* 24.0*  --        Hematology Studies    Recent Labs   Lab Test 07/26/22  0610 07/24/22  0744 07/22/22 2206 11/08/21  0601 11/07/21  0517 11/05/21  0651 11/04/21  1939   WBC 5.5 7.1 13.7* 8.5  --  8.0 12.4*   HGB 11.0* 12.0 12.0 11.3*  --  10.8* 12.5   MCV 91 92 92 92  --  92 91    294 298 320   < > 334 355    < > = values in this interval not displayed.       Metabolic Studies     Recent Labs   Lab Test 07/26/22  0610 07/24/22  0744 07/23/22  0029 07/22/22 2206 06/24/22  1116 06/20/22  1312 11/08/21  0601 11/07/21  0517 11/04/21  1939    137  --  134*  --   --  140  --  137   POTASSIUM 3.9 4.4  --  4.1  --   --  3.6  --  4.3   CHLORIDE 101 102  --  97*  --   --  103  --  99   CO2 31* 29  --  26  --   --  32*  --  31   BUN 14.8 19.2  --  16.1  --   --  13  --  12   CR 0.47* 0.52 0.43* 0.43* 0.54   < > 0.63   < > 0.58*   GFRESTIMATED >90 >90 >90 >90 >90   < > 87   < > 90    < > = values in this interval not displayed.       Hepatic Studies    Recent Labs   Lab Test 07/24/22  0744 07/22/22  2206 05/13/22  1032 10/20/21  1002  09/30/21  0547 09/28/21 2048 09/23/20  1127   BILITOTAL 0.4 0.2  --  0.6 0.3 0.4 0.5   ALKPHOS 92 109*  --  94 71 96 96   ALBUMIN 3.6 4.0 3.5 3.7 2.4* 2.9* 3.9   AST 32 35  --  28 20 22 25   ALT 11 14  --  17 20 22 21       Microbiology:  Culture   Date Value Ref Range Status   07/23/2022 Culture in progress  Preliminary   07/23/2022 4+ Normal kenji  Preliminary   07/23/2022 2+ Achromobacter xylosoxidans/denitrificans (A)  Preliminary   07/23/2022 No growth after 2 days  Preliminary   07/23/2022 No growth after 2 days  Preliminary   11/03/2021 1+ Normal kenji  Final   11/03/2021 3+ Achromobacter xylosoxidans/denitrificans (A)  Final     Comment:     Mucoid Strain   09/29/2021 No Growth  Final   09/29/2021 No Growth  Final   09/28/2021 No Growth  Final   09/28/2021 No Growth  Final   09/28/2021 1+ Normal kenji  Final   09/28/2021 4+ Achromobacter xylosoxidans/denitrificans (A)  Final   10/23/2020 No Mycobacteria isolated  Final   10/23/2020 Usual kenji with  Final   10/23/2020 HAEMOPHILUS INFLUENZAE (A)  Final     Comment:     4+ Haemophilus influenzae  Beta lactamase negative     10/01/2019 No Mycobacteria isolated  Final   09/30/2019 No Mycobacteria isolated  Final   09/30/2019 Usual kenji with  Final   09/30/2019 HAEMOPHILUS INFLUENZAE (A)  Final     Comment:     3+ Haemophilus influenzae  Beta lactamase negative     03/27/2019 Usual kenji with  Final   03/27/2019 HAEMOPHILUS INFLUENZAE (A)  Final     Comment:     4+ Haemophilus influenzae  Beta lactamase negative     03/04/2019 No Mycobacteria isolated  Final   03/04/2019 BILLY ALBICANS (A)  Final     Comment:     1+ Candida albicans   03/04/2019 Usual kenji with  Final   03/04/2019 HAEMOPHILUS INFLUENZAE (A)  Final     Comment:     4+ Haemophilus influenzae  Beta lactamase negative     09/01/2018 No Mycobacteria isolated  Final   09/01/2018 Usual kenji with  Final   09/01/2018 HAEMOPHILUS INFLUENZAE (A)  Final     Comment:     4+ Haemophilus influenzae  Beta  lactamase negative     09/01/2018 CANDIDA ALBICANS (A)  Final     Comment:     1+ Candida albicans       Urine Studies    Recent Labs   Lab Test 10/20/21  1016 09/23/20  1127 06/19/19  1336 07/01/18  1017 06/18/18  1149   LEUKEST Negative Negative Negative Negative Negative       Vancomycin Levels  No lab results found.    Invalid input(s): VANCO    Hepatitis B Testing No lab results found.  Hepatitis C Testing     Hepatitis C Antibody   Date Value Ref Range Status   09/23/2020 Negative Negative Final     Respiratory Virus Testing    No results found for: RS, FLUAG          Imaging:   EXAMINATION: Limited Abdominal Ultrasound, 7/23/2022 11:27 AM     IMPRESSION:   1.  Cholelithiasis without evidence of acute cholecystitis.    EXAM: CT CHEST PULMONARY EMBOLISM W CONTRAST  LOCATION: [facility]  DATE/TIME: 7/23/2022 2:11 AM    IMPRESSION:  1.  No evidence for pulmonary embolism.     2.  No aortic dissection. Mild aortic dilatation at 3.4 cm.     3.  Ground-glass opacity involving the left lower lobe anteriorly is new since prior examination and concerning for an acute infectious or inflammatory etiology but should undergo continued CT follow-up as detailed below at 6 months.     4.  Multiple new nodules which are ill-defined along the margins of throughout both lungs measuring upwards of 6 mm. These may represent infectious or inflammatory nodularity.      5.  Recommend follow-up at 6 months as detailed below.     6.  Bronchiectasis with bronchial wall thickening which can be seen with underlying bronchitis.     7.  Fibrotic scarring involving both lungs.        EXAM: XR CHEST 2 VIEWS  LOCATION: [facility]  DATE/TIME: 7/22/2022 10:43 PM                                                                    IMPRESSION: Heart size is normal. Linear lucencies overlying the right lateral chest are favored to represent skin folds. Chronic  appearing interstitial scarring or fibrosis is noted bilaterally without lobar consolidation or pleural effusion.   Thoracolumbar rods fixation.

## 2022-07-26 NOTE — PROGRESS NOTES
[facility]    Medicine Progress Note - Hospitalist Service, GOLD TEAM 6    Date of Admission:  7/22/2022    Assessment & Plan    #Community Acquired Pneumonia, suspect   # Leukocytosis, suspect steroid induced   # Hx of MDR Pneumonia w/ Achromobacter  # Acute COPD exacerbation.  #Acute on Chronic Hypoxic Respiratory failure  Home O2 2L with exertion. CXR with chronic interstitial scarring without obvious infiltrates. CT-PE w/o PE,  bronchiectasis,  ground-glass opacity suspicious for acute infectious vs inflammatory process in addition to multiple new nodules. WBC 13.7 on admission and continues to trend down to 5.5. CRP trending down from 16.5 to 5.4. PTA patient on Augmentin and Prednisone 20 mg.     Initially with increased oxygen requirements, now back to baseline O2.   - Significant hx of MDR PNA  in November and September 2021 with Achromobacter, only sensitive to Zosyn and Meropenem. At that time, she was discharged to TCU and required ~3 weeks of IV Abx   -Sputum culture with achromobacter, sensitivities pending    -Cont Ceftriaxone and Azithromycin   -ID Consult to guide antibiotic therapy and duration  -If need for IV antibiotics, will need TCU placement   - No wheezing on exam so no steroids indicated    # Pleuritis  # Chest pain   Seral troponins were negative. EKG showed NSR. She has not had classic ACS symptoms of radiating pain or exertional chest pain. Suspect pain is 2/2 to pleurisy.  - No further workup at this time     # Multiple pulmonary nodules   Noted on CT PE study 7/23 with largest in size 6 mm.  - 6 month follow up recommended      # Hyponatremia - resolved   - Encourage PO intake      #Neuropathy  -Continue PTA lyrica     #Urinary urgency  -Continue oxybutynin     # Hx of Migraines   - Sumatriptan PRN     #Abdominal tenderness w/ positive Caldernó's sign   Pt w/ known hx of cholelithiasis on recent US on 7/1. She had + calderón's sign on exam and  mildly elevated lipase at 183. Now improved  - Rechecked RUQ US which showed cholelithiasis without cholecystitis   - No further work up indicated at this time. Defer to outpatient management         Diet: Combination Diet Regular Diet Adult    DVT Prophylaxis: Enoxaparin (Lovenox) SQ  Mobley Catheter: Not present  Central Lines: None  Cardiac Monitoring: None  Code Status: No CPR- Do NOT Intubate      Disposition Plan    Expected discharge date:  7/27/2022   Destination: Home. Lives at independent living facility but has support.   Discharge Comments:Awaiting ID Consult for antibiotic plan. Anticipate discharge 7/27. Will need TCU if needs IV Abx         The patient's care was discussed with the Attending physician, Dr. Wynn and the patient. Paged pulmonary team to notify of admission per patient request (Patient follows with Dr. Lorenz outpatient).     Carla Blair PA-C  Hospitalist Service, GOLD TEAM [address] [place] [address]  Securely message with the Vocera Web Console (learn more here)  Text page via [facility] Paging/Directory   Please see signed in provider for up to date coverage information                  ______________________________________________________________________    Interval History   Doing well. No SOB. Occasional cough. BM today. No abdominal pain or chest pain. Ambulating in hallway-no GARCIA. Mild GARCIA while ambulating outside with nurse yesterday.     Data reviewed today: I reviewed all medications, new labs and imaging results over the last 24 hours. Please see discussion of results in the A/P.    Physical Exam     Vital Signs: Temp: 97.5  F (36.4  C) Temp src: Oral BP: 117/63 Pulse: 63     Resp: 16 SpO2: 97 % O2 Device: None (Room air) (2L NC when ambulating) Oxygen Delivery: 1/2 LPM  Weight: 105 lbs 0 oz    General: Alert and oriented x 3. NAD. Appears comfortable sitting in chair.   HEENT: . Anicteric sclera.  Oral mucosa moist. Neck supple. No  JVD.  Resp: No signs of respiratory distress. Lungs diminished but clear to ausculation bilaterally without rales, rhonchi or wheezes.   Cardiac: RRR. S1 and S2 normal intensity. No murmurs appreciated.   GI: Abdomen soft, non-tender, non-distended.  No masses, hepatomegaly or splenomegaly.   : No ledesma  Neuro:  No focal deficits. Moves all extremities. Gait not tested.   Psych: Appropriate mood and affect.  Derm: Skin warm and dry. No rashes or skin breakdown. No jaundice.   Extremities: No cyanosis, clubbing or edema        Data   Recent Labs   Lab 07/26/22  0610 07/24/22  0744 07/23/22  0029 07/22/22  2206   WBC 5.5 7.1  --  13.7*   HGB 11.0* 12.0  --  12.0   MCV 91 92  --  92    294  --  298    137  --  134*   POTASSIUM 3.9 4.4  --  4.1   CHLORIDE 101 102  --  97*   CO2 31* 29  --  26   BUN 14.8 19.2  --  16.1   CR 0.47* 0.52 0.43* 0.43*   ANIONGAP 6* 6*  --  11   MAGAN 8.9 9.3  --  9.6   GLC 86 89  --  109*   ALBUMIN  --  3.6  --  4.0   PROTTOTAL  --  6.8  --  7.6   BILITOTAL  --  0.4  --  0.2   ALKPHOS  --  92  --  109*   ALT  --  11  --  14   AST  --  32  --  35   LIPASE  --  47  --  183*       No results found for this or any previous visit (from the past 24 hour(s)).

## 2022-07-27 VITALS
TEMPERATURE: 97.9 F | HEIGHT: 63 IN | HEART RATE: 74 BPM | SYSTOLIC BLOOD PRESSURE: 116 MMHG | DIASTOLIC BLOOD PRESSURE: 61 MMHG | BODY MASS INDEX: 18.61 KG/M2 | OXYGEN SATURATION: 97 % | RESPIRATION RATE: 18 BRPM | WEIGHT: 105 LBS

## 2022-07-27 PROCEDURE — 94640 AIRWAY INHALATION TREATMENT: CPT | Mod: 76

## 2022-07-27 PROCEDURE — 250N000009 HC RX 250

## 2022-07-27 PROCEDURE — 999N000157 HC STATISTIC RCP TIME EA 10 MIN

## 2022-07-27 PROCEDURE — 99239 HOSP IP/OBS DSCHRG MGMT >30: CPT | Mod: FS | Performed by: INTERNAL MEDICINE

## 2022-07-27 PROCEDURE — 250N000011 HC RX IP 250 OP 636

## 2022-07-27 PROCEDURE — 250N000009 HC RX 250: Performed by: INTERNAL MEDICINE

## 2022-07-27 PROCEDURE — 250N000013 HC RX MED GY IP 250 OP 250 PS 637

## 2022-07-27 PROCEDURE — 99207 PR APP CREDIT; MD BILLING SHARED VISIT: CPT | Performed by: PHYSICIAN ASSISTANT

## 2022-07-27 PROCEDURE — 250N000013 HC RX MED GY IP 250 OP 250 PS 637: Performed by: STUDENT IN AN ORGANIZED HEALTH CARE EDUCATION/TRAINING PROGRAM

## 2022-07-27 PROCEDURE — 250N000011 HC RX IP 250 OP 636: Performed by: PHYSICIAN ASSISTANT

## 2022-07-27 PROCEDURE — 94640 AIRWAY INHALATION TREATMENT: CPT

## 2022-07-27 PROCEDURE — 272N000202 HC AEROBIKA WITH MANOMETER

## 2022-07-27 PROCEDURE — 250N000013 HC RX MED GY IP 250 OP 250 PS 637: Performed by: PHYSICIAN ASSISTANT

## 2022-07-27 RX ORDER — CEFDINIR 300 MG/1
300 CAPSULE ORAL ONCE
Qty: 1 CAPSULE | Refills: 0 | Status: SHIPPED | OUTPATIENT
Start: 2022-07-27 | End: 2022-07-27

## 2022-07-27 RX ORDER — FLUTICASONE PROPIONATE 50 MCG
1 SPRAY, SUSPENSION (ML) NASAL DAILY
Status: DISCONTINUED | OUTPATIENT
Start: 2022-07-27 | End: 2022-07-27 | Stop reason: HOSPADM

## 2022-07-27 RX ORDER — FLUTICASONE PROPIONATE 50 MCG
1 SPRAY, SUSPENSION (ML) NASAL DAILY
Qty: 9.9 ML | Refills: 0 | Status: SHIPPED | OUTPATIENT
Start: 2022-07-27 | End: 2022-09-06

## 2022-07-27 RX ORDER — CEFDINIR 300 MG/1
300 CAPSULE ORAL EVERY 12 HOURS SCHEDULED
Status: DISCONTINUED | OUTPATIENT
Start: 2022-07-27 | End: 2022-07-27 | Stop reason: HOSPADM

## 2022-07-27 RX ADMIN — AZITHROMYCIN MONOHYDRATE 250 MG: 250 TABLET ORAL at 08:54

## 2022-07-27 RX ADMIN — FLUTICASONE FUROATE AND VILANTEROL TRIFENATATE 1 PUFF: 100; 25 POWDER RESPIRATORY (INHALATION) at 08:50

## 2022-07-27 RX ADMIN — CEFDINIR 300 MG: 300 CAPSULE ORAL at 10:28

## 2022-07-27 RX ADMIN — FLUTICASONE PROPIONATE 1 SPRAY: 50 SPRAY, METERED NASAL at 09:45

## 2022-07-27 RX ADMIN — UMECLIDINIUM 1 PUFF: 62.5 AEROSOL, POWDER ORAL at 08:50

## 2022-07-27 RX ADMIN — CEFTRIAXONE SODIUM 2 G: 2 INJECTION, POWDER, FOR SOLUTION INTRAMUSCULAR; INTRAVENOUS at 07:16

## 2022-07-27 RX ADMIN — CELECOXIB 200 MG: 200 CAPSULE ORAL at 08:54

## 2022-07-27 RX ADMIN — Medication 1 TABLET: at 08:55

## 2022-07-27 RX ADMIN — ESCITALOPRAM 5 MG: 5 TABLET, FILM COATED ORAL at 08:54

## 2022-07-27 RX ADMIN — SALINE NASAL SPRAY 2 SPRAY: 1.5 SOLUTION NASAL at 09:02

## 2022-07-27 RX ADMIN — IPRATROPIUM BROMIDE AND ALBUTEROL SULFATE 3 ML: .5; 3 SOLUTION RESPIRATORY (INHALATION) at 08:07

## 2022-07-27 RX ADMIN — ENOXAPARIN SODIUM 40 MG: 40 INJECTION SUBCUTANEOUS at 08:52

## 2022-07-27 RX ADMIN — ACETYLCYSTEINE 4 ML: 100 SOLUTION ORAL; RESPIRATORY (INHALATION) at 08:07

## 2022-07-27 RX ADMIN — PREGABALIN 50 MG: 50 CAPSULE ORAL at 09:01

## 2022-07-27 RX ADMIN — OXYBUTYNIN CHLORIDE 5 MG: 5 TABLET, EXTENDED RELEASE ORAL at 08:54

## 2022-07-27 ASSESSMENT — ACTIVITIES OF DAILY LIVING (ADL)
ADLS_ACUITY_SCORE: 26

## 2022-07-27 NOTE — PLAN OF CARE
Goal Outcome Evaluation:    Plan of Care Reviewed With: patient     Overall Patient Progress: improving    Outcome Evaluation: Pt pleasant, up ad adriana/SBA when walking in hallway. At rest pt is on RA, satting adequately, using 2L on excertion and overnight. Lung sounds are diminshed in bases and wheezes in uppers. Pt c/o of chronic shoulder pain, administered tylenol with good relief. Pt potentially D/C tomorrow AM to TCU, friend will be pick pt up. Talk about getting PICC placed for IV abx at said outpatient TCU. Continue with POC.

## 2022-07-27 NOTE — DISCHARGE SUMMARY
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 Lorenz, within 2 weeks. Please   call his office to schedule an appointment.     Follow up with your primary care provider within one week. Please call the   office to schedule an appointment.         CT Chest in 6 months to follow up pulmonary nodules (discussed with patient on discharge)    Unresulted Labs Ordered in the Past 30 Days of this Admission     Date and Time Order Name Status Description    7/23/2022  9:49 AM Blood Culture Hand, Right Preliminary     7/23/2022  9:49 AM Blood Culture Hand, Left Preliminary     7/23/2022  9:13 AM Respiratory Aerobic Bacterial Culture Preliminary           Discharge Disposition   Discharged to home  Condition at discharge: Stable    Hospital Course      Ms. Ayse Najera is a 77 year old female with PMH COPD, anxiety, and mild asthma who was admitted on 7/23/2022 for complaints of pleuritic chest pain. She recently started steroids and Augmentin for presumed COPD exacerbation. She had serial troponins which were negative and EKG showed NSR. She had a Chest CT PE protocol with no evidence of PE, ground glass opacities concerning for acute infectious or  inflammatory etiology, bronchiectasis, fibrotic scarring and multiple new pulmonary nodules. She was treated with Ceftriaxone and Azithromycin for suspected community acquired pneumonia. She had no wheezing on exam and steroids were not continued.     Hospital Course by Problem     #Community Acquired Pneumonia  # Leukocytosis, suspect steroid induced   # Hx of MDR Pneumonia w/ Achromobacter  # Acute COPD exacerbation. Pt is on 2L at baseline   Initially presented to ED with pleuritic chest pain. She started steroids and Augmentin outpatient for presumed COPD exacerbation (has standing order for medications). She had a Chest CT PE protocol with no evidence of PE, ground glass opacities concerning for acute infectious or inflammatory etiology, bronchiectasis, fibrotic scarring and multiple new pulmonary nodules. She was treated with Ceftriaxone and Azithromycin for suspected community acquired pneumonia. She had no wheezing on exam and steroids were not continued. She has history of MDR Achromobacter and her sputum culture was positive for Achromobacter. ID was consulted and felt likely due to colonization since patient was clinically improving. Plan was to complete 5 day course of antibiotics prior to discharge but IV infiltrated on day of discharge. Therefore she could not complete Ceftriaxone and cefdinir was ordered instead so she could avoid another IV placement. She is at her baseline oxygen needs (2L NC with activity). She has an appointment with her Pulm on Aug 1.      # Pleuritic chest pain, resolved   Seral troponins were negative. EKG showed NSR. She has not had classic ACS symptoms of radiating pain or exertional chest pain. Suspect pain is 2/2 to pleurisy. Pain resolved with treatment of CAP.     #Post-nasal drip  She reported ongoing post-nasal drip. She was started on flonase. Follow up outpatient for further management.     # Multiple pulmonary nodules   Noted on CT PE study 7/23 with largest in size  6 mm. Order placed for Chest CT 6 month follow up. Discussed with patient on discharge.      # Hyponatremia - resolved      #Neuropathy\  Continued on PTA lyrica     #Urinary urgency  Continued on PTA oxybutynin     # Hx of Migraines   No headaches during hospitalization. Cont Sumatriptan PRN     #Abdominal tenderness w/ positive Calderón's sign   Pt w/ known hx of cholelithiasis on recent US on 7/1. On admission, she had + calderón's sign on exam and mildly elevated lipase at 183.   Rechecked RUQ US which showed cholelithiasis without cholecystitis. She had no further complaints of abdominal pain during hospitalization.  Defer outpatient for further management if indicated.     Consultations This Hospital Stay   NURSING TO CONSULT FOR VASCULAR ACCESS CARE IP CONSULT  INFECTIOUS DISEASE GENERAL ADULT IP CONSULT    Code Status   No CPR- Do NOT Intubate    Time Spent on this Encounter   I, Carla Blair PA-C, personally saw the patient today and spent greater than 30 minutes discharging this patient.       Carla Blair PA-C  Former[facility] [address] [place] [id number]  Phone: [phone]  ______________________________________________________________________    Physical Exam   Vital Signs: Temp: 97.9  F (36.6  C) Temp src: Oral BP: 116/61 Pulse: 74   Resp: 18 SpO2: 97 % O2 Device: Nasal cannula Oxygen Delivery: 2 LPM  Weight: 105 lbs 0 oz     General: Alert and oriented x 3. NAD. Appears comfortable sitting in chair.   HEENT: . Anicteric sclera.  Oral mucosa moist. Neck supple. No JVD.  Resp: No signs of respiratory distress. Lungs clear to ausculation bilaterally without rales, rhonchi or wheezes.   Cardiac: RRR. S1 and S2 normal intensity. No murmurs appreciated.   GI: Abdomen soft, non-tender, non-distended.  No masses, hepatomegaly or splenomegaly.   : No ledesma  Neuro:  No focal deficits. Moves all extremities. Gait not tested.   Psych: Appropriate mood and affect.  Derm: Skin  warm and dry. No rashes or skin breakdown. No jaundice.   Extremities: No cyanosis, clubbing or edema    Primary Care Physician   Neida Maradiaga    Discharge Orders      Activity    Your activity upon discharge: activity as tolerated     Reason for your hospital stay    You were hospitalized for pneumonia and a COPD exacerbation.     Follow Up and recommended labs and tests    Follow up with your pulmonologist, Dr. Lorenz, within 2 weeks. Please call his office to schedule an appointment.     Follow up with your primary care provider within one week. Please call the office to schedule an appointment.     Flutter Valve    Continue to use flutter valve 4 times a day Until return to normal activity     Diet    Follow this diet upon discharge: Orders Placed This Encounter      Combination Diet Regular Diet Adult       Significant Results and Procedures   Most Recent 3 CBC's:Recent Labs   Lab Test 07/26/22  0610 07/24/22  0744 07/22/22 2206   WBC 5.5 7.1 13.7*   HGB 11.0* 12.0 12.0   MCV 91 92 92    294 298     Most Recent 3 BMP's:Recent Labs   Lab Test 07/26/22  0610 07/24/22  0744 07/23/22  0029 07/22/22 2206    137  --  134*   POTASSIUM 3.9 4.4  --  4.1   CHLORIDE 101 102  --  97*   CO2 31* 29  --  26   BUN 14.8 19.2  --  16.1   CR 0.47* 0.52 0.43* 0.43*   ANIONGAP 6* 6*  --  11   MAGAN 8.9 9.3  --  9.6   GLC 86 89  --  109*     Most Recent 2 LFT's:Recent Labs   Lab Test 07/24/22  0744 07/22/22 2206   AST 32 35   ALT 11 14   ALKPHOS 92 109*   BILITOTAL 0.4 0.2     Most Recent 3 INR's:Recent Labs   Lab Test 11/05/21  0651 09/28/21  2048   INR 1.15 1.12       Discharge Medications   Current Discharge Medication List      START taking these medications    Details   cefdinir (OMNICEF) 300 MG capsule Take 1 capsule (300 mg) by mouth once for 1 dose  Qty: 1 capsule, Refills: 0    Associated Diagnoses: Community acquired bacterial pneumonia      fluticasone (FLONASE) 50 MCG/ACT nasal spray Spray 1 spray into  both nostrils daily  Qty: 9.9 mL, Refills: 0    Associated Diagnoses: Post-nasal drip         CONTINUE these medications which have CHANGED    Details   biotin 300 MCG TABS tablet Take 1 tablet (300 mcg) by mouth daily    Comments: Please resume your previous dose taken prior to admission as directed by your provider  Associated Diagnoses: Nutritional deficiency         CONTINUE these medications which have NOT CHANGED    Details   acetylcysteine (MUCOMYST) 10 % nebulizer solution Inhale 4 mLs into the lungs daily  Qty: 120 mL, Refills: 11    Associated Diagnoses: Bronchiectasis (H)      albuterol (PROAIR HFA/PROVENTIL HFA/VENTOLIN HFA) 108 (90 Base) MCG/ACT inhaler Inhale 2 puffs into the lungs every 6 hours as needed  Qty: 18 g, Refills: 11    Comments: Pharmacy may dispense brand covered by insurance (Proair, or proventil or ventolin or generic albuterol inhaler)  Associated Diagnoses: Bronchiectasis without complication (H)      albuterol (PROVENTIL) (2.5 MG/3ML) 0.083% neb solution Take 1 vial (2.5 mg) by nebulization every 4 hours as needed for shortness of breath / dyspnea or wheezing  Qty: 150 mL, Refills: 11    Associated Diagnoses: Bronchiectasis with acute exacerbation (H)      calcium-vitamin D (CALCIUM-VITAMIN D) 500 mg(1,250mg) -200 unit per tablet Take 1 tablet by mouth 2 times daily       celecoxib (CELEBREX) 200 MG capsule TAKE 1 CAPSULE (200 MG TOTAL) BY MOUTH DAILY.  Qty: 90 capsule, Refills: 11    Associated Diagnoses: Primary osteoarthritis involving multiple joints      cetirizine (ZYRTEC) 5 MG tablet Take 5 mg by mouth daily      cholecalciferol, vitamin D3, (VITAMIN D3) 1,000 unit capsule [CHOLECALCIFEROL, VITAMIN D3, (VITAMIN D3) 1,000 UNIT CAPSULE] Take 1,000 Units by mouth daily.      escitalopram (LEXAPRO) 5 MG tablet Take 1 tablet (5 mg) by mouth daily  Qty: 90 tablet, Refills: 0    Associated Diagnoses: Anxiety      Fluticasone-Umeclidin-Vilanterol (TRELEGY ELLIPTA) 100-62.5-25 MCG/INH  oral inhaler Inhale 1 puff into the lungs daily  Qty: 60 each, Refills: 11    Associated Diagnoses: Bronchiectasis with acute exacerbation (H); Chronic obstructive pulmonary disease with acute exacerbation (H)      Lactobacillus rhamnosus GG (CULTURELLE) 10-15 Billion cell capsule Take 1 capsule by mouth every evening       multivitamin therapeutic (THERAGRAN) tablet [MULTIVITAMIN THERAPEUTIC (THERAGRAN) TABLET] Take 1 tablet by mouth daily.      nystatin (MYCOSTATIN) 862832 UNIT/GM external cream Apply to rash on body 3 times per day as needed.  Qty: 30 g, Refills: 1    Associated Diagnoses: Rash and nonspecific skin eruption      oxybutynin ER (DITROPAN XL) 5 MG 24 hr tablet Take 1 tablet (5 mg) by mouth daily  Qty: 90 tablet, Refills: 0    Associated Diagnoses: OAB (overactive bladder)      OXYGEN-AIR DELIVERY SYSTEMS MISC [OXYGEN-AIR DELIVERY SYSTEMS MISC] Use 2 L As Directed. 2L with activity and at night  [employer]      pregabalin (LYRICA) 50 MG capsule TAKE 3 CAPSULES BY MOUTH DAILY AS DIRECTED.  Qty: 90 capsule, Refills: 1    Associated Diagnoses: Chronic pain syndrome      sodium chloride (NEBUSAL) 3 % neb solution Take 3 mLs by nebulization 2 times daily  Qty: 180 mL, Refills: 11    Associated Diagnoses: Bronchiectasis with acute exacerbation (H)      sodium chloride (OCEAN) 0.65 % nasal spray Spray 2 sprays in nostril daily  Qty: 88 mL, Refills: 0    Associated Diagnoses: Nasal lesion      SUMAtriptan (IMITREX) 50 MG tablet [SUMATRIPTAN (IMITREX) 50 MG TABLET] Take 1 tablet (50 mg total) by mouth every 2 (two) hours as needed for migraine.  Qty: 30 tablet, Refills: 1      montelukast (SINGULAIR) 10 MG tablet [MONTELUKAST (SINGULAIR) 10 MG TABLET] TAKE 1 TABLET(10 MG) BY MOUTH AT BEDTIME  Qty: 90 tablet, Refills: 3    Associated Diagnoses: Bronchiectasis (H)         STOP taking these medications       amoxicillin-clavulanate (AUGMENTIN) 875-125 MG tablet Comments:   Reason for Stopping:             Allergies    Allergies   Allergen Reactions     Codeine Unknown     Morphine Itching

## 2022-07-27 NOTE — PLAN OF CARE
Goal Outcome Evaluation:    Plan of Care Reviewed With: patient     Overall Patient Progress: improving    Outcome Evaluation: A&O, VSS on 2L, which is pt home O2. Pt did not receive any PRN meds. Pt slept well throughout the night. Potential d/c 7/27 AM pending PICC placement. ID reccommends continuing w/ current abx through 7/27.

## 2022-07-27 NOTE — PLAN OF CARE
Goal Outcome Evaluation:    Plan of Care Reviewed With: patient     Overall Patient Progress: improving    Outcome Evaluation: Pt educated on discharge instructions and reiterated understanding of discharge teaching. Pt left wioth friend providing transportation and oxygen to go home with

## 2022-07-27 NOTE — DISCHARGE INSTRUCTIONS
Please continue using your oxygen at 2L via nasal cannula with activity as previously done prior to admission.     Return to the Emergency department if you have any new or worsening symptoms including but not limited to shortness of breath, chest pain, cough or fever.     You will need a CT of your chest in 6 months to follow up pulmonary nodules found on your CT during this admission. An order has been placed so you should be contacted. Please discuss this with your pulmonologist.

## 2022-07-28 LAB
BACTERIA BLD CULT: NO GROWTH
BACTERIA BLD CULT: NO GROWTH
BACTERIA SPT CULT: ABNORMAL
GRAM STAIN RESULT: ABNORMAL

## 2022-08-01 ENCOUNTER — VIRTUAL VISIT (OUTPATIENT)
Dept: PULMONOLOGY | Facility: OTHER | Age: 77
End: 2022-08-01
Payer: MEDICARE

## 2022-08-01 DIAGNOSIS — R91.1 PULMONARY NODULE: ICD-10-CM

## 2022-08-01 DIAGNOSIS — J96.11 CHRONIC RESPIRATORY FAILURE WITH HYPOXIA (H): ICD-10-CM

## 2022-08-01 DIAGNOSIS — R06.09 DYSPNEA ON EXERTION: Primary | ICD-10-CM

## 2022-08-01 DIAGNOSIS — J47.1 BRONCHIECTASIS WITH ACUTE EXACERBATION (H): ICD-10-CM

## 2022-08-01 PROCEDURE — 99214 OFFICE O/P EST MOD 30 MIN: CPT | Mod: 95 | Performed by: INTERNAL MEDICINE

## 2022-08-01 NOTE — PROGRESS NOTES
Ayse is a 77 year old who is being evaluated via a billable video visit.      Patient confirms medications and allergies are accurate via patients echeck in completion, and or denies any changes since last reviewed/verified.         Kate Puri Virtual Facilitator      How would you like to obtain your AVS? MyChart  If the video visit is dropped, the invitation should be resent by: Send to e-mail at: [email or web address]  Will anyone else be joining your video visit? No        Video-Visit Details    Video Start Time: 926    Type of service:  Video Visit    Video End Time:947    Originating Location (pt. Location): Home    Distant Location (provider location):  [facility]     Platform used for Video Visit: Tracy Tavera is a 76 year old who is being evaluated via a billable video visit.      Pt is in [place]    How would you like to obtain your AVS? MyChart  If the video visit is dropped, the invitation should be resent by: Send to e-mail at: [email or web address]  Will anyone else be joining your video visit? No      Video Start Time: 1057 AM  Video-Visit Details    Type of service:  Video Visit    Video End Time:1113      Originating Location (pt. Location): Home    Distant Location (provider location):  [facility]     Platform used for Video Visit: Tracy Briones [employer]    LUNG NODULE & INTERVENTIONAL PULMONARY CLIN[facility]  [facility], [facility], [facility]     Fiordaliza Collazo Reinaldo Garcia. MRN# [id number]   Age: 76 year old YOB: 1945       Requesting Physician: No referring provider defined for this encounter.       Assessment and Plan:    1.  Bronchiectasis.  Possible recent exacerbation.  Chest pain in the setting of pulmonary infiltrates, negative cardiac evaluation.  Continue Trelegy.  Continue nebulizer regimen. She is tolerating mucomyst and 3%.    It is difficult to tell the nature of her  admission.  I agree that the Achromobacter is not likely a pathogenic organism.  However, it does reflect her complex bronchial kenji.  I was concerned that she would need IV antibiotics but it seems that she is resolved quickly and she did not really have a lot of bronchitis or pneumonia symptoms prior.    She may have a little bit of postnasal drip that does not seem to be contributing.  She will continue the Flonase started during her hospital stay but will consider stopping it after she gets back from her trip.    2.  Shortness of breath.  Seems to have gotten a little worse.  Exertional.  I think this reflects possible deconditioning although she has been keeping up with her rehab.  Loss of skeletal muscle mass given her low weight is also a concern.  At this point I do not think that there is much intervention we can do as far as medications.  We discussed continuing her medication regimen as above, continuing with her excellent efforts at persistent and proactive physical rehab.    3.  Chronic hypoxic respiratory failure and shortness of breath with exertion.  As above.  Goal oxygen saturation 89% or greater at rest.  I explained today that upper 80s and low 90s are likely her new baseline.  Pulmonary nodules.  Consistent with bronchiectasis.  I do not feel strongly about following these up but the patient is understandably concerned.  We will make arrangements for 6-month CT.    4.              History:     Fiordaliza Najera . is a 76 year old female with sig h/o for bronchiectasis.  She was recently hospitalized for pneumonia in the setting of chest pain.  Her chest pain has resolved.  Cardiac evaluation was negative.  She grew out Achromobacter which she is grown before.  Infectious disease recommended treatment for community-acquired pneumonia.    Trelegy  3% once daily  Mucomyst    Oxygen -2 L/min with rest.               Past Medical History:      Past Medical History:   Diagnosis Date      Anxiety 09/30/2021     COPD (chronic obstructive pulmonary disease) (H)      Diverticulosis      Hiatal hernia      Mild asthma      Neuropathy      Osteopenia      Temporomandibular joint (TMJ) pain            Past Surgical History:      Past Surgical History:   Procedure Laterality Date     ANTERIOR / POSTERIOR COMBINED FUSION LUMBAR SPINE       BRONCHOSCOPY (RIGID OR FLEXIBLE), DIAGNOSTIC N/A 9/28/2021    Procedure: BRONCHOSCOPY, WITH BRONCHOALVEOLAR LAVAGE;  Surgeon: Randall Lorenz MD;  Location: [facility]     HYSTERECTOMY       PICC SINGLE LUMEN PLACEMENT  11/4/2021          RETINAL LASER PROCEDURE Left 10/04/2016     SALPINGOOPHORECTOMY       TOTAL KNEE ARTHROPLASTY  2008          Social History:     Social History     Tobacco Use     Smoking status: Never Smoker     Smokeless tobacco: Never Used   Substance Use Topics     Alcohol use: No          Family History:     Family History   Problem Relation Age of Onset     Dementia Mother         passed age 88     Chronic Obstructive Pulmonary Disease Father         passed age 78           Allergies:      Allergies   Allergen Reactions     Codeine Unknown     Morphine Itching          Medications:     Current Outpatient Medications   Medication Sig     acetylcysteine (MUCOMYST) 10 % nebulizer solution Inhale 4 mLs into the lungs daily     albuterol (PROAIR HFA/PROVENTIL HFA/VENTOLIN HFA) 108 (90 Base) MCG/ACT inhaler Inhale 2 puffs into the lungs every 6 hours as needed     albuterol (PROVENTIL) (2.5 MG/3ML) 0.083% neb solution Take 1 vial (2.5 mg) by nebulization every 4 hours as needed for shortness of breath / dyspnea or wheezing     calcium-vitamin D (CALCIUM-VITAMIN D) 500 mg(1,250mg) -200 unit per tablet Take 1 tablet by mouth 2 times daily      celecoxib (CELEBREX) 200 MG capsule TAKE 1 CAPSULE (200 MG TOTAL) BY MOUTH DAILY. (Patient taking differently: Take 200 mg by mouth daily)     cetirizine (ZYRTEC) 5 MG tablet Take 5 mg by mouth daily      cholecalciferol, vitamin D3, (VITAMIN D3) 1,000 unit capsule [CHOLECALCIFEROL, VITAMIN D3, (VITAMIN D3) 1,000 UNIT CAPSULE] Take 1,000 Units by mouth daily.     escitalopram (LEXAPRO) 5 MG tablet Take 1 tablet (5 mg) by mouth daily     fluticasone (FLONASE) 50 MCG/ACT nasal spray Spray 1 spray into both nostrils daily     Fluticasone-Umeclidin-Vilanterol (TRELEGY ELLIPTA) 100-62.5-25 MCG/INH oral inhaler Inhale 1 puff into the lungs daily     Lactobacillus rhamnosus GG (CULTURELLE) 10-15 Billion cell capsule Take 1 capsule by mouth every evening      montelukast (SINGULAIR) 10 MG tablet [MONTELUKAST (SINGULAIR) 10 MG TABLET] TAKE 1 TABLET(10 MG) BY MOUTH AT BEDTIME     multivitamin therapeutic (THERAGRAN) tablet [MULTIVITAMIN THERAPEUTIC (THERAGRAN) TABLET] Take 1 tablet by mouth daily.     nystatin (MYCOSTATIN) 771954 UNIT/GM external cream Apply to rash on body 3 times per day as needed.     oxybutynin ER (DITROPAN XL) 5 MG 24 hr tablet Take 1 tablet (5 mg) by mouth daily     OXYGEN-AIR DELIVERY SYSTEMS MISC [OXYGEN-AIR DELIVERY SYSTEMS MISC] Use 2 L As Directed. 2L with activity and at night  [employer]     pregabalin (LYRICA) 50 MG capsule TAKE 3 CAPSULES BY MOUTH DAILY AS DIRECTED.     sodium chloride (NEBUSAL) 3 % neb solution Take 3 mLs by nebulization 2 times daily     sodium chloride (OCEAN) 0.65 % nasal spray Spray 2 sprays in nostril daily     SUMAtriptan (IMITREX) 50 MG tablet [SUMATRIPTAN (IMITREX) 50 MG TABLET] Take 1 tablet (50 mg total) by mouth every 2 (two) hours as needed for migraine.     biotin 300 MCG TABS tablet Take 1 tablet (300 mcg) by mouth daily (Patient not taking: Reported on 8/1/2022)     No current facility-administered medications for this visit.          Review of Systems:     See HPI         Physical Exam:   There were no vitals taken for this visit.    Constitutional - looks well, in no apparent distress  Eyes - no redness or discharge  Respiratory -breathing appears comfortable. No  wheeze or rhonchi.   Skin - No appreciable discoloration or lesions (very limited exam)  Neurological - No apparent tremors. Speech fluent and articlate  Psychiatric - no signs of delirium or anxiety          Current Laboratory Data:   All laboratory and imaging data reviewed.    No results found for this or any previous visit (from the past 24 hour(s)).

## 2022-08-02 NOTE — TELEPHONE ENCOUNTER
Would prefer to see patient in clinic.     Unfortunately, our schedules are not lining up as I am out of town next week.     Please try to get her in with another provider for in person visit

## 2022-08-15 ENCOUNTER — MYC MEDICAL ADVICE (OUTPATIENT)
Dept: PULMONOLOGY | Facility: OTHER | Age: 77
End: 2022-08-15

## 2022-08-15 DIAGNOSIS — J47.1 BRONCHIECTASIS WITH ACUTE EXACERBATION (H): Primary | ICD-10-CM

## 2022-08-15 DIAGNOSIS — J47.9 BRONCHIECTASIS WITHOUT COMPLICATION (H): ICD-10-CM

## 2022-08-16 DIAGNOSIS — J47.9 BRONCHIECTASIS (H): ICD-10-CM

## 2022-08-16 RX ORDER — ACETYLCYSTEINE 100 MG/ML
4 SOLUTION ORAL; RESPIRATORY (INHALATION) DAILY
Qty: 120 ML | Refills: 11 | Status: SHIPPED | OUTPATIENT
Start: 2022-08-16 | End: 2023-01-01

## 2022-08-17 RX ORDER — ACETYLCYSTEINE 200 MG/ML
4 SOLUTION ORAL; RESPIRATORY (INHALATION) DAILY
Qty: 360 ML | Refills: 3 | Status: SHIPPED | OUTPATIENT
Start: 2022-08-17 | End: 2023-01-01

## 2022-08-21 ENCOUNTER — MYC MEDICAL ADVICE (OUTPATIENT)
Dept: FAMILY MEDICINE | Facility: CLINIC | Age: 77
End: 2022-08-21

## 2022-08-21 DIAGNOSIS — G89.4 CHRONIC PAIN SYNDROME: ICD-10-CM

## 2022-08-22 ENCOUNTER — MEDICAL CORRESPONDENCE (OUTPATIENT)
Dept: HEALTH INFORMATION MANAGEMENT | Facility: CLINIC | Age: 77
End: 2022-08-22

## 2022-08-22 NOTE — TELEPHONE ENCOUNTER
"MyChart msg:   Please, Dr. Maradiaga,  I need a refill on my presecription for Pregabalin or Lyrica 50 MG Caps, qty. 90.   I take one in the AM and two at PM.   [facility] on [address]  [phone].    RxC  [id number]  Thank you very much,  BETTY Najera, MARYAM  \"Ayse\"        Med is key'd up for verification and approval, if appropriate.       Thank you,  Tavo Chaudhari Jr., CMA on 8/22/2022 at 9:41 AM    "

## 2022-08-23 ENCOUNTER — VIRTUAL VISIT (OUTPATIENT)
Dept: FAMILY MEDICINE | Facility: CLINIC | Age: 77
End: 2022-08-23
Payer: MEDICARE

## 2022-08-23 DIAGNOSIS — J34.89 NASAL LESION: ICD-10-CM

## 2022-08-23 DIAGNOSIS — J96.11 CHRONIC RESPIRATORY FAILURE WITH HYPOXIA (H): ICD-10-CM

## 2022-08-23 DIAGNOSIS — J18.9 COMMUNITY ACQUIRED PNEUMONIA, UNSPECIFIED LATERALITY: ICD-10-CM

## 2022-08-23 DIAGNOSIS — R39.15 URINARY URGENCY: ICD-10-CM

## 2022-08-23 DIAGNOSIS — Z09 HOSPITAL DISCHARGE FOLLOW-UP: Primary | ICD-10-CM

## 2022-08-23 DIAGNOSIS — J47.9 BRONCHIECTASIS WITHOUT COMPLICATION (H): ICD-10-CM

## 2022-08-23 DIAGNOSIS — R91.8 PULMONARY NODULES: ICD-10-CM

## 2022-08-23 DIAGNOSIS — R53.81 PHYSICAL DECONDITIONING: ICD-10-CM

## 2022-08-23 PROBLEM — R07.9 CHEST PAIN IN ADULT: Status: RESOLVED | Noted: 2022-07-23 | Resolved: 2022-08-23

## 2022-08-23 PROCEDURE — 99214 OFFICE O/P EST MOD 30 MIN: CPT | Mod: 95 | Performed by: STUDENT IN AN ORGANIZED HEALTH CARE EDUCATION/TRAINING PROGRAM

## 2022-08-23 RX ORDER — PREGABALIN 50 MG/1
CAPSULE ORAL
Qty: 90 CAPSULE | Refills: 1 | Status: SHIPPED | OUTPATIENT
Start: 2022-08-23 | End: 2022-10-18

## 2022-08-23 NOTE — PROGRESS NOTES
Ayse is a 77 year old who is being evaluated via a billable video visit.      How would you like to obtain your AVS? MyChart  If the video visit is dropped, the invitation should be resent by: Send to e-mail at: [email or web address]  Will anyone else be joining your video visit? No        Assessment & Plan       ICD-10-CM    1. Hospital discharge follow-up  Z09    2. Pulmonary nodules  R91.8 CT Chest w/o Contrast   3. Nasal lesion  J34.89 Adult ENT  Referral   4. Community acquired pneumonia, unspecified laterality  J18.9    5. Urinary urgency  R39.15    6. Bronchiectasis without complication (H)  J47.9    7. Chronic respiratory failure with hypoxia (H)  J96.11    8. Physical deconditioning  R53.81        Hospital follow-up:  Patient seems to be doing well and largely  at her baseline respiratory status.  Improving, albeit slowly. No changes to medications made.  Did note that she lost some weight due to acute illness.  Has gained a few pounds back.  Encouraged her to continue to eat regularly + stay active    Multiple pulmonary nodules:  Saw several new nodules 7/23 on CT PE study done in the hospital.  Patient is supposed to get a repeat CT in 6 months.  Future order placed.    Urinary urgency:  Patient is continuing to do with oxybutynin.  Had attempted to increase it to 10 mg but she did not tolerate that (says that she got swelling in her legs).  She is now back to oxybutynin 5 mg and doing well.  No side effects.  Continue to monitor.  Decrease fluid intake prior to bed.    Nasal lesion:  Looks like in October 2021, patient was prescribed topical mupirocin for scab forming in her bilateral nares.  Patient says that she has been using the mupirocin as needed for scab formation.  It helps heal the lesion but as soon as she stops using it, it comes back.  Has not been to ENT for further investigation of this lesion.  Referral to ENT placed given recurrent nature of the lesion.  I am unable to see a  lesion on exam today.    Cholelithiasis: Stable and without any symptoms.      31 minutes spent on the date of the encounter doing chart review, history and exam, documentation and further activities per the note    No follow-ups on file.    Neida Maradiaga DO  [facility] HUE Tavera is a 77 year old, presenting for the following health issues:  Hospital F/U ([facility] end of July-pneumonia )    Patient was hospitalized 7/22/2022 -7/27/2022 for community-acquired pneumonia and bronchiectasis exacerbation.  Patient was started on ceftriaxone and azithromycin.  Was on antibiotics 5 days and return to baseline oxygen levels (2 L NC) prior to discharge.     Today, she feels she is largely back to her baseline respiratory status. Still doesn't feel as good as it was prior to the hospitalization but is continuing to rehab and stay active.  Today, it is a bit humid so she is having some difficulty with breathing but her oxygen levels are holding in the mid 90s on O2 2 L NC.  Has not needed to increase her oxygen requirement.  Has had a follow-up with pulmonology already and no changes to her regimen have been made.      Hospital Follow-up Visit:    Hospital/Nursing Home/IP Rehab Facility: [facility]  Date of Admission: 7.22.2022  Date of Discharge: 7.27.2022  Reason(s) for Admission: Pneumonia     Was your hospitalization related to COVID-19? No   Problems taking medications regularly:  Some days the bigger pills are hard to swallow   Medication changes since discharge: Biotin dose change- decreased mcg   Problems adhering to non-medication therapy:  None    Summary of hospitalization:  [facility] discharge summary reviewed  Diagnostic Tests/Treatments reviewed.  Follow up needed: ENT  Other Healthcare Providers Involved in Patient s Care:         None  Update since discharge: improved.     Post Medication Reconciliation Status:  Discharge medications reconciled, continue medications without change      Plan of care communicated with patient     Review of Systems   As per HPI       Objective    Vitals - Patient Reported  SpO2 (Patient Reported): 96  Pulse (Patient Reported): 80  Pain Score: No Pain (0)      Vitals:  No vitals were obtained today due to virtual visit.    Physical Exam   GENERAL: Healthy, alert and no distress  EYES: Eyes grossly normal to inspection.  No discharge or erythema, or obvious scleral/conjunctival abnormalities.  RESP: No audible wheeze, cough, or visible cyanosis.  No visible retractions or increased work of breathing.    SKIN: Visible skin clear. No significant rash, abnormal pigmentation or lesions.  NEURO: Cranial nerves grossly intact.  Mentation and speech appropriate for age.  PSYCH: Mentation appears normal, affect normal/bright, judgement and insight intact, normal speech and appearance well-groomed.    Video-Visit Details    Video Start Time: 3:05 PM    Type of service:  Video Visit    Video End Time:3:18 PM    Originating Location (pt. Location): Home    Distant Location (provider location):  [facility]     Platform used for Video Visit: [employer]

## 2022-08-25 ENCOUNTER — MEDICAL CORRESPONDENCE (OUTPATIENT)
Dept: HEALTH INFORMATION MANAGEMENT | Facility: CLINIC | Age: 77
End: 2022-08-25

## 2022-08-26 ENCOUNTER — MYC MEDICAL ADVICE (OUTPATIENT)
Dept: PULMONOLOGY | Facility: OTHER | Age: 77
End: 2022-08-26

## 2022-08-26 DIAGNOSIS — J47.1 BRONCHIECTASIS WITH ACUTE EXACERBATION (H): Primary | ICD-10-CM

## 2022-08-26 RX ORDER — PREDNISONE 20 MG/1
20 TABLET ORAL DAILY
Qty: 7 TABLET | Refills: 0 | Status: SHIPPED | OUTPATIENT
Start: 2022-08-26 | End: 2022-09-02

## 2022-09-06 ENCOUNTER — DOCUMENTATION ONLY (OUTPATIENT)
Dept: PULMONOLOGY | Facility: OTHER | Age: 77
End: 2022-09-06

## 2022-09-06 ENCOUNTER — MEDICAL CORRESPONDENCE (OUTPATIENT)
Dept: HEALTH INFORMATION MANAGEMENT | Facility: CLINIC | Age: 77
End: 2022-09-06

## 2022-09-06 DIAGNOSIS — R09.82 POST-NASAL DRIP: ICD-10-CM

## 2022-09-06 DIAGNOSIS — J44.1 CHRONIC OBSTRUCTIVE PULMONARY DISEASE WITH ACUTE EXACERBATION (H): Primary | ICD-10-CM

## 2022-09-06 RX ORDER — FLUTICASONE PROPIONATE 50 MCG
1 SPRAY, SUSPENSION (ML) NASAL DAILY
Qty: 9.9 ML | Refills: 0 | Status: SHIPPED | OUTPATIENT
Start: 2022-09-06 | End: 2022-11-18

## 2022-09-12 DIAGNOSIS — J47.9 BRONCHIECTASIS (H): ICD-10-CM

## 2022-09-12 DIAGNOSIS — J47.1 BRONCHIECTASIS WITH ACUTE EXACERBATION (H): ICD-10-CM

## 2022-09-12 RX ORDER — MONTELUKAST SODIUM 10 MG/1
TABLET ORAL
Qty: 90 TABLET | Refills: 3 | Status: SHIPPED | OUTPATIENT
Start: 2022-09-12 | End: 2023-01-01

## 2022-09-12 RX ORDER — SODIUM CHLORIDE FOR INHALATION 3 %
3 VIAL, NEBULIZER (ML) INHALATION 2 TIMES DAILY
Qty: 180 ML | Refills: 11 | Status: SHIPPED | OUTPATIENT
Start: 2022-09-12 | End: 2023-01-01

## 2022-09-22 ENCOUNTER — MYC MEDICAL ADVICE (OUTPATIENT)
Dept: FAMILY MEDICINE | Facility: CLINIC | Age: 77
End: 2022-09-22

## 2022-09-22 DIAGNOSIS — N32.81 OAB (OVERACTIVE BLADDER): ICD-10-CM

## 2022-09-22 DIAGNOSIS — F41.9 ANXIETY: ICD-10-CM

## 2022-09-24 RX ORDER — OXYBUTYNIN CHLORIDE 5 MG/1
5 TABLET, EXTENDED RELEASE ORAL DAILY
Qty: 90 TABLET | Refills: 3 | Status: SHIPPED | OUTPATIENT
Start: 2022-09-24 | End: 2023-01-01

## 2022-09-24 RX ORDER — ESCITALOPRAM OXALATE 5 MG/1
5 TABLET ORAL DAILY
Qty: 90 TABLET | Refills: 3 | Status: SHIPPED | OUTPATIENT
Start: 2022-09-24 | End: 2022-11-14

## 2022-09-24 NOTE — TELEPHONE ENCOUNTER
"Last Written Prescription Date:  6/23/22  Last Fill Quantity: 90,  # refills: 0   Last office visit provider:  8/23/22     Requested Prescriptions   Pending Prescriptions Disp Refills     oxybutynin ER (DITROPAN XL) 5 MG 24 hr tablet 90 tablet 0     Sig: Take 1 tablet (5 mg) by mouth daily       Muscarinic Antagonists (Urinary Incontinence Agents) Passed - 9/23/2022  9:27 AM        Passed - Recent (12 mo) or future (30 days) visit within the authorizing provider's specialty     Patient has had an office visit with the authorizing provider or a provider within the authorizing providers department within the previous 12 mos or has a future within next 30 days. See \"Patient Info\" tab in inbasket, or \"Choose Columns\" in Meds & Orders section of the refill encounter.              Passed - Medication is Oxybutynin and patient is 5 years of age or older        Passed - Patient does not have a diagnosis of glaucoma on the problem list     If glaucoma diagnosis is new, refer refill to physician.          Passed - Medication is active on med list        Passed - Patient is 18 years of age or older           escitalopram (LEXAPRO) 5 MG tablet 90 tablet 0     Sig: Take 1 tablet (5 mg) by mouth daily       SSRIs Protocol Passed - 9/23/2022  9:27 AM        Passed - Recent (12 mo) or future (30 days) visit within the authorizing provider's specialty     Patient has had an office visit with the authorizing provider or a provider within the authorizing providers department within the previous 12 mos or has a future within next 30 days. See \"Patient Info\" tab in inbasket, or \"Choose Columns\" in Meds & Orders section of the refill encounter.              Passed - Medication is active on med list        Passed - Patient is age 18 or older        Passed - No active pregnancy on record        Passed - No positive pregnancy test in last 12 months             Antonette Miles RN 09/24/22 2:15 PM  "

## 2022-09-29 ENCOUNTER — MYC MEDICAL ADVICE (OUTPATIENT)
Dept: PULMONOLOGY | Facility: OTHER | Age: 77
End: 2022-09-29

## 2022-09-29 ENCOUNTER — OFFICE VISIT (OUTPATIENT)
Dept: OTOLARYNGOLOGY | Facility: CLINIC | Age: 77
End: 2022-09-29
Attending: STUDENT IN AN ORGANIZED HEALTH CARE EDUCATION/TRAINING PROGRAM
Payer: MEDICARE

## 2022-09-29 DIAGNOSIS — R09.A2 GLOBUS SENSATION: ICD-10-CM

## 2022-09-29 DIAGNOSIS — J34.89 NASAL DRYNESS: Primary | ICD-10-CM

## 2022-09-29 DIAGNOSIS — J39.2 THROAT DRYNESS: ICD-10-CM

## 2022-09-29 PROCEDURE — 31575 DIAGNOSTIC LARYNGOSCOPY: CPT | Performed by: OTOLARYNGOLOGY

## 2022-09-29 PROCEDURE — 99204 OFFICE O/P NEW MOD 45 MIN: CPT | Mod: 25 | Performed by: OTOLARYNGOLOGY

## 2022-09-29 RX ORDER — AMOXICILLIN 125 MG/5ML
50 SUSPENSION, RECONSTITUTED, ORAL (ML) ORAL 2 TIMES DAILY
COMMUNITY
End: 2022-11-18

## 2022-09-29 NOTE — PROGRESS NOTES
HPI: This patient is a 78yo F who presents for evaluation of the nose at the request of Dr. Davidson. She is on chronic O2 and has been using chronic flonase as well. She recently has been having sores in her nose that keep recurring. They will get better with mupirocin but then rapidly recur. She also has a globus sensation for years. Dry mouth from polypharmacy.    Past medical history, surgical history, social history, family history, medications, and allergies have been reviewed with the patient and are documented above.    Review of Systems: a 10-system review was performed. Pertinent positives are noted in the HPI and on a separate scanned document in the chart.    PHYSICAL EXAMINATION:  GEN: no acute distress, normocephalic  EYES: extraocular movements are intact, pupils are equal and round. Sclera clear.   EARS: auricles are normally formed. The external auditory canals are clear with minimal to no cerumen. Tympanic membranes are intact bilaterally with no signs of infection, effusion, retractions, or perforations.  NOSE: anterior nares are patent. There are no masses or lesions. The septum is non-obstructing. The tissues are bone dry throughout. Nasal cannula present.  OC/OP: clear, dentition is in good repair. Very dry mucus membranes.  The tongue and palate are fully mobile and symmetric. No masses or lesions.  HP/L (scope): nasopharynx, base of tongue, vallecula, epiglottis, and pyriform sinuses are clear. The bilateral vocal folds are mobile and without lesion. Very dry, thick secretions sticking in various locations. No masses or mucosal abnormalities other than the dryness.  NECK: soft and supple. No lymphadenopathy or masses. Airway is midline.  NEURO: CN VII and XII symmetric. alert and oriented. No spontaneous nystagmus. Gait is normal.  PULM: breathing comfortably on room air, normal chest expansion with respiration  CARDS: no cyanosis or clubbing, normal carotid pulses    FLEXIBLE LARYNGOSCOPY:  Scope inserted bilaterally to examine nasal tissue, nasopharynx, posterior oropharynx, hypopharynx, and larynx. See exam notes for exam finding details. Patient tolerated the procedure well.    MEDICAL DECISION-MAKING: This patient is a 78yo  with severe intranasal dryness. She needs to immediately stop the flonase and replace it with aggressive saline rehydration. Also needs to be using a humidity attachment on her NC O2. As far as the globus sensation goes, I think a lot of this is secondary to how dry her tissues are. There may be a component of LPR, but adding another medication when the likelihood of significant symptom improvement is low seems unnecessary and only likely to add to medication side effects. The patient agrees with this.

## 2022-09-29 NOTE — LETTER
9/29/2022         RE: Fiordaliza Najera  [address] [place] [address]        Dear Colleague,    Thank you for referring your patient, Fiordaliza Najera, to the [facility]. Please see a copy of my visit note below.    HPI: This patient is a 76yo F who presents for evaluation of the nose at the request of Dr. Davidson. She is on chronic O2 and has been using chronic flonase as well. She recently has been having sores in her nose that keep recurring. They will get better with mupirocin but then rapidly recur. She also has a globus sensation for years. Dry mouth from polypharmacy.    Past medical history, surgical history, social history, family history, medications, and allergies have been reviewed with the patient and are documented above.    Review of Systems: a 10-system review was performed. Pertinent positives are noted in the HPI and on a separate scanned document in the chart.    PHYSICAL EXAMINATION:  GEN: no acute distress, normocephalic  EYES: extraocular movements are intact, pupils are equal and round. Sclera clear.   EARS: auricles are normally formed. The external auditory canals are clear with minimal to no cerumen. Tympanic membranes are intact bilaterally with no signs of infection, effusion, retractions, or perforations.  NOSE: anterior nares are patent. There are no masses or lesions. The septum is non-obstructing. The tissues are bone dry throughout. Nasal cannula present.  OC/OP: clear, dentition is in good repair. Very dry mucus membranes.  The tongue and palate are fully mobile and symmetric. No masses or lesions.  HP/L (scope): nasopharynx, base of tongue, vallecula, epiglottis, and pyriform sinuses are clear. The bilateral vocal folds are mobile and without lesion. Very dry, thick secretions sticking in various locations. No masses or mucosal abnormalities other than the dryness.  NECK: soft and supple. No lymphadenopathy or masses. Airway is  midline.  NEURO: CN VII and XII symmetric. alert and oriented. No spontaneous nystagmus. Gait is normal.  PULM: breathing comfortably on room air, normal chest expansion with respiration  CARDS: no cyanosis or clubbing, normal carotid pulses    FLEXIBLE LARYNGOSCOPY: Scope inserted bilaterally to examine nasal tissue, nasopharynx, posterior oropharynx, hypopharynx, and larynx. See exam notes for exam finding details. Patient tolerated the procedure well.    MEDICAL DECISION-MAKING: This patient is a 76yo  with severe intranasal dryness. She needs to immediately stop the flonase and replace it with aggressive saline rehydration. Also needs to be using a humidity attachment on her NC O2. As far as the globus sensation goes, I think a lot of this is secondary to how dry her tissues are. There may be a component of LPR, but adding another medication when the likelihood of significant symptom improvement is low seems unnecessary and only likely to add to medication side effects. The patient agrees with this.             Again, thank you for allowing me to participate in the care of your patient.        Sincerely,        Jazmin Beverly MD

## 2022-10-06 ENCOUNTER — MYC MEDICAL ADVICE (OUTPATIENT)
Dept: FAMILY MEDICINE | Facility: CLINIC | Age: 77
End: 2022-10-06

## 2022-10-06 DIAGNOSIS — G43.009 MIGRAINE WITHOUT AURA AND WITHOUT STATUS MIGRAINOSUS, NOT INTRACTABLE: Primary | ICD-10-CM

## 2022-10-10 DIAGNOSIS — J47.1 BRONCHIECTASIS WITH ACUTE EXACERBATION (H): ICD-10-CM

## 2022-10-10 RX ORDER — SUMATRIPTAN 50 MG/1
50 TABLET, FILM COATED ORAL
Qty: 30 TABLET | Refills: 1 | Status: SHIPPED | OUTPATIENT
Start: 2022-10-10 | End: 2022-10-11

## 2022-10-10 RX ORDER — ALBUTEROL SULFATE 0.83 MG/ML
2.5 SOLUTION RESPIRATORY (INHALATION) EVERY 4 HOURS PRN
Qty: 150 ML | Refills: 11 | Status: SHIPPED | OUTPATIENT
Start: 2022-10-10 | End: 2023-01-01

## 2022-10-11 ENCOUNTER — MYC MEDICAL ADVICE (OUTPATIENT)
Dept: FAMILY MEDICINE | Facility: CLINIC | Age: 77
End: 2022-10-11

## 2022-10-11 DIAGNOSIS — G43.009 MIGRAINE WITHOUT AURA AND WITHOUT STATUS MIGRAINOSUS, NOT INTRACTABLE: ICD-10-CM

## 2022-10-11 RX ORDER — SUMATRIPTAN 50 MG/1
TABLET, FILM COATED ORAL
Qty: 30 TABLET | Refills: 1 | Status: SHIPPED | OUTPATIENT
Start: 2022-10-11 | End: 2022-12-21

## 2022-10-14 ENCOUNTER — MYC MEDICAL ADVICE (OUTPATIENT)
Dept: PULMONOLOGY | Facility: OTHER | Age: 77
End: 2022-10-14

## 2022-10-14 ENCOUNTER — MEDICAL CORRESPONDENCE (OUTPATIENT)
Dept: HEALTH INFORMATION MANAGEMENT | Facility: CLINIC | Age: 77
End: 2022-10-14

## 2022-10-14 DIAGNOSIS — J44.1 CHRONIC OBSTRUCTIVE PULMONARY DISEASE WITH ACUTE EXACERBATION (H): ICD-10-CM

## 2022-10-14 DIAGNOSIS — J43.1 PANLOBULAR EMPHYSEMA (H): Primary | ICD-10-CM

## 2022-10-14 RX ORDER — PREDNISONE 20 MG/1
20 TABLET ORAL DAILY
Qty: 7 TABLET | Refills: 0 | Status: SHIPPED | OUTPATIENT
Start: 2022-10-14 | End: 2022-10-21

## 2022-10-14 NOTE — TELEPHONE ENCOUNTER
Called Ayse in regards to her [employer] message. She reports feeling short of breath with movement and having some nasal drip. Denies fever and has tested negative for Covid. Per Dr. Lorenz action plan order, will send prescription for Augmentin and prednisone. Last ordered on 8/26/22. She agrees to call 911 or utilize the ER if symptoms do not improve or get worse.

## 2022-10-18 ENCOUNTER — MYC MEDICAL ADVICE (OUTPATIENT)
Dept: FAMILY MEDICINE | Facility: CLINIC | Age: 77
End: 2022-10-18

## 2022-10-18 DIAGNOSIS — J44.1 CHRONIC OBSTRUCTIVE PULMONARY DISEASE WITH ACUTE EXACERBATION (H): ICD-10-CM

## 2022-10-18 DIAGNOSIS — G89.4 CHRONIC PAIN SYNDROME: ICD-10-CM

## 2022-10-18 DIAGNOSIS — J47.1 BRONCHIECTASIS WITH ACUTE EXACERBATION (H): ICD-10-CM

## 2022-10-18 RX ORDER — PREGABALIN 50 MG/1
CAPSULE ORAL
Qty: 90 CAPSULE | Refills: 1 | Status: SHIPPED | OUTPATIENT
Start: 2022-10-18 | End: 2022-12-21

## 2022-10-22 ENCOUNTER — HEALTH MAINTENANCE LETTER (OUTPATIENT)
Age: 77
End: 2022-10-22

## 2022-10-26 ENCOUNTER — MEDICAL CORRESPONDENCE (OUTPATIENT)
Dept: HEALTH INFORMATION MANAGEMENT | Facility: CLINIC | Age: 77
End: 2022-10-26

## 2022-10-31 ENCOUNTER — MYC MEDICAL ADVICE (OUTPATIENT)
Dept: FAMILY MEDICINE | Facility: CLINIC | Age: 77
End: 2022-10-31

## 2022-11-12 ENCOUNTER — APPOINTMENT (OUTPATIENT)
Dept: GENERAL RADIOLOGY | Facility: CLINIC | Age: 77
End: 2022-11-12
Payer: MEDICARE

## 2022-11-12 ENCOUNTER — HOSPITAL ENCOUNTER (EMERGENCY)
Facility: CLINIC | Age: 77
Discharge: HOME | End: 2022-11-12
Attending: EMERGENCY MEDICINE | Admitting: EMERGENCY MEDICINE
Payer: MEDICARE

## 2022-11-12 VITALS
OXYGEN SATURATION: 98 % | BODY MASS INDEX: 18.9 KG/M2 | RESPIRATION RATE: 16 BRPM | TEMPERATURE: 98.2 F | WEIGHT: 105 LBS | HEART RATE: 92 BPM | SYSTOLIC BLOOD PRESSURE: 148 MMHG | DIASTOLIC BLOOD PRESSURE: 88 MMHG

## 2022-11-12 DIAGNOSIS — J44.1 COPD EXACERBATION (H): ICD-10-CM

## 2022-11-12 LAB
ANION GAP SERPL CALCULATED.3IONS-SCNC: 9 MMOL/L (ref 7–15)
ATRIAL RATE - MUSE: 80 BPM
BASOPHILS # BLD AUTO: 0 10E3/UL (ref 0–0.2)
BASOPHILS NFR BLD AUTO: 1 %
BUN SERPL-MCNC: 10.1 MG/DL (ref 8–23)
CALCIUM SERPL-MCNC: 9.1 MG/DL (ref 8.8–10.2)
CHLORIDE SERPL-SCNC: 101 MMOL/L (ref 98–107)
CREAT SERPL-MCNC: 0.42 MG/DL (ref 0.51–0.95)
DEPRECATED HCO3 PLAS-SCNC: 28 MMOL/L (ref 22–29)
DIASTOLIC BLOOD PRESSURE - MUSE: NORMAL MMHG
EOSINOPHIL # BLD AUTO: 0.2 10E3/UL (ref 0–0.7)
EOSINOPHIL NFR BLD AUTO: 3 %
ERYTHROCYTE [DISTWIDTH] IN BLOOD BY AUTOMATED COUNT: 13 % (ref 10–15)
FLUAV RNA SPEC QL NAA+PROBE: NEGATIVE
FLUBV RNA RESP QL NAA+PROBE: NEGATIVE
GFR SERPL CREATININE-BSD FRML MDRD: >90 ML/MIN/1.73M2
GLUCOSE SERPL-MCNC: 97 MG/DL (ref 70–99)
HCT VFR BLD AUTO: 38.1 % (ref 35–47)
HGB BLD-MCNC: 11.9 G/DL (ref 11.7–15.7)
IMM GRANULOCYTES # BLD: 0 10E3/UL
IMM GRANULOCYTES NFR BLD: 0 %
INTERPRETATION ECG - MUSE: NORMAL
LYMPHOCYTES # BLD AUTO: 1.2 10E3/UL (ref 0.8–5.3)
LYMPHOCYTES NFR BLD AUTO: 15 %
MCH RBC QN AUTO: 28.3 PG (ref 26.5–33)
MCHC RBC AUTO-ENTMCNC: 31.2 G/DL (ref 31.5–36.5)
MCV RBC AUTO: 91 FL (ref 78–100)
MONOCYTES # BLD AUTO: 0.8 10E3/UL (ref 0–1.3)
MONOCYTES NFR BLD AUTO: 11 %
NEUTROPHILS # BLD AUTO: 5.7 10E3/UL (ref 1.6–8.3)
NEUTROPHILS NFR BLD AUTO: 70 %
NRBC # BLD AUTO: 0 10E3/UL
NRBC BLD AUTO-RTO: 0 /100
P AXIS - MUSE: 70 DEGREES
PLATELET # BLD AUTO: 314 10E3/UL (ref 150–450)
POTASSIUM SERPL-SCNC: 4 MMOL/L (ref 3.4–5.3)
PR INTERVAL - MUSE: 168 MS
QRS DURATION - MUSE: 82 MS
QT - MUSE: 388 MS
QTC - MUSE: 447 MS
R AXIS - MUSE: -16 DEGREES
RBC # BLD AUTO: 4.21 10E6/UL (ref 3.8–5.2)
RSV RNA SPEC NAA+PROBE: NEGATIVE
SARS-COV-2 RNA RESP QL NAA+PROBE: NEGATIVE
SODIUM SERPL-SCNC: 138 MMOL/L (ref 136–145)
SYSTOLIC BLOOD PRESSURE - MUSE: NORMAL MMHG
T AXIS - MUSE: 38 DEGREES
TROPONIN T SERPL HS-MCNC: 8 NG/L
VENTRICULAR RATE- MUSE: 80 BPM
WBC # BLD AUTO: 8 10E3/UL (ref 4–11)

## 2022-11-12 PROCEDURE — 93010 ELECTROCARDIOGRAM REPORT: CPT | Performed by: EMERGENCY MEDICINE

## 2022-11-12 PROCEDURE — 87637 SARSCOV2&INF A&B&RSV AMP PRB: CPT

## 2022-11-12 PROCEDURE — C9803 HOPD COVID-19 SPEC COLLECT: HCPCS | Performed by: EMERGENCY MEDICINE

## 2022-11-12 PROCEDURE — 250N000012 HC RX MED GY IP 250 OP 636 PS 637: Performed by: EMERGENCY MEDICINE

## 2022-11-12 PROCEDURE — 36415 COLL VENOUS BLD VENIPUNCTURE: CPT | Performed by: EMERGENCY MEDICINE

## 2022-11-12 PROCEDURE — 71046 X-RAY EXAM CHEST 2 VIEWS: CPT

## 2022-11-12 PROCEDURE — 84484 ASSAY OF TROPONIN QUANT: CPT | Performed by: EMERGENCY MEDICINE

## 2022-11-12 PROCEDURE — 85025 COMPLETE CBC W/AUTO DIFF WBC: CPT | Performed by: EMERGENCY MEDICINE

## 2022-11-12 PROCEDURE — 99285 EMERGENCY DEPT VISIT HI MDM: CPT | Mod: CS | Performed by: EMERGENCY MEDICINE

## 2022-11-12 PROCEDURE — 99285 EMERGENCY DEPT VISIT HI MDM: CPT | Mod: CS,25 | Performed by: EMERGENCY MEDICINE

## 2022-11-12 PROCEDURE — 71046 X-RAY EXAM CHEST 2 VIEWS: CPT | Mod: 26 | Performed by: RADIOLOGY

## 2022-11-12 PROCEDURE — 80048 BASIC METABOLIC PNL TOTAL CA: CPT | Performed by: EMERGENCY MEDICINE

## 2022-11-12 PROCEDURE — 93005 ELECTROCARDIOGRAM TRACING: CPT | Performed by: EMERGENCY MEDICINE

## 2022-11-12 RX ORDER — PREDNISONE 20 MG/1
TABLET ORAL
Qty: 8 TABLET | Refills: 0 | Status: SHIPPED | OUTPATIENT
Start: 2022-11-12 | End: 2022-11-18

## 2022-11-12 RX ORDER — ESCITALOPRAM OXALATE 5 MG/1
5 TABLET ORAL DAILY
Status: DISCONTINUED | OUTPATIENT
Start: 2022-11-12 | End: 2022-11-12 | Stop reason: HOSPADM

## 2022-11-12 RX ORDER — PREDNISONE 20 MG/1
60 TABLET ORAL ONCE
Status: COMPLETED | OUTPATIENT
Start: 2022-11-12 | End: 2022-11-12

## 2022-11-12 RX ADMIN — PREDNISONE 60 MG: 20 TABLET ORAL at 11:51

## 2022-11-12 ASSESSMENT — ENCOUNTER SYMPTOMS
DIARRHEA: 0
ACTIVITY CHANGE: 0
FATIGUE: 1
RHINORRHEA: 0
AGITATION: 0
NECK STIFFNESS: 0
DIZZINESS: 0
NAUSEA: 0
CONSTIPATION: 0
NUMBNESS: 1
SHORTNESS OF BREATH: 1
ABDOMINAL PAIN: 0
PALPITATIONS: 0
COUGH: 1
MYALGIAS: 1
HEMATURIA: 0
WOUND: 0
EYE DISCHARGE: 0
COLOR CHANGE: 0
DIFFICULTY URINATING: 0
CHEST TIGHTNESS: 0
FEVER: 0
CHILLS: 0
WEAKNESS: 1
LIGHT-HEADEDNESS: 0
VOMITING: 0
ARTHRALGIAS: 0
NECK PAIN: 0
DYSURIA: 0

## 2022-11-12 ASSESSMENT — ACTIVITIES OF DAILY LIVING (ADL)
ADLS_ACUITY_SCORE: 35

## 2022-11-12 NOTE — ED TRIAGE NOTES
Pt presents via EMS with c/o shortness of breath. Woke from sleep sob, normally on 2L NC at home for her COPD. Got 1 neb from EMS, feels better. Faint expiratory wheezes bilaterally, no accessory muscle use, no chest pain.      Triage Assessment     Row Name 11/12/22 0532       Triage Assessment (Adult)    Airway WDL WDL       Respiratory WDL    Respiratory WDL X;cough;expansion/retractions    Cough Frequency with stimulation       Skin Circulation/Temperature WDL    Skin Circulation/Temperature WDL WDL       Cardiac WDL    Cardiac WDL WDL       Peripheral/Neurovascular WDL    Peripheral Neurovascular WDL WDL       Cognitive/Neuro/Behavioral WDL    Cognitive/Neuro/Behavioral WDL WDL

## 2022-11-12 NOTE — DISCHARGE INSTRUCTIONS
You came to the hospital with shortness of breath. You had lab work here that was normal. Your white blood cell count was normal which is reassuring that you do not have an acute infection. You had a chest xray that showed stable signs of COPD and was not concerning for any pneumonia.     If you experience any worsening shortness of breath, chest pain, worsening or more productive cough, or start to need more oxygen than normal, please return to the ED for further workup.     Please make an appointment to follow up with Your Primary Care Provider in 7-14 days.

## 2022-11-12 NOTE — ED PROVIDER NOTES
ED Provider Note  [facility]      History     Chief Complaint   Patient presents with     Shortness of Breath     HPI     Fiordaliza Najera is a 77 year old female with PMH of COPD (on 2L home O2), bronchiectasis, osteopenia, asthma, anxiety who presented to the ED with shortness of breath. Patient states for the last few days she has been having worsening shortness of breath on exertion. States her breathing returns to normal while at rest. Patient notes that she began having symptoms of tingling in her feet last night that caused her concern and prompted her to present to the ED. States this feels different from last COPD exacerbation where she was also found to have pneumonia. Patient endorses weakness for past day. She has been taking her inhalers regularly at home. Endorses cough but states this is her baseline, has not been more productive recently.     Patient given nebulizer in ambulance en route to ED with improvement of symptoms. In ED, O2 saturation 96% on home 2L. Patient breathing comfortably at rest, more short of breath with exertion.  Denies chest pain, pleuritic chest pain, fevers, LE swelling, lightheadedness. Able to eat and drink normally.     Past Medical History  Past Medical History:   Diagnosis Date     Anxiety 09/30/2021     COPD (chronic obstructive pulmonary disease) (H)      Diverticulosis      Hiatal hernia      Mild asthma      Neuropathy      Osteopenia      Temporomandibular joint (TMJ) pain      Past Surgical History:   Procedure Laterality Date     ANTERIOR / POSTERIOR COMBINED FUSION LUMBAR SPINE       BRONCHOSCOPY (RIGID OR FLEXIBLE), DIAGNOSTIC N/A 9/28/2021    Procedure: BRONCHOSCOPY, WITH BRONCHOALVEOLAR LAVAGE;  Surgeon: Randall Lorenz MD;  Location: [facility]     HYSTERECTOMY       PICC SINGLE LUMEN PLACEMENT  11/4/2021          RETINAL LASER PROCEDURE Left 10/04/2016     SALPINGOOPHORECTOMY       TOTAL KNEE ARTHROPLASTY  2008     predniSONE  (DELTASONE) 20 MG tablet  acetylcysteine (MUCOMYST) 10 % nebulizer solution  acetylcysteine (MUCOMYST) 20 % neb solution  albuterol (PROAIR HFA/PROVENTIL HFA/VENTOLIN HFA) 108 (90 Base) MCG/ACT inhaler  albuterol (PROVENTIL) (2.5 MG/3ML) 0.083% neb solution  amoxicillin (AMOXIL) 125 MG/5ML suspension  biotin 300 MCG TABS tablet  calcium-vitamin D (CALCIUM-VITAMIN D) 500 mg(1,250mg) -200 unit per tablet  celecoxib (CELEBREX) 200 MG capsule  cetirizine (ZYRTEC) 5 MG tablet  cholecalciferol, vitamin D3, (VITAMIN D3) 1,000 unit capsule  escitalopram (LEXAPRO) 5 MG tablet  fluticasone (FLONASE) 50 MCG/ACT nasal spray  Fluticasone-Umeclidin-Vilanterol (TRELEGY ELLIPTA) 100-62.5-25 MCG/INH oral inhaler  Lactobacillus rhamnosus GG (CULTURELLE) 10-15 Billion cell capsule  montelukast (SINGULAIR) 10 MG tablet  multivitamin therapeutic (THERAGRAN) tablet  nystatin (MYCOSTATIN) 113361 UNIT/GM external cream  oxybutynin ER (DITROPAN XL) 5 MG 24 hr tablet  OXYGEN-AIR DELIVERY SYSTEMS MISC  pregabalin (LYRICA) 50 MG capsule  sodium chloride (NEBUSAL) 3 % neb solution  sodium chloride (OCEAN) 0.65 % nasal spray  SUMAtriptan (IMITREX) 50 MG tablet      Allergies   Allergen Reactions     Codeine Unknown     Morphine Itching     Family History  Family History   Problem Relation Age of Onset     Dementia Mother         passed age 88     Chronic Obstructive Pulmonary Disease Father         passed age 78     Social History   Social History     Tobacco Use     Smoking status: Never     Smokeless tobacco: Never   Vaping Use     Vaping Use: Never used   Substance Use Topics     Alcohol use: No     Drug use: Never      Past medical history, past surgical history, medications, allergies, family history, and social history were reviewed with the patient. No additional pertinent items.       Review of Systems   Constitutional: Positive for fatigue. Negative for activity change, chills and fever.   HENT: Negative for congestion and rhinorrhea.     Eyes: Negative for discharge.   Respiratory: Positive for cough and shortness of breath. Negative for chest tightness.    Cardiovascular: Negative for chest pain, palpitations and leg swelling.   Gastrointestinal: Negative for abdominal pain, constipation, diarrhea, nausea and vomiting.   Genitourinary: Negative for difficulty urinating, dysuria, hematuria and urgency.   Musculoskeletal: Positive for myalgias. Negative for arthralgias, neck pain and neck stiffness.   Skin: Negative for color change, rash and wound.   Neurological: Positive for weakness and numbness. Negative for dizziness and light-headedness.   Psychiatric/Behavioral: Negative for agitation and behavioral problems.   All other systems reviewed and are negative.    A complete review of systems was performed with pertinent positives and negatives noted in the HPI, and all other systems negative.    Physical Exam   BP: (!) 144/77  Pulse: 74  Temp: 98.2  F (36.8  C)  Resp: 20  Weight: 47.6 kg (105 lb)  SpO2: 96 %     Physical Exam  Vitals and nursing note reviewed.   Constitutional:       General: She is not in acute distress.     Appearance: She is well-developed, normal weight and well-nourished. She is not ill-appearing, toxic-appearing or diaphoretic.   HENT:      Head: Normocephalic and atraumatic.      Nose: Nose normal.      Mouth/Throat:      Mouth: Mucous membranes are moist.   Eyes:      General: No scleral icterus.     Extraocular Movements: Extraocular movements intact.      Conjunctiva/sclera: Conjunctivae normal.      Pupils: Pupils are equal, round, and reactive to light.   Cardiovascular:      Rate and Rhythm: Normal rate and regular rhythm.   Pulmonary:      Effort: Pulmonary effort is normal. No respiratory distress.      Breath sounds: Examination of the right-lower field reveals rhonchi. Examination of the left-lower field reveals rhonchi. Rhonchi present. No wheezing.   Chest:      Chest wall: No mass, tenderness or crepitus.    Abdominal:      General: There is no distension.      Palpations: Abdomen is soft.      Tenderness: There is no abdominal tenderness.   Musculoskeletal:         General: No deformity or signs of injury. Normal range of motion.      Cervical back: Normal range of motion and neck supple. No rigidity.      Right lower leg: No edema.      Left lower leg: No edema.   Skin:     General: Skin is warm and dry.      Coloration: Skin is not jaundiced or pale.   Neurological:      General: No focal deficit present.      Mental Status: She is alert and oriented to person, place, and time.      Motor: No weakness.   Psychiatric:         Mood and Affect: Mood and affect and mood normal.         Behavior: Behavior normal.       ED Course      Procedures            EKG Interpretation:      Interpreted by Eva Holloway MD  Time reviewed: 1:56pm  Symptoms at time of EKG: shortness of breath    Rhythm: normal sinus   Rate: normal  Axis: normal  Ectopy: none  Conduction: normal  ST Segments/ T Waves: No ST-T wave changes  Q Waves: none  Comparison to prior: Unchanged from 7/22/22    Clinical Impression: normal EKG     Results for orders placed or performed during the hospital encounter of 11/12/22   XR Chest 2 Views     Status: None    Narrative    Exam: XR CHEST 2 VIEWS, 11/12/2022 10:40 AM    Indication: Shortness of breath    Comparison: 7/22/2022 7/23/2022    Findings:   Chronic interstitial opacities throughout the lung. Heart and  pulmonary vasculature within normal limits. Pleural spaces are clear.  Hyperinflation of the lungs.      Impression    Impression: Stable changes of chronic lung disease. No acute  consolidative opacities to suggest pneumonia.    BRANDY SANABRIA MD         SYSTEM ID:  [id number]   Troponin T, High Sensitivity     Status: Normal   Result Value Ref Range    Troponin T, High Sensitivity 8 <=14 ng/L   Basic metabolic panel     Status: Abnormal   Result Value Ref Range    Sodium 138 136 - 145 mmol/L     Potassium 4.0 3.4 - 5.3 mmol/L    Chloride 101 98 - 107 mmol/L    Carbon Dioxide (CO2) 28 22 - 29 mmol/L    Anion Gap 9 7 - 15 mmol/L    Urea Nitrogen 10.1 8.0 - 23.0 mg/dL    Creatinine 0.42 (L) 0.51 - 0.95 mg/dL    Calcium 9.1 8.8 - 10.2 mg/dL    Glucose 97 70 - 99 mg/dL    GFR Estimate >90 >60 mL/min/1.73m2   Symptomatic; Unknown Influenza A/B & SARS-CoV2 (COVID-19) Virus PCR Multiplex Nasopharyngeal     Status: Normal    Specimen: Nasopharyngeal; Swab   Result Value Ref Range    Influenza A PCR Negative Negative    Influenza B PCR Negative Negative    RSV PCR Negative Negative    SARS CoV2 PCR Negative Negative    Narrative    Testing was performed using the Xpert Xpress CoV2/Flu/RSV Assay on the [employer] Instrument. This test should be ordered for the detection of SARS-CoV-2 and influenza viruses in individuals who meet clinical and/or epidemiological criteria. Test performance is unknown in asymptomatic patients. This test is for in vitro diagnostic use under the FDA EUA for laboratories certified under CLIA to perform high or moderate complexity testing. This test has not been FDA cleared or approved. A negative result does not rule out the presence of PCR inhibitors in the specimen or target RNA in concentration below the limit of detection for the assay. If only one viral target is positive but coinfection with multiple targets is suspected, the sample should be re-tested with another FDA cleared, approved, or authorized test, if coinfection would change clinical management. This test was validated by the [facility]. These laboratories are certified under the Clinical Laboratory Improvement Amendments of 1988 (CLIA-88) as qualified to perform high complexity laboratory testing.   CBC with platelets and differential     Status: Abnormal   Result Value Ref Range    WBC Count 8.0 4.0 - 11.0 10e3/uL    RBC Count 4.21 3.80 - 5.20 10e6/uL    Hemoglobin 11.9 11.7 - 15.7 g/dL     Hematocrit 38.1 35.0 - 47.0 %    MCV 91 78 - 100 fL    MCH 28.3 26.5 - 33.0 pg    MCHC 31.2 (L) 31.5 - 36.5 g/dL    RDW 13.0 10.0 - 15.0 %    Platelet Count 314 150 - 450 10e3/uL    % Neutrophils 70 %    % Lymphocytes 15 %    % Monocytes 11 %    % Eosinophils 3 %    % Basophils 1 %    % Immature Granulocytes 0 %    NRBCs per 100 WBC 0 <1 /100    Absolute Neutrophils 5.7 1.6 - 8.3 10e3/uL    Absolute Lymphocytes 1.2 0.8 - 5.3 10e3/uL    Absolute Monocytes 0.8 0.0 - 1.3 10e3/uL    Absolute Eosinophils 0.2 0.0 - 0.7 10e3/uL    Absolute Basophils 0.0 0.0 - 0.2 10e3/uL    Absolute Immature Granulocytes 0.0 <=0.4 10e3/uL    Absolute NRBCs 0.0 10e3/uL   EKG 12-lead, tracing only     Status: None (Preliminary result)   Result Value Ref Range    Systolic Blood Pressure  mmHg    Diastolic Blood Pressure  mmHg    Ventricular Rate 80 BPM    Atrial Rate 80 BPM    NC Interval 168 ms    QRS Duration 82 ms     ms    QTc 447 ms    P Axis 70 degrees    R AXIS -16 degrees    T Axis 38 degrees    Interpretation ECG       Sinus rhythm  Possible Left atrial enlargement  Borderline ECG     CBC with platelets differential     Status: Abnormal    Narrative    The following orders were created for panel order CBC with platelets differential.  Procedure                               Abnormality         Status                     ---------                               -----------         ------                     CBC with platelets and d...[[id number]]  Abnormal            Final result                 Please view results for these tests on the individual orders.     Medications   escitalopram (LEXAPRO) tablet 5 mg (has no administration in time range)   predniSONE (DELTASONE) tablet 60 mg (60 mg Oral Given 11/12/22 1151)        Assessments & Plan (with Medical Decision Making)   Fiordaliza Najera is a 77 year old female with PMH of COPD (on 2L home O2), osteopenia, asthma, anxiety who presented to the ED with shortness of breath.  Differential includes COPD exacerbation vs acute viral illness vs pneumonia vs other. Patient remained vitally stable on 2L oxygen (home dose). Labs unremarkable for acute infection, kidney function stable. Troponin 8 and EKG NRS. Patient given 1 dose 60mg prednisone in ED and 1 nebulizer during transport to ED. Patient ambulated with pulse ox and oxygen saturation remained in mid to high 90s.. Patient without concern for acute findings or hypoxia requiring hospital admission. Stable for discharge home with return precautions if experiencing worsening shortness of breath, chest pain, fevers, worsening cough, or requiring additional home oxygen. Patient will be discharged with additional 4 days of prednisone 40mg. Recommended patient follow up with PCP in 1-2 weeks.    I have reviewed the nursing notes. I have reviewed the findings, diagnosis, plan and need for follow up with the patient.    New Prescriptions    PREDNISONE (DELTASONE) 20 MG TABLET    Take two tablets (= 40mg) each day for 4 (four) days beginning 11/13/2022.       Final diagnoses:   COPD exacerbation (H)       --  Eva Holloway MD  Internal Medicine PGY-1  [facility] EMERGENCY DEPARTMENT  11/12/2022    --    ED Attending Physician Attestation    I Nicky Sousa MD, cared for this patient with the Resident. I have performed a history and physical examination of the patient and discussed management with the resident. I reviewed the resident's documentation above and agree with the documented findings and plan of care.    Summary of HPI, PE, ED Course   Patient is a 77 year old female evaluated in the emergency department for shortness of breath. Exam notable for well-appearing female with no acute respiratory distress. ED course notable for patient already significantly improved on arrival after receiving a neb by EMS.  No chest pain given 60 mg oral prednisone.  Chest x-ray without evidence of pneumonia.  EKG without acute ischemic  changes.  Initial troponin 8 which is baseline for the patient.  She does not have any chest pains I do not think further work-up is indicated for rule out.  Influenza, RSV, COVID-negative.  White count normal.  Hemoglobin normal.  BMP normal.  Patient was ambulated with pulse ox and placed on her baseline 2 L oxygen via nasal cannula.  Her sats remained in the upper 90s.  We will discharge the patient with a course of prednisone for COPD exacerbation.  She did not think her cough had been any different from her baseline and she has no fever so we will defer azithromycin.  She declined Flonase to treat her nasal congestion and prefers to take nasal saline rinses.  I did encourage her to increase the frequency of her albuterol inhaler or nebulization treatments, as needed while she is having an acute COPD exacerbation.  Recommended close PCP follow-up.  Detailed return precautions provided.  After the completion of care in the emergency department, the patient was discharged.    Critical Care & Procedures  Not applicable.    Medical Decision Making  The medical record was reviewed and interpreted.  Current labs reviewed and interpreted.  Previous labs reviewed and interpreted.  Current images reviewed and interpreted: No focal opacities to suggest pneumonia, hyperinflated lungs consistent with COPD.  EKG reviewed and interpreted: Normal sinus rhythm without acute ischemic changes.  Managed outpatient prescription medications.      Nicky Sousa MD  Emergency Medicine   '     Nicky Sousa MD  11/12/22 2733

## 2022-11-13 ENCOUNTER — MYC MEDICAL ADVICE (OUTPATIENT)
Dept: PULMONOLOGY | Facility: OTHER | Age: 77
End: 2022-11-13

## 2022-11-14 ENCOUNTER — VIRTUAL VISIT (OUTPATIENT)
Dept: FAMILY MEDICINE | Facility: CLINIC | Age: 77
End: 2022-11-14
Payer: MEDICARE

## 2022-11-14 ENCOUNTER — MEDICAL CORRESPONDENCE (OUTPATIENT)
Dept: HEALTH INFORMATION MANAGEMENT | Facility: CLINIC | Age: 77
End: 2022-11-14

## 2022-11-14 DIAGNOSIS — J47.1 BRONCHIECTASIS WITH ACUTE EXACERBATION (H): Primary | ICD-10-CM

## 2022-11-14 DIAGNOSIS — F41.9 ANXIETY: ICD-10-CM

## 2022-11-14 DIAGNOSIS — J44.1 CHRONIC OBSTRUCTIVE PULMONARY DISEASE WITH ACUTE EXACERBATION (H): ICD-10-CM

## 2022-11-14 PROCEDURE — 99441 PR PHYSICIAN TELEPHONE EVALUATION 5-10 MIN: CPT | Mod: 95 | Performed by: STUDENT IN AN ORGANIZED HEALTH CARE EDUCATION/TRAINING PROGRAM

## 2022-11-14 RX ORDER — ESCITALOPRAM OXALATE 5 MG/1
10 TABLET ORAL DAILY
Qty: 90 TABLET | Refills: 0 | Status: SHIPPED | OUTPATIENT
Start: 2022-11-14 | End: 2023-01-30

## 2022-11-14 NOTE — PROGRESS NOTES
"Ayse is a 77 year old who is being evaluated via a billable telephone visit.      What phone number would you like to be contacted at?  [phone]  How would you like to obtain your AVS? [facility]    Assessment & Plan   Problem List Items Addressed This Visit        Other    Anxiety    Relevant Medications    escitalopram (LEXAPRO) 5 MG tablet        But patient is a pleasant 77-year-old female with PMH of chronic hypoxic respiratory failure, bronchiectasis who presents today for anxiety/ADD follow-up.    Anxiety: Patient notes that she has been experiencing more more anxiety recently in regards to her breathing.  Even when she is breathing \"fine\", she is thinking about her breathing a lot.  If she gets anxious, it will then feed into her shortness of breath and exacerbate things.  Is wondering if she can go up on the dose of her Lexapro.  When she first started it many years ago with the pulmonologist, she found it extremely helpful.  I think it is reasonable for her to go up to 10 mg.  We will see her back in 6 to 8 weeks for follow-up.  Reviewed initially activating side effects of the Lexapro.  Patient verbalized understanding-if symptoms are severe she has persistent suicidal ideations, she is to discontinue the medication immediately.    COPD exacerbation/ED follow-up:  Patient presented to the ED 11/12 with shortness of breath and found to be in COPD exacerbation.  Chest x-ray was negative.  Flu, RSV and COVID-negative.  Blood work unremarkable.  Patient was on baseline oxygen in the ED.  Patient was discharged with steroid burst and encouraged to follow-up with PCP.  Today, patient notes that she continues to feel a bit more tired than usual.  Yesterday, she felt \"really good\" and perhaps \"overdid it\".  Her oximeter at home reads 96% on 2 L NC O2.  She has not needed to increase her oxygenation level.  Is not wheezing.  Vitals are stable.  Is not wheezing and using her inhalers as instructed.  Has been " scheduling her albuterol.  Plan:  -Sounds comfortable during visit today  - Finish out the prednisone burst  - Continue to monitor pulse ox  - If patient has significant worsening of her breathing after she finishes burst, has increased wheezing, she should reach out to the office as she may need a slower taper off the prednisone  -Please head to the emergency room if rapid progression of aspiratory symptoms    Multiple pulmonary nodules:  Saw several new nodules 7/23 on CT PE study done in the hospital.  Patient is supposed to get a repeat CT in 6 months.  Future order placed last visit but doesn't seem to scheduled.       Return in about 6 weeks (around 12/26/2022) for Mood .    Neida Maradiaga DO  [facility] Tavera is a 77 year old, presenting for the following health issues:  Anxiety      Review of Systems   As per HPI       Objective           Vitals:  No vitals were obtained today due to virtual visit.    Physical Exam   healthy, alert and no distress  PSYCH: Alert and oriented times 3; coherent speech, normal   rate and volume, able to articulate logical thoughts, able   to abstract reason, no tangential thoughts, no hallucinations   or delusions  Her affect is normal  RESP: No cough, no audible wheezing, able to talk in full sentences  Remainder of exam unable to be completed due to telephone visits          Phone call duration: 8 minutes

## 2022-11-18 ENCOUNTER — DOCUMENTATION ONLY (OUTPATIENT)
Dept: PULMONOLOGY | Facility: OTHER | Age: 77
End: 2022-11-18

## 2022-11-18 ENCOUNTER — VIRTUAL VISIT (OUTPATIENT)
Dept: PULMONOLOGY | Facility: OTHER | Age: 77
End: 2022-11-18
Payer: MEDICARE

## 2022-11-18 DIAGNOSIS — R06.09 DYSPNEA ON EXERTION: ICD-10-CM

## 2022-11-18 DIAGNOSIS — J47.1 BRONCHIECTASIS WITH ACUTE EXACERBATION (H): Primary | ICD-10-CM

## 2022-11-18 PROCEDURE — 99214 OFFICE O/P EST MOD 30 MIN: CPT | Mod: 95 | Performed by: INTERNAL MEDICINE

## 2022-11-18 RX ORDER — LEVOFLOXACIN 750 MG/1
750 TABLET, FILM COATED ORAL DAILY
Qty: 14 TABLET | Refills: 0 | Status: SHIPPED | OUTPATIENT
Start: 2022-11-18 | End: 2022-12-05

## 2022-11-18 NOTE — PROGRESS NOTES
Ayse is a 77 year old who is being evaluated via a billable video visit.      Pt is in [place]    How would you like to obtain your AVS? MyChart  If the video visit is dropped, the invitation should be resent by: Send to e-mail at: [email or web address]  Will anyone else be joining your video visit? Nurse, Lucy, is present      Anali HAM    Video-Visit Details    Video Start Time: 948    Type of service:  Video Visit    Video End Time:1015    Originating Location (pt. Location): Home        Distant Location (provider location):  On-site    Platform used for Video Visit: Tracy Tavera is a 77 year old who is being evaluated via a billable video visit.      Patient confirms medications and allergies are accurate via patients echeck in completion, and or denies any changes since last reviewed/verified.       LUNG NODULE & INTERVENTIONAL PULMONARY CLINIC  CL[facility]ICS & SURGERY [facility]ENTER, [facility]     Fiordaliza Najera . MRN# [id number]   Age: 76 year old YOB: 1945       Requesting Physician: No referring provider defined for this encounter.       Assessment and Plan:    1.  Bronchiectasis.  Possible recent exacerbation.  Chest pain in the setting of pulmonary infiltrates, negative cardiac evaluation.  Continue Trelegy.  Continue nebulizer regimen.  She has not tolerated mechanical airway clearance techniques.  We discussed the possibility of requiring repeat IV antibiotics.  She will try quinolone and we can rediscuss.    2.  Shortness of breath.  This continues to be a significant issue.  Multifactorial.  She will continue with rehab.    3.  Chronic hypoxic respiratory failure and shortness of breath with exertion.  As above.  Goal oxygen saturation 89% or greater at rest.     Not discussed today: Pulmonary nodules.  Consistent with bronchiectasis.  I do not feel strongly about following these up but the patient is understandably concerned.  We will  make arrangements for 6-month CT.           History:     Fiordaliza Najera Sr. is a 76 year old female with sig h/o for bronchiectasis.  She continues to be pretty debilitated from her disease.  She is using her nebulizers in the past has been unable to tolerate vest or volara.  She is understandably frustrated with her shortness of breath and poor energy.  She has questions about her oxygen and her rehab.               Past Medical History:      Past Medical History:   Diagnosis Date     Anxiety 09/30/2021     COPD (chronic obstructive pulmonary disease) (H)      Diverticulosis      Hiatal hernia      Mild asthma      Neuropathy      Osteopenia      Temporomandibular joint (TMJ) pain            Past Surgical History:      Past Surgical History:   Procedure Laterality Date     ANTERIOR / POSTERIOR COMBINED FUSION LUMBAR SPINE       BRONCHOSCOPY (RIGID OR FLEXIBLE), DIAGNOSTIC N/A 9/28/2021    Procedure: BRONCHOSCOPY, WITH BRONCHOALVEOLAR LAVAGE;  Surgeon: Randall Lorenz MD;  Location: [facility]     HYSTERECTOMY       PICC SINGLE LUMEN PLACEMENT  11/4/2021          RETINAL LASER PROCEDURE Left 10/04/2016     SALPINGOOPHORECTOMY       TOTAL KNEE ARTHROPLASTY  2008          Social History:     Social History     Tobacco Use     Smoking status: Never     Smokeless tobacco: Never   Substance Use Topics     Alcohol use: No          Family History:     Family History   Problem Relation Age of Onset     Dementia Mother         passed age 88     Chronic Obstructive Pulmonary Disease Father         passed age 78           Allergies:      Allergies   Allergen Reactions     Codeine Unknown     Morphine Itching          Medications:     Current Outpatient Medications   Medication Sig     acetylcysteine (MUCOMYST) 10 % nebulizer solution Inhale 4 mLs into the lungs daily     acetylcysteine (MUCOMYST) 20 % neb solution Take 4 mLs by nebulization daily     albuterol (PROAIR HFA/PROVENTIL HFA/VENTOLIN HFA) 108 (90 Base) MCG/ACT  inhaler Inhale 2 puffs into the lungs every 6 hours as needed     albuterol (PROVENTIL) (2.5 MG/3ML) 0.083% neb solution Take 1 vial (2.5 mg) by nebulization every 4 hours as needed for shortness of breath / dyspnea or wheezing     biotin 300 MCG TABS tablet Take 1 tablet (300 mcg) by mouth daily     cetirizine (ZYRTEC) 5 MG tablet Take 5 mg by mouth daily     cholecalciferol, vitamin D3, (VITAMIN D3) 1,000 unit capsule [CHOLECALCIFEROL, VITAMIN D3, (VITAMIN D3) 1,000 UNIT CAPSULE] Take 1,000 Units by mouth daily.     escitalopram (LEXAPRO) 5 MG tablet Take 2 tablets (10 mg) by mouth daily     Fluticasone-Umeclidin-Vilanterol (TRELEGY ELLIPTA) 100-62.5-25 MCG/INH oral inhaler Inhale 1 puff into the lungs daily     Lactobacillus rhamnosus GG (CULTURELLE) 10-15 Billion cell capsule Take 1 capsule by mouth every evening      montelukast (SINGULAIR) 10 MG tablet [MONTELUKAST (SINGULAIR) 10 MG TABLET] TAKE 1 TABLET(10 MG) BY MOUTH AT BEDTIME     multivitamin therapeutic (THERAGRAN) tablet [MULTIVITAMIN THERAPEUTIC (THERAGRAN) TABLET] Take 1 tablet by mouth daily.     nystatin (MYCOSTATIN) 503667 UNIT/GM external cream Apply to rash on body 3 times per day as needed.     oxybutynin ER (DITROPAN XL) 5 MG 24 hr tablet Take 1 tablet (5 mg) by mouth daily     OXYGEN-AIR DELIVERY SYSTEMS MISC [OXYGEN-AIR DELIVERY SYSTEMS MISC] Use 2 L As Directed. 2L with activity and at night  [employer]     pregabalin (LYRICA) 50 MG capsule Take 1 tab in the AM and 2 in the evening     sodium chloride (NEBUSAL) 3 % neb solution Take 3 mLs by nebulization 2 times daily     sodium chloride (OCEAN) 0.65 % nasal spray Spray 2 sprays in nostril daily     SUMAtriptan (IMITREX) 50 MG tablet Take 1 tablet (50 mg) by mouth as needed for migraine,may repeat after 2 hours if needed;  mg/24 hours     amoxicillin (AMOXIL) 125 MG/5ML suspension Take 50 mg/kg/day by mouth 2 times daily (Patient not taking: Reported on 11/18/2022)     calcium-vitamin D  (CALCIUM-VITAMIN D) 500 mg(1,250mg) -200 unit per tablet Take 1 tablet by mouth 2 times daily      celecoxib (CELEBREX) 200 MG capsule TAKE 1 CAPSULE (200 MG TOTAL) BY MOUTH DAILY. (Patient taking differently: Take 200 mg by mouth daily)     fluticasone (FLONASE) 50 MCG/ACT nasal spray Spray 1 spray into both nostrils daily (Patient not taking: Reported on 11/18/2022)     predniSONE (DELTASONE) 20 MG tablet Take two tablets (= 40mg) each day for 4 (four) days beginning 11/13/2022. (Patient not taking: Reported on 11/18/2022)     No current facility-administered medications for this visit.          Review of Systems:     See HPI         Physical Exam:   There were no vitals taken for this visit.    Constitutional - looks well, in no apparent distress  Eyes - no redness or discharge  Respiratory -breathing appears comfortable. No wheeze or rhonchi.   Skin - No appreciable discoloration or lesions (very limited exam)  Neurological - No apparent tremors. Speech fluent and articlate  Psychiatric - no signs of delirium or anxiety          Current Laboratory Data:   All laboratory and imaging data reviewed.    No results found for this or any previous visit (from the past 24 hour(s)).

## 2022-12-04 ENCOUNTER — HEALTH MAINTENANCE LETTER (OUTPATIENT)
Age: 77
End: 2022-12-04

## 2022-12-05 ENCOUNTER — TELEPHONE (OUTPATIENT)
Dept: PULMONOLOGY | Facility: CLINIC | Age: 77
End: 2022-12-05

## 2022-12-05 ENCOUNTER — MYC MEDICAL ADVICE (OUTPATIENT)
Dept: FAMILY MEDICINE | Facility: CLINIC | Age: 77
End: 2022-12-05

## 2022-12-05 DIAGNOSIS — J47.1 BRONCHIECTASIS WITH ACUTE EXACERBATION (H): ICD-10-CM

## 2022-12-05 DIAGNOSIS — F41.1 GAD (GENERALIZED ANXIETY DISORDER): Primary | ICD-10-CM

## 2022-12-05 RX ORDER — LEVOFLOXACIN 750 MG/1
750 TABLET, FILM COATED ORAL DAILY
Qty: 10 TABLET | Refills: 0 | Status: SHIPPED | OUTPATIENT
Start: 2022-12-05 | End: 2022-12-15

## 2022-12-05 NOTE — TELEPHONE ENCOUNTER
Phone call from patient.  States that she has completed her Levaquin Saturday and is not feeling better.  Still struggling with her breathing, mostly with activity.  Having to use her oxygen most of the time now.  Also, having some pain last night in her chest and back. Not like when she had pneumonia, though.  Not sure if related to her lungs?  Pain is better this am, but still there.        Reviewed with Dr. Lorenz in clinic.  Will repeat another 10 days of Levaquin and will schedule for virtual visit next Tuesday, Dec. 13 at 1:00pm.

## 2022-12-11 NOTE — TELEPHONE ENCOUNTER
Can we call patient tomorrow to follow up with her and if still symptomatic but stable, would like her to be seen in person ASAP. If progressively worse, head to the ED.

## 2022-12-12 DIAGNOSIS — M15.0 PRIMARY OSTEOARTHRITIS INVOLVING MULTIPLE JOINTS: ICD-10-CM

## 2022-12-12 RX ORDER — ESCITALOPRAM OXALATE 20 MG/1
20 TABLET ORAL DAILY
Qty: 90 TABLET | Refills: 0 | Status: SHIPPED | OUTPATIENT
Start: 2022-12-12 | End: 2023-01-01

## 2022-12-12 NOTE — TELEPHONE ENCOUNTER
Contacted pt. She is not concerned about breathing, it is not worsening.    She is supposed to have a visit with pulmonology tomorrow. If that visit does not happen, she will see Dr. Maradiaga 12/19/22.

## 2022-12-13 ENCOUNTER — VIRTUAL VISIT (OUTPATIENT)
Dept: PULMONOLOGY | Facility: CLINIC | Age: 77
End: 2022-12-13
Payer: MEDICARE

## 2022-12-13 DIAGNOSIS — R06.09 DYSPNEA ON EXERTION: ICD-10-CM

## 2022-12-13 DIAGNOSIS — J47.0 BRONCHIECTASIS WITH ACUTE LOWER RESPIRATORY INFECTION (H): Primary | ICD-10-CM

## 2022-12-13 PROCEDURE — 99214 OFFICE O/P EST MOD 30 MIN: CPT | Mod: 95 | Performed by: INTERNAL MEDICINE

## 2022-12-13 RX ORDER — MEROPENEM 1 G/1
1 INJECTION, POWDER, FOR SOLUTION INTRAVENOUS EVERY 8 HOURS
Start: 2022-12-13 | End: 2022-12-20

## 2022-12-13 NOTE — TELEPHONE ENCOUNTER
"Routing refill request to provider for review/approval because:  Drug not active on patient's medication list  Labs out of range:  creatinine  Blood pressure    Last Written Prescription Date:  NA  Last Fill Quantity: NA,  # refills: NA   Last office visit provider:  11/14/2022     Requested Prescriptions   Pending Prescriptions Disp Refills     celecoxib (CELEBREX) 200 MG capsule [Pharmacy Med Name: CELECOXIB 200 MG CAPS** 200 Capsule] 90 capsule 11     Sig: TAKE 1 CAPSULE (200 MG TOTAL) BY MOUTH DAILY.       NSAID Medications Failed - 12/12/2022  8:48 AM        Failed - Blood pressure under 140/90 in past 12 months     BP Readings from Last 3 Encounters:   11/12/22 (!) 148/88   07/27/22 116/61   06/24/22 122/70                 Failed - Recent (12 mo) or future (30 days) visit within the authorizing provider's specialty     Patient has had an office visit with the authorizing provider or a provider within the authorizing providers department within the previous 12 mos or has a future within next 30 days. See \"Patient Info\" tab in inbasket, or \"Choose Columns\" in Meds & Orders section of the refill encounter.              Failed - Patient is age 6-64 years        Failed - Medication is active on med list        Failed - Normal serum creatinine on file in past 12 months     Recent Labs   Lab Test 11/12/22  1158   CR 0.42*       Ok to refill medication if creatinine is low          Passed - Normal ALT on file in past 12 months     Recent Labs   Lab Test 07/24/22  0744   ALT 11             Passed - Normal AST on file in past 12 months     Recent Labs   Lab Test 07/24/22  0744   AST 32             Passed - Normal CBC on file in past 12 months     Recent Labs   Lab Test 11/12/22  1158   WBC 8.0   RBC 4.21   HGB 11.9   HCT 38.1                    Passed - No active pregnancy on record        Passed - No positive pregnancy test in past 12 months             Stephanie Mancilla RN 12/13/22 12:05 AM  "

## 2022-12-13 NOTE — PROGRESS NOTES
Ayse is a 77 year old who is being evaluated via a billable video visit.      [place] in [place]    How would you like to obtain your AVS? MyChart  If the video visit is dropped, the invitation should be resent by: Send to e-mail at: [email or web address]  Will anyone else be joining your video visit?  Yes, nurse Lucy, is present       Anali HAM    Video-Visit Details    Video Start Time: 1307    Type of service:  Video Visit    Video End Time 1330      Originating Location (pt. Location): Home        Distant Location (provider location):  On-site    Platform used for Video Visit: Tracy Tavera is a 77 year old who is being evaluated via a billable video visit.      [place] in [place]    How would you like to obtain your AVS? MyChart  If the video visit is dropped, the invitation should be resent by: Send to e-mail at: [email or web address]  Will anyone else be joining your video visit? Nurse, Lucy, is present      Anali HAM      LUNG NODULE & INTERVENTIONAL PULMONARY CLINIC  CLINICS & SURGERY CENTER, [facility]     Fiordaliza Najera Sr. MRN# [id number]   Age: 76 year old YOB: 1945       Requesting Physician: No referring provider defined for this encounter.       Assessment and Plan:    1.  Bronchiectasis. Prolonged exacerbation.  Unfortunately I think you are at a point where we need to admit for IV antibiotics.  She will work on getting this done through a TCU associated with her care facility.  The treatment regimen will be IV meropenem every 8 hours 1 g    2.  Shortness of breath.  This continues to be a significant issue.  Multifactorial.  She will continue with rehab.    3.  Chronic hypoxic respiratory failure and shortness of breath with exertion.  As above.  Goal oxygen saturation 89% or greater at rest.                History:     Fiordaliza Najera Sr. is a 77 year old female with sig h/o for bronchiectasis.  She  continues to be pretty debilitated from her disease.  She is using her nebulizers in the past has been unable to tolerate vest or volara.  She is understandably frustrated with her shortness of breath and poor energy.  She has questions about her oxygen and her rehab.    After multiple courses of oral antibiotics she is not improving and so we are considering going to IV.               Past Medical History:      Past Medical History:   Diagnosis Date     Anxiety 09/30/2021     COPD (chronic obstructive pulmonary disease) (H)      Diverticulosis      Hiatal hernia      Mild asthma      Neuropathy      Osteopenia      Temporomandibular joint (TMJ) pain            Past Surgical History:      Past Surgical History:   Procedure Laterality Date     ANTERIOR / POSTERIOR COMBINED FUSION LUMBAR SPINE       BRONCHOSCOPY (RIGID OR FLEXIBLE), DIAGNOSTIC N/A 9/28/2021    Procedure: BRONCHOSCOPY, WITH BRONCHOALVEOLAR LAVAGE;  Surgeon: Randall Lorenz MD;  Location: [facility]     HYSTERECTOMY       PICC SINGLE LUMEN PLACEMENT  11/4/2021          RETINAL LASER PROCEDURE Left 10/04/2016     SALPINGOOPHORECTOMY       TOTAL KNEE ARTHROPLASTY  2008          Social History:     Social History     Tobacco Use     Smoking status: Never     Smokeless tobacco: Never   Substance Use Topics     Alcohol use: No          Family History:     Family History   Problem Relation Age of Onset     Dementia Mother         passed age 88     Chronic Obstructive Pulmonary Disease Father         passed age 78           Allergies:      Allergies   Allergen Reactions     Codeine Unknown     Morphine Itching          Medications:     Current Outpatient Medications   Medication Sig     acetylcysteine (MUCOMYST) 10 % nebulizer solution Inhale 4 mLs into the lungs daily     acetylcysteine (MUCOMYST) 20 % neb solution Take 4 mLs by nebulization daily     albuterol (PROAIR HFA/PROVENTIL HFA/VENTOLIN HFA) 108 (90 Base) MCG/ACT inhaler Inhale 2 puffs into the lungs  every 6 hours as needed     albuterol (PROVENTIL) (2.5 MG/3ML) 0.083% neb solution Take 1 vial (2.5 mg) by nebulization every 4 hours as needed for shortness of breath / dyspnea or wheezing     calcium-vitamin D (CALCIUM-VITAMIN D) 500 mg(1,250mg) -200 unit per tablet Take 1 tablet by mouth 2 times daily      cetirizine (ZYRTEC) 5 MG tablet Take 5 mg by mouth daily     cholecalciferol, vitamin D3, (VITAMIN D3) 1,000 unit capsule [CHOLECALCIFEROL, VITAMIN D3, (VITAMIN D3) 1,000 UNIT CAPSULE] Take 1,000 Units by mouth daily.     escitalopram (LEXAPRO) 20 MG tablet Take 1 tablet (20 mg) by mouth daily     Fluticasone-Umeclidin-Vilanterol (TRELEGY ELLIPTA) 100-62.5-25 MCG/INH oral inhaler Inhale 1 puff into the lungs daily     Lactobacillus rhamnosus GG (CULTURELLE) 10-15 Billion cell capsule Take 1 capsule by mouth every evening      levofloxacin (LEVAQUIN) 750 MG tablet Take 1 tablet (750 mg) by mouth daily for 10 days     montelukast (SINGULAIR) 10 MG tablet [MONTELUKAST (SINGULAIR) 10 MG TABLET] TAKE 1 TABLET(10 MG) BY MOUTH AT BEDTIME     multivitamin therapeutic (THERAGRAN) tablet [MULTIVITAMIN THERAPEUTIC (THERAGRAN) TABLET] Take 1 tablet by mouth daily.     nystatin (MYCOSTATIN) 116512 UNIT/GM external cream Apply to rash on body 3 times per day as needed.     oxybutynin ER (DITROPAN XL) 5 MG 24 hr tablet Take 1 tablet (5 mg) by mouth daily     OXYGEN-AIR DELIVERY SYSTEMS MISC [OXYGEN-AIR DELIVERY SYSTEMS MISC] Use 2 L As Directed. 2L with activity and at night  [employer]     pregabalin (LYRICA) 50 MG capsule Take 1 tab in the AM and 2 in the evening     sodium chloride (NEBUSAL) 3 % neb solution Take 3 mLs by nebulization 2 times daily     sodium chloride (OCEAN) 0.65 % nasal spray Spray 2 sprays in nostril daily     SUMAtriptan (IMITREX) 50 MG tablet Take 1 tablet (50 mg) by mouth as needed for migraine,may repeat after 2 hours if needed;  mg/24 hours     biotin 300 MCG TABS tablet Take 1 tablet (300 mcg)  by mouth daily     escitalopram (LEXAPRO) 5 MG tablet Take 2 tablets (10 mg) by mouth daily     No current facility-administered medications for this visit.          Review of Systems:     See HPI         Physical Exam:   There were no vitals taken for this visit.    Constitutional - looks well, in no apparent distress  Eyes - no redness or discharge  Respiratory -breathing appears comfortable. No wheeze or rhonchi.   Skin - No appreciable discoloration or lesions (very limited exam)  Neurological - No apparent tremors. Speech fluent and articlate  Psychiatric - no signs of delirium or anxiety          Current Laboratory Data:   All laboratory and imaging data reviewed.    No results found for this or any previous visit (from the past 24 hour(s)).

## 2022-12-15 ENCOUNTER — TRANSFERRED RECORDS (OUTPATIENT)
Dept: HEALTH INFORMATION MANAGEMENT | Facility: CLINIC | Age: 77
End: 2022-12-15

## 2022-12-16 RX ORDER — CELECOXIB 200 MG/1
200 CAPSULE ORAL DAILY
Qty: 90 CAPSULE | Refills: 1 | Status: SHIPPED | OUTPATIENT
Start: 2022-12-16 | End: 2023-01-01

## 2022-12-19 ENCOUNTER — TELEPHONE (OUTPATIENT)
Dept: FAMILY MEDICINE | Facility: CLINIC | Age: 77
End: 2022-12-19

## 2022-12-19 ENCOUNTER — MEDICAL CORRESPONDENCE (OUTPATIENT)
Dept: HEALTH INFORMATION MANAGEMENT | Facility: CLINIC | Age: 77
End: 2022-12-19

## 2022-12-19 ENCOUNTER — TELEPHONE (OUTPATIENT)
Dept: NURSING | Facility: CLINIC | Age: 77
End: 2022-12-19

## 2022-12-19 NOTE — TELEPHONE ENCOUNTER
Order/Referral Request    Who is requesting: Nancy with [facility]    Patient is admitting to [facility] this Wednesday 12/21 from [facility].    Orders being requested: Faxing form for completion and detail list of what is needed to     Need order for 12/13 RX meropenem (MERREM) 1 g vial    Along with signed med list.       Reason service is needed/diagnosis: current living facility is unable to administer the MERREM medication, patient is moving to [facility].     When are orders needed by: as soon as possible.    Fax Number is     Any questions, call back number is  any of the staff will be able to answer questions and assist.    Where to send orders: Fax  [phone]

## 2022-12-19 NOTE — TELEPHONE ENCOUNTER
Patient and RN calling to give a heads up that admissions person (Juliet) will be sending form to Dr. Maradiaga for orders for PICC line insertion and IV ABX.    The hope is for patient to be admitted on Wednesday to [facility].    They are waiting to hear back from IR for PICC line insertion which may occur on Wednesday morning.    Did not triage.    Melissa Morris RN on 12/19/2022 at 11:59 AM

## 2022-12-19 NOTE — TELEPHONE ENCOUNTER
Fax has been received from [facility]:    --current med list printed out  --immunization record printed out    Forms given to Dr Maradiaga for review.    Keyana BRUNSON CMA  [place] Clinical Staff

## 2022-12-19 NOTE — TELEPHONE ENCOUNTER
Looks like these orders are for IV antibiotics by pulm, please fax over to the pulmonologist's office. I am not aware of these order. Note from pulm is not done at this time

## 2022-12-20 ENCOUNTER — TELEPHONE (OUTPATIENT)
Dept: FAMILY MEDICINE | Facility: CLINIC | Age: 77
End: 2022-12-20

## 2022-12-20 ENCOUNTER — TRANSFERRED RECORDS (OUTPATIENT)
Dept: HEALTH INFORMATION MANAGEMENT | Facility: CLINIC | Age: 77
End: 2022-12-20

## 2022-12-20 DIAGNOSIS — J47.9 BRONCHIECTASIS WITHOUT COMPLICATION (H): Primary | ICD-10-CM

## 2022-12-20 DIAGNOSIS — J47.0 BRONCHIECTASIS WITH ACUTE LOWER RESPIRATORY INFECTION (H): ICD-10-CM

## 2022-12-20 RX ORDER — MEROPENEM 1 G/1
1 INJECTION, POWDER, FOR SOLUTION INTRAVENOUS EVERY 8 HOURS
Qty: 42 EACH | Refills: 0 | Status: SHIPPED | OUTPATIENT
Start: 2022-12-20 | End: 2023-01-03

## 2022-12-20 NOTE — TELEPHONE ENCOUNTER
Forms/Letter Request    Type of form/letter:  Paperwork for admission     Have you been seen for this request: No    Do we have the form/letter: No    When is form/letter needed by: Tomorrow    How would you like the form/letter returned: Call the [facility] at [phone]    Patient Notified form requests are processed in 3-5 business days:Yes    Could we send this information to you in [place] or would you prefer to receive a phone call?:   Patient would prefer a phone call   Okay to leave a detailed message?: Yes at Other phone number:  [phone]

## 2022-12-20 NOTE — TELEPHONE ENCOUNTER
Called pulmonology with Dr. Lorenz; spoke to Lianne, care coordinator; fax number given was [phone]. Faxing this paperwork to Dr. Lorenz to fill out for patients admit to [facility].     Paperwork is needed ASAP--as patient is being admitted tomorrow.     Keyana BRUNSON CMA  [place] Clinical Staff

## 2022-12-20 NOTE — TELEPHONE ENCOUNTER
Received communication from pulm. IV abx ordered signed by them. We will send over immunization records + med list over

## 2022-12-21 ENCOUNTER — NURSE TRIAGE (OUTPATIENT)
Dept: NURSING | Facility: CLINIC | Age: 77
End: 2022-12-21

## 2022-12-21 ENCOUNTER — DOCUMENTATION ONLY (OUTPATIENT)
Dept: PULMONOLOGY | Facility: CLINIC | Age: 77
End: 2022-12-21

## 2022-12-21 ENCOUNTER — TELEPHONE (OUTPATIENT)
Dept: FAMILY MEDICINE | Facility: CLINIC | Age: 77
End: 2022-12-21

## 2022-12-21 DIAGNOSIS — G89.4 CHRONIC PAIN SYNDROME: ICD-10-CM

## 2022-12-21 DIAGNOSIS — G43.009 MIGRAINE WITHOUT AURA AND WITHOUT STATUS MIGRAINOSUS, NOT INTRACTABLE: ICD-10-CM

## 2022-12-21 RX ORDER — PREGABALIN 50 MG/1
CAPSULE ORAL
Qty: 90 CAPSULE | Refills: 1 | Status: SHIPPED | OUTPATIENT
Start: 2022-12-21 | End: 2023-01-01

## 2022-12-21 RX ORDER — SUMATRIPTAN 50 MG/1
TABLET, FILM COATED ORAL
Qty: 30 TABLET | Refills: 1
Start: 2022-12-21 | End: 2022-12-21

## 2022-12-21 RX ORDER — SUMATRIPTAN 50 MG/1
TABLET, FILM COATED ORAL
Qty: 30 TABLET | Refills: 1 | Status: ON HOLD | OUTPATIENT
Start: 2022-12-21 | End: 2024-01-01

## 2022-12-21 RX ORDER — PREGABALIN 50 MG/1
CAPSULE ORAL
Qty: 90 CAPSULE | Refills: 1
Start: 2022-12-21 | End: 2022-12-21

## 2022-12-21 NOTE — TELEPHONE ENCOUNTER
Rita with [facility] calling in to clinic requesting refills for Lyrica and Imitrex and requesting Rx orders faxed to [phone]. Rita states patient is staying there and they will now be filling her meds through their pharmacy. They just need orders for these meds if patient is to continue taking them.     Writer informed would send this to PCP for review and once approved/signed- we will fax Rx orders to [phone].     Rita thanked for the call and info.

## 2022-12-21 NOTE — TELEPHONE ENCOUNTER
Call  from NH with further need  from clinic RN request calling back to [facility] clinic with further needs after receiving a FAX  Call transferred to [facility]  who will address.   Marlen Wright RN  FNA          Reason for Disposition    Information only question and nurse able to answer    Protocols used: INFORMATION ONLY CALL - NO TRIAGE-A-[place]

## 2022-12-21 NOTE — PROGRESS NOTES
Faxed antibiotic order and admission orders to [facility] at [phone] yesterday at 5:00 pm.

## 2022-12-22 ENCOUNTER — HOSPITAL ENCOUNTER (OUTPATIENT)
Dept: VASCULAR ULTRASOUND | Facility: CLINIC | Age: 77
Discharge: HOME | End: 2022-12-22
Attending: INTERNAL MEDICINE
Payer: MEDICARE

## 2022-12-22 ENCOUNTER — HOSPITAL ENCOUNTER (OUTPATIENT)
Dept: GENERAL RADIOLOGY | Facility: CLINIC | Age: 77
Discharge: HOME | End: 2022-12-22
Attending: INTERNAL MEDICINE
Payer: MEDICARE

## 2022-12-22 DIAGNOSIS — J47.0 BRONCHIECTASIS WITH ACUTE LOWER RESPIRATORY INFECTION (H): ICD-10-CM

## 2022-12-22 PROCEDURE — 272N000450 HC KIT 4FR POWER PICC SINGLE LUMEN

## 2022-12-22 PROCEDURE — 36569 INSJ PICC 5 YR+ W/O IMAGING: CPT

## 2022-12-22 PROCEDURE — 71045 X-RAY EXAM CHEST 1 VIEW: CPT | Mod: 26 | Performed by: RADIOLOGY

## 2022-12-22 PROCEDURE — 999N000065 XR CHEST 1 VIEW

## 2022-12-22 PROCEDURE — 272N000473 HC KIT, VPS RHYTHM STYLET

## 2022-12-22 NOTE — PROCEDURES
[facility]    Single Lumen PICC Placement    Date/Time: 12/22/2022 11:02 AM  Performed by: Sharon Mart RN  Authorized by: Randall Lorenz MD   Indications: vascular access      UNIVERSAL PROTOCOL   Site Marked: Yes  Prior Images Obtained and Reviewed:  Yes  Required items: Required blood products, implants, devices and special equipment available    Patient identity confirmed:  Verbally with patient, arm band and hospital-assigned identification number  Patient was reevaluated immediately before administering moderate or deep sedation or anesthesia  Confirmation Checklist:  Patient's identity using two indicators, relevant allergies, procedure was appropriate and matched the consent or emergent situation and correct equipment/implants were available  Time out: Immediately prior to the procedure a time out was called    Universal Protocol: the Joint Commission Universal Protocol was followed    Preparation: Patient was prepped and draped in usual sterile fashion       ANESTHESIA    Anesthesia: Local infiltration  Local Anesthetic:  Lidocaine 1% without epinephrine  Anesthetic Total (mL):  3.5      SEDATION    Patient Sedated: No        Preparation: skin prepped with ChloraPrep  Skin prep agent: skin prep agent completely dried prior to procedure  Sterile barriers: maximum sterile barriers were used: cap, mask, sterile gown, sterile gloves, and large sterile sheet  Hand hygiene: hand hygiene performed prior to central venous catheter insertion  Type of line used: PICC  Catheter type: single lumen  Lumen type: non-valved and power PICC  Catheter size: 4 Fr  Brand: Bard  Lot number: [id number]  Placement method: venipuncture, MST and ultrasound  Number of attempts: 1  Difficulty threading catheter: no  Successful placement: yes  Orientation: right    Location: basilic vein (0.33  cm vein diameter)  Tip Location: SVC (low)  Arm circumference: adults 10  cm  Extremity circumference: 23  Visible catheter length: 0  Total catheter length: 38  Dressing and securement: adhesive securement device, alcohol impregnated caps, blood cleaned with CHG, chlorhexidine disc applied, glue, dressing applied, site cleansed, statlock, sterile dressing applied and transparent dressing  Post procedure assessment: blood return through all ports, free fluid flow and placement verified by x-ray

## 2022-12-27 ENCOUNTER — TRANSITIONAL CARE UNIT VISIT (OUTPATIENT)
Dept: GERIATRICS | Facility: CLINIC | Age: 77
End: 2022-12-27
Payer: MEDICARE

## 2022-12-27 VITALS
HEART RATE: 71 BPM | OXYGEN SATURATION: 95 % | TEMPERATURE: 97.5 F | BODY MASS INDEX: 19.14 KG/M2 | WEIGHT: 104 LBS | DIASTOLIC BLOOD PRESSURE: 84 MMHG | SYSTOLIC BLOOD PRESSURE: 146 MMHG | HEIGHT: 62 IN | RESPIRATION RATE: 18 BRPM

## 2022-12-27 DIAGNOSIS — F41.9 ANXIETY: ICD-10-CM

## 2022-12-27 DIAGNOSIS — F39 MOOD DISORDER (H): ICD-10-CM

## 2022-12-27 DIAGNOSIS — J43.1 PANLOBULAR EMPHYSEMA (H): ICD-10-CM

## 2022-12-27 DIAGNOSIS — G89.29 OTHER CHRONIC PAIN: ICD-10-CM

## 2022-12-27 DIAGNOSIS — Z99.81 CHRONIC RESPIRATORY FAILURE WITH HYPOXIA, ON HOME O2 THERAPY (H): ICD-10-CM

## 2022-12-27 DIAGNOSIS — J47.1 BRONCHIECTASIS WITH ACUTE EXACERBATION (H): Primary | ICD-10-CM

## 2022-12-27 DIAGNOSIS — J96.11 CHRONIC RESPIRATORY FAILURE WITH HYPOXIA, ON HOME O2 THERAPY (H): ICD-10-CM

## 2022-12-27 PROCEDURE — 99310 SBSQ NF CARE HIGH MDM 45: CPT | Performed by: NURSE PRACTITIONER

## 2022-12-27 NOTE — LETTER
2022        RE: Fiordaliza Najera  [address]  [place] [address]        [facility]    Name:   Fiordaliza Najera Sr. 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  She again presented as a direct admission from Dr. Randall Lorenz, pulmonologist, for treatment of bronchiectasis exacerbation as well as pulmonary bacteria not treatable with oral antibiotics. She was never a smoker but was exposed to second hand smoke at a young age and multiple bouts of bronchitis has led to scarring and subsequent COPD.     Previous CT chest showed new multifocal groundglass opacities throughout both lungs, likely infectious.  Minimal change in scattered tree-in-bud and nodular opacities.  She was treated with IV Zosyn, twice daily Mucomyst, twice daily sodium chloride nebs, prednisone 40 mg daily, Trelegy, and montelukast, and pulmonology recommended a PICC line placement for 2 weeks of IV antibiotics.  She did have some leukocytosis, felt likely secondary to steroids taken in the outpatient setting.  Otherwise, she was without chills or other signs of infection.  Uses 2 L of oxygen at baseline.    Treatment with IV meropenem 1 g every 8 hours.  Saturation goal of 89% or greater at rest.      CURRENT/RECENT TCU ISSUES    Disposition   -- She reports she is here because she has had so much trouble breathing.   "  -- Now getting IV antibiotics 3 times daily for 14 days.    -- This is not her first rodeo.    Chronic respiratory failure with hypoxia  Bronchiectasis with acute exacerbation  Panlobular emphysema  -- \"Everyone on my father's side had emphysema.\"  -- She has had COPD and bronchiectasis for more than 5 years.    -- She also suffers from chronic anxiety and has been taught belly breathing, although she often forgets.      -- Breathing is not a problem when sitting but worsens with only minimal exertion (standing).    -- She has a 4 wheeled walker and uses the seat to carry her oxygen, currently on 2L, her baseline.      Other chronic pain  Neuropathy  -- Pain is minimal except for her \"ordinary arthritis.\"  She does have bilateral rotator cuff injuries mild left greater than right.  She has had bilateral knee replacements (at the same time in 2008) and anterior/posterior combined fusion of the lumbar spine.  -- Says she was told by the surgeon that her neuropathy extends from \"all the hardware\" in her back.  -- For chronic pain, she has orders for pregabalin 50 mg every morning/100 mg at bedtime for neuropathy (gabapentin made her lightheaded), celecoxib (200 mg daily) and as needed tramadol, which she used to take for her back and shoulder pain.    Diaphragmatic hernia  -- Has not complained of any heartburn.  She does prefer to eat bland food.    Mood disorder  Anxiety  -- Says she has benefited from low-dose escitalopram (it \"has made the biggest difference\").   -- Suspect he will     Discharge planning  -- She lives in [facility].    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  IV antibiotics been administered for 2 weeks, and she will discharge the following day.       ROS: No headaches or chest pains, coughing or congestion, nausea or vomiting, dizziness, dysuria, difficulty chewing or swallowing, integumentary issues, or problems with appetite or sleep.      Past Medical " History:   Diagnosis Date     Anxiety 09/30/2021     COPD (chronic obstructive pulmonary disease) (H)      Diverticulosis      Hiatal hernia      Mild asthma      Neuropathy      Osteopenia      Temporomandibular joint (TMJ) pain               Family History   Problem Relation Age of Onset     Dementia Mother         passed age 88     Chronic Obstructive Pulmonary Disease Father         passed age 78     Social History     Socioeconomic History     Marital status: Single     Spouse name: None     Number of children: None     Years of education: None     Highest education level: None   Occupational History     None   Tobacco Use     Smoking status: Never Smoker     Smokeless tobacco: Never Used   Substance and Sexual Activity     Alcohol use: No     Drug use: Never     Sexual activity: Never   Other Topics Concern     Parent/sibling w/ CABG, MI or angioplasty before 65F 55M? 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  She was a Anab[employer] sister with [employer].  She has a sister and brother-in-law, 2 nieces, 3 grand nieces and nephews in [place].  She came to [place] for a sabbatical, and then  stayed on for a teaching job.  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  She lives alone in a long-term facility. - Dr. Nguyễn 01/04/21       Social Determinants of Health     Financial Resource Strain: Not on file   Food Insecurity: Not on file   Transportation Needs: Not on file   Physical Activity: Not on file   Stress: Not on file   Social Connections: Not on file   Intimate Partner Violence: Not on file   Housing Stability: Not on file       MEDICATIONS: Reviewed from the MAR, physician orders, and/or earlier progress notes.  MED REC REQUIRED  Post Medication Reconciliation Status: discharge medications reconciled, continue medications without change    Current Outpatient Medications   Medication Sig     acetylcysteine (MUCOMYST) 10 %  nebulizer solution Inhale 4 mLs into the lungs daily     acetylcysteine (MUCOMYST) 20 % neb solution Take 4 mLs by nebulization daily     albuterol (PROAIR HFA/PROVENTIL HFA/VENTOLIN HFA) 108 (90 Base) MCG/ACT inhaler Inhale 2 puffs into the lungs every 6 hours as needed     albuterol (PROVENTIL) (2.5 MG/3ML) 0.083% neb solution Take 1 vial (2.5 mg) by nebulization every 4 hours as needed for shortness of breath / dyspnea or wheezing     biotin 300 MCG TABS tablet Take 1 tablet (300 mcg) by mouth daily     calcium-vitamin D (CALCIUM-VITAMIN D) 500 mg(1,250mg) -200 unit per tablet Take 1 tablet by mouth 2 times daily      celecoxib (CELEBREX) 200 MG capsule Take 1 capsule (200 mg) by mouth daily     cetirizine (ZYRTEC) 5 MG tablet Take 5 mg by mouth daily     cholecalciferol, vitamin D3, (VITAMIN D3) 1,000 unit capsule [CHOLECALCIFEROL, VITAMIN D3, (VITAMIN D3) 1,000 UNIT CAPSULE] Take 1,000 Units by mouth daily.     escitalopram (LEXAPRO) 20 MG tablet Take 1 tablet (20 mg) by mouth daily     escitalopram (LEXAPRO) 5 MG tablet Take 2 tablets (10 mg) by mouth daily     Fluticasone-Umeclidin-Vilanterol (TRELEGY ELLIPTA) 100-62.5-25 MCG/INH oral inhaler Inhale 1 puff into the lungs daily     Lactobacillus rhamnosus GG (CULTURELLE) 10-15 Billion cell capsule Take 1 capsule by mouth every evening      meropenem (MERREM) 1 g vial Inject 1,000 mg (1 g) into the vein every 8 hours for 14 days     montelukast (SINGULAIR) 10 MG tablet [MONTELUKAST (SINGULAIR) 10 MG TABLET] TAKE 1 TABLET(10 MG) BY MOUTH AT BEDTIME     multivitamin therapeutic (THERAGRAN) tablet [MULTIVITAMIN THERAPEUTIC (THERAGRAN) TABLET] Take 1 tablet by mouth daily.     nystatin (MYCOSTATIN) 974902 UNIT/GM external cream Apply to rash on body 3 times per day as needed.     oxybutynin ER (DITROPAN XL) 5 MG 24 hr tablet Take 1 tablet (5 mg) by mouth daily     OXYGEN-AIR DELIVERY SYSTEMS MISC [OXYGEN-AIR DELIVERY SYSTEMS MISC] Use 2 L As Directed. 2L with  "activity and at night  Sherri     pregabalin (LYRICA) 50 MG capsule Take 1 tab in the AM and 2 in the evening     sodium chloride (NEBUSAL) 3 % neb solution Take 3 mLs by nebulization 2 times daily     sodium chloride (OCEAN) 0.65 % nasal spray Spray 2 sprays in nostril daily     SUMAtriptan (IMITREX) 50 MG tablet Take 1 tablet (50 mg) by mouth as needed for migraine,may repeat after 2 hours if needed;  mg/24 hours     No current facility-administered medications for this visit.     ALLERGIES:   Allergies   Allergen Reactions     Codeine Unknown     Morphine Itching     DIET: Regular, regular texture, thin liquids.  Prefers bland foods.    Vitals:    12/27/22 1239   BP: (!) 146/84   Pulse: 71   Resp: 18   Temp: 97.5  F (36.4  C)   SpO2: 95%   Weight: 47.2 kg (104 lb)   Height: 1.575 m (5' 2\")     Body mass index is 19.02 kg/m .    EXAMINATION:   General: Pleasant, alert, and conversant elderly female, sitting in a recliner, in no apparent distress.  Head: Normocephalic and atraumatic.   Eyes: PERRLA, sclerae clear.   ENT: Moist oral mucosa.   Cardiovascular: Sinus tachycardia without appreciable murmur.   Respiratory: Lungs clear to auscultation bilaterally.   Abdomen: Nondistended.   Musculoskeletal/Extremities: Age-related degenerative joint disease.  No peripheral edema.  Integument: No rashes, clinically significant lesions, or skin breakdown.   Cognitive/Psychiatric: Alert and oriented with euthymic affect.    DIAGNOSTICS:   No results found for this or any previous visit (from the past 240 hour(s)).   Last Comprehensive Metabolic Panel:  Sodium   Date Value Ref Range Status   11/12/2022 138 136 - 145 mmol/L Final     Potassium   Date Value Ref Range Status   11/12/2022 4.0 3.4 - 5.3 mmol/L Final   11/08/2021 3.6 3.5 - 5.0 mmol/L Final     Chloride   Date Value Ref Range Status   11/12/2022 101 98 - 107 mmol/L Final   11/08/2021 103 98 - 107 mmol/L Final     Carbon Dioxide (CO2)   Date Value Ref Range " Status   11/12/2022 28 22 - 29 mmol/L Final   11/08/2021 32 (H) 22 - 31 mmol/L Final     Anion Gap   Date Value Ref Range Status   11/12/2022 9 7 - 15 mmol/L Final   11/08/2021 5 5 - 18 mmol/L Final     Glucose   Date Value Ref Range Status   11/12/2022 97 70 - 99 mg/dL Final   11/08/2021 92 70 - 125 mg/dL Final     Urea Nitrogen   Date Value Ref Range Status   11/12/2022 10.1 8.0 - 23.0 mg/dL Final   11/08/2021 13 8 - 28 mg/dL Final     Creatinine   Date Value Ref Range Status   11/12/2022 0.42 (L) 0.51 - 0.95 mg/dL Final     GFR Estimate   Date Value Ref Range Status   11/12/2022 >90 >60 mL/min/1.73m2 Final     Comment:     Effective December 21, 2021 eGFRcr in adults is calculated using the 2021 CKD-EPI creatinine equation which includes age and gender (Wilman et al., NE, DOI: 10.1056/KYXCvi2702818)   09/23/2020 >60 >60 mL/min/1.73m2 Final     GFR, ESTIMATED POCT   Date Value Ref Range Status   06/20/2022 >60 >60 mL/min/1.73m2 Final     Calcium   Date Value Ref Range Status   11/12/2022 9.1 8.8 - 10.2 mg/dL Final     Bilirubin Total   Date Value Ref Range Status   07/24/2022 0.4 <=1.2 mg/dL Final     Alkaline Phosphatase   Date Value Ref Range Status   07/24/2022 92 35 - 104 U/L Final     ALT   Date Value Ref Range Status   07/24/2022 11 10 - 35 U/L Final     AST   Date Value Ref Range Status   07/24/2022 32 10 - 35 U/L Final     Last CBC:  Lab Results   Component Value Date    WBC 8.0 11/12/2022     Lab Results   Component Value Date    RBC 4.21 11/12/2022     Lab Results   Component Value Date    HGB 11.9 11/12/2022     Lab Results   Component Value Date    HCT 38.1 11/12/2022     Lab Results   Component Value Date    MCV 91 11/12/2022     Lab Results   Component Value Date    MCH 28.3 11/12/2022     Lab Results   Component Value Date    MCHC 31.2 11/12/2022     Lab Results   Component Value Date    RDW 13.0 11/12/2022     Lab Results   Component Value Date     11/12/2022       ASSESSMENT/Plan:       ICD-10-CM    1. Bronchiectasis with acute exacerbation (H)  J47.1       2. Panlobular emphysema (H)  J43.1       3. Chronic respiratory failure with hypoxia, on home O2 therapy (H)  J96.11     Z99.81       4. Mood disorder (H)  F39       5. Anxiety  F41.9       6. Other chronic pain  G89.29           CHANGES:  None.    CARE PLAN:  The care plan has been reviewed and all orders signed.  Changes to care plan, if any, as noted.  Otherwise, continue current plan of care. Total time spent in this admission encounter was 36 minutes, with greater than 50% spent in counseling and coordination of care that involved a review of the patient's medical record since her last admission to the TCU, as this is essentially a readmission.    The above has been created using voice recognition software. Please be aware that this may unintentionally  produce inaccuracies and/or nonsensical sentences.      Electronically signed by:  BATOOL Melara CNP        Sincerely,        BATOOL Melara CNP

## 2023-01-01 ENCOUNTER — OFFICE VISIT (OUTPATIENT)
Dept: FAMILY MEDICINE | Facility: CLINIC | Age: 78
End: 2023-01-01
Payer: MEDICARE

## 2023-01-01 ENCOUNTER — APPOINTMENT (OUTPATIENT)
Dept: GENERAL RADIOLOGY | Facility: CLINIC | Age: 78
DRG: 643 | End: 2023-01-01
Attending: EMERGENCY MEDICINE
Payer: MEDICARE

## 2023-01-01 ENCOUNTER — VIRTUAL VISIT (OUTPATIENT)
Dept: PULMONOLOGY | Facility: CLINIC | Age: 78
End: 2023-01-01
Payer: MEDICARE

## 2023-01-01 ENCOUNTER — APPOINTMENT (OUTPATIENT)
Dept: PHYSICAL THERAPY | Facility: CLINIC | Age: 78
DRG: 643 | End: 2023-01-01
Attending: INTERNAL MEDICINE
Payer: MEDICARE

## 2023-01-01 ENCOUNTER — APPOINTMENT (OUTPATIENT)
Dept: PHYSICAL THERAPY | Facility: CLINIC | Age: 78
DRG: 643 | End: 2023-01-01
Payer: MEDICARE

## 2023-01-01 ENCOUNTER — TELEPHONE (OUTPATIENT)
Dept: FAMILY MEDICINE | Facility: CLINIC | Age: 78
End: 2023-01-01
Payer: MEDICARE

## 2023-01-01 ENCOUNTER — TELEPHONE (OUTPATIENT)
Dept: ENDOCRINOLOGY | Facility: CLINIC | Age: 78
End: 2023-01-01
Payer: MEDICARE

## 2023-01-01 ENCOUNTER — TELEPHONE (OUTPATIENT)
Dept: OTOLARYNGOLOGY | Facility: CLINIC | Age: 78
End: 2023-01-01

## 2023-01-01 ENCOUNTER — HOSPITAL ENCOUNTER (OUTPATIENT)
Dept: ULTRASOUND IMAGING | Facility: HOSPITAL | Age: 78
Discharge: HOME | End: 2023-08-21
Payer: MEDICARE

## 2023-01-01 ENCOUNTER — ANCILLARY PROCEDURE (OUTPATIENT)
Dept: CT IMAGING | Facility: CLINIC | Age: 78
End: 2023-01-01
Attending: FAMILY MEDICINE
Payer: MEDICARE

## 2023-01-01 ENCOUNTER — MYC MEDICAL ADVICE (OUTPATIENT)
Dept: PULMONOLOGY | Facility: CLINIC | Age: 78
End: 2023-01-01
Payer: MEDICARE

## 2023-01-01 ENCOUNTER — LAB (OUTPATIENT)
Dept: LAB | Facility: CLINIC | Age: 78
End: 2023-01-01
Payer: MEDICARE

## 2023-01-01 ENCOUNTER — MEDICAL CORRESPONDENCE (OUTPATIENT)
Dept: HEALTH INFORMATION MANAGEMENT | Facility: CLINIC | Age: 78
End: 2023-01-01

## 2023-01-01 ENCOUNTER — MEDICAL CORRESPONDENCE (OUTPATIENT)
Dept: HEALTH INFORMATION MANAGEMENT | Facility: CLINIC | Age: 78
End: 2023-01-01
Payer: MEDICARE

## 2023-01-01 ENCOUNTER — MYC MEDICAL ADVICE (OUTPATIENT)
Dept: FAMILY MEDICINE | Facility: CLINIC | Age: 78
End: 2023-01-01
Payer: MEDICARE

## 2023-01-01 ENCOUNTER — TELEPHONE (OUTPATIENT)
Dept: ENDOCRINOLOGY | Facility: CLINIC | Age: 78
End: 2023-01-01

## 2023-01-01 ENCOUNTER — APPOINTMENT (OUTPATIENT)
Dept: SPEECH THERAPY | Facility: CLINIC | Age: 78
DRG: 643 | End: 2023-01-01
Payer: MEDICARE

## 2023-01-01 ENCOUNTER — APPOINTMENT (OUTPATIENT)
Dept: CT IMAGING | Facility: CLINIC | Age: 78
DRG: 643 | End: 2023-01-01
Attending: ORTHOPAEDIC SURGERY
Payer: MEDICARE

## 2023-01-01 ENCOUNTER — HOSPITAL ENCOUNTER (OUTPATIENT)
Dept: CARDIOLOGY | Facility: HOSPITAL | Age: 78
Discharge: HOME | End: 2023-03-07
Attending: STUDENT IN AN ORGANIZED HEALTH CARE EDUCATION/TRAINING PROGRAM
Payer: MEDICARE

## 2023-01-01 ENCOUNTER — TRANSFERRED RECORDS (OUTPATIENT)
Dept: HEALTH INFORMATION MANAGEMENT | Facility: CLINIC | Age: 78
End: 2023-01-01
Payer: MEDICARE

## 2023-01-01 ENCOUNTER — NURSE TRIAGE (OUTPATIENT)
Dept: NURSING | Facility: CLINIC | Age: 78
End: 2023-01-01
Payer: MEDICARE

## 2023-01-01 ENCOUNTER — HOSPITAL ENCOUNTER (OUTPATIENT)
Dept: CT IMAGING | Facility: HOSPITAL | Age: 78
Discharge: HOME | End: 2023-06-07
Attending: INTERNAL MEDICINE | Admitting: INTERNAL MEDICINE
Payer: MEDICARE

## 2023-01-01 ENCOUNTER — TELEPHONE (OUTPATIENT)
Dept: PULMONOLOGY | Facility: CLINIC | Age: 78
End: 2023-01-01
Payer: MEDICARE

## 2023-01-01 ENCOUNTER — OFFICE VISIT (OUTPATIENT)
Dept: PULMONOLOGY | Facility: CLINIC | Age: 78
End: 2023-01-01
Payer: MEDICARE

## 2023-01-01 ENCOUNTER — HOSPITAL ENCOUNTER (INPATIENT)
Facility: CLINIC | Age: 78
LOS: 5 days | Discharge: HOME HEALTH | DRG: 643 | End: 2023-07-28
Attending: EMERGENCY MEDICINE | Admitting: HOSPITALIST
Payer: MEDICARE

## 2023-01-01 ENCOUNTER — HOSPITAL ENCOUNTER (OUTPATIENT)
Dept: CT IMAGING | Facility: HOSPITAL | Age: 78
Discharge: HOME | End: 2023-02-16
Attending: STUDENT IN AN ORGANIZED HEALTH CARE EDUCATION/TRAINING PROGRAM
Payer: MEDICARE

## 2023-01-01 ENCOUNTER — APPOINTMENT (OUTPATIENT)
Dept: ULTRASOUND IMAGING | Facility: CLINIC | Age: 78
DRG: 643 | End: 2023-01-01
Attending: INTERNAL MEDICINE
Payer: MEDICARE

## 2023-01-01 ENCOUNTER — PATIENT OUTREACH (OUTPATIENT)
Dept: CARE COORDINATION | Facility: CLINIC | Age: 78
End: 2023-01-01
Payer: MEDICARE

## 2023-01-01 ENCOUNTER — OFFICE VISIT (OUTPATIENT)
Dept: OTOLARYNGOLOGY | Facility: CLINIC | Age: 78
End: 2023-01-01
Attending: INTERNAL MEDICINE
Payer: MEDICARE

## 2023-01-01 ENCOUNTER — APPOINTMENT (OUTPATIENT)
Dept: LAB | Facility: CLINIC | Age: 78
End: 2023-01-01
Payer: MEDICARE

## 2023-01-01 ENCOUNTER — ONCOLOGY VISIT (OUTPATIENT)
Dept: ONCOLOGY | Facility: HOSPITAL | Age: 78
End: 2023-01-01
Attending: STUDENT IN AN ORGANIZED HEALTH CARE EDUCATION/TRAINING PROGRAM
Payer: MEDICARE

## 2023-01-01 ENCOUNTER — LAB (OUTPATIENT)
Dept: LAB | Facility: HOSPITAL | Age: 78
End: 2023-01-01
Payer: MEDICARE

## 2023-01-01 ENCOUNTER — APPOINTMENT (OUTPATIENT)
Dept: LAB | Facility: CLINIC | Age: 78
End: 2023-01-01
Attending: STUDENT IN AN ORGANIZED HEALTH CARE EDUCATION/TRAINING PROGRAM
Payer: MEDICARE

## 2023-01-01 ENCOUNTER — OFFICE VISIT (OUTPATIENT)
Dept: URGENT CARE | Facility: URGENT CARE | Age: 78
End: 2023-01-01
Payer: MEDICARE

## 2023-01-01 ENCOUNTER — TELEPHONE (OUTPATIENT)
Dept: FAMILY MEDICINE | Facility: CLINIC | Age: 78
End: 2023-01-01

## 2023-01-01 ENCOUNTER — LAB REQUISITION (OUTPATIENT)
Dept: LAB | Facility: CLINIC | Age: 78
End: 2023-01-01
Payer: MEDICARE

## 2023-01-01 ENCOUNTER — HOSPITAL ENCOUNTER (OUTPATIENT)
Dept: CT IMAGING | Facility: HOSPITAL | Age: 78
Discharge: HOME | End: 2023-02-16
Attending: OTOLARYNGOLOGY
Payer: MEDICARE

## 2023-01-01 ENCOUNTER — OFFICE VISIT (OUTPATIENT)
Dept: OTOLARYNGOLOGY | Facility: CLINIC | Age: 78
End: 2023-01-01
Payer: MEDICARE

## 2023-01-01 ENCOUNTER — HOSPITAL ENCOUNTER (OUTPATIENT)
Dept: CARDIOLOGY | Facility: HOSPITAL | Age: 78
Setting detail: OBSERVATION
Discharge: HOME | End: 2023-03-07
Attending: STUDENT IN AN ORGANIZED HEALTH CARE EDUCATION/TRAINING PROGRAM
Payer: MEDICARE

## 2023-01-01 ENCOUNTER — ANCILLARY PROCEDURE (OUTPATIENT)
Dept: MAMMOGRAPHY | Facility: CLINIC | Age: 78
End: 2023-01-01
Attending: STUDENT IN AN ORGANIZED HEALTH CARE EDUCATION/TRAINING PROGRAM
Payer: MEDICARE

## 2023-01-01 ENCOUNTER — NURSE TRIAGE (OUTPATIENT)
Dept: NURSING | Facility: CLINIC | Age: 78
End: 2023-01-01

## 2023-01-01 ENCOUNTER — PATIENT OUTREACH (OUTPATIENT)
Dept: ONCOLOGY | Facility: CLINIC | Age: 78
End: 2023-01-01
Payer: MEDICARE

## 2023-01-01 ENCOUNTER — VIRTUAL VISIT (OUTPATIENT)
Dept: ENDOCRINOLOGY | Facility: CLINIC | Age: 78
End: 2023-01-01
Payer: MEDICARE

## 2023-01-01 ENCOUNTER — TRANSFERRED RECORDS (OUTPATIENT)
Dept: HEALTH INFORMATION MANAGEMENT | Facility: CLINIC | Age: 78
End: 2023-01-01

## 2023-01-01 ENCOUNTER — INFUSION THERAPY VISIT (OUTPATIENT)
Dept: INFUSION THERAPY | Facility: CLINIC | Age: 78
End: 2023-01-01
Attending: STUDENT IN AN ORGANIZED HEALTH CARE EDUCATION/TRAINING PROGRAM
Payer: MEDICARE

## 2023-01-01 ENCOUNTER — APPOINTMENT (OUTPATIENT)
Dept: CT IMAGING | Facility: CLINIC | Age: 78
DRG: 643 | End: 2023-01-01
Payer: MEDICARE

## 2023-01-01 VITALS
WEIGHT: 104.72 LBS | BODY MASS INDEX: 19.15 KG/M2 | SYSTOLIC BLOOD PRESSURE: 101 MMHG | TEMPERATURE: 98.7 F | OXYGEN SATURATION: 97 % | DIASTOLIC BLOOD PRESSURE: 71 MMHG | HEART RATE: 89 BPM | RESPIRATION RATE: 18 BRPM

## 2023-01-01 VITALS
BODY MASS INDEX: 19.81 KG/M2 | OXYGEN SATURATION: 97 % | WEIGHT: 108.3 LBS | HEART RATE: 68 BPM | DIASTOLIC BLOOD PRESSURE: 64 MMHG | SYSTOLIC BLOOD PRESSURE: 120 MMHG | TEMPERATURE: 98 F | RESPIRATION RATE: 16 BRPM

## 2023-01-01 VITALS
WEIGHT: 104.3 LBS | HEART RATE: 79 BPM | RESPIRATION RATE: 20 BRPM | BODY MASS INDEX: 19.19 KG/M2 | OXYGEN SATURATION: 93 % | TEMPERATURE: 98.3 F | SYSTOLIC BLOOD PRESSURE: 112 MMHG | HEIGHT: 62 IN | DIASTOLIC BLOOD PRESSURE: 58 MMHG

## 2023-01-01 VITALS
TEMPERATURE: 98.1 F | WEIGHT: 106 LBS | RESPIRATION RATE: 22 BRPM | HEART RATE: 81 BPM | SYSTOLIC BLOOD PRESSURE: 118 MMHG | BODY MASS INDEX: 19.51 KG/M2 | HEIGHT: 62 IN | DIASTOLIC BLOOD PRESSURE: 62 MMHG | OXYGEN SATURATION: 94 %

## 2023-01-01 VITALS
DIASTOLIC BLOOD PRESSURE: 66 MMHG | OXYGEN SATURATION: 97 % | HEART RATE: 73 BPM | WEIGHT: 108.3 LBS | SYSTOLIC BLOOD PRESSURE: 122 MMHG | BODY MASS INDEX: 19.81 KG/M2

## 2023-01-01 VITALS
HEIGHT: 62 IN | TEMPERATURE: 98.4 F | HEART RATE: 95 BPM | BODY MASS INDEX: 19.6 KG/M2 | SYSTOLIC BLOOD PRESSURE: 118 MMHG | WEIGHT: 106.5 LBS | RESPIRATION RATE: 18 BRPM | OXYGEN SATURATION: 95 % | DIASTOLIC BLOOD PRESSURE: 60 MMHG

## 2023-01-01 VITALS
BODY MASS INDEX: 19.9 KG/M2 | HEART RATE: 87 BPM | WEIGHT: 108.8 LBS | DIASTOLIC BLOOD PRESSURE: 64 MMHG | OXYGEN SATURATION: 95 % | SYSTOLIC BLOOD PRESSURE: 122 MMHG

## 2023-01-01 VITALS
OXYGEN SATURATION: 97 % | HEIGHT: 62 IN | HEART RATE: 89 BPM | BODY MASS INDEX: 19.32 KG/M2 | DIASTOLIC BLOOD PRESSURE: 81 MMHG | TEMPERATURE: 98 F | WEIGHT: 105 LBS | SYSTOLIC BLOOD PRESSURE: 135 MMHG

## 2023-01-01 VITALS
RESPIRATION RATE: 18 BRPM | WEIGHT: 107.6 LBS | BODY MASS INDEX: 19.8 KG/M2 | OXYGEN SATURATION: 94 % | TEMPERATURE: 97.5 F | HEIGHT: 62 IN | HEART RATE: 86 BPM | DIASTOLIC BLOOD PRESSURE: 59 MMHG | SYSTOLIC BLOOD PRESSURE: 123 MMHG

## 2023-01-01 VITALS
WEIGHT: 104 LBS | BODY MASS INDEX: 18.87 KG/M2 | HEART RATE: 82 BPM | OXYGEN SATURATION: 96 % | DIASTOLIC BLOOD PRESSURE: 64 MMHG | SYSTOLIC BLOOD PRESSURE: 116 MMHG

## 2023-01-01 VITALS — HEIGHT: 62 IN | WEIGHT: 105 LBS | BODY MASS INDEX: 19.32 KG/M2

## 2023-01-01 DIAGNOSIS — R06.09 DYSPNEA ON EXERTION: ICD-10-CM

## 2023-01-01 DIAGNOSIS — R53.82 CHRONIC FATIGUE: ICD-10-CM

## 2023-01-01 DIAGNOSIS — R09.82 POST-NASAL DRIP: ICD-10-CM

## 2023-01-01 DIAGNOSIS — R00.0 TACHYCARDIA: ICD-10-CM

## 2023-01-01 DIAGNOSIS — M85.80 OSTEOPENIA, UNSPECIFIED LOCATION: Primary | ICD-10-CM

## 2023-01-01 DIAGNOSIS — I26.93 SINGLE SUBSEGMENTAL PULMONARY EMBOLISM WITHOUT ACUTE COR PULMONALE (H): ICD-10-CM

## 2023-01-01 DIAGNOSIS — Z53.9 DIAGNOSIS NOT YET DEFINED: Primary | ICD-10-CM

## 2023-01-01 DIAGNOSIS — M25.419 EFFUSION, UNSPECIFIED SHOULDER: ICD-10-CM

## 2023-01-01 DIAGNOSIS — S09.90XA CLOSED HEAD INJURY, INITIAL ENCOUNTER: ICD-10-CM

## 2023-01-01 DIAGNOSIS — E87.1 HYPONATREMIA: Primary | ICD-10-CM

## 2023-01-01 DIAGNOSIS — E03.9 HYPOTHYROIDISM, UNSPECIFIED TYPE: ICD-10-CM

## 2023-01-01 DIAGNOSIS — F41.1 GAD (GENERALIZED ANXIETY DISORDER): ICD-10-CM

## 2023-01-01 DIAGNOSIS — R91.1 LUNG NODULE: ICD-10-CM

## 2023-01-01 DIAGNOSIS — Z99.81 CHRONIC RESPIRATORY FAILURE WITH HYPOXIA, ON HOME O2 THERAPY (H): ICD-10-CM

## 2023-01-01 DIAGNOSIS — J47.9 BRONCHIECTASIS WITHOUT COMPLICATION (H): ICD-10-CM

## 2023-01-01 DIAGNOSIS — M15.0 PRIMARY OSTEOARTHRITIS INVOLVING MULTIPLE JOINTS: ICD-10-CM

## 2023-01-01 DIAGNOSIS — J44.1 CHRONIC OBSTRUCTIVE PULMONARY DISEASE WITH ACUTE EXACERBATION (H): Primary | ICD-10-CM

## 2023-01-01 DIAGNOSIS — F41.9 ANXIETY: ICD-10-CM

## 2023-01-01 DIAGNOSIS — Z23 ENCOUNTER FOR IMMUNIZATION: ICD-10-CM

## 2023-01-01 DIAGNOSIS — G89.4 CHRONIC PAIN SYNDROME: ICD-10-CM

## 2023-01-01 DIAGNOSIS — J44.1 COPD EXACERBATION (H): Primary | ICD-10-CM

## 2023-01-01 DIAGNOSIS — Z00.00 ENCOUNTER FOR MEDICARE ANNUAL WELLNESS EXAM: ICD-10-CM

## 2023-01-01 DIAGNOSIS — R33.9 URINARY RETENTION: ICD-10-CM

## 2023-01-01 DIAGNOSIS — H61.23 BILATERAL IMPACTED CERUMEN: ICD-10-CM

## 2023-01-01 DIAGNOSIS — J44.89 OBSTRUCTIVE CHRONIC BRONCHITIS WITHOUT EXACERBATION (H): Primary | ICD-10-CM

## 2023-01-01 DIAGNOSIS — D72.829 LEUKOCYTOSIS, UNSPECIFIED TYPE: ICD-10-CM

## 2023-01-01 DIAGNOSIS — I26.93 SINGLE SUBSEGMENTAL PULMONARY EMBOLISM WITHOUT ACUTE COR PULMONALE (H): Primary | ICD-10-CM

## 2023-01-01 DIAGNOSIS — J96.11 CHRONIC RESPIRATORY FAILURE WITH HYPOXIA (H): ICD-10-CM

## 2023-01-01 DIAGNOSIS — Z86.711 HISTORY OF PULMONARY EMBOLISM: ICD-10-CM

## 2023-01-01 DIAGNOSIS — J44.1 COPD EXACERBATION (H): ICD-10-CM

## 2023-01-01 DIAGNOSIS — Z11.52 ENCOUNTER FOR SCREENING LABORATORY TESTING FOR SEVERE ACUTE RESPIRATORY SYNDROME CORONAVIRUS 2 (SARS-COV-2): ICD-10-CM

## 2023-01-01 DIAGNOSIS — E87.1 HYPONATREMIA: ICD-10-CM

## 2023-01-01 DIAGNOSIS — R23.2 HOT FLASHES: Primary | ICD-10-CM

## 2023-01-01 DIAGNOSIS — J96.11 CHRONIC RESPIRATORY FAILURE WITH HYPOXIA, ON HOME O2 THERAPY (H): ICD-10-CM

## 2023-01-01 DIAGNOSIS — R26.81 UNSTEADINESS ON FEET: ICD-10-CM

## 2023-01-01 DIAGNOSIS — R06.02 SHORTNESS OF BREATH: ICD-10-CM

## 2023-01-01 DIAGNOSIS — H61.21 IMPACTED CERUMEN OF RIGHT EAR: ICD-10-CM

## 2023-01-01 DIAGNOSIS — R05.2 SUBACUTE COUGH: ICD-10-CM

## 2023-01-01 DIAGNOSIS — E03.8 SUBCLINICAL HYPOTHYROIDISM: ICD-10-CM

## 2023-01-01 DIAGNOSIS — J47.1 BRONCHIECTASIS WITH ACUTE EXACERBATION (H): ICD-10-CM

## 2023-01-01 DIAGNOSIS — Z91.81 AT HIGH RISK FOR FALLS: ICD-10-CM

## 2023-01-01 DIAGNOSIS — Z12.31 ENCOUNTER FOR SCREENING MAMMOGRAM FOR MALIGNANT NEOPLASM OF BREAST: ICD-10-CM

## 2023-01-01 DIAGNOSIS — R53.83 OTHER FATIGUE: ICD-10-CM

## 2023-01-01 DIAGNOSIS — R91.8 PULMONARY NODULES: ICD-10-CM

## 2023-01-01 DIAGNOSIS — E87.1 LOW SODIUM LEVELS: ICD-10-CM

## 2023-01-01 DIAGNOSIS — D50.9 IRON DEFICIENCY ANEMIA, UNSPECIFIED IRON DEFICIENCY ANEMIA TYPE: ICD-10-CM

## 2023-01-01 DIAGNOSIS — D72.829 LEUKOCYTOSIS, UNSPECIFIED TYPE: Primary | ICD-10-CM

## 2023-01-01 DIAGNOSIS — J44.1 CHRONIC OBSTRUCTIVE PULMONARY DISEASE WITH ACUTE EXACERBATION (H): ICD-10-CM

## 2023-01-01 DIAGNOSIS — J43.1 PANLOBULAR EMPHYSEMA (H): ICD-10-CM

## 2023-01-01 DIAGNOSIS — R00.0 TACHYCARDIA: Primary | ICD-10-CM

## 2023-01-01 DIAGNOSIS — M81.0 AGE-RELATED OSTEOPOROSIS WITHOUT CURRENT PATHOLOGICAL FRACTURE: ICD-10-CM

## 2023-01-01 DIAGNOSIS — H61.23 BILATERAL IMPACTED CERUMEN: Primary | ICD-10-CM

## 2023-01-01 DIAGNOSIS — J30.2 SEASONAL ALLERGIC RHINITIS, UNSPECIFIED TRIGGER: ICD-10-CM

## 2023-01-01 DIAGNOSIS — J43.1 PANLOBULAR EMPHYSEMA (H): Primary | ICD-10-CM

## 2023-01-01 DIAGNOSIS — J47.9 BRONCHIECTASIS WITHOUT COMPLICATION (H): Primary | ICD-10-CM

## 2023-01-01 DIAGNOSIS — R51.9 NONINTRACTABLE HEADACHE, UNSPECIFIED CHRONICITY PATTERN, UNSPECIFIED HEADACHE TYPE: Primary | ICD-10-CM
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
## 2023-01-01 DIAGNOSIS — H61.22 IMPACTED CERUMEN OF LEFT EAR: ICD-10-CM

## 2023-01-01 DIAGNOSIS — M25.411 EFFUSION, RIGHT SHOULDER: ICD-10-CM

## 2023-01-01 DIAGNOSIS — R51.9 SINUS HEADACHE: ICD-10-CM

## 2023-01-01 DIAGNOSIS — N32.81 OAB (OVERACTIVE BLADDER): ICD-10-CM

## 2023-01-01 DIAGNOSIS — E03.8 SUBCLINICAL HYPOTHYROIDISM: Primary | ICD-10-CM

## 2023-01-01 DIAGNOSIS — R26.9 UNSPECIFIED ABNORMALITIES OF GAIT AND MOBILITY: ICD-10-CM

## 2023-01-01 DIAGNOSIS — Z99.81 DEPENDENCE ON SUPPLEMENTAL OXYGEN: ICD-10-CM

## 2023-01-01 DIAGNOSIS — E03.9 HYPOTHYROIDISM, UNSPECIFIED TYPE: Primary | ICD-10-CM

## 2023-01-01 DIAGNOSIS — Z29.11 NEED FOR VACCINATION AGAINST RESPIRATORY SYNCYTIAL VIRUS: ICD-10-CM

## 2023-01-01 DIAGNOSIS — J47.9 BRONCHIECTASIS WITHOUT ACUTE EXACERBATION (H): Primary | ICD-10-CM

## 2023-01-01 DIAGNOSIS — I26.99 OTHER ACUTE PULMONARY EMBOLISM WITHOUT ACUTE COR PULMONALE (H): ICD-10-CM

## 2023-01-01 DIAGNOSIS — J44.9 CHRONIC OBSTRUCTIVE PULMONARY DISEASE, UNSPECIFIED COPD TYPE (H): ICD-10-CM

## 2023-01-01 DIAGNOSIS — H61.21 IMPACTED CERUMEN OF RIGHT EAR: Primary | ICD-10-CM

## 2023-01-01 DIAGNOSIS — M62.81 GENERALIZED MUSCLE WEAKNESS: Primary | ICD-10-CM

## 2023-01-01 DIAGNOSIS — J34.89 INTERNAL NASAL LESION: ICD-10-CM

## 2023-01-01 DIAGNOSIS — J47.9 BRONCHIECTASIS (H): ICD-10-CM

## 2023-01-01 DIAGNOSIS — B37.0 THRUSH: ICD-10-CM

## 2023-01-01 DIAGNOSIS — J34.89 NASAL CRUSTING: Primary | ICD-10-CM

## 2023-01-01 LAB
1,3 BETA GLUCAN SER-MCNC: <31 PG/ML
ACID FAST STAIN (ARUP): NORMAL
ALBUMIN SERPL BCG-MCNC: 3.7 G/DL (ref 3.5–5.2)
ALBUMIN SERPL BCG-MCNC: 3.7 G/DL (ref 3.5–5.2)
ALBUMIN SERPL BCG-MCNC: 4 G/DL (ref 3.5–5.2)
ALBUMIN SERPL BCG-MCNC: 4.1 G/DL (ref 3.5–5.2)
ALBUMIN UR-MCNC: 20 MG/DL
ALBUMIN UR-MCNC: NEGATIVE MG/DL
ALBUMIN UR-MCNC: NEGATIVE MG/DL
ALP SERPL-CCNC: 104 U/L (ref 35–104)
ALP SERPL-CCNC: 83 U/L (ref 35–104)
ALP SERPL-CCNC: 89 U/L (ref 35–104)
ALP SERPL-CCNC: 95 U/L (ref 35–104)
ALT SERPL W P-5'-P-CCNC: 13 U/L (ref 0–50)
ALT SERPL W P-5'-P-CCNC: 15 U/L (ref 0–50)
ALT SERPL W P-5'-P-CCNC: 21 U/L (ref 0–50)
ALT SERPL W P-5'-P-CCNC: 23 U/L (ref 0–50)
AMORPH CRY #/AREA URNS HPF: ABNORMAL /HPF
ANION GAP SERPL CALCULATED.3IONS-SCNC: 10 MMOL/L (ref 7–15)
ANION GAP SERPL CALCULATED.3IONS-SCNC: 11 MMOL/L (ref 7–15)
ANION GAP SERPL CALCULATED.3IONS-SCNC: 16 MMOL/L (ref 7–15)
ANION GAP SERPL CALCULATED.3IONS-SCNC: 5 MMOL/L (ref 7–15)
ANION GAP SERPL CALCULATED.3IONS-SCNC: 5 MMOL/L (ref 7–15)
ANION GAP SERPL CALCULATED.3IONS-SCNC: 6 MMOL/L (ref 7–15)
ANION GAP SERPL CALCULATED.3IONS-SCNC: 7 MMOL/L (ref 7–15)
ANION GAP SERPL CALCULATED.3IONS-SCNC: 8 MMOL/L (ref 7–15)
APPEARANCE FLD: ABNORMAL
APPEARANCE UR: ABNORMAL
APPEARANCE UR: CLEAR
APPEARANCE UR: CLEAR
AST SERPL W P-5'-P-CCNC: 42 U/L (ref 0–45)
AST SERPL W P-5'-P-CCNC: 54 U/L (ref 0–45)
AST SERPL W P-5'-P-CCNC: 57 U/L (ref 0–45)
AST SERPL W P-5'-P-CCNC: 62 U/L (ref 0–45)
ATRIAL RATE - MUSE: 90 BPM
B-OH-BUTYR SERPL-SCNC: <0.18 MMOL/L
BACTERIA SNV CULT: NO GROWTH
BACTERIA SNV CULT: NORMAL
BASE EXCESS BLDV CALC-SCNC: 2.2 MMOL/L (ref -7.7–1.9)
BASOPHILS # BLD AUTO: 0 10E3/UL (ref 0–0.2)
BASOPHILS NFR BLD AUTO: 0 %
BILIRUB SERPL-MCNC: 0.2 MG/DL
BILIRUB SERPL-MCNC: 0.3 MG/DL
BILIRUB SERPL-MCNC: 0.5 MG/DL
BILIRUB SERPL-MCNC: 0.6 MG/DL
BILIRUB UR QL STRIP: NEGATIVE
BUN SERPL-MCNC: 10.4 MG/DL (ref 8–23)
BUN SERPL-MCNC: 11 MG/DL (ref 8–23)
BUN SERPL-MCNC: 12.8 MG/DL (ref 8–23)
BUN SERPL-MCNC: 13 MG/DL (ref 8–23)
BUN SERPL-MCNC: 13.2 MG/DL (ref 8–23)
BUN SERPL-MCNC: 16.6 MG/DL (ref 8–23)
BUN SERPL-MCNC: 17 MG/DL (ref 8–23)
BUN SERPL-MCNC: 17.2 MG/DL (ref 8–23)
BUN SERPL-MCNC: 17.7 MG/DL (ref 8–23)
BUN SERPL-MCNC: 18.1 MG/DL (ref 8–23)
BUN SERPL-MCNC: 18.8 MG/DL (ref 8–23)
BUN SERPL-MCNC: 18.8 MG/DL (ref 8–23)
BUN SERPL-MCNC: 19.2 MG/DL (ref 8–23)
C PNEUM DNA SPEC QL NAA+PROBE: NOT DETECTED
CALCIUM SERPL-MCNC: 8.1 MG/DL (ref 8.8–10.2)
CALCIUM SERPL-MCNC: 8.1 MG/DL (ref 8.8–10.2)
CALCIUM SERPL-MCNC: 8.2 MG/DL (ref 8.8–10.2)
CALCIUM SERPL-MCNC: 8.5 MG/DL (ref 8.8–10.2)
CALCIUM SERPL-MCNC: 8.6 MG/DL (ref 8.8–10.2)
CALCIUM SERPL-MCNC: 8.7 MG/DL (ref 8.8–10.2)
CALCIUM SERPL-MCNC: 9.1 MG/DL (ref 8.8–10.2)
CALCIUM SERPL-MCNC: 9.1 MG/DL (ref 8.8–10.2)
CALCIUM SERPL-MCNC: 9.3 MG/DL (ref 8.8–10.2)
CALCIUM SERPL-MCNC: 9.3 MG/DL (ref 8.8–10.2)
CALCIUM SERPL-MCNC: 9.5 MG/DL (ref 8.8–10.2)
CALCIUM SERPL-MCNC: 9.6 MG/DL (ref 8.8–10.2)
CALCIUM SERPL-MCNC: 9.7 MG/DL (ref 8.8–10.2)
CALCIUM SERPL-MCNC: 9.7 MG/DL (ref 8.8–10.2)
CELL COUNT BODY FLUID SOURCE: ABNORMAL
CHLORIDE SERPL-SCNC: 84 MMOL/L (ref 98–107)
CHLORIDE SERPL-SCNC: 85 MMOL/L (ref 98–107)
CHLORIDE SERPL-SCNC: 85 MMOL/L (ref 98–107)
CHLORIDE SERPL-SCNC: 86 MMOL/L (ref 98–107)
CHLORIDE SERPL-SCNC: 90 MMOL/L (ref 98–107)
CHLORIDE SERPL-SCNC: 92 MMOL/L (ref 98–107)
CHLORIDE SERPL-SCNC: 92 MMOL/L (ref 98–107)
CHLORIDE SERPL-SCNC: 93 MMOL/L (ref 98–107)
CHLORIDE SERPL-SCNC: 95 MMOL/L (ref 98–107)
CHLORIDE SERPL-SCNC: 95 MMOL/L (ref 98–107)
CHLORIDE SERPL-SCNC: 96 MMOL/L (ref 98–107)
CHLORIDE SERPL-SCNC: 96 MMOL/L (ref 98–107)
CHLORIDE SERPL-SCNC: 98 MMOL/L (ref 98–107)
COLOR FLD: ABNORMAL
COLOR UR AUTO: NORMAL
COLOR UR AUTO: YELLOW
COLOR UR AUTO: YELLOW
CREAT SERPL-MCNC: 0.41 MG/DL (ref 0.51–0.95)
CREAT SERPL-MCNC: 0.41 MG/DL (ref 0.51–0.95)
CREAT SERPL-MCNC: 0.42 MG/DL (ref 0.51–0.95)
CREAT SERPL-MCNC: 0.45 MG/DL (ref 0.51–0.95)
CREAT SERPL-MCNC: 0.46 MG/DL (ref 0.51–0.95)
CREAT SERPL-MCNC: 0.49 MG/DL (ref 0.51–0.95)
CREAT SERPL-MCNC: 0.5 MG/DL (ref 0.51–0.95)
CREAT SERPL-MCNC: 0.51 MG/DL (ref 0.51–0.95)
CREAT SERPL-MCNC: 0.55 MG/DL (ref 0.51–0.95)
CREAT UR-MCNC: 52.5 MG/DL
CRP SERPL-MCNC: <3 MG/L
DEPRECATED HCO3 PLAS-SCNC: 20 MMOL/L (ref 22–29)
DEPRECATED HCO3 PLAS-SCNC: 22 MMOL/L (ref 22–29)
DEPRECATED HCO3 PLAS-SCNC: 23 MMOL/L (ref 22–29)
DEPRECATED HCO3 PLAS-SCNC: 26 MMOL/L (ref 22–29)
DEPRECATED HCO3 PLAS-SCNC: 29 MMOL/L (ref 22–29)
DEPRECATED HCO3 PLAS-SCNC: 30 MMOL/L (ref 22–29)
DEPRECATED HCO3 PLAS-SCNC: 31 MMOL/L (ref 22–29)
DEPRECATED HCO3 PLAS-SCNC: 32 MMOL/L (ref 22–29)
DEPRECATED HCO3 PLAS-SCNC: 34 MMOL/L (ref 22–29)
DEPRECATED HCO3 PLAS-SCNC: 34 MMOL/L (ref 22–29)
DIASTOLIC BLOOD PRESSURE - MUSE: NORMAL MMHG
EGFRCR SERPLBLD CKD-EPI 2021: >90 ML/MIN/1.73M2
EGFRCR SERPLBLD CKD-EPI 2021: >90 ML/MIN/1.73M2
EOSINOPHIL # BLD AUTO: 0 10E3/UL (ref 0–0.7)
EOSINOPHIL # BLD AUTO: 0.1 10E3/UL (ref 0–0.7)
EOSINOPHIL # BLD AUTO: 0.2 10E3/UL (ref 0–0.7)
EOSINOPHIL # BLD AUTO: 0.2 10E3/UL (ref 0–0.7)
EOSINOPHIL NFR BLD AUTO: 0 %
EOSINOPHIL NFR BLD AUTO: 1 %
EOSINOPHIL NFR FLD MANUAL: 1 %
ERYTHROCYTE [DISTWIDTH] IN BLOOD BY AUTOMATED COUNT: 12.7 % (ref 10–15)
ERYTHROCYTE [DISTWIDTH] IN BLOOD BY AUTOMATED COUNT: 12.8 % (ref 10–15)
ERYTHROCYTE [DISTWIDTH] IN BLOOD BY AUTOMATED COUNT: 13 % (ref 10–15)
ERYTHROCYTE [DISTWIDTH] IN BLOOD BY AUTOMATED COUNT: 13.1 % (ref 10–15)
ERYTHROCYTE [DISTWIDTH] IN BLOOD BY AUTOMATED COUNT: 13.6 % (ref 10–15)
ERYTHROCYTE [DISTWIDTH] IN BLOOD BY AUTOMATED COUNT: 14.2 % (ref 10–15)
ERYTHROCYTE [SEDIMENTATION RATE] IN BLOOD BY WESTERGREN METHOD: 42 MM/HR (ref 0–30)
FERRITIN SERPL-MCNC: 100 NG/ML (ref 11–328)
FERRITIN SERPL-MCNC: 170 NG/ML (ref 11–328)
FLUAV H1 2009 PAND RNA SPEC QL NAA+PROBE: NOT DETECTED
FLUAV H1 RNA SPEC QL NAA+PROBE: NOT DETECTED
FLUAV H3 RNA SPEC QL NAA+PROBE: NOT DETECTED
FLUAV RNA SPEC QL NAA+PROBE: NOT DETECTED
FLUBV RNA SPEC QL NAA+PROBE: NOT DETECTED
FRACT EXCRET NA UR+SERPL-RTO: NORMAL %
GALACTOMANNAN AG SERPL QL IA: NEGATIVE
GALACTOMANNAN AG SPEC IA-ACNC: 0.05
GFR SERPL CREATININE-BSD FRML MDRD: >90 ML/MIN/1.73M2
GLUCOSE SERPL-MCNC: 109 MG/DL (ref 70–99)
GLUCOSE SERPL-MCNC: 113 MG/DL (ref 70–99)
GLUCOSE SERPL-MCNC: 127 MG/DL (ref 70–99)
GLUCOSE SERPL-MCNC: 136 MG/DL (ref 70–99)
GLUCOSE SERPL-MCNC: 141 MG/DL (ref 70–99)
GLUCOSE SERPL-MCNC: 187 MG/DL (ref 70–99)
GLUCOSE SERPL-MCNC: 66 MG/DL (ref 70–99)
GLUCOSE SERPL-MCNC: 66 MG/DL (ref 70–99)
GLUCOSE SERPL-MCNC: 86 MG/DL (ref 70–99)
GLUCOSE SERPL-MCNC: 89 MG/DL (ref 70–99)
GLUCOSE SERPL-MCNC: 89 MG/DL (ref 70–99)
GLUCOSE SERPL-MCNC: 90 MG/DL (ref 70–99)
GLUCOSE SERPL-MCNC: 92 MG/DL (ref 70–99)
GLUCOSE UR STRIP-MCNC: NEGATIVE MG/DL
GRAM STAIN RESULT: NORMAL
GRAM STAIN RESULT: NORMAL
GROUP A STREP BY PCR: NOT DETECTED
HADV DNA SPEC QL NAA+PROBE: NOT DETECTED
HCO3 BLDV-SCNC: 28 MMOL/L (ref 21–28)
HCOV PNL SPEC NAA+PROBE: NOT DETECTED
HCT VFR BLD AUTO: 34.1 % (ref 35–47)
HCT VFR BLD AUTO: 34.4 % (ref 35–47)
HCT VFR BLD AUTO: 36.4 % (ref 35–47)
HCT VFR BLD AUTO: 36.8 % (ref 35–47)
HCT VFR BLD AUTO: 36.8 % (ref 35–47)
HCT VFR BLD AUTO: 36.9 % (ref 35–47)
HCT VFR BLD AUTO: 37.1 % (ref 35–47)
HCT VFR BLD AUTO: 37.9 % (ref 35–47)
HCT VFR BLD AUTO: 42 % (ref 35–47)
HGB BLD-MCNC: 11.3 G/DL (ref 11.7–15.7)
HGB BLD-MCNC: 11.4 G/DL (ref 11.7–15.7)
HGB BLD-MCNC: 11.5 G/DL (ref 11.7–15.7)
HGB BLD-MCNC: 11.7 G/DL (ref 11.7–15.7)
HGB BLD-MCNC: 11.7 G/DL (ref 11.7–15.7)
HGB BLD-MCNC: 11.8 G/DL (ref 11.7–15.7)
HGB BLD-MCNC: 11.8 G/DL (ref 11.7–15.7)
HGB BLD-MCNC: 12.1 G/DL (ref 11.7–15.7)
HGB BLD-MCNC: 13.3 G/DL (ref 11.7–15.7)
HGB UR QL STRIP: NEGATIVE
HMPV RNA SPEC QL NAA+PROBE: NOT DETECTED
HOLD SPECIMEN: NORMAL
HOLD SPECIMEN: NORMAL
HPIV1 RNA SPEC QL NAA+PROBE: NOT DETECTED
HPIV2 RNA SPEC QL NAA+PROBE: NOT DETECTED
HPIV3 RNA SPEC QL NAA+PROBE: NOT DETECTED
HPIV4 RNA SPEC QL NAA+PROBE: NOT DETECTED
IMM GRANULOCYTES # BLD: 0 10E3/UL
IMM GRANULOCYTES # BLD: 0 10E3/UL
IMM GRANULOCYTES # BLD: 0.1 10E3/UL
IMM GRANULOCYTES NFR BLD: 0 %
IMM GRANULOCYTES NFR BLD: 0 %
IMM GRANULOCYTES NFR BLD: 1 %
INTERPRETATION ECG - MUSE: NORMAL
KETONES UR STRIP-MCNC: NEGATIVE MG/DL
L PNEUMO1 AG UR QL IA: NEGATIVE
LACTATE SERPL-SCNC: 1.1 MMOL/L (ref 0.7–2)
LDH SERPL L TO P-CCNC: 273 U/L (ref 0–250)
LEUKOCYTE ESTERASE UR QL STRIP: NEGATIVE
LVEF ECHO: NORMAL
LYMPHOCYTES # BLD AUTO: 0.8 10E3/UL (ref 0.8–5.3)
LYMPHOCYTES # BLD AUTO: 0.9 10E3/UL (ref 0.8–5.3)
LYMPHOCYTES # BLD AUTO: 0.9 10E3/UL (ref 0.8–5.3)
LYMPHOCYTES # BLD AUTO: 1.4 10E3/UL (ref 0.8–5.3)
LYMPHOCYTES NFR BLD AUTO: 10 %
LYMPHOCYTES NFR BLD AUTO: 4 %
LYMPHOCYTES NFR BLD AUTO: 6 %
LYMPHOCYTES NFR BLD AUTO: 7 %
LYMPHOCYTES NFR BLD AUTO: 8 %
LYMPHOCYTES NFR BLD AUTO: 9 %
LYMPHOCYTES NFR FLD MANUAL: 1 %
M PNEUMO DNA SPEC QL NAA+PROBE: NOT DETECTED
MAGNESIUM SERPL-MCNC: 1.7 MG/DL (ref 1.7–2.3)
MAGNESIUM SERPL-MCNC: 2.1 MG/DL (ref 1.7–2.3)
MAGNESIUM SERPL-MCNC: 2.2 MG/DL (ref 1.7–2.3)
MCH RBC QN AUTO: 28.3 PG (ref 26.5–33)
MCH RBC QN AUTO: 28.6 PG (ref 26.5–33)
MCH RBC QN AUTO: 29 PG (ref 26.5–33)
MCH RBC QN AUTO: 29.7 PG (ref 26.5–33)
MCHC RBC AUTO-ENTMCNC: 30.9 G/DL (ref 31.5–36.5)
MCHC RBC AUTO-ENTMCNC: 31 G/DL (ref 31.5–36.5)
MCHC RBC AUTO-ENTMCNC: 31.7 G/DL (ref 31.5–36.5)
MCHC RBC AUTO-ENTMCNC: 31.8 G/DL (ref 31.5–36.5)
MCHC RBC AUTO-ENTMCNC: 32.1 G/DL (ref 31.5–36.5)
MCHC RBC AUTO-ENTMCNC: 32.4 G/DL (ref 31.5–36.5)
MCHC RBC AUTO-ENTMCNC: 32.8 G/DL (ref 31.5–36.5)
MCHC RBC AUTO-ENTMCNC: 33.4 G/DL (ref 31.5–36.5)
MCV RBC AUTO: 89 FL (ref 78–100)
MCV RBC AUTO: 89 FL (ref 78–100)
MCV RBC AUTO: 90 FL (ref 78–100)
MCV RBC AUTO: 91 FL (ref 78–100)
MCV RBC AUTO: 91 FL (ref 78–100)
MCV RBC AUTO: 92 FL (ref 78–100)
MCV RBC AUTO: 92 FL (ref 78–100)
MCV RBC AUTO: 94 FL (ref 78–100)
MONOCYTES # BLD AUTO: 0.8 10E3/UL (ref 0–1.3)
MONOCYTES # BLD AUTO: 1.1 10E3/UL (ref 0–1.3)
MONOCYTES # BLD AUTO: 1.2 10E3/UL (ref 0–1.3)
MONOCYTES # BLD AUTO: 1.3 10E3/UL (ref 0–1.3)
MONOCYTES # BLD AUTO: 1.4 10E3/UL (ref 0–1.3)
MONOCYTES # BLD AUTO: 2.2 10E3/UL (ref 0–1.3)
MONOCYTES NFR BLD AUTO: 10 %
MONOCYTES NFR BLD AUTO: 11 %
MONOCYTES NFR BLD AUTO: 9 %
MONOS+MACROS NFR FLD MANUAL: 1 %
MRSA DNA SPEC QL NAA+PROBE: NEGATIVE
NEUTROPHILS # BLD AUTO: 10.3 10E3/UL (ref 1.6–8.3)
NEUTROPHILS # BLD AUTO: 10.6 10E3/UL (ref 1.6–8.3)
NEUTROPHILS # BLD AUTO: 10.9 10E3/UL (ref 1.6–8.3)
NEUTROPHILS # BLD AUTO: 18 10E3/UL (ref 1.6–8.3)
NEUTROPHILS # BLD AUTO: 6.5 10E3/UL (ref 1.6–8.3)
NEUTROPHILS # BLD AUTO: 9.9 10E3/UL (ref 1.6–8.3)
NEUTROPHILS NFR BLD AUTO: 78 %
NEUTROPHILS NFR BLD AUTO: 80 %
NEUTROPHILS NFR BLD AUTO: 81 %
NEUTROPHILS NFR BLD AUTO: 81 %
NEUTROPHILS NFR BLD AUTO: 84 %
NEUTROPHILS NFR BLD AUTO: 85 %
NEUTS BAND NFR FLD MANUAL: 97 %
NITRATE UR QL: NEGATIVE
NRBC # BLD AUTO: 0 10E3/UL
NRBC BLD AUTO-RTO: 0 /100
O2/TOTAL GAS SETTING VFR VENT: 3 %
OBSERVATION IMP: NEGATIVE
OSMOLALITY SERPL: 259 MMOL/KG (ref 280–301)
OSMOLALITY SERPL: 262 MMOL/KG (ref 280–301)
OSMOLALITY SERPL: 266 MMOL/KG (ref 280–301)
OSMOLALITY UR: 456 MMOL/KG (ref 100–1200)
OSMOLALITY UR: 720 MMOL/KG (ref 100–1200)
P AXIS - MUSE: 74 DEGREES
PCO2 BLDV: 47 MM HG (ref 40–50)
PH BLDV: 7.39 [PH] (ref 7.32–7.43)
PH UR STRIP: 6.5 [PH] (ref 5–7)
PH UR STRIP: 7 [PH] (ref 5–8)
PH UR STRIP: 7.5 [PH] (ref 5–7)
PHOSPHATE SERPL-MCNC: 2.7 MG/DL (ref 2.5–4.5)
PLATELET # BLD AUTO: 277 10E3/UL (ref 150–450)
PLATELET # BLD AUTO: 280 10E3/UL (ref 150–450)
PLATELET # BLD AUTO: 299 10E3/UL (ref 150–450)
PLATELET # BLD AUTO: 308 10E3/UL (ref 150–450)
PLATELET # BLD AUTO: 311 10E3/UL (ref 150–450)
PLATELET # BLD AUTO: 327 10E3/UL (ref 150–450)
PLATELET # BLD AUTO: 331 10E3/UL (ref 150–450)
PLATELET # BLD AUTO: 335 10E3/UL (ref 150–450)
PLATELET # BLD AUTO: 374 10E3/UL (ref 150–450)
PLATELET # BLD AUTO: 377 10E3/UL (ref 150–450)
PO2 BLDV: 64 MM HG (ref 25–47)
POTASSIUM SERPL-SCNC: 4.2 MMOL/L (ref 3.4–5.3)
POTASSIUM SERPL-SCNC: 4.3 MMOL/L (ref 3.4–5.3)
POTASSIUM SERPL-SCNC: 4.3 MMOL/L (ref 3.4–5.3)
POTASSIUM SERPL-SCNC: 4.5 MMOL/L (ref 3.4–5.3)
POTASSIUM SERPL-SCNC: 4.6 MMOL/L (ref 3.4–5.3)
POTASSIUM SERPL-SCNC: 4.6 MMOL/L (ref 3.4–5.3)
POTASSIUM SERPL-SCNC: 4.7 MMOL/L (ref 3.4–5.3)
POTASSIUM SERPL-SCNC: 4.7 MMOL/L (ref 3.4–5.3)
POTASSIUM SERPL-SCNC: 5 MMOL/L (ref 3.4–5.3)
POTASSIUM SERPL-SCNC: 5.3 MMOL/L (ref 3.4–5.3)
POTASSIUM SERPL-SCNC: 6.2 MMOL/L (ref 3.4–5.3)
POTASSIUM UR-SCNC: 20.4 MMOL/L
PR INTERVAL - MUSE: 156 MS
PROCALCITONIN SERPL IA-MCNC: 0.05 NG/ML
PROT SERPL-MCNC: 6.9 G/DL (ref 6.4–8.3)
PROT SERPL-MCNC: 7.2 G/DL (ref 6.4–8.3)
PROT SERPL-MCNC: 7.4 G/DL (ref 6.4–8.3)
PROT SERPL-MCNC: 7.7 G/DL (ref 6.4–8.3)
QRS DURATION - MUSE: 80 MS
QT - MUSE: 356 MS
QTC - MUSE: 435 MS
R AXIS - MUSE: -20 DEGREES
RADIOLOGIST FLAGS: ABNORMAL
RBC # BLD AUTO: 3.81 10E6/UL (ref 3.8–5.2)
RBC # BLD AUTO: 3.84 10E6/UL (ref 3.8–5.2)
RBC # BLD AUTO: 3.97 10E6/UL (ref 3.8–5.2)
RBC # BLD AUTO: 4.04 10E6/UL (ref 3.8–5.2)
RBC # BLD AUTO: 4.04 10E6/UL (ref 3.8–5.2)
RBC # BLD AUTO: 4.07 10E6/UL (ref 3.8–5.2)
RBC # BLD AUTO: 4.13 10E6/UL (ref 3.8–5.2)
RBC # BLD AUTO: 4.59 10E6/UL (ref 3.8–5.2)
RBC URINE: 2 /HPF
RSV RNA SPEC QL NAA+PROBE: NOT DETECTED
RSV RNA SPEC QL NAA+PROBE: NOT DETECTED
RV+EV RNA SPEC QL NAA+PROBE: NOT DETECTED
S PNEUM AG SPEC QL: NEGATIVE
SA TARGET DNA: NEGATIVE
SARS-COV-2 RNA RESP QL NAA+PROBE: NEGATIVE
SODIUM SERPL-SCNC: 118 MMOL/L (ref 136–145)
SODIUM SERPL-SCNC: 120 MMOL/L (ref 136–145)
SODIUM SERPL-SCNC: 122 MMOL/L (ref 136–145)
SODIUM SERPL-SCNC: 123 MMOL/L (ref 136–145)
SODIUM SERPL-SCNC: 124 MMOL/L (ref 136–145)
SODIUM SERPL-SCNC: 125 MMOL/L (ref 136–145)
SODIUM SERPL-SCNC: 126 MMOL/L (ref 136–145)
SODIUM SERPL-SCNC: 127 MMOL/L (ref 136–145)
SODIUM SERPL-SCNC: 128 MMOL/L (ref 136–145)
SODIUM SERPL-SCNC: 129 MMOL/L (ref 136–145)
SODIUM SERPL-SCNC: 129 MMOL/L (ref 136–145)
SODIUM SERPL-SCNC: 131 MMOL/L (ref 136–145)
SODIUM SERPL-SCNC: 131 MMOL/L (ref 136–145)
SODIUM SERPL-SCNC: 132 MMOL/L (ref 136–145)
SODIUM SERPL-SCNC: 134 MMOL/L (ref 136–145)
SODIUM SERPL-SCNC: 135 MMOL/L (ref 136–145)
SODIUM SERPL-SCNC: 136 MMOL/L (ref 136–145)
SODIUM SERPL-SCNC: 137 MMOL/L (ref 136–145)
SODIUM SERPL-SCNC: 138 MMOL/L (ref 135–145)
SODIUM UR-SCNC: 170 MMOL/L
SODIUM UR-SCNC: <20 MMOL/L
SP GR UR STRIP: 1.01 (ref 1–1.03)
SP GR UR STRIP: 1.02 (ref 1–1.03)
SP GR UR STRIP: 1.03 (ref 1–1.03)
SYSTOLIC BLOOD PRESSURE - MUSE: NORMAL MMHG
T AXIS - MUSE: 56 DEGREES
T4 FREE SERPL-MCNC: 0.91 NG/DL (ref 0.9–1.7)
T4 FREE SERPL-MCNC: 0.96 NG/DL (ref 0.9–1.7)
T4 FREE SERPL-MCNC: 1.07 NG/DL (ref 0.9–1.7)
TROPONIN T SERPL HS-MCNC: 18 NG/L
TSH SERPL DL<=0.005 MIU/L-ACNC: 2.85 UIU/ML (ref 0.3–4.2)
TSH SERPL DL<=0.005 MIU/L-ACNC: 4.02 UIU/ML (ref 0.3–4.2)
TSH SERPL DL<=0.005 MIU/L-ACNC: 4.2 UIU/ML (ref 0.3–4.2)
TSH SERPL DL<=0.005 MIU/L-ACNC: 5.18 UIU/ML (ref 0.3–4.2)
TSH SERPL DL<=0.005 MIU/L-ACNC: 6.89 UIU/ML (ref 0.3–4.2)
TSH SERPL DL<=0.005 MIU/L-ACNC: 7.88 UIU/ML (ref 0.3–4.2)
URATE SERPL-MCNC: 1.5 MG/DL (ref 2.4–5.7)
UROBILINOGEN UR STRIP-ACNC: 0.2 E.U./DL
UROBILINOGEN UR STRIP-MCNC: NORMAL MG/DL
UROBILINOGEN UR STRIP-MCNC: NORMAL MG/DL
VENTRICULAR RATE- MUSE: 90 BPM
VIT B12 SERPL-MCNC: 463 PG/ML (ref 232–1245)
VIT D+METAB SERPL-MCNC: 39 NG/ML (ref 20–50)
WBC # BLD AUTO: 12.1 10E3/UL (ref 4–11)
WBC # BLD AUTO: 12.9 10E3/UL (ref 4–11)
WBC # BLD AUTO: 13.1 10E3/UL (ref 4–11)
WBC # BLD AUTO: 13.2 10E3/UL (ref 4–11)
WBC # BLD AUTO: 15.5 10E3/UL (ref 4–11)
WBC # BLD AUTO: 21.2 10E3/UL (ref 4–11)
WBC # BLD AUTO: 8.2 10E3/UL (ref 4–11)
WBC # BLD AUTO: 8.8 10E3/UL (ref 4–11)
WBC # FLD AUTO: 4450 /UL
WBC URINE: 0 /HPF

## 2023-01-01 PROCEDURE — 84443 ASSAY THYROID STIM HORMONE: CPT | Performed by: FAMILY MEDICINE

## 2023-01-01 PROCEDURE — 36415 COLL VENOUS BLD VENIPUNCTURE: CPT | Performed by: FAMILY MEDICINE

## 2023-01-01 PROCEDURE — 97161 PT EVAL LOW COMPLEX 20 MIN: CPT | Mod: GP

## 2023-01-01 PROCEDURE — 258N000003 HC RX IP 258 OP 636: Performed by: PHYSICIAN ASSISTANT

## 2023-01-01 PROCEDURE — 84443 ASSAY THYROID STIM HORMONE: CPT

## 2023-01-01 PROCEDURE — 90678 RSV VACC PREF BIVALENT IM: CPT | Performed by: STUDENT IN AN ORGANIZED HEALTH CARE EDUCATION/TRAINING PROGRAM

## 2023-01-01 PROCEDURE — 271N000002 HC RX 271: Performed by: EMERGENCY MEDICINE

## 2023-01-01 PROCEDURE — 92526 ORAL FUNCTION THERAPY: CPT | Mod: GN | Performed by: SPEECH-LANGUAGE PATHOLOGIST

## 2023-01-01 PROCEDURE — 84132 ASSAY OF SERUM POTASSIUM: CPT | Performed by: FAMILY MEDICINE

## 2023-01-01 PROCEDURE — 85025 COMPLETE CBC W/AUTO DIFF WBC: CPT

## 2023-01-01 PROCEDURE — 36416 COLLJ CAPILLARY BLOOD SPEC: CPT

## 2023-01-01 PROCEDURE — 87651 STREP A DNA AMP PROBE: CPT | Performed by: EMERGENCY MEDICINE

## 2023-01-01 PROCEDURE — 250N000013 HC RX MED GY IP 250 OP 250 PS 637: Performed by: STUDENT IN AN ORGANIZED HEALTH CARE EDUCATION/TRAINING PROGRAM

## 2023-01-01 PROCEDURE — 250N000011 HC RX IP 250 OP 636: Performed by: HOSPITALIST

## 2023-01-01 PROCEDURE — 94640 AIRWAY INHALATION TREATMENT: CPT | Mod: 76

## 2023-01-01 PROCEDURE — 90480 ADMN SARSCOV2 VAC 1/ONLY CMP: CPT | Performed by: STUDENT IN AN ORGANIZED HEALTH CARE EDUCATION/TRAINING PROGRAM

## 2023-01-01 PROCEDURE — 99233 SBSQ HOSP IP/OBS HIGH 50: CPT | Mod: FS | Performed by: PHYSICIAN ASSISTANT

## 2023-01-01 PROCEDURE — 80053 COMPREHEN METABOLIC PANEL: CPT | Performed by: EMERGENCY MEDICINE

## 2023-01-01 PROCEDURE — 87205 SMEAR GRAM STAIN: CPT | Mod: ORL | Performed by: PHYSICIAN ASSISTANT

## 2023-01-01 PROCEDURE — 86140 C-REACTIVE PROTEIN: CPT | Performed by: FAMILY MEDICINE

## 2023-01-01 PROCEDURE — 85014 HEMATOCRIT: CPT

## 2023-01-01 PROCEDURE — 94640 AIRWAY INHALATION TREATMENT: CPT

## 2023-01-01 PROCEDURE — 83735 ASSAY OF MAGNESIUM: CPT | Performed by: HOSPITALIST

## 2023-01-01 PROCEDURE — 36415 COLL VENOUS BLD VENIPUNCTURE: CPT

## 2023-01-01 PROCEDURE — 93970 EXTREMITY STUDY: CPT

## 2023-01-01 PROCEDURE — 82310 ASSAY OF CALCIUM: CPT

## 2023-01-01 PROCEDURE — 77067 SCR MAMMO BI INCL CAD: CPT | Mod: TC | Performed by: RADIOLOGY

## 2023-01-01 PROCEDURE — 97110 THERAPEUTIC EXERCISES: CPT | Mod: GP

## 2023-01-01 PROCEDURE — 87633 RESP VIRUS 12-25 TARGETS: CPT | Performed by: STUDENT IN AN ORGANIZED HEALTH CARE EDUCATION/TRAINING PROGRAM

## 2023-01-01 PROCEDURE — 99215 OFFICE O/P EST HI 40 MIN: CPT | Mod: VID | Performed by: INTERNAL MEDICINE

## 2023-01-01 PROCEDURE — 99291 CRITICAL CARE FIRST HOUR: CPT | Mod: 25 | Performed by: EMERGENCY MEDICINE

## 2023-01-01 PROCEDURE — 99204 OFFICE O/P NEW MOD 45 MIN: CPT | Performed by: INTERNAL MEDICINE

## 2023-01-01 PROCEDURE — 250N000013 HC RX MED GY IP 250 OP 250 PS 637: Performed by: INTERNAL MEDICINE

## 2023-01-01 PROCEDURE — 83735 ASSAY OF MAGNESIUM: CPT | Performed by: INTERNAL MEDICINE

## 2023-01-01 PROCEDURE — 84295 ASSAY OF SERUM SODIUM: CPT

## 2023-01-01 PROCEDURE — 250N000013 HC RX MED GY IP 250 OP 250 PS 637

## 2023-01-01 PROCEDURE — 80053 COMPREHEN METABOLIC PANEL: CPT | Performed by: STUDENT IN AN ORGANIZED HEALTH CARE EDUCATION/TRAINING PROGRAM

## 2023-01-01 PROCEDURE — 85025 COMPLETE CBC W/AUTO DIFF WBC: CPT | Performed by: EMERGENCY MEDICINE

## 2023-01-01 PROCEDURE — 87075 CULTR BACTERIA EXCEPT BLOOD: CPT | Mod: ORL | Performed by: PHYSICIAN ASSISTANT

## 2023-01-01 PROCEDURE — 99215 OFFICE O/P EST HI 40 MIN: CPT | Mod: 25 | Performed by: STUDENT IN AN ORGANIZED HEALTH CARE EDUCATION/TRAINING PROGRAM

## 2023-01-01 PROCEDURE — 999N000157 HC STATISTIC RCP TIME EA 10 MIN

## 2023-01-01 PROCEDURE — 82803 BLOOD GASES ANY COMBINATION: CPT

## 2023-01-01 PROCEDURE — 69210 REMOVE IMPACTED EAR WAX UNI: CPT | Mod: RT | Performed by: OTOLARYNGOLOGY

## 2023-01-01 PROCEDURE — G0180 MD CERTIFICATION HHA PATIENT: HCPCS | Performed by: STUDENT IN AN ORGANIZED HEALTH CARE EDUCATION/TRAINING PROGRAM

## 2023-01-01 PROCEDURE — 80048 BASIC METABOLIC PNL TOTAL CA: CPT

## 2023-01-01 PROCEDURE — 83935 ASSAY OF URINE OSMOLALITY: CPT

## 2023-01-01 PROCEDURE — 99233 SBSQ HOSP IP/OBS HIGH 50: CPT | Performed by: INTERNAL MEDICINE

## 2023-01-01 PROCEDURE — 93228 REMOTE 30 DAY ECG REV/REPORT: CPT | Performed by: INTERNAL MEDICINE

## 2023-01-01 PROCEDURE — G1010 CDSM STANSON: HCPCS | Mod: GC | Performed by: RADIOLOGY

## 2023-01-01 PROCEDURE — 250N000011 HC RX IP 250 OP 636: Performed by: ORTHOPAEDIC SURGERY

## 2023-01-01 PROCEDURE — 69210 REMOVE IMPACTED EAR WAX UNI: CPT | Performed by: OTOLARYNGOLOGY

## 2023-01-01 PROCEDURE — 83615 LACTATE (LD) (LDH) ENZYME: CPT | Performed by: FAMILY MEDICINE

## 2023-01-01 PROCEDURE — 250N000011 HC RX IP 250 OP 636

## 2023-01-01 PROCEDURE — 82565 ASSAY OF CREATININE: CPT

## 2023-01-01 PROCEDURE — 99214 OFFICE O/P EST MOD 30 MIN: CPT | Performed by: OTOLARYNGOLOGY

## 2023-01-01 PROCEDURE — 83930 ASSAY OF BLOOD OSMOLALITY: CPT | Performed by: HOSPITALIST

## 2023-01-01 PROCEDURE — 99213 OFFICE O/P EST LOW 20 MIN: CPT | Mod: 25 | Performed by: OTOLARYNGOLOGY

## 2023-01-01 PROCEDURE — 97530 THERAPEUTIC ACTIVITIES: CPT | Mod: GP

## 2023-01-01 PROCEDURE — 93005 ELECTROCARDIOGRAM TRACING: CPT | Performed by: EMERGENCY MEDICINE

## 2023-01-01 PROCEDURE — 250N000012 HC RX MED GY IP 250 OP 636 PS 637

## 2023-01-01 PROCEDURE — 85014 HEMATOCRIT: CPT | Performed by: STUDENT IN AN ORGANIZED HEALTH CARE EDUCATION/TRAINING PROGRAM

## 2023-01-01 PROCEDURE — 250N000009 HC RX 250

## 2023-01-01 PROCEDURE — 82310 ASSAY OF CALCIUM: CPT | Performed by: STUDENT IN AN ORGANIZED HEALTH CARE EDUCATION/TRAINING PROGRAM

## 2023-01-01 PROCEDURE — 36415 COLL VENOUS BLD VENIPUNCTURE: CPT | Performed by: EMERGENCY MEDICINE

## 2023-01-01 PROCEDURE — 85018 HEMOGLOBIN: CPT | Performed by: STUDENT IN AN ORGANIZED HEALTH CARE EDUCATION/TRAINING PROGRAM

## 2023-01-01 PROCEDURE — 87449 NOS EACH ORGANISM AG IA: CPT | Performed by: STUDENT IN AN ORGANIZED HEALTH CARE EDUCATION/TRAINING PROGRAM

## 2023-01-01 PROCEDURE — 84439 ASSAY OF FREE THYROXINE: CPT

## 2023-01-01 PROCEDURE — 80048 BASIC METABOLIC PNL TOTAL CA: CPT | Performed by: FAMILY MEDICINE

## 2023-01-01 PROCEDURE — 71046 X-RAY EXAM CHEST 2 VIEWS: CPT

## 2023-01-01 PROCEDURE — 93270 REMOTE 30 DAY ECG REV/REPORT: CPT

## 2023-01-01 PROCEDURE — 0134A COVID-19 BIVALENT 18+ (MODERNA): CPT

## 2023-01-01 PROCEDURE — 84443 ASSAY THYROID STIM HORMONE: CPT | Performed by: STUDENT IN AN ORGANIZED HEALTH CARE EDUCATION/TRAINING PROGRAM

## 2023-01-01 PROCEDURE — 93306 TTE W/DOPPLER COMPLETE: CPT | Mod: 26 | Performed by: INTERNAL MEDICINE

## 2023-01-01 PROCEDURE — 83930 ASSAY OF BLOOD OSMOLALITY: CPT

## 2023-01-01 PROCEDURE — 76857 US EXAM PELVIC LIMITED: CPT

## 2023-01-01 PROCEDURE — 92526 ORAL FUNCTION THERAPY: CPT | Mod: GN

## 2023-01-01 PROCEDURE — G1010 CDSM STANSON: HCPCS

## 2023-01-01 PROCEDURE — 85004 AUTOMATED DIFF WBC COUNT: CPT

## 2023-01-01 PROCEDURE — 84484 ASSAY OF TROPONIN QUANT: CPT | Performed by: EMERGENCY MEDICINE

## 2023-01-01 PROCEDURE — 82010 KETONE BODYS QUAN: CPT

## 2023-01-01 PROCEDURE — 84300 ASSAY OF URINE SODIUM: CPT

## 2023-01-01 PROCEDURE — 99215 OFFICE O/P EST HI 40 MIN: CPT | Performed by: FAMILY MEDICINE

## 2023-01-01 PROCEDURE — 81001 URINALYSIS AUTO W/SCOPE: CPT | Performed by: EMERGENCY MEDICINE

## 2023-01-01 PROCEDURE — 81003 URINALYSIS AUTO W/O SCOPE: CPT

## 2023-01-01 PROCEDURE — 91320 SARSCV2 VAC 30MCG TRS-SUC IM: CPT | Performed by: STUDENT IN AN ORGANIZED HEALTH CARE EDUCATION/TRAINING PROGRAM

## 2023-01-01 PROCEDURE — G0008 ADMIN INFLUENZA VIRUS VAC: HCPCS | Performed by: STUDENT IN AN ORGANIZED HEALTH CARE EDUCATION/TRAINING PROGRAM

## 2023-01-01 PROCEDURE — 71275 CT ANGIOGRAPHY CHEST: CPT | Mod: 26 | Performed by: RADIOLOGY

## 2023-01-01 PROCEDURE — 82565 ASSAY OF CREATININE: CPT | Performed by: STUDENT IN AN ORGANIZED HEALTH CARE EDUCATION/TRAINING PROGRAM

## 2023-01-01 PROCEDURE — 69209 REMOVE IMPACTED EAR WAX UNI: CPT | Mod: 50

## 2023-01-01 PROCEDURE — 99214 OFFICE O/P EST MOD 30 MIN: CPT | Mod: VID | Performed by: STUDENT IN AN ORGANIZED HEALTH CARE EDUCATION/TRAINING PROGRAM

## 2023-01-01 PROCEDURE — 84300 ASSAY OF URINE SODIUM: CPT | Performed by: HOSPITALIST

## 2023-01-01 PROCEDURE — 87305 ASPERGILLUS AG IA: CPT | Performed by: STUDENT IN AN ORGANIZED HEALTH CARE EDUCATION/TRAINING PROGRAM

## 2023-01-01 PROCEDURE — 85652 RBC SED RATE AUTOMATED: CPT | Performed by: FAMILY MEDICINE

## 2023-01-01 PROCEDURE — 85025 COMPLETE CBC W/AUTO DIFF WBC: CPT | Performed by: STUDENT IN AN ORGANIZED HEALTH CARE EDUCATION/TRAINING PROGRAM

## 2023-01-01 PROCEDURE — 250N000011 HC RX IP 250 OP 636: Mod: JZ | Performed by: EMERGENCY MEDICINE

## 2023-01-01 PROCEDURE — 89051 BODY FLUID CELL COUNT: CPT | Mod: ORL | Performed by: PHYSICIAN ASSISTANT

## 2023-01-01 PROCEDURE — 84100 ASSAY OF PHOSPHORUS: CPT

## 2023-01-01 PROCEDURE — 83935 ASSAY OF URINE OSMOLALITY: CPT | Performed by: HOSPITALIST

## 2023-01-01 PROCEDURE — 272N000735 HC KIT, CIRCUIT WITH NEB, VOLERA

## 2023-01-01 PROCEDURE — 87635 SARS-COV-2 COVID-19 AMP PRB: CPT

## 2023-01-01 PROCEDURE — 93010 ELECTROCARDIOGRAM REPORT: CPT | Performed by: EMERGENCY MEDICINE

## 2023-01-01 PROCEDURE — 99233 SBSQ HOSP IP/OBS HIGH 50: CPT | Mod: GC | Performed by: ORTHOPAEDIC SURGERY

## 2023-01-01 PROCEDURE — 99239 HOSP IP/OBS DSCHRG MGMT >30: CPT | Performed by: INTERNAL MEDICINE

## 2023-01-01 PROCEDURE — 84133 ASSAY OF URINE POTASSIUM: CPT

## 2023-01-01 PROCEDURE — 99214 OFFICE O/P EST MOD 30 MIN: CPT | Performed by: INTERNAL MEDICINE

## 2023-01-01 PROCEDURE — 84550 ASSAY OF BLOOD/URIC ACID: CPT | Performed by: HOSPITALIST

## 2023-01-01 PROCEDURE — 84439 ASSAY OF FREE THYROXINE: CPT | Performed by: STUDENT IN AN ORGANIZED HEALTH CARE EDUCATION/TRAINING PROGRAM

## 2023-01-01 PROCEDURE — 82728 ASSAY OF FERRITIN: CPT | Performed by: STUDENT IN AN ORGANIZED HEALTH CARE EDUCATION/TRAINING PROGRAM

## 2023-01-01 PROCEDURE — 90472 IMMUNIZATION ADMIN EACH ADD: CPT | Performed by: STUDENT IN AN ORGANIZED HEALTH CARE EDUCATION/TRAINING PROGRAM

## 2023-01-01 PROCEDURE — 99232 SBSQ HOSP IP/OBS MODERATE 35: CPT | Performed by: INTERNAL MEDICINE

## 2023-01-01 PROCEDURE — 71250 CT THORAX DX C-: CPT | Mod: MF

## 2023-01-01 PROCEDURE — 97530 THERAPEUTIC ACTIVITIES: CPT | Mod: GP | Performed by: REHABILITATION PRACTITIONER

## 2023-01-01 PROCEDURE — 36415 COLL VENOUS BLD VENIPUNCTURE: CPT | Performed by: STUDENT IN AN ORGANIZED HEALTH CARE EDUCATION/TRAINING PROGRAM

## 2023-01-01 PROCEDURE — 80053 COMPREHEN METABOLIC PANEL: CPT

## 2023-01-01 PROCEDURE — 94799 UNLISTED PULMONARY SVC/PX: CPT

## 2023-01-01 PROCEDURE — 84145 PROCALCITONIN (PCT): CPT | Performed by: EMERGENCY MEDICINE

## 2023-01-01 PROCEDURE — 70450 CT HEAD/BRAIN W/O DYE: CPT | Mod: ME | Performed by: RADIOLOGY

## 2023-01-01 PROCEDURE — 120N000002 HC R&B MED SURG/OB UMMC

## 2023-01-01 PROCEDURE — 81003 URINALYSIS AUTO W/O SCOPE: CPT | Performed by: FAMILY MEDICINE

## 2023-01-01 PROCEDURE — 82306 VITAMIN D 25 HYDROXY: CPT | Mod: GZ | Performed by: STUDENT IN AN ORGANIZED HEALTH CARE EDUCATION/TRAINING PROGRAM

## 2023-01-01 PROCEDURE — 85049 AUTOMATED PLATELET COUNT: CPT

## 2023-01-01 PROCEDURE — 250N000011 HC RX IP 250 OP 636: Mod: JZ | Performed by: STUDENT IN AN ORGANIZED HEALTH CARE EDUCATION/TRAINING PROGRAM

## 2023-01-01 PROCEDURE — 99207 PR NO BILLABLE SERVICE THIS VISIT: CPT | Performed by: INTERNAL MEDICINE

## 2023-01-01 PROCEDURE — 70491 CT SOFT TISSUE NECK W/DYE: CPT | Mod: 26 | Performed by: RADIOLOGY

## 2023-01-01 PROCEDURE — 250N000009 HC RX 250: Performed by: EMERGENCY MEDICINE

## 2023-01-01 PROCEDURE — 91313 COVID-19 BIVALENT 18+ (MODERNA): CPT

## 2023-01-01 PROCEDURE — 87070 CULTURE OTHR SPECIMN AEROBIC: CPT | Mod: ORL | Performed by: PHYSICIAN ASSISTANT

## 2023-01-01 PROCEDURE — 93306 TTE W/DOPPLER COMPLETE: CPT

## 2023-01-01 PROCEDURE — 87899 AGENT NOS ASSAY W/OPTIC: CPT | Performed by: STUDENT IN AN ORGANIZED HEALTH CARE EDUCATION/TRAINING PROGRAM

## 2023-01-01 PROCEDURE — 82607 VITAMIN B-12: CPT | Performed by: STUDENT IN AN ORGANIZED HEALTH CARE EDUCATION/TRAINING PROGRAM

## 2023-01-01 PROCEDURE — 99214 OFFICE O/P EST MOD 30 MIN: CPT | Mod: 25

## 2023-01-01 PROCEDURE — 70486 CT MAXILLOFACIAL W/O DYE: CPT | Mod: MG

## 2023-01-01 PROCEDURE — 71046 X-RAY EXAM CHEST 2 VIEWS: CPT | Mod: 26 | Performed by: RADIOLOGY

## 2023-01-01 PROCEDURE — 250N000011 HC RX IP 250 OP 636: Mod: JZ

## 2023-01-01 PROCEDURE — 87641 MR-STAPH DNA AMP PROBE: CPT | Performed by: STUDENT IN AN ORGANIZED HEALTH CARE EDUCATION/TRAINING PROGRAM

## 2023-01-01 PROCEDURE — 90662 IIV NO PRSV INCREASED AG IM: CPT | Performed by: STUDENT IN AN ORGANIZED HEALTH CARE EDUCATION/TRAINING PROGRAM

## 2023-01-01 PROCEDURE — 80053 COMPREHEN METABOLIC PANEL: CPT | Performed by: FAMILY MEDICINE

## 2023-01-01 PROCEDURE — 99214 OFFICE O/P EST MOD 30 MIN: CPT | Performed by: STUDENT IN AN ORGANIZED HEALTH CARE EDUCATION/TRAINING PROGRAM

## 2023-01-01 PROCEDURE — 87206 SMEAR FLUORESCENT/ACID STAI: CPT | Performed by: STUDENT IN AN ORGANIZED HEALTH CARE EDUCATION/TRAINING PROGRAM

## 2023-01-01 PROCEDURE — 71250 CT THORAX DX C-: CPT | Mod: ME

## 2023-01-01 PROCEDURE — 96361 HYDRATE IV INFUSION ADD-ON: CPT | Performed by: EMERGENCY MEDICINE

## 2023-01-01 PROCEDURE — 999N000033 HC STATISTIC CHRONIC PULMONARY DISEASE SPECIALIST

## 2023-01-01 PROCEDURE — 83605 ASSAY OF LACTIC ACID: CPT | Performed by: EMERGENCY MEDICINE

## 2023-01-01 PROCEDURE — 258N000003 HC RX IP 258 OP 636: Performed by: EMERGENCY MEDICINE

## 2023-01-01 PROCEDURE — 87116 MYCOBACTERIA CULTURE: CPT | Performed by: STUDENT IN AN ORGANIZED HEALTH CARE EDUCATION/TRAINING PROGRAM

## 2023-01-01 PROCEDURE — 200N000002 HC R&B ICU UMMC

## 2023-01-01 PROCEDURE — 36415 COLL VENOUS BLD VENIPUNCTURE: CPT | Performed by: HOSPITALIST

## 2023-01-01 PROCEDURE — 93970 EXTREMITY STUDY: CPT | Mod: 26 | Performed by: RADIOLOGY

## 2023-01-01 PROCEDURE — 99215 OFFICE O/P EST HI 40 MIN: CPT | Performed by: INTERNAL MEDICINE

## 2023-01-01 PROCEDURE — 92507 TX SP LANG VOICE COMM INDIV: CPT | Mod: GN | Performed by: SPEECH-LANGUAGE PATHOLOGIST

## 2023-01-01 PROCEDURE — 99222 1ST HOSP IP/OBS MODERATE 55: CPT | Mod: GC | Performed by: STUDENT IN AN ORGANIZED HEALTH CARE EDUCATION/TRAINING PROGRAM

## 2023-01-01 PROCEDURE — 250N000013 HC RX MED GY IP 250 OP 250 PS 637: Performed by: ORTHOPAEDIC SURGERY

## 2023-01-01 PROCEDURE — G0463 HOSPITAL OUTPT CLINIC VISIT: HCPCS | Performed by: INTERNAL MEDICINE

## 2023-01-01 PROCEDURE — 96365 THER/PROPH/DIAG IV INF INIT: CPT

## 2023-01-01 PROCEDURE — 96360 HYDRATION IV INFUSION INIT: CPT | Performed by: EMERGENCY MEDICINE

## 2023-01-01 PROCEDURE — 99214 OFFICE O/P EST MOD 30 MIN: CPT | Mod: VID | Performed by: INTERNAL MEDICINE

## 2023-01-01 PROCEDURE — 92610 EVALUATE SWALLOWING FUNCTION: CPT | Mod: GN | Performed by: SPEECH-LANGUAGE PATHOLOGIST

## 2023-01-01 PROCEDURE — 83735 ASSAY OF MAGNESIUM: CPT | Performed by: FAMILY MEDICINE

## 2023-01-01 PROCEDURE — 99214 OFFICE O/P EST MOD 30 MIN: CPT | Performed by: FAMILY MEDICINE

## 2023-01-01 PROCEDURE — 94640 AIRWAY INHALATION TREATMENT: CPT | Performed by: EMERGENCY MEDICINE

## 2023-01-01 PROCEDURE — 85027 COMPLETE CBC AUTOMATED: CPT | Performed by: FAMILY MEDICINE

## 2023-01-01 PROCEDURE — 99222 1ST HOSP IP/OBS MODERATE 55: CPT | Mod: GC | Performed by: INTERNAL MEDICINE

## 2023-01-01 RX ORDER — ZOLEDRONIC ACID 5 MG/100ML
5 INJECTION, SOLUTION INTRAVENOUS ONCE
Status: COMPLETED | OUTPATIENT
Start: 2023-01-01 | End: 2023-01-01

## 2023-01-01 RX ORDER — AZITHROMYCIN 250 MG/1
TABLET, FILM COATED ORAL
Qty: 6 TABLET | Refills: 1 | Status: SHIPPED | OUTPATIENT
Start: 2023-01-01 | End: 2023-01-01

## 2023-01-01 RX ORDER — ACETAMINOPHEN 650 MG/1
650 SUPPOSITORY RECTAL EVERY 6 HOURS PRN
Status: DISCONTINUED | OUTPATIENT
Start: 2023-01-01 | End: 2023-01-01 | Stop reason: HOSPADM

## 2023-01-01 RX ORDER — ACETAMINOPHEN 325 MG/1
650 TABLET ORAL ONCE
Status: COMPLETED | OUTPATIENT
Start: 2023-01-01 | End: 2023-01-01

## 2023-01-01 RX ORDER — HYDROXYZINE HYDROCHLORIDE 25 MG/1
25 TABLET, FILM COATED ORAL EVERY 6 HOURS PRN
Status: DISCONTINUED | OUTPATIENT
Start: 2023-01-01 | End: 2023-01-01

## 2023-01-01 RX ORDER — FLUTICASONE FUROATE AND VILANTEROL 100; 25 UG/1; UG/1
1 POWDER RESPIRATORY (INHALATION) DAILY
Status: DISCONTINUED | OUTPATIENT
Start: 2023-01-01 | End: 2023-01-01 | Stop reason: HOSPADM

## 2023-01-01 RX ORDER — LEVALBUTEROL INHALATION SOLUTION 0.31 MG/3ML
0.31 SOLUTION RESPIRATORY (INHALATION)
Status: DISCONTINUED | OUTPATIENT
Start: 2023-01-01 | End: 2023-01-01

## 2023-01-01 RX ORDER — VITAMIN B COMPLEX
25 TABLET ORAL DAILY
Status: DISCONTINUED | OUTPATIENT
Start: 2023-01-01 | End: 2023-01-01 | Stop reason: HOSPADM

## 2023-01-01 RX ORDER — PREGABALIN 50 MG/1
CAPSULE ORAL
Qty: 90 CAPSULE | Refills: 1 | Status: SHIPPED | OUTPATIENT
Start: 2023-01-01 | End: 2023-01-01

## 2023-01-01 RX ORDER — HEPARIN SODIUM (PORCINE) LOCK FLUSH IV SOLN 100 UNIT/ML 100 UNIT/ML
5 SOLUTION INTRAVENOUS
Status: CANCELLED | OUTPATIENT
Start: 2023-01-01

## 2023-01-01 RX ORDER — PREDNISONE 20 MG/1
20 TABLET ORAL DAILY
Qty: 10 TABLET | Refills: 0 | Status: SHIPPED | OUTPATIENT
Start: 2023-01-01 | End: 2023-01-01

## 2023-01-01 RX ORDER — IPRATROPIUM BROMIDE AND ALBUTEROL SULFATE 2.5; .5 MG/3ML; MG/3ML
3 SOLUTION RESPIRATORY (INHALATION)
Status: DISCONTINUED | OUTPATIENT
Start: 2023-01-01 | End: 2023-01-01 | Stop reason: HOSPADM

## 2023-01-01 RX ORDER — MONTELUKAST SODIUM 10 MG/1
10 TABLET ORAL AT BEDTIME
Status: DISCONTINUED | OUTPATIENT
Start: 2023-01-01 | End: 2023-01-01

## 2023-01-01 RX ORDER — ALBUTEROL SULFATE 90 UG/1
2 AEROSOL, METERED RESPIRATORY (INHALATION) EVERY 6 HOURS PRN
Qty: 18 G | Refills: 11 | Status: ON HOLD | OUTPATIENT
Start: 2023-01-01 | End: 2024-01-01

## 2023-01-01 RX ORDER — DIPHENHYDRAMINE HYDROCHLORIDE 50 MG/ML
50 INJECTION INTRAMUSCULAR; INTRAVENOUS
Status: CANCELLED
Start: 2023-01-01

## 2023-01-01 RX ORDER — ALBUTEROL SULFATE 90 UG/1
1-2 AEROSOL, METERED RESPIRATORY (INHALATION)
Status: CANCELLED
Start: 2023-01-01

## 2023-01-01 RX ORDER — IPRATROPIUM BROMIDE AND ALBUTEROL SULFATE 2.5; .5 MG/3ML; MG/3ML
3 SOLUTION RESPIRATORY (INHALATION) ONCE
Status: COMPLETED | OUTPATIENT
Start: 2023-01-01 | End: 2023-01-01

## 2023-01-01 RX ORDER — METHYLPREDNISOLONE SODIUM SUCCINATE 125 MG/2ML
125 INJECTION, POWDER, LYOPHILIZED, FOR SOLUTION INTRAMUSCULAR; INTRAVENOUS
Status: CANCELLED
Start: 2023-01-01

## 2023-01-01 RX ORDER — ESCITALOPRAM OXALATE 20 MG/1
20 TABLET ORAL DAILY
Qty: 90 TABLET | Refills: 0 | Status: SHIPPED | OUTPATIENT
Start: 2023-01-01 | End: 2023-01-01

## 2023-01-01 RX ORDER — LIDOCAINE HYDROCHLORIDE 20 MG/ML
10 SOLUTION OROPHARYNGEAL ONCE
Status: COMPLETED | OUTPATIENT
Start: 2023-01-01 | End: 2023-01-01

## 2023-01-01 RX ORDER — BENZONATATE 100 MG/1
100 CAPSULE ORAL 3 TIMES DAILY PRN
Qty: 30 CAPSULE | Refills: 0 | Status: SHIPPED | OUTPATIENT
Start: 2023-01-01 | End: 2024-01-01

## 2023-01-01 RX ORDER — METOPROLOL SUCCINATE 25 MG/1
25 TABLET, EXTENDED RELEASE ORAL DAILY
Qty: 90 TABLET | Refills: 0 | Status: SHIPPED | OUTPATIENT
Start: 2023-01-01 | End: 2023-01-01

## 2023-01-01 RX ORDER — PREDNISONE 20 MG/1
40 TABLET ORAL DAILY
Qty: 2 TABLET | Refills: 0 | Status: CANCELLED | OUTPATIENT
Start: 2023-01-01 | End: 2023-01-01

## 2023-01-01 RX ORDER — BUPROPION HYDROCHLORIDE 150 MG/1
150 TABLET ORAL EVERY MORNING
Qty: 90 TABLET | Refills: 0 | Status: SHIPPED | OUTPATIENT
Start: 2023-01-01 | End: 2023-01-01

## 2023-01-01 RX ORDER — IOPAMIDOL 755 MG/ML
50 INJECTION, SOLUTION INTRAVASCULAR ONCE
Status: COMPLETED | OUTPATIENT
Start: 2023-01-01 | End: 2023-01-01

## 2023-01-01 RX ORDER — OXYBUTYNIN CHLORIDE 5 MG/1
5 TABLET, EXTENDED RELEASE ORAL DAILY
Status: DISCONTINUED | OUTPATIENT
Start: 2023-01-01 | End: 2023-01-01 | Stop reason: HOSPADM

## 2023-01-01 RX ORDER — EPINEPHRINE 1 MG/ML
0.3 INJECTION, SOLUTION INTRAMUSCULAR; SUBCUTANEOUS EVERY 5 MIN PRN
Status: CANCELLED | OUTPATIENT
Start: 2023-01-01

## 2023-01-01 RX ORDER — METHYLPREDNISOLONE 4 MG
TABLET, DOSE PACK ORAL
Qty: 21 TABLET | Refills: 1 | Status: SHIPPED | OUTPATIENT
Start: 2023-01-01 | End: 2023-01-01

## 2023-01-01 RX ORDER — PREDNISONE 20 MG/1
20 TABLET ORAL DAILY
Qty: 7 TABLET | Refills: 1 | Status: SHIPPED | OUTPATIENT
Start: 2023-01-01 | End: 2023-01-01

## 2023-01-01 RX ORDER — IOPAMIDOL 755 MG/ML
100 INJECTION, SOLUTION INTRAVASCULAR ONCE
Status: COMPLETED | OUTPATIENT
Start: 2023-01-01 | End: 2023-01-01

## 2023-01-01 RX ORDER — AMOXICILLIN 250 MG
2 CAPSULE ORAL 2 TIMES DAILY PRN
Status: DISCONTINUED | OUTPATIENT
Start: 2023-01-01 | End: 2023-01-01 | Stop reason: HOSPADM

## 2023-01-01 RX ORDER — CELECOXIB 200 MG/1
200 CAPSULE ORAL DAILY
Status: DISCONTINUED | OUTPATIENT
Start: 2023-01-01 | End: 2023-01-01

## 2023-01-01 RX ORDER — ACETAMINOPHEN 325 MG/1
650 TABLET ORAL EVERY 6 HOURS PRN
Status: DISCONTINUED | OUTPATIENT
Start: 2023-01-01 | End: 2023-01-01 | Stop reason: HOSPADM

## 2023-01-01 RX ORDER — ENOXAPARIN SODIUM 100 MG/ML
1 INJECTION SUBCUTANEOUS EVERY 12 HOURS
Status: DISCONTINUED | OUTPATIENT
Start: 2023-01-01 | End: 2023-01-01

## 2023-01-01 RX ORDER — PREDNISONE 10 MG/1
TABLET ORAL
Qty: 30 TABLET | Refills: 0 | Status: SHIPPED | OUTPATIENT
Start: 2023-01-01 | End: 2023-01-01

## 2023-01-01 RX ORDER — LEVOTHYROXINE SODIUM 25 UG/1
25 TABLET ORAL DAILY
Qty: 90 TABLET | Refills: 3 | Status: SHIPPED | OUTPATIENT
Start: 2023-01-01 | End: 2023-01-01

## 2023-01-01 RX ORDER — HEPARIN SODIUM,PORCINE 10 UNIT/ML
5 VIAL (ML) INTRAVENOUS
Status: CANCELLED | OUTPATIENT
Start: 2023-01-01

## 2023-01-01 RX ORDER — SODIUM CHLORIDE FOR INHALATION 3 %
3 VIAL, NEBULIZER (ML) INHALATION 2 TIMES DAILY
Qty: 180 ML | Refills: 11 | Status: ON HOLD | OUTPATIENT
Start: 2023-01-01 | End: 2023-01-01

## 2023-01-01 RX ORDER — NYSTATIN 100000/ML
500000 SUSPENSION, ORAL (FINAL DOSE FORM) ORAL 4 TIMES DAILY
Qty: 280 ML | Refills: 0 | Status: SHIPPED | OUTPATIENT
Start: 2023-01-01 | End: 2023-01-01

## 2023-01-01 RX ORDER — ALBUTEROL SULFATE 0.83 MG/ML
2.5 SOLUTION RESPIRATORY (INHALATION)
Status: CANCELLED | OUTPATIENT
Start: 2023-01-01

## 2023-01-01 RX ORDER — LEVOTHYROXINE SODIUM 25 UG/1
25 TABLET ORAL DAILY
Qty: 90 TABLET | Refills: 0 | Status: SHIPPED | OUTPATIENT
Start: 2023-01-01 | End: 2023-01-01

## 2023-01-01 RX ORDER — METHYLPREDNISOLONE SODIUM SUCCINATE 125 MG/2ML
125 INJECTION, POWDER, LYOPHILIZED, FOR SOLUTION INTRAMUSCULAR; INTRAVENOUS ONCE
Status: COMPLETED | OUTPATIENT
Start: 2023-01-01 | End: 2023-01-01

## 2023-01-01 RX ORDER — GUAIFENESIN 600 MG/1
600 TABLET, EXTENDED RELEASE ORAL 2 TIMES DAILY
Status: DISCONTINUED | OUTPATIENT
Start: 2023-01-01 | End: 2023-01-01 | Stop reason: HOSPADM

## 2023-01-01 RX ORDER — NYSTATIN 100000/ML
500000 SUSPENSION, ORAL (FINAL DOSE FORM) ORAL 4 TIMES DAILY
Status: DISCONTINUED | OUTPATIENT
Start: 2023-01-01 | End: 2023-01-01

## 2023-01-01 RX ORDER — PREGABALIN 100 MG/1
100 CAPSULE ORAL EVERY EVENING
Status: DISCONTINUED | OUTPATIENT
Start: 2023-01-01 | End: 2023-01-01 | Stop reason: HOSPADM

## 2023-01-01 RX ORDER — OXYBUTYNIN CHLORIDE 5 MG/1
5 TABLET, EXTENDED RELEASE ORAL DAILY
Qty: 90 TABLET | Refills: 3 | Status: ON HOLD | OUTPATIENT
Start: 2023-01-01 | End: 2024-01-01

## 2023-01-01 RX ORDER — PROCHLORPERAZINE 25 MG
12.5 SUPPOSITORY, RECTAL RECTAL EVERY 12 HOURS PRN
Status: DISCONTINUED | OUTPATIENT
Start: 2023-01-01 | End: 2023-01-01 | Stop reason: HOSPADM

## 2023-01-01 RX ORDER — NYSTATIN 100000/ML
500000 SUSPENSION, ORAL (FINAL DOSE FORM) ORAL 4 TIMES DAILY
Status: DISCONTINUED | OUTPATIENT
Start: 2023-01-01 | End: 2023-01-01 | Stop reason: HOSPADM

## 2023-01-01 RX ORDER — BIOTIN 1 MG
5000 TABLET ORAL DAILY
Status: DISCONTINUED | OUTPATIENT
Start: 2023-01-01 | End: 2023-01-01 | Stop reason: HOSPADM

## 2023-01-01 RX ORDER — PREDNISONE 20 MG/1
20 TABLET ORAL DAILY
Qty: 7 TABLET | Refills: 0 | Status: SHIPPED | OUTPATIENT
Start: 2023-01-01 | End: 2023-01-01

## 2023-01-01 RX ORDER — PREGABALIN 50 MG/1
CAPSULE ORAL
Qty: 270 CAPSULE | Refills: 1 | Status: ON HOLD | OUTPATIENT
Start: 2023-01-01 | End: 2024-01-01

## 2023-01-01 RX ORDER — AMOXICILLIN 250 MG
1 CAPSULE ORAL 2 TIMES DAILY PRN
Status: DISCONTINUED | OUTPATIENT
Start: 2023-01-01 | End: 2023-01-01 | Stop reason: HOSPADM

## 2023-01-01 RX ORDER — LEVOTHYROXINE SODIUM 25 UG/1
25 TABLET ORAL DAILY
Qty: 90 TABLET | Refills: 3 | Status: SHIPPED | OUTPATIENT
Start: 2023-01-01 | End: 2024-01-01

## 2023-01-01 RX ORDER — SULFAMETHOXAZOLE/TRIMETHOPRIM 800-160 MG
1 TABLET ORAL 2 TIMES DAILY
Qty: 14 TABLET | Refills: 1 | Status: SHIPPED | OUTPATIENT
Start: 2023-01-01 | End: 2023-01-01

## 2023-01-01 RX ORDER — MEPERIDINE HYDROCHLORIDE 25 MG/ML
25 INJECTION INTRAMUSCULAR; INTRAVENOUS; SUBCUTANEOUS EVERY 30 MIN PRN
Status: CANCELLED | OUTPATIENT
Start: 2023-01-01

## 2023-01-01 RX ORDER — LEVOFLOXACIN 500 MG/1
500 TABLET, FILM COATED ORAL DAILY
Qty: 4 TABLET | Refills: 0 | Status: SHIPPED | OUTPATIENT
Start: 2023-01-01 | End: 2023-01-01

## 2023-01-01 RX ORDER — DEXTROSE MONOHYDRATE 50 MG/ML
INJECTION, SOLUTION INTRAVENOUS CONTINUOUS
Status: DISCONTINUED | OUTPATIENT
Start: 2023-01-01 | End: 2023-01-01 | Stop reason: HOSPADM

## 2023-01-01 RX ORDER — LEVOFLOXACIN 250 MG/1
250 TABLET, FILM COATED ORAL DAILY
Qty: 7 TABLET | Refills: 0 | Status: SHIPPED | OUTPATIENT
Start: 2023-01-01 | End: 2023-01-01

## 2023-01-01 RX ORDER — BENZONATATE 100 MG/1
100 CAPSULE ORAL 3 TIMES DAILY PRN
Qty: 60 CAPSULE | Refills: 0 | Status: SHIPPED | OUTPATIENT
Start: 2023-01-01 | End: 2023-01-01

## 2023-01-01 RX ORDER — ALBUTEROL SULFATE 0.83 MG/ML
2.5 SOLUTION RESPIRATORY (INHALATION) EVERY 4 HOURS PRN
Qty: 150 ML | Refills: 11 | Status: ON HOLD | OUTPATIENT
Start: 2023-01-01 | End: 2024-01-01

## 2023-01-01 RX ORDER — ANTIARTHRITIC COMBINATION NO.2 900 MG
1 TABLET ORAL DAILY
Status: ON HOLD | COMMUNITY
End: 2024-01-01

## 2023-01-01 RX ORDER — PREDNISONE 20 MG/1
40 TABLET ORAL DAILY
Status: COMPLETED | OUTPATIENT
Start: 2023-01-01 | End: 2023-01-01

## 2023-01-01 RX ORDER — LORAZEPAM 0.5 MG/1
0.25 TABLET ORAL DAILY PRN
Status: DISCONTINUED | OUTPATIENT
Start: 2023-01-01 | End: 2023-01-01 | Stop reason: HOSPADM

## 2023-01-01 RX ORDER — AZITHROMYCIN 250 MG/1
TABLET, FILM COATED ORAL
Qty: 6 TABLET | Refills: 0 | Status: SHIPPED | OUTPATIENT
Start: 2023-01-01 | End: 2023-01-01

## 2023-01-01 RX ORDER — PROCHLORPERAZINE MALEATE 5 MG
5 TABLET ORAL EVERY 6 HOURS PRN
Status: DISCONTINUED | OUTPATIENT
Start: 2023-01-01 | End: 2023-01-01 | Stop reason: HOSPADM

## 2023-01-01 RX ORDER — ZOLEDRONIC ACID 5 MG/100ML
5 INJECTION, SOLUTION INTRAVENOUS ONCE
Status: CANCELLED
Start: 2023-01-01

## 2023-01-01 RX ORDER — CELECOXIB 200 MG/1
200 CAPSULE ORAL DAILY
Qty: 90 CAPSULE | Refills: 0 | Status: ON HOLD | OUTPATIENT
Start: 2023-01-01 | End: 2023-01-01

## 2023-01-01 RX ORDER — ESCITALOPRAM OXALATE 10 MG/1
20 TABLET ORAL DAILY
Status: DISCONTINUED | OUTPATIENT
Start: 2023-01-01 | End: 2023-01-01 | Stop reason: HOSPADM

## 2023-01-01 RX ORDER — MONTELUKAST SODIUM 10 MG/1
10 TABLET ORAL AT BEDTIME
Status: DISCONTINUED | OUTPATIENT
Start: 2023-01-01 | End: 2023-01-01 | Stop reason: HOSPADM

## 2023-01-01 RX ORDER — HYDROXYZINE HYDROCHLORIDE 25 MG/1
25 TABLET, FILM COATED ORAL 3 TIMES DAILY PRN
Status: COMPLETED | OUTPATIENT
Start: 2023-01-01 | End: 2023-01-01

## 2023-01-01 RX ORDER — ALBUTEROL SULFATE 90 UG/1
2 AEROSOL, METERED RESPIRATORY (INHALATION) EVERY 6 HOURS PRN
Status: DISCONTINUED | OUTPATIENT
Start: 2023-01-01 | End: 2023-01-01

## 2023-01-01 RX ORDER — HYDROXYZINE HYDROCHLORIDE 25 MG/1
25 TABLET, FILM COATED ORAL
Status: COMPLETED | OUTPATIENT
Start: 2023-01-01 | End: 2023-01-01

## 2023-01-01 RX ORDER — DIPHENHYDRAMINE HYDROCHLORIDE AND LIDOCAINE HYDROCHLORIDE AND ALUMINUM HYDROXIDE AND MAGNESIUM HYDRO
10 KIT EVERY 6 HOURS PRN
Status: DISCONTINUED | OUTPATIENT
Start: 2023-01-01 | End: 2023-01-01

## 2023-01-01 RX ORDER — MULTIVITAMIN,THERAPEUTIC
1 TABLET ORAL DAILY
Status: DISCONTINUED | OUTPATIENT
Start: 2023-01-01 | End: 2023-01-01 | Stop reason: HOSPADM

## 2023-01-01 RX ORDER — BUPROPION HYDROCHLORIDE 150 MG/1
150 TABLET ORAL EVERY MORNING
Status: DISCONTINUED | OUTPATIENT
Start: 2023-01-01 | End: 2023-01-01 | Stop reason: HOSPADM

## 2023-01-01 RX ORDER — SODIUM CHLORIDE 9 MG/ML
INJECTION, SOLUTION INTRAVENOUS CONTINUOUS
Status: DISCONTINUED | OUTPATIENT
Start: 2023-01-01 | End: 2023-01-01

## 2023-01-01 RX ORDER — SODIUM CHLORIDE FOR INHALATION 3 %
3 VIAL, NEBULIZER (ML) INHALATION 2 TIMES DAILY
Status: DISCONTINUED | OUTPATIENT
Start: 2023-01-01 | End: 2023-01-01 | Stop reason: HOSPADM

## 2023-01-01 RX ORDER — MUPIROCIN 20 MG/G
OINTMENT TOPICAL
Qty: 22 G | Refills: 0 | Status: SHIPPED | OUTPATIENT
Start: 2023-01-01 | End: 2023-01-01

## 2023-01-01 RX ORDER — METOPROLOL SUCCINATE 50 MG/1
50 TABLET, EXTENDED RELEASE ORAL DAILY
Qty: 90 TABLET | Refills: 1 | Status: ON HOLD | OUTPATIENT
Start: 2023-01-01 | End: 2024-01-01

## 2023-01-01 RX ORDER — BUPROPION HYDROCHLORIDE 300 MG/1
300 TABLET ORAL EVERY MORNING
Qty: 90 TABLET | Refills: 1 | Status: ON HOLD | OUTPATIENT
Start: 2023-01-01 | End: 2024-01-01

## 2023-01-01 RX ORDER — LEVOTHYROXINE SODIUM 25 UG/1
25 TABLET ORAL DAILY
Status: DISCONTINUED | OUTPATIENT
Start: 2023-01-01 | End: 2023-01-01 | Stop reason: HOSPADM

## 2023-01-01 RX ORDER — SODIUM CHLORIDE FOR INHALATION 3 %
3 VIAL, NEBULIZER (ML) INHALATION 2 TIMES DAILY
Qty: 180 ML | Refills: 11 | Status: ON HOLD | OUTPATIENT
Start: 2023-01-01 | End: 2024-01-01

## 2023-01-01 RX ORDER — LEVALBUTEROL TARTRATE 45 UG/1
2 AEROSOL, METERED ORAL EVERY 6 HOURS PRN
Status: DISCONTINUED | OUTPATIENT
Start: 2023-01-01 | End: 2023-01-01 | Stop reason: HOSPADM

## 2023-01-01 RX ORDER — CEFTRIAXONE 1 G/1
1 INJECTION, POWDER, FOR SOLUTION INTRAMUSCULAR; INTRAVENOUS ONCE
Status: COMPLETED | OUTPATIENT
Start: 2023-01-01 | End: 2023-01-01

## 2023-01-01 RX ORDER — EPINEPHRINE 1 MG/ML
0.3 INJECTION, SOLUTION, CONCENTRATE INTRAVENOUS EVERY 5 MIN PRN
Status: CANCELLED | OUTPATIENT
Start: 2023-01-01

## 2023-01-01 RX ORDER — ACETAMINOPHEN 325 MG/1
650 TABLET ORAL ONCE
Status: CANCELLED | OUTPATIENT
Start: 2023-01-01

## 2023-01-01 RX ORDER — POLYETHYLENE GLYCOL 3350 17 G/17G
17 POWDER, FOR SOLUTION ORAL DAILY PRN
Status: DISCONTINUED | OUTPATIENT
Start: 2023-01-01 | End: 2023-01-01 | Stop reason: HOSPADM

## 2023-01-01 RX ORDER — ESCITALOPRAM OXALATE 20 MG/1
20 TABLET ORAL DAILY
Qty: 90 TABLET | Refills: 0 | Status: SHIPPED | OUTPATIENT
Start: 2023-01-01 | End: 2024-01-01

## 2023-01-01 RX ORDER — MONTELUKAST SODIUM 10 MG/1
TABLET ORAL
Qty: 90 TABLET | Refills: 3 | Status: ON HOLD | OUTPATIENT
Start: 2023-01-01 | End: 2024-01-01

## 2023-01-01 RX ORDER — GUAIFENESIN 200 MG/10ML
200 LIQUID ORAL EVERY 4 HOURS PRN
Status: DISCONTINUED | OUTPATIENT
Start: 2023-01-01 | End: 2023-01-01 | Stop reason: HOSPADM

## 2023-01-01 RX ORDER — LEVOFLOXACIN 500 MG/1
500 TABLET, FILM COATED ORAL DAILY
Status: DISCONTINUED | OUTPATIENT
Start: 2023-01-01 | End: 2023-01-01 | Stop reason: HOSPADM

## 2023-01-01 RX ORDER — AZITHROMYCIN 500 MG/1
500 TABLET, FILM COATED ORAL DAILY
Status: COMPLETED | OUTPATIENT
Start: 2023-01-01 | End: 2023-01-01

## 2023-01-01 RX ORDER — LIDOCAINE 40 MG/G
CREAM TOPICAL
Status: DISCONTINUED | OUTPATIENT
Start: 2023-01-01 | End: 2023-01-01 | Stop reason: HOSPADM

## 2023-01-01 RX ORDER — PREGABALIN 50 MG/1
50 CAPSULE ORAL DAILY
Status: DISCONTINUED | OUTPATIENT
Start: 2023-01-01 | End: 2023-01-01 | Stop reason: HOSPADM

## 2023-01-01 RX ORDER — ALBUTEROL SULFATE 0.83 MG/ML
2.5 SOLUTION RESPIRATORY (INHALATION)
Status: COMPLETED | OUTPATIENT
Start: 2023-01-01 | End: 2023-01-01

## 2023-01-01 RX ORDER — METOPROLOL SUCCINATE 25 MG/1
25 TABLET, EXTENDED RELEASE ORAL DAILY
Status: DISCONTINUED | OUTPATIENT
Start: 2023-01-01 | End: 2023-01-01 | Stop reason: HOSPADM

## 2023-01-01 RX ADMIN — ESCITALOPRAM OXALATE 20 MG: 10 TABLET, FILM COATED ORAL at 08:39

## 2023-01-01 RX ADMIN — Medication 5000 MCG: at 09:02

## 2023-01-01 RX ADMIN — NYSTATIN 500000 UNITS: 100000 SUSPENSION ORAL at 08:31

## 2023-01-01 RX ADMIN — Medication 1 TABLET: at 15:51

## 2023-01-01 RX ADMIN — NYSTATIN 500000 UNITS: 100000 SUSPENSION ORAL at 12:58

## 2023-01-01 RX ADMIN — ACETAMINOPHEN 650 MG: 325 TABLET, FILM COATED ORAL at 04:59

## 2023-01-01 RX ADMIN — IPRATROPIUM BROMIDE AND ALBUTEROL SULFATE 3 ML: .5; 3 SOLUTION RESPIRATORY (INHALATION) at 16:07

## 2023-01-01 RX ADMIN — SODIUM CHLORIDE SOLN NEBU 3% 3 ML: 3 NEBU SOLN at 08:17

## 2023-01-01 RX ADMIN — GUAIFENESIN 200 MG: 200 SOLUTION ORAL at 12:58

## 2023-01-01 RX ADMIN — NYSTATIN 500000 UNITS: 100000 SUSPENSION ORAL at 17:26

## 2023-01-01 RX ADMIN — GUAIFENESIN 600 MG: 600 TABLET ORAL at 00:20

## 2023-01-01 RX ADMIN — Medication 25 MCG: at 09:03

## 2023-01-01 RX ADMIN — METOPROLOL SUCCINATE 25 MG: 25 TABLET, EXTENDED RELEASE ORAL at 08:30

## 2023-01-01 RX ADMIN — IPRATROPIUM BROMIDE AND ALBUTEROL SULFATE 3 ML: .5; 3 SOLUTION RESPIRATORY (INHALATION) at 19:57

## 2023-01-01 RX ADMIN — OXYBUTYNIN CHLORIDE 5 MG: 5 TABLET, EXTENDED RELEASE ORAL at 09:02

## 2023-01-01 RX ADMIN — ENOXAPARIN SODIUM 50 MG: 60 INJECTION SUBCUTANEOUS at 03:33

## 2023-01-01 RX ADMIN — Medication 25 MCG: at 14:24

## 2023-01-01 RX ADMIN — CELECOXIB 200 MG: 200 CAPSULE ORAL at 08:43

## 2023-01-01 RX ADMIN — METHYLPREDNISOLONE SODIUM SUCCINATE 125 MG: 125 INJECTION, POWDER, FOR SOLUTION INTRAMUSCULAR; INTRAVENOUS at 21:18

## 2023-01-01 RX ADMIN — METOPROLOL SUCCINATE 25 MG: 25 TABLET, EXTENDED RELEASE ORAL at 08:57

## 2023-01-01 RX ADMIN — IPRATROPIUM BROMIDE AND ALBUTEROL SULFATE 3 ML: .5; 3 SOLUTION RESPIRATORY (INHALATION) at 22:24

## 2023-01-01 RX ADMIN — AZITHROMYCIN 500 MG: 500 TABLET, FILM COATED ORAL at 19:39

## 2023-01-01 RX ADMIN — LEVOTHYROXINE SODIUM 25 MCG: 0.03 TABLET ORAL at 07:56

## 2023-01-01 RX ADMIN — Medication 25 MCG: at 08:57

## 2023-01-01 RX ADMIN — GUAIFENESIN 600 MG: 600 TABLET ORAL at 19:53

## 2023-01-01 RX ADMIN — GUAIFENESIN 600 MG: 600 TABLET ORAL at 08:30

## 2023-01-01 RX ADMIN — IOPAMIDOL 50 ML: 755 INJECTION, SOLUTION INTRAVENOUS at 13:37

## 2023-01-01 RX ADMIN — NYSTATIN 500000 UNITS: 100000 SUSPENSION ORAL at 12:49

## 2023-01-01 RX ADMIN — ESCITALOPRAM OXALATE 20 MG: 10 TABLET, FILM COATED ORAL at 08:57

## 2023-01-01 RX ADMIN — ENOXAPARIN SODIUM 50 MG: 60 INJECTION SUBCUTANEOUS at 14:21

## 2023-01-01 RX ADMIN — Medication 5 DROP: at 20:12

## 2023-01-01 RX ADMIN — GUAIFENESIN 600 MG: 600 TABLET ORAL at 19:49

## 2023-01-01 RX ADMIN — ZOLEDRONIC ACID 5 MG: 0.05 INJECTION, SOLUTION INTRAVENOUS at 15:39

## 2023-01-01 RX ADMIN — ENOXAPARIN SODIUM 50 MG: 60 INJECTION SUBCUTANEOUS at 16:51

## 2023-01-01 RX ADMIN — SODIUM CHLORIDE 500 ML: 9 INJECTION, SOLUTION INTRAVENOUS at 19:14

## 2023-01-01 RX ADMIN — FLUTICASONE FUROATE AND VILANTEROL TRIFENATATE 1 PUFF: 100; 25 POWDER RESPIRATORY (INHALATION) at 08:25

## 2023-01-01 RX ADMIN — IPRATROPIUM BROMIDE AND ALBUTEROL SULFATE 3 ML: .5; 3 SOLUTION RESPIRATORY (INHALATION) at 22:00

## 2023-01-01 RX ADMIN — ALBUTEROL SULFATE 2.5 MG: 2.5 SOLUTION RESPIRATORY (INHALATION) at 19:53

## 2023-01-01 RX ADMIN — PREGABALIN 100 MG: 100 CAPSULE ORAL at 19:50

## 2023-01-01 RX ADMIN — OXYBUTYNIN CHLORIDE 5 MG: 5 TABLET, EXTENDED RELEASE ORAL at 08:58

## 2023-01-01 RX ADMIN — OXYBUTYNIN CHLORIDE 5 MG: 5 TABLET, EXTENDED RELEASE ORAL at 08:39

## 2023-01-01 RX ADMIN — NYSTATIN 500000 UNITS: 100000 SUSPENSION ORAL at 09:04

## 2023-01-01 RX ADMIN — NYSTATIN 500000 UNITS: 100000 SUSPENSION ORAL at 08:43

## 2023-01-01 RX ADMIN — Medication 5 DROP: at 09:33

## 2023-01-01 RX ADMIN — PREGABALIN 50 MG: 50 CAPSULE ORAL at 07:55

## 2023-01-01 RX ADMIN — LEVOFLOXACIN 500 MG: 500 TABLET, FILM COATED ORAL at 10:26

## 2023-01-01 RX ADMIN — Medication 1 TABLET: at 14:24

## 2023-01-01 RX ADMIN — APIXABAN 10 MG: 5 TABLET, FILM COATED ORAL at 09:02

## 2023-01-01 RX ADMIN — IPRATROPIUM BROMIDE AND ALBUTEROL SULFATE 3 ML: .5; 3 SOLUTION RESPIRATORY (INHALATION) at 16:32

## 2023-01-01 RX ADMIN — SODIUM CHLORIDE: 9 INJECTION, SOLUTION INTRAVENOUS at 19:15

## 2023-01-01 RX ADMIN — ESCITALOPRAM OXALATE 20 MG: 10 TABLET, FILM COATED ORAL at 09:02

## 2023-01-01 RX ADMIN — IPRATROPIUM BROMIDE AND ALBUTEROL SULFATE 3 ML: .5; 3 SOLUTION RESPIRATORY (INHALATION) at 12:05

## 2023-01-01 RX ADMIN — CEFTRIAXONE SODIUM 1 G: 1 INJECTION, POWDER, FOR SOLUTION INTRAMUSCULAR; INTRAVENOUS at 21:22

## 2023-01-01 RX ADMIN — LEVOTHYROXINE SODIUM 25 MCG: 0.03 TABLET ORAL at 09:03

## 2023-01-01 RX ADMIN — OXYBUTYNIN CHLORIDE 5 MG: 5 TABLET, EXTENDED RELEASE ORAL at 08:30

## 2023-01-01 RX ADMIN — LEVOTHYROXINE SODIUM 25 MCG: 0.03 TABLET ORAL at 08:58

## 2023-01-01 RX ADMIN — ACETAMINOPHEN 650 MG: 325 TABLET ORAL at 15:19

## 2023-01-01 RX ADMIN — LEVOTHYROXINE SODIUM 25 MCG: 0.03 TABLET ORAL at 08:31

## 2023-01-01 RX ADMIN — METOPROLOL SUCCINATE 25 MG: 25 TABLET, EXTENDED RELEASE ORAL at 09:03

## 2023-01-01 RX ADMIN — MONTELUKAST 10 MG: 10 TABLET, FILM COATED ORAL at 21:16

## 2023-01-01 RX ADMIN — IPRATROPIUM BROMIDE AND ALBUTEROL SULFATE 3 ML: .5; 3 SOLUTION RESPIRATORY (INHALATION) at 08:23

## 2023-01-01 RX ADMIN — NYSTATIN 500000 UNITS: 100000 SUSPENSION ORAL at 12:36

## 2023-01-01 RX ADMIN — SODIUM CHLORIDE 500 ML: 9 INJECTION, SOLUTION INTRAVENOUS at 12:55

## 2023-01-01 RX ADMIN — NYSTATIN 500000 UNITS: 100000 SUSPENSION ORAL at 15:51

## 2023-01-01 RX ADMIN — ESCITALOPRAM OXALATE 20 MG: 10 TABLET, FILM COATED ORAL at 07:56

## 2023-01-01 RX ADMIN — SODIUM CHLORIDE SOLN NEBU 3% 3 ML: 3 NEBU SOLN at 09:17

## 2023-01-01 RX ADMIN — CELECOXIB 200 MG: 200 CAPSULE ORAL at 08:30

## 2023-01-01 RX ADMIN — PREGABALIN 100 MG: 100 CAPSULE ORAL at 19:39

## 2023-01-01 RX ADMIN — IPRATROPIUM BROMIDE AND ALBUTEROL SULFATE 3 ML: .5; 3 SOLUTION RESPIRATORY (INHALATION) at 09:16

## 2023-01-01 RX ADMIN — GUAIFENESIN 600 MG: 600 TABLET ORAL at 08:42

## 2023-01-01 RX ADMIN — BUPROPION HYDROCHLORIDE 150 MG: 150 TABLET, FILM COATED, EXTENDED RELEASE ORAL at 08:03

## 2023-01-01 RX ADMIN — NYSTATIN 500000 UNITS: 100000 SUSPENSION ORAL at 12:14

## 2023-01-01 RX ADMIN — APIXABAN 10 MG: 5 TABLET, FILM COATED ORAL at 19:50

## 2023-01-01 RX ADMIN — GUAIFENESIN 600 MG: 600 TABLET ORAL at 20:55

## 2023-01-01 RX ADMIN — UMECLIDINIUM 1 PUFF: 62.5 AEROSOL, POWDER ORAL at 08:25

## 2023-01-01 RX ADMIN — NYSTATIN 500000 UNITS: 100000 SUSPENSION ORAL at 12:01

## 2023-01-01 RX ADMIN — LEVOTHYROXINE SODIUM 25 MCG: 0.03 TABLET ORAL at 08:42

## 2023-01-01 RX ADMIN — LEVOFLOXACIN 500 MG: 500 TABLET, FILM COATED ORAL at 08:58

## 2023-01-01 RX ADMIN — Medication 1 LOZENGE: at 10:26

## 2023-01-01 RX ADMIN — Medication 5000 MCG: at 14:52

## 2023-01-01 RX ADMIN — METOPROLOL SUCCINATE 25 MG: 25 TABLET, EXTENDED RELEASE ORAL at 07:55

## 2023-01-01 RX ADMIN — OXYBUTYNIN CHLORIDE 5 MG: 5 TABLET, EXTENDED RELEASE ORAL at 07:56

## 2023-01-01 RX ADMIN — APIXABAN 10 MG: 5 TABLET, FILM COATED ORAL at 17:27

## 2023-01-01 RX ADMIN — LEVALBUTEROL TARTRATE 2 PUFF: 45 AEROSOL, METERED ORAL at 16:05

## 2023-01-01 RX ADMIN — Medication 1 LOZENGE: at 22:31

## 2023-01-01 RX ADMIN — ALBUTEROL SULFATE 2.5 MG: 2.5 SOLUTION RESPIRATORY (INHALATION) at 19:35

## 2023-01-01 RX ADMIN — IPRATROPIUM BROMIDE AND ALBUTEROL SULFATE 3 ML: .5; 3 SOLUTION RESPIRATORY (INHALATION) at 19:13

## 2023-01-01 RX ADMIN — NYSTATIN 500000 UNITS: 100000 SUSPENSION ORAL at 08:59

## 2023-01-01 RX ADMIN — SALINE NASAL SPRAY 2 SPRAY: 1.5 SOLUTION NASAL at 08:04

## 2023-01-01 RX ADMIN — MONTELUKAST 10 MG: 10 TABLET, FILM COATED ORAL at 22:27

## 2023-01-01 RX ADMIN — NYSTATIN 500000 UNITS: 100000 SUSPENSION ORAL at 07:54

## 2023-01-01 RX ADMIN — IPRATROPIUM BROMIDE AND ALBUTEROL SULFATE 3 ML: .5; 3 SOLUTION RESPIRATORY (INHALATION) at 12:40

## 2023-01-01 RX ADMIN — PREGABALIN 50 MG: 50 CAPSULE ORAL at 08:39

## 2023-01-01 RX ADMIN — IPRATROPIUM BROMIDE AND ALBUTEROL SULFATE 3 ML: .5; 3 SOLUTION RESPIRATORY (INHALATION) at 12:21

## 2023-01-01 RX ADMIN — IPRATROPIUM BROMIDE AND ALBUTEROL SULFATE 3 ML: .5; 3 SOLUTION RESPIRATORY (INHALATION) at 16:01

## 2023-01-01 RX ADMIN — ESCITALOPRAM OXALATE 20 MG: 10 TABLET, FILM COATED ORAL at 10:44

## 2023-01-01 RX ADMIN — NYSTATIN 500000 UNITS: 100000 SUSPENSION ORAL at 21:51

## 2023-01-01 RX ADMIN — SODIUM CHLORIDE SOLN NEBU 3% 3 ML: 3 NEBU SOLN at 22:14

## 2023-01-01 RX ADMIN — PREDNISONE 40 MG: 20 TABLET ORAL at 14:21

## 2023-01-01 RX ADMIN — IPRATROPIUM BROMIDE AND ALBUTEROL SULFATE 3 ML: .5; 3 SOLUTION RESPIRATORY (INHALATION) at 15:20

## 2023-01-01 RX ADMIN — APIXABAN 10 MG: 5 TABLET, FILM COATED ORAL at 08:58

## 2023-01-01 RX ADMIN — SALINE NASAL SPRAY 2 SPRAY: 1.5 SOLUTION NASAL at 08:59

## 2023-01-01 RX ADMIN — ACETAMINOPHEN 650 MG: 325 TABLET, FILM COATED ORAL at 19:49

## 2023-01-01 RX ADMIN — MONTELUKAST 10 MG: 10 TABLET, FILM COATED ORAL at 00:20

## 2023-01-01 RX ADMIN — IOPAMIDOL 100 ML: 755 INJECTION, SOLUTION INTRAVENOUS at 02:44

## 2023-01-01 RX ADMIN — MONTELUKAST 10 MG: 10 TABLET, FILM COATED ORAL at 21:52

## 2023-01-01 RX ADMIN — SODIUM CHLORIDE SOLN NEBU 3% 3 ML: 3 NEBU SOLN at 19:57

## 2023-01-01 RX ADMIN — IPRATROPIUM BROMIDE AND ALBUTEROL SULFATE 3 ML: .5; 3 SOLUTION RESPIRATORY (INHALATION) at 12:49

## 2023-01-01 RX ADMIN — PREDNISONE 40 MG: 20 TABLET ORAL at 15:23

## 2023-01-01 RX ADMIN — ALBUTEROL SULFATE 2.5 MG: 2.5 SOLUTION RESPIRATORY (INHALATION) at 19:13

## 2023-01-01 RX ADMIN — GUAIFENESIN 600 MG: 600 TABLET ORAL at 20:10

## 2023-01-01 RX ADMIN — SODIUM CHLORIDE SOLN NEBU 3% 3 ML: 3 NEBU SOLN at 08:30

## 2023-01-01 RX ADMIN — LEVALBUTEROL TARTRATE 2 PUFF: 45 AEROSOL, METERED ORAL at 09:20

## 2023-01-01 RX ADMIN — HYDROXYZINE HYDROCHLORIDE 25 MG: 25 TABLET, FILM COATED ORAL at 16:51

## 2023-01-01 RX ADMIN — POLYETHYLENE GLYCOL 3350 17 G: 17 POWDER, FOR SOLUTION ORAL at 19:55

## 2023-01-01 RX ADMIN — SALINE NASAL SPRAY 2 SPRAY: 1.5 SOLUTION NASAL at 08:31

## 2023-01-01 RX ADMIN — GUAIFENESIN 600 MG: 600 TABLET ORAL at 08:57

## 2023-01-01 RX ADMIN — PREDNISONE 40 MG: 20 TABLET ORAL at 15:51

## 2023-01-01 RX ADMIN — BUPROPION HYDROCHLORIDE 150 MG: 150 TABLET, FILM COATED, EXTENDED RELEASE ORAL at 08:38

## 2023-01-01 RX ADMIN — NYSTATIN 500000 UNITS: 100000 SUSPENSION ORAL at 20:55

## 2023-01-01 RX ADMIN — IPRATROPIUM BROMIDE AND ALBUTEROL SULFATE 3 ML: .5; 3 SOLUTION RESPIRATORY (INHALATION) at 22:14

## 2023-01-01 RX ADMIN — ENOXAPARIN SODIUM 50 MG: 60 INJECTION SUBCUTANEOUS at 04:03

## 2023-01-01 RX ADMIN — HYDROXYZINE HYDROCHLORIDE 25 MG: 25 TABLET, FILM COATED ORAL at 10:52

## 2023-01-01 RX ADMIN — PREGABALIN 100 MG: 100 CAPSULE ORAL at 20:10

## 2023-01-01 RX ADMIN — Medication 5000 MCG: at 08:57

## 2023-01-01 RX ADMIN — AZITHROMYCIN 500 MG: 500 TABLET, FILM COATED ORAL at 19:54

## 2023-01-01 RX ADMIN — GUAIFENESIN 600 MG: 600 TABLET ORAL at 07:56

## 2023-01-01 RX ADMIN — NYSTATIN 500000 UNITS: 100000 SUSPENSION ORAL at 18:22

## 2023-01-01 RX ADMIN — Medication 5 DROP: at 21:16

## 2023-01-01 RX ADMIN — SODIUM CHLORIDE SOLN NEBU 3% 3 ML: 3 NEBU SOLN at 08:23

## 2023-01-01 RX ADMIN — Medication 1 LOZENGE: at 13:41

## 2023-01-01 RX ADMIN — NYSTATIN 500000 UNITS: 100000 SUSPENSION ORAL at 19:50

## 2023-01-01 RX ADMIN — IPRATROPIUM BROMIDE AND ALBUTEROL SULFATE 3 ML: .5; 3 SOLUTION RESPIRATORY (INHALATION) at 21:05

## 2023-01-01 RX ADMIN — IPRATROPIUM BROMIDE AND ALBUTEROL SULFATE 3 ML: .5; 3 SOLUTION RESPIRATORY (INHALATION) at 08:17

## 2023-01-01 RX ADMIN — GUAIFENESIN 600 MG: 600 TABLET ORAL at 09:02

## 2023-01-01 RX ADMIN — HYDROXYZINE HYDROCHLORIDE 25 MG: 25 TABLET, FILM COATED ORAL at 00:38

## 2023-01-01 RX ADMIN — SENNOSIDES AND DOCUSATE SODIUM 1 TABLET: 50; 8.6 TABLET ORAL at 19:39

## 2023-01-01 RX ADMIN — IPRATROPIUM BROMIDE AND ALBUTEROL SULFATE 3 ML: .5; 3 SOLUTION RESPIRATORY (INHALATION) at 12:32

## 2023-01-01 RX ADMIN — THERA TABS 1 TABLET: TAB at 14:24

## 2023-01-01 RX ADMIN — Medication 5 DROP: at 09:20

## 2023-01-01 RX ADMIN — THERA TABS 1 TABLET: TAB at 15:51

## 2023-01-01 RX ADMIN — BUPROPION HYDROCHLORIDE 150 MG: 150 TABLET, FILM COATED, EXTENDED RELEASE ORAL at 08:57

## 2023-01-01 RX ADMIN — SODIUM CHLORIDE SOLN NEBU 3% 3 ML: 3 NEBU SOLN at 21:05

## 2023-01-01 RX ADMIN — PREGABALIN 50 MG: 50 CAPSULE ORAL at 09:03

## 2023-01-01 RX ADMIN — SALINE NASAL SPRAY 2 SPRAY: 1.5 SOLUTION NASAL at 09:49

## 2023-01-01 RX ADMIN — SALINE NASAL SPRAY 2 SPRAY: 1.5 SOLUTION NASAL at 09:21

## 2023-01-01 RX ADMIN — LIDOCAINE HYDROCHLORIDE 10 ML: 1 POWDER at 23:13

## 2023-01-01 RX ADMIN — IPRATROPIUM BROMIDE AND ALBUTEROL SULFATE 3 ML: .5; 3 SOLUTION RESPIRATORY (INHALATION) at 08:30

## 2023-01-01 RX ADMIN — METOPROLOL SUCCINATE 25 MG: 25 TABLET, EXTENDED RELEASE ORAL at 08:39

## 2023-01-01 RX ADMIN — NYSTATIN 500000 UNITS: 100000 SUSPENSION ORAL at 20:09

## 2023-01-01 RX ADMIN — PREGABALIN 50 MG: 50 CAPSULE ORAL at 08:57

## 2023-01-01 RX ADMIN — MONTELUKAST 10 MG: 10 TABLET, FILM COATED ORAL at 22:52

## 2023-01-01 RX ADMIN — AZITHROMYCIN MONOHYDRATE 500 MG: 500 INJECTION, POWDER, LYOPHILIZED, FOR SOLUTION INTRAVENOUS at 21:57

## 2023-01-01 RX ADMIN — LEVALBUTEROL TARTRATE 2 PUFF: 45 AEROSOL, METERED ORAL at 17:26

## 2023-01-01 RX ADMIN — SENNOSIDES AND DOCUSATE SODIUM 2 TABLET: 50; 8.6 TABLET ORAL at 08:53

## 2023-01-01 RX ADMIN — BUPROPION HYDROCHLORIDE 150 MG: 150 TABLET, FILM COATED, EXTENDED RELEASE ORAL at 10:44

## 2023-01-01 RX ADMIN — PREGABALIN 50 MG: 50 CAPSULE ORAL at 08:30

## 2023-01-01 RX ADMIN — PREDNISONE 40 MG: 20 TABLET ORAL at 14:24

## 2023-01-01 RX ADMIN — LEVOFLOXACIN 500 MG: 500 TABLET, FILM COATED ORAL at 11:09

## 2023-01-01 RX ADMIN — ACETAMINOPHEN 650 MG: 325 TABLET, FILM COATED ORAL at 19:39

## 2023-01-01 RX ADMIN — PREGABALIN 100 MG: 100 CAPSULE ORAL at 19:52

## 2023-01-01 RX ADMIN — BUPROPION HYDROCHLORIDE 150 MG: 150 TABLET, FILM COATED, EXTENDED RELEASE ORAL at 09:02

## 2023-01-01 ASSESSMENT — ACTIVITIES OF DAILY LIVING (ADL)
ADLS_ACUITY_SCORE: 35
ADLS_ACUITY_SCORE: 35
ADLS_ACUITY_SCORE: 40
ADLS_ACUITY_SCORE: 35
ADLS_ACUITY_SCORE: 40
ADLS_ACUITY_SCORE: 35
ADLS_ACUITY_SCORE: 40
ADLS_ACUITY_SCORE: 35
ADLS_ACUITY_SCORE: 35
ADLS_ACUITY_SCORE: 44
ADLS_ACUITY_SCORE: 35
ADLS_ACUITY_SCORE: 35
ADLS_ACUITY_SCORE: 44
ADLS_ACUITY_SCORE: 40
ADLS_ACUITY_SCORE: 35
ADLS_ACUITY_SCORE: 44
ADLS_ACUITY_SCORE: 35
ADLS_ACUITY_SCORE: 33
ADLS_ACUITY_SCORE: 35
ADLS_ACUITY_SCORE: 44
ADLS_ACUITY_SCORE: 44
ADLS_ACUITY_SCORE: 35
ADLS_ACUITY_SCORE: 35
ADLS_ACUITY_SCORE: 40
ADLS_ACUITY_SCORE: 36
ADLS_ACUITY_SCORE: 37
DEPENDENT_IADLS:: CLEANING;TRANSPORTATION
ADLS_ACUITY_SCORE: 35
ADLS_ACUITY_SCORE: 40
ADLS_ACUITY_SCORE: 44
ADLS_ACUITY_SCORE: 36
ADLS_ACUITY_SCORE: 40
ADLS_ACUITY_SCORE: 44
ADLS_ACUITY_SCORE: 35
ADLS_ACUITY_SCORE: 35
ADLS_ACUITY_SCORE: 44
ADLS_ACUITY_SCORE: 35
ADLS_ACUITY_SCORE: 35
ADLS_ACUITY_SCORE: 44
ADLS_ACUITY_SCORE: 40
ADLS_ACUITY_SCORE: 35
ADLS_ACUITY_SCORE: 44
ADLS_ACUITY_SCORE: 35
ADLS_ACUITY_SCORE: 35

## 2023-01-01 ASSESSMENT — ENCOUNTER SYMPTOMS
NAIL CHANGES: 0
MUSCLE WEAKNESS: 0
COUGH DISTURBING SLEEP: 0
FREQUENCY: 0
WHEEZING: 0
WHEEZING: 0
APPETITE CHANGE: 0
SNORES LOUDLY: 0
COUGH: 0
HEMOPTYSIS: 0
ARTHRALGIAS: 1
POSTURAL DYSPNEA: 0
MUSCLE CRAMPS: 0
POOR WOUND HEALING: 0
BACK PAIN: 0
COUGH: 1
ACTIVITY CHANGE: 0
NECK PAIN: 1
FEVER: 0
JOINT SWELLING: 0
MYALGIAS: 0
DYSURIA: 0
NAUSEA: 0
SHORTNESS OF BREATH: 1
WEAKNESS: 0
DIFFICULTY URINATING: 0
HEADACHES: 0
SPUTUM PRODUCTION: 1
DYSPNEA ON EXERTION: 1
SHORTNESS OF BREATH: 1
SKIN CHANGES: 0
VOMITING: 0
STIFFNESS: 0
FATIGUE: 0
DIZZINESS: 0
PALPITATIONS: 0

## 2023-01-01 ASSESSMENT — PAIN SCALES - GENERAL
PAINLEVEL: MODERATE PAIN (4)
PAINLEVEL: NO PAIN (0)
PAINLEVEL: SEVERE PAIN (7)

## 2023-01-01 NOTE — PROGRESS NOTES
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  She again presented as a direct admission from Dr. Randall Lorenz, pulmonologist, for treatment of bronchiectasis exacerbation as well as pulmonary bacteria not treatable with oral antibiotics. She was never a smoker but was exposed to second hand smoke at a young age and multiple bouts of bronchitis has led to scarring and subsequent COPD.     Previous CT chest showed new multifocal groundglass opacities throughout both lungs, likely infectious.  Minimal change in scattered tree-in-bud and nodular opacities.  She was treated with IV Zosyn, twice daily Mucomyst, twice daily sodium chloride nebs, prednisone 40 mg daily, Trelegy, and montelukast, and pulmonology recommended a PICC line placement for 2 weeks of IV antibiotics.  She did have some leukocytosis, felt likely secondary to steroids taken in the outpatient setting.  Otherwise, she was without chills or other signs of infection.  Uses 2 L of oxygen at baseline.    Treatment with IV meropenem 1 g every 8 hours.  Saturation goal of 89% or greater at rest.      CURRENT/RECENT TCU ISSUES    Disposition   -- She reports she is here because she has had so much trouble breathing.    -- Now getting IV antibiotics 3 times daily for 14 days.    -- This is not her first  "allie.    Chronic respiratory failure with hypoxia  Bronchiectasis with acute exacerbation  Panlobular emphysema  -- \"Everyone on my father's side had emphysema.\"  -- She has had COPD and bronchiectasis for more than 5 years.    -- She also suffers from chronic anxiety and has been taught belly breathing, although she often forgets.      -- Breathing is not a problem when sitting but worsens with only minimal exertion (standing).    -- She has a 4 wheeled walker and uses the seat to carry her oxygen, currently on 2L, her baseline.      Other chronic pain  Neuropathy  -- Pain is minimal except for her \"ordinary arthritis.\"  She does have bilateral rotator cuff injuries mild left greater than right.  She has had bilateral knee replacements (at the same time in 2008) and anterior/posterior combined fusion of the lumbar spine.  -- Says she was told by the surgeon that her neuropathy extends from \"all the hardware\" in her back.  -- For chronic pain, she has orders for pregabalin 50 mg every morning/100 mg at bedtime for neuropathy (gabapentin made her lightheaded), celecoxib (200 mg daily) and as needed tramadol, which she used to take for her back and shoulder pain.    Diaphragmatic hernia  -- Has not complained of any heartburn.  She does prefer to eat bland food.    Mood disorder  Anxiety  -- Says she has benefited from low-dose escitalopram (it \"has made the biggest difference\").   -- Suspect he will     Discharge planning  -- She lives in [facility].    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  IV antibiotics been administered for 2 weeks, and she will discharge the following day.       ROS: No headaches or chest pains, coughing or congestion, nausea or vomiting, dizziness, dysuria, difficulty chewing or swallowing, integumentary issues, or problems with appetite or sleep.      Past Medical History:   Diagnosis Date     Anxiety 09/30/2021     COPD (chronic obstructive pulmonary " disease) (H)      Diverticulosis      Hiatal hernia      Mild asthma      Neuropathy      Osteopenia      Temporomandibular joint (TMJ) pain               Family History   Problem Relation Age of Onset     Dementia Mother         passed age 88     Chronic Obstructive Pulmonary Disease Father         passed age 78     Social History     Socioeconomic History     Marital status: Single     Spouse name: None     Number of children: None     Years of education: None     Highest education level: None   Occupational History     None   Tobacco Use     Smoking status: Never Smoker     Smokeless tobacco: Never Used   Substance and Sexual Activity     Alcohol use: No     Drug use: Never     Sexual activity: Never   Other Topics Concern     Parent/sibling w/ CABG, MI or angioplasty before 65F 55M? 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.  She has a sister and brother-in-law, 2 nieces, 3 grand nieces and nephews in [place].  She came to [place] for a sabbatical, and then  stayed on for a teaching job.  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  She lives alone in a long-term facility. - Dr. Nguyễn 01/04/21       Social Determinants of Health     Financial Resource Strain: Not on file   Food Insecurity: Not on file   Transportation Needs: Not on file   Physical Activity: Not on file   Stress: Not on file   Social Connections: Not on file   Intimate Partner Violence: Not on file   Housing Stability: Not on file       MEDICATIONS: Reviewed from the MAR, physician orders, and/or earlier progress notes.  MED REC REQUIRED  Post Medication Reconciliation Status: discharge medications reconciled, continue medications without change    Current Outpatient Medications   Medication Sig     acetylcysteine (MUCOMYST) 10 % nebulizer solution Inhale 4 mLs into the lungs daily     acetylcysteine (MUCOMYST) 20 % neb  solution Take 4 mLs by nebulization daily     albuterol (PROAIR HFA/PROVENTIL HFA/VENTOLIN HFA) 108 (90 Base) MCG/ACT inhaler Inhale 2 puffs into the lungs every 6 hours as needed     albuterol (PROVENTIL) (2.5 MG/3ML) 0.083% neb solution Take 1 vial (2.5 mg) by nebulization every 4 hours as needed for shortness of breath / dyspnea or wheezing     biotin 300 MCG TABS tablet Take 1 tablet (300 mcg) by mouth daily     calcium-vitamin D (CALCIUM-VITAMIN D) 500 mg(1,250mg) -200 unit per tablet Take 1 tablet by mouth 2 times daily      celecoxib (CELEBREX) 200 MG capsule Take 1 capsule (200 mg) by mouth daily     cetirizine (ZYRTEC) 5 MG tablet Take 5 mg by mouth daily     cholecalciferol, vitamin D3, (VITAMIN D3) 1,000 unit capsule [CHOLECALCIFEROL, VITAMIN D3, (VITAMIN D3) 1,000 UNIT CAPSULE] Take 1,000 Units by mouth daily.     escitalopram (LEXAPRO) 20 MG tablet Take 1 tablet (20 mg) by mouth daily     escitalopram (LEXAPRO) 5 MG tablet Take 2 tablets (10 mg) by mouth daily     Fluticasone-Umeclidin-Vilanterol (TRELEGY ELLIPTA) 100-62.5-25 MCG/INH oral inhaler Inhale 1 puff into the lungs daily     Lactobacillus rhamnosus GG (CULTURELLE) 10-15 Billion cell capsule Take 1 capsule by mouth every evening      meropenem (MERREM) 1 g vial Inject 1,000 mg (1 g) into the vein every 8 hours for 14 days     montelukast (SINGULAIR) 10 MG tablet [MONTELUKAST (SINGULAIR) 10 MG TABLET] TAKE 1 TABLET(10 MG) BY MOUTH AT BEDTIME     multivitamin therapeutic (THERAGRAN) tablet [MULTIVITAMIN THERAPEUTIC (THERAGRAN) TABLET] Take 1 tablet by mouth daily.     nystatin (MYCOSTATIN) 251075 UNIT/GM external cream Apply to rash on body 3 times per day as needed.     oxybutynin ER (DITROPAN XL) 5 MG 24 hr tablet Take 1 tablet (5 mg) by mouth daily     OXYGEN-AIR DELIVERY SYSTEMS MISC [OXYGEN-AIR DELIVERY SYSTEMS MISC] Use 2 L As Directed. 2L with activity and at night  [employer]     pregabalin (LYRICA) 50 MG capsule Take 1 tab in the AM and 2  "in the evening     sodium chloride (NEBUSAL) 3 % neb solution Take 3 mLs by nebulization 2 times daily     sodium chloride (OCEAN) 0.65 % nasal spray Spray 2 sprays in nostril daily     SUMAtriptan (IMITREX) 50 MG tablet Take 1 tablet (50 mg) by mouth as needed for migraine,may repeat after 2 hours if needed;  mg/24 hours     No current facility-administered medications for this visit.     ALLERGIES:   Allergies   Allergen Reactions     Codeine Unknown     Morphine Itching     DIET: Regular, regular texture, thin liquids.  Prefers bland foods.    Vitals:    12/27/22 1239   BP: (!) 146/84   Pulse: 71   Resp: 18   Temp: 97.5  F (36.4  C)   SpO2: 95%   Weight: 47.2 kg (104 lb)   Height: 1.575 m (5' 2\")     Body mass index is 19.02 kg/m .    EXAMINATION:   General: Pleasant, alert, and conversant elderly female, sitting in a recliner, in no apparent distress.  Head: Normocephalic and atraumatic.   Eyes: PERRLA, sclerae clear.   ENT: Moist oral mucosa.   Cardiovascular: Sinus tachycardia without appreciable murmur.   Respiratory: Lungs clear to auscultation bilaterally.   Abdomen: Nondistended.   Musculoskeletal/Extremities: Age-related degenerative joint disease.  No peripheral edema.  Integument: No rashes, clinically significant lesions, or skin breakdown.   Cognitive/Psychiatric: Alert and oriented with euthymic affect.    DIAGNOSTICS:   No results found for this or any previous visit (from the past 240 hour(s)).   Last Comprehensive Metabolic Panel:  Sodium   Date Value Ref Range Status   11/12/2022 138 136 - 145 mmol/L Final     Potassium   Date Value Ref Range Status   11/12/2022 4.0 3.4 - 5.3 mmol/L Final   11/08/2021 3.6 3.5 - 5.0 mmol/L Final     Chloride   Date Value Ref Range Status   11/12/2022 101 98 - 107 mmol/L Final   11/08/2021 103 98 - 107 mmol/L Final     Carbon Dioxide (CO2)   Date Value Ref Range Status   11/12/2022 28 22 - 29 mmol/L Final   11/08/2021 32 (H) 22 - 31 mmol/L Final     Anion " Gap   Date Value Ref Range Status   11/12/2022 9 7 - 15 mmol/L Final   11/08/2021 5 5 - 18 mmol/L Final     Glucose   Date Value Ref Range Status   11/12/2022 97 70 - 99 mg/dL Final   11/08/2021 92 70 - 125 mg/dL Final     Urea Nitrogen   Date Value Ref Range Status   11/12/2022 10.1 8.0 - 23.0 mg/dL Final   11/08/2021 13 8 - 28 mg/dL Final     Creatinine   Date Value Ref Range Status   11/12/2022 0.42 (L) 0.51 - 0.95 mg/dL Final     GFR Estimate   Date Value Ref Range Status   11/12/2022 >90 >60 mL/min/1.73m2 Final     Comment:     Effective December 21, 2021 eGFRcr in adults is calculated using the 2021 CKD-EPI creatinine equation which includes age and gender (Wilman et al., NE, DOI: 10.1056/HMPSww7057992)   09/23/2020 >60 >60 mL/min/1.73m2 Final     GFR, ESTIMATED POCT   Date Value Ref Range Status   06/20/2022 >60 >60 mL/min/1.73m2 Final     Calcium   Date Value Ref Range Status   11/12/2022 9.1 8.8 - 10.2 mg/dL Final     Bilirubin Total   Date Value Ref Range Status   07/24/2022 0.4 <=1.2 mg/dL Final     Alkaline Phosphatase   Date Value Ref Range Status   07/24/2022 92 35 - 104 U/L Final     ALT   Date Value Ref Range Status   07/24/2022 11 10 - 35 U/L Final     AST   Date Value Ref Range Status   07/24/2022 32 10 - 35 U/L Final     Last CBC:  Lab Results   Component Value Date    WBC 8.0 11/12/2022     Lab Results   Component Value Date    RBC 4.21 11/12/2022     Lab Results   Component Value Date    HGB 11.9 11/12/2022     Lab Results   Component Value Date    HCT 38.1 11/12/2022     Lab Results   Component Value Date    MCV 91 11/12/2022     Lab Results   Component Value Date    MCH 28.3 11/12/2022     Lab Results   Component Value Date    MCHC 31.2 11/12/2022     Lab Results   Component Value Date    RDW 13.0 11/12/2022     Lab Results   Component Value Date     11/12/2022       ASSESSMENT/Plan:      ICD-10-CM    1. Bronchiectasis with acute exacerbation (H)  J47.1       2. Panlobular emphysema  (H)  J43.1       3. Chronic respiratory failure with hypoxia, on home O2 therapy (H)  J96.11     Z99.81       4. Mood disorder (H)  F39       5. Anxiety  F41.9       6. Other chronic pain  G89.29           CHANGES:  None.    CARE PLAN:  The care plan has been reviewed and all orders signed.  Changes to care plan, if any, as noted.  Otherwise, continue current plan of care. Total time spent in this admission encounter was 36 minutes, with greater than 50% spent in counseling and coordination of care that involved a review of the patient's medical record since her last admission to the TCU, as this is essentially a readmission.    The above has been created using voice recognition software. Please be aware that this may unintentionally  produce inaccuracies and/or nonsensical sentences.      Electronically signed by:  BATOOL Melara CNP

## 2023-01-02 PROBLEM — F39 MOOD DISORDER (H): Status: ACTIVE | Noted: 2021-11-04

## 2023-01-03 ENCOUNTER — TELEPHONE (OUTPATIENT)
Dept: PULMONOLOGY | Facility: CLINIC | Age: 78
End: 2023-01-03

## 2023-01-03 NOTE — TELEPHONE ENCOUNTER
TCU nurse called this morning stating Fiordaliza missed 1 dose of her meropenem this morning.  She was supposed to start on 12/20 but didn't start till 12/22 so medication was not available.  Nurse will have medication this afternoon.  I have let Dr. Lorenz know.     Lianne Fabian RN, BSN  Nurse Care Coordinator  Interventional Pulmonology  [phone]

## 2023-01-05 ENCOUNTER — TRANSITIONAL CARE UNIT VISIT (OUTPATIENT)
Dept: GERIATRICS | Facility: CLINIC | Age: 78
End: 2023-01-05
Payer: MEDICARE

## 2023-01-05 ENCOUNTER — MEDICAL CORRESPONDENCE (OUTPATIENT)
Dept: HEALTH INFORMATION MANAGEMENT | Facility: CLINIC | Age: 78
End: 2023-01-05

## 2023-01-05 VITALS
HEIGHT: 62 IN | WEIGHT: 104 LBS | DIASTOLIC BLOOD PRESSURE: 84 MMHG | BODY MASS INDEX: 19.14 KG/M2 | OXYGEN SATURATION: 95 % | RESPIRATION RATE: 18 BRPM | HEART RATE: 71 BPM | TEMPERATURE: 97.5 F | SYSTOLIC BLOOD PRESSURE: 146 MMHG

## 2023-01-05 DIAGNOSIS — J47.1 BRONCHIECTASIS WITH ACUTE EXACERBATION (H): Primary | ICD-10-CM

## 2023-01-05 DIAGNOSIS — Z99.81 CHRONIC RESPIRATORY FAILURE WITH HYPOXIA, ON HOME O2 THERAPY (H): ICD-10-CM

## 2023-01-05 DIAGNOSIS — F41.9 ANXIETY: ICD-10-CM

## 2023-01-05 DIAGNOSIS — F39 MOOD DISORDER (H): ICD-10-CM

## 2023-01-05 DIAGNOSIS — J96.11 CHRONIC RESPIRATORY FAILURE WITH HYPOXIA, ON HOME O2 THERAPY (H): ICD-10-CM

## 2023-01-05 DIAGNOSIS — J43.1 PANLOBULAR EMPHYSEMA (H): ICD-10-CM

## 2023-01-05 DIAGNOSIS — G89.29 OTHER CHRONIC PAIN: ICD-10-CM

## 2023-01-05 PROCEDURE — 99316 NF DSCHRG MGMT 30 MIN+: CPT | Performed by: NURSE PRACTITIONER

## 2023-01-05 NOTE — LETTER
2023        RE: Fiordaliza Najera  [address]  [place] [address]        [facility]    Name:   Fiordaliza Najera Sr. (Ayse)  :   1945  MRN:    [id number]     Facility:   [facility] (SNF) [[id number]]   Room: TCU / [place] [address]  Code Status: DNR/DNI and POLST AVAILABLE -     DOS:  2022    PCP:  Ekaterina Koehler    CHIEF COMPLAINT / REASON FOR VISIT:  Chief Complaint   Patient presents with     Discharge Summary Nursing Home     bronchiectasis with acute exacerbation      [facility] from 2021 until 2021 (bronchiectasis with acute exacerbation)  [facility] TCU from 2022 until 2023 (anticipated discharge date)      HPI: Fiordaliza (who prefers to be called Ayse) is a 77 year old female, 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  She again presented as a direct admission from Dr. Randall Lorenz, pulmonologist, for treatment of bronchiectasis exacerbation as well as pulmonary bacteria not treatable with oral antibiotics. She was never a smoker but was exposed to second hand smoke at a young age and multiple bouts of bronchitis has led to scarring and subsequent COPD.     Previous CT chest showed new multifocal groundglass opacities throughout both lungs, likely infectious.  Minimal change in scattered tree-in-bud and nodular opacities.  She was treated with IV Zosyn, twice daily Mucomyst, twice daily sodium chloride nebs, prednisone 40 mg daily, Trelegy, and montelukast, and pulmonology recommended a PICC line placement for 2 weeks of IV antibiotics.  She did have some leukocytosis, felt likely secondary to steroids taken in the outpatient setting.  Otherwise, she was without chills or other signs of infection.  Uses 2 L of oxygen at baseline.    Treatment with IV meropenem 1 g every 8 hours.  Saturation goal of  "89% or greater at rest.      CURRENT/RECENT TCU ISSUES    Disposition   -- She is a little anxious about discharge. She is \"hoping that it worked, and it'll make a difference when I get home.\"  -- She reports she is here because she has had so much trouble breathing.    -- Now getting IV antibiotics 3 times daily for 14 days.    -- This is not her first rodeo.    Chronic respiratory failure with hypoxia  Bronchiectasis with acute exacerbation  Panlobular emphysema  -- \"Everyone on my father's side had emphysema.\"  -- She has had COPD and bronchiectasis for more than 5 years.    -- She also suffers from chronic anxiety and has been taught belly breathing, although she often forgets.      -- Breathing is not a problem when sitting but worsens with only minimal exertion (standing).    -- She has a 4 wheeled walker and uses the seat to carry her oxygen, currently on 2L, her baseline.      Other chronic pain  Neuropathy  -- Pain is minimal except for her \"ordinary arthritis.\"  She does have bilateral rotator cuff injuries mild left greater than right.  She has had bilateral knee replacements (at the same time in 2008) and anterior/posterior combined fusion of the lumbar spine.  -- Says she was told by the surgeon that her neuropathy extends from \"all the hardware\" in her back.  -- For chronic pain, she has orders for pregabalin 50 mg every morning/100 mg at bedtime for neuropathy (gabapentin made her lightheaded), celecoxib (200 mg daily) and as needed tramadol, which she used to take for her back and shoulder pain.    Diaphragmatic hernia  -- Has not complained of any heartburn.  She does prefer to eat bland food.    Mood disorder  Anxiety  -- Says she has benefited from low-dose escitalopram (it \"has made the biggest difference\").   -- Suspect he will     Discharge planning  -- She lives in [facility].    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   -- Plan to discharge on 01/06. Home " services to include PT and OT provided by [facility].      ROS: 10 point ROS of systems including Constitutional, Eyes, Respiratory, Cardiovascular, Gastroenterology, Genitourinary, Integumentary, Muscularskeletal, Psychiatric were all negative except for pertinent positives noted in my HPI.      Past Medical History:   Diagnosis Date     Anxiety 09/30/2021     COPD (chronic obstructive pulmonary disease) (H)      Diverticulosis      Hiatal hernia      Mild asthma      Neuropathy      Osteopenia      Temporomandibular joint (TMJ) pain               Family History   Problem Relation Age of Onset     Dementia Mother         passed age 88     Chronic Obstructive Pulmonary Disease Father         passed age 78     Social History     Socioeconomic History     Marital status: Single     Spouse name: None     Number of children: None     Years of education: None     Highest education level: None   Occupational History     None   Tobacco Use     Smoking status: Never Smoker     Smokeless tobacco: Never Used   Substance and Sexual Activity     Alcohol use: No     Drug use: Never     Sexual activity: Never   Other Topics Concern     Parent/sibling w/ CABG, MI or angioplasty before 65F 55M? 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  She was a [employer] sister with [employer].  She has a sister and brother-in-law, 2 nieces, 3 grand nieces and nephews in [place].  She came to [place] for a sabbatical, and then  stayed on for a teaching job.  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  She lives alone in a long-term facility. - Dr. Nguyễn 01/04/21       Social Determinants of Health     Financial Resource Strain: Not on file   Food Insecurity: Not on file   Transportation Needs: Not on file   Physical Activity: Not on file   Stress: Not on file   Social Connections: Not on file   Intimate Partner Violence: Not on file   Housing Stability: Not on file        MEDICATIONS: Reviewed from the MAR, physician orders, and/or earlier progress notes.  MED REC REQUIRED  Post Medication Reconciliation Status: medication reconcilation previously completed during another office visit    Current Outpatient Medications   Medication Sig     acetylcysteine (MUCOMYST) 10 % nebulizer solution Inhale 4 mLs into the lungs daily     acetylcysteine (MUCOMYST) 20 % neb solution Take 4 mLs by nebulization daily     albuterol (PROAIR HFA/PROVENTIL HFA/VENTOLIN HFA) 108 (90 Base) MCG/ACT inhaler Inhale 2 puffs into the lungs every 6 hours as needed     albuterol (PROVENTIL) (2.5 MG/3ML) 0.083% neb solution Take 1 vial (2.5 mg) by nebulization every 4 hours as needed for shortness of breath / dyspnea or wheezing     biotin 300 MCG TABS tablet Take 1 tablet (300 mcg) by mouth daily     calcium-vitamin D (CALCIUM-VITAMIN D) 500 mg(1,250mg) -200 unit per tablet Take 1 tablet by mouth 2 times daily      celecoxib (CELEBREX) 200 MG capsule Take 1 capsule (200 mg) by mouth daily     cetirizine (ZYRTEC) 5 MG tablet Take 5 mg by mouth daily     cholecalciferol, vitamin D3, (VITAMIN D3) 1,000 unit capsule [CHOLECALCIFEROL, VITAMIN D3, (VITAMIN D3) 1,000 UNIT CAPSULE] Take 1,000 Units by mouth daily.     escitalopram (LEXAPRO) 20 MG tablet Take 1 tablet (20 mg) by mouth daily     escitalopram (LEXAPRO) 5 MG tablet Take 2 tablets (10 mg) by mouth daily     Fluticasone-Umeclidin-Vilanterol (TRELEGY ELLIPTA) 100-62.5-25 MCG/INH oral inhaler Inhale 1 puff into the lungs daily     Lactobacillus rhamnosus GG (CULTURELLE) 10-15 Billion cell capsule Take 1 capsule by mouth every evening      montelukast (SINGULAIR) 10 MG tablet [MONTELUKAST (SINGULAIR) 10 MG TABLET] TAKE 1 TABLET(10 MG) BY MOUTH AT BEDTIME     multivitamin therapeutic (THERAGRAN) tablet [MULTIVITAMIN THERAPEUTIC (THERAGRAN) TABLET] Take 1 tablet by mouth daily.     nystatin (MYCOSTATIN) 138272 UNIT/GM external cream Apply to rash on body 3  "times per day as needed.     oxybutynin ER (DITROPAN XL) 5 MG 24 hr tablet Take 1 tablet (5 mg) by mouth daily     OXYGEN-AIR DELIVERY SYSTEMS MISC [OXYGEN-AIR DELIVERY SYSTEMS MISC] Use 2 L As Directed. 2L with activity and at night  [employer]     pregabalin (LYRICA) 50 MG capsule Take 1 tab in the AM and 2 in the evening     sodium chloride (NEBUSAL) 3 % neb solution Take 3 mLs by nebulization 2 times daily     sodium chloride (OCEAN) 0.65 % nasal spray Spray 2 sprays in nostril daily     SUMAtriptan (IMITREX) 50 MG tablet Take 1 tablet (50 mg) by mouth as needed for migraine,may repeat after 2 hours if needed;  mg/24 hours     No current facility-administered medications for this visit.     ALLERGIES:   Allergies   Allergen Reactions     Codeine Unknown     Morphine Itching     DIET: Regular, regular texture, thin liquids.  Prefers bland foods.    Vitals:    01/05/23 1322   BP: (!) 146/84   Pulse: 71   Resp: 18   Temp: 97.5  F (36.4  C)   SpO2: 95%   Weight: 47.2 kg (104 lb)   Height: 1.575 m (5' 2\")     Body mass index is 19.02 kg/m .    EXAMINATION:   General: Pleasant, alert, and conversant elderly female, sitting in a recliner, in no apparent distress.  Head: Normocephalic and atraumatic.   Eyes: PERRLA, sclerae clear.   ENT: Moist oral mucosa.   Cardiovascular: Sinus tachycardia. No appreciable murmur.   Respiratory: Lungs clear to auscultation bilaterally.   Abdomen: Nondistended.   Musculoskeletal/Extremities: Age-related degenerative joint disease.  No peripheral edema.  Integument: No rashes, clinically significant lesions, or skin breakdown.   Cognitive/Psychiatric: Alert and oriented with euthymic affect.    DIAGNOSTICS:   No results found for this or any previous visit (from the past 240 hour(s)).   Last Comprehensive Metabolic Panel:  Sodium   Date Value Ref Range Status   11/12/2022 138 136 - 145 mmol/L Final     Potassium   Date Value Ref Range Status   11/12/2022 4.0 3.4 - 5.3 mmol/L Final "   11/08/2021 3.6 3.5 - 5.0 mmol/L Final     Chloride   Date Value Ref Range Status   11/12/2022 101 98 - 107 mmol/L Final   11/08/2021 103 98 - 107 mmol/L Final     Carbon Dioxide (CO2)   Date Value Ref Range Status   11/12/2022 28 22 - 29 mmol/L Final   11/08/2021 32 (H) 22 - 31 mmol/L Final     Anion Gap   Date Value Ref Range Status   11/12/2022 9 7 - 15 mmol/L Final   11/08/2021 5 5 - 18 mmol/L Final     Glucose   Date Value Ref Range Status   11/12/2022 97 70 - 99 mg/dL Final   11/08/2021 92 70 - 125 mg/dL Final     Urea Nitrogen   Date Value Ref Range Status   11/12/2022 10.1 8.0 - 23.0 mg/dL Final   11/08/2021 13 8 - 28 mg/dL Final     Creatinine   Date Value Ref Range Status   11/12/2022 0.42 (L) 0.51 - 0.95 mg/dL Final     GFR Estimate   Date Value Ref Range Status   11/12/2022 >90 >60 mL/min/1.73m2 Final     Comment:     Effective December 21, 2021 eGFRcr in adults is calculated using the 2021 CKD-EPI creatinine equation which includes age and gender (Wilman et al., NE, DOI: 10.1056/UFLHli2379933)   09/23/2020 >60 >60 mL/min/1.73m2 Final     GFR, ESTIMATED POCT   Date Value Ref Range Status   06/20/2022 >60 >60 mL/min/1.73m2 Final     Calcium   Date Value Ref Range Status   11/12/2022 9.1 8.8 - 10.2 mg/dL Final     Bilirubin Total   Date Value Ref Range Status   07/24/2022 0.4 <=1.2 mg/dL Final     Alkaline Phosphatase   Date Value Ref Range Status   07/24/2022 92 35 - 104 U/L Final     ALT   Date Value Ref Range Status   07/24/2022 11 10 - 35 U/L Final     AST   Date Value Ref Range Status   07/24/2022 32 10 - 35 U/L Final     Last CBC:  Lab Results   Component Value Date    WBC 8.0 11/12/2022     Lab Results   Component Value Date    RBC 4.21 11/12/2022     Lab Results   Component Value Date    HGB 11.9 11/12/2022     Lab Results   Component Value Date    HCT 38.1 11/12/2022     Lab Results   Component Value Date    MCV 91 11/12/2022     Lab Results   Component Value Date    MCH 28.3 11/12/2022     Lab  Results   Component Value Date    MCHC 31.2 11/12/2022     Lab Results   Component Value Date    RDW 13.0 11/12/2022     Lab Results   Component Value Date     11/12/2022       ASSESSMENT/Plan:      ICD-10-CM    1. Bronchiectasis with acute exacerbation (H)  J47.1       2. Panlobular emphysema (H)  J43.1       3. Chronic respiratory failure with hypoxia, on home O2 therapy (H)  J96.11     Z99.81       4. Mood disorder (H)  F39       5. Anxiety  F41.9       6. Other chronic pain  G89.29           DISCHARGE FACE TO FACE:  I certify that this patient is under my care and that I had a face-to-face encounter that meets the physician face-to-face encounter requirements with this patient.     Date of Face-to-Face Encounter:  01/05/2023     I certify that, based on my findings, the following services are medically necessary home health services:  Home PT, and home OT    My clinical findings support the need for the above skilled services because: (Please write a brief narrative summary that describes what the RN, PT, SLP, or other services will be doing in the home. A list of diagnoses in this section does not meet the CMS requirements):  Home PT for strengthening, balance, endurance, and safety with mobility/ambulation; home OT for strengthening, ADL needs, adaptive equipment, and safety.    This patient is homebound because: (Please write a brief narrative summmary describing the functional limitations as to why this patient is homebound and specifically what makes this patient homebound.):  Above services necessarily need to be performed in the home to be of benefit.    The patient is, or has been, under my care and I have initiated the establishment of the plan of care. This patient will be followed by a physician who will periodically review the plan of care.  Initial follow-up should be within 7-10 days.    Approximate time spent with this patient was greater than 30 minutes with greater than 50% spent in  discussions regarding services required upon discharge.      The above has been created using voice recognition software. Please be aware that this may unintentionally  produce inaccuracies and/or nonsensical sentences.      Electronically signed by:  BATOOL Melara CNP        Sincerely,        BATOOL Melara CNP

## 2023-01-06 ENCOUNTER — TELEPHONE (OUTPATIENT)
Dept: FAMILY MEDICINE | Facility: CLINIC | Age: 78
End: 2023-01-06

## 2023-01-06 NOTE — TELEPHONE ENCOUNTER
The Home Care/Assisted Living/Nursing Facility is calling regarding an established patient.  Has the patient seen Home Care in the past or is currently residing in Assisted Living or Nursing Facility? Yes.     Lucy calling from [facility] requesting the following orders that are within the Home Care, Assisted Living or Nursing Home Eval and Treatment standing order and can be signed as standing order signature required by RN.    Preferred Call Back Number: [phone]    PT/OT/Speech Therapy    Any additional Orders:  Are there any orders requested, not stated above, that are outside of the standing order and must be routed to a licensed practitioner for approval?    No    Writer has verified [facility] will send fax to have orders signed.

## 2023-01-06 NOTE — TELEPHONE ENCOUNTER
Reason for Call: Request for an order or referral:    Order or referral being requested: Verbal orders.    Date needed: at your convenience    Has the patient been seen by the PCP for this problem? Not Applicable    Additional comments: Delay of of therapy start of care to 1 -11.     Phone number: [phone]- Lucy     Best Time:  Any time     Can we leave a detailed message on this number?  YES    Call taken on 1/6/2023 at 2:57 PM by Evangelina Mast

## 2023-01-08 NOTE — PROGRESS NOTES
[facility]    Name:   Fiordaliza Najera  (Ayse)  :   1945  MRN:    [id number]     Facility:   [facility] (SNF) [[id number]]   Room: TCU / [place] [address]  Code Status: DNR/DNI and POLST AVAILABLE -     DOS:  2022    PCP:  Ekaterina Koehler    CHIEF COMPLAINT / REASON FOR VISIT:  Chief Complaint   Patient presents with     Discharge Summary Nursing Home     bronchiectasis with acute exacerbation      [facility] from 2021 until 2021 (bronchiectasis with acute exacerbation)  [facility] TCU from 2022 until 2023 (anticipated discharge date)      HPI: Fiordaliza (who prefers to be called Ayse) is a 77 year old female, 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  She again presented as a direct admission from Dr. Randall Lorenz, pulmonologist, for treatment of bronchiectasis exacerbation as well as pulmonary bacteria not treatable with oral antibiotics. She was never a smoker but was exposed to second hand smoke at a young age and multiple bouts of bronchitis has led to scarring and subsequent COPD.     Previous CT chest showed new multifocal groundglass opacities throughout both lungs, likely infectious.  Minimal change in scattered tree-in-bud and nodular opacities.  She was treated with IV Zosyn, twice daily Mucomyst, twice daily sodium chloride nebs, prednisone 40 mg daily, Trelegy, and montelukast, and pulmonology recommended a PICC line placement for 2 weeks of IV antibiotics.  She did have some leukocytosis, felt likely secondary to steroids taken in the outpatient setting.  Otherwise, she was without chills or other signs of infection.  Uses 2 L of oxygen at baseline.    Treatment with IV meropenem 1 g every 8 hours.  Saturation goal of 89% or greater at rest.      CURRENT/RECENT TCU ISSUES    Disposition   -- She is a  "little anxious about discharge. She is \"hoping that it worked, and it'll make a difference when I get home.\"  -- She reports she is here because she has had so much trouble breathing.    -- Now getting IV antibiotics 3 times daily for 14 days.    -- This is not her first rodeo.    Chronic respiratory failure with hypoxia  Bronchiectasis with acute exacerbation  Panlobular emphysema  -- \"Everyone on my father's side had emphysema.\"  -- She has had COPD and bronchiectasis for more than 5 years.    -- She also suffers from chronic anxiety and has been taught belly breathing, although she often forgets.      -- Breathing is not a problem when sitting but worsens with only minimal exertion (standing).    -- She has a 4 wheeled walker and uses the seat to carry her oxygen, currently on 2L, her baseline.      Other chronic pain  Neuropathy  -- Pain is minimal except for her \"ordinary arthritis.\"  She does have bilateral rotator cuff injuries mild left greater than right.  She has had bilateral knee replacements (at the same time in 2008) and anterior/posterior combined fusion of the lumbar spine.  -- Says she was told by the surgeon that her neuropathy extends from \"all the hardware\" in her back.  -- For chronic pain, she has orders for pregabalin 50 mg every morning/100 mg at bedtime for neuropathy (gabapentin made her lightheaded), celecoxib (200 mg daily) and as needed tramadol, which she used to take for her back and shoulder pain.    Diaphragmatic hernia  -- Has not complained of any heartburn.  She does prefer to eat bland food.    Mood disorder  Anxiety  -- Says she has benefited from low-dose escitalopram (it \"has made the biggest difference\").   -- Suspect he will     Discharge planning  -- She lives in [facility].    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   -- Plan to discharge on 01/06. Home services to include PT and OT provided by [facility].      ROS: 10 point ROS of systems " including Constitutional, Eyes, Respiratory, Cardiovascular, Gastroenterology, Genitourinary, Integumentary, Muscularskeletal, Psychiatric were all negative except for pertinent positives noted in my HPI.      Past Medical History:   Diagnosis Date     Anxiety 09/30/2021     COPD (chronic obstructive pulmonary disease) (H)      Diverticulosis      Hiatal hernia      Mild asthma      Neuropathy      Osteopenia      Temporomandibular joint (TMJ) pain               Family History   Problem Relation Age of Onset     Dementia Mother         passed age 88     Chronic Obstructive Pulmonary Disease Father         passed age 78     Social History     Socioeconomic History     Marital status: Single     Spouse name: None     Number of children: None     Years of education: None     Highest education level: None   Occupational History     None   Tobacco Use     Smoking status: Never Smoker     Smokeless tobacco: Never Used   Substance and Sexual Activity     Alcohol use: No     Drug use: Never     Sexual activity: Never   Other Topics Concern     Parent/sibling w/ CABG, MI or angioplasty before 65F 55M? 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  She has a sister and brother-in-law, 2 nieces, 3 grand nieces and nephews in [place].  She came to [place] for a sabbatical, and then  stayed on for a teaching job.  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  She lives alone in a long-term facility. - Dr. Nguyễn 01/04/21       Social Determinants of Health     Financial Resource Strain: Not on file   Food Insecurity: Not on file   Transportation Needs: Not on file   Physical Activity: Not on file   Stress: Not on file   Social Connections: Not on file   Intimate Partner Violence: Not on file   Housing Stability: Not on file       MEDICATIONS: Reviewed from the MAR, physician orders, and/or earlier progress  notes.  MED REC REQUIRED  Post Medication Reconciliation Status: medication reconcilation previously completed during another office visit    Current Outpatient Medications   Medication Sig     acetylcysteine (MUCOMYST) 10 % nebulizer solution Inhale 4 mLs into the lungs daily     acetylcysteine (MUCOMYST) 20 % neb solution Take 4 mLs by nebulization daily     albuterol (PROAIR HFA/PROVENTIL HFA/VENTOLIN HFA) 108 (90 Base) MCG/ACT inhaler Inhale 2 puffs into the lungs every 6 hours as needed     albuterol (PROVENTIL) (2.5 MG/3ML) 0.083% neb solution Take 1 vial (2.5 mg) by nebulization every 4 hours as needed for shortness of breath / dyspnea or wheezing     biotin 300 MCG TABS tablet Take 1 tablet (300 mcg) by mouth daily     calcium-vitamin D (CALCIUM-VITAMIN D) 500 mg(1,250mg) -200 unit per tablet Take 1 tablet by mouth 2 times daily      celecoxib (CELEBREX) 200 MG capsule Take 1 capsule (200 mg) by mouth daily     cetirizine (ZYRTEC) 5 MG tablet Take 5 mg by mouth daily     cholecalciferol, vitamin D3, (VITAMIN D3) 1,000 unit capsule [CHOLECALCIFEROL, VITAMIN D3, (VITAMIN D3) 1,000 UNIT CAPSULE] Take 1,000 Units by mouth daily.     escitalopram (LEXAPRO) 20 MG tablet Take 1 tablet (20 mg) by mouth daily     escitalopram (LEXAPRO) 5 MG tablet Take 2 tablets (10 mg) by mouth daily     Fluticasone-Umeclidin-Vilanterol (TRELEGY ELLIPTA) 100-62.5-25 MCG/INH oral inhaler Inhale 1 puff into the lungs daily     Lactobacillus rhamnosus GG (CULTURELLE) 10-15 Billion cell capsule Take 1 capsule by mouth every evening      montelukast (SINGULAIR) 10 MG tablet [MONTELUKAST (SINGULAIR) 10 MG TABLET] TAKE 1 TABLET(10 MG) BY MOUTH AT BEDTIME     multivitamin therapeutic (THERAGRAN) tablet [MULTIVITAMIN THERAPEUTIC (THERAGRAN) TABLET] Take 1 tablet by mouth daily.     nystatin (MYCOSTATIN) 456930 UNIT/GM external cream Apply to rash on body 3 times per day as needed.     oxybutynin ER (DITROPAN XL) 5 MG 24 hr tablet Take 1  "tablet (5 mg) by mouth daily     OXYGEN-AIR DELIVERY SYSTEMS MISC [OXYGEN-AIR DELIVERY SYSTEMS MISC] Use 2 L As Directed. 2L with activity and at night  L[place]ana     pregabalin (LYRICA) 50 MG capsule Take 1 tab in the AM and 2 in the evening     sodium chloride (NEBUSAL) 3 % neb solution Take 3 mLs by nebulization 2 times daily     sodium chloride (OCEAN) 0.65 % nasal spray Spray 2 sprays in nostril daily     SUMAtriptan (IMITREX) 50 MG tablet Take 1 tablet (50 mg) by mouth as needed for migraine,may repeat after 2 hours if needed;  mg/24 hours     No current facility-administered medications for this visit.     ALLERGIES:   Allergies   Allergen Reactions     Codeine Unknown     Morphine Itching     DIET: Regular, regular texture, thin liquids.  Prefers bland foods.    Vitals:    01/05/23 1322   BP: (!) 146/84   Pulse: 71   Resp: 18   Temp: 97.5  F (36.4  C)   SpO2: 95%   Weight: 47.2 kg (104 lb)   Height: 1.575 m (5' 2\")     Body mass index is 19.02 kg/m .    EXAMINATION:   General: Pleasant, alert, and conversant elderly female, sitting in a recliner, in no apparent distress.  Head: Normocephalic and atraumatic.   Eyes: PERRLA, sclerae clear.   ENT: Moist oral mucosa.   Cardiovascular: Sinus tachycardia. No appreciable murmur.   Respiratory: Lungs clear to auscultation bilaterally.   Abdomen: Nondistended.   Musculoskeletal/Extremities: Age-related degenerative joint disease.  No peripheral edema.  Integument: No rashes, clinically significant lesions, or skin breakdown.   Cognitive/Psychiatric: Alert and oriented with euthymic affect.    DIAGNOSTICS:   No results found for this or any previous visit (from the past 240 hour(s)).   Last Comprehensive Metabolic Panel:  Sodium   Date Value Ref Range Status   11/12/2022 138 136 - 145 mmol/L Final     Potassium   Date Value Ref Range Status   11/12/2022 4.0 3.4 - 5.3 mmol/L Final   11/08/2021 3.6 3.5 - 5.0 mmol/L Final     Chloride   Date Value Ref Range Status "   11/12/2022 101 98 - 107 mmol/L Final   11/08/2021 103 98 - 107 mmol/L Final     Carbon Dioxide (CO2)   Date Value Ref Range Status   11/12/2022 28 22 - 29 mmol/L Final   11/08/2021 32 (H) 22 - 31 mmol/L Final     Anion Gap   Date Value Ref Range Status   11/12/2022 9 7 - 15 mmol/L Final   11/08/2021 5 5 - 18 mmol/L Final     Glucose   Date Value Ref Range Status   11/12/2022 97 70 - 99 mg/dL Final   11/08/2021 92 70 - 125 mg/dL Final     Urea Nitrogen   Date Value Ref Range Status   11/12/2022 10.1 8.0 - 23.0 mg/dL Final   11/08/2021 13 8 - 28 mg/dL Final     Creatinine   Date Value Ref Range Status   11/12/2022 0.42 (L) 0.51 - 0.95 mg/dL Final     GFR Estimate   Date Value Ref Range Status   11/12/2022 >90 >60 mL/min/1.73m2 Final     Comment:     Effective December 21, 2021 eGFRcr in adults is calculated using the 2021 CKD-EPI creatinine equation which includes age and gender (Wilman et al., NEJ, DOI: 10.1056/EVXJgl1925553)   09/23/2020 >60 >60 mL/min/1.73m2 Final     GFR, ESTIMATED POCT   Date Value Ref Range Status   06/20/2022 >60 >60 mL/min/1.73m2 Final     Calcium   Date Value Ref Range Status   11/12/2022 9.1 8.8 - 10.2 mg/dL Final     Bilirubin Total   Date Value Ref Range Status   07/24/2022 0.4 <=1.2 mg/dL Final     Alkaline Phosphatase   Date Value Ref Range Status   07/24/2022 92 35 - 104 U/L Final     ALT   Date Value Ref Range Status   07/24/2022 11 10 - 35 U/L Final     AST   Date Value Ref Range Status   07/24/2022 32 10 - 35 U/L Final     Last CBC:  Lab Results   Component Value Date    WBC 8.0 11/12/2022     Lab Results   Component Value Date    RBC 4.21 11/12/2022     Lab Results   Component Value Date    HGB 11.9 11/12/2022     Lab Results   Component Value Date    HCT 38.1 11/12/2022     Lab Results   Component Value Date    MCV 91 11/12/2022     Lab Results   Component Value Date    MCH 28.3 11/12/2022     Lab Results   Component Value Date    MCHC 31.2 11/12/2022     Lab Results   Component  Value Date    RDW 13.0 11/12/2022     Lab Results   Component Value Date     11/12/2022       ASSESSMENT/Plan:      ICD-10-CM    1. Bronchiectasis with acute exacerbation (H)  J47.1       2. Panlobular emphysema (H)  J43.1       3. Chronic respiratory failure with hypoxia, on home O2 therapy (H)  J96.11     Z99.81       4. Mood disorder (H)  F39       5. Anxiety  F41.9       6. Other chronic pain  G89.29           DISCHARGE FACE TO FACE:  I certify that this patient is under my care and that I had a face-to-face encounter that meets the physician face-to-face encounter requirements with this patient.     Date of Face-to-Face Encounter:  01/05/2023     I certify that, based on my findings, the following services are medically necessary home health services:  Home PT, and home OT    My clinical findings support the need for the above skilled services because: (Please write a brief narrative summary that describes what the RN, PT, SLP, or other services will be doing in the home. A list of diagnoses in this section does not meet the CMS requirements):  Home PT for strengthening, balance, endurance, and safety with mobility/ambulation; home OT for strengthening, ADL needs, adaptive equipment, and safety.    This patient is homebound because: (Please write a brief narrative summmary describing the functional limitations as to why this patient is homebound and specifically what makes this patient homebound.):  Above services necessarily need to be performed in the home to be of benefit.    The patient is, or has been, under my care and I have initiated the establishment of the plan of care. This patient will be followed by a physician who will periodically review the plan of care.  Initial follow-up should be within 7-10 days.    Approximate time spent with this patient was greater than 30 minutes with greater than 50% spent in discussions regarding services required upon discharge.      The above has been created  using voice recognition software. Please be aware that this may unintentionally  produce inaccuracies and/or nonsensical sentences.      Electronically signed by:  BATOOL Melara CNP

## 2023-01-11 ENCOUNTER — MEDICAL CORRESPONDENCE (OUTPATIENT)
Dept: HEALTH INFORMATION MANAGEMENT | Facility: CLINIC | Age: 78
End: 2023-01-11

## 2023-01-12 ENCOUNTER — TELEPHONE (OUTPATIENT)
Dept: FAMILY MEDICINE | Facility: CLINIC | Age: 78
End: 2023-01-12

## 2023-01-12 ENCOUNTER — MEDICAL CORRESPONDENCE (OUTPATIENT)
Dept: HEALTH INFORMATION MANAGEMENT | Facility: CLINIC | Age: 78
End: 2023-01-12

## 2023-01-12 NOTE — TELEPHONE ENCOUNTER
Daphnie calling from [facility] to request verbal orders for the following:    PT:  1x per week for 1 week, 2x per week for 2 weeks, 1x per week for 4 weeks. Gait training, balance, strength.    Daphnie also reports patient was DNR while she was staying with her previous TCU.  However she has no POLST in her home or chart that Daphnie can find.  This should be discussed at next office visit with patient.    Please call Daphnie at [phone].  Secure VM if needed.

## 2023-01-13 NOTE — TELEPHONE ENCOUNTER
The Home Care/Assisted Living/Nursing Facility is calling regarding an established patient.  Has the patient seen Home Care in the past or is currently residing in Assisted Living or Nursing Facility? Yes.     Daphnie calling from [facility] requesting the following orders that are within the Home Care, Assisted Living or Nursing Home Eval and Treatment standing order and can be signed as standing order signature required by RN.    Preferred Call Back Number: [phone]    PT/OT/Speech Therapy    Any additional Orders:  Are there any orders requested, not stated above, that are outside of the standing order and must be routed to a licensed practitioner for approval?    No    Writer has verified [facility] will send fax to have orders signed.    Made note for upcoming visit with PCP on 1/30 to discuss DNR.

## 2023-01-16 DIAGNOSIS — Z53.9 DIAGNOSIS NOT YET DEFINED: Primary | ICD-10-CM

## 2023-01-16 PROCEDURE — G0180 MD CERTIFICATION HHA PATIENT: HCPCS | Performed by: PEDIATRICS

## 2023-01-20 ENCOUNTER — TRANSFERRED RECORDS (OUTPATIENT)
Dept: HEALTH INFORMATION MANAGEMENT | Facility: CLINIC | Age: 78
End: 2023-01-20

## 2023-01-20 ENCOUNTER — MEDICAL CORRESPONDENCE (OUTPATIENT)
Dept: HEALTH INFORMATION MANAGEMENT | Facility: CLINIC | Age: 78
End: 2023-01-20

## 2023-01-23 ASSESSMENT — ENCOUNTER SYMPTOMS
BREAST MASS: 0
SHORTNESS OF BREATH: 1
ARTHRALGIAS: 1
HEADACHES: 1

## 2023-01-23 ASSESSMENT — ACTIVITIES OF DAILY LIVING (ADL)
CURRENT_FUNCTION: SHOPPING REQUIRES ASSISTANCE
CURRENT_FUNCTION: TRANSPORTATION REQUIRES ASSISTANCE
CURRENT_FUNCTION: HOUSEWORK REQUIRES ASSISTANCE

## 2023-01-24 ENCOUNTER — TELEPHONE (OUTPATIENT)
Dept: PULMONOLOGY | Facility: CLINIC | Age: 78
End: 2023-01-24
Payer: MEDICARE

## 2023-01-24 DIAGNOSIS — R09.82 POST-NASAL DRIP: Primary | ICD-10-CM

## 2023-01-24 DIAGNOSIS — J47.9 BRONCHIECTASIS (H): ICD-10-CM

## 2023-01-24 NOTE — TELEPHONE ENCOUNTER
Per Dr. Lorenz: Annalisa please place ENT referral   To Dr. Jaspreet Burns         Orders placed. ENT should contact her to schedule

## 2023-01-24 NOTE — TELEPHONE ENCOUNTER
Phone call from [facility] - Home Care Nurse Jagruti salcedo.    States that patient would like to see an ENT again.  Has seen Dr. Beverly in the past (9/29) but would like to see someone else for another opinion.  Lucy attempted to make an appt with Dr. Melissa Jacinto without success. (office mentioned they may have more luck if she had a referral?).  Asking if Dr. Lorenz has someone he might recommend? Would like to have her be seen sooner rather than later.  And they would like referral placed.

## 2023-01-25 ENCOUNTER — VIRTUAL VISIT (OUTPATIENT)
Dept: PULMONOLOGY | Facility: CLINIC | Age: 78
End: 2023-01-25
Payer: MEDICARE

## 2023-01-25 DIAGNOSIS — J47.9 BRONCHIECTASIS WITHOUT ACUTE EXACERBATION (H): ICD-10-CM

## 2023-01-25 DIAGNOSIS — J96.11 CHRONIC RESPIRATORY FAILURE WITH HYPOXIA (H): ICD-10-CM

## 2023-01-25 DIAGNOSIS — J44.1 COPD EXACERBATION (H): Primary | ICD-10-CM

## 2023-01-25 DIAGNOSIS — R09.82 POST-NASAL DRIP: ICD-10-CM

## 2023-01-25 PROCEDURE — 99214 OFFICE O/P EST MOD 30 MIN: CPT | Mod: 95 | Performed by: NURSE PRACTITIONER

## 2023-01-25 RX ORDER — PREDNISONE 20 MG/1
40 TABLET ORAL DAILY
Qty: 10 TABLET | Refills: 0 | Status: SHIPPED | OUTPATIENT
Start: 2023-01-25 | End: 2023-01-30

## 2023-01-25 NOTE — PROGRESS NOTES
Ayse is a 77 year old who is being evaluated via a billable video visit.      How would you like to obtain your AVS? MyChart  If the video visit is dropped, the invitation should be resent by: Send to e-mail at: [email or web address]  Will anyone else be joining your video visit? No        Video-Visit Details    Type of service:  Video Visit   Video Start Time: 2:58pm  Video End Time:3:26pm    Originating Location (pt. Location): Home    Distant Location (provider location):  On-site  Platform used for Video Visit: [facility]      LUNG NODULE & INTERVENTIONAL PULMONARY CLINIC  CLINICS & SURGERY [place], [facility]     Fiordaliza Najera Sr. MRN# [id number]   Age: 77 year old YOB: 1945          Assessment and Plan:    Fiordaliza Najera Sr. is a 77 year old female with sig h/o for COPD, chronic hypoxic respiratory failure, and bronchiectasis.  She is participating in a video visit today for chest congestion.    Bronchiectasis  Moderate-severe bronchiectasis per chest CT.  Recent TCU stay for IV antibiotics (meropenem), last day was 1/5/23.  She contacted our clinic today concerned for increased chest congestion.  She has not used her mucomyst + albuterol neb yesterday or today, as she thought she was getting up her sputum okay without it.  Afebrile per nursing staff.    Plan:  - encouraged her to take her daily mucomyst + albuterol neb now, and increase albuterol neb use to help with pulmonary hygiene and sputum clearance.  Encouraged to increase fluid intake as well.  We discussed that antibiotics do not appear to be indicated at this time, especially d/t her hx of multiple courses of oral antibiotics, IV antibiotics, and resistance per last sputum culture.    COPD  Severe obstructive lung disease (FEV1 39%), with air-trapping and severe diffusion capacity defect.  On oxygen 2L continuous at baseline.  Lifelong non-smoker but exposure to second hand  smoke as child.  Contacted clinic today with increased chest congestion and dark sputum, but per further questioning she states sputum is always this dark (green), but is thicker today.  Slight increase in SOB but her sats are 96% on 2L.  Plan:  - will trial prednisone 40mg x5 days for possible exacerbation  - continue Trelegy one puff daily, rinse mouth after use  - continue albuterol neb and inhaler PRN.  Asked her to increase albuterol nebs.    Chronic hypoxic respiratory failure  On continuous 2L oxygen.  Sats 96% per nursing staff during video visit today.  Plan:  - appears stable at this time, continue oxygen    Chronic rhinorrhea/rhinitis  Post nasal drip  Unsure on all the specifics of this, but today she endorses chronic sinus congestion and drip, and she has ENT appt upcoming on 2/8/23.  This could possibly be exacerbating the above comorbidities as well.  Plan:  - continue cetirizine, singulair, saline spray  - follow-up with ENT as planned    Tachycardia   per nursing staff, during video visit today.  She denies chest pain, palpitations, dizziness.  All other VSS.  She believes she is hydrated, drinks about 40 fl oz/day.  Nursing staff state they believe her HR 'runs high' normally.  Plan:  - needs close follow-up, considering she is also having chest congestion.  Nursing will be taking her VS in morning for me and I will call in morning to see if her HR is still elevated and how she is feeling.      Elda Al, CNP  Pulmonary Medicine  [facility]  [phone]             HPI:     Fiordaliza Collazo Reinaldo Dale is a 77 year old female with sig h/o for COPD, chronic hypoxic respiratory failure, and bronchiectasis.  She is participating in a video visit today for chest congestion..    She was recently in TCU from November - January for IV antibiotics for a bronchiectasis exacerbation. Her last day of meropenem was 1/5, and she was discharged back to her apartment  on 1/6/23.  She did feel back to her baseline, but the last few days she is having increased chest congestion and coughing up dark green thick sputum.  Of note, she has not done her mucomyst + albuterol nebs the past two days.  She states her sputum is normally this dark color, but not this thick.  She does have chronic SOB (on oxygen), and believes this is at about baseline.    She denies chest pain, dizziness, chills, fever.    She has been compliant with her daily Trelegy inhaler and her evening saline nebs.  As noted above she's missed her mucomyst + albuterol nebs past two days.  She has not increased her albuterol PRN nebs while she has had this increased congestion.      She has an upcoming appt with her PCP on Monday, ENT on 2/8 for chronic rhinorrhea/sinus congestion, and has an appt with Dr. Lorenz on 2/16.    Nursing from her building is present during video visit.     ROS:     6-point ROS performed and is negative aside from those listed in HPI.           Past Medical History:      Past Medical History:   Diagnosis Date     Anxiety 09/30/2021     COPD (chronic obstructive pulmonary disease) (H)      Diverticulosis      Hiatal hernia      Mild asthma      Neuropathy      Osteopenia      Temporomandibular joint (TMJ) pain            Past Surgical History:      Past Surgical History:   Procedure Laterality Date     ANTERIOR / POSTERIOR COMBINED FUSION LUMBAR SPINE       BRONCHOSCOPY (RIGID OR FLEXIBLE), DIAGNOSTIC N/A 09/28/2021    Procedure: BRONCHOSCOPY, WITH BRONCHOALVEOLAR LAVAGE;  Surgeon: Randall Lorenz MD;  Location: [facility]     HYSTERECTOMY       PICC SINGLE LUMEN PLACEMENT  11/04/2021          PICC SINGLE LUMEN PLACEMENT Right 12/22/2022    Right basilic, 38 cm     RETINAL LASER PROCEDURE Left 10/04/2016     SALPINGOOPHORECTOMY       TOTAL KNEE ARTHROPLASTY  2008          Social History:     Social History     Tobacco Use     Smoking status: Never     Smokeless tobacco: Never   Substance Use  Topics     Alcohol use: No          Family History:     Family History   Problem Relation Age of Onset     Dementia Mother         passed age 88     Chronic Obstructive Pulmonary Disease Father         passed age 78           Allergies:      Allergies   Allergen Reactions     Codeine Unknown     Morphine Itching          Medications:     Current Outpatient Medications   Medication Sig     acetylcysteine (MUCOMYST) 10 % nebulizer solution Inhale 4 mLs into the lungs daily     acetylcysteine (MUCOMYST) 20 % neb solution Take 4 mLs by nebulization daily     albuterol (PROAIR HFA/PROVENTIL HFA/VENTOLIN HFA) 108 (90 Base) MCG/ACT inhaler Inhale 2 puffs into the lungs every 6 hours as needed     albuterol (PROVENTIL) (2.5 MG/3ML) 0.083% neb solution Take 1 vial (2.5 mg) by nebulization every 4 hours as needed for shortness of breath / dyspnea or wheezing     biotin 300 MCG TABS tablet Take 1 tablet (300 mcg) by mouth daily     calcium-vitamin D (CALCIUM-VITAMIN D) 500 mg(1,250mg) -200 unit per tablet Take 1 tablet by mouth 2 times daily      celecoxib (CELEBREX) 200 MG capsule Take 1 capsule (200 mg) by mouth daily     cetirizine (ZYRTEC) 5 MG tablet Take 5 mg by mouth daily     cholecalciferol, vitamin D3, (VITAMIN D3) 1,000 unit capsule [CHOLECALCIFEROL, VITAMIN D3, (VITAMIN D3) 1,000 UNIT CAPSULE] Take 1,000 Units by mouth daily.     escitalopram (LEXAPRO) 20 MG tablet Take 1 tablet (20 mg) by mouth daily     Fluticasone-Umeclidin-Vilanterol (TRELEGY ELLIPTA) 100-62.5-25 MCG/INH oral inhaler Inhale 1 puff into the lungs daily     Lactobacillus rhamnosus GG (CULTURELLE) 10-15 Billion cell capsule Take 1 capsule by mouth every evening      montelukast (SINGULAIR) 10 MG tablet [MONTELUKAST (SINGULAIR) 10 MG TABLET] TAKE 1 TABLET(10 MG) BY MOUTH AT BEDTIME     multivitamin therapeutic (THERAGRAN) tablet [MULTIVITAMIN THERAPEUTIC (THERAGRAN) TABLET] Take 1 tablet by mouth daily.     nystatin (MYCOSTATIN) 206209 UNIT/GM  external cream Apply to rash on body 3 times per day as needed.     oxybutynin ER (DITROPAN XL) 5 MG 24 hr tablet Take 1 tablet (5 mg) by mouth daily     OXYGEN-AIR DELIVERY SYSTEMS MISC [OXYGEN-AIR DELIVERY SYSTEMS MISC] Use 2 L As Directed. 2L with activity and at night  [employer]     pregabalin (LYRICA) 50 MG capsule Take 1 tab in the AM and 2 in the evening     sodium chloride (NEBUSAL) 3 % neb solution Take 3 mLs by nebulization 2 times daily     sodium chloride (OCEAN) 0.65 % nasal spray Spray 2 sprays in nostril daily     SUMAtriptan (IMITREX) 50 MG tablet Take 1 tablet (50 mg) by mouth as needed for migraine,may repeat after 2 hours if needed;  mg/24 hours     escitalopram (LEXAPRO) 5 MG tablet Take 2 tablets (10 mg) by mouth daily (Patient not taking: Reported on 1/25/2023)     No current facility-administered medications for this visit.              Physical Exam:   , sats 96% on 2L oxygen.    Constitutional - elderly female patient, appears in NAD  Respiratory - on 2L oxygen, slight tachypnea noted, although patient appears comfortable and is able to converse through video visit without observed dyspnea.  Neurological - alert, answering questions appropriately  Psychiatric - no signs of delirium or anxiety

## 2023-01-25 NOTE — TELEPHONE ENCOUNTER
Spoke with Ayse and got her scheduled with Dr. Burns.  Pt expressed understanding of appointment.    [facility]      Linnette Stoll RN  [facility]  ENT  [address] [address]  [email or web address]   Office:[phone]  Employed by [facility]

## 2023-01-26 ENCOUNTER — TELEPHONE (OUTPATIENT)
Dept: PULMONOLOGY | Facility: OTHER | Age: 78
End: 2023-01-26
Payer: MEDICARE

## 2023-01-26 DIAGNOSIS — J44.1 COPD EXACERBATION (H): Primary | ICD-10-CM

## 2023-01-26 PROCEDURE — 99207 PR NON-BILLABLE SERV PER CHARTING: CPT | Performed by: NURSE PRACTITIONER

## 2023-01-26 NOTE — TELEPHONE ENCOUNTER
Telephone visit start time:  9:20am  End time:  9:25am    CC:  Follow-up on acute visit    HPI:  Called patient to check on her after acute visit yesterday.  She states she feels much improved after one prednisone dose and catching up on missed nebs.   Nurse is present in her apartment during call, and she states patient appears improved and vitals this morning are:  Temp 98  HR 80s  sats 97-98% on 2L oxygen (baseline)  /70    A/P:  Respirations even and unlabored over phone, no dyspnea noted.  Alert and answering questions appropriately.  Appears stable and improved at this time, let her know to finish prednisone course and continue scheduled nebs, also can add on extra albuterol nebs if feeling congested again.  Reiterated to patient and nurse to call us if her breathing changes again.

## 2023-01-29 ENCOUNTER — MEDICAL CORRESPONDENCE (OUTPATIENT)
Dept: HEALTH INFORMATION MANAGEMENT | Facility: CLINIC | Age: 78
End: 2023-01-29

## 2023-01-30 ENCOUNTER — TELEPHONE (OUTPATIENT)
Dept: FAMILY MEDICINE | Facility: CLINIC | Age: 78
End: 2023-01-30

## 2023-01-30 ENCOUNTER — OFFICE VISIT (OUTPATIENT)
Dept: FAMILY MEDICINE | Facility: CLINIC | Age: 78
End: 2023-01-30
Payer: MEDICARE

## 2023-01-30 VITALS
BODY MASS INDEX: 19.36 KG/M2 | SYSTOLIC BLOOD PRESSURE: 122 MMHG | DIASTOLIC BLOOD PRESSURE: 64 MMHG | HEIGHT: 62 IN | WEIGHT: 105.2 LBS | RESPIRATION RATE: 20 BRPM | HEART RATE: 98 BPM | TEMPERATURE: 99 F | OXYGEN SATURATION: 89 %

## 2023-01-30 DIAGNOSIS — I77.819 AORTIC DILATATION (H): ICD-10-CM

## 2023-01-30 DIAGNOSIS — J44.9 CHRONIC OBSTRUCTIVE PULMONARY DISEASE, UNSPECIFIED COPD TYPE (H): ICD-10-CM

## 2023-01-30 DIAGNOSIS — E55.9 VITAMIN D DEFICIENCY: ICD-10-CM

## 2023-01-30 DIAGNOSIS — I72.0 CAROTID ARTERY ANEURYSM (H): ICD-10-CM

## 2023-01-30 DIAGNOSIS — R06.09 DOE (DYSPNEA ON EXERTION): ICD-10-CM

## 2023-01-30 DIAGNOSIS — R00.0 TACHYCARDIA: ICD-10-CM

## 2023-01-30 DIAGNOSIS — Z13.220 ENCOUNTER FOR LIPID SCREENING FOR CARDIOVASCULAR DISEASE: ICD-10-CM

## 2023-01-30 DIAGNOSIS — F41.1 GAD (GENERALIZED ANXIETY DISORDER): ICD-10-CM

## 2023-01-30 DIAGNOSIS — Z00.00 ENCOUNTER FOR MEDICARE ANNUAL WELLNESS EXAM: Primary | ICD-10-CM

## 2023-01-30 DIAGNOSIS — J31.0 CHRONIC RHINITIS: ICD-10-CM

## 2023-01-30 DIAGNOSIS — Z13.6 ENCOUNTER FOR LIPID SCREENING FOR CARDIOVASCULAR DISEASE: ICD-10-CM

## 2023-01-30 DIAGNOSIS — R53.83 OTHER FATIGUE: ICD-10-CM

## 2023-01-30 DIAGNOSIS — Z12.31 ENCOUNTER FOR SCREENING MAMMOGRAM FOR MALIGNANT NEOPLASM OF BREAST: ICD-10-CM

## 2023-01-30 DIAGNOSIS — Z79.899 ENCOUNTER FOR LONG-TERM (CURRENT) USE OF MEDICATIONS: ICD-10-CM

## 2023-01-30 DIAGNOSIS — J96.11 CHRONIC RESPIRATORY FAILURE WITH HYPOXIA (H): ICD-10-CM

## 2023-01-30 DIAGNOSIS — J47.9 BRONCHIECTASIS WITHOUT COMPLICATION (H): ICD-10-CM

## 2023-01-30 DIAGNOSIS — M62.81 GENERALIZED MUSCLE WEAKNESS: ICD-10-CM

## 2023-01-30 PROBLEM — Z99.81 DEPENDENCE ON SUPPLEMENTAL OXYGEN: Status: ACTIVE | Noted: 2022-12-22

## 2023-01-30 PROBLEM — R09.02 HYPOXEMIA: Status: ACTIVE | Noted: 2022-12-22

## 2023-01-30 PROBLEM — R26.81 UNSTEADINESS ON FEET: Status: ACTIVE | Noted: 2022-12-22

## 2023-01-30 PROBLEM — R26.2 DIFFICULTY IN WALKING, NOT ELSEWHERE CLASSIFIED: Status: ACTIVE | Noted: 2022-12-22

## 2023-01-30 PROCEDURE — G0439 PPPS, SUBSEQ VISIT: HCPCS | Performed by: STUDENT IN AN ORGANIZED HEALTH CARE EDUCATION/TRAINING PROGRAM

## 2023-01-30 PROCEDURE — 99214 OFFICE O/P EST MOD 30 MIN: CPT | Mod: 25 | Performed by: STUDENT IN AN ORGANIZED HEALTH CARE EDUCATION/TRAINING PROGRAM

## 2023-01-30 RX ORDER — METOPROLOL SUCCINATE 25 MG/1
25 TABLET, EXTENDED RELEASE ORAL DAILY
Qty: 90 TABLET | Refills: 0 | Status: SHIPPED | OUTPATIENT
Start: 2023-01-30 | End: 2023-01-01

## 2023-01-30 RX ORDER — BUPROPION HYDROCHLORIDE 150 MG/1
150 TABLET ORAL EVERY MORNING
Qty: 90 TABLET | Refills: 0 | Status: SHIPPED | OUTPATIENT
Start: 2023-01-30 | End: 2023-01-01

## 2023-01-30 ASSESSMENT — ENCOUNTER SYMPTOMS
ARTHRALGIAS: 1
SHORTNESS OF BREATH: 1
HEADACHES: 1
BREAST MASS: 0

## 2023-01-30 ASSESSMENT — ACTIVITIES OF DAILY LIVING (ADL)
CURRENT_FUNCTION: SHOPPING REQUIRES ASSISTANCE
CURRENT_FUNCTION: HOUSEWORK REQUIRES ASSISTANCE
CURRENT_FUNCTION: TRANSPORTATION REQUIRES ASSISTANCE

## 2023-01-30 ASSESSMENT — PAIN SCALES - GENERAL: PAINLEVEL: NO PAIN (0)

## 2023-01-30 NOTE — PATIENT INSTRUCTIONS
Patient Education   Personalized Prevention Plan  You are due for the preventive services outlined below.  Your care team is available to assist you in scheduling these services.  If you have already completed any of these items, please share that information with your care team to update in your medical record.  Health Maintenance Due   Topic Date Due     URINE DRUG SCREEN  Never done     COPD Action Plan  Never done     Annual Wellness Visit  10/20/2022     ANNUAL REVIEW OF HM ORDERS  10/20/2022     Your Health Risk Assessment indicates you feel you are not in good health    A healthy lifestyle helps keep the body fit and the mind alert. It helps protect you from disease, helps you fight disease, and helps prevent chronic disease (disease that doesn't go away) from getting worse. This is important as you get older and begin to notice twinges in muscles and joints and a decline in the strength and stamina you once took for granted. A healthy lifestyle includes good healthcare, good nutrition, weight control, recreation, and regular exercise. Avoid harmful substances and do what you can to keep safe. Another part of a healthy lifestyle is stay mentally active and socially involved.    Good healthcare     Have a wellness visit every year.     If you have new symptoms, let us know right away. Don't wait until the next checkup.     Take medicines exactly as prescribed and keep your medicines in a safe place. Tell us if your medicine causes problems.   Healthy diet and weight control     Eat 3 or 4 small, nutritious, low-fat, high-fiber meals a day. Include a variety of fruits, vegetables, and whole-grain foods.     Make sure you get enough calcium in your diet. Calcium, vitamin D, and exercise help prevent osteoporosis (bone thinning).     If you live alone, try eating with others when you can. That way you get a good meal and have company while you eat it.     Try to keep a healthy weight. If you eat more calories  than your body uses for energy, it will be stored as fat and you will gain weight.     Recreation   Recreation is not limited to sports and team events. It includes any activity that provides relaxation, interest, enjoyment, and exercise. Recreation provides an outlet for physical, mental, and social energy. It can give a sense of worth and achievement. It can help you stay healthy.    Mental Exercise and Social Involvement  Mental and emotional health is as important as physical health. Keep in touch with friends and family. Stay as active as possible. Continue to learn and challenge yourself.   Things you can do to stay mentally active are:    Learn something new, like a foreign language or musical instrument.     Play SCRABBLE or do crossword puzzles. If you cannot find people to play these games with you at home, you can play them with others on your computer through the Internet.     Join a games club--anything from card games to chess or checkers or lawn bowling.     Start a new hobby.     Go back to school.     Volunteer.     Read.   Keep up with world events.  Activities of Daily Living    Your Health Risk Assessment indicates you have difficulties with activities of daily living such as housework, bathing, preparing meals, taking medication, etc. Please make a follow up appointment for us to address this issue in more detail.    Urinary Incontinence, Female (Adult)   Urinary incontinence means loss of bladder control. This problem affects many women, especially as they get older. If you have incontinence, you may be embarrassed to ask for help. But know that this problem can be treated.   Types of Incontinence  There are different types of incontinence. Two of the main types are described here. You can have more than one type.     Stress incontinence. With this type, urine leaks when pressure (stress) is put on the bladder. This may happen when you cough, sneeze, or laugh. Stress incontinence most often  occurs because the pelvic floor muscles that support the bladder and urethra are weak. This can happen after pregnancy and vaginal childbirth or a hysterectomy. It can also be due to excess body weight or hormone changes.    Urge incontinence (also called overactive bladder). With this type, a sudden urge to urinate is felt often. This may happen even though there may not be much urine in the bladder. The need to urinate often during the night is common. Urge incontinence most often occurs because of bladder spasms. This may be due to bladder irritation or infection. Damage to bladder nerves or pelvic muscles, constipation, and certain medicines can also lead to urge incontinence.  Treatment depends on the cause. Further evaluation is needed to find the type you have. This will likely include an exam and certain tests. Based on the results, you and your healthcare provider can then plan treatment. Until a diagnosis is made, the home care tips below can help ease symptoms.   Home care    Do pelvic floor muscle exercises, if they are prescribed. The pelvic floor muscles help support the bladder and urethra. Many women find that their symptoms improve when doing special exercises that strengthen these muscles. To do the exercises, contract the muscles you would use to stop your stream of urine. But do this when you re not urinating. Hold for 10 seconds, then relax. Repeat 10 to 20 times in a row, at least 3 times a day. Your healthcare provider may give you other instructions for how to do the exercises and how often.    Keep a bladder diary. This helps track how often and how much you urinate over a set period of time. Bring this diary with you to your next visit with the provider. The information can help your provider learn more about your bladder problem.    Lose weight, if advised to by your provider. Extra weight puts pressure on the bladder. Your provider can help you create a weight-loss plan that s right for  you. This may include exercising more and making certain diet changes.    Don't have foods and drinks that may irritate the bladder. These can include alcohol and caffeinated drinks.    Quit smoking. Smoking and other tobacco use can lead to a long-term (chronic) cough that strains the pelvic floor muscles. Smoking may also damage the bladder and urethra. Talk with your provider about treatments or methods you can use to quit smoking.    If drinking large amounts of fluid makes you have symptoms, you may be advised to limit your fluid intake. You may also be advised to drink most of your fluids during the day and to limit fluids at night.    If you re worried about urine leakage or accidents, you may wear absorbent pads to catch urine. Change the pads often. This helps reduce discomfort. It may also reduce the risk of skin or bladder infections.    Follow-up care  Follow up with your healthcare provider, or as directed. It may take some to find the right treatment for your problem. But healthy lifestyle changes can be made right away. These include such things as exercising on a regular basis, eating a healthy diet, losing weight (if needed), and quitting smoking. Your treatment plan may include special therapies or medicines. Certain procedures or surgery may also be options. Talk about any questions you have with your provider.   When to seek medical advice  Call the healthcare provider right away if any of these occur:    Fever of 100.4 F (38 C) or higher, or as directed by your provider    Bladder pain or fullness    Belly swelling    Nausea or vomiting    Back pain    Weakness, dizziness, or fainting  [employer] last reviewed this educational content on 1/1/2020 2000-2021 [employer]. All rights reserved. This information is not intended as a substitute for professional medical care. Always follow your healthcare professional's instructions.

## 2023-01-30 NOTE — PROGRESS NOTES
"SUBJECTIVE:   Ayse is a 77 year old who presents for Preventive Visit.  Patient has been advised of split billing requirements and indicates understanding: Yes  Are you in the first 12 months of your Medicare coverage?  No    Healthy Habits:     In general, how would you rate your overall health?  Fair    Frequency of exercise:  6-7 days/week    Duration of exercise:  45-60 minutes    Do you usually eat at least 4 servings of fruit and vegetables a day, include whole grains    & fiber and avoid regularly eating high fat or \"junk\" foods?  Yes    Taking medications regularly:  Yes    Medication side effects:  None    Ability to successfully perform activities of daily living:  Transportation requires assistance, shopping requires assistance and housework requires assistance    Home Safety:  No safety concerns identified    Hearing Impairment:  No hearing concerns    In the past 6 months, have you been bothered by leaking of urine? Yes    In general, how would you rate your overall mental or emotional health?  Good      PHQ-2 Total Score: 0    Additional concerns today:  Yes          Have you ever done Advance Care Planning? (For example, a Health Directive, POLST, or a discussion with a medical provider or your loved ones about your wishes): Yes, advance care planning is on file.       Fall risk  Fallen 2 or more times in the past year?: No  Any fall with injury in the past year?: No       Cognitive Screening   1) Repeat 3 items (Leader, Season, Table)    2) Clock draw: NORMAL  3) 3 item recall: Recalls 3 objects  Results: 3 items recalled: COGNITIVE IMPAIRMENT LESS LIKELY    Mini-CogTM Copyright MARIO Hollins. Licensed by the author for use in [facility]; reprinted with permission (alexander@.[facility]). All rights reserved.      Do you have sleep apnea, excessive snoring or daytime drowsiness?: no    Reviewed and updated as needed this visit by clinical staff   Tobacco  Allergies  Meds              Reviewed and " updated as needed this visit by Provider                 Social History     Tobacco Use     Smoking status: Never     Smokeless tobacco: Never   Substance Use Topics     Alcohol use: No     If you drink alcohol do you typically have >3 drinks per day or >7 drinks per week? Not applicable    Alcohol Use 1/23/2023   Prescreen: >3 drinks/day or >7 drinks/week? Not Applicable   Prescreen: >3 drinks/day or >7 drinks/week? -     Current providers sharing in care for this patient include:     Patient Care Team:  Neida Maradiaga DO as PCP - General (Family Medicine)  Randall Lorenz MD as Assigned Pulmonology Provider  Marjan Paul MD as MD (Endocrinology, Diabetes, and Metabolism)  Marjan Paul MD as Assigned Endocrinology Provider  Neida Maradiaga DO as Assigned PCP  Jazmin Beverly MD as Assigned Surgical Provider  [facility] [employer] [facility]  Eugene Alvarado APRN CNP as Nurse Practitioner (Family Medicine)    The following health maintenance items are reviewed in Epic and correct as of today:  Health Maintenance   Topic Date Due     URINE DRUG SCREEN  Never done     COPD ACTION PLAN  Never done     MEDICARE ANNUAL WELLNESS VISIT  10/20/2022     ANNUAL REVIEW OF HM ORDERS  10/20/2022     FALL RISK ASSESSMENT  01/30/2024     LIPID  10/20/2026     ADVANCE CARE PLANNING  10/22/2026     DTAP/TDAP/TD IMMUNIZATION (7 - Td or Tdap) 09/23/2030     DEXA  02/10/2037     SPIROMETRY  Completed     HEPATITIS C SCREENING  Completed     PHQ-2 (once per calendar year)  Completed     INFLUENZA VACCINE  Completed     Pneumococcal Vaccine: 65+ Years  Completed     ZOSTER IMMUNIZATION  Completed     COVID-19 Vaccine  Completed     IPV IMMUNIZATION  Aged Out     MENINGITIS IMMUNIZATION  Aged Out     MAMMO SCREENING  Discontinued     COLORECTAL CANCER SCREENING  Discontinued     Lab work is in process  Labs reviewed in EPIC  BP Readings from Last 3 Encounters:   01/30/23 122/64   01/05/23 (!) 146/84    01/02/23 (!) 146/84    Wt Readings from Last 3 Encounters:   01/30/23 47.7 kg (105 lb 3.2 oz)   01/05/23 47.2 kg (104 lb)   01/02/23 47.2 kg (104 lb)                  Patient Active Problem List   Diagnosis     Osteopenia     Bronchiectasis with (acute) exacerbation (H)     Idiopathic peripheral neuropathy     COPD (chronic obstructive pulmonary disease) (H)     Unspecified abnormalities of gait and mobility     Back pain     Diaphragmatic hernia     Other kyphoscoliosis and scoliosis     Chronic respiratory failure with hypoxia, on home O2 therapy (H)     Anxiety     Migraine without aura and without status migrainosus, not intractable     Carotid artery aneurysm (H)     Bronchiectasis (H)     Mood disorder (H)     Chronic pain     Nontraumatic tear of rotator cuff, unspecified laterality, unspecified tear extent     Panlobular emphysema (H)     Dependence on supplemental oxygen     Difficulty in walking, not elsewhere classified     Hypoxemia     Muscle weakness (generalized)     Unsteadiness on feet     Past Surgical History:   Procedure Laterality Date     ANTERIOR / POSTERIOR COMBINED FUSION LUMBAR SPINE       BRONCHOSCOPY (RIGID OR FLEXIBLE), DIAGNOSTIC N/A 09/28/2021    Procedure: BRONCHOSCOPY, WITH BRONCHOALVEOLAR LAVAGE;  Surgeon: Randall Lorenz MD;  Location: [facility]     HYSTERECTOMY       PICC SINGLE LUMEN PLACEMENT  11/04/2021          PICC SINGLE LUMEN PLACEMENT Right 12/22/2022    Right basilic, 38 cm     RETINAL LASER PROCEDURE Left 10/04/2016     SALPINGOOPHORECTOMY       TOTAL KNEE ARTHROPLASTY  2008       Social History     Tobacco Use     Smoking status: Never     Smokeless tobacco: Never   Substance Use Topics     Alcohol use: No     Family History   Problem Relation Age of Onset     Dementia Mother         passed age 88     Chronic Obstructive Pulmonary Disease Father         passed age 78         Current Outpatient Medications   Medication Sig Dispense Refill     acetylcysteine (MUCOMYST) 10  % nebulizer solution Inhale 4 mLs into the lungs daily 120 mL 11     acetylcysteine (MUCOMYST) 20 % neb solution Take 4 mLs by nebulization daily 360 mL 3     albuterol (PROAIR HFA/PROVENTIL HFA/VENTOLIN HFA) 108 (90 Base) MCG/ACT inhaler Inhale 2 puffs into the lungs every 6 hours as needed 18 g 11     albuterol (PROVENTIL) (2.5 MG/3ML) 0.083% neb solution Take 1 vial (2.5 mg) by nebulization every 4 hours as needed for shortness of breath / dyspnea or wheezing 150 mL 11     biotin 300 MCG TABS tablet Take 1 tablet (300 mcg) by mouth daily       buPROPion (WELLBUTRIN XL) 150 MG 24 hr tablet Take 1 tablet (150 mg) by mouth every morning 90 tablet 0     calcium-vitamin D (CALCIUM-VITAMIN D) 500 mg(1,250mg) -200 unit per tablet Take 1 tablet by mouth 2 times daily        celecoxib (CELEBREX) 200 MG capsule Take 1 capsule (200 mg) by mouth daily 90 capsule 1     cetirizine (ZYRTEC) 5 MG tablet Take 5 mg by mouth daily       cholecalciferol, vitamin D3, (VITAMIN D3) 1,000 unit capsule [CHOLECALCIFEROL, VITAMIN D3, (VITAMIN D3) 1,000 UNIT CAPSULE] Take 1,000 Units by mouth daily.       escitalopram (LEXAPRO) 20 MG tablet Take 1 tablet (20 mg) by mouth daily 90 tablet 0     Fluticasone-Umeclidin-Vilanterol (TRELEGY ELLIPTA) 100-62.5-25 MCG/INH oral inhaler Inhale 1 puff into the lungs daily 60 each 11     Lactobacillus rhamnosus GG (CULTURELLE) 10-15 Billion cell capsule Take 1 capsule by mouth every evening        metoprolol succinate ER (TOPROL XL) 25 MG 24 hr tablet Take 1 tablet (25 mg) by mouth daily 90 tablet 0     montelukast (SINGULAIR) 10 MG tablet [MONTELUKAST (SINGULAIR) 10 MG TABLET] TAKE 1 TABLET(10 MG) BY MOUTH AT BEDTIME 90 tablet 3     multivitamin therapeutic (THERAGRAN) tablet [MULTIVITAMIN THERAPEUTIC (THERAGRAN) TABLET] Take 1 tablet by mouth daily.       nystatin (MYCOSTATIN) 213811 UNIT/GM external cream Apply to rash on body 3 times per day as needed. 30 g 1     oxybutynin ER (DITROPAN XL) 5 MG 24  "hr tablet Take 1 tablet (5 mg) by mouth daily 90 tablet 3     OXYGEN-AIR DELIVERY SYSTEMS MISC [OXYGEN-AIR DELIVERY SYSTEMS MISC] Use 2 L As Directed. 2L with activity and at night  Sherri       pregabalin (LYRICA) 50 MG capsule Take 1 tab in the AM and 2 in the evening 90 capsule 1     sodium chloride (NEBUSAL) 3 % neb solution Take 3 mLs by nebulization 2 times daily 180 mL 11     sodium chloride (OCEAN) 0.65 % nasal spray Spray 2 sprays in nostril daily 88 mL 0     SUMAtriptan (IMITREX) 50 MG tablet Take 1 tablet (50 mg) by mouth as needed for migraine,may repeat after 2 hours if needed;  mg/24 hours 30 tablet 1     Allergies   Allergen Reactions     Codeine Unknown     Morphine Itching     Pneumonia Vaccine:For adults 65 years or older who do not have an immunocompromising condition, cerebrospinal fluid leak, or cochlear implant and want to receive PPSV23 ONLY: Administer 1 dose of PPSV23. Anyone who received any doses of PPSV23 before age 65 should receive 1 final dose of the vaccine at age 65 or older. Administer this last dose at least 5 years after the prior PPSV23 dose.  Mammogram Screening: Mammogram Screening - Patient over age 75, has elected to continue with screening.  Last 3 Pap and HPV Results:        Pertinent mammograms are reviewed under the imaging tab.    Review of Systems   HENT: Positive for congestion.    Respiratory: Positive for shortness of breath.    Breasts:  Negative for tenderness, breast mass and discharge.   Genitourinary: Negative for pelvic pain, vaginal bleeding and vaginal discharge.   Musculoskeletal: Positive for arthralgias.   Neurological: Positive for headaches.     OBJECTIVE:   /64 (BP Location: Right arm, Patient Position: Sitting, Cuff Size: Adult Regular)   Pulse 98   Temp 99  F (37.2  C) (Oral)   Resp 20   Ht 1.581 m (5' 2.25\")   Wt 47.7 kg (105 lb 3.2 oz)   SpO2 (!) 89%   BMI 19.09 kg/m   Estimated body mass index is 19.09 kg/m  as calculated from " "the following:    Height as of this encounter: 1.581 m (5' 2.25\").    Weight as of this encounter: 47.7 kg (105 lb 3.2 oz).  Physical Exam  GENERAL: Thin, elderly, chronically ill-appearing.  On 2 L of NC O2  NECK: no adenopathy, no asymmetry, masses, or scars and thyroid normal to palpation  RESP: Coarse lung sounds throughout all lung fields with no obvious and expiratory wheezing, bibasilar crackles noted.  On baseline 2 L NC O2 and in no respiratory distress.  Kyphotic.    CV: regular rate and rhythm, normal S1 S2, no S3 or S4, no murmur, click or rub, no peripheral edema and peripheral pulses strong  MS: no gross musculoskeletal defects noted, no edema  Uses walker at baseline  Able to ambulate without difficulty  PSYCH: mentation appears normal, affect anxious    ASSESSMENT / PLAN:       ICD-10-CM    1. Encounter for Medicare annual wellness exam  Z00.00 *MA Screening Digital Bilateral      2. Encounter for long-term (current) use of medications  Z79.899       3. Carotid artery aneurysm (H)  I72.0       4. Tachycardia  R00.0 Echocardiogram Complete   5. Bronchiectasis without complication (H)     J47.9 Adult Cardiac Event Monitor   6. Other fatigue     R53.83 metoprolol succinate ER (TOPROL XL) 25 MG 24 hr tablet     Comprehensive metabolic panel (BMP + Alb, Alk Phos, ALT, AST, Total. Bili, TP)     CBC with platelets     TSH with free T4 reflex     Lipid Profile (Chol, Trig, HDL, LDL calc)         7. ANDREW (generalized anxiety disorder)  F41.1 buPROPion (WELLBUTRIN XL) 150 MG 24 hr tablet      8. Encounter for screening mammogram for malignant neoplasm of breast  Z12.31 *MA Screening Digital Bilateral      9. Encounter for lipid screening for cardiovascular disease  Z13.220 Lipid Profile (Chol, Trig, HDL, LDL calc)    Z13.6       10. Vitamin D deficiency  E55.9 Vitamin D Deficiency      11. Chronic respiratory failure with hypoxia (H)  J96.11       12. Aortic dilatation (H)  I77.819       13. Chronic obstructive " pulmonary disease, unspecified COPD type (H)  J44.9       14. Chronic rhinitis  J31.0       15. GARCIA (dyspnea on exertion)  R06.09       16. Generalized muscle weakness  M62.81             Patient is a pleasant 77-year-old female with PMH of bronchiectasis, anxiety, peripheral neuropathy, chronic respiratory failure on home O2, migraines, rotator cuff tear and emphysema who presents today for annual wellness    Patient lives in independent living.  Has people that come in and check on her daily.  No obvious safety concerns identified.  Does not recall any significant family history of cancers.  Notes she is up-to-date on eye/dental exam.  Denies any heavy alcohol use  No tobacco use, marijuana use or drug use  Vaccines are up-to-date  Blood work has been ordered  Patient is postmenopausal without any postmenopausal bleeding noted  Last DEXA scan: 2/10/2022 consistent with osteopenia with moderate risk fracture.  On Reclast.  Last Pap smear: Aged out  Mammogram: calcification was stable in , would like to continue with mammograms for now.  Ordered.  Colon cancer screenin - negative cologuard.     IADLs/ADLs intact: Yes  Driving: Does not drive  Memory: Intact  Constipation, vision, hearing concerns: None identified  Falls over the last year: None, just generalized weakness    Patient recently has been struggling with her bronchiectasis.  Has moderate to severe bronchiectasis per CT chest.  Follows with pulmonology.  Recently finished her TCU stay for IV antibiotics (meropenem) on   Today, patient is doing well after prednisone burst on  given by pulm for bronchiectasis flare.  Antibiotics were not given.  She feels quite weak and deconditioned still.  But otherwise at baseline.      Groundglass opacity involving left lower lobe:  Has several ill-defined nodules along the margin of both lungs  Was noted to be new  on CT of the chest.  Had recommended a 6-month follow-up but it does not look  "like it got done.  Order placed.      Aortic dilatation 3.4 cm:  Noted on last CT on 7/22  Patient is just below threshold for annual CT for monitoring aortic dilatation  We are getting an echo today as noted below, that will help give us a interval update size of aortic dilatation and any valvular disease that may be concurrent      COPD:  Severe obstructive lung disease (FEV1 39%) were tapping and severe DLCO defect on continuous 2 L of O2 at home.  Found to be in exacerbation with last pulmonary appointment on 1/25 and given prednisone burst  Feels that her shortness of breath cleared up with the prednisone burst and her breathing is back to baseline  Is 89% today --> improved to 92% with deep breathing.  In no respiratory distress.  Is on her baseline 2 L of O2.  Patient notes that she has O2 sats in the 90s at home  Lung exam is appropriate without wheezing or signs of COPD exacerbation.  I suspect she is anxious and some of that is contributing.    Chronic rhinorrhea/rhinitis  Postnasal drip  Follows with ENT, has appointment coming up with them on February 8    GARCIA  Tachycardia  Generalized weakness  As noted above, patient has been having a difficult time with her severe bronchiectasis/COPD.  Has been in and out of exacerbation.  Recent stent at the TCU.  Has been feeling like she is much weaker.  She is working with home PT at this time.  They are coming to the house twice a week.  However, she feels she cannot do as much with them because \"any small level of exercise\" will cause her heart to start racing.  When her heart starts racing, she finds it hard to breathe.  She is not sure if that is related to her anxiety or underlying bronchiectasis.  O2 sats at home are in the low 90s consistently with 2 L NC O2.  She has no chest pain, lightheadedness, dizziness or near syncope with this.  No lower extremity edema.  Plan:  - Patient is euvolemic  - Does have longstanding bronchiectasis which could be " contributing but she does not seem to be improving despite recent stent at TCU and ongoing PT.  - On chart review, echo in 2016 does note some diastolic dysfunction on her echo.  At this point, would like to order Echo and cardiac monitor to assess extent of her tachycardia and EF.  - I suspect her tachycardia is exacerbated by underlying uncontrolled anxiety  -We will start her on metoprolol to see if that will help with her tachycardia    Osteopenia with high risk fracture:  Is doing Reclast with endocrinology.  Next due in May.  Has not had any falls.    Anxiety:  Patient has quite a bit of anxiety as related to her breathing.  Recently increased her dose of Lexapro to 20 mg which she feels like is helping.  She is getting more sleep but still has breakthrough anxiety symptoms on a daily basis.  Discussed adding a second agent to manage her anxiety.  Patient is amenable.  Wellbutrin added.      Patient has been advised of split billing requirements and indicates understanding: Yes      COUNSELING:  Reviewed preventive health counseling, as reflected in patient instructions        She reports that she has never smoked. She has never used smokeless tobacco.      Appropriate preventive services were discussed with this patient, including applicable screening as appropriate for cardiovascular disease, diabetes, osteopenia/osteoporosis, and glaucoma.  As appropriate for age/gender, discussed screening for colorectal cancer, prostate cancer, breast cancer, and cervical cancer. Checklist reviewing preventive services available has been given to the patient.    Reviewed patients plan of care and provided an AVS. The Basic Care Plan (routine screening as documented in Health Maintenance) for Fi[place] meets the Care Plan requirement. This Care Plan has been established and reviewed with the Patient.        Neida Maradiaga DO  [facility]

## 2023-01-30 NOTE — TELEPHONE ENCOUNTER
Patient Returning Call    Reason for call:  Patient calling back in based on VM she received from [facility].    Information relayed to patient:  No call outs noted in chart at time of call, unknown why clinical staff are attempting to reach patient.    Patient has additional questions:  Yes    What are your questions/concerns:  Patient would like to know why the clinical staff are attempting to reach her.  She was just seen this morning by Dr Maradiaga and when she returned home she had a voicemail from [facility].  VM did not specify reason for call.  Please call patient back to discuss.    Could we send this information to you in [facility] or would you prefer to receive a phone call?:   Patient would prefer a phone call   Okay to leave a detailed message?: Yes at Home number on file [phone] (home)

## 2023-01-31 NOTE — TELEPHONE ENCOUNTER
Left message for patient to call back. Reasoning why patient was called yesterday she left before we could her schedule for a follow up and patient needed labs completed. When she calls back please assist in scheduling a lab only appointment ASAP and schedule follow up in 3 months

## 2023-02-02 ENCOUNTER — LAB (OUTPATIENT)
Dept: LAB | Facility: CLINIC | Age: 78
End: 2023-02-02
Payer: MEDICARE

## 2023-02-02 ENCOUNTER — MYC MEDICAL ADVICE (OUTPATIENT)
Dept: FAMILY MEDICINE | Facility: CLINIC | Age: 78
End: 2023-02-02

## 2023-02-02 DIAGNOSIS — J47.9 BRONCHIECTASIS WITHOUT COMPLICATION (H): ICD-10-CM

## 2023-02-02 DIAGNOSIS — R00.0 TACHYCARDIA: ICD-10-CM

## 2023-02-02 DIAGNOSIS — Z13.220 ENCOUNTER FOR LIPID SCREENING FOR CARDIOVASCULAR DISEASE: ICD-10-CM

## 2023-02-02 DIAGNOSIS — Z13.6 ENCOUNTER FOR LIPID SCREENING FOR CARDIOVASCULAR DISEASE: ICD-10-CM

## 2023-02-02 DIAGNOSIS — E55.9 VITAMIN D DEFICIENCY: ICD-10-CM

## 2023-02-02 DIAGNOSIS — R53.83 OTHER FATIGUE: ICD-10-CM

## 2023-02-02 LAB
ALBUMIN SERPL BCG-MCNC: 3.6 G/DL (ref 3.5–5.2)
ALP SERPL-CCNC: 89 U/L (ref 35–104)
ALT SERPL W P-5'-P-CCNC: 23 U/L (ref 10–35)
ANION GAP SERPL CALCULATED.3IONS-SCNC: 10 MMOL/L (ref 7–15)
AST SERPL W P-5'-P-CCNC: 32 U/L (ref 10–35)
BILIRUB SERPL-MCNC: 0.5 MG/DL
BUN SERPL-MCNC: 13.6 MG/DL (ref 8–23)
CALCIUM SERPL-MCNC: 9.4 MG/DL (ref 8.8–10.2)
CHLORIDE SERPL-SCNC: 92 MMOL/L (ref 98–107)
CHOLEST SERPL-MCNC: 150 MG/DL
CREAT SERPL-MCNC: 0.53 MG/DL (ref 0.51–0.95)
DEPRECATED HCO3 PLAS-SCNC: 30 MMOL/L (ref 22–29)
ERYTHROCYTE [DISTWIDTH] IN BLOOD BY AUTOMATED COUNT: 13 % (ref 10–15)
GFR SERPL CREATININE-BSD FRML MDRD: >90 ML/MIN/1.73M2
GLUCOSE SERPL-MCNC: 95 MG/DL (ref 70–99)
HCT VFR BLD AUTO: 36.2 % (ref 35–47)
HDLC SERPL-MCNC: 69 MG/DL
HGB BLD-MCNC: 11.5 G/DL (ref 11.7–15.7)
LDLC SERPL CALC-MCNC: 64 MG/DL
MCH RBC QN AUTO: 28.2 PG (ref 26.5–33)
MCHC RBC AUTO-ENTMCNC: 31.8 G/DL (ref 31.5–36.5)
MCV RBC AUTO: 89 FL (ref 78–100)
NONHDLC SERPL-MCNC: 81 MG/DL
PLATELET # BLD AUTO: 423 10E3/UL (ref 150–450)
POTASSIUM SERPL-SCNC: 4.1 MMOL/L (ref 3.4–5.3)
PROT SERPL-MCNC: 7.3 G/DL (ref 6.4–8.3)
RBC # BLD AUTO: 4.08 10E6/UL (ref 3.8–5.2)
SODIUM SERPL-SCNC: 132 MMOL/L (ref 136–145)
T4 FREE SERPL-MCNC: 1.16 NG/DL (ref 0.9–1.7)
TRIGL SERPL-MCNC: 84 MG/DL
TSH SERPL DL<=0.005 MIU/L-ACNC: 6.21 UIU/ML (ref 0.3–4.2)
WBC # BLD AUTO: 14 10E3/UL (ref 4–11)

## 2023-02-02 PROCEDURE — 80053 COMPREHEN METABOLIC PANEL: CPT

## 2023-02-02 PROCEDURE — 85027 COMPLETE CBC AUTOMATED: CPT

## 2023-02-02 PROCEDURE — 82306 VITAMIN D 25 HYDROXY: CPT

## 2023-02-02 PROCEDURE — 84439 ASSAY OF FREE THYROXINE: CPT

## 2023-02-02 PROCEDURE — 84443 ASSAY THYROID STIM HORMONE: CPT

## 2023-02-02 PROCEDURE — 36415 COLL VENOUS BLD VENIPUNCTURE: CPT

## 2023-02-02 PROCEDURE — 80061 LIPID PANEL: CPT

## 2023-02-03 LAB — DEPRECATED CALCIDIOL+CALCIFEROL SERPL-MC: 49 UG/L (ref 20–75)

## 2023-02-05 PROBLEM — I77.819 AORTIC DILATATION (H): Status: ACTIVE | Noted: 2023-02-05

## 2023-02-08 NOTE — LETTER
2/8/2023         RE: Fiordaliza Najera  [address] [place] [address]        Dear Colleague,    Thank you for referring your patient, Fiordaliza Najera, to the [facility]. Please see a copy of my visit note below.    uCHIEF COMPLAINT: Patient presents with:  Ent Problem: Would like her ear nose and throat looked at had a sinus headache and pressure in ears and it is recurring issue and been dealing with this for years she is on Oxygen and is taking a neb 3 times a day and coughs up phlegm usually greenish, saw another ENT and was told this was due to reflux, but she believes it is due to sinus. She is using nasal saline spray and ayr to keep the dryness from causing lesions in her nose and she states that is helping          HISTORY OF PRESENT ILLNESS    Ayse was seen at the behest of Randall Lorenz MD for nasal lesion.         REFERRAL NOTE:    Nasal lesion:  Looks like in October 2021, patient was prescribed topical mupirocin for scab forming in her bilateral nares.  Patient says that she has been using the mupirocin as needed for scab formation.  It helps heal the lesion but as soon as she stops using it, it comes back.  Has not been to ENT for further investigation of this lesion.  Referral to ENT placed given recurrent nature of the lesion.  I am unable to see a lesion on exam today.     Previous ENT note:  JENIFFER Beverly MD    FLEXIBLE LARYNGOSCOPY: Scope inserted bilaterally to examine nasal tissue, nasopharynx, posterior oropharynx, hypopharynx, and larynx. See exam notes for exam finding details. Patient tolerated the procedure well.     MEDICAL DECISION-MAKING: This patient is a 78yo  with severe intranasal dryness. She needs to immediately stop the flonase and replace it with aggressive saline rehydration. Also needs to be using a humidity attachment on her NC O2. As far as the globus sensation goes, I think a lot of this is secondary to how dry her tissues are.      There may be a component of LPR, but adding another medication when the likelihood of significant symptom improvement is low seems unnecessary and only likely to add to medication side effects. The patient agrees with this.       REVIEW OF SYSTEMS    Review of Systems as per HPI and PMHx, otherwise 10 system review system are negative.       ALLERGIES    Codeine and Morphine    CURRENT MEDICATIONS      Current Outpatient Medications:      acetylcysteine (MUCOMYST) 10 % nebulizer solution, Inhale 4 mLs into the lungs daily, Disp: 120 mL, Rfl: 11     acetylcysteine (MUCOMYST) 20 % neb solution, Take 4 mLs by nebulization daily, Disp: 360 mL, Rfl: 3     albuterol (PROAIR HFA/PROVENTIL HFA/VENTOLIN HFA) 108 (90 Base) MCG/ACT inhaler, Inhale 2 puffs into the lungs every 6 hours as needed, Disp: 18 g, Rfl: 11     albuterol (PROVENTIL) (2.5 MG/3ML) 0.083% neb solution, Take 1 vial (2.5 mg) by nebulization every 4 hours as needed for shortness of breath / dyspnea or wheezing, Disp: 150 mL, Rfl: 11     biotin 300 MCG TABS tablet, Take 1 tablet (300 mcg) by mouth daily, Disp: , Rfl:      buPROPion (WELLBUTRIN XL) 150 MG 24 hr tablet, Take 1 tablet (150 mg) by mouth every morning, Disp: 90 tablet, Rfl: 0     calcium-vitamin D (CALCIUM-VITAMIN D) 500 mg(1,250mg) -200 unit per tablet, Take 1 tablet by mouth 2 times daily , Disp: , Rfl:      celecoxib (CELEBREX) 200 MG capsule, Take 1 capsule (200 mg) by mouth daily, Disp: 90 capsule, Rfl: 1     cetirizine (ZYRTEC) 5 MG tablet, Take 5 mg by mouth daily, Disp: , Rfl:      cholecalciferol, vitamin D3, (VITAMIN D3) 1,000 unit capsule, [CHOLECALCIFEROL, VITAMIN D3, (VITAMIN D3) 1,000 UNIT CAPSULE] Take 1,000 Units by mouth daily., Disp: , Rfl:      escitalopram (LEXAPRO) 20 MG tablet, Take 1 tablet (20 mg) by mouth daily, Disp: 90 tablet, Rfl: 0     Fluticasone-Umeclidin-Vilanterol (TRELEGY ELLIPTA) 100-62.5-25 MCG/INH oral inhaler, Inhale 1 puff into the lungs daily, Disp: 60  each, Rfl: 11     Lactobacillus rhamnosus GG (CULTURELLE) 10-15 Billion cell capsule, Take 1 capsule by mouth every evening , Disp: , Rfl:      metoprolol succinate ER (TOPROL XL) 25 MG 24 hr tablet, Take 1 tablet (25 mg) by mouth daily, Disp: 90 tablet, Rfl: 0     montelukast (SINGULAIR) 10 MG tablet, [MONTELUKAST (SINGULAIR) 10 MG TABLET] TAKE 1 TABLET(10 MG) BY MOUTH AT BEDTIME, Disp: 90 tablet, Rfl: 3     multivitamin therapeutic (THERAGRAN) tablet, [MULTIVITAMIN THERAPEUTIC (THERAGRAN) TABLET] Take 1 tablet by mouth daily., Disp: , Rfl:      mupirocin (BACTROBAN) 2 % external ointment, Apply a pea sized amount to inside of each nostril 3x daily with Qtip for 10 days, Disp: 22 g, Rfl: 0     nystatin (MYCOSTATIN) 865818 UNIT/GM external cream, Apply to rash on body 3 times per day as needed., Disp: 30 g, Rfl: 1     oxybutynin ER (DITROPAN XL) 5 MG 24 hr tablet, Take 1 tablet (5 mg) by mouth daily, Disp: 90 tablet, Rfl: 3     OXYGEN-AIR DELIVERY SYSTEMS MISC, [OXYGEN-AIR DELIVERY SYSTEMS MISC] Use 2 L As Directed. 2L with activity and at night  Sherri, Disp: , Rfl:      pregabalin (LYRICA) 50 MG capsule, Take 1 tab in the AM and 2 in the evening, Disp: 90 capsule, Rfl: 1     sodium chloride (NEBUSAL) 3 % neb solution, Take 3 mLs by nebulization 2 times daily, Disp: 180 mL, Rfl: 11     sodium chloride (OCEAN) 0.65 % nasal spray, Spray 2 sprays in nostril daily, Disp: 88 mL, Rfl: 0     SUMAtriptan (IMITREX) 50 MG tablet, Take 1 tablet (50 mg) by mouth as needed for migraine,may repeat after 2 hours if needed;  mg/24 hours, Disp: 30 tablet, Rfl: 1     PAST MEDICAL HISTORY    PAST MEDICAL HISTORY:   Past Medical History:   Diagnosis Date     Anxiety 09/30/2021     COPD (chronic obstructive pulmonary disease) (H)      Diverticulosis      Hiatal hernia      Mild asthma      Neuropathy      Osteopenia      Temporomandibular joint (TMJ) pain        PAST SURGICAL HISTORY    PAST SURGICAL HISTORY:   Past Surgical  History:   Procedure Laterality Date     ANTERIOR / POSTERIOR COMBINED FUSION LUMBAR SPINE       BRONCHOSCOPY (RIGID OR FLEXIBLE), DIAGNOSTIC N/A 09/28/2021    Procedure: BRONCHOSCOPY, WITH BRONCHOALVEOLAR LAVAGE;  Surgeon: Randall Lorenz MD;  Location: [facility]     HYSTERECTOMY       PICC SINGLE LUMEN PLACEMENT  11/04/2021          PICC SINGLE LUMEN PLACEMENT Right 12/22/2022    Right basilic, 38 cm     RETINAL LASER PROCEDURE Left 10/04/2016     SALPINGOOPHORECTOMY       TOTAL KNEE ARTHROPLASTY  2008       FAMILY  HISTORY    FAMILY HISTORY:   Family History   Problem Relation Age of Onset     Dementia Mother         passed age 88     Chronic Obstructive Pulmonary Disease Father         passed age 78       SOCIAL HISTORY    SOCIAL HISTORY:   Social History     Tobacco Use     Smoking status: Never     Smokeless tobacco: Never   Substance Use Topics     Alcohol use: No        PHYSICAL EXAM    HEAD: Normal appearance and symmetry:  No cutaneous lesions.      NECK:  supple     EARS: Cerumen (soft) impaction noted each ear       CERUMEN IMPACTION REMOVAL    After obtaining verbal consent, and using the binocular microscope.  Cerumen impaction(s) were removed from the affected ear canal(s)  using a wire loops and/or suction.  The procedure was terminated due to patient discomfort and hardness of the cerumen impactions.        NOSE:     Dorsum:   straight  Septum:  midline  Mucosa:  dry, crusting        ORAL CAVITY/OROPHARYNX:     Lips:  Normal.  Tongue: normal, midline  Mucosa:   no lesions     NECK:  Trachea:  midline.              Thyroid:  normal              Adenopathy:  none        NEURO:   Alert and Oriented     GAIT AND STATION:  normal     RESPIRATORY:   Symmetry and Respiratory effort     PSYCH:  Normal mood and affect     SKIN:   warm and dry         IMPRESSION:    Encounter Diagnoses   Name Primary?     Nasal crusting Yes     Internal nasal lesion      Post-nasal drip      Bronchiectasis (H)      Bilateral  impacted cerumen      Sinus headache           RECOMMENDATIONS:      Orders Placed This Encounter   Procedures     Remove Cerumen     CT Sinus w/o Contrast      Results via myChart  Return for ear cleaning after debrox drops.         Again, thank you for allowing me to participate in the care of your patient.        Sincerely,        Jaspreet Burns MD

## 2023-02-08 NOTE — PROGRESS NOTES
MaddiIEF COMPLAINT: Patient presents with:  Ent Problem: Would like her ear nose and throat looked at had a sinus headache and pressure in ears and it is recurring issue and been dealing with this for years she is on Oxygen and is taking a neb 3 times a day and coughs up phlegm usually greenish, saw another ENT and was told this was due to reflux, but she believes it is due to sinus. She is using nasal saline spray and ayr to keep the dryness from causing lesions in her nose and she states that is helping          HISTORY OF PRESENT ILLNESS    Ayse was seen at the behest of Randall Lorenz MD for nasal lesion.         REFERRAL NOTE:    Nasal lesion:  Looks like in October 2021, patient was prescribed topical mupirocin for scab forming in her bilateral nares.  Patient says that she has been using the mupirocin as needed for scab formation.  It helps heal the lesion but as soon as she stops using it, it comes back.  Has not been to ENT for further investigation of this lesion.  Referral to ENT placed given recurrent nature of the lesion.  I am unable to see a lesion on exam today.     Previous ENT note:  JENIFFER Beverly MD    FLEXIBLE LARYNGOSCOPY: Scope inserted bilaterally to examine nasal tissue, nasopharynx, posterior oropharynx, hypopharynx, and larynx. See exam notes for exam finding details. Patient tolerated the procedure well.     MEDICAL DECISION-MAKING: This patient is a 76yo  with severe intranasal dryness. She needs to immediately stop the flonase and replace it with aggressive saline rehydration. Also needs to be using a humidity attachment on her NC O2. As far as the globus sensation goes, I think a lot of this is secondary to how dry her tissues are.     There may be a component of LPR, but adding another medication when the likelihood of significant symptom improvement is low seems unnecessary and only likely to add to medication side effects. The patient agrees with this.       REVIEW OF SYSTEMS    Review  of Systems as per HPI and PMHx, otherwise 10 system review system are negative.       ALLERGIES    Codeine and Morphine    CURRENT MEDICATIONS      Current Outpatient Medications:      acetylcysteine (MUCOMYST) 10 % nebulizer solution, Inhale 4 mLs into the lungs daily, Disp: 120 mL, Rfl: 11     acetylcysteine (MUCOMYST) 20 % neb solution, Take 4 mLs by nebulization daily, Disp: 360 mL, Rfl: 3     albuterol (PROAIR HFA/PROVENTIL HFA/VENTOLIN HFA) 108 (90 Base) MCG/ACT inhaler, Inhale 2 puffs into the lungs every 6 hours as needed, Disp: 18 g, Rfl: 11     albuterol (PROVENTIL) (2.5 MG/3ML) 0.083% neb solution, Take 1 vial (2.5 mg) by nebulization every 4 hours as needed for shortness of breath / dyspnea or wheezing, Disp: 150 mL, Rfl: 11     biotin 300 MCG TABS tablet, Take 1 tablet (300 mcg) by mouth daily, Disp: , Rfl:      buPROPion (WELLBUTRIN XL) 150 MG 24 hr tablet, Take 1 tablet (150 mg) by mouth every morning, Disp: 90 tablet, Rfl: 0     calcium-vitamin D (CALCIUM-VITAMIN D) 500 mg(1,250mg) -200 unit per tablet, Take 1 tablet by mouth 2 times daily , Disp: , Rfl:      celecoxib (CELEBREX) 200 MG capsule, Take 1 capsule (200 mg) by mouth daily, Disp: 90 capsule, Rfl: 1     cetirizine (ZYRTEC) 5 MG tablet, Take 5 mg by mouth daily, Disp: , Rfl:      cholecalciferol, vitamin D3, (VITAMIN D3) 1,000 unit capsule, [CHOLECALCIFEROL, VITAMIN D3, (VITAMIN D3) 1,000 UNIT CAPSULE] Take 1,000 Units by mouth daily., Disp: , Rfl:      escitalopram (LEXAPRO) 20 MG tablet, Take 1 tablet (20 mg) by mouth daily, Disp: 90 tablet, Rfl: 0     Fluticasone-Umeclidin-Vilanterol (TRELEGY ELLIPTA) 100-62.5-25 MCG/INH oral inhaler, Inhale 1 puff into the lungs daily, Disp: 60 each, Rfl: 11     Lactobacillus rhamnosus GG (CULTURELLE) 10-15 Billion cell capsule, Take 1 capsule by mouth every evening , Disp: , Rfl:      metoprolol succinate ER (TOPROL XL) 25 MG 24 hr tablet, Take 1 tablet (25 mg) by mouth daily, Disp: 90 tablet, Rfl: 0      montelukast (SINGULAIR) 10 MG tablet, [MONTELUKAST (SINGULAIR) 10 MG TABLET] TAKE 1 TABLET(10 MG) BY MOUTH AT BEDTIME, Disp: 90 tablet, Rfl: 3     multivitamin therapeutic (THERAGRAN) tablet, [MULTIVITAMIN THERAPEUTIC (THERAGRAN) TABLET] Take 1 tablet by mouth daily., Disp: , Rfl:      mupirocin (BACTROBAN) 2 % external ointment, Apply a pea sized amount to inside of each nostril 3x daily with Qtip for 10 days, Disp: 22 g, Rfl: 0     nystatin (MYCOSTATIN) 301978 UNIT/GM external cream, Apply to rash on body 3 times per day as needed., Disp: 30 g, Rfl: 1     oxybutynin ER (DITROPAN XL) 5 MG 24 hr tablet, Take 1 tablet (5 mg) by mouth daily, Disp: 90 tablet, Rfl: 3     OXYGEN-AIR DELIVERY SYSTEMS MISC, [OXYGEN-AIR DELIVERY SYSTEMS MISC] Use 2 L As Directed. 2L with activity and at night  Sherri, Disp: , Rfl:      pregabalin (LYRICA) 50 MG capsule, Take 1 tab in the AM and 2 in the evening, Disp: 90 capsule, Rfl: 1     sodium chloride (NEBUSAL) 3 % neb solution, Take 3 mLs by nebulization 2 times daily, Disp: 180 mL, Rfl: 11     sodium chloride (OCEAN) 0.65 % nasal spray, Spray 2 sprays in nostril daily, Disp: 88 mL, Rfl: 0     SUMAtriptan (IMITREX) 50 MG tablet, Take 1 tablet (50 mg) by mouth as needed for migraine,may repeat after 2 hours if needed;  mg/24 hours, Disp: 30 tablet, Rfl: 1     PAST MEDICAL HISTORY    PAST MEDICAL HISTORY:   Past Medical History:   Diagnosis Date     Anxiety 09/30/2021     COPD (chronic obstructive pulmonary disease) (H)      Diverticulosis      Hiatal hernia      Mild asthma      Neuropathy      Osteopenia      Temporomandibular joint (TMJ) pain        PAST SURGICAL HISTORY    PAST SURGICAL HISTORY:   Past Surgical History:   Procedure Laterality Date     ANTERIOR / POSTERIOR COMBINED FUSION LUMBAR SPINE       BRONCHOSCOPY (RIGID OR FLEXIBLE), DIAGNOSTIC N/A 09/28/2021    Procedure: BRONCHOSCOPY, WITH BRONCHOALVEOLAR LAVAGE;  Surgeon: Randall Lorenz MD;  Location: [facility]      HYSTERECTOMY       PICC SINGLE LUMEN PLACEMENT  11/04/2021          PICC SINGLE LUMEN PLACEMENT Right 12/22/2022    Right basilic, 38 cm     RETINAL LASER PROCEDURE Left 10/04/2016     SALPINGOOPHORECTOMY       TOTAL KNEE ARTHROPLASTY  2008       FAMILY  HISTORY    FAMILY HISTORY:   Family History   Problem Relation Age of Onset     Dementia Mother         passed age 88     Chronic Obstructive Pulmonary Disease Father         passed age 78       SOCIAL HISTORY    SOCIAL HISTORY:   Social History     Tobacco Use     Smoking status: Never     Smokeless tobacco: Never   Substance Use Topics     Alcohol use: No        PHYSICAL EXAM    HEAD: Normal appearance and symmetry:  No cutaneous lesions.      NECK:  supple     EARS: Cerumen (soft) impaction noted each ear       CERUMEN IMPACTION REMOVAL    After obtaining verbal consent, and using the binocular microscope.  Cerumen impaction(s) were removed from the affected ear canal(s)  using a wire loops and/or suction.  The procedure was terminated due to patient discomfort and hardness of the cerumen impactions.        NOSE:     Dorsum:   straight  Septum:  midline  Mucosa:  dry, crusting        ORAL CAVITY/OROPHARYNX:     Lips:  Normal.  Tongue: normal, midline  Mucosa:   no lesions     NECK:  Trachea:  midline.              Thyroid:  normal              Adenopathy:  none        NEURO:   Alert and Oriented     GAIT AND STATION:  normal     RESPIRATORY:   Symmetry and Respiratory effort     PSYCH:  Normal mood and affect     SKIN:   warm and dry         IMPRESSION:    Encounter Diagnoses   Name Primary?     Nasal crusting Yes     Internal nasal lesion      Post-nasal drip      Bronchiectasis (H)      Bilateral impacted cerumen      Sinus headache           RECOMMENDATIONS:      Orders Placed This Encounter   Procedures     Remove Cerumen     CT Sinus w/o Contrast      Results via myChart  Return for ear cleaning after debrox drops.

## 2023-02-09 NOTE — TELEPHONE ENCOUNTER
General Call    Contacts       Type Contact Phone/Fax    02/09/2023 04:15 PM CST Phone (Incoming) Asye Najera (Self) [phone] (H)        Reason for Call:  Prescription    What are your questions or concerns:  Pharmacy advised patient to call clinic regarding LYRICA Rx.    pregabalin (LYRICA) 50 MG capsule 90 capsule 1 12/21/2022  No   Sig: Take 1 tab in the AM and 2 in the evening   Class: Local Print   Order: [id number]     Epic reflects Local Print instead of e-Prescribe     Date of last appointment with provider: 01/30/2023    Could we send this information to you in "[employer] or would you prefer to receive a phone call?:   Patient would like to be contacted via "[employer]

## 2023-02-16 NOTE — TELEPHONE ENCOUNTER
----- Message from Jaspreet Burns MD sent at 2/16/2023 12:29 PM CST -----  Please advise patient of normal CT sinus.     ----- Message -----  From: Ricardo Patel  Sent: 2/16/2023  11:11 AM CST  To: Jaspreet Burns MD

## 2023-02-16 NOTE — TELEPHONE ENCOUNTER
Spoke with Ayse about her CT scan.  Gave results per Dr. Burns below.  Pt expressed understanding of results.    [facility]      Linnette Stoll RN  [facility]  ENT  [address]  [place] [address]  [email or web address]   Office:[phone]  Employed by [facility]

## 2023-02-16 NOTE — PATIENT INSTRUCTIONS
Continue with your current medications.    You can try stopping the mucomyst for a few days and doing sodium chloride instead and see how you feel.  If you have more congestion.

## 2023-02-16 NOTE — PROGRESS NOTES
LUNG NODULE & INTERVENTIONAL PULMONARY CLINIC  [facility], [facility]     Fiordaliza Najera Sr. MRN# [id number]   Age: 76 year old YOB: 1945       Requesting Physician: No referring provider defined for this encounter.       Assessment and Plan:    1.  Bronchiectasis feeling much better.  We will see if she can do okay off the Mucomyst.    2.  Shortness of breath.  This is improving.  She has better control of palpitations with metoprolol and this is really helped.    3.  Chronic hypoxic respiratory failure and shortness of breath with exertion.  As above.  Goal oxygen saturation 89% or greater at rest.     4.  Lung nodules.  Worsening.  She has no risk for lung cancer and there is a possibility this represents the significant bronchiectatic changes.  Previous bronchoscopy has not shown fungal or nontuberculous mycobacteria.  I will compare her images going back and we can discuss further.             History:     Fiordaliza Najera Sr. is a 77 year old female with sig h/o for bronchiectasis.  She is much improved after IV antibiotics and starting metoprolol for palpitations.  Her Mucomyst smells pretty bad and makes her apartment smell bad.  Her exertional capacity is a little bit better.               Past Medical History:      Past Medical History:   Diagnosis Date     Anxiety 09/30/2021     COPD (chronic obstructive pulmonary disease) (H)      Diverticulosis      Hiatal hernia      Mild asthma      Neuropathy      Osteopenia      Temporomandibular joint (TMJ) pain            Past Surgical History:      Past Surgical History:   Procedure Laterality Date     ANTERIOR / POSTERIOR COMBINED FUSION LUMBAR SPINE       BRONCHOSCOPY (RIGID OR FLEXIBLE), DIAGNOSTIC N/A 09/28/2021    Procedure: BRONCHOSCOPY, WITH BRONCHOALVEOLAR LAVAGE;  Surgeon: Randall Lorenz MD;  Location: [facility]     HYSTERECTOMY       PICC SINGLE LUMEN PLACEMENT   11/04/2021          PICC SINGLE LUMEN PLACEMENT Right 12/22/2022    Right basilic, 38 cm     RETINAL LASER PROCEDURE Left 10/04/2016     SALPINGOOPHORECTOMY       TOTAL KNEE ARTHROPLASTY  2008          Social History:     Social History     Tobacco Use     Smoking status: Never     Smokeless tobacco: Never   Substance Use Topics     Alcohol use: No          Family History:     Family History   Problem Relation Age of Onset     Dementia Mother         passed age 88     Chronic Obstructive Pulmonary Disease Father         passed age 78           Allergies:      Allergies   Allergen Reactions     Codeine Unknown     Morphine Itching          Medications:     Current Outpatient Medications   Medication Sig     acetylcysteine (MUCOMYST) 10 % nebulizer solution Inhale 4 mLs into the lungs daily     albuterol (PROAIR HFA/PROVENTIL HFA/VENTOLIN HFA) 108 (90 Base) MCG/ACT inhaler Inhale 2 puffs into the lungs every 6 hours as needed     albuterol (PROVENTIL) (2.5 MG/3ML) 0.083% neb solution Take 1 vial (2.5 mg) by nebulization every 4 hours as needed for shortness of breath / dyspnea or wheezing     biotin 300 MCG TABS tablet Take 1 tablet (300 mcg) by mouth daily     buPROPion (WELLBUTRIN XL) 150 MG 24 hr tablet Take 1 tablet (150 mg) by mouth every morning     calcium-vitamin D (CALCIUM-VITAMIN D) 500 mg(1,250mg) -200 unit per tablet Take 1 tablet by mouth 2 times daily      carbamide peroxide (DEBROX) 6.5 % otic solution Place 5 drops into both ears At Bedtime for 10 days     celecoxib (CELEBREX) 200 MG capsule Take 1 capsule (200 mg) by mouth daily     cetirizine (ZYRTEC) 5 MG tablet Take 5 mg by mouth daily     cholecalciferol, vitamin D3, (VITAMIN D3) 1,000 unit capsule [CHOLECALCIFEROL, VITAMIN D3, (VITAMIN D3) 1,000 UNIT CAPSULE] Take 1,000 Units by mouth daily.     escitalopram (LEXAPRO) 20 MG tablet Take 1 tablet (20 mg) by mouth daily     Fluticasone-Umeclidin-Vilanterol (TRELEGY ELLIPTA) 100-62.5-25 MCG/INH oral  inhaler Inhale 1 puff into the lungs daily     Lactobacillus rhamnosus GG (CULTURELLE) 10-15 Billion cell capsule Take 1 capsule by mouth every evening      metoprolol succinate ER (TOPROL XL) 25 MG 24 hr tablet Take 1 tablet (25 mg) by mouth daily     montelukast (SINGULAIR) 10 MG tablet [MONTELUKAST (SINGULAIR) 10 MG TABLET] TAKE 1 TABLET(10 MG) BY MOUTH AT BEDTIME     multivitamin therapeutic (THERAGRAN) tablet [MULTIVITAMIN THERAPEUTIC (THERAGRAN) TABLET] Take 1 tablet by mouth daily.     mupirocin (BACTROBAN) 2 % external ointment Apply a pea sized amount to inside of each nostril 3x daily with Qtip for 10 days     nystatin (MYCOSTATIN) 776533 UNIT/GM external cream Apply to rash on body 3 times per day as needed.     oxybutynin ER (DITROPAN XL) 5 MG 24 hr tablet Take 1 tablet (5 mg) by mouth daily     OXYGEN-AIR DELIVERY SYSTEMS MISC [OXYGEN-AIR DELIVERY SYSTEMS MISC] Use 2 L As Directed. 2L with activity and at night  [employer]     pregabalin (LYRICA) 50 MG capsule Take 1 tab in the AM and 2 in the evening     sodium chloride (NEBUSAL) 3 % neb solution Take 3 mLs by nebulization 2 times daily     sodium chloride (OCEAN) 0.65 % nasal spray Spray 2 sprays in nostril daily     SUMAtriptan (IMITREX) 50 MG tablet Take 1 tablet (50 mg) by mouth as needed for migraine,may repeat after 2 hours if needed;  mg/24 hours     acetylcysteine (MUCOMYST) 20 % neb solution Take 4 mLs by nebulization daily     No current facility-administered medications for this visit.          Review of Systems:     See HPI         Physical Exam:   /64 (BP Location: Right arm, Patient Position: Chair, Cuff Size: Adult Regular)   Pulse 82   Wt 47.2 kg (104 lb)   SpO2 96%   BMI 18.87 kg/m      Constitutional - looks well, in no apparent distress  Eyes - no redness or discharge  Respiratory -breathing appears comfortable. No wheeze or rhonchi.   Skin - No appreciable discoloration or lesions (very limited exam)  Neurological - No  apparent tremors. Speech fluent and articlate  Psychiatric - no signs of delirium or anxiety          Current Laboratory Data:   All laboratory and imaging data reviewed.    Results for orders placed or performed during the hospital encounter of 02/16/23 (from the past 24 hour(s))   CT Sinus w/o Contrast    Narrative    EXAM: CT SINUS W/O CONTRAST  LOCATION: [facility]  DATE/TIME: 2/16/2023 10:49 AM    INDICATION: sinus pressure  COMPARISON: 6/20/2022.  TECHNIQUE: Routine without contrast. Multiplanar reformats. Dose reduction techniques were used.    FINDINGS:     FRONTAL SINUSES:  Normal.    ETHMOID SINUSES:  Normal.    SPHENOID SINUSES: Normal.     MAXILLARY SINUSES: Normal. The floors of the maxillary sinuses are intact.    NASAL CAVITY/SKULL BASE: Intact nasal septum. Unremarkable nasal turbinates. The cribriform plate is intact and symmetric. The anterior clinoid processes are not pneumatized.    PARANASAL SINUS DRAINAGE PATHWAYS:  The paranasal sinus drainage pathways are patent.    NON-SINUS STRUCTURES: Mild chronic small vessel change in the deep white matter both cerebral hemispheres.    Soft tissue swelling/small hematoma within the anterior scalp soft tissues.      Impression    IMPRESSION:  1.  Paranasal sinuses are clear.    2.  Soft tissue swelling and soft tissue hematoma within the anterior scalp soft tissues. No evidence of skull fracture.

## 2023-02-27 NOTE — TELEPHONE ENCOUNTER
"Spoke w/ pt and her hm care nurse. Pt reports dizzy spells that occur w/ \"headache\" - though not head pain, it's a prickly, tight sensation in head, blurred and flashy vision and wobbly legs. Denies spinning or moving sensation w/ these. Happens at least once a day. Length varies from a few min to 1 hr or more. She started bupropion 1/30 and she believes shortly after this is when the dizzy spells started so she questions if this may be related to bupropion. She has had 2 falls this month but says those were unrelated to dizzy spells. Advised see provider w/i 24 hrs. Pt voiced understanding and agreement.   Warm transferred to scheduling.       Reason for Disposition    [1] MODERATE dizziness (e.g., interferes with normal activities) AND [2] has NOT been evaluated by physician for this  (Exception: dizziness caused by heat exposure, sudden standing, or poor fluid intake)    Additional Information    Negative: SEVERE difficulty breathing (e.g., struggling for each breath, speaks in single words)    Negative: [1] Difficulty breathing or swallowing AND [2] started suddenly after medicine, an allergic food or bee sting    Negative: Shock suspected (e.g., cold/pale/clammy skin, too weak to stand, low BP, rapid pulse)    Negative: Difficult to awaken or acting confused (e.g., disoriented, slurred speech)    Negative: [1] Weakness (i.e., paralysis, loss of muscle strength) of the face, arm or leg on one side of the body AND [2] sudden onset AND [3] present now    Negative: [1] Numbness (i.e., loss of sensation) of the face, arm or leg on one side of the body AND [2] sudden onset AND [3] present now    Negative: [1] Loss of speech or garbled speech AND [2] sudden onset AND [3] present now    Negative: Overdose (accidental or intentional) of medications    Negative: [1] Fainted > 15 minutes ago AND [2] still feels too weak or dizzy to stand    Negative: Heart beating < 50 beats per minute OR > 140 beats per minute    " "Negative: Sounds like a life-threatening emergency to the triager    Negative: Chest pain    Negative: Rectal bleeding, bloody stool, or tarry-black stool    Negative: [1] Vomiting AND [2] contains red blood or black (\"coffee ground\") material    Negative: Vomiting is main symptom    Negative: Diarrhea is main symptom    Negative: Headache is main symptom    Negative: Patient states that they are having an anxiety or panic attack    Negative: Dizziness from low blood sugar (i.e., < 60 mg/dl or 3.5 mmol/l)    Negative: Dizziness is described as a spinning sensation (i.e., vertigo)    Negative: Heat exhaustion suspected (i.e., dehydration from heat exposure)    Negative: Difficulty breathing    Negative: SEVERE dizziness (e.g., unable to stand, requires support to walk, feels like passing out now)    Negative: Extra heart beats OR irregular heart beating (i.e., \"palpitations\")    Negative: [1] Drinking very little AND [2] dehydration suspected (e.g., no urine > 12 hours, very dry mouth, very lightheaded)    Negative: [1] Weakness (i.e., paralysis, loss of muscle strength) of the face, arm / hand, or leg / foot on one side of the body AND [2] sudden onset AND [3] brief (now gone)    Negative: [1] Numbness (i.e., loss of sensation) of the face, arm / hand, or leg / foot on one side of the body AND [2] sudden onset AND [3] brief (now gone)    Negative: [1] Loss of speech or garbled speech AND [2] sudden onset AND [3] brief (now gone)    Negative: Loss of vision or double vision (Exception: similar to previous migraines)    Negative: Patient sounds very sick or weak to the triager    Negative: [1] Dizziness caused by heat exposure, sudden standing, or poor fluid intake AND [2] no improvement after 2 hours of rest and fluids    Negative: [1] Fever > 103 F (39.4 C) AND [2] not able to get the fever down using Fever Care Advice    Negative: [1] Fever > 101 F (38.3 C) AND [2] age > 60 years    Negative: [1] Fever > 100.0 F " (37.8 C) AND [2] bedridden (e.g., nursing home patient, CVA, chronic illness, recovering from surgery)    Negative: [1] Fever > 100.0 F (37.8 C) AND [2] diabetes mellitus or weak immune system (e.g., HIV positive, cancer chemo, splenectomy, organ transplant, chronic steroids)    Protocols used: DIZZINESS - [id number]       Forceps were not used

## 2023-02-27 NOTE — TELEPHONE ENCOUNTER
[facility] Call Center    Phone Message    May a detailed message be left on voicemail: yes     Reason for Call: Appointment Intake    Patient canceled her appt today (2/27/23) for a follow-up on her ear cleaning due to the road conditions - her transportation was not able to accommodate a ride.  Patient was upset that there were no appointments available for rescheduling until July and she is worried that her symptoms of dizziness will get worse because of the wax build-up.  She's wondering if there are any sooner appointments and if she should continue using the ear drops that Dr. Burns prescribed.  Thank you    Action Taken: Message routed to:  Other: [facility]    Travel Screening: Not Applicable

## 2023-02-28 NOTE — PROGRESS NOTES
Chief Complaint   Patient presents with     Urgent Care     Fall     Pt in clinic to have eval for facial bruising, head tightness, leg weakness, fatigue and dizziness following a fall 4 days ago.  Pt is also c/o  plugged ears and sinus pressure.       Fiordaliza was seen today for urgent care and fall.    Diagnoses and all orders for this visit:    Nonintractable headache, unspecified chronicity pattern, unspecified headache type    Fall, initial encounter  -     Basic metabolic panel  (Ca, Cl, CO2, Creat, Gluc, K, Na, BUN); Future  -     Basic metabolic panel  (Ca, Cl, CO2, Creat, Gluc, K, Na, BUN)    Unsteadiness on feet    Closed head injury, initial encounter  -     CT Head w/o Contrast; Future    Low sodium levels    MDM  Fiordaliza Najera is a77 year old female who presents to clinic for evaluation of a head injury. History is consistent with a mechanical fall and subsequent head injury.  Physical examination reveals bruising on face and forehead   Specifically, there are not signs of basilar skull fracture (baltazar sign,  hemotympanum, otorrhea, or rhinorrhea).  At present, there are no alterations in mental status, nor evidence of abnormal neurologic findings.  She is not on anticoagulation complicating their current presentation.   advanced imaging was felt required.  Results of this image (as above), reveal no acute intracranial pathology .  No alterations in mental status or development of new neurologic findings were noted.  Given the above history, examination, and clinic course, I feel outpatient management is reasonable at this time with very close follow-up.  We discussed the possible, yet small risk, of delayed hemorrhage, and that this is more common in individuals on blood thinning medications.  Nonetheless, I have recommended close monitoring for symptoms of worsening/severe headache, vomiting, visual changes, numbness/tingling/weakness to extremities, or confusion, which should prompt immediate  presentation to the ER.  A BMP was done in the clinic and that showed low sodium level patient was advised to take extra sodium to help the sodium correct.     SUBJECTIVE:  Fiodraliza Najera is a 77 year old female with history of migraine, COPD, mild headache which she does not have now who comes in for evaluation of recent head injury patient had a fall 2 weeks back while she was in her kitchen bending down to lift up some boxes she lost her balance and fell did not lose her consciousness.  Following which she had bruise over the face and the forehead area.  Again had a fall 4 days back when she tripped over her shoelace and fell on her face.   she did have a CT of the sinuses on the first fall which did show soft tissue swelling and also hematoma Soft tissue swelling and soft tissue hematoma within the anterior scalp soft tissues. No evidence of skull fracture..History of Migranes: Yes   Currently she does not have any headache.  She says that her head feels tight.  Patient has been started on metoprolol and also bupropion almost 4 weeks back and the metoprolol does help her with her allergies and she denies any worsening dizziness symptoms since starting metoprolol she has history of unsteady gait but that is not new for her has no memory issues no foggy feeling no nausea symptoms no vision changes she is on 2 L of oxygen for her COPD and her oxygen saturation is above 90s.  Neurologic ROS: Denies, focal weakness, sensory deficits, paresthesias, aphasia, tremors, involuntary movements and any neurological problems.    Past Medical History:   Diagnosis Date     Anxiety 09/30/2021     COPD (chronic obstructive pulmonary disease) (H)      Diverticulosis      Hiatal hernia      Mild asthma      Neuropathy      Osteopenia      Temporomandibular joint (TMJ) pain      Current Outpatient Medications   Medication Sig Dispense Refill     acetylcysteine (MUCOMYST) 10 % nebulizer solution Inhale 4 mLs into the lungs daily 120  mL 11     albuterol (PROAIR HFA/PROVENTIL HFA/VENTOLIN HFA) 108 (90 Base) MCG/ACT inhaler Inhale 2 puffs into the lungs every 6 hours as needed 18 g 11     albuterol (PROVENTIL) (2.5 MG/3ML) 0.083% neb solution Take 1 vial (2.5 mg) by nebulization every 4 hours as needed for shortness of breath / dyspnea or wheezing 150 mL 11     biotin 300 MCG TABS tablet Take 1 tablet (300 mcg) by mouth daily       buPROPion (WELLBUTRIN XL) 150 MG 24 hr tablet Take 1 tablet (150 mg) by mouth every morning 90 tablet 0     calcium-vitamin D (CALCIUM-VITAMIN D) 500 mg(1,250mg) -200 unit per tablet Take 1 tablet by mouth 2 times daily        celecoxib (CELEBREX) 200 MG capsule Take 1 capsule (200 mg) by mouth daily 90 capsule 1     cetirizine (ZYRTEC) 5 MG tablet Take 5 mg by mouth daily       cholecalciferol, vitamin D3, (VITAMIN D3) 1,000 unit capsule [CHOLECALCIFEROL, VITAMIN D3, (VITAMIN D3) 1,000 UNIT CAPSULE] Take 1,000 Units by mouth daily.       escitalopram (LEXAPRO) 20 MG tablet Take 1 tablet (20 mg) by mouth daily 90 tablet 0     Fluticasone-Umeclidin-Vilanterol (TRELEGY ELLIPTA) 100-62.5-25 MCG/INH oral inhaler Inhale 1 puff into the lungs daily 60 each 11     Lactobacillus rhamnosus GG (CULTURELLE) 10-15 Billion cell capsule Take 1 capsule by mouth every evening        metoprolol succinate ER (TOPROL XL) 25 MG 24 hr tablet Take 1 tablet (25 mg) by mouth daily 90 tablet 0     montelukast (SINGULAIR) 10 MG tablet [MONTELUKAST (SINGULAIR) 10 MG TABLET] TAKE 1 TABLET(10 MG) BY MOUTH AT BEDTIME 90 tablet 3     multivitamin therapeutic (THERAGRAN) tablet [MULTIVITAMIN THERAPEUTIC (THERAGRAN) TABLET] Take 1 tablet by mouth daily.       mupirocin (BACTROBAN) 2 % external ointment Apply a pea sized amount to inside of each nostril 3x daily with Qtip for 10 days 22 g 0     nystatin (MYCOSTATIN) [id number] UNIT/GM external cream Apply to rash on body 3 times per day as needed. 30 g 1     oxybutynin ER (DITROPAN XL) 5 MG 24 hr tablet  "Take 1 tablet (5 mg) by mouth daily 90 tablet 3     OXYGEN-AIR DELIVERY SYSTEMS MISC [OXYGEN-AIR DELIVERY SYSTEMS MISC] Use 2 L As Directed. 2L with activity and at night  [employer]       pregabalin (LYRICA) 50 MG capsule Take 1 tab in the AM and 2 in the evening 90 capsule 1     sodium chloride (NEBUSAL) 3 % neb solution Take 3 mLs by nebulization 2 times daily 180 mL 11     sodium chloride (OCEAN) 0.65 % nasal spray Spray 2 sprays in nostril daily 88 mL 0     SUMAtriptan (IMITREX) 50 MG tablet Take 1 tablet (50 mg) by mouth as needed for migraine,may repeat after 2 hours if needed;  mg/24 hours 30 tablet 1     Social History     Tobacco Use     Smoking status: Never     Smokeless tobacco: Never   Substance Use Topics     Alcohol use: No       ROS:   Review of systems negative except as stated above.  OBJECTIVE:  /81   Pulse 89   Temp 98  F (36.7  C) (Temporal)   Ht 1.575 m (5' 2\")   Wt 47.6 kg (105 lb)   SpO2 97%   BMI 19.20 kg/m    GENERAL APPEARANCE: healthy, alert and no distress, patient was oriented to time place  EYES: EOMI,  PERRL, conjunctiva clear  HENT: ear canals and TM's impacted with cerumen   Nose and mouth without ulcers, erythema or lesions  Face - shows bruising noted in the face around the eyes  and nose and and also there is a definite swelling and tenderness in the left forehead area with some tenderness and a tender bump but no nose deformity noted   NECK: supple, nontender, no lymphadenopathy  RESP: lungs clear to auscultation - no rales, rhonchi or wheezes  CV: regular rates and rhythm, normal S1 S2, no murmur noted  NEURO: Normal strength and tone, sensory exam grossly normal,  normal speech and mentation finger-nose test intact rapid alternating movements within normal limits.  SKIN: no suspicious lesions or rashes  PSYCH: mentation appears normal    Bertha Avalos MD       "

## 2023-02-28 NOTE — TELEPHONE ENCOUNTER
RENAE and sent MyChart to reschedule appointment with Dr. Paul as provider will be out on 5/30/23.  Gerald Yao on 2/28/2023 at 3:08 PM

## 2023-03-01 NOTE — PROGRESS NOTES
CHIEF COMPLAINT:  Patient presents with:  Follow Up: Used the ointment for her nose and her drops          HISTORY OF PRESENT ILLNESS    Ayse was seen in follow up after previous 2/8/2023 visit for cerumen removal.  No new concerns.  She is on chronic oxygen. Had recent fall at home bending over in kitchen        REVIEW OF SYSTEMS    Review of Systems: a 10-system review is reviewed at this encounter.  See scanned document.       Allergies   Allergen Reactions     Codeine Unknown     Morphine Itching           PHYSICAL EXAM:        HEAD: Normal appearance and symmetry:  Malar bruising evident bilaterallly   EARS:    Cerumen impactions noted AU     RIGHT:  Hard, impacted cerumen (could not be removed due to pain)   LEFT:    {external auditory canals clear, tympanic membranes normal        CERUMEN IMPACTION REMOVAL    After obtaining verbal consent, and using the binocular microscope.  Cerumen impaction(s) were removed from the affected ear canal(s)  using a wire loops and/or suction.  The patient tolerated the procedure well without incident.    NOSE:    Dorsum:   straight       ORAL CAVITY/OROPHARYNX:    Lips:  Normal.     NECK:  Trachea:  midline     NEURO:   Alert and Oriented    GAIT AND STATION:  normal     RESPIRATORY:   Symmetry and Respiratory effort    PSYCH:   normal mood and affect    SKIN:  warm and dry         IMPRESSION:   Encounter Diagnoses   Name Primary?     Bilateral impacted cerumen Yes     Impacted cerumen of right ear      Impacted cerumen of left ear             RECOMMENDATIONS:    Orders Placed This Encounter   Medications     carbamide peroxide (DEBROX) 6.5 % otic solution     Sig: Place 5 drops into the right ear At Bedtime for 21 days     Dispense:  5.3 mL     Refill:  0

## 2023-03-01 NOTE — LETTER
3/1/2023         RE: Fiordaliza Najera  [address] [place] [address]        Dear Colleague,    Thank you for referring your patient, Fiordaliza Najera, to the [facility]. Please see a copy of my visit note below.    CHIEF COMPLAINT:  Patient presents with:  Follow Up: Used the ointment for her nose and her drops          HISTORY OF PRESENT ILLNESS    Ayse was seen in follow up after previous 2/8/2023 visit for cerumen removal.  No new concerns.  She is on chronic oxygen. Had recent fall at home bending over in kitchen        REVIEW OF SYSTEMS    Review of Systems: a 10-system review is reviewed at this encounter.  See scanned document.       Allergies   Allergen Reactions     Codeine Unknown     Morphine Itching           PHYSICAL EXAM:        HEAD: Normal appearance and symmetry:  No cutaneous lesions.      EARS:    Cerumen impactions noted AU     RIGHT:  Hard, impacted cerumen (could not be removed due to pain)   LEFT:    {external auditory canals clear, tympanic membranes normal        CERUMEN IMPACTION REMOVAL    After obtaining verbal consent, and using the binocular microscope.  Cerumen impaction(s) were removed from the affected ear canal(s)  using a wire loops and/or suction.  The patient tolerated the procedure well without incident.    NOSE:    Dorsum:   straight       ORAL CAVITY/OROPHARYNX:    Lips:  Normal.     NECK:  Trachea:  midline     NEURO:   Alert and Oriented    GAIT AND STATION:  normal     RESPIRATORY:   Symmetry and Respiratory effort    PSYCH:   normal mood and affect    SKIN:  warm and dry         IMPRESSION:   Encounter Diagnoses   Name Primary?     Bilateral impacted cerumen Yes     Impacted cerumen of right ear      Impacted cerumen of left ear             RECOMMENDATIONS:    Orders Placed This Encounter   Medications     carbamide peroxide (DEBROX) 6.5 % otic solution     Sig: Place 5 drops into the right ear At Bedtime for 21 days      Dispense:  5.3 mL     Refill:  0       Again, thank you for allowing me to participate in the care of your patient.        Sincerely,        Jaspreet Burns MD

## 2023-03-06 NOTE — TELEPHONE ENCOUNTER
Order/Referral Request    Who is requesting: [facility]    Orders being requested: Physical therapy outpatient referral to continue gait, balance, strengthening, and aerobic capacity training.     Reason service is needed/diagnosis: COPD and bronchiectasis     When are orders needed by: ASAP    Has this been discussed with Provider: No     Does patient have a preference on a Group/Provider/Facility? [facility]     Does patient have an appointment scheduled?: No    Where to send orders: Fax [phone]    Could we send this information to you in [facility] or would you prefer to receive a phone call?:   No preference   Okay to leave a detailed message?: Yes at Other phone number:  [phone]

## 2023-03-22 NOTE — LETTER
3/22/2023         RE: Fiordaliza Najera  [address] [place] [address]        Dear Colleague,    Thank you for referring your patient, Fiordaliza Najera, to the [facility]. Please see a copy of my visit note below.    CHIEF COMPLAINT:  Patient presents with:  Follow Up: Ear cleaning, right ear, debrox          HISTORY OF PRESENT ILLNESS    Ayse was seen in follow up after previous 3/1/2023 visit for ear cleaning (after debrox drops) to the right ear.         REVIEW OF SYSTEMS    Review of Systems: a 10-system review is reviewed at this encounter.  See scanned document.       Allergies   Allergen Reactions     Codeine Unknown     Morphine Itching           PHYSICAL EXAM:        HEAD: Normal appearance and symmetry:  No cutaneous lesions.      EARS:        RIGHT:  {cerumen impaction noted (removed with suction/alligator   Forceps). TM normal, intactx   LEFT:    {external auditory canals clear, tympanic membranes normal     NOSE:    Dorsum:   straight       ORAL CAVITY/OROPHARYNX:    Lips:  Normal.     NECK:  Trachea:  midline     NEURO:   Alert and Oriented    GAIT AND STATION:  normal     RESPIRATORY:   Symmetry and Respiratory effort    PSYCH:   normal mood and affect    SKIN:  warm and dry         IMPRESSION:   Encounter Diagnosis   Name Primary?     Impacted cerumen of right ear Yes            RECOMMENDATIONS:    Orders Placed This Encounter   Medications     carbamide peroxide (DEBROX) 6.5 % otic solution     Si drops to both ears at bedtime 1 week before return visit     Dispense:  5.3 mL     Refill:  0       Return visit 6 months for routine cleaning.  Debrox drops 1 week before visit.       Again, thank you for allowing me to participate in the care of your patient.        Sincerely,        Jaspreet Burns MD

## 2023-03-22 NOTE — PROGRESS NOTES
CHIEF COMPLAINT:  Patient presents with:  Follow Up: Ear cleaning, right ear, debrox          HISTORY OF PRESENT ILLNESS    Ayse was seen in follow up after previous 3/1/2023 visit for ear cleaning (after debrox drops) to the right ear.         REVIEW OF SYSTEMS    Review of Systems: a 10-system review is reviewed at this encounter.  See scanned document.       Allergies   Allergen Reactions     Codeine Unknown     Morphine Itching           PHYSICAL EXAM:        HEAD: Normal appearance and symmetry:  No cutaneous lesions.      EARS:        RIGHT:  {cerumen impaction noted (removed with suction/alligator   Forceps). TM normal, intactx   LEFT:    {external auditory canals clear, tympanic membranes normal     NOSE:    Dorsum:   straight       ORAL CAVITY/OROPHARYNX:    Lips:  Normal.     NECK:  Trachea:  midline     NEURO:   Alert and Oriented    GAIT AND STATION:  normal     RESPIRATORY:   Symmetry and Respiratory effort    PSYCH:   normal mood and affect    SKIN:  warm and dry         IMPRESSION:   Encounter Diagnosis   Name Primary?     Impacted cerumen of right ear Yes            RECOMMENDATIONS:    Orders Placed This Encounter   Medications     carbamide peroxide (DEBROX) 6.5 % otic solution     Si drops to both ears at bedtime 1 week before return visit     Dispense:  5.3 mL     Refill:  0       Return visit 6 months for routine cleaning.  Debrox drops 1 week before visit.

## 2023-05-01 NOTE — PROGRESS NOTES
Assessment & Plan       ICD-10-CM    1. Tachycardia  R00.0       2. Subclinical hypothyroidism  E03.8 TSH with free T4 reflex      3. Iron deficiency anemia, unspecified iron deficiency anemia type  D50.9 Ferritin     Hemoglobin and hematocrit      4. Seasonal allergic rhinitis, unspecified trigger  J30.2       5. Pulmonary nodules  R91.8       6. Bronchiectasis with acute exacerbation (H)  J47.1       7. Anxiety  F41.9             Aortic dilatation 3.4 cm  Last time, Echo was ordered and no aortic dilatation was identified.  We will monitor clinically.    Lung nodule:  Ill-defined nodules along the margins of both lungs noted on CT 7/2022.  Had repeated CT which showed multiple new ill-defined nodular regions throughout both lungs.  Last note from pulmonology noted that she has had significant bronchiectasis changes.  Previous bronchoscopy showed no fungal or mycobacterial infection.  Pulmonology was going to compare her images going back and discuss further at the next visit.  Plan:  - Defer to pulmonology for ongoing work-up/management     Seasonal allergies  Bronchiectasis  Persistent PND  Patient notes that she was doing really well from a respiratory standpoint until last week when her allergies flared up  Has been having a hard time breathing and is on a 10-day course of prednisone  The prednisone has not helped significantly but she has not worsened  She is taking Zyrtec to 5 mg and she is on normal saline nasal spray  On exam, patient has no end expiratory wheezing, no increase in her O2 requirements and SPO2 is 95% on her NC O2.  Plan:  - We will cautiously increase Zyrtec to 10 mg.  Did caution patient of the anticholinergic properties of Zyrtec and if she has any side effects including increased confusion, urinary retention or feeling off balance, she is to decrease the dose to 5 mg  - Continue nasal spray  - Continue her inhalers and finish up the prednisone  - She will follow-up with pulmonology on  "5/18    Subclinical hypothyroidism:  Identified on last set of blood work.  We will repeat TSH today.    Anemia:  Persistent but stable on last 2 CBCs  We will get ferritin and repeat hemoglobin and hematocrit today    Anxiety:  Tachycardia  Cardiac monitor unremarkable 3/7/2023  Much improved with addition of the Wellbutrin to her Lexapro as well as metoprolol.  Now that she does not feel her palpitations as much, her anxiety is much better.  Her breathing in turn has also been better         35 minutes spent by me on the date of the encounter doing chart review, history and exam, documentation and further activities per the note    DO LULU [facility]    Marietta Tavera is a 77 year old, presenting for the following health issues:  No chief complaint on file.        5/1/2023     9:53 AM   Additional Questions   Roomed by PUJA Stack       Review of Systems   As per HPI       Objective    /60 (BP Location: Right arm, Patient Position: Sitting, Cuff Size: Adult Small)   Pulse 95   Temp 98.4  F (36.9  C) (Oral)   Resp 18   Ht 1.575 m (5' 2\")   Wt 48.3 kg (106 lb 8 oz)   SpO2 95%   BMI 19.48 kg/m    Body mass index is 19.48 kg/m .  Physical Exam   GENERAL: healthy, alert and no distress, thin and elderly  NECK: no adenopathy, no asymmetry, masses, or scars and thyroid normal to palpation  RESP: lungs clear to auscultation -no end expiratory wheezing, crackles or rhonchi identified.  Patient is moving good air.  Is on 2 L NC O2 with SPO2 of 95%.  In no respiratory distress.  CV: regular rate and rhythm, no murmur, click or rub, no peripheral edema and peripheral pulses strong  MS: no gross musculoskeletal defects noted, no edema  PSYCH: mentation appears normal, affect vito, anxious           "

## 2023-05-02 PROBLEM — E03.8 SUBCLINICAL HYPOTHYROIDISM: Status: ACTIVE | Noted: 2023-01-01

## 2023-05-18 NOTE — NURSING NOTE
Is the patient currently in the state of [place]? YES    Visit mode:VIDEO    If the visit is dropped, the patient can be reconnected by: VIDEO VISIT: Send to e-mail at: [email or web address]    Will anyone else be joining the visit? NO      How would you like to obtain your AVS? MyChart    Are changes needed to the allergy or medication list? NO - pt says same as checked last week.    Reason for visit: Video Visit    LORENA BOWERS

## 2023-05-18 NOTE — PROGRESS NOTES
Virtual Visit Details    Type of service:  Video Visit   Video Start Time: 1345  Video End Time:1405    Originating Location (pt. Location): Home    Distant Location (provider location):  On-site  Platform used for Video Visit: [facility]           LUNG NODULE & INTERVENTIONAL PULMONARY CLINIC  CLINICS & SURGERY [place], [facility], [facility]     Fiordaliza Najera Sr. MRN# [id number]   Age: 76 year old YOB: 1945       Requesting Physician: No referring provider defined for this encounter.       Assessment and Plan:    1.  Bronchiectasis: Not having an exacerbation type symptoms.  Continue current regimen.    2.  Shortness of breath.  Unfortunately this has become more difficult.  No other clear localizing signs.  No suggestion of heart disease or anything else.  We will repeat CT scan given previous significant nodules and then consider further.    3.  Chronic hypoxic respiratory failure and shortness of breath with exertion.  As above.  Goal oxygen saturation 89% or greater at rest.     4.  Lung nodules.  Repeat CT today.  3 months.             History:     Fiordaliza Najera Sr. is a 77 year old female with sig h/o for bronchiectasis.  She is feeling more short of breath than usual again.  No symptoms of bronchitis.  No change in phlegm, fever, reflux or sinus issues.  No leg swelling no evident blood loss.                   Past Medical History:      Past Medical History:   Diagnosis Date     Anxiety 09/30/2021     COPD (chronic obstructive pulmonary disease) (H)      Diverticulosis      Hiatal hernia      Mild asthma      Neuropathy      Osteopenia      Temporomandibular joint (TMJ) pain            Past Surgical History:      Past Surgical History:   Procedure Laterality Date     ANTERIOR / POSTERIOR COMBINED FUSION LUMBAR SPINE       BRONCHOSCOPY (RIGID OR FLEXIBLE), DIAGNOSTIC N/A 09/28/2021    Procedure: BRONCHOSCOPY, WITH BRONCHOALVEOLAR LAVAGE;  Surgeon:  Randall Lorenz MD;  Location: [facility]     HYSTERECTOMY       PICC SINGLE LUMEN PLACEMENT  11/04/2021          PICC SINGLE LUMEN PLACEMENT Right 12/22/2022    Right basilic, 38 cm     RETINAL LASER PROCEDURE Left 10/04/2016     SALPINGOOPHORECTOMY       TOTAL KNEE ARTHROPLASTY  2008          Social History:     Social History     Tobacco Use     Smoking status: Never     Smokeless tobacco: Never   Vaping Use     Vaping status: Never Used     Passive vaping exposure: Yes   Substance Use Topics     Alcohol use: No          Family History:     Family History   Problem Relation Age of Onset     Dementia Mother         passed age 88     Chronic Obstructive Pulmonary Disease Father         passed age 78           Allergies:      Allergies   Allergen Reactions     Codeine Unknown     Morphine Itching          Medications:     Current Outpatient Medications   Medication Sig     albuterol (PROAIR HFA/PROVENTIL HFA/VENTOLIN HFA) 108 (90 Base) MCG/ACT inhaler Inhale 2 puffs into the lungs every 6 hours as needed     albuterol (PROVENTIL) (2.5 MG/3ML) 0.083% neb solution Take 1 vial (2.5 mg) by nebulization every 4 hours as needed for shortness of breath / dyspnea or wheezing     biotin 300 MCG TABS tablet Take 1 tablet (300 mcg) by mouth daily     buPROPion (WELLBUTRIN XL) 150 MG 24 hr tablet Take 1 tablet (150 mg) by mouth every morning     calcium-vitamin D (CALCIUM-VITAMIN D) 500 mg(1,250mg) -200 unit per tablet Take 1 tablet by mouth 2 times daily      carbamide peroxide (DEBROX) 6.5 % otic solution 5 drops to both ears at bedtime 1 week before return visit     celecoxib (CELEBREX) 200 MG capsule Take 1 capsule (200 mg) by mouth daily     cetirizine (ZYRTEC) 5 MG tablet Take 5 mg by mouth daily     cholecalciferol, vitamin D3, (VITAMIN D3) 1,000 unit capsule [CHOLECALCIFEROL, VITAMIN D3, (VITAMIN D3) 1,000 UNIT CAPSULE] Take 1,000 Units by mouth daily.     escitalopram (LEXAPRO) 20 MG tablet Take 1 tablet (20 mg) by mouth  daily     Fluticasone-Umeclidin-Vilant (TRELEGY ELLIPTA) 100-62.5-25 MCG/ACT oral inhaler Inhale 1 puff into the lungs daily     Lactobacillus rhamnosus GG (CULTURELLE) 10-15 Billion cell capsule Take 1 capsule by mouth every evening      metoprolol succinate ER (TOPROL XL) 25 MG 24 hr tablet Take 1 tablet (25 mg) by mouth daily     montelukast (SINGULAIR) 10 MG tablet [MONTELUKAST (SINGULAIR) 10 MG TABLET] TAKE 1 TABLET(10 MG) BY MOUTH AT BEDTIME     multivitamin therapeutic (THERAGRAN) tablet [MULTIVITAMIN THERAPEUTIC (THERAGRAN) TABLET] Take 1 tablet by mouth daily.     nystatin (MYCOSTATIN) 963254 UNIT/GM external cream Apply to rash on body 3 times per day as needed.     oxybutynin ER (DITROPAN XL) 5 MG 24 hr tablet Take 1 tablet (5 mg) by mouth daily     OXYGEN-AIR DELIVERY SYSTEMS MISC [OXYGEN-AIR DELIVERY SYSTEMS MISC] Use 2 L As Directed. 2L with activity and at night  [employer]     predniSONE (DELTASONE) 20 MG tablet Take 1 tablet (20 mg) by mouth daily     pregabalin (LYRICA) 50 MG capsule Take 1 tab in the AM and 2 in the evening     sodium chloride (NEBUSAL) 3 % neb solution Take 3 mLs by nebulization 2 times daily     sodium chloride (OCEAN) 0.65 % nasal spray Spray 2 sprays in nostril daily     SUMAtriptan (IMITREX) 50 MG tablet Take 1 tablet (50 mg) by mouth as needed for migraine,may repeat after 2 hours if needed;  mg/24 hours     No current facility-administered medications for this visit.          Review of Systems:     See HPI         Physical Exam:   There were no vitals taken for this visit.    Constitutional - looks well, in no apparent distress  Eyes - no redness or discharge  Respiratory -breathing appears comfortable. No wheeze or rhonchi.   Skin - No appreciable discoloration or lesions (very limited exam)  Neurological - No apparent tremors. Speech fluent and articlate  Psychiatric - no signs of delirium or anxiety          Current Laboratory Data:   All laboratory and imaging data  reviewed.    No results found for this or any previous visit (from the past 24 hour(s)).

## 2023-06-06 NOTE — NURSING NOTE
Is the patient currently in the state of [place]? YES    Visit mode:VIDEO    If the visit is dropped, the patient can be reconnected by: VIDEO VISIT: Send to e-mail at: [email or web address]    Will anyone else be joining the visit? NO      How would you like to obtain your AVS? MyChart    Are changes needed to the allergy or medication list? NO    Reason for visit: RECHECK    Pt states pain today is from torn rotator.    FATOUMATA Bond on 6/6/2023 at 2:16 PM

## 2023-06-06 NOTE — LETTER
6/6/2023       RE: Fiordaliza Najera  [address] [place] [address]     Dear Colleague,    Thank you for referring your patient, Fiordaliza Najera, to the [facility] ENDOCRINOLOGY CLINIC [place] at [facility]. Please see a copy of my visit note below.    Endocrinology Clinic Visit     Video-Visit Details     Type of service:  Video Visit     Joined the call at 6/6/2023, 2:37:02 pm.  Left the call at 6/6/2023, 3:00:56 pm.     I spent a total of 35 minutes on the date of encounter reviewing medical records, evaluating the patient, coordinating care and documenting in the EHR, as detailed above.        Platform used for Video Visit: [email or web address]    NAME:  Fiordaliza Najera Eros  PCP:  Ekaterina Koehler  MRN:  [id number]  Reason for Consult:  osteopororis  Requesting Provider:  Ekaterina Koehler    Chief Complaint     Chief Complaint   Patient presents with    RECHECK       History of Present Illness     Fiordaliza Najera Sr. is a 76 year old female who is seen in clinic for osteoporosis management. Last seen 5/31/23    Interval hx: she received IV reclast 6/2023 no issues.  She had 3 falls over the past year, tripping over shoelace, wrong step and falling. She had no bone fracture.  She walks using a walker. She had to use prednisone for at least 2 flares, each 10 days course.  Most recent pertinent labs 2/2023, ca, cr and vitd wnl.      Background: no changes.  She has always been told she has osteopenia. She has been on alendronate for a vinnie time. She can not recall when she was first started on it. I reviewed all her PCP note and I was not able to find the date of starting it. It is possible that she was taken off fosamax in 2017 based on the dxa reading note. She was restarted on fosamax in 2019. She has been on fosamax now since ? 9/2019. I called her old pahrmacy to confirm prior fosamax use and they said no fosamax on her med list. Today she said she  is taking fosamax once weekly in fact she took it on Sunday but not sure for how long she has been on it, probably 2 years.    The patient had a DXA scan on 2/2022 which showed: lowest T score of -2 at the right femoral neck, bone density has declined a significant 4.9% left total hip, 5.5% in the right total hip, and 10.2% in the wrist.      Calcium intake:   Dietary: yogurt everyday, milk with cereal, cottage cheese everyday.  Supplements: ca citrate 315mg 2 pills    Vitamin D intake:   Supplements: vitamin 1000 international unit(s) daily  Last vitamin D level was 40 on 10/2021.    Osteoporosis Risk factors:   Previous fractures: no fractures, back surgeries and knee replacments  Family history of fragility fracture in parent: no.   Current smoking: no  Steroid Use: intermittent use for copd flairs, prednisone 20 mg daily for 5 days sevral times a year  Rheumatoid  arthritis: no  Alcohol (3 or more units per day): no  Other medications known to affect BMD: no  Reported loss of height: yes especially after back surgeries  Menstrual history: age at menopause: she had THBSO in her 40s  Estrogen use after menopause: yes  Tendency for falls: no, last fall 5-6 years ago fell in the escalator  GI history: Malabsorption (IBD, Celiac, gastric bypass ): no  History of kidney stones: no  History of thyroid disease: no  Physical activity: exercise and walk everyday  Weight history: she lost 7 lbs after recent pneumonia, but started to gain them back.    Social: she lives an independent living. She does not have kids. She is on oxygen due to copd but independent in her daily needs.    Problem List     Patient Active Problem List   Diagnosis    Osteopenia    Bronchiectasis with (acute) exacerbation (H)    Idiopathic peripheral neuropathy    COPD (chronic obstructive pulmonary disease) (H)    Unspecified abnormalities of gait and mobility    Back pain    Diaphragmatic hernia    Other kyphoscoliosis and scoliosis    Chronic  respiratory failure with hypoxia, on home O2 therapy (H)    Anxiety    Migraine without aura and without status migrainosus, not intractable    Carotid artery aneurysm (H)    Bronchiectasis (H)    Mood disorder (H)    Chronic pain    Nontraumatic tear of rotator cuff, unspecified laterality, unspecified tear extent    Panlobular emphysema (H)    Dependence on supplemental oxygen    Difficulty in walking, not elsewhere classified    Hypoxemia    Muscle weakness (generalized)    Unsteadiness on feet    Aortic dilatation (H)    Subclinical hypothyroidism        Medications     Current Outpatient Medications   Medication    albuterol (PROAIR HFA/PROVENTIL HFA/VENTOLIN HFA) 108 (90 Base) MCG/ACT inhaler    albuterol (PROVENTIL) (2.5 MG/3ML) 0.083% neb solution    biotin 300 MCG TABS tablet    buPROPion (WELLBUTRIN XL) 150 MG 24 hr tablet    calcium-vitamin D (CALCIUM-VITAMIN D) 500 mg(1,250mg) -200 unit per tablet    carbamide peroxide (DEBROX) 6.5 % otic solution    celecoxib (CELEBREX) 200 MG capsule    cetirizine (ZYRTEC) 5 MG tablet    cholecalciferol, vitamin D3, (VITAMIN D3) 1,000 unit capsule    escitalopram (LEXAPRO) 20 MG tablet    Fluticasone-Umeclidin-Vilant (TRELEGY ELLIPTA) 100-62.5-25 MCG/ACT oral inhaler    Lactobacillus rhamnosus GG (CULTURELLE) 10-15 Billion cell capsule    methylPREDNISolone (MEDROL DOSEPAK) 4 MG tablet therapy pack    metoprolol succinate ER (TOPROL XL) 25 MG 24 hr tablet    montelukast (SINGULAIR) 10 MG tablet    multivitamin therapeutic (THERAGRAN) tablet    nystatin (MYCOSTATIN) 517001 UNIT/GM external cream    oxybutynin ER (DITROPAN XL) 5 MG 24 hr tablet    OXYGEN-AIR DELIVERY SYSTEMS MISC    pregabalin (LYRICA) 50 MG capsule    sodium chloride (NEBUSAL) 3 % neb solution    sodium chloride (OCEAN) 0.65 % nasal spray    sulfamethoxazole-trimethoprim (BACTRIM DS) 800-160 MG tablet    SUMAtriptan (IMITREX) 50 MG tablet    predniSONE (DELTASONE) 20 MG tablet     No current  facility-administered medications for this visit.        Allergies     Allergies   Allergen Reactions    Codeine Unknown    Morphine Itching       Medical / Surgical History     Past Medical History:   Diagnosis Date    Anxiety 09/30/2021    COPD (chronic obstructive pulmonary disease) (H)     Diverticulosis     Hiatal hernia     Mild asthma     Neuropathy     Osteopenia     Temporomandibular joint (TMJ) pain      Past Surgical History:   Procedure Laterality Date    ANTERIOR / POSTERIOR COMBINED FUSION LUMBAR SPINE      BRONCHOSCOPY (RIGID OR FLEXIBLE), DIAGNOSTIC N/A 09/28/2021    Procedure: BRONCHOSCOPY, WITH BRONCHOALVEOLAR LAVAGE;  Surgeon: Randall Lorenz MD;  Location: [facility]    HYSTERECTOMY      PICC SINGLE LUMEN PLACEMENT  11/04/2021         PICC SINGLE LUMEN PLACEMENT Right 12/22/2022    Right basilic, 38 cm    RETINAL LASER PROCEDURE Left 10/04/2016    SALPINGOOPHORECTOMY      TOTAL KNEE ARTHROPLASTY  2008       Social History     Social History     Socioeconomic History    Marital status: Single     Spouse name: Not on file    Number of children: Not on file    Years of education: Not on file    Highest education level: Not on file   Occupational History    Not on file   Tobacco Use    Smoking status: Never    Smokeless tobacco: Never   Vaping Use    Vaping status: Never Used     Passive vaping exposure: Yes   Substance and Sexual Activity    Alcohol use: No    Drug use: Never    Sexual activity: Never   Other Topics Concern    Parent/sibling w/ CABG, MI or angioplasty before 65F 55M? 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  She was a [employer] sister with [employer].  She has a sister and brother-in-law, 2 nieces, 3 grand nieces and nephews in [place].  She came to [place] for a sabbatical, and then  stayed on for a teaching job.  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  She lives alone in a long-term  "facility. - Dr. Nguyễn 01/04/21       Social Determinants of Health     Financial Resource Strain: Not on file   Food Insecurity: Not on file   Transportation Needs: Not on file   Physical Activity: Not on file   Stress: Not on file   Social Connections: Not on file   Intimate Partner Violence: Not on file   Housing Stability: Not on file       Family History     Family History   Problem Relation Age of Onset    Dementia Mother         passed age 88    Chronic Obstructive Pulmonary Disease Father         passed age 78       ROS     Limited  ROS completed, pertinent positive and negative in HPI      Physical Exam   Ht 1.575 m (5' 2\")   Wt 47.6 kg (105 lb)   BMI 19.20 kg/m       GENERAL: Healthy, alert and no distress  EYES: Eyes grossly normal to inspection.  No discharge or erythema, or obvious scleral/conjunctival abnormalities.  RESP: No audible wheeze, cough, or visible cyanosis.  No visible retractions or increased work of breathing.    SKIN: Visible skin clear. No significant rash, abnormal pigmentation or lesions.  NEURO: Cranial nerves grossly intact.  Mentation and speech appropriate for age.  PSYCH: Mentation appears normal, affect normal/bright, judgement and insight intact, normal speech and appearance well-groomed.    Labs/Imaging     Pertinent Labs were reviewed and updated in [employer]  Radiology Results were  reviewed and updated in EPIC.    Summary of recent findings:   No results found for: A1C    TSH   Date Value Ref Range Status   05/01/2023 6.89 (H) 0.30 - 4.20 uIU/mL Final   02/02/2023 6.21 (H) 0.30 - 4.20 uIU/mL Final   06/19/2019 3.94 0.30 - 5.00 uIU/mL Final   06/18/2018 3.92 0.30 - 5.00 uIU/mL Final     Free T4   Date Value Ref Range Status   05/01/2023 0.96 0.90 - 1.70 ng/dL Final   02/02/2023 1.16 0.90 - 1.70 ng/dL Final       Creatinine   Date Value Ref Range Status   02/28/2023 0.50 (L) 0.51 - 0.95 mg/dL Final       Recent Labs   Lab Test 10/20/21  1002 09/23/20  1127   CHOL 172 191   HDL 61 " 78   LDL 95 91   TRIG 78 111      Latest Reference Range & Units 05/13/22 10:32   Calcium 8.5 - 10.5 mg/dL 9.5   Phosphorus 2.5 - 4.5 mg/dL 3.4   Bone Spec Alk Phosphatase ug/L 14.4 [1]   N TELOPEPTIDE CROSS LINKED URINE  Rpt   Tissue Transglutaminase Antibody IgA <7.0 U/mL 2.7 [2]   Tissue Transglutaminase Debbie IgG <7.0 U/mL 1.0 [3]   C-Telopept B-X-Linked pg/mL 348 [4]   Parathyroid Hormone Intact 10 - 86 pg/mL 35   No results found for: [id number], [id number], [id number]    I personally reviewed the patient's outside records from [employer] EMR and Care Everywhere. Summary of pertinent findings in HPI.    Impression / Plan     1. Osteopenia  2. Long history of intermittent steroid use    She has long hx of osteopenia with high risk for fracture giving her indication for treatment for OP. Her risk factors include age, post menaupose, intermitent chronic steroid use. As far as secondary OP work up, she had normal ca, pth and vitD. She was screened for MM in 2018. Negative celiac.  She thinks she was on alendronate longtime before 2015 but I only have DXA and data since 2015 and I am not able to confirm for how long she took fosamax in the past. I did call her old pharmacy I was told no fosamax on her list.  She was off fosamax [id number] based on dxa report, probably even since 2015. Took  fosamax 9/2019-5/2022. Bone turnover were low normal range but had evidence of BMD decline on DXA 2/2022 after 2.5 years of fosamax treatment, no fractures. Given her concern of BMD decline, decision made to switch to IV reclast with DEXA in recheck in 2years. S/p IV reclast on 6/2022.      Plan:  - schedule second IV reclast.  - continue calcium and vitd as above  -recheck dxa 5/2024    Follow up : 1 year or earlier if bone fracture happened.            Test and/or medications prescribed today:  No orders of the defined types were placed in this encounter.        Follow up: 1 year      Marjan Paul MD  Endocrinology, Diabetes and  Metabolism  [facility]

## 2023-06-06 NOTE — PROGRESS NOTES
Endocrinology Clinic Visit     Video-Visit Details     Type of service:  Video Visit     Joined the call at 6/6/2023, 2:37:02 pm.  Left the call at 6/6/2023, 3:00:56 pm.     I spent a total of 35 minutes on the date of encounter reviewing medical records, evaluating the patient, coordinating care and documenting in the EHR, as detailed above.        Platform used for Video Visit: [employer]    NAME:  Fiordaliza Najera Sr.  PCP:  Ekaterina Koehler  MRN:  [id number]  Reason for Consult:  osteopororis  Requesting Provider:  Ekaterina Koehler    Chief Complaint     Chief Complaint   Patient presents with     RECHECK       History of Present Illness     Fiordaliza Najera Sr. is a 76 year old female who is seen in clinic for osteoporosis management. Last seen 5/31/23    Interval hx: she received IV reclast 6/2023 no issues.  She had 3 falls over the past year, tripping over shoelace, wrong step and falling. She had no bone fracture.  She walks using a walker. She had to use prednisone for at least 2 flares, each 10 days course.  Most recent pertinent labs 2/2023, ca, cr and vitd wnl.      Background: no changes.  She has always been told she has osteopenia. She has been on alendronate for a vinnie time. She can not recall when she was first started on it. I reviewed all her PCP note and I was not able to find the date of starting it. It is possible that she was taken off fosamax in 2017 based on the dxa reading note. She was restarted on fosamax in 2019. She has been on fosamax now since ? 9/2019. I called her old [facility] to confirm prior fosamax use and they said no fosamax on her med list. Today she said she is taking fosamax once weekly in fact she took it on Sunday but not sure for how long she has been on it, probably 2 years.    The patient had a DXA scan on 2/2022 which showed: lowest T score of -2 at the right femoral neck, bone density has declined a significant 4.9% left total hip, 5.5% in the right total hip,  and 10.2% in the wrist.      Calcium intake:   - Dietary: yogurt everyday, milk with cereal, cottage cheese everyday.  - Supplements: ca citrate 315mg 2 pills    Vitamin D intake:   - Supplements: vitamin 1000 international unit(s) daily  Last vitamin D level was 40 on 10/2021.    Osteoporosis Risk factors:   - Previous fractures: no fractures, back surgeries and knee replacments  - Family history of fragility fracture in parent: no.   - Current smoking: no  - Steroid Use: intermittent use for copd flairs, prednisone 20 mg daily for 5 days sevral times a year  - Rheumatoid  arthritis: no  - Alcohol (3 or more units per day): no  - Other medications known to affect BMD: no  - Reported loss of height: yes especially after back surgeries  - Menstrual history: age at menopause: she had THBSO in her 40s  - Estrogen use after menopause: yes  - Tendency for falls: no, last fall 5-6 years ago fell in the escalator  - GI history: Malabsorption (IBD, Celiac, gastric bypass ): no  - History of kidney stones: no  - History of thyroid disease: no  - Physical activity: exercise and walk everyday  - Weight history: she lost 7 lbs after recent pneumonia, but started to gain them back.    Social: she lives an independent living. She does not have kids. She is on oxygen due to copd but independent in her daily needs.    Problem List     Patient Active Problem List   Diagnosis     Osteopenia     Bronchiectasis with (acute) exacerbation (H)     Idiopathic peripheral neuropathy     COPD (chronic obstructive pulmonary disease) (H)     Unspecified abnormalities of gait and mobility     Back pain     Diaphragmatic hernia     Other kyphoscoliosis and scoliosis     Chronic respiratory failure with hypoxia, on home O2 therapy (H)     Anxiety     Migraine without aura and without status migrainosus, not intractable     Carotid artery aneurysm (H)     Bronchiectasis (H)     Mood disorder (H)     Chronic pain     Nontraumatic tear of rotator  cuff, unspecified laterality, unspecified tear extent     Panlobular emphysema (H)     Dependence on supplemental oxygen     Difficulty in walking, not elsewhere classified     Hypoxemia     Muscle weakness (generalized)     Unsteadiness on feet     Aortic dilatation (H)     Subclinical hypothyroidism        Medications     Current Outpatient Medications   Medication     albuterol (PROAIR HFA/PROVENTIL HFA/VENTOLIN HFA) 108 (90 Base) MCG/ACT inhaler     albuterol (PROVENTIL) (2.5 MG/3ML) 0.083% neb solution     biotin 300 MCG TABS tablet     buPROPion (WELLBUTRIN XL) 150 MG 24 hr tablet     calcium-vitamin D (CALCIUM-VITAMIN D) 500 mg(1,250mg) -200 unit per tablet     carbamide peroxide (DEBROX) 6.5 % otic solution     celecoxib (CELEBREX) 200 MG capsule     cetirizine (ZYRTEC) 5 MG tablet     cholecalciferol, vitamin D3, (VITAMIN D3) 1,000 unit capsule     escitalopram (LEXAPRO) 20 MG tablet     Fluticasone-Umeclidin-Vilant (TRELEGY ELLIPTA) 100-62.5-25 MCG/ACT oral inhaler     Lactobacillus rhamnosus GG (CULTURELLE) 10-15 Billion cell capsule     methylPREDNISolone (MEDROL DOSEPAK) 4 MG tablet therapy pack     metoprolol succinate ER (TOPROL XL) 25 MG 24 hr tablet     montelukast (SINGULAIR) 10 MG tablet     multivitamin therapeutic (THERAGRAN) tablet     nystatin (MYCOSTATIN) 427886 UNIT/GM external cream     oxybutynin ER (DITROPAN XL) 5 MG 24 hr tablet     OXYGEN-AIR DELIVERY SYSTEMS MISC     pregabalin (LYRICA) 50 MG capsule     sodium chloride (NEBUSAL) 3 % neb solution     sodium chloride (OCEAN) 0.65 % nasal spray     sulfamethoxazole-trimethoprim (BACTRIM DS) 800-160 MG tablet     SUMAtriptan (IMITREX) 50 MG tablet     predniSONE (DELTASONE) 20 MG tablet     No current facility-administered medications for this visit.        Allergies     Allergies   Allergen Reactions     Codeine Unknown     Morphine Itching       Medical / Surgical History     Past Medical History:   Diagnosis Date     Anxiety 09/30/2021      COPD (chronic obstructive pulmonary disease) (H)      Diverticulosis      Hiatal hernia      Mild asthma      Neuropathy      Osteopenia      Temporomandibular joint (TMJ) pain      Past Surgical History:   Procedure Laterality Date     ANTERIOR / POSTERIOR COMBINED FUSION LUMBAR SPINE       BRONCHOSCOPY (RIGID OR FLEXIBLE), DIAGNOSTIC N/A 09/28/2021    Procedure: BRONCHOSCOPY, WITH BRONCHOALVEOLAR LAVAGE;  Surgeon: Randall Lorenz MD;  Location: [facility]     HYSTERECTOMY       PICC SINGLE LUMEN PLACEMENT  11/04/2021          PICC SINGLE LUMEN PLACEMENT Right 12/22/2022    Right basilic, 38 cm     RETINAL LASER PROCEDURE Left 10/04/2016     SALPINGOOPHORECTOMY       TOTAL KNEE ARTHROPLASTY  2008       Social History     Social History     Socioeconomic History     Marital status: Single     Spouse name: Not on file     Number of children: Not on file     Years of education: Not on file     Highest education level: Not on file   Occupational History     Not on file   Tobacco Use     Smoking status: Never     Smokeless tobacco: Never   Vaping Use     Vaping status: Never Used     Passive vaping exposure: Yes   Substance and Sexual Activity     Alcohol use: No     Drug use: Never     Sexual activity: Never   Other Topics Concern     Parent/sibling w/ CABG, MI or angioplasty before 65F 55M? 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  She was a [employer] sister with [employer].  She has a sister and brother-in-law, 2 nieces, 3 grand nieces and nephews in [place].  She came to [place] for a sabbatical, and then  stayed on for a teaching job.  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  She lives alone in a long-term facility. - Dr. Nguyễn 01/04/21       Social Determinants of Health     Financial Resource Strain: Not on file   Food Insecurity: Not on file   Transportation Needs: Not on file   Physical Activity: Not on file   Stress: Not  "on file   Social Connections: Not on file   Intimate Partner Violence: Not on file   Housing Stability: Not on file       Family History     Family History   Problem Relation Age of Onset     Dementia Mother         passed age 88     Chronic Obstructive Pulmonary Disease Father         passed age 78       ROS     Limited  ROS completed, pertinent positive and negative in HPI      Physical Exam   Ht 1.575 m (5' 2\")   Wt 47.6 kg (105 lb)   BMI 19.20 kg/m       GENERAL: Healthy, alert and no distress  EYES: Eyes grossly normal to inspection.  No discharge or erythema, or obvious scleral/conjunctival abnormalities.  RESP: No audible wheeze, cough, or visible cyanosis.  No visible retractions or increased work of breathing.    SKIN: Visible skin clear. No significant rash, abnormal pigmentation or lesions.  NEURO: Cranial nerves grossly intact.  Mentation and speech appropriate for age.  PSYCH: Mentation appears normal, affect normal/bright, judgement and insight intact, normal speech and appearance well-groomed.    Labs/Imaging     Pertinent Labs were reviewed and updated in [employer].  Radiology Results were  reviewed and updated in EPIC.    Summary of recent findings:   No results found for: A1C    TSH   Date Value Ref Range Status   05/01/2023 6.89 (H) 0.30 - 4.20 uIU/mL Final   02/02/2023 6.21 (H) 0.30 - 4.20 uIU/mL Final   06/19/2019 3.94 0.30 - 5.00 uIU/mL Final   06/18/2018 3.92 0.30 - 5.00 uIU/mL Final     Free T4   Date Value Ref Range Status   05/01/2023 0.96 0.90 - 1.70 ng/dL Final   02/02/2023 1.16 0.90 - 1.70 ng/dL Final       Creatinine   Date Value Ref Range Status   02/28/2023 0.50 (L) 0.51 - 0.95 mg/dL Final       Recent Labs   Lab Test 10/20/21  1002 09/23/20  1127   CHOL 172 191   HDL 61 78   LDL 95 91   TRIG 78 111      Latest Reference Range & Units 05/13/22 10:32   Calcium 8.5 - 10.5 mg/dL 9.5   Phosphorus 2.5 - 4.5 mg/dL 3.4   Bone Spec Alk Phosphatase ug/L 14.4 [1]   N TELOPEPTIDE CROSS LINKED URINE "  Rpt   Tissue Transglutaminase Antibody IgA <7.0 U/mL 2.7 [2]   Tissue Transglutaminase Debbie IgG <7.0 U/mL 1.0 [3]   C-Telopept B-X-Linked pg/mL 348 [4]   Parathyroid Hormone Intact 10 - 86 pg/mL 35   No results found for: [id number], [id number], [id number]    I personally reviewed the patient's outside records from [facility] EMR and Care Everywhere. Summary of pertinent findings in HPI.    Impression / Plan     1. Osteopenia  2. Long history of intermittent steroid use    She has long hx of osteopenia with high risk for fracture giving her indication for treatment for OP. Her risk factors include age, post menaupose, intermitent chronic steroid use. As far as secondary OP work up, she had normal ca, pth and vitD. She was screened for MM in 2018. Negative celiac.  She thinks she was on alendronate longtime before 2015 but I only have DXA and data since 2015 and I am not able to confirm for how long she took fosamax in the past. I did call her old pharmacy I was told no fosamax on her list.  She was off fosamax [id number] based on dxa report, probably even since 2015. Took  fosamax 9/2019-5/2022. Bone turnover were low normal range but had evidence of BMD decline on DXA 2/2022 after 2.5 years of fosamax treatment, no fractures. Given her concern of BMD decline, decision made to switch to IV reclast with DEXA in recheck in 2years. S/p IV reclast on 6/2022.      Plan:  - schedule second IV reclast.  - continue calcium and vitd as above  -recheck dxa 5/2024    Follow up : 1 year or earlier if bone fracture happened.            Test and/or medications prescribed today:  No orders of the defined types were placed in this encounter.        Follow up: 1 year      Marjan Paul MD  Endocrinology, Diabetes and Metabolism  [facility]

## 2023-06-09 NOTE — PROGRESS NOTES
LUNG NODULE & INTERVENTIONAL PULMONARY CLINIC  CLINICS [facility] SURGERY [facility]ENTER, [facility]     Fiordaliza Najera Sr. MRN# [id number]   Age: 76 year old YOB: 1945       Requesting Physician: No referring provider defined for this encounter.       Assessment and Plan:    1.  Bronchiectasis-no exacerbation.  On a reasonable inhaler and nebulizer regimen.  Unfortunately action plan does not seem to help much but will make sure she has it available.  She has required IV antibiotics twice in the last 18 months or so.  Previous attempts at getting culture data including bronchoscopy have not been very informative.  We have never isolated AFBs.    2.  Shortness of breath.  Still an issue.  She tolerated about 150 feet of activity today with saturations within reasonable range although some shortness of breath at the end.  I think we will have to primarily approach this with exercise therapy and everything we can to maintain skeletal muscle.  We discussed pulmonary rehab and she will take a look.  Previously she has been hesitant but now that the public health emergency is settled down and may be an option.    3.  Chronic hypoxic respiratory failure and shortness of breath with exertion.  As above.  Goal oxygen saturation 89% or greater at rest.     4.  Lung nodules.  Waxing waning.  Consistent with bronchiectasis and persistent inflammation.  I do not think these need to be followed again..    More than 40 minutes spent on this patient encounter during this calendar day.  This includes interpretation of CT data, review of previous culture records, face-to-face time with the patient and time spent on documentation.         History:     Fiordaliza Najera Sr. is a 78 year old female with sig h/o for bronchiectasis.  He is here for follow-up.  Unfortunately her breathing is slowly worsened-specifically shortness of breath with activity.  Fortunately she is not  having any exacerbations.  She continues to be on 2 L of oxygen for most the time.  This maintains her saturations around 90 with activity.  She had a little bit of swelling in her legs but this resolves overnight.           Past Medical History:      Past Medical History:   Diagnosis Date     Anxiety 09/30/2021     COPD (chronic obstructive pulmonary disease) (H)      Diverticulosis      Hiatal hernia      Mild asthma      Neuropathy      Osteopenia      Temporomandibular joint (TMJ) pain            Past Surgical History:      Past Surgical History:   Procedure Laterality Date     ANTERIOR / POSTERIOR COMBINED FUSION LUMBAR SPINE       BRONCHOSCOPY (RIGID OR FLEXIBLE), DIAGNOSTIC N/A 09/28/2021    Procedure: BRONCHOSCOPY, WITH BRONCHOALVEOLAR LAVAGE;  Surgeon: Randall Lorenz MD;  Location: [facility]     HYSTERECTOMY       PICC SINGLE LUMEN PLACEMENT  11/04/2021          PICC SINGLE LUMEN PLACEMENT Right 12/22/2022    Right basilic, 38 cm     RETINAL LASER PROCEDURE Left 10/04/2016     SALPINGOOPHORECTOMY       TOTAL KNEE ARTHROPLASTY  2008          Social History:     Social History     Tobacco Use     Smoking status: Never     Smokeless tobacco: Never   Vaping Use     Vaping status: Never Used     Passive vaping exposure: Yes   Substance Use Topics     Alcohol use: No          Family History:     Family History   Problem Relation Age of Onset     Dementia Mother         passed age 88     Chronic Obstructive Pulmonary Disease Father         passed age 78           Allergies:      Allergies   Allergen Reactions     Codeine Unknown     Morphine Itching          Medications:     Current Outpatient Medications   Medication Sig     albuterol (PROAIR HFA/PROVENTIL HFA/VENTOLIN HFA) 108 (90 Base) MCG/ACT inhaler Inhale 2 puffs into the lungs every 6 hours as needed     albuterol (PROVENTIL) (2.5 MG/3ML) 0.083% neb solution Take 1 vial (2.5 mg) by nebulization every 4 hours as needed for shortness of breath / dyspnea or  wheezing     biotin 300 MCG TABS tablet Take 1 tablet (300 mcg) by mouth daily     buPROPion (WELLBUTRIN XL) 150 MG 24 hr tablet Take 1 tablet (150 mg) by mouth every morning     calcium-vitamin D (CALCIUM-VITAMIN D) 500 mg(1,250mg) -200 unit per tablet Take 1 tablet by mouth 2 times daily      carbamide peroxide (DEBROX) 6.5 % otic solution 5 drops to both ears at bedtime 1 week before return visit     celecoxib (CELEBREX) 200 MG capsule Take 1 capsule (200 mg) by mouth daily     cetirizine (ZYRTEC) 5 MG tablet Take 5 mg by mouth daily     cholecalciferol, vitamin D3, (VITAMIN D3) 1,000 unit capsule [CHOLECALCIFEROL, VITAMIN D3, (VITAMIN D3) 1,000 UNIT CAPSULE] Take 1,000 Units by mouth daily.     escitalopram (LEXAPRO) 20 MG tablet Take 1 tablet (20 mg) by mouth daily     Fluticasone-Umeclidin-Vilant (TRELEGY ELLIPTA) 100-62.5-25 MCG/ACT oral inhaler Inhale 1 puff into the lungs daily     Lactobacillus rhamnosus GG (CULTURELLE) 10-15 Billion cell capsule Take 1 capsule by mouth every evening      methylPREDNISolone (MEDROL DOSEPAK) 4 MG tablet therapy pack Follow Package Directions     metoprolol succinate ER (TOPROL XL) 25 MG 24 hr tablet Take 1 tablet (25 mg) by mouth daily     montelukast (SINGULAIR) 10 MG tablet [MONTELUKAST (SINGULAIR) 10 MG TABLET] TAKE 1 TABLET(10 MG) BY MOUTH AT BEDTIME     multivitamin therapeutic (THERAGRAN) tablet [MULTIVITAMIN THERAPEUTIC (THERAGRAN) TABLET] Take 1 tablet by mouth daily.     nystatin (MYCOSTATIN) 881466 UNIT/GM external cream Apply to rash on body 3 times per day as needed.     oxybutynin ER (DITROPAN XL) 5 MG 24 hr tablet Take 1 tablet (5 mg) by mouth daily     OXYGEN-AIR DELIVERY SYSTEMS MISC [OXYGEN-AIR DELIVERY SYSTEMS MISC] Use 2 L As Directed. 2L with activity and at night  [employer]     pregabalin (LYRICA) 50 MG capsule Take 1 tab in the AM and 2 in the evening     sodium chloride (NEBUSAL) 3 % neb solution Take 3 mLs by nebulization 2 times daily     sodium  chloride (OCEAN) 0.65 % nasal spray Spray 2 sprays in nostril daily     sulfamethoxazole-trimethoprim (BACTRIM DS) 800-160 MG tablet Take 1 tablet by mouth 2 times daily     SUMAtriptan (IMITREX) 50 MG tablet Take 1 tablet (50 mg) by mouth as needed for migraine,may repeat after 2 hours if needed;  mg/24 hours     predniSONE (DELTASONE) 20 MG tablet Take 1 tablet (20 mg) by mouth daily     No current facility-administered medications for this visit.          Review of Systems:     See HPI         Physical Exam:   /64 (BP Location: Left arm, Patient Position: Sitting, Cuff Size: Adult Regular)   Pulse 87   Wt 49.4 kg (108 lb 12.8 oz)   SpO2 95%   BMI 19.90 kg/m      Constitutional - looks well, in no apparent distress  Eyes - no redness or discharge  Respiratory -breathing appears comfortable.  Some small basilar crackles  Cardiac-regular rate and rhythm  Skin - No appreciable discoloration or lesions (very limited exam)  Neurological - No apparent tremors. Speech fluent and articlate  Psychiatric - no signs of delirium or anxiety          Current Laboratory Data:   All laboratory and imaging data reviewed.    No results found. However, due to the size of the patient record, not all encounters were searched. Please check Results Review for a complete set of results.

## 2023-07-03 NOTE — PATIENT INSTRUCTIONS
Dear Fiordaliza Najera    Thank you for choosing [facility] ([facility]) for your infusion.  The following information is a summary of our appointment as well as important reminders.        We look forward in seeing you on your next appointment here at [facility] ([facility]).  Please don t hesitate to call us at [phone] to reschedule any of your appointments or to speak with one of the [facility] registered nurses.  It was a pleasure taking care of you today.    Sincerely,    [facility]  [facility]  [address] [place] [address]  [address]  Phone:  [phone]

## 2023-07-03 NOTE — NURSING NOTE
Chief Complaint   Patient presents with     Blood Draw     Iv placement with blood draw by lab RN. Vitals taken and appointment arrived     Karin Montes RN

## 2023-07-03 NOTE — PROGRESS NOTES
Infusion Nursing Note:  Fiordaliza Najera presents today for IV Reclast.    Patient seen by provider today: No   present during visit today: Not Applicable.    Note: Pt received IV Reclast over 30 minutes without incident.  Pre-medicated with po tylenol as ordered.      Intravenous Access:  Peripheral IV placed.    Treatment Conditions:  Lab Results   Component Value Date     (L) 02/28/2023    POTASSIUM 4.7 02/28/2023    MAG 2.1 07/24/2022    CR 0.51 07/03/2023    MAGAN 9.7 07/03/2023    BILITOTAL 0.5 02/02/2023    ALBUMIN 3.6 02/02/2023    ALT 23 02/02/2023    AST 32 02/02/2023     Results reviewed, labs MET treatment parameters, ok to proceed with treatment.      Post Infusion Assessment:  Patient tolerated infusion without incident.  Blood return noted pre and post infusion.  Site patent and intact, free from redness, edema or discomfort.  No evidence of extravasations.  Access discontinued per protocol.       Discharge Plan:   Discharge instructions reviewed with: Patient.  Patient and/or family verbalized understanding of discharge instructions and all questions answered.  Copy of AVS reviewed with patient and/or family.    Patient discharged in stable condition accompanied by: friend.  Departure Mode: Ambulatory.    Administrations This Visit     acetaminophen (TYLENOL) tablet 650 mg     Admin Date  07/03/2023 Action  $Given Dose  650 mg Route  Oral Administered By  Laine Hernandez RN          zoledronic Acid (RECLAST) infusion 5 mg     Admin Date  07/03/2023 Action  $New Bag Dose  5 mg Rate  200 mL/hr Route  Intravenous Administered By  Laine Hernandez RN Carissa L. Draper, RN

## 2023-07-12 NOTE — TELEPHONE ENCOUNTER
----- Message from Neida Maradiaga DO sent at 7/11/2023  3:54 PM CDT -----  [facility] message not read. Please call x 2 and if no response, please send letter.

## 2023-07-12 NOTE — TELEPHONE ENCOUNTER
Spoke with patient regarding her TSH. Patient understood. Writer did list off symptoms of hyperthyroidism since patient was curious of what symptoms there is.

## 2023-07-14 PROBLEM — R23.2 HOT FLASHES: Status: ACTIVE | Noted: 2023-01-01

## 2023-07-14 NOTE — ASSESSMENT & PLAN NOTE
New onset ( in the last 2 weeks ) and randomly occurring in the face.     - no trial of new foods/ diet, supplements or topicals.   - meds reviewed and likely not contributory     Plan:   DDx is broad   See initial work up/ lab orders

## 2023-07-14 NOTE — PROGRESS NOTES
Assessment & Plan   Problem List Items Addressed This Visit        Circulatory    Hot flashes - Primary     New onset ( in the last 2 weeks ) and randomly occurring in the face.     - no trial of new foods/ diet, supplements or topicals.   - meds reviewed and likely not contributory     Plan:   DDx is broad   See initial work up/ lab orders           Relevant Orders    REVIEW OF HEALTH MAINTENANCE PROTOCOL ORDERS (Completed)    Comprehensive metabolic panel (BMP + Alb, Alk Phos, ALT, AST, Total. Bili, TP) (Completed)    CBC with platelets (Completed)    ESR: Erythrocyte sedimentation rate (Completed)    CRP, inflammation (Completed)    Lactate Dehydrogenase (Completed)    UA Macroscopic with reflex to Microscopic and Culture - Clinic Collect (Completed)   Other Visit Diagnoses     Hypothyroidism, unspecified type        Relevant Orders    TSH with free T4 reflex (Completed)             I spent a total of 43 minutes on the day of the visit.   Time spent by me doing chart review, history and exam, documentation and further activities per the note           Mauricio Sheridan MD  [facility]    Marietta Tavera is a 78 year old, presenting with randomly occurring burning hot flashes in the upper face/ ears area lasting for about an hour at the most, and self resolving and disappearing on its own in the last 2 weeks and occurring about 5 times so far.     She notes to not eating anything differently or using any new topicals/detergents or fragrances.   She has started a new thyroid medication in the past week ( but the symptoms having been present in the last two)     She has mild chronic edema of the left lower leg, with no other associated skin, facial, oral, chest , abdominal/ GI symptoms.           7/14/2023    11:33 AM   Additional Questions   Roomed by Tavo BRUNSON CMA     History of Present Illness       Headaches:   Since the patient's last clinic visit, headaches are: no change  The  "patient is getting headaches:  Rarely  She is able to do normal daily activities when she has a migraine.  The patient is taking the following rescue/relief medications:  Tylenol and sumatriptan (Imitrex)   Patient states \"I get total relief\" from the rescue/relief medications.   The patient is taking the following medications to prevent migraines:  No medications to prevent migraines  In the past 4 weeks, the patient has gone to an Urgent Care or Emergency Room 0 times times due to headaches.    Reason for visit:  Hot burning red face, ears lasts for hour;  happened 5 diff days.  Symptom onset:  3-4 weeks ago  Symptoms include:  Hot red blotches on ears, face; some swelling ankles, feet.  Symptom intensity:  Moderate  Symptom progression:  Staying the same  Had these symptoms before:  No  What makes it worse:  NO  What makes it better:  Doesn't last long    She eats 2-3 servings of fruits and vegetables daily.She consumes 0 sweetened beverage(s) daily.She exercises with enough effort to increase her heart rate 30 to 60 minutes per day.  She exercises with enough effort to increase her heart rate 5 days per week.   She is taking medications regularly.       Review of Systems         Objective    /66 (BP Location: Right arm, Patient Position: Sitting, Cuff Size: Adult Regular)   Pulse 73   Wt 49.1 kg (108 lb 4.8 oz)   LMP  (LMP Unknown)   SpO2 97%   Breastfeeding No   BMI 19.81 kg/m    Body mass index is 19.81 kg/m .     Physical Exam   GENERAL: healthy, alert and no distress  NECK: no adenopathy, no asymmetry, masses, or scars and thyroid normal to palpation  RESP: lungs clear to auscultation - no rales, rhonchi or wheezes  CV: regular rate and rhythm, normal S1 S2, no S3 or S4, no murmur, click or rub, no peripheral edema and peripheral pulses strong  ABDOMEN: soft, nontender, no hepatosplenomegaly, no masses and bowel sounds normal  MS: extremities normal- mild left LE swelling, no gross deformities " noted  SKIN: no suspicious lesions or rashes

## 2023-07-21 NOTE — TELEPHONE ENCOUNTER
Ayse called today in regards to symptoms of increase in cough, green colored phlegm, shortness of breath, and scratchy throat. Denies fever. She has her action plan on hand of Bactrim and Medrol dose lucille. She will start that today. She agrees to utilize the urgent care or ED if symptoms do not improve.

## 2023-07-23 PROBLEM — E87.1 HYPONATREMIA: Status: ACTIVE | Noted: 2023-01-01

## 2023-07-23 PROBLEM — R06.02 SHORTNESS OF BREATH: Status: ACTIVE | Noted: 2023-01-01

## 2023-07-23 NOTE — ED TRIAGE NOTES
Triage Assessment     Row Name 07/23/23 1546       Triage Assessment (Adult)    Airway WDL X       Respiratory WDL    Respiratory WDL X       Skin Circulation/Temperature WDL    Skin Circulation/Temperature WDL WDL       Cardiac WDL    Cardiac WDL WDL       Peripheral/Neurovascular WDL    Peripheral Neurovascular WDL WDL       Cognitive/Neuro/Behavioral WDL    Cognitive/Neuro/Behavioral WDL WDL

## 2023-07-23 NOTE — ED PROVIDER NOTES
[place] EMERGENCY DEPARTMENT ([facility])    7/23/23      History     Chief Complaint   Patient presents with     Shortness of Breath     HPI  Fiordaliza Najera is a 78 year old female who with PMH of COPD, bronchiectasis, panlobular emphysema, carotid artery aneurysm, aortic dilatation and subclinical hypothyroidism presents to the ED with shortness of breath and throat swelling. Patient presents with worker from [employer].  Patient reports that onset today, her neck and throat felt like it was closing up and she could not breathe very well.  She states that she uses O2 all the time now and has been using it continuously for more than a few months. She endorses shortness of breath for the last couple of days, and she states that it worse with movement. She states that her breathing feels fine when she is sitting or laying down. She states that when she is active. Her pulse goes up into the 120's.  She states that she called her pulmonologist on Friday (7/21), and was started on Bactrim and Medrol dose pack. She states that she sometimes coughs up phlegm and the last day or 2, it has been difficult to get it out. She states that she is eating fine but could drink more. She states her left ankle swells once in a while. She denies her a burning sensation in her throat, but notes that if she takes a large pill it hurts. She notes that her ears feel a little clogged.    Per Epic review:   CT Chest w/o contrast on 6/7/23:   IMPRESSION:   1.  Bilateral bronchiectasis with bronchial wall thickening and endobronchial debris/secretions redemonstrated. Waxing and waning nodular parenchymal opacities compatible with atypical infection such as MAC.        Past Medical History  Past Medical History:   Diagnosis Date     Anxiety 09/30/2021     COPD (chronic obstructive pulmonary disease) (H)      Diverticulosis      Hiatal hernia      Mild asthma      Neuropathy      Osteopenia      Temporomandibular joint  (TMJ) pain      Past Surgical History:   Procedure Laterality Date     ANTERIOR / POSTERIOR COMBINED FUSION LUMBAR SPINE       BRONCHOSCOPY (RIGID OR FLEXIBLE), DIAGNOSTIC N/A 09/28/2021    Procedure: BRONCHOSCOPY, WITH BRONCHOALVEOLAR LAVAGE;  Surgeon: Randall Lorenz MD;  Location: [facility]     HYSTERECTOMY       PICC SINGLE LUMEN PLACEMENT  11/04/2021          PICC SINGLE LUMEN PLACEMENT Right 12/22/2022    Right basilic, 38 cm     RETINAL LASER PROCEDURE Left 10/04/2016     SALPINGOOPHORECTOMY       TOTAL KNEE ARTHROPLASTY  2008     albuterol (PROAIR HFA/PROVENTIL HFA/VENTOLIN HFA) 108 (90 Base) MCG/ACT inhaler  albuterol (PROVENTIL) (2.5 MG/3ML) 0.083% neb solution  biotin 300 MCG TABS tablet  buPROPion (WELLBUTRIN XL) 150 MG 24 hr tablet  calcium-vitamin D (CALCIUM-VITAMIN D) 500 mg(1,250mg) -200 unit per tablet  carbamide peroxide (DEBROX) 6.5 % otic solution  celecoxib (CELEBREX) 200 MG capsule  cetirizine (ZYRTEC) 5 MG tablet  cholecalciferol, vitamin D3, (VITAMIN D3) 1,000 unit capsule  escitalopram (LEXAPRO) 20 MG tablet  Fluticasone-Umeclidin-Vilant (TRELEGY ELLIPTA) 100-62.5-25 MCG/ACT oral inhaler  Lactobacillus rhamnosus GG (CULTURELLE) 10-15 Billion cell capsule  levothyroxine (SYNTHROID/LEVOTHROID) 25 MCG tablet  metoprolol succinate ER (TOPROL XL) 25 MG 24 hr tablet  montelukast (SINGULAIR) 10 MG tablet  multivitamin therapeutic (THERAGRAN) tablet  oxybutynin ER (DITROPAN XL) 5 MG 24 hr tablet  OXYGEN-AIR DELIVERY SYSTEMS MISC  pregabalin (LYRICA) 50 MG capsule  sodium chloride (NEBUSAL) 3 % neb solution  sodium chloride (OCEAN) 0.65 % nasal spray  sulfamethoxazole-trimethoprim (BACTRIM DS) 800-160 MG tablet  SUMAtriptan (IMITREX) 50 MG tablet      Allergies   Allergen Reactions     Codeine Unknown     Morphine Itching     Family History  Family History   Problem Relation Age of Onset     Dementia Mother         passed age 88     Chronic Obstructive Pulmonary Disease Father         passed age 78      Social History   Social History     Tobacco Use     Smoking status: Never     Smokeless tobacco: Never   Vaping Use     Vaping Use: Never used   Substance Use Topics     Alcohol use: No     Drug use: Never         A complete review of systems was performed with pertinent positives and negatives noted in the HPI, and all other systems negative.    Physical Exam   BP: (!) 163/78  Pulse: 96  Temp: 98.2  F (36.8  C)  Resp: 16  SpO2: 97 %  Physical Exam  Constitutional:       General: She is not in acute distress.     Appearance: She is well-developed. She is ill-appearing. She is not toxic-appearing or diaphoretic.   HENT:      Head: Normocephalic and atraumatic.   Cardiovascular:      Rate and Rhythm: Normal rate and regular rhythm.      Heart sounds: Normal heart sounds.   Pulmonary:      Effort: Tachypnea, accessory muscle usage and respiratory distress present.      Breath sounds: Normal breath sounds.      Comments: 3 liters on NC; purse lip breathing  Abdominal:      General: There is no distension.      Palpations: Abdomen is soft.      Tenderness: There is no abdominal tenderness. There is no rebound.   Musculoskeletal:         General: No tenderness.      Cervical back: Normal range of motion.      Right lower leg: No edema.      Left lower leg: No edema.   Skin:     General: Skin is warm.   Neurological:      General: No focal deficit present.      Mental Status: She is alert and oriented to person, place, and time.   Psychiatric:         Mood and Affect: Mood normal.         Behavior: Behavior normal.         Thought Content: Thought content normal.           ED Course, Procedures, & Data     6:23 PM  The patient was seen and examined by Jaqui Sanders in Room VTA.   Procedures       ED Course Selections:        EKG Interpretation:      Interpreted by Jaqui Sanders MD  Time reviewed: 1921  Symptoms at time of EKG: SOB   Rhythm: normal sinus   Rate: Normal  Axis: Normal  Ectopy:  none  Conduction: normal  ST Segments/ T Waves: No acute ischemic changes  Q Waves: none  Comparison to prior: Unchanged    Clinical Impression: non-specific EKG                  Results for orders placed or performed during the hospital encounter of 07/23/23   XR Chest 2 Views     Status: None    Narrative    EXAMINATION: XR CHEST 2 VIEWS, 7/23/2023 7:05 PM    COMPARISON: 12/22/2022    HISTORY: sob    FINDINGS: At size is normal. Spinal hardware again place. Bilateral  interstitial opacities have not changed substantially. No new airspace  opacities. No pneumothorax or pleural effusion.      Impression    IMPRESSION: Chronic interstitial opacities have not changed  substantially. No new airspace disease.     DEBBIE ANN MD         SYSTEM ID:  [id number]   Comprehensive metabolic panel     Status: Abnormal   Result Value Ref Range    Sodium 120 (L) 136 - 145 mmol/L    Potassium 5.3 3.4 - 5.3 mmol/L    Chloride 84 (L) 98 - 107 mmol/L    Carbon Dioxide (CO2) 20 (L) 22 - 29 mmol/L    Anion Gap 16 (H) 7 - 15 mmol/L    Urea Nitrogen 17.0 8.0 - 23.0 mg/dL    Creatinine 0.49 (L) 0.51 - 0.95 mg/dL    Calcium 9.3 8.8 - 10.2 mg/dL    Glucose 113 (H) 70 - 99 mg/dL    Alkaline Phosphatase 95 35 - 104 U/L    AST 62 (H) 0 - 45 U/L    ALT 23 0 - 50 U/L    Protein Total 7.7 6.4 - 8.3 g/dL    Albumin 4.1 3.5 - 5.2 g/dL    Bilirubin Total 0.6 <=1.2 mg/dL    GFR Estimate >90 >60 mL/min/1.73m2   Procalcitonin     Status: Abnormal   Result Value Ref Range    Procalcitonin 0.05 (H) <0.05 ng/mL   Lactic acid whole blood     Status: Normal   Result Value Ref Range    Lactic Acid 1.1 0.7 - 2.0 mmol/L   Troponin T, High Sensitivity     Status: Abnormal   Result Value Ref Range    Troponin T, High Sensitivity 18 (H) <=14 ng/L   CBC with platelets and differential     Status: Abnormal   Result Value Ref Range    WBC Count 21.2 (H) 4.0 - 11.0 10e3/uL    RBC Count 4.59 3.80 - 5.20 10e6/uL    Hemoglobin 13.3 11.7 - 15.7 g/dL    Hematocrit  42.0 35.0 - 47.0 %    MCV 92 78 - 100 fL    MCH 29.0 26.5 - 33.0 pg    MCHC 31.7 31.5 - 36.5 g/dL    RDW 13.0 10.0 - 15.0 %    Platelet Count 331 150 - 450 10e3/uL    % Neutrophils 85 %    % Lymphocytes 4 %    % Monocytes 10 %    % Eosinophils 0 %    % Basophils 0 %    % Immature Granulocytes 1 %    NRBCs per 100 WBC 0 <1 /100    Absolute Neutrophils 18.0 (H) 1.6 - 8.3 10e3/uL    Absolute Lymphocytes 0.8 0.8 - 5.3 10e3/uL    Absolute Monocytes 2.2 (H) 0.0 - 1.3 10e3/uL    Absolute Eosinophils 0.0 0.0 - 0.7 10e3/uL    Absolute Basophils 0.0 0.0 - 0.2 10e3/uL    Absolute Immature Granulocytes 0.1 <=0.4 10e3/uL    Absolute NRBCs 0.0 10e3/uL   EKG 12-lead, tracing only     Status: None   Result Value Ref Range    Systolic Blood Pressure  mmHg    Diastolic Blood Pressure  mmHg    Ventricular Rate 90 BPM    Atrial Rate 90 BPM    KY Interval 156 ms    QRS Duration 80 ms     ms    QTc 435 ms    P Axis 74 degrees    R AXIS -20 degrees    T Axis 56 degrees    Interpretation ECG       Sinus rhythm  Possible Left atrial enlargement  Borderline ECG  Unconfirmed report - interpretation of this ECG is computer generated - see medical record for final interpretation  Confirmed by - EMERGENCY ROOM, PHYSICIAN ([id number]),  GALILEA GURROLA ([id number]) on 7/23/2023 8:50:52 PM     Group A Streptococcus PCR Throat Swab     Status: Normal    Specimen: Throat; Swab   Result Value Ref Range    Group A strep by PCR Not Detected Not Detected    Narrative    The Xpert Xpress Strep A test, performed on the [employer], is a rapid, qualitative in vitro diagnostic test for the detection of Streptococcus pyogenes (Group A ß-hemolytic Streptococcus, Strep A) in throat swab specimens from patients with signs and symptoms of pharyngitis. The Xpert Xpress Strep A test can be used as an aid in the diagnosis of Group A Streptococcal pharyngitis. The assay is not intended to monitor treatment for Group A Streptococcus infections. The  Xpert Xpress Strep A test utilizes an automated real-time polymerase chain reaction (PCR) to detect Streptococcus pyogenes DNA.   CBC with platelets differential     Status: Abnormal    Narrative    The following orders were created for panel order CBC with platelets differential.  Procedure                               Abnormality         Status                     ---------                               -----------         ------                     CBC with platelets and d...[[id number]]  Abnormal            Final result                 Please view results for these tests on the individual orders.     Medications   sodium chloride 0.9% infusion ( Intravenous Rate/Dose Verify 7/23/23 2156)   azithromycin (ZITHROMAX) 500 mg in sodium chloride 0.9 % 250 mL intermittent infusion (500 mg Intravenous $New Bag 7/23/23 2157)   0.9% sodium chloride BOLUS (0 mLs Intravenous Stopped 7/23/23 2111)   ipratropium - albuterol 0.5 mg/2.5 mg/3 mL (DUONEB) neb solution 3 mL (3 mLs Nebulization $Given 7/23/23 1913)   albuterol (PROVENTIL) neb solution 2.5 mg (2.5 mg Nebulization $Given 7/23/23 1953)   cefTRIAXone (ROCEPHIN) 1 g vial to attach to  mL bag for ADULTS or NS 50 mL bag for PEDS (0 g Intravenous Stopped 7/23/23 2145)   methylPREDNISolone sodium succinate (solu-MEDROL) injection 125 mg (125 mg Intravenous $Given 7/23/23 2118)              No results found for any visits on 07/23/23.  Medications - No data to display  Labs Ordered and Resulted from Time of ED Arrival to Time of ED Departure - No data to display  No orders to display          Critical care was performed.   Critical Care Addendum  My initial assessment, based on my review of vital signs and focused history, established a high suspicion that Fiordaliza Najera has a high risk electrolyte abnormality, which requires immediate intervention, and therefore she is critically ill.     After the initial assessment, the care team initiated multiple lab tests and  initiated IV fluid administration to provide stabilization care. Due to the critical nature of this patient, I reassessed vital signs and physical exam multiple times prior to her disposition.     Time also spent performing reviewing test results, discussion with consultants and coordination of care.     Critical care time (excluding teaching time and procedures): 45 minutes.     Assessment & Plan    Patient is a 78-year-old female with a known history of bronchiectasis and severe COPD was normally using 3 L of oxygen who presented to the ER due to worsening symptoms.  Patient says her breathing got worse 3 days ago and she has been taking oral antibiotics and steroids.  This has not helped and now she is having more discomfort.  Patient was having some pursed lip breathing on initial presentation.  She is ordered to get IV steroids and nebs.  Patient was found to be acutely hyponatremic with a sodium of 120.  Patient received 500 mL of normal saline and was on 125 mL drip per hour.  Her sodium is repeated and is now even lower at 118.  These results were discussed with the hospitalist on-call.  Report already been given prior to the repeat BMP.  Possible recommends stopping the IV fluids.  The medicine team will take over care and will treat aggressively.  Patient does have some sensation of discomfort in her throat.  On exam she does have a teardrop uvula which is likely causing the symptoms.  We will also change the patient from albuterol to Xopenex and she is having tachycardia.  Patient will be admitted to internal medicine for further treatment.    I have reviewed the nursing notes. I have reviewed the findings, diagnosis, plan and need for follow up with the patient.    New Prescriptions    No medications on file       Final diagnoses:   Hyponatremia   Shortness of breath   MARYCHUY ACEVEDO am serving as a trained medical scribe to document services personally performed by Jaqui Sanders MD, based on the  provider's statements to me.      I, Jaqui Sanders MD, was physically present and have reviewed and verified the accuracy of this note documented by MARYCHUY SUAZO.       Jaqui Sanders MD  [facility] EMERGENCY DEPARTMENT  7/23/2023     Jaqui Sanders MD  07/23/23 [id number]

## 2023-07-23 NOTE — ED TRIAGE NOTES
"Pt arrives ambulatory to triage w/ c/o copd exacerbation. Endorses sob, throat swelling. Throat swelling onset today, shortness of breath has been going on \"for a little bit.\" Endorses sob worsening w/ movement. EMS biba from home, pt noted 91% on 2 L/nc. Pt arrives 95% on 3L.     nebs/inhalers used w/out relief.  "

## 2023-07-24 NOTE — TELEPHONE ENCOUNTER
Tez msg:   I know Dr. Maradiaga is not in but I'm hoping someone covering for her will call these in asap, as I will be traveling to [place] early Thurs. AM, so need them delivered WED.     And I never got the Celebrex  that I sent you a message about some time ago.    Here are the ones I need refills on:   Fiordaliza Najera, J   PLEASE & thank you very much   [id number] Escitalopram 20 MG Tablet   [id number] Metoprolol Succ ER 25 Tab   [id number] Bupropion HCL XL 150MG       Meds are key'd up for verification and approval, if appropriate.       Thank you,  Tavo Chaudhari Jr., CMA on 7/24/2023 at 7:05 AM

## 2023-07-24 NOTE — CONSULTS
"NEPHROLOGY/RENAL INITIAL CONSULT NOTE    PATIENT: Fiordaliza Najera, 78 year old, : 1945  MRN: [id number]    Current Date:  2023  Admit Date: 2023  LOS: 1    Reason for Consult: Hyponatremia, likely SIADH    Source of History: chart review and the patient    History of present Illness      Fiordaliza Najera is a 78 year old y.o F with history of COPD, bronchiectasis, panlobular emphysema, carotid artery aneurysm, aortic dilatation and subclinical hypothyroidism and was admitted for hyponatremia and COPD exacerbation. Renal was consulted for management of hyponatremia concerning for SIADH.    Ayse explains that last week she started to become more short of breath and having increased coughing with phlegm production. Although she coughs normally at baseline this was worse then usual. Given this, she started her emergency medications that are prescribed by her pulmonologist to use during episodes of flares (Bactrim and Medrol). During this she was not able stay as hydrated as she normally would. Despite this her breathing worsened so she came to the ED. In the ED she was given ceftriaxone, azithromycin, around 1 L NS. During this, she was also noted to have a sodium of 122 which further decreased to 118 upon administration of fluids.    Ayse reports that she has had \"bad\" lungs for sometime. Normally she uses 2 L of oxygen at home. Over the last year she has noticed that she needs the oxygen constantly as previously she was able to go without oxygen when she was just resting/sitting. Otherwise, her only new medications are the medications from her emergency pack (Bactrim and Medrol) and in [facility] she started taking an over the counter (from [employer]) medication for dry mouth. She continues to follow with her pulmonologist and more recently she was told she has lung nodules they are following and last seen 2023.    Medical History     Patient Active Problem List   Diagnosis    Osteopenia    " Bronchiectasis with (acute) exacerbation (H)    Idiopathic peripheral neuropathy    COPD (chronic obstructive pulmonary disease) (H)    Unspecified abnormalities of gait and mobility    Back pain    Diaphragmatic hernia    Other kyphoscoliosis and scoliosis    Chronic respiratory failure with hypoxia, on home O2 therapy (H)    Anxiety    Migraine without aura and without status migrainosus, not intractable    Carotid artery aneurysm (H)    Bronchiectasis (H)    Mood disorder (H)    Chronic pain    Nontraumatic tear of rotator cuff, unspecified laterality, unspecified tear extent    Panlobular emphysema (H)    Dependence on supplemental oxygen    Difficulty in walking, not elsewhere classified    Hypoxemia    Muscle weakness (generalized)    Unsteadiness on feet    Aortic dilatation (H)    Subclinical hypothyroidism    Hot flashes    Shortness of breath    Hyponatremia       Surgical History     Past Surgical History:   Procedure Laterality Date    ANTERIOR / POSTERIOR COMBINED FUSION LUMBAR SPINE      BRONCHOSCOPY (RIGID OR FLEXIBLE), DIAGNOSTIC N/A 09/28/2021    Procedure: BRONCHOSCOPY, WITH BRONCHOALVEOLAR LAVAGE;  Surgeon: Randall Lorenz MD;  Location: [facility]    HYSTERECTOMY      PICC SINGLE LUMEN PLACEMENT  11/04/2021         PICC SINGLE LUMEN PLACEMENT Right 12/22/2022    Right basilic, 38 cm    RETINAL LASER PROCEDURE Left 10/04/2016    SALPINGOOPHORECTOMY      TOTAL KNEE ARTHROPLASTY  2008       Family History     Family History   Problem Relation Age of Onset    Dementia Mother         passed age 88    Chronic Obstructive Pulmonary Disease Father         passed age 78       Social History     Social History     Socioeconomic History    Marital status: Single     Spouse name: Not on file    Number of children: Not on file    Years of education: Not on file    Highest education level: Not on file   Occupational History    Not on file   Tobacco Use    Smoking status: Never    Smokeless tobacco: Never   Vaping  Use    Vaping Use: Never used   Substance and Sexual Activity    Alcohol use: No    Drug use: Never    Sexual activity: Never   Other Topics Concern    Parent/sibling w/ CABG, MI or angioplasty before 65F 55M? 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.  She has a sister and brother-in-law, 2 nieces, 3 grand nieces and nephews in [place].  She came to [place] for a sabbatical, and then  stayed on for a teaching job.  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  She lives alone in a long-term facility. - Dr. Nguyễn 01/04/21       Social Determinants of Health     Financial Resource Strain: Not on file   Food Insecurity: Not on file   Transportation Needs: Not on file   Physical Activity: Not on file   Stress: Not on file   Social Connections: Not on file   Intimate Partner Violence: Not on file   Housing Stability: Not on file       Allergies     Allergies   Allergen Reactions    Codeine Unknown    Morphine Itching       Medications     Current Outpatient Medications   Medication Instructions    albuterol (PROAIR HFA/PROVENTIL HFA/VENTOLIN HFA) 108 (90 Base) MCG/ACT inhaler 2 puffs, Inhalation, EVERY 6 HOURS PRN    albuterol (PROVENTIL) 2.5 mg, Nebulization, EVERY 4 HOURS PRN    Biotin 5000 MCG TABS 1 tablet, Oral, DAILY    buPROPion (WELLBUTRIN XL) 150 mg, Oral, EVERY MORNING    calcium-vitamin D (CALCIUM-VITAMIN D) 500 mg(1,250mg) -200 unit per tablet 1 tablet, Oral, 2 TIMES DAILY    celecoxib (CELEBREX) 200 mg, Oral, DAILY    cetirizine (ZYRTEC) 5 mg, Oral, DAILY    cholecalciferol (VITAMIN D3) 1,000 Units, DAILY    escitalopram (LEXAPRO) 20 mg, Oral, DAILY    Fluticasone-Umeclidin-Vilant (TRELEGY ELLIPTA) 100-62.5-25 MCG/ACT oral inhaler 1 puff, Inhalation, DAILY    Lactobacillus rhamnosus GG (CULTURELLE) 10-15 Billion cell capsule 1 capsule, Oral, EVERY EVENING    levothyroxine  (SYNTHROID/LEVOTHROID) 25 mcg, Oral, DAILY    metoprolol succinate ER (TOPROL XL) 25 mg, Oral, DAILY    montelukast (SINGULAIR) 10 MG tablet MONTELUKAST (SINGULAIR) 10 MG TABLET bTAKE 1 TABLET(10 MG) BY MOUTH AT BEDTIME    multivitamin therapeutic (THERAGRAN) tablet 1 tablet, DAILY    oxybutynin ER (DITROPAN XL) 5 mg, Oral, DAILY    OXYGEN-AIR DELIVERY SYSTEMS MISC 2 L    pregabalin (LYRICA) 50 MG capsule Take 1 tab in the AM and 2 in the evening    sodium chloride (NEBUSAL) 3 % neb solution 3 mLs, Nebulization, 2 TIMES DAILY    sodium chloride (OCEAN) 0.65 % nasal spray 2 sprays, Nasal, DAILY    sulfamethoxazole-trimethoprim (BACTRIM DS) 800-160 MG tablet 1 tablet, Oral, 2 TIMES DAILY    SUMAtriptan (IMITREX) 50 MG tablet Take 1 tablet (50 mg) by mouth as needed for migraine,may repeat after 2 hours if needed;  mg/24 hours       ROS  All systems reviewed and negative except what is mentioned in HPI.     Objective       /71   Pulse 98   Temp 98.2  F (36.8  C) (Oral)   Resp 25   LMP  (LMP Unknown)   SpO2 97%     Physical Examination:    General appearance: alert, unable to complete sentences without stopping, maintain oxygen staturations  HEENT: Atraumatic. Normocephalic. OSIRIS. Moist mucus membranes   Lungs: coarse breath sounds bilaterally worse in upper field bilaterally. No wheezes or rales.   Heart: regular rate and rhythm, with no audible murmur  Abdomen: soft, non-tender, nondistanded.   Extremities: Normal tone. No LE edema.   Neurologic: Speech normal. Moving extremities sponteanously.  Psych: AOx3. Cooperative. Appropriate mood and affect. Intact insight.     Labs:  Last Comprehensive Metabolic Panel:  Lab Results   Component Value Date     (L) 07/24/2023    POTASSIUM 4.5 07/24/2023    CHLORIDE 86 (L) 07/24/2023    CO2 26 07/24/2023    ANIONGAP 10 07/24/2023     (H) 07/24/2023    BUN 13.0 07/24/2023    CR 0.45 (L) 07/24/2023    GFRESTIMATED >90 07/24/2023    MAGAN 8.5 (L)  07/24/2023     Color Urine (no units)   Date Value   07/23/2023 Yellow     Appearance Urine (no units)   Date Value   07/23/2023 Cloudy (A)     Glucose Urine (mg/dL)   Date Value   07/23/2023 Negative     Bilirubin Urine (no units)   Date Value   07/23/2023 Negative     Ketones Urine (mg/dL)   Date Value   07/23/2023 Negative     Specific Gravity Urine (no units)   Date Value   07/23/2023 1.020     pH Urine (no units)   Date Value   07/23/2023 7.5 (H)     Protein Albumin Urine (mg/dL)   Date Value   07/23/2023 20 (A)     Urobilinogen Urine (E.U./dL)   Date Value   07/14/2023 0.2     Nitrite Urine (no units)   Date Value   07/23/2023 Negative     Leukocyte Esterase Urine (no units)   Date Value   07/23/2023 Negative     Assessment    Fiordaliza Najera is a 78 year old y.o F with history of COPD, bronchiectasis, panlobular emphysema, carotid artery aneurysm, aortic dilatation and subclinical hypothyroidism and was admitted for hyponatremia and COPD exacerbation. Renal was consulted for management of hyponatremia concerning for SIADH.    #Hypovolemic hyponatremia   #Concern for underlying SIADH likely 2/2 pulmonary disease  Reports limited oral intake over the last couple days due to acute illness leading to hospitalization. Found to have Na of 120 in the ED (baseline Na over the last 6 months ~131-134). Given ~1L of NS in the ED with subsequent increase in urine output and increase in serum Na (initially tapered off however was around Na ~122 at 5 am) making hypovolemic hyponatremia more likely. Suspect urine studies collect are reflective of the kidney response to IV fluids rather then true SIADH in this acute setting. Continue to monitoring Na closely over the next 24-48 hours with goal correct ~6 mEq every 24 hours. Further would suspect there is a chronic component of hyponatremia driven by SIADH in the setting of pulmonary disease. With this, it is unclear what her new baseline sodium will be thus we plan to correct  with caution.      Recommendations     -Goal Na 126 by 1900 on 7/24   - Goal 6 mEq every 24 hours (starting Na 120 at 1900 on 7/23)  -Continue frequent Na check (q2-4h)  -Repeat urine studies (Urine Na, Urine Osm, Serum Osm)    Thank you for allowing us to participate in the care of this patient. We will continue to follow. Please page us if there are any further questions or concerns.    Patient was staffed with Dr. Tristan Sosa.    Brandi Gordon  Internal Medicine PGY 2

## 2023-07-24 NOTE — PROGRESS NOTES
"RT called for worsening SOB by pt. Upon arrival, pt purse lip breathing and in tripod position. Breath sounds diminished but is moving air, on 3L NC satting low 90's. One time DuoNeb given however pt complaining of throat closing so neb was stopped immediately. MD called to assess and it seems like uvula is causing that feeling in her throat. Pt encouraged to continue neb, states \"I don't think I can do that right now because I can't breathe.\" Levalbuterol ordered PRN d/t increased HR with on going SOB. After sitting up in bed, pt able to catch her breath a little more. Sats still remain between 88-92% on 3L NC. RT will continue to follow.    Chris Alicia, RT on 7/23/2023 at 11:09 PM    "

## 2023-07-24 NOTE — PROGRESS NOTES
Patient arrived to unit 1720 with EMS.  Patient is alert and oriented x4.  Able to make needs known.  Continent of bowel and bladder. 2L via NC same as home.  Patient reports SOB with exertion - it is improving. Patient to have ultrasound of BLE. Regular diet.  SBA,  Patient was reported to be anxious in ED this appears to have resolved with PRN given in ED.  Purewick in place.

## 2023-07-24 NOTE — PROGRESS NOTES
Attestation:  I saw and evaluated this patient. I agree with the assessment and plan as documented in the note by Dr. Hughes. 79 yo woman with COPD presenting with severe hyponatremia and COPD exacerbation. On exam this AM, the pt is in the tripod position with RR in the 30's. ++ anxiety, feelnig breathles. 02 needs a 2l NC. Lungs with a few crackles but largely CTAB. Xray without clear infliltrate. Given level of distress and pre-test probability of PE, elected to proceed with CTA. Prev noted upper airyway symptoms appear improved and she has no stridor. Come concern for oral thrush as cause of dysphagia. While urine labs consistent with SIADH, suspecte multifactorial cause. Consult placed to nephrology, goal Na 126-128 over next 24 hours. Hold IVF for now, consider hypotonic infusion if rate of rise is too fast. Discussed with SOC and [facility] team, pt will be admitted to [facility] on IMC status.     Robles Brown MD

## 2023-07-24 NOTE — MEDICATION SCRIBE - ADMISSION MEDICATION HISTORY
Medication Scribe Admission Medication History    Admission medication history is complete. The information provided in this note is only as accurate as the sources available at the time of the update.    Medication reconciliation/reorder completed by provider prior to medication history? No    Information Source(s): Patient via in-person    Pertinent Information: n/a    Changes made to PTA medication list:    Added: None    Deleted: Biotin 300 MCG, Carbamide peroxide 6.5%    Changed: Biotin 5000 MCG    Medication Affordability:  Not including over the counter (OTC) medications, was there a time in the past 3 months when you did not take your medications as prescribed because of cost?: No    Allergies reviewed with patient and updates made in EHR: yes    Medication History Completed By: Lata Jasmine 7/23/2023 11:06 PM    Prior to Admission medications    Medication Sig Last Dose Taking? Auth Provider Long Term End Date   albuterol (PROAIR HFA/PROVENTIL HFA/VENTOLIN HFA) 108 (90 Base) MCG/ACT inhaler Inhale 2 puffs into the lungs every 6 hours as needed 7/23/2023 at unknown Yes Randall Lorenz MD Yes    albuterol (PROVENTIL) (2.5 MG/3ML) 0.083% neb solution Take 1 vial (2.5 mg) by nebulization every 4 hours as needed for shortness of breath / dyspnea or wheezing 7/22/2023 at pm Yes Randall Lorenz MD Yes    Biotin 5000 MCG TABS Take 1 tablet by mouth daily 7/23/2023 at am Yes Reported, Patient     buPROPion (WELLBUTRIN XL) 150 MG 24 hr tablet Take 1 tablet (150 mg) by mouth every morning 7/23/2023 at am Yes Neida Maradiaga, DO Yes    calcium-vitamin D (CALCIUM-VITAMIN D) 500 mg(1,250mg) -200 unit per tablet Take 1 tablet by mouth 2 times daily  7/23/2023 at am Yes Provider, Historical     celecoxib (CELEBREX) 200 MG capsule Take 1 capsule (200 mg) by mouth daily 7/23/2023 at unknown Yes Mauricio Sheridan MD Yes    cetirizine (ZYRTEC) 5 MG tablet Take 5 mg by mouth daily 7/22/2023 at pm Yes Reported, Patient      cholecalciferol, vitamin D3, (VITAMIN D3) 1,000 unit capsule [CHOLECALCIFEROL, VITAMIN D3, (VITAMIN D3) 1,000 UNIT CAPSULE] Take 1,000 Units by mouth daily. 7/23/2023 at am Yes Provider, Historical     escitalopram (LEXAPRO) 20 MG tablet Take 1 tablet (20 mg) by mouth daily 7/23/2023 at am Yes Neida Maradiaga, DO Yes    Fluticasone-Umeclidin-Vilant (TRELEGY ELLIPTA) 100-62.5-25 MCG/ACT oral inhaler Inhale 1 puff into the lungs daily 7/23/2023 at am Yes Randall Lorenz MD     Lactobacillus rhamnosus GG (CULTURELLE) 10-15 Billion cell capsule Take 1 capsule by mouth every evening  7/22/2023 at pm Yes Provider, Historical     levothyroxine (SYNTHROID/LEVOTHROID) 25 MCG tablet Take 1 tablet (25 mcg) by mouth daily 7/23/2023 at am Yes Neida Maradiaga, DO Yes    metoprolol succinate ER (TOPROL XL) 25 MG 24 hr tablet Take 1 tablet (25 mg) by mouth daily 7/23/2023 Yes Neida Maradiaga, DO Yes    montelukast (SINGULAIR) 10 MG tablet [MONTELUKAST (SINGULAIR) 10 MG TABLET] TAKE 1 TABLET(10 MG) BY MOUTH AT BEDTIME 7/22/2023 at pm Yes Randall Lorenz MD Yes    multivitamin therapeutic (THERAGRAN) tablet [MULTIVITAMIN THERAPEUTIC (THERAGRAN) TABLET] Take 1 tablet by mouth daily. 7/23/2023 at am Yes Provider, Historical     oxybutynin ER (DITROPAN XL) 5 MG 24 hr tablet Take 1 tablet (5 mg) by mouth daily 7/23/2023 at am Yes Neida Maradiaga DO     OXYGEN-AIR DELIVERY SYSTEMS MISC [OXYGEN-AIR DELIVERY SYSTEMS MISC] Use 2 L As Directed. 2L with activity and at night  [facility] 7/23/2023 at am Yes Provider, Historical     pregabalin (LYRICA) 50 MG capsule Take 1 tab in the AM and 2 in the evening 7/23/2023 at am Yes Dayanara Yuan NP Yes    sodium chloride (NEBUSAL) 3 % neb solution Take 3 mLs by nebulization 2 times daily 7/23/2023 at am Yes Randall Lorenz MD     sodium chloride (OCEAN) 0.65 % nasal spray Spray 2 sprays in nostril daily 7/23/2023 at am Yes Ekaterina Koehler MD     sulfamethoxazole-trimethoprim  (BACTRIM DS) 800-160 MG tablet Take 1 tablet by mouth 2 times daily 7/23/2023 at am Yes Randall Lorenz MD     SUMAtriptan (IMITREX) 50 MG tablet Take 1 tablet (50 mg) by mouth as needed for migraine,may repeat after 2 hours if needed;  mg/24 hours More than a month at unknown Yes Neida Maradiaga DO

## 2023-07-24 NOTE — H&P
[facility]    History and Physical - Medicine Service, MARO[facility] TEAM        Date of Admission:  7/23/2023    Assessment & Plan      Fiordaliza Najera is a 78 year old female admitted on 7/23/2023. She has a history of COPD, bronchiectasis, panlobular emphysema, carotid artery aneurysm, aortic dilatation and subclinical hypothyroidism and is admitted for hyponatremia and COPD exacerbation    #Hyponatremia, possibly SIADH  Has been mildly low to 130s during 2023, admission with Na 120. No symptoms, alert and oriented. Given nearly 1L IVF in ED and Na decreased to 118 which raises c/f SIADH. Urine studies ordered and show U Na 170 with S Osm 259, U Osm 720. This is c/w SIADH, etiology unclear thought c/f adrenal insufficiency though not on chronic steroids (recently completing steroid burst) vs severe hypothyroidism vs meds (on selective serotonin reuptake inhibitor however no recent changes) vs underlying malignancy of unclear source. Of note, urine labs drawn after IVF given in ED.   - Q4H Na checks  - s/p 875 ml NS in ED, recheck worsened supporting SIADH  - Fluid restriction 1L   - Per discussion with neph will start Urea TID  - TSH pending  - Considered am cortisol though on steroids so likely difficult to interpret  - Nephrology consult  - Pharmacy consult to assess if any med changes occurred, holding SSRIs    #AHRF, home O2 2L  #Shortness of breath  #COPD exacerbation with underlying bronchiectasis  #Anxiety  #Dysphagia  Presenting with a few days of worsening shortness of breath, green productive cough. Maintained on 2L O2 at home. Started tx at home with Bactrim and Medrol dose pack for presumed COPD exacerbation however now having feelings of throat closing worsening her sx. CXR w/o e/o PNA, procal negative so less c/f bacterial source. CO2 WNL, not retaining. No hx HF, no weight change or peripheral edema to suggest volume overload. No chest pain, not tachycardic  prior to nebs and initially on home O2 which makes PE less likely. Endorsing more upper airway feelings of swelling, no food or med changes to suggest anaphylaxis and no nodules or LAD noted on cervical exam. CT neck negative for swelling or airway compression. Possibly globus sensation vs web/stricture/new functional airway issue though less likely given acute onset. Patient significantly anxious and hyperventilating through mouth breathing though saturating well on 2-3L NC, tremulous. Unclear if this is how she presented or an effect of the steroids and 5 nebulizers however anxiety possibly contributing so will trial low dose atarax.   - Continue Azithromycin  - s/p CTX in ED, will not continue given negative procal and no e/o PNA  - Duonebs QID with RT  - Albuterol PRN  - PTA Breo Ellipta, Montelukast, NS 3% Nebs, Ocean spray  - s/p Methylpred in ED, continue with Prednisone 40 mg x 4 days  - SLP consult for dysphagia  - Consider ENT consult in am for scope to further evaluate swelling sensation  - Nystatin for oral thrush    #Leukocytosis  Presenting with WBC 21, afebrile, procal 0.05, UA negative, CXR w/o e/o PNA (chronic changes). Took Medrol dose lucille since Friday, likely steroid induced. Follow CX. No other localizing sx.   - UA/UCX  - BCX    #HAGMA, resolved on recheck  LA negative. Endorses eating ok. However given hyperventilation will further evaluate with ketones to r/o ketoacidosis, then resolved on recheck so less concerning however will follow results.   - Ketones pending    #Hyperkalemia, resolved on recheck  - RN managed protocol     #Mood  #Pain  - HOLD PTA Buproprion given possible SIADH  - PTA Celecoxib  - HOLD PTA Escitalopram given possible SIADH  - PTA Pregabalin  - PRN Tylenol  - PRN Atarax given significant anxiety    #Hypothyroidism  TSH pending  - PTA Levothyroxine    #HTN  - PTA Metoprolol    #OAB  - PTA Oxybutinin       Diet: Combination Diet Regular Diet Adult  DVT Prophylaxis: Low  Risk/Ambulatory with no VTE prophylaxis indicated  Mobley Catheter: Not present  Fluids: s/p IVF in ED  Lines: PRESENT             Cardiac Monitoring: None  Code Status: No CPR- Do NOT Intubate    Clinically Significant Risk Factors Present on Admission        # Hyperkalemia: Highest K = 6.2 mmol/L in last 2 days, will monitor as appropriate  # Hyponatremia: Lowest Na = 118 mmol/L in last 2 days, will monitor as appropriate  # Hypocalcemia: Lowest Ca = 8.1 mg/dL in last 2 days, will monitor and replace as appropriate                       Disposition Plan      Expected Discharge Date: 07/25/2023                The patient's care was discussed with the Attending Physician, Dr. Rader.      Tiffanie Calderon MD  Medicine Service, [facility]  Securely message with Vocera (more info)  Text page via [facility] Paging/Directory   See signed in provider for up to date coverage information  ______________________________________________________________________    Chief Complaint   Difficulty breathing    History is obtained from the patient    History of Present Illness   Fiordaliza Najrea is a 78 year old female who presents with a few days of worsening shortness of breath.  The patient called her PCP late last week with increasing shortness of breath, cough productive of green sputum which is a change from her baseline cough.  She has a home emergency plan and started on her Bactrim and Medrol Dosepak however did not notice much of a change in her symptoms.  Yesterday, she began to feel as if her throat were swelling and she was having difficulty breathing and getting air in and secretions out.  This is what caused her to present to the emergency room.  She denies fevers or chills.  She denies chest pain, palpitations, pleurisy.  She denies any other infectious symptoms including no nausea, vomiting, diarrhea, abdominal pain, rash, urinary symptoms.  No sick  contacts.    In terms of her throat swelling, this is new for her.  She states it has improved since coming into the emergency room.  She has not noticed any lumps or bumps around her neck.  She has difficulty with solid or more difficult to chew foods and feels like they get stuck on the way down.  No trouble with liquids.  Not progressive in nature.  Denies coughing or any aspiration.    In the ED, the patient was given multiple nebulizers and started on ceftriaxone and azithromycin for presumed COPD exacerbation.  She was given IV steroids as well.  She then became quite anxious and tachycardic likely related to above interventions which improved with Atarax.  Was initially started on fluids for hyponatremia however when these numbers worsened they were stopped and she was started on a fluid restriction.      Past Medical History    Past Medical History:   Diagnosis Date     Anxiety 09/30/2021     COPD (chronic obstructive pulmonary disease) (H)      Diverticulosis      Hiatal hernia      Mild asthma      Neuropathy      Osteopenia      Temporomandibular joint (TMJ) pain        Past Surgical History   Past Surgical History:   Procedure Laterality Date     ANTERIOR / POSTERIOR COMBINED FUSION LUMBAR SPINE       BRONCHOSCOPY (RIGID OR FLEXIBLE), DIAGNOSTIC N/A 09/28/2021    Procedure: BRONCHOSCOPY, WITH BRONCHOALVEOLAR LAVAGE;  Surgeon: Randall Lorenz MD;  Location: [facility]     HYSTERECTOMY       PICC SINGLE LUMEN PLACEMENT  11/04/2021          PICC SINGLE LUMEN PLACEMENT Right 12/22/2022    Right basilic, 38 cm     RETINAL LASER PROCEDURE Left 10/04/2016     SALPINGOOPHORECTOMY       TOTAL KNEE ARTHROPLASTY  2008       Prior to Admission Medications   Prior to Admission Medications   Prescriptions Last Dose Informant Patient Reported? Taking?   Biotin 5000 MCG TABS 7/23/2023 at am  Yes Yes   Sig: Take 1 tablet by mouth daily   Fluticasone-Umeclidin-Vilant (TRELEGY ELLIPTA) 100-62.5-25 MCG/ACT oral inhaler  7/23/2023 at am  No Yes   Sig: Inhale 1 puff into the lungs daily   Lactobacillus rhamnosus GG (CULTURELLE) 10-15 Billion cell capsule 7/22/2023 at pm Self Yes Yes   Sig: Take 1 capsule by mouth every evening    OXYGEN-AIR DELIVERY SYSTEMS MISC 7/23/2023 at am Self Yes Yes   Sig: [OXYGEN-AIR DELIVERY SYSTEMS MIS] Use 2 L As Directed. 2L with activity and at night  [employer]   SUMAtriptan (IMITREX) 50 MG tablet More than a month at unknown  No Yes   Sig: Take 1 tablet (50 mg) by mouth as needed for migraine,may repeat after 2 hours if needed;  mg/24 hours   albuterol (PROAIR HFA/PROVENTIL HFA/VENTOLIN HFA) 108 (90 Base) MCG/ACT inhaler 7/23/2023 at unknown  No Yes   Sig: Inhale 2 puffs into the lungs every 6 hours as needed   albuterol (PROVENTIL) (2.5 MG/3ML) 0.083% neb solution 7/22/2023 at pm  No Yes   Sig: Take 1 vial (2.5 mg) by nebulization every 4 hours as needed for shortness of breath / dyspnea or wheezing   buPROPion (WELLBUTRIN XL) 150 MG 24 hr tablet 7/23/2023 at am  No Yes   Sig: Take 1 tablet (150 mg) by mouth every morning   calcium-vitamin D (CALCIUM-VITAMIN D) 500 mg(1,250mg) -200 unit per tablet 7/23/2023 at am Self Yes Yes   Sig: Take 1 tablet by mouth 2 times daily    celecoxib (CELEBREX) 200 MG capsule 7/23/2023 at unknown  No Yes   Sig: Take 1 capsule (200 mg) by mouth daily   cetirizine (ZYRTEC) 5 MG tablet 7/22/2023 at pm Self Yes Yes   Sig: Take 5 mg by mouth daily   cholecalciferol, vitamin D3, (VITAMIN D3) 1,000 unit capsule 7/23/2023 at am Self Yes Yes   Sig: [CHOLECALCIFEROL, VITAMIN D3, (VITAMIN D3) 1,000 UNIT CAPSULE] Take 1,000 Units by mouth daily.   escitalopram (LEXAPRO) 20 MG tablet 7/23/2023 at am  No Yes   Sig: Take 1 tablet (20 mg) by mouth daily   levothyroxine (SYNTHROID/LEVOTHROID) 25 MCG tablet 7/23/2023 at am  No Yes   Sig: Take 1 tablet (25 mcg) by mouth daily   metoprolol succinate ER (TOPROL XL) 25 MG 24 hr tablet 7/23/2023  No Yes   Sig: Take 1 tablet (25 mg) by  mouth daily   montelukast (SINGULAIR) 10 MG tablet 7/22/2023 at pm  No Yes   Sig: [MONTELUKAST (SINGULAIR) 10 MG TABLET] TAKE 1 TABLET(10 MG) BY MOUTH AT BEDTIME   multivitamin therapeutic (THERAGRAN) tablet 7/23/2023 at am Self Yes Yes   Sig: [MULTIVITAMIN THERAPEUTIC (THERAGRAN) TABLET] Take 1 tablet by mouth daily.   oxybutynin ER (DITROPAN XL) 5 MG 24 hr tablet 7/23/2023 at am  No Yes   Sig: Take 1 tablet (5 mg) by mouth daily   pregabalin (LYRICA) 50 MG capsule 7/23/2023 at am  No Yes   Sig: Take 1 tab in the AM and 2 in the evening   sodium chloride (NEBUSAL) 3 % neb solution 7/23/2023 at am  No Yes   Sig: Take 3 mLs by nebulization 2 times daily   sodium chloride (OCEAN) 0.65 % nasal spray 7/23/2023 at am Self No Yes   Sig: Spray 2 sprays in nostril daily   sulfamethoxazole-trimethoprim (BACTRIM DS) 800-160 MG tablet 7/23/2023 at am  No Yes   Sig: Take 1 tablet by mouth 2 times daily      Facility-Administered Medications: None        Review of Systems    The 10 point Review of Systems is negative other than noted in the HPI or here.      Physical Exam   Vital Signs: Temp: 98.2  F (36.8  C) Temp src: Oral BP: (!) 141/85 Pulse: 115   Resp: 23 SpO2: 98 % O2 Device: Nasal cannula Oxygen Delivery: 3 LPM  Weight: 0 lbs 0 oz    General Appearance: Anxious, sitting at edge of bed, mouth breathing, tachypenic   HEENT: no LAD, no nodules or lumps, no pain to palpation of neck or thyroid, no obvious thyroid abnormalities. Some e/o thrush on tongue and redness to roof of mouth  Respiratory: tachypneic, diminished breath sounds diffusely however no wheezing or crackles, no stridor or upper airway sounds  Cardiovascular: tachycardic but regular, normal S1/S2, no murmur  GI: soft, NTND, bowel sounds present  Skin: no rashes or bruising noted in areas examined     Medical Decision Making     Please see A&P for additional details of medical decision making.      Data   ------------------------- PAST 24 HR DATA REVIEWED  -----------------------------------------------    I have personally reviewed the following data over the past 24 hrs:    21.2 (H)  \   13.3   / 331     118 (LL) 85 (L) 16.6 /  141 (H)   6.2 (HH) 22 0.41 (L) \       ALT: 23 AST: 62 (H) AP: 95 TBILI: 0.6   ALB: 4.1 TOT PROTEIN: 7.7 LIPASE: N/A       Trop: 18 (H) BNP: N/A       Procal: 0.05 (H) CRP: N/A Lactic Acid: 1.1         Imaging results reviewed over the past 24 hrs:   Recent Results (from the past 24 hour(s))   XR Chest 2 Views    Narrative    EXAMINATION: XR CHEST 2 VIEWS, 7/23/2023 7:05 PM    COMPARISON: 12/22/2022    HISTORY: sob    FINDINGS: At size is normal. Spinal hardware again place. Bilateral  interstitial opacities have not changed substantially. No new airspace  opacities. No pneumothorax or pleural effusion.      Impression    IMPRESSION: Chronic interstitial opacities have not changed  substantially. No new airspace disease.     DEBBIE ANN MD         SYSTEM ID:  [id number]

## 2023-07-24 NOTE — PROGRESS NOTES
[id number]    Dx: SOB/ COPD exacerbation    Cognitive: A&Ox4. Was very anxious at beginning of the shift, especially w/ correlating SOB. Order obtained for PRN Atarax, which was mildly effective. Pt calmed down and was able to sleep for part of the night.     Tele/cardiac: TELE monitored: NSR/ST. Elevated HR w/ SOB/dyspneice episodes. BP stable.     Respiratory: LS: diminished throughout. Fine crackles heard in RUL. Continued on 3ltrs O2 via NC. SpO2: 92-96%. Baseline/ chronic O2 use 2ltrs at home.     Activity: Pt uses a 4WW at baseline. Up to bedside commode  But Pt became very dyspneic w/ prolong recovery. Bedrest now at this time.     Pain: c/o 6/10 generalized achiness/joint stiffness. PRN Tylenol administered, which was moderately effective.     IV: PIV x1: SL    Drains/tubes: Purewick utilized overnight.     GI/: BS: normoactive x4Q. Abdomen is soft/non tender. Pt reports last BM was 7/22. I/O cathed x2 for urinary retention, 700cc and 600cc. Urine was cloudy at beginning of shift but is now a clear light yellow.     Skin: Unremarkable.     Diet: Tolerating a regular diet. Placed on a 1200ml fluid restriction.     Medication: Takes pills whole, one at a time w/ water.     Other: NA critical low at 118.  pagejitendra and results were discussed w/ nephrology, see new orders. Mag and K+ protocols. Redraw in the AM. Unable to get a standing weight at this time r/t pt's severe SOB w/ movement.     Pt w/ notable oral thrush. Order received for scheduled Nystatin suspension

## 2023-07-24 NOTE — CONSULTS
Chronic Pulmonary Disease Specialist Consult   COPD Initial Interview    2023    Patient: Fiordaliza Najera      :  1945                    MRN:[id number]      Reason for Consult: Consulted to provide COPD education and optimize treatment regimen. Patient with severe COPD being followed by COPD Readmission Reduction Program    History of Present Illness: Fiordaliza Najera is a 78 year old female admitted on 2023. She has a history of COPD, bronchiectasis, panlobular emphysema, carotid artery aneurysm, aortic dilatation and subclinical hypothyroidism and is admitted for hyponatremia and COPD exacerbation     Smoking Hx: Never smoker                    Pulmonologist/Last office visit: Patient is under the care of Dr. Randall Lorenz, pulmonologist. Last visit on 23 at [facility]  Patient with multiple lung infections throughout last few years some requiring IV ABX    Most recent  PFT/interpretation on: 2019               FEV1/FVC is 0.50 and is reduced.  FEV1 is 39% predicted and is reduced.  FVC is 60% predicted and is reduced.  There was no improvement in spirometry after a single inhaled dose of bronchodilator.  TLC is 99% predicted and is normal.  RV is 159% predicted and is increased.  DLCO is 37% predicted and is reduced when it   is corrected for hemoglobin; the result was repeatable but invalid due to patient inability to achieve >85% of vital capacity.  The flow volume loop shows an obstructive morphology.     Impression:  Full Pulmonary Function Test is abnormal. Spirometry is consistent with severe obstructive ventilatory defect.  Spirometry is not consistent with reversibility.  There is no hyperinflation.  There is air-trapping.  Diffusion capacity when corrected for hemoglobin is severely reduced; the DLCO result was repeatable but invalid.    Home Oxygen Use: 2LPM-continuous         Pulmonary Rehab History:  Is open to pulmonary rehab    Home respiratory  "medications include:      -Albuterol (Proair/Ventolin/Proventolin)  - Trelegy Ellipta  -Albuterol nebulizer solution   -3% Hypertonic    Assessment:  Patient lying in bed with 3L NC with sats of 97%, RR 20s, HR 98, /71, Coarse BBS, productive of greenish yellow mucus.       -Patient c/o sore throat. Patient confirms she is gargles and rinses after taking her Trelegy. Will evaluate her inspiratory flow strength for Trelegy Ellipta.     Action:     Evaluated patients inspiratory flow using In-Check device:      Medium/low resistance setting:    Patient able to generate 35LPM, which is sufficient inspiratory flow for Trelegy Ellipta DPI.    Medium resistance inhalers require a fast and deep inhalation of 30-80LPM with 5-10 second breath hold.    -Discussed aggressive airway clearance tools. Patient has a number of Aerobika devices at home and she attaches one onto her nebulizer cup when nebulizing medications.   -The Aerobika itself does not help her mobilize secretions but using it the above stated way helps her bring up mucus.   -Patient recalls trial of vest therapy and how it was not right for her-she stated ,\"It was just too much.\"  -Discussed option of Volara while hospitalized and she is opening to trying it but resistant to a trial for home use.     Recommendations:    Inpatient:  Continue with current inpatient respiratory medication schedule.     Trial of Volara with Nebs QID, start with low CPEP and CHFO pressures     Inpatient pulmonary consult for further recommendations and coordination of care-Discuss with pulmonary the possibility Trelegy may be causing frequent PNAs    Use Aerobika Oscillating PEP Device for 3 sets of 10 breaths two times daily as directed above    PT/OT consult to assess patients functional abilities, limitations, home care needs, and make recommendations for safe transition to home or TCU.    For Discharge:    Home Oxygen Assessment Testing 24-48 hours prior to discharge to " "determine if patient has  NEW O2 needs at rest and with exertion/activity. If the patient requires oxygen at rest, the walk test must be performed using the new baseline O2 to determine if patient needs more oxygen with activity.     In the event patient qualifies for oxygen, at rest or with activity, please use the following verbiage in oxygen order: \"Please provide portable oxygen concentrator AND please test for oxygen conserving device using ____LPM to maintain sats between ____)      I spent 40 minutes with the patient.    -Will continue to follow patient, assist bedside RT, RN CC, SW, and treatment team with specific respiratory discharge needs and IP recommendations.       Stephanie Myers, RRT, CTTS  Chronic Pulmonary Disease Specialist  Office: [phone]  Pager: [phone]  Hours: M-F 8-4:30      "

## 2023-07-24 NOTE — CONSULTS
Pharmacy consult    Consult received for pharmacist to review for any recent additions or changes to medications and SIADH causing medications.    Based on pharmacy fill history the only noticeable change in medications/doses is the addition of levothyroxine 7/5/23.  This medication is not typically associated with SIADH    Her other medication culprits include SSRI (escitalopram) and NSAID / COX2 inhibitor (celecoxib).  Both of these medications has been consistently filled at the same dose for at least 6 months      For any further questions or concern please contact your unit pharmacist.  Thank you    Rosario Kim, PharmD, BCPS

## 2023-07-24 NOTE — PROGRESS NOTES
Patient is a 78-year-old female with a history of COPD (on 2 L home O2), bronchiectasis, hypothyroidism presenting from home with acute onset shortness of breath admitted for COPD exacerbation.  Patient also noted to have hyponatremia (with presenting sodium of 120) thought likely due to SIADH.  Over the course of hospital stay, patient also found to have acute pulmonary embolism without evidence of right heart strain or increased right heart pressure.  Patient has since been initiated on anticoagulation with Lovenox.  Patient transferred from [place] to [facility].  On examination, patient is saturating well on 3 L of supplemental oxygen, speaking in full sentences without evidence of accessory muscle use.  She has normal S1-S2, regular rate, no murmurs rubs or gallops.  On auscultation of her lungs, she has mild inspiratory wheezes bilaterally and some coarse crackles bilaterally.  Patient's imaging, and labs have been reviewed.  -At this time, we will continue to manage her for COPD exacerbation with scheduled DuoNebs, continue prednisone 40 mg daily x4 doses.  - Patient already initiated on Lovenox for recent diagnosis of PE.  Follow-up bilateral lower extremity ultrasound to rule out DVT.  - With regards to hyponatremia, nephrology already following patient, based on work-up for likely represent SIADH.  Goal any at 7 PM today is 126.  And goal sodium by 7 PM on 7/24/2023 is 132-134.  Currently pending repeat BMP at this time.        GRUPO SIMS MD  Hospitalist Service  [facility]

## 2023-07-24 NOTE — PROGRESS NOTES
"   07/24/23 0953   Appointment Info   Signing Clinician's Name / Credentials (SLP) Karlos Brody MA [facility] SLP   General Information   Onset of Illness/Injury or Date of Surgery 07/23/23   Referring Physician Tiffanie Calderon MD   Pertinent History of Current Problem Per H&P: \"Fiordaliza Najera is a 78 year old female who presents with a few days of worsening shortness of breath... admitted on 7/23/2023. She has a history of COPD, bronchiectasis, panlobular emphysema, carotid artery aneurysm, aortic dilatation and subclinical hypothyroidism and is admitted for hyponatremia and COPD exacerbation...\" \"The patient called her PCP late last week with increasing shortness of breath, cough productive of green sputum which is a change from her baseline cough.  She has a home emergency plan and started on her Bactrim and Medrol Dosepak however did not notice much of a change in her symptoms. Yesterday, she began to feel as if her throat were swelling and she was having difficulty breathing and getting air in and secretions out.  This is what caused her to present to the emergency room.  She denies fevers or chills.  She denies chest pain, palpitations, pleurisy.  She denies any other infectious symptoms including no nausea, vomiting, diarrhea, abdominal pain, rash, urinary symptoms.  No sick contacts. In terms of her throat swelling, this is new for her.  She states it has improved since coming into the emergency room.  She has not noticed any lumps or bumps around her neck. She has difficulty with solid or more difficult to chew foods and feels like they get stuck on the way down.  No trouble with liquids. Not progressive in nature.  Denies coughing or any aspiration.\"   Type of Evaluation   Type of Evaluation Swallow Evaluation   Oral Motor   Oral Musculature generally intact   Structural Abnormalities none present   Mucosal Quality good;other (see comments)  (thrush on velum and posterior portion of tongue)   Dentition (Oral " Motor)   Dentition (Oral Motor) natural dentition;adequate dentition   Facial Symmetry (Oral Motor)   Facial Symmetry (Oral Motor) WNL   Lip Function (Oral Motor)   Lip Range of Motion (Oral Motor) WNL   Lip Strength (Oral Motor) WNL   Tongue Function (Oral Motor)   Tongue Coordination/Speed (Oral Motor) WNL   Tongue ROM (Oral Motor) WNL   Jaw Function (Oral Motor)   Jaw Function (Oral Motor) WNL   Cough/Swallow/Gag Reflex (Oral Motor)   Soft Palate/Velum (Oral Motor) WNL   Volitional Throat Clear/Cough (Oral Motor) WNL   Volitional Swallow (Oral Motor) other (see comments)  (feels soreness in her throat)   Vocal Quality/Secretion Management (Oral Motor)   Vocal Quality (Oral Motor) WNL   Secretion Management (Oral Motor) WNL   General Swallowing Observations   Past History of Dysphagia None per chart review and patient report.   Respiratory Support (General Swallowing Observations) nasal cannula;other (see comments)  (2L O2 currently and same level at home baseline.)   Current Diet/Method of Nutritional Intake (General Swallowing Observations, NIS) regular diet;thin liquids (level 0)   Swallowing Evaluation Clinical swallow evaluation   Clinical Swallow Evaluation   Feeding Assistance no assistance needed   Clinical Swallow Evaluation Textures Trialed thin liquids;pureed;solid foods   Clinical Swallow Eval: Thin Liquid Texture Trial   Mode of Presentation, Thin Liquids straw;self-fed   Volume of Liquid or Food Presented 4 trials: x2 single, x2 consecutive sips   Oral Phase of Swallow WFL   Pharyngeal Phase of Swallow intact   Diagnostic Statement No overt s/s of aspiration observed.   Clinical Swallow Evaluation: Puree Solid Texture Trial   Mode of Presentation, Puree spoon;self-fed   Volume of Puree Presented 4 tbsps   Oral Phase, Puree WFL   Pharyngeal Phase, Puree intact   Diagnostic Statement No overt s/s of aspiration observed.   Clinical Swallow Evaluation: Solid Food Texture Trial   Mode of Presentation  self-fed   Volume Presented 1 matt cracker   Oral Phase WFL   Pharyngeal Phase feeling of something stuck in throat   Diagnostic Statement No overt s/s of aspiration, but patient complaining that after last trial, it felt like cracker was stuck in her throat.   Esophageal Phase of Swallow   Patient reports or presents with symptoms of esophageal dysphagia No   Swallowing Recommendations   Diet Consistency Recommendations thin liquids (level 0);regular diet   Supervision Level for Intake patient independent   Mode of Delivery Recommendations bolus size, small;slow rate of intake   Monitoring/Assistance Required (Eating/Swallowing) stop eating activities when fatigue is present   Recommended Feeding/Eating Techniques (Swallow Eval) maintain upright sitting position for eating   Medication Administration Recommendations, Swallowing (SLP) As tolerated   Instrumental Assessment Recommendations instrumental evaluation not recommended at this time   General Therapy Interventions   Planned Therapy Interventions Dysphagia Treatment   Dysphagia treatment Instruction of safe swallow strategies   Clinical Impression   Criteria for Skilled Therapeutic Interventions Met (SLP Eval) Yes, treatment indicated   SLP Diagnosis Mild Pharyngeal Dysphagia   Risks & Benefits of therapy have been explained evaluation/treatment results reviewed;care plan/treatment goals reviewed;risks/benefits reviewed;current/potential barriers reviewed;participants voiced agreement with care plan;participants included;patient   Clinical Impression Comments   Clinical swallow evaluation completed this AM per provider orders. Patient presents with mild pharyngeal dysphagia in setting of COPD exacerbation. Patient reports sore throat and occasional feeling of food getting stuck in her throat, which has been new. Oral motor evaluation revealed thrush on velum and posterior portion of tongue - note patient is already receiving treatment. Patient completed  trials of thin liquids (straw), puree textures, and hard solids. No overt s/s of aspiration with all intake. Patient oral phase c/b adequate bolus acceptance and closure, timely mastication of solids, and good oral clearance with all intake. Patient did report after final swallow of solids, feeling like it was getting stuck in her throat, and also reports feeling like she has a sore throat, increasing discomfort when swallowing solids.     Recommend continue regular textures and thin liquids diet. She should be fully upright and alert for all intake, taking small bites/sips and using a slow rate of intake. Patient reported she would like SLP to check-in with her tomorrow regarding feeling like food getting stuck in her throat after practice using safe swallow strategies. SLP will follow for short-course of dysphagia treatment. Likely that patient's SOB is related to COPD exacerbation vs aspiration of PO intake.     SLP Total Evaluation Time   Eval: oral/pharyngeal swallow function, clinical swallow Minutes (67090) 14   SLP Discharge Planning   SLP Discharge Recommendation home   SLP Rationale for DC Rec Suspect patient will meet all swallow goals at time of d/c

## 2023-07-24 NOTE — PROGRESS NOTES
Chronic Pulmonary Disease Specialist  Progress Note     Initiated Volara treatment today at 1600. Orders are for Volara QID with nebs for patients bronchiectasis. Patient uses Aerobika with nebs at home however given patients continued lung infections, Aerobika may no longer be an effective tool for airway clearance. Vest trial done a couple of years ago and patient did not tolerate.     Todays treatment with Volara was 3 minutes each of CPEP and CHFO both at a pressure of 8. Patient was very tired but tolerated the treatment. She is open to using the Volara while inpatient. If patient does not tolerate, treatment may be discontinued as this is a trial.    Will continue to follow and provide support.     Stephanie Myers RRT, CTTS  Chronic Pulmonary Disease Specialist  Office: [phone]  Pager: [phone]

## 2023-07-25 NOTE — PROGRESS NOTES
Major Shift Events:  A&Ox4, advocates moderate pain in shoulders due to previous torn rotator cuffs, Tylenol and ice controlling pain. Denies chest pain or shortness of breath. Lung sounds wheezing. On 2L NC overnight. No bowel movement overnight, PRN Senna given. Purewick leaked overnight, so output does not coincide w/ actual urine output.    Plan: Continue POC. Transfer to med/surg or IMC when able.  For vital signs and complete assessments, please see documentation flowsheets.

## 2023-07-25 NOTE — CONSULTS
Pulmonology Consult Note     Date of Service: 07/25/2023  Patient: Fiordaliza Najera  MRN: [id number]  Admission Date: 7/23/2023  Hospital Day: 2    Reason for Consult: Acute COPD exacerbation, COPD RT concern trellegy causing PNA    Assessment:   Fiordaliza Najera is a 78 year old female with chronic hypoxic respiratory failure, bronchiectasis, possible COPD, waxing and waning pulmonary nodular opacities, CAD admitted (7/23/2023) for acute on chronic hypoxic respiratory failure found to have hypovolemic hyponatremia, acute bilateral segmental PEs, and acute exacerbation of bronchiectasis.    Pulmonary Problem List:  Acute on chronic hypoxic respiratory failure  Acute bilateral segmental pulmonary emboli  Bronchiectasis with acute exacerbation  Waxing and waning pulmonary nodular opacities  Possible COPD    Currently on baseline oxygen requirements of 2 L.  Presented with dyspnea and increase productive cough found to have acute bilateral segmental pulmonary emboli without evidence of DVTs.  She is currently being treated for an acute exacerbation of her bronchiectasis with peripheral nodular opacities and mosaic attenuation throughout the lung.  Etiology of her underlying lung disease is unclear.  Although Trelegy is associated with an increased risk of pneumonia, this patient's recurrent pneumonias are likely due to her severe bronchiectasis. 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 Qtc this admission WNL. Therefore we will continue trilogy Ellipta or equivalent, nebs and CPT for airway clearance, steroid burst, evaluate for infection and consider treating pneumonia as below.    Recommendations:   Respiratory viral panel, sputum cultures (bacterial, fungal, AFB), Fungitell, Aspergillus galactomannan, strep/Legionella urinary antigens, MRSA nares (ordered)  Start Levaquin for a 7-day course  Agree with 5-day steroid burst  Continue Trelegy  Ellipta or equivalent  Albuterol, hypertonic saline and chest physiotherapy 3-4 times daily as tolerated  Anticoagulation per primary team    Patient was seen and plan of care was discussed with attending physician Dr. Vance. We will continue to follow this patient peripherally. Please don't hesitate to contact our team with any additional questions or concerns.    Nolberto Collado MD  Pulmonary and Critical Care Fellow  Pager: [phone]    History of Present Illness:      Patient reports dyspnea with a sensation that her upper airway was closing off at the end of last week.  She was evaluated for this and started on steroids as well as a Bactrim.  Her symptoms did not improve and she then presented to the hospital.  She reports initially having increased amount of green sputum production.  She reports at baseline she has green sputum production with difficult airway clearance.  Her airway clearance regimen includes albuterol, hypertonic saline and sometimes Mucomyst 3 times daily and sometimes uses a flutter valve.  She has not tolerated vest therapy in the past.     Review of Systems:  A comprehensive ROS was performed and found to be negative or non-contributory with the exception of that noted in the HPI above.    Past Medical History:   Diagnosis Date    Anxiety 09/30/2021    COPD (chronic obstructive pulmonary disease) (H)     Diverticulosis     Hiatal hernia     Mild asthma     Neuropathy     Osteopenia     Temporomandibular joint (TMJ) pain      Past Surgical History:   Procedure Laterality Date    ANTERIOR / POSTERIOR COMBINED FUSION LUMBAR SPINE      BRONCHOSCOPY (RIGID OR FLEXIBLE), DIAGNOSTIC N/A 09/28/2021    Procedure: BRONCHOSCOPY, WITH BRONCHOALVEOLAR LAVAGE;  Surgeon: Randall Lorenz MD;  Location: [facility]    HYSTERECTOMY      PICC SINGLE LUMEN PLACEMENT  11/04/2021         PICC SINGLE LUMEN PLACEMENT Right 12/22/2022    Right basilic, 38 cm    RETINAL LASER PROCEDURE Left 10/04/2016     SALPINGOOPHORECTOMY      TOTAL KNEE ARTHROPLASTY  2008     Social History     Socioeconomic History    Marital status: Single   Tobacco Use    Smoking status: Never    Smokeless tobacco: Never   Vaping Use    Vaping Use: Never used   Substance and Sexual Activity    Alcohol use: No    Drug use: Never    Sexual activity: Never   Social History Narrative    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  She was a [employer] sister with [employer].  She has a sister and brother-in-law, 2 nieces, 3 grand nieces and nephews in [place].  She came to [place] for a sabbatical, and then  stayed on for a teaching job.  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  She lives alone in a long-term facility. - Dr. Nguyễn 01/04/21        Family History   Problem Relation Age of Onset    Dementia Mother         passed age 88    Chronic Obstructive Pulmonary Disease Father         passed age 78     Medications Prior to Admission   Medication Sig Dispense Refill Last Dose    albuterol (PROAIR HFA/PROVENTIL HFA/VENTOLIN HFA) 108 (90 Base) MCG/ACT inhaler Inhale 2 puffs into the lungs every 6 hours as needed 18 g 11 7/23/2023 at unknown    albuterol (PROVENTIL) (2.5 MG/3ML) 0.083% neb solution Take 1 vial (2.5 mg) by nebulization every 4 hours as needed for shortness of breath / dyspnea or wheezing 150 mL 11 7/22/2023 at pm    Biotin 5000 MCG TABS Take 1 tablet by mouth daily   7/23/2023 at am    calcium-vitamin D (CALCIUM-VITAMIN D) 500 mg(1,250mg) -200 unit per tablet Take 1 tablet by mouth 2 times daily    7/23/2023 at am    celecoxib (CELEBREX) 200 MG capsule Take 1 capsule (200 mg) by mouth daily 90 capsule 0 7/23/2023 at unknown    cetirizine (ZYRTEC) 5 MG tablet Take 5 mg by mouth daily   7/22/2023 at pm    cholecalciferol, vitamin D3, (VITAMIN D3) 1,000 unit capsule [CHOLECALCIFEROL, VITAMIN D3, (VITAMIN D3) 1,000 UNIT CAPSULE] Take 1,000 Units by mouth daily.   7/23/2023 at am     Fluticasone-Umeclidin-Vilant (TRELEGY ELLIPTA) 100-62.5-25 MCG/ACT oral inhaler Inhale 1 puff into the lungs daily 180 each 3 7/23/2023 at am    Lactobacillus rhamnosus GG (CULTURELLE) 10-15 Billion cell capsule Take 1 capsule by mouth every evening    7/22/2023 at pm    levothyroxine (SYNTHROID/LEVOTHROID) 25 MCG tablet Take 1 tablet (25 mcg) by mouth daily 90 tablet 0 7/23/2023 at am    montelukast (SINGULAIR) 10 MG tablet [MONTELUKAST (SINGULAIR) 10 MG TABLET] TAKE 1 TABLET(10 MG) BY MOUTH AT BEDTIME 90 tablet 3 7/22/2023 at pm    multivitamin therapeutic (THERAGRAN) tablet [MULTIVITAMIN THERAPEUTIC (THERAGRAN) TABLET] Take 1 tablet by mouth daily.   7/23/2023 at am    oxybutynin ER (DITROPAN XL) 5 MG 24 hr tablet Take 1 tablet (5 mg) by mouth daily 90 tablet 3 7/23/2023 at am    OXYGEN-AIR DELIVERY SYSTEMS MISC [OXYGEN-AIR DELIVERY SYSTEMS MISC] Use 2 L As Directed. 2L with activity and at night  [facility]   7/23/2023 at am    pregabalin (LYRICA) 50 MG capsule Take 1 tab in the AM and 2 in the evening 90 capsule 1 7/23/2023 at am    sodium chloride (NEBUSAL) 3 % neb solution Take 3 mLs by nebulization 2 times daily 180 mL 11 7/23/2023 at am    sodium chloride (OCEAN) 0.65 % nasal spray Spray 2 sprays in nostril daily 88 mL 0 7/23/2023 at am    sulfamethoxazole-trimethoprim (BACTRIM DS) 800-160 MG tablet Take 1 tablet by mouth 2 times daily 14 tablet 1 7/23/2023 at am    SUMAtriptan (IMITREX) 50 MG tablet Take 1 tablet (50 mg) by mouth as needed for migraine,may repeat after 2 hours if needed;  mg/24 hours 30 tablet 1 More than a month at unknown    buPROPion (WELLBUTRIN XL) 150 MG 24 hr tablet Take 1 tablet (150 mg) by mouth every morning 90 tablet 0     escitalopram (LEXAPRO) 20 MG tablet Take 1 tablet (20 mg) by mouth daily 90 tablet 0     metoprolol succinate ER (TOPROL XL) 25 MG 24 hr tablet Take 1 tablet (25 mg) by mouth daily 90 tablet 0      Current Facility-Administered Medications   Medication     0.9% sodium chloride BOLUS    acetaminophen (TYLENOL) tablet 650 mg    Or    acetaminophen (TYLENOL) Suppository 650 mg    azithromycin (ZITHROMAX) tablet 500 mg    buPROPion (WELLBUTRIN XL) 24 hr tablet 150 mg    [Held by provider] dextrose 5% infusion    enoxaparin ANTICOAGULANT (LOVENOX) injection 50 mg    escitalopram (LEXAPRO) tablet 20 mg    [Held by provider] fluticasone-vilanterol (BREO ELLIPTA) 100-25 MCG/ACT inhaler 1 puff    And    [Held by provider] umeclidinium (INCRUSE ELLIPTA) 62.5 MCG/ACT inhaler 1 puff    guaiFENesin (MUCINEX) 12 hr tablet 600 mg    ipratropium - albuterol 0.5 mg/2.5 mg/3 mL (DUONEB) neb solution 3 mL    levalbuterol (XOPENEX HFA) 45 MCG/ACT Inhaler 2 puff    levothyroxine (SYNTHROID/LEVOTHROID) tablet 25 mcg    lidocaine (LMX4) cream    lidocaine 1 % 0.1-1 mL    LORazepam (ATIVAN) half-tab 0.25 mg    melatonin tablet 5 mg    metoprolol succinate ER (TOPROL XL) 24 hr tablet 25 mg    montelukast (SINGULAIR) tablet 10 mg    nystatin (MYCOSTATIN) suspension 500,000 Units    oxybutynin ER (DITROPAN XL) 24 hr tablet 5 mg    polyethylene glycol (MIRALAX) Packet 17 g    predniSONE (DELTASONE) tablet 40 mg    pregabalin (LYRICA) capsule 100 mg    pregabalin (LYRICA) capsule 50 mg    prochlorperazine (COMPAZINE) injection 5 mg    Or    prochlorperazine (COMPAZINE) tablet 5 mg    Or    prochlorperazine (COMPAZINE) suppository 12.5 mg    senna-docusate (SENOKOT-S/PERICOLACE) 8.6-50 MG per tablet 1 tablet    Or    senna-docusate (SENOKOT-S/PERICOLACE) 8.6-50 MG per tablet 2 tablet    sodium chloride (NEBUSAL) 3 % neb solution 3 mL    sodium chloride (OCEAN) 0.65 % nasal spray 2 spray    sodium chloride (PF) 0.9% PF flush 3 mL    sodium chloride (PF) 0.9% PF flush 3 mL       Physical Exam:    Blood pressure 95/59, pulse 86, temperature 98.1  F (36.7  C), temperature source Oral, resp. rate 20, weight 45.8 kg (100 lb 15.5 oz), SpO2 97 %, not currently breastfeeding.    Constitutional: Thin  female, no apparent distress  HENT: conjugate gaze, sclera anicteric  Respiratory: non-labored respirations on 2 L, diffuse rhonchi  Cardiovascular: RRR, no lower extremity edema   GI: soft, non-distended, non-tender  Skin: warm and dry, no rashes or lesions  Neurologic: Alert, oriented, normal speech, moving all extremities equally and independently      Labs & Studies: I personally reviewed the following studies:  CMP  Recent Labs   Lab 07/25/23  1035 07/25/23  0744 07/25/23  0533 07/25/23  0451 07/25/23  0201 07/24/23  0941 07/24/23  0559 07/24/23  0151 07/23/23  2321 07/23/23  2155 07/23/23  1908   * 126*  --  124* 124*   < > 122* 118* 118* 118* 120*   POTASSIUM  --  4.5  --   --   --   --  4.5  --  4.5 6.2* 5.3   CHLORIDE  --  90*  --   --   --   --  86*  --  85* 85* 84*   CO2  --  31*  --   --   --   --  26  --  23 22 20*   ANIONGAP  --  5*  --   --   --   --  10  --  10 11 16*   GLC  --  90  --   --   --   --  127*  --  187* 141* 113*   BUN  --  18.8  --   --   --   --  13.0  --  17.7 16.6 17.0   CR  --  0.50* 0.50*  --   --   --  0.45*  --  0.42* 0.41* 0.49*   GFRESTIMATED  --  >90  --   --   --   --  >90  --  >90 >90 >90   MAGAN  --  8.6*  --   --   --   --  8.5*  --  8.2* 8.1* 9.3   MAG  --   --   --   --   --   --   --  1.7  --   --   --    PHOS  --   --   --   --   --   --   --   --  2.7  --   --    PROTTOTAL  --   --   --   --   --   --  6.9  --   --   --  7.7   ALBUMIN  --   --   --   --   --   --  3.7  --   --   --  4.1   BILITOTAL  --   --   --   --   --   --  0.5  --   --   --  0.6   ALKPHOS  --   --   --   --   --   --  83  --   --   --  95   AST  --   --   --   --   --   --  54*  --   --   --  62*   ALT  --   --   --   --   --   --  21  --   --   --  23    < > = values in this interval not displayed.     CBC  Recent Labs   Lab 07/25/23  0451 07/24/23  1444 07/24/23  0559 07/23/23  1908   WBC 12.9*  --  15.5* 21.2*   RBC 3.84  --  4.13 4.59   HGB 11.4*  --  11.8 13.3   HCT 34.1*  --  36.8 42.0    MCV 89  --  89 92   MCH 29.7  --  28.6 29.0   MCHC 33.4  --  32.1 31.7   RDW 12.7  --  12.8 13.0    308 280 331     INRNo lab results found in last 7 days.         No data to display                  Recent Results (from the past 4320 hour(s))   Echocardiogram Complete   Result Value    LVEF  60-65%    [facility]    [id number]  [id number]  [id number]  [id number]^BRENDEN^[place] [address]     Name: SR MARANDA AMIN SR.  MRN: [id number]  : 1945  Study Date: 2023 11:00 AM  Age: 77 yrs  Gender: Female  Patient Location: [facility]  Reason For Study: Tachycardia, Bronchiectasis without complication (H), Other  fati  Ordering Physician: ASHLYN WILCOX  Referring Physician: ASHLYN WILCOX  Performed By: AT     BSA: 1.5 m2  Height: 62 in  Weight: 105 lb  HR: 73  ______________________________________________________________________________  Procedure  Complete Echo Adult.  ______________________________________________________________________________  Interpretation Summary     Left ventricular size, wall motion and function are normal. The ejection  fraction is 60-65%.  Normal right ventricle size and systolic function.  No hemodynamically significant valvular abnormalities on 2D or color flow  imaging.  ______________________________________________________________________________  Left Ventricle  Left ventricular size, wall motion and function are normal. The ejection  fraction is 60-65%. There is normal left ventricular wall thickness. Left  ventricular diastolic function is normal. No regional wall motion  abnormalities noted.     Right Ventricle  Normal right ventricle size and systolic function. TAPSE is normal, which is  consistent with normal right ventricular systolic function.     Atria  The left atrium is mildly dilated. Right atrial size is normal. There is no  color Doppler evidence of an atrial shunt.     Mitral Valve  Mitral valve leaflets  appear normal. There is no evidence of mitral stenosis  or clinically significant mitral regurgitation. There is trace mitral  regurgitation.     Tricuspid Valve  Tricuspid valve leaflets appear normal. Right ventricle systolic pressure  estimate normal. There is trace to mild tricuspid regurgitation.     Aortic Valve  Aortic valve leaflets appear normal. There is no evidence of aortic stenosis  or clinically significant aortic regurgitation.     Pulmonic Valve  The pulmonic valve is not well seen, but is grossly normal.     Vessels  The aorta root is normal. Normal size ascending aorta. IVC diameter <2.1 cm  collapsing >50% with sniff suggests a normal RA pressure of 3 mmHg.     Pericardium  There is no pericardial effusion.     ______________________________________________________________________________  MMode/2D Measurements & Calculations  IVSd: 1.1 cm  LVIDd: 4.0 cm  LVIDs: 2.6 cm  LVPWd: 0.97 cm  FS: 35.7 %     LV mass(C)d: 135.3 grams  LV mass(C)dI: 93.1 grams/m2  Ao root diam: 3.5 cm  LA dimension: 2.6 cm  asc Aorta Diam: 3.1 cm  LA/Ao: 0.75  LVOT diam: 2.3 cm  LVOT area: 4.0 cm2  LA Volume (BP): 45.0 ml  LA Volume Index (BP): 31.0 ml/m2     LA Volume Indexed (AL/bp): 34.0 ml/m2  RWT: 0.48     Time Measurements  MM HR: 64.0 BPM     Doppler Measurements & Calculations  MV E max ryan: 76.2 cm/sec  MV A max ryan: 72.5 cm/sec  MV E/A: 1.1  MV max P.4 mmHg  MV mean P.8 mmHg  MV V2 VTI: 22.8 cm  MVA(VTI): 3.5 cm2  MV dec time: 0.24 sec  Ao V2 max: 137.3 cm/sec  Ao max P.0 mmHg  Ao V2 mean: 102.2 cm/sec  Ao mean P.8 mmHg  Ao V2 VTI: 30.6 cm  HOME(I,D): 2.6 cm2  HOME(V,D): 2.9 cm2  LV V1 max P.0 mmHg  LV V1 max: 100.3 cm/sec  LV V1 VTI: 20.1 cm  SV(LVOT): 80.7 ml  SI(LVOT): 55.5 ml/m2     PA acc time: 0.12 sec  PI end-d ryan: 122.2 cm/sec  AV Ryan Ratio (DI): 0.73  HOME Index (cm2/m2): 1.8  E/E': 14.1  E/E' av.7  Lateral E/e': 9.1  Medial E/e': 14.2  Peak E' Ryan: 5.4 cm/sec      ______________________________________________________________________________  Report approved by: Bar Cornejo 03/07/2023 04:13 PM             Imaging:  US Lower Extremity Venous Duplex Bilateral  Narrative: EXAMINATION: DOPPLER VENOUS ULTRASOUND OF BILATERAL LOWER EXTREMITIES,  7/25/2023     COMPARISON: None.    HISTORY:  Recent PE, rule out lower extremity DVT    TECHNIQUE:  Gray-scale evaluation with compression, spectral flow and  color Doppler assessment of the deep venous system of both legs from  groin to knee, and then at the ankles.    FINDINGS:  In both lower extremities, the common femoral, femoral, popliteal,  peroneal, and posterior tibial veins demonstrate normal  compressibility and blood flow.  Impression: IMPRESSION:  No evidence of deep venous thrombosis in either lower extremity.    I have personally reviewed the examination and initial interpretation  and I agree with the findings.    ALAN DOVER,          SYSTEM ID:  [id number]

## 2023-07-25 NOTE — PROGRESS NOTES
[facility] [place]    Medicine Progress Note - Hospitalist Service, GOLD TEAM 22    Date of Admission:  7/23/2023    Assessment & Plan   78 year old female with COPD with chronic respiratory failure on 2L NC, bronchiectasis and emphysema, CAD who was admitted on 7/23/2023 for COPD exacerbation and hyponatremia with acute on chronic hypoxic respiratory failure and acute PE.     Acute pulmonary embolus  - continue lovenox  - transition to apixaban tomorrow  - discussed pros and cons with patient    Acute on Chronic hypoxic respiratory failure due to COPD exacerbation  Acute COPD exacerbation on severe COPD  Sepsis due to   - Currently on 3L NC at rest, with significant shortness of breath with activity  - prednisone 40mg x 4 days total  - Trial of Volara with nebs QID   - duonebs and hypertonic saline nebs  - COPD RT consulted  - Pulmonology consulted   - PT consulted for mobility and safety at home  - SLP consulted, recommending continued diet  - transfer to floor    Acute on chronic hyponatremia  - nephrology consulted  - monitor sodium and repeat urine studies    Anxiety  Depression  - continue wellbutrin and lexapro and hydroxyzine    Leukocytosis  - steroid induced    SIRS criteria due to COPD exacerbation  - does not have sepsis    Hyperkalemia, resolved    Pain  - hold celecoxib    HTN  - continue metoprolol     Diet: Combination Diet Regular Diet Adult  Fluid restriction 750 ML FLUID    DVT Prophylaxis: Enoxaparin (Lovenox) SQ  Mobley Catheter: Not present  Lines: PRESENT             Cardiac Monitoring: None  Code Status: No CPR- Do NOT Intubate      Clinically Significant Risk Factors        # Hyperkalemia: Highest K = 6.2 mmol/L in last 2 days, will monitor as appropriate  # Hyponatremia: Lowest Na = 118 mmol/L in last 2 days, will monitor as appropriate  # Hypocalcemia: Lowest Ca = 8.1 mg/dL in last 2 days, will monitor and replace as appropriate                          Disposition Plan      Expected Discharge Date: 07/26/2023,  3:00 PM      Discharge Comments: Patient may transfer to [facility]. Not currently safe for discharge home. Pending further work up and treatment.          Gerald Dutton MD  Hospitalist Service, GOLD TEAM [address] [place] [address]  Securely message with [employer] (more info)  Text page via [facility] Paging/Directory   See signed in provider for up to date coverage information  ______________________________________________________________________    Interval History   Today patient feels well when at rest but very short of breath with activity. Does not feel like she could safely get herself food, go to the bathroom or change he clothes at home.    Physical Exam   Vital Signs: Temp: 98.1  F (36.7  C) Temp src: Oral BP: 120/69 Pulse: 82   Resp: 22 SpO2: 98 % O2 Device: Nasal cannula Oxygen Delivery: 2 LPM  Weight: 100 lbs 15.53 oz    Gen: NAD, sitting comfortably in chair  Eyes: EOMI, conjuctiva clear  Mouth: OP clear, no lesions  CV: RRR, no murmurs, 2+ radial pulses  RESP: CTA bilaterally, prolonged expiratory phase, crackles at bases bilaterally  Abd: soft, nontender, nondistended  Ext: no edema bilaterally    Medical Decision Making       55 MINUTES SPENT BY ME on the date of service doing chart review, history, exam, documentation & further activities per the note.      Data     I have personally reviewed the following data over the past 24 hrs:    12.9 (H)  \   11.4 (L)   / 277     123 (L) 90 (L) 18.8 /  90   4.5 31 (H) 0.50 (L) \       Imaging results reviewed over the past 24 hrs:   Recent Results (from the past 24 hour(s))   CT Chest Pulmonary Embolism w Contrast   Result Value    Radiologist flags Pulmonary embolism (AA)    Narrative    EXAMINATION: CTA pulmonary angiogram, 7/24/2023 1:59 PM     CLINICAL HISTORY: pt with increasing SOB, tachypnea, severe COPD w/o  clear infiltrate or other factors to explain  symptoms, r/o PE    COMPARISON: Chest CT 6/7/2023.    TECHNIQUE: Volumetric helical acquisition of CT images of the chest  from the lung apices to the kidneys were acquired after the  administration of 81 mL of Isovue-370 IV contrast. .  Post-processed  multiplanar and/or MIP reformations were obtained, archived to PACS  and used in interpretation of this study.     FINDINGS:  Pulmonary artery: There is adequate opacification of the pulmonary  artery. Small subsegmental filling defects involving branches of the  right upper, right middle, left upper, as well as the left interlobar  artery. The left to right ventricular size ratio is greater than 1.  The pulmonary artery is normal in caliber.    Mediastinum: The imaged thyroid is unremarkable. No cardiomegaly or  pericardial effusion. Thoracic aorta is within normal limits. No  suspicious thoracic lymphadenopathy. The thoracic esophagus is within  normal limits.    Lungs: No substantial change in appearance of bronchiectasis and  scarring in the right middle lobe lingula, with mucus plugging,  peripheral nodular opacities and mosaic attenuation throughout the  lungs. Findings are not significantly changed from 6/7/2023.    Upper abdomen: Limited upper abdomen which is also obscured by streak  artifact. Contrast opacifying the renal collecting systems.    Bones/soft tissues: Soft tissues unremarkable. No axillary  lymphadenopathy. Surgical fusion hardware in the spine which is  partially imaged.        Impression    IMPRESSION:   1. Exam is positive for acute pulmonary embolism. Small burden of  bilateral segmental pulmonary artery emboli.      Evidence for right heart strain or increased right heart pressures?   is not present.     2. No substantial change in CT findings reflecting infection since  6/7/2023, differential remaining nontuberculous mycobacterium as well  as fungal infection.     In the event of a positive result for acute pulmonary embolism  resulting  in right heart strain, consider calling the   [facility] patient placement ([phone]) for PERT (Pulmonary Embolism  Response Team) Activation?    PERT -- Pulmonary Embolism Response Team (Multidisciplinary team  including cardiology, interventional radiology, critical care,  hematology)      [Critical Result: Pulmonary embolism]    Finding was identified on 7/24/2023 2:04 PM.     Dr Brown was contacted by Dr. Molina at 7/24/2023 2:17 PM  and verbalized understanding of the critical finding.     I have personally reviewed the examination and initial interpretation  and I agree with the findings.    ELISA OLSEN MD         SYSTEM ID:  [id number]    Lower Extremity Venous Duplex Bilateral    Narrative    EXAMINATION: DOPPLER VENOUS ULTRASOUND OF BILATERAL LOWER EXTREMITIES,  7/25/2023     COMPARISON: None.    HISTORY:  Recent PE, rule out lower extremity DVT    TECHNIQUE:  Gray-scale evaluation with compression, spectral flow and  color Doppler assessment of the deep venous system of both legs from  groin to knee, and then at the ankles.    FINDINGS:  In both lower extremities, the common femoral, femoral, popliteal,  peroneal, and posterior tibial veins demonstrate normal  compressibility and blood flow.      Impression    IMPRESSION:  No evidence of deep venous thrombosis in either lower extremity.    I have personally reviewed the examination and initial interpretation  and I agree with the findings.    ALAN DOVER DO         SYSTEM ID:  [id number]

## 2023-07-25 NOTE — PROGRESS NOTES
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 Found to have Na of 120 in the ED (baseline Na over the last 6 months ~131-134). Given ~1L of NS in the ED with subsequent increase in urine output and increase in serum Na (initially tapered off however was around Na ~122 at 5 am on 7/24) making hypovolemic hyponatremia more likely. Suspect urine studies collected were reflective of the kidney response to IV fluids rather then true SIADH in this acute setting.     -7/2523 am serum Na 123 <- 126 <- 124 <- 124 <- 122  7/25/23 urine sodium < 20, unable to calculate FENA  -7/25/23 urine osmolality 456 <- 720  -7/25/23 serum osmolality 262 <- 266 <- 259  -7/2/23 urine potassium 20.4      Continue to monitoring Na closely over the next 24-48 hours with goal correct ~6 mEq every 24 hours. Further would suspect there is a chronic component of hyponatremia driven by SIADH in the setting of pulmonary disease. With this, it is unclear what her new baseline sodium will be thus we plan to correct with caution.    - recommend decreasing fluid restriction to 750 ml free water in 24 hours.   - ordered 500 ml normal saline bolus over one hour.        Recommendations were communicated to primary team verbally and via this note.     Seen and discussed with Dr. Alda MINA, PAShahidC     Interval History :   Reviewed provider and nursing notes from past 24 hours. She feels okay when she is resting. She has increased difficulty breathing with activity. She has a good appetite, no n/v/d.     Review of Systems:   A comprehensive review of systems was negative except as noted above.      Physical Exam:   I/O last 3 completed shifts:  In: 720 [P.O.:720]  Out: 650 [Urine:650]  Vitals: /86  Pulse 89  Resp 13   SpO2 95%  Wt 45.8 kg  GENERAL APPEARANCE: alert and no distress  EYES:  no scleral icterus, pupils equal  PULM: lungs sounds diminished, no crackles or wheeze noted  CV: regular rhythm, normal rate     -edema none   MS: no evidence of inflammation in joints, no muscle tenderness  NEURO: mentation intact and speech normal       Labs:   All labs reviewed by me  Electrolytes/Renal -   Recent Labs   Lab Test 07/25/23  0533 07/25/23  0451 07/25/23  0201 07/24/23  2216 07/24/23  0941 07/24/23  0559 07/24/23  0151 07/23/23  2321 07/23/23  2155 07/26/22  0610 07/24/22  0744 06/20/22  1312 05/13/22  1032   NA  --  124* 124* 122*   < > 122* 118* 118* 118*   < > 137   < >  --    POTASSIUM  --   --   --   --   --  4.5  --  4.5 6.2*   < > 4.4   < >  --    CHLORIDE  --   --   --   --   --  86*  --  85* 85*   < > 102   < >  --    CO2  --   --   --   --   --  26  --  23 22   < > 29   < >  --    BUN  --   --   --   --   --  13.0  --  17.7 16.6   < > 19.2   < >  --    CR 0.50*  --   --   --   --  0.45*  --  0.42* 0.41*   < > 0.52   < >  --    GLC  --   --   --   --   --  127*  --  187* 141*   < > 89   < >  --    MAGAN  --   --   --   --   --  8.5*  --  8.2* 8.1*   < > 9.3   < > 9.5   MAG  --   --   --   --   --   --  1.7  --   --   --  2.1  --  2.1   PHOS  --   --   --   --   --   --   --  2.7  --   --  3.0  --  3.4    < > = values in this interval not displayed.       CBC -   Recent Labs   Lab Test 07/25/23  0451 07/24/23  1444 07/24/23  0559 07/23/23  1908   WBC 12.9*  --  15.5* 21.2*   HGB 11.4*  --  11.8 13.3    308 280 331       LFTs -   Recent Labs   Lab Test 07/24/23  0559 07/23/23 1908 07/14/23  1227   ALKPHOS 83 95 104   BILITOTAL 0.5 0.6 0.3   ALT 21 23 15   AST 54* 62* 57*   PROTTOTAL 6.9 7.7 7.4   ALBUMIN 3.7 4.1 4.0       Iron Panel -   Recent Labs   Lab Test 05/01/23  1122   ANMOL 170         Imaging:  Reviewed     Current Medications:   azithromycin  500 mg Oral Daily    buPROPion  150 mg Oral QAM    celecoxib  200  mg Oral Daily    enoxaparin ANTICOAGULANT  1 mg/kg Subcutaneous Q12H    escitalopram  20 mg Oral Daily    [Held by provider] fluticasone-vilanterol  1 puff Inhalation Daily    And    [Held by provider] umeclidinium  1 puff Inhalation Daily    guaiFENesin  600 mg Oral BID    ipratropium - albuterol 0.5 mg/2.5 mg/3 mL  3 mL Nebulization 4x daily    levothyroxine  25 mcg Oral Daily    metoprolol succinate ER  25 mg Oral Daily    montelukast  10 mg Oral At Bedtime    nystatin  500,000 Units Oral 4x Daily    oxybutynin ER  5 mg Oral Daily    predniSONE  40 mg Oral Daily    pregabalin  100 mg Oral QPM    pregabalin  50 mg Oral Daily    sodium chloride  3 mL Nebulization BID    sodium chloride  2 spray Nasal Daily    sodium chloride (PF)  3 mL Intracatheter Q8H      [Held by provider] D5W Stopped (07/24/23 1525)     DAYNA MINA, PA-C

## 2023-07-25 NOTE — PROGRESS NOTES
07/25/23 1502   Appointment Info   Signing Clinician's Name / Credentials (PT) ALE Alvarado   Student Supervision On-site supervision provided;Direct supervision provided;Direct Patient Contact Provided;Therapy services provided with the co-signing licensed therapist guiding and directing the services, and providing the skilled judgement and assessment throughout the session       Present no   Language English   Living Environment   People in Home alone   Current Living Arrangements independent living facility   Home Accessibility no concerns  (has elevators)   Transportation Anticipated family or friend will provide   Living Environment Comments Pt. receives services at facility including help with meals, cleaning, getting ready for bed.   Self-Care   Usual Activity Tolerance moderate   Current Activity Tolerance fair   Regular Exercise Yes   Activity/Exercise Type other (see comments)  (PT exercises, HEP, walking but not as far recently)   Exercise Amount/Frequency daily   Equipment Currently Used at Home walker, rolling  (no AD used within apartment, owns arm bike, canes)   Fall history within last six months yes   Number of times patient has fallen within last six months 3  (2 in february, 2 of 3 tripping to fall)   Activity/Exercise/Self-Care Comment [facility] such as getting dressed/showering   General Information   Onset of Illness/Injury or Date of Surgery 07/23/23   Referring Physician Gerald Dutton MD   Patient/Family Therapy Goals Statement (PT) did not endorse   Pertinent History of Current Problem (include personal factors and/or comorbidities that impact the POC) 78 year old female with COPD with chronic respiratory failure on 2L NC, bronchiectasis and emphysema, CAD who was admitted on 7/23/2023 for COPD exacerbation and hyponatremia with acute on chronic hypoxic respiratory failure and acute PE.   Existing Precautions/Restrictions  fall;oxygen therapy device and L/min  (2 L/min)   Weight-Bearing Status - LUE full weight-bearing   Weight-Bearing Status - RUE full weight-bearing   Weight-Bearing Status - LLE full weight-bearing   Weight-Bearing Status - RLE full weight-bearing   Cognition   Affect/Mental Status (Cognition) WFL   Orientation Status (Cognition) oriented x 4   Follows Commands (Cognition) WNL   Pain Assessment   Patient Currently in Pain No   Integumentary/Edema   Integumentary/Edema no deficits were identifed   Posture    Posture Forward head position;Protracted shoulders;Kyphosis   Range of Motion (ROM)   Range of Motion ROM is WFL   Strength (Manual Muscle Testing)   Strength (Manual Muscle Testing) strength is WFL   Bed Mobility   Comment, (Bed Mobility) Pt. performs supine<>sit transfer with SBA   Transfers   Comment, (Transfers) Pt. performs sit<>stand transfer with supervision   Gait/Stairs (Locomotion)   Comment, (Gait/Stairs) Pt. ambulates with 4WW, CGA   Balance   Balance other (describe)   Balance Comments Pt. independent in sitting balance, some deficits noted in standing balance this session   Sensory Examination   Sensory Perception other (describe)   Sensory Perception Comments neuropathy in hands and feet at baseline   Clinical Impression   Criteria for Skilled Therapeutic Intervention Yes, treatment indicated   PT Diagnosis (PT) impaired tolerance for functional activities   Influenced by the following impairments decreased activity tolerance, some weakness/ deconditioning   Functional limitations due to impairments impaired tolerance for functional activities   Clinical Presentation (PT Evaluation Complexity) Stable/Uncomplicated   Clinical Presentation Rationale per clinical judgement   Clinical Decision Making (Complexity) low complexity   Planned Therapy Interventions (PT) balance training;bed mobility training;gait training;home exercise program;patient/family education;stair training;strengthening;transfer  training;progressive activity/exercise;risk factor education;home program guidelines   Risk & Benefits of therapy have been explained evaluation/treatment results reviewed;care plan/treatment goals reviewed;risks/benefits reviewed;current/potential barriers reviewed   PT Total Evaluation Time   PT Eval, Low Complexity Minutes (54455) 9   Physical Therapy Goals   PT Frequency 5x/week   PT Predicted Duration/Target Date for Goal Attainment 07/31/23   PT Goals Bed Mobility;Transfers;Gait   PT: Bed Mobility Independent;Supine to/from sit   PT: Transfers Modified independent;Assistive device;Bed to/from chair;Sit to/from stand   PT: Gait Modified independent;Assistive device;Greater than 200 feet   Interventions   Interventions Quick Adds Therapeutic Activity;Therapeutic Procedure   Therapeutic Procedure/Exercise   Ther. Procedure: strength, endurance, ROM, flexibillity Minutes (33281) 8   Treatment Detail/Skilled Intervention Pt. instructed in standing BLE strengthening exercises with walker, CGA including: marches, hamstring curls, mini squats 2x10. Pt. takes seated rest between sets, good tolerance overall.   Therapeutic Activity   Therapeutic Activities: dynamic activities to improve functional performance Minutes (34858) 10   Treatment Detail/Skilled Intervention Pt. received supine in bed, agreeable to participate in PT session. Pt. performs supine to sit transfer with supervision and sits EOB demonstrating independent sitting balance. Pt received on 2 L/min O2, transferred to portable tank for session at 2 L/min. Pt. completes sit to stand with SBA. Pt. does demonstrate some instability without walker when she is not using it to steady herself in standing when talking briefly this session, CGA provided no significant loss of balance. Pt. then ambulates with walker, CGA in hallway. Pt. ambulates about 300' with slow but steady speed. Pt. reports feeling weaker, like muscle are tight. Upon return to room pt.  performs sit<>stand to EOB with SBA as chair prepared. Pt then transfers bed>chair with CGA. Pt. cued to lock walker brakes, back up to chair for transfers. Pt instructed in standing exercise (see note). Pt. ends session seated in chair with all needs met and call light in reach.   PT Discharge Planning   PT Plan Progress ambulation for activity tolerance, BLE strengthening, pt. reports not using AD in apartment trial no AD to assess safety   PT Discharge Recommendation (DC Rec) home with assist   PT Rationale for DC Rec Pt. from independent living facility and receiving services there. Pt. does demonstrate decreased activity tolerance but overall doing well with mobility this date. Pending length of stay anticipate safe to discharge back to independent living with assist when ready.   PT Brief overview of current status Pt. is supervision for bed mobility, SBA transfers, CGA ambulation with walker   Total Session Time   Timed Code Treatment Minutes 18   Total Session Time (sum of timed and untimed services) 27

## 2023-07-26 NOTE — PROGRESS NOTES
Care Management Follow Up    Length of Stay (days): 3    Expected Discharge Date: 07/28/2023     Concerns to be Addressed:   Discharge Planning    Patient plan of care discussed at interdisciplinary rounds: No    Anticipated Discharge Disposition:  TBD     Anticipated Discharge Services:  TBD  Anticipated Discharge DME:  TBD    Patient/family educated on Medicare website which has current facility and service quality ratings:  N/A  Education Provided on the Discharge Plan:  N/A  Patient/Family in Agreement with the Plan:  N/A    Referrals Placed by CM/SW:  None  Private pay costs discussed: Not applicable    Additional Information:  3915  BEREKET received page from Calli from [facility] asking for a call back [phone] regarding pt's discharge plan.     BEREKET called back and spoke with Calli and nurse, Lucy. Pt is from [facility] with no services. Pt has been struggling and very fragile now. Calli calling to advocate for pt suggesting pt will benefit from going to a TCU for rehab. [facility] has a TCU facility. BEREKET review pt's chart and explained discharge plans are based on pt's medical teams recommendations. BEREKET suggest Besty to call [facility] [phone] tomorrow to connect with pt's medical team in regards to their concerns for pt. BEREKET provided Calli with [facility]'s contact information.     _______________________    REYES Richard, LSW  ED/OBS   [facility]  Phone: [phone]  Pager: [phone]  Fax: [phone]     On-call pager, [phone], 4:00 pm to midnight

## 2023-07-26 NOTE — PROGRESS NOTES
Major Shift Events:  Patient Alert and Oriented overnight. Denies pain. Remains on 2L NC overnight. No BM overnight. Miralax given overnight. Patient remains on 750ml fluid restrictions. Continent of urine overnight. Na recheck q4h. Patient tolerating physical activity well. Awaiting sputum sample.  Plan: Monitor Vitals.   For vital signs and complete assessments, please see documentation flowsheets.

## 2023-07-26 NOTE — PROGRESS NOTES
[facility]    Medicine Progress Note - Hospitalist Service, GOLD TEAM 22    Date of Admission:  7/23/2023    Assessment & Plan   78 year old female with COPD with chronic respiratory failure on 2L NC, bronchiectasis and emphysema, CAD who was admitted on 7/23/2023 for COPD exacerbation and hyponatremia with acute on chronic hypoxic respiratory failure and acute PE.     Acute pulmonary embolus  - continue lovenox  - transition to apixaban tomorrow  - discussed pros and cons with patient    Acute on Chronic hypoxic respiratory failure due to COPD exacerbation  Acute COPD exacerbation on severe COPD  Sepsis due to   - Currently on 3L NC at rest, with significant shortness of breath with activity  - prednisone 40mg x 4 days total  - Trial of Volara with nebs QID   - duonebs and hypertonic saline nebs  - COPD RT consulted  - Pulmonology consulted    - start levoquin 500mg x 7 days   - Respiratory PCR negative, sputum culture awaiting collection, MRSA nares negative. Galactomannan and B-D glucan in process.   - PT consulted for mobility and safety at home  - SLP consulted, recommending continued diet  - transfer to floor    Acute on chronic hyponatremia  - nephrology consulted  - monitor sodium and repeat urine studies  - continue fluid restriction    Anxiety  Depression  - continue wellbutrin and lexapro and hydroxyzine    Leukocytosis  - steroid induced    SIRS criteria due to COPD exacerbation  - does not have sepsis    Hyperkalemia, resolved    Pain  - hold celecoxib    HTN  - continue metoprolol       Diet: Combination Diet Regular Diet Adult  Fluid restriction 750 ML FLUID    DVT Prophylaxis: Enoxaparin (Lovenox) SQ  Mobley Catheter: Not present  Lines: PRESENT             Cardiac Monitoring: None  Code Status: No CPR- Do NOT Intubate      Clinically Significant Risk Factors         # Hyponatremia: Lowest Na = 122 mmol/L in last 2 days, will monitor as appropriate                           Disposition Plan     Expected Discharge Date: 07/26/2023,  3:00 PM      Discharge Comments: Patient may transfer to [facility]. Not currently safe for discharge home. Pending further work up and treatment.          Gerald Dutton MD  Hospitalist Service, GOLD TEAM [address] [place] [address]  Securely message with [employer] (more info)  Text page via [facility] Paging/Directory   See signed in provider for up to date coverage information  ______________________________________________________________________    Interval History   Patient was able walk the hallways x3 this morning and is in good spirits. While walking was able to stay at home 2L O2.     Physical Exam   Vital Signs: Temp: 98  F (36.7  C) Temp src: Oral BP: 110/43 Pulse: 97   Resp: 18 SpO2: 99 % O2 Device: Nasal cannula Oxygen Delivery: 2 LPM  Weight: 100 lbs 15.53 oz    Gen: NAD, sitting comfortably in chair  Eyes: EOMI, conjuctiva clear  Mouth: OP clear, no lesions  Neck: No cervical LAD  CV: RRR, no murmurs, 2+ radial pulses  RESP: CTA bilaterally, no w/r/c  Abd: soft, nontender, nondistended  Ext: no edema bilaterally    Medical Decision Making       55 MINUTES SPENT BY ME on the date of service doing chart review, history, exam, documentation & further activities per the note.      Data     I have personally reviewed the following data over the past 24 hrs:    12.1 (H)  \   11.3 (L)   / 311     132 (L) 93 (L) 18.8 /  92   4.2 29 0.41 (L) \       Imaging results reviewed over the past 24 hrs:   No results found for this or any previous visit (from the past 24 hour(s)).

## 2023-07-26 NOTE — PLAN OF CARE
Shift: 07/25/2023 [id number]  Neuro: A/O x4, cooperative/pleasant. PEERL. Afebrile. Denies pain.   CV: tele SR. Soft BPs, SBP 90s - low 100s, 500ml NS bolus given.  PU: on 2L O2 NC - baseline. LS crackles, fair congestive nonproductive cough, GARCIA.  GI: no BM this shift, plan miralax this evening. Regular diet, good appetite, on 750ml fluid restriction.   : continent of urine, voiding spontaneously without difficulty.  Skin: intact. Bruising on forearms.  Lines: L PIV.  Other info: Na 125, recheck q4h. SBA, ambulated in halls w/PT. US LE completed. Nephrology/pulmonary following. Family at bedside this afternoon. Transferred to [facility] this afternoon.      Goal Outcome Evaluation: continue with plan of care, update provider with changes.      Plan of Care Reviewed With: patient

## 2023-07-26 NOTE — CARE PLAN
Speech Language Therapy Discharge Summary    Reason for therapy discharge:    All goals and outcomes met, no further needs identified.    Progress towards therapy goal(s). See goals on Care Plan in [facility] electronic health record for goal details.  Goals met    Therapy recommendation(s):    No further therapy is recommended.

## 2023-07-26 NOTE — PLAN OF CARE
VS:     Assumed cares from 1500 to 1930.  Vital signs:  Temp: 97.5  F (36.4  C) Temp src: Oral BP: 116/65 Pulse: 88   Resp: 19 SpO2: 96 % O2 Device: Nasal cannula Oxygen Delivery: 1.5 LPM        A/O X 4. Afebrile. VSS. Lung sound coarse with fine rackles.  IS encouraged. Denies nausea, dizziness, light-headedness and chest pain.    A little dyspneic noted when ambulated to bathroom.  PRN Inhaler administered.     Output:     Bowel sound normoactive, passing gas, last BM is today.  Voids spontaneously, denied dysuria.     Activity:       Pt up A1 GB/Walker with GARCIA. O2 maintained at 1.5 L with O2Sat above 90%. Tolerated sitting in chair during dinner and prefers to stay in semi-muller's to upright when in bed.    Tolerated ambulated in the hallway approximately 100 ft.     Skin:   Dry, intact, no open area.     Pain:     Denied pain      CMS:       CMS and Neuro's are intact. Denies numbness and tingling in all extremities.      Dressing:       NA     Diet:       Pt is on a regular diet and appetite was good this shift.  FR up to 750ml. Consumed 120 ml this shift.     LDA:       L PIV is patent in the and saline locked.     Equipment:     Four-wheeled walker used for ambulation.     Plan:     Continuous O2 per baseline, PRN Inhalers, encourage ambulation as tolerated.  To discharge back to his independent living.  PO ABX for PNA.        Additional Info:     NA

## 2023-07-26 NOTE — PROGRESS NOTES
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 Found to have Na of 120 in the ED (baseline Na over the last 6 months ~131-134). Given ~1L of NS in the ED with subsequent increase in urine output and increase in serum Na (initially tapered off however was around Na ~122 at 5 am on 7/24) making hypovolemic hyponatremia more likely. Suspect urine studies collected were reflective of the kidney response to IV fluids rather then true SIADH in this acute setting.     -Serum Na 132<-128<-125<-123 <- 126 <- 124 <- 124 <- 122  7/25/23 urine sodium < 20, unable to calculate FENA  -7/25/23 urine osmolality 456 <- 720  -7/25/23 serum osmolality 262 <- 266 <- 259  -7/2/23 urine potassium 20.4    - Na is back to baseline. Okay to decrease frequency of sodium checks.   - recommend continuing fluid restriction at 750 ml free water in 24 hours.   - nephrology will sign off. Please feel free to contact us with any further questions or concerns.     Recommendations were communicated to primary team verbally and via this note.     Seen and discussed with Dr. Alda MINA PA-C     Interval History :   Reviewed provider and nursing notes from past 24 hours. She feels better today and is up walking the hallways. She has a good appetite, no n/v/d. Had bilateral LE US, negative for DVT's in both legs    Review of Systems:   A comprehensive review of systems was negative except as noted above.      Physical Exam:   I/O last 3 completed shifts:  In: 600 [P.O.:600]  Out: 1100 [Urine:1100]  Vitals: /43  Pulse 97  Resp 18  SpO2 99%  Wt 45.8 kg  GENERAL APPEARANCE: alert and no distress  EYES:  no scleral icterus, pupils equal  PULM: lungs sounds diminished, no crackles or wheeze noted  CV: regular rhythm,  normal rate     -edema none   MS: no evidence of inflammation in joints, no muscle tenderness  NEURO: mentation intact and speech normal       Labs:   All labs reviewed by me  Electrolytes/Renal -   Recent Labs   Lab Test 07/26/23  0750 07/26/23  0437 07/26/23  0219 07/25/23  1035 07/25/23  0744 07/25/23  0533 07/24/23  0941 07/24/23  0559 07/24/23  0151 07/23/23  2321 07/26/22  0610 07/24/22  0744 06/20/22  1312 05/13/22  1032   * 129*  129* 127*   < > 126*  --    < > 122* 118* 118*   < > 137   < >  --    POTASSIUM  --  4.2  --   --  4.5  --   --  4.5  --  4.5   < > 4.4   < >  --    CHLORIDE  --  93*  --   --  90*  --   --  86*  --  85*   < > 102   < >  --    CO2  --  29  --   --  31*  --   --  26  --  23   < > 29   < >  --    BUN  --  18.8  --   --  18.8  --   --  13.0  --  17.7   < > 19.2   < >  --    CR  --  0.41*  --   --  0.50* 0.50*  --  0.45*  --  0.42*   < > 0.52   < >  --    GLC  --  92  --   --  90  --   --  127*  --  187*   < > 89   < >  --    MAGAN  --  8.1*  --   --  8.6*  --   --  8.5*  --  8.2*   < > 9.3   < > 9.5   MAG  --   --   --   --   --   --   --   --  1.7  --   --  2.1  --  2.1   PHOS  --   --   --   --   --   --   --   --   --  2.7  --  3.0  --  3.4    < > = values in this interval not displayed.       CBC -   Recent Labs   Lab Test 07/26/23  0437 07/25/23  0451 07/24/23  1444 07/24/23  0559   WBC 12.1* 12.9*  --  15.5*   HGB 11.3* 11.4*  --  11.8    277 308 280       LFTs -   Recent Labs   Lab Test 07/24/23  0559 07/23/23  1908 07/14/23  1227   ALKPHOS 83 95 104   BILITOTAL 0.5 0.6 0.3   ALT 21 23 15   AST 54* 62* 57*   PROTTOTAL 6.9 7.7 7.4   ALBUMIN 3.7 4.1 4.0       Iron Panel -   Recent Labs   Lab Test 05/01/23  1122   ANMOL 170         Imaging:  Reviewed     Current Medications:   buPROPion  150 mg Oral QAM    enoxaparin ANTICOAGULANT  1 mg/kg Subcutaneous Q12H    escitalopram  20 mg Oral Daily    [Held by provider] fluticasone-vilanterol  1 puff Inhalation Daily    And     [Held by provider] umeclidinium  1 puff Inhalation Daily    guaiFENesin  600 mg Oral BID    ipratropium - albuterol 0.5 mg/2.5 mg/3 mL  3 mL Nebulization 4x daily    levofloxacin  500 mg Oral Daily    levothyroxine  25 mcg Oral Daily    metoprolol succinate ER  25 mg Oral Daily    montelukast  10 mg Oral At Bedtime    nystatin  500,000 Units Swish & Swallow 4x Daily    oxybutynin ER  5 mg Oral Daily    predniSONE  40 mg Oral Daily    pregabalin  100 mg Oral QPM    pregabalin  50 mg Oral Daily    sodium chloride  3 mL Nebulization BID    sodium chloride  2 spray Nasal Daily    sodium chloride (PF)  3 mL Intracatheter Q8H      [Held by provider] D5W Stopped (07/24/23 1525)     PRASHANT JORGEC

## 2023-07-26 NOTE — PLAN OF CARE
Patient arrived on floor ~1400    Nursing Assessment:  VT: /77 (BP Location: Left arm, Patient Position: Dangled, Cuff Size: Adult Regular)   Pulse 97   Temp 97.9  F (36.6  C) (Oral)   Resp 18   Wt 47.5 kg (104 lb 11.5 oz)   LMP  (LMP Unknown)   SpO2 98%   BMI 19.15 kg/m       Respiratory: patient reported SOB when ambulating but not when resting in bed. She does report tickle at the back of her neck which causes her to cough intermittently. Cough not productive    Activity: SBA from wheelchair to bed    Pain Management:  Denied pain    Nursing Plan:  Continue POC    Discharge Disposition:  pending

## 2023-07-26 NOTE — PLAN OF CARE
Shift: 07/26/2023 [id number]  Neuro: A/O x4, calm/cooperative/pleasant. PERRL. Afebrile. Denies pain.   CV: no tele. BP normotensive.  PU: on 2L O2 NC - baseline. LS crackles - improved from yesterday.  GI: BM this am. Regular diet, good appetite, 750ml fluid restriction.  : continent of urine, dribbling, voiding spontaneously using bedside commode.  Skin: intact.   Lines: L PIV.  Other info: SBA, ambulated in acevedo w/PT this afternoon. Family at bedside. Prior to transfer to 8th floor, patient developed tickling in back of throat causing patient to have persistent cough, neb treatment given, pt appears more comfortable, lozenges ordered - given x1, transferred to [facility] surg at 1345, report given to RN, all belongings sent with patient.       Goal Outcome Evaluation: continue with plan of care, update provider with changes.      Plan of Care Reviewed With: patient    Overall Patient Progress: improvingOverall Patient Progress: improving

## 2023-07-27 NOTE — PROGRESS NOTES
7/27/23    Care Management Follow Up    CHW called pt's friend Fiordaliza Huizar ([phone] & [phone]) to ask if they can help take pt to [facility] where is lives. Friend did not answer phone call, but CHW LVM for her to call back.    UPDATE: CHW called friend back @ 1215 ( [phone]) to confirm pt . They said they can come tomorrow afternoon and meet her at the main entrance. Address was confirmed with her.  RNCC, Charge RN, and bedside RN were notified. Inpatient transportation will be scheduled tomorrow to help take pt down stairs.     Cecilia Perez  Inpatient CHW  [facility] 5 Ortho, 8 Med Surge, & ED  PH: [phone]

## 2023-07-27 NOTE — CONSULTS
Care Management Initial Consult    General Information  Assessment completed with: Patient,    Type of CM/SW Visit: Initial Assessment    Primary Care Provider verified and updated as needed: Yes   Readmission within the last 30 days: no previous admission in last 30 days      Reason for Consult: discharge planning  Advance Care Planning: Advance Care Planning Reviewed: present on chart, verified with patient, no concerns identified.    Communication Assessment  Patient's communication style: spoken language (English or Bilingual)    Hearing Difficulty or Deaf: no      Cognitive  Cognitive/Neuro/Behavioral: .WDL except, mood/behavior           Mood/Behavior: anxious          Living Environment:   People in home: alone     Current living Arrangements: independent living facility      Able to return to prior arrangements: yes  Living Arrangement Comments: [facility]    Family/Social Support:  Care provided by: self, other (see comments) (Has [employer] days per week for 1 hr, housekeeping once a month)  Provides care for: no one  Marital Status: Single  Neighbor, Other (specify), Facility resident(s)/Staff ([employer], sister, 2 neices, neighbors)          Description of Support System: Supportive    Support Assessment: Adequate social supports    Current Resources:   Patient receiving home care services: No     Community Resources: Other (see comment) ([employer], Housekeeping, facility transportation, groceries deliverd.)  Equipment currently used at home: walker, rolling, commode chair, grab bar, tub/shower  Supplies currently used at home: Oxygen Tubing/Supplies    Employment/Financial:  Employment Status: retired     Employment/ Comments: works casually  Financial Concerns: No concerns identified   Referral to Financial Worker: No     Does the patient's insurance plan have a 3 day qualifying hospital stay waiver?  Yes   Will the waiver be used for post-acute placement?  No    Lifestyle & Psychosocial Needs:  Social Determinants of Health     Tobacco Use: Low Risk  (7/14/2023)    Patient History     Smoking Tobacco Use: Never     Smokeless Tobacco Use: Never     Passive Exposure: Not on file   Alcohol Use: Not on file   Financial Resource Strain: Not on file   Food Insecurity: Not on file   Transportation Needs: Not on file   Physical Activity: Not on file   Stress: Not on file   Social Connections: Not on file   Intimate Partner Violence: Not on file   Depression: Not at risk (6/6/2023)    PHQ-2     PHQ-2 Score: 0   Housing Stability: Not on file       Functional Status:  Prior to admission patient needed assistance:   Dependent ADLs:: Ambulation-walker  Dependent IADLs:: Cleaning, Transportation  Assesssment of Functional Status: Not at baseline with mobility, Not at  functional baseline    Mental Health Status:  Mental Health Status: Past Concern  Mental Health Management: Medication    Chemical Dependency Status:  Chemical Dependency Status: No Current Concerns           Values/Beliefs:  Spiritual, Cultural Beliefs, Anabaptism Practices, Values that affect care: no          Values/Beliefs Comment: Devout Lutheran    Additional Information:  RNCC completed CM assessment due to elevated risk score and need for discharge planning. RNCC met with pt at bedside and introduced RNCC role.   Patient states that she lives in independent Living at [facility].   Patient stated she has support from the [employer], her neighbors, friend Fiordaliza, sister and 2 nieces. 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 Prior to admission, patient had HHA through [employer] twice a week for one hour a day,  once a month., food is ordered through [employer], transportation through facility or friend.   Patient requested Home Care through [facility] in [place] as they also have palliative which could follow her also. Friend Fiordaliza  to drive patient home tomorrow.  Patient reported that she has a history of anxiety, worsened by GARCIA and her medication keeps her stable. Patient uses 2L NC at baseline and gets her oxygen through [employer]. Has neb machine- uses 3x daily. Patient stated she has difficulty with reaching d/t her torn rotator cuffs and weakness with ambulation. She typically uses her 4 WW but right now is deconditioned. Patient stated she does not want to give up her home.     RNCC left  for Calli from [facility] asking for a call back [phone]- letting them know that patient will be returning tomorrow with .     RNCC placed order for RN/PT/OT/HHA:    Pending Home Care acceptance:   [facility]   [facility]    RNCC will continue to follow and assist with discharge needs.     Vale Raymond RNCC Float  8A Med/Surg [phone]  Nurse Coordinator     Social Work and Care Management Department

## 2023-07-27 NOTE — PLAN OF CARE
[id number]    VS: /69 (BP Location: Right arm, Patient Position: Semi-Hurt's, Cuff Size: Adult Regular)   Pulse 92   Temp 98.7  F (37.1  C) (Oral)   Resp 16   Wt 47.5 kg (104 lb 11.5 oz)   LMP  (LMP Unknown)   SpO2 98%   BMI 19.15 kg/m       O2: 1.5 L nasal cannula. Pt reports SOB when ambulating to bathroom, no reports of SOB when resting in bed. Denies chest pain, dizziness. O2 sat above 90% overnight.    Output: Continent of bladder. Voids spontaneously without difficulty.    Last BM: Continent of bowel. LBM per pt report 07/26/2023   Activity: Assist x1 with walker and gait belt.   Skin: Intact.    Pain: Denies pain throughout shift.    CMS: A&Ox4, denies numbness and tingling.    Dressing: None.    Diet: Regular, thin liquids, pills whole with applesauce.   750 mL fluid restriction.   LDA: Left PIV, flushed and saline locked.    Equipment: Four-wheeled walker used for ambulation, personal belongings.    Plan: Continue O2 per baseline, PRN inhalers, encourage ambulation. PO antibiotics for pneumonia.  Discharge plan TBD.   Additional Info: Able to make needs known. Call light within reach. Continue with plan of care.

## 2023-07-27 NOTE — PROGRESS NOTES
Brief Care Management Note:    See note from Ayse MARTINEZCC from today (7/27) for CMA and details regarding home care discussion.    2:43pm - Received call from [facility].  [facility] has accepted pt for services w/ delayed SOC of 7/31 or 8/1.  [facility] representative inquired if this is acceptable.  Informed her that this writer will check w/ provider and respond in CM/AC sticky.    2:51pm - Paged provider, received prompt call back.  Informed provider of accepting agency and delayed SOC, provider acknowledged, stated that would be acceptable.    Updated CM/AC sticky note - requested [facility] HUB to accept [facility] for services if [facility] is unable to accommodate (as that is pt's preference of agency).        CARLOS Correia  [facility]  Units 8M/S & 10 ICU  Pager: [phone]  Phone: [phone]

## 2023-07-27 NOTE — PROGRESS NOTES
[facility]    Medicine Progress Note - Hospitalist Service, GOLD TEAM 22    Date of Admission:  7/23/2023    Assessment & Plan   78 year old female with COPD with chronic respiratory failure on 2L NC, bronchiectasis and emphysema, CAD who was admitted on 7/23/2023 for COPD exacerbation and hyponatremia with acute on chronic hypoxic respiratory failure and acute PE. Plan for discharge tomorrow.     Acute pulmonary embolus  - continue apixaban for 3 months    Acute on Chronic hypoxic respiratory failure due to COPD exacerbation  Acute COPD exacerbation on severe COPD due to pneumonia  Community acquired pneumonia  - Currently on 3L NC at rest, with significant shortness of breath with activity  - prednisone 40mg x 4 days total  - Trial of Volara with nebs QID   - duonebs and hypertonic saline nebs  - COPD RT consulted  - Pulmonology consulted    - start levoquin 500mg x 7 days   - Respiratory PCR negative, sputum culture awaiting collection, MRSA nares negative. Galactomannan and B-D glucan in process.    - restart trelegy on discharge  - PT consulted for mobility and safety at home  - SLP consulted, recommending continued diet    Acute on chronic hyponatremia  - nephrology consulted  - monitor sodium and repeat urine studies  - continue fluid restriction    Anxiety  Depression  - continue wellbutrin and lexapro and hydroxyzine    Leukocytosis  - steroid induced    SIRS criteria due to COPD exacerbation  - does not have sepsis    Hyperkalemia, resolved    Pain  - hold celecoxib    HTN  - continue metoprolol       Diet: Combination Diet Regular Diet Adult  Fluid restriction 750 ML FLUID    DVT Prophylaxis: DOAC  Mobley Catheter: Not present  Lines: None     Cardiac Monitoring: None  Code Status: No CPR- Do NOT Intubate      Clinically Significant Risk Factors         # Hyponatremia: Lowest Na = 125 mmol/L in last 2 days, will monitor as appropriate                           Disposition Plan      Expected Discharge Date: 07/28/2023,  3:00 PM    Destination: home with help/services  Discharge Comments: Return to home with HC. Patient requested [facility]          Gerald Dutton MD  Hospitalist Service, GOLD TEAM 22  [facility]  Securely message with [employer] (more info)  Text page via [facility] Paging/Directory   See signed in provider for up to date coverage information  ______________________________________________________________________    Interval History   No acute events overnight. Has questions on home care supports.     Physical Exam   Vital Signs: Temp: 98.8  F (37.1  C) Temp src: Oral BP: 132/68 Pulse: 102   Resp: 18 SpO2: 93 % O2 Device: Nasal cannula Oxygen Delivery: 2 LPM  Weight: 104 lbs 11.5 oz    Gen: NAD, sitting comfortably in bed  Eyes:  conjuctiva clear  Mouth: OP clear, no lesions  Neck: No cervical LAD  CV: RRR, no murmurs, 2+ radial pulses  RESP: CTA bilaterally, +crackles in upper lobes  Abd: soft, nontender, nondistended    Medical Decision Making       45 MINUTES SPENT BY ME on the date of service doing chart review, history, exam, documentation & further activities per the note.      Data     I have personally reviewed the following data over the past 24 hrs:    13.2 (H)  \   11.7   / 327     131 (L) 92 (L) 17.2 /  136 (H)   4.3 31 (H) 0.46 (L) \       Imaging results reviewed over the past 24 hrs:   No results found for this or any previous visit (from the past 24 hour(s)).

## 2023-07-28 NOTE — PROGRESS NOTES
7/28/23    Care Management Follow Up     CHW called pt's friend Fiordaliza Huizar ([phone]) to see if they could come around 1300 but there was no answer. CHW left a VM for pt to call back.    UPDATE: CHW called friend back and they are ok with coming at 1300. Inpatient transport was called [phone]) to see if they could bring the pt down via wheelchair to the hospitals main lobby.     Cecilia Perez  Inpatient CHW  [facility] 5 Ortho, 8 Med Surge, & ED  PH: [phone]

## 2023-07-28 NOTE — PROGRESS NOTES
Care Management Discharge Note    Discharge Date: 07/28/2023       Discharge Disposition: Home Care, Home    Discharge Services:      Discharge DME: Oxygen, Walker, Raised Toilet Seat    Discharge Transportation: family or friend will provide    Private pay costs discussed: Not applicable    Does the patient's insurance plan have a 3 day qualifying hospital stay waiver?  Yes   Will the waiver be used for post-acute placement? No    PAS Confirmation Code:    Patient/family educated on Medicare website which has current facility and service quality ratings:      Education Provided on the Discharge Plan:    Persons Notified of Discharge Plans: Patient, facility, friend  Patient/Family in Agreement with the Plan: yes    Handoff Referral Completed: Yes    Additional Information:     RNCC met with patient and confirmed discharge. Patient approved for home care with [facility].RNCC spoke with friend Fiordaliza Huizar.-See note from [place] RNCC regarding transport issue.Patient discharged today with friend to facility. RNCC spoke with Lucy Feliciano RN at facility to notify of discharge and home care orders.     Vale Raymond RNCC Jasmine RNCC  8Med Surg  [phone]  Nurse Coordinator     Social Work and Care Management Department

## 2023-07-28 NOTE — PROGRESS NOTES
Brief pulmonary note    Chart reviewed.  Remains on baseline 2 L.  Microbiology studies unrevealing for alternative etiologies.  Plan to continue Levaquin for full 7-day course, current inhalers, and airway clearance therapies.  Pulmonary will sign off. Patient discussed with Dr. Vance.    Nolberto Collado MD  Pulmonary and Critical Care Fellow  Pager: [phone]

## 2023-07-28 NOTE — PROGRESS NOTES
07/28/23 1000   Appointment Info   Signing Clinician's Name / Credentials (PT) ying cornelius   PT Assistant Visit Number 1   Therapeutic Activity   Therapeutic Activities: dynamic activities to improve functional performance Minutes (58846) 23   Symptoms Noted During/After Treatment Fatigue   Treatment Detail/Skilled Intervention PT: pt on 2L 02 at start of PT session. pt able to demo IND wtih all bed mob, mulit STS from bed, chair and commode. pt has 02 at home. pt demo short amb today cut short due to wanting to get ready for D.c to home. pt has demo amb wth 4WW ( own 4WW) 800' yesterday. pt stating concerned for home now due to just heard that didnt get some of the services that thought was getting. pt still planning to D.c home  at 1Pm today.   PT Discharge Planning   PT Plan PT see D./c note,   PT Discharge Recommendation (DC Rec) home with assist;home with home care physical therapy   PT Rationale for DC Rec PT: Anticipate return to independent living facility. Per patient her facility is in the process of setting up home care PT.   PT Brief overview of current status PT: independent bed mob, Supervision to near independent with walker   Total Session Time   Timed Code Treatment Minutes 23   Total Session Time (sum of timed and untimed services) 23     PT: pt met 3/0 goals time of discharge   No PT equipment needs. Home PT to follow.

## 2023-07-28 NOTE — PLAN OF CARE
VS: /71 (BP Location: Right arm)   Pulse 89   Temp 98.7  F (37.1  C) (Oral)   Resp 18   Wt 47.5 kg (104 lb 11.5 oz)   LMP  (LMP Unknown)   SpO2 97%   BMI 19.15 kg/m       O2: Sating >93% on 2 L of Oxygen via NC. Lung sounds crackles. Denies chest pain Reports sob at rest and on exertion, reports baseline (diagnosis of COPD),    Output: Voids spontaneously and adequately.    Last BM:  Bowel sounds active x4. Passing flatus.    Activity:  SBA assist with walker and gait belt   Skin: Intact   Pain: Denies pain   CMS: A&Ox4. Denies N/T.    Dressing: None   Diet: Regular. Appetite was good.Denies N/V.    LDA: PIV removed   Equipment: IV pole, FWW, gait belt, and personal belongings. Call light within reach and uses appropriately.   Plan:  Pt Discharged to home with ABX and outpatient services.   AVS and meds reviewed, all questions answered. Pt picked up by friend, all belongings sent with.   Additional Info:

## 2023-07-28 NOTE — PLAN OF CARE
/63 (BP Location: Right arm)   Pulse 89   Temp 99.4  F (37.4  C) (Oral)   Resp 18   Wt 47.5 kg (104 lb 11.5 oz)   LMP  (LMP Unknown)   SpO2 97%   BMI 19.15 kg/m      Patient alert and oriented X4. Had compliant of generalized pain, rated at 5/10 PRN Tylenol administered with effective results. Continent of bowel and bladder. X1 assist with walker and gait belt. Remains on 2 Litter of Oxygen this shift. Denied any SOB. Reported non productive cough, Respiratory therapy administer Neb with effective result. Patient resting in bed with call light within reach, Patient slept through this shift. No acute distress noted. Continue per plan or care.

## 2023-07-28 NOTE — PROGRESS NOTES
Brief Care Management Note:    Discharge note was written by Ayse GONZALEZ.    12:13pm - W updated this writer that pt's friend just informed her that she will be arriving to pick pt up between 12:15pm-12:30pm.  Pt's friend is bringing a wheelchair.  They will need assistance in bringing personal effects down to lobby.  This writer subsequently called bedside RN and updated her, RN acknowledged.    Bedside RN later updated this writer that pt transport came to unit and left because they could not wait for pt's medications to be filled.  At present, discharge pharm has not filled meds.  Bedside RN requested this writer to contact pt's friend, who is reportedly sitting outside the hospital in a vehicle, and alert her to the delay.    Called pt's friend, Fiordaliza Huizar, at mobile number listed in chart, twice.  No answer both times.  VM left on second attempt to alert her to delay.  Provided unit phone number if questions/concerns.  Did not leave specific identifying info of pt, as  box had a generic greeting.    Ayse GONZALEZ informed this writer that she met w/ Fiordaliza out front of hospital, informed her of delay.  Fiordaliza opted to go home, retrieve her cell phone, and return to  pt.  The time that this would take is roughly the same as the amount of time discharge pharm needs to fill meds.    Updated bedside RN, RN acknowledged.        CARLOS Correia  [facility]  Units 8M/S & 10 ICU  Pager: [phone]  Phone: [phone]

## 2023-07-28 NOTE — TELEPHONE ENCOUNTER
Home Health Care    Reason for call:  Verbal Orders - Delay Start Date Monday 07/31/2023    Orders are needed for this patient.  Caller states just received order for home care services.  Delay start date until Monday due to staffing and capacity.     Pt Provider: Dr. Neida Maradiaga    Phone Number Homecare Nurse can be reached at: [phone]    Can we leave a detailed message on this number? YES

## 2023-07-28 NOTE — DISCHARGE SUMMARY
[facility]  Hospitalist Discharge Summary      Date of Admission:  7/23/2023  Date of Discharge:  7/28/2023  Discharging Provider: Gerald Dutton MD  Discharge Service: Hospitalist Service, GOLD TEAM 22    Discharge Diagnoses   Acute pulmonary embolus  Acute community acquired pneumonia  Acute COPD exacerbation  Bronchiectasis  Acute on chronic hyponatremia  Anxiety and Depression  Leukocytosis  Hyperkalemia, resolved  HTN  Osteoarthritis  Cachexia    Clinically Significant Risk Factors          Follow-ups Needed After Discharge   Follow-up Appointments     Adult [facility] Follow-up and recommended labs and tests      Follow up with Dr. Lorenz within 1 month  for hospital follow- up. No   follow up labs or test are needed.    Appointments on [place] and/or [facility] (with [facility] or [facility]   provider or service). Call [phone] if you haven't heard regarding   these appointments within 7 days of discharge.            Unresulted Labs Ordered in the Past 30 Days of this Admission       No orders found from 6/23/2023 to 7/24/2023.        These results will be followed up by NA    Discharge Disposition   Discharged to home  Condition at discharge: Stable    Hospital Course   78 year old female with COPD with chronic respiratory failure on 2L NC, bronchiectasis and emphysema, CAD who was admitted on 7/23/2023 for COPD exacerbation and hyponatremia with acute on chronic hypoxic respiratory failure and acute PE. Plan for discharge tomorrow.     Acute pulmonary embolus  - continue apixaban for 3 months    Acute on Chronic hypoxic respiratory failure due to COPD exacerbation  Acute COPD exacerbation on severe COPD due to pneumonia  Community acquired pneumonia  - Currently on 2L NC at rest, basleine  - prednisone give x 5 days  - Trial of Volara with nebs QID   - duonebs and hypertonic saline nebs  - COPD RT consulted  - Pulmonology consulted    - continue levoquin 500mg x  7 days total   - Respiratory PCR negative, sputum culture awaiting collection, MRSA nares negative. Galactomannan and B-D glucan in process.    - restart trelegy on discharge  - PT consulted for mobility and safety at home  - SLP consulted, recommending continued diet    Acute on chronic hyponatremia  - nephrology consulted  - monitor sodium and repeat urine studies  - continue fluid restriction of 750ml at discharge    Anxiety  Depression  - continue wellbutrin and lexapro and hydroxyzine    Leukocytosis  - steroid induced    Hyperkalemia, resolved    Pain  Osteoarthritis  - stop celecoxib    HTN  - continue metoprolol    Cachexia, poa  Body mass index is 19.15 kg/m .   - noted on exam, discussed nutrition      Consultations This Hospital Stay   SPEECH LANGUAGE PATH ADULT IP CONSULT  NEPHROLOGY GENERAL ADULT IP CONSULT  MEDICATION HISTORY IP PHARMACY CONSULT  RESPIRATORY CARE IP CONSULT  IP RESPIRATORY CARE CHRONIC PULMONARY DISEASE SPECIALIST  PHYSICAL THERAPY ADULT IP CONSULT  PULMONARY GENERAL ADULT IP CONSULT  CARE MANAGEMENT / SOCIAL WORK IP CONSULT    Code Status   No CPR- Do NOT Intubate    Time Spent on this Encounter   I, Gerald Dutton MD, personally saw the patient today and spent 40 minutes discharging this patient.       Gerald Dutton MD  [facility] UNIT [address]A  [address] [place] [id number]  Phone: [phone]  Fax: [phone]  ______________________________________________________________________    Physical Exam   Vital Signs: Temp: 98.7  F (37.1  C) Temp src: Oral BP: 101/71 Pulse: 89   Resp: 18 SpO2: 97 % O2 Device: Nasal cannula Oxygen Delivery: 2 LPM  Weight: 104 lbs 11.5 oz  Gen: NAD, sitting comfortably in bed  Eyes: PERRLA, EOMI, conjuctiva clear  Mouth: OP clear, no lesions  Neck: No cervical LAD  CV: RRR, no murmurs, 2+ radial pulses  RESP: CTA bilaterally, no w/r/c  Abd: soft, nontender, nondistended  Ext: no edema bilaterally  Neuro: CNII-CNXII intact e       Primary Care  Physician   Neida Maradiaga    Discharge Orders      Home Care Referral      Reason for your hospital stay    Dear Fiordaliza Najera    You were hospitalized at [facility] with a COPD exacerbation and pulmonary embolus (a blood clot in your lung).  Over this hospitalization your symptoms improved and today you are ready to be discharged on your home O2.  You were treated with antibiotics, steroids, and blood thinners. If you develop fever, shortness of breath, light headedness, chest pain, severe vomiting or any other worrisome symptoms, please seek medical attention.    We are suggesting the following medication changes:  Start apixaban 10mg twice daily  Start levofloxacin 500mg for 5 more days    Please set up an appointment with:  Dr. Lorenz    It was a pleasure meeting with you. Thank you for allowing me and my team the privilege of caring for you. You are the reason we are here, and I truly hope we provided you with the excellent service you deserve. Please let us know if there is anything else we can do for you so that we can be sure you are leaving completely satisfied with your care experience.    Your hospital unit at the time of discharge is 8 Med Surg, so if you have any questions please call the general hospital line at [phone] and ask to talk to a nurse on 8 Med Surg.    Best,    Gerald Dutton MD     Activity    Your activity upon discharge: activity as tolerated     Adult [facility] Follow-up and recommended labs and tests    Follow up with Dr. Lorenz within 1 month  for hospital follow- up. No follow up labs or test are needed.    Appointments on [place] and/or [facility] (with [facility] or [facility] provider or service). Call [phone] if you haven't heard regarding these appointments within 7 days of discharge.     Diet    Follow this diet upon discharge: Orders Placed This Encounter      Fluid restriction 750 ML FLUID      Combination Diet Regular Diet Adult        Significant Results and Procedures   Most Recent 3 CBC's:  Recent Labs   Lab Test 07/27/23  1431 07/27/23  0823 07/26/23  0437 07/25/23  0451   WBC  --  13.2* 12.1* 12.9*   HGB  --  11.7 11.3* 11.4*   MCV  --  91 90 89    327 311 277     Most Recent 3 BMP's:  Recent Labs   Lab Test 07/28/23  0733 07/27/23  0823 07/26/23  0750 07/26/23  0437 07/25/23  1035 07/25/23  0744   NA  --  131* 132* 129*  129*   < > 126*   POTASSIUM 4.7 4.3  --  4.2  --  4.5   CHLORIDE  --  92*  --  93*  --  90*   CO2  --  31*  --  29  --  31*   BUN  --  17.2  --  18.8  --  18.8   CR  --  0.46*  --  0.41*  --  0.50*   ANIONGAP  --  8  --  7  --  5*   MAGAN  --  8.7*  --  8.1*  --  8.6*   GLC  --  136*  --  92  --  90    < > = values in this interval not displayed.     Most Recent 2 LFT's:  Recent Labs   Lab Test 07/24/23  0559 07/23/23  1908   AST 54* 62*   ALT 21 23   ALKPHOS 83 95   BILITOTAL 0.5 0.6       Discharge Medications   Current Discharge Medication List        START taking these medications    Details   !! apixaban ANTICOAGULANT (ELIQUIS) 5 MG tablet Take 2 tablets (10 mg) by mouth 2 times daily for 5 days  Qty: 20 tablet, Refills: 0    Associated Diagnoses: Single subsegmental pulmonary embolism without acute cor pulmonale (H)      !! apixaban ANTICOAGULANT (ELIQUIS) 5 MG tablet Take 1 tablet (5 mg) by mouth 2 times daily for 83 days  Qty: 166 tablet, Refills: 0    Associated Diagnoses: Single subsegmental pulmonary embolism without acute cor pulmonale (H)      levofloxacin (LEVAQUIN) 500 MG tablet Take 1 tablet (500 mg) by mouth daily for 4 days  Qty: 4 tablet, Refills: 0    Associated Diagnoses: Obstructive chronic bronchitis without exacerbation (H)       !! - Potential duplicate medications found. Please discuss with provider.        CONTINUE these medications which have NOT CHANGED    Details   albuterol (PROAIR HFA/PROVENTIL HFA/VENTOLIN HFA) 108 (90 Base) MCG/ACT inhaler Inhale 2 puffs into the lungs every 6  hours as needed  Qty: 18 g, Refills: 11    Comments: Pharmacy may dispense brand covered by insurance (Proair, or proventil or ventolin or generic albuterol inhaler)  Associated Diagnoses: Bronchiectasis without complication (H)      albuterol (PROVENTIL) (2.5 MG/3ML) 0.083% neb solution Take 1 vial (2.5 mg) by nebulization every 4 hours as needed for shortness of breath / dyspnea or wheezing  Qty: 150 mL, Refills: 11    Associated Diagnoses: Bronchiectasis with acute exacerbation (H)      Biotin 5000 MCG TABS Take 1 tablet by mouth daily      calcium-vitamin D (CALCIUM-VITAMIN D) 500 mg(1,250mg) -200 unit per tablet Take 1 tablet by mouth 2 times daily       cetirizine (ZYRTEC) 5 MG tablet Take 5 mg by mouth daily      cholecalciferol, vitamin D3, (VITAMIN D3) 1,000 unit capsule [CHOLECALCIFEROL, VITAMIN D3, (VITAMIN D3) 1,000 UNIT CAPSULE] Take 1,000 Units by mouth daily.      Fluticasone-Umeclidin-Vilant (TRELEGY ELLIPTA) 100-62.5-25 MCG/ACT oral inhaler Inhale 1 puff into the lungs daily  Qty: 180 each, Refills: 3    Associated Diagnoses: Bronchiectasis with acute exacerbation (H); Chronic obstructive pulmonary disease with acute exacerbation (H)      Lactobacillus rhamnosus GG (CULTURELLE) 10-15 Billion cell capsule Take 1 capsule by mouth every evening       levothyroxine (SYNTHROID/LEVOTHROID) 25 MCG tablet Take 1 tablet (25 mcg) by mouth daily  Qty: 90 tablet, Refills: 0    Associated Diagnoses: Hypothyroidism, unspecified type      montelukast (SINGULAIR) 10 MG tablet [MONTELUKAST (SINGULAIR) 10 MG TABLET] TAKE 1 TABLET(10 MG) BY MOUTH AT BEDTIME  Qty: 90 tablet, Refills: 3    Associated Diagnoses: Bronchiectasis (H)      multivitamin therapeutic (THERAGRAN) tablet [MULTIVITAMIN THERAPEUTIC (THERAGRAN) TABLET] Take 1 tablet by mouth daily.      oxybutynin ER (DITROPAN XL) 5 MG 24 hr tablet Take 1 tablet (5 mg) by mouth daily  Qty: 90 tablet, Refills: 3    Associated Diagnoses: OAB (overactive bladder)       OXYGEN-AIR DELIVERY SYSTEMS MISC [OXYGEN-AIR DELIVERY SYSTEMS MISC] Use 2 L As Directed. 2L with activity and at night  [employer]      pregabalin (LYRICA) 50 MG capsule Take 1 tab in the AM and 2 in the evening  Qty: 90 capsule, Refills: 1    Associated Diagnoses: Chronic pain syndrome      sulfamethoxazole-trimethoprim (BACTRIM DS) 800-160 MG tablet Take 1 tablet by mouth 2 times daily  Qty: 14 tablet, Refills: 1    Associated Diagnoses: Bronchiectasis without complication (H)      SUMAtriptan (IMITREX) 50 MG tablet Take 1 tablet (50 mg) by mouth as needed for migraine,may repeat after 2 hours if needed;  mg/24 hours  Qty: 30 tablet, Refills: 1    Associated Diagnoses: Migraine without aura and without status migrainosus, not intractable      buPROPion (WELLBUTRIN XL) 150 MG 24 hr tablet Take 1 tablet (150 mg) by mouth every morning  Qty: 90 tablet, Refills: 0    Associated Diagnoses: ANDREW (generalized anxiety disorder)      escitalopram (LEXAPRO) 20 MG tablet Take 1 tablet (20 mg) by mouth daily  Qty: 90 tablet, Refills: 0    Associated Diagnoses: ANDREW (generalized anxiety disorder)      metoprolol succinate ER (TOPROL XL) 25 MG 24 hr tablet Take 1 tablet (25 mg) by mouth daily  Qty: 90 tablet, Refills: 0    Associated Diagnoses: Tachycardia           STOP taking these medications       celecoxib (CELEBREX) 200 MG capsule Comments:   Reason for Stopping:         sodium chloride (NEBUSAL) 3 % neb solution Comments:   Reason for Stopping:         sodium chloride (OCEAN) 0.65 % nasal spray Comments:   Reason for Stopping:             Allergies   Allergies   Allergen Reactions    Codeine Unknown    Morphine Itching

## 2023-08-02 NOTE — PROGRESS NOTES
Clinic Care Coordination Contact  [facility]: Post-Discharge Note  SITUATION                                                      Admission:    Admission Date: 07/23/23   Reason for Admission: shortness of breath  Discharge:   Discharge Date: 07/28/23  Discharge Diagnosis: acute on chronic hypoxic respiratory failure due to COPD exacerbation    BACKGROUND                                                      Per hospital discharge summary and inpatient provider notes:  78 year old female with COPD with chronic respiratory failure on 2L NC, bronchiectasis and emphysema, CAD who was admitted on 7/23/2023 for COPD exacerbation and hyponatremia with acute on chronic hypoxic respiratory failure and acute PE.     ASSESSMENT           Discharge Assessment  How are you doing now that you are home?: I am doing about same. I have a sore foot.  How are your symptoms? (Red Flag symptoms escalate to triage hotline per guidelines): Unchanged  Do you feel your condition is stable enough to be safe at home until your provider visit?: Yes  Does the patient have their discharge instructions? : Yes  Does the patient have questions regarding their discharge instructions? : No  Were you started on any new medications or were there changes to any of your previous medications? : Yes  Does the patient have all of their medications?: Yes  Do you have questions regarding any of your medications? : No  Do you have all of your needed medical supplies or equipment (DME)?  (i.e. oxygen tank, CPAP, cane, etc.): Yes (O2 on 2 liters at all time.)  Discharge follow-up appointment scheduled within 14 calendar days? : No (Left a message with PCP about needing a hospital follow up visit.)  Is patient agreeable to assistance with scheduling? : Yes         Post-op (Clinicians Only)  Did the patient have surgery or a procedure: No  Fever: No  Chills: No  Eating & Drinking: unable to tolerate solid foods  PO Intake: low fat diet  Bowel Function:  "normal  Date of last BM: 08/02/23  Urinary Status: voiding without complaint/concerns    Care Management       Care Mgmt General Assessment      CCC RN spoke with patient today to follow up on goals and to discuss any needs or concerns. Patient stated she was \"doing about the same\" at home. She stated she is wearing her oxygen at all times and is taking all of her medications as directed. Patient currently receiving home care through [facility]. She stated the nurse has already been out to see her. Patient currently living at [facility] where she is receiving additional support services on a regular basis. She denied needing any additional support at this time and declined enrolling in Care Coordination at this time. Writer sent note to PCP regarding availability for hospital follow up appointment in the near future. Patient said she does not want to see anyone else, but her PCP.                    PLAN                                                      Outpatient Plan:  Home with home care through [facility]    Future Appointments   Date Time Provider Department Center   10/13/2023  1:30 PM Randall Lorenz MD [facility]   11/3/2023 10:30 AM Neida Maradiaga DO [facility]   6/17/2024  3:00 PM Marjan Paul MD [facility]         For any urgent concerns, please contact our 24 hour nurse triage line: [phone] ([phone])         David J Myhre, RN                "

## 2023-08-02 NOTE — TELEPHONE ENCOUNTER
Yaron Maradiaga,     I spoke with patient today. She stated she is available anytime this week or next week for a hospital follow up appointment. She stated she does not want to see anyone, but you. Please advise.     Thanks,     Dave Myhre, RN   CCC RN

## 2023-08-11 NOTE — TELEPHONE ENCOUNTER
Please call patient and offer her an MA visit for ear flushing. I apologize this got missed after our visit today. Her ears did look good, and like I mentioned the was is light yellow and appears soft so she doesn't necessarily need them flushed, but if the left one is persistently bothering her, flushing may help.

## 2023-08-11 NOTE — PROGRESS NOTES
Assessment & Plan     Hyponatremia  Sodium in the low 30s and high 20s while admitted to the hospital setting. Has been drinking 750 ml of water max per day and increasing oral food intake with protein. We will recheck today and see if we can increase fluid restriction at all. Could be related to selective serotonin reuptake inhibitor use. No symptoms of nausea, vomiting, headache, increased faitgue or muscle weakness, or confusion.   - Basic metabolic panel  (Ca, Cl, CO2, Creat, Gluc, K, Na, BUN)    Bilateral impacted cerumen  Noted during exam, patient would like ears lavaged. Completed by MA staff today.   - FL REMOVAL IMPACTED CERUMEN IRRIGATION/LVG UNILAT    Urinary retention  Patient reports she was told she had urinary retention while in the hospital and was treated with a ledesma catheter. She was voiding fine up until this event so worried she may be retaining again. Will order outpatient US bladder scan. Discussed with patient who agrees.   - US Bladder w Pre and Post Void Residual; Future    Encounter for immunization  Patient due for COVID immunization, discussed. She would like to receive this. Ordered and given by MA staff.   - COVID-19 BIVALENT 18+ (MODERNA)    Subacute cough  Likely secondary to recent COPD exacerbation. Lung exam today is without adventitious sounds. Low suspicion for pneumonia (afebrile and not in respiratory distress/tachypnea) though did have recent hospitalization. Cough is non-productive. Recommend benzonatate for sleep and relief of severe symptoms, if cough does not resolve would want to consider PFTs, chest x-ray and inhaler management. Patient has follow-up with PCP in a few months. Follow-up sooner if cough persists.   - benzonatate (TESSALON) 100 MG capsule; Take 1 capsule (100 mg) by mouth 3 times daily as needed for cough    History of pulmonary embolism  Noted during hospital admission, likely contributing to acute hypoxic respiratory failure. She is now back to  baseline 2L. No chest pain with inhalation, lung sounds equal throughout. No chest pain. Taking apixiban. This should be continued for 3 months (10/23/2023).     Chronic obstructive pulmonary disease, unspecified COPD type (H)  Recent exacerbation, treated with prednisone and antibiotics. Back to baseline at this time, though still taking things slow due to GARCIA. She is able to talk in full sentences today. She is very thin, but no obvious accessory muscle use for breathing today. RR is 20. Still having cough, tolerating 2L well. SpO2 today is 93%. She is trying to increase oral intake and protein, discussed importance of adequate caloric intake with COPD. BMI 19.08. Using albuterol PRN, and before sodium chloride nebulizer as prescribed. Also on Trelegy Ellipta daily. Takes singular daily. Follows with pulmonology.        MED REC REQUIRED  Post Medication Reconciliation Status: discharge medications reconciled, continue medications without change    Subjective     Hospital F/U (7/23/2023 - 7/28/2023 (5 days)/[facility]/)        8/11/2023     8:46 AM   Additional Questions   Roomed by Keyana MARTIN CMA         8/11/2023     8:46 AM   Patient Reported Additional Medications   Patient reports taking the following new medications OMEGA Tavera is a 78 year old, presenting for the following health issues: Hospital follow-up for pneumonia.     She would like her sodium checked. She is on 750 ml per day of water. It was low in the hospital. No increased fatigue, weakness, confusion, or nausea.     She had blood clots in her lungs. This is the first time she has had a blood clot. Breathing is still very hard. She has to go slow. She does well when she is sitting. She is back on her 2L. No chest pain with breathing. Things are getting better day by day. She is using her albuterol inhaler. 2 puffs before the sodium chloride nebulizer treatment. She does this twice a day. She  follows with a pulmonologist. She sees the pulmonologist in a few months.     Urine retention while in the hospital. She is worried because she had no idea she was retaining urine. She is not going as much as she used to, but better since she came home. She was asymptomatic when this happened. She is wondering about urinary retention when at home and how she can monitor for this.     She has a tickle in her throat and then she can not stop. It is causing a dry cough that is persisting. She has some drainage in the back of her throat too. No fevers.           8/2/2023    10:32 AM   Post Discharge Outreach   Admission Date 7/23/2023   Reason for Admission shortness of breath   Discharge Date 7/28/2023   Discharge Diagnosis acute on chronic hypoxic respiratory failure due to COPD exacerbation   How are you doing now that you are home? I am doing about same. I have a sore foot.   How are your symptoms? (Red Flag symptoms escalate to triage hotline per guidelines) Unchanged   Do you feel your condition is stable enough to be safe at home until your provider visit? Yes   Does the patient have their discharge instructions?  Yes   Does the patient have questions regarding their discharge instructions?  No   Were you started on any new medications or were there changes to any of your previous medications?  Yes   Does the patient have all of their medications? Yes   Do you have questions regarding any of your medications?  No   Do you have all of your needed medical supplies or equipment (DME)?  (i.e. oxygen tank, CPAP, cane, etc.) Yes   Discharge follow-up appointment scheduled within 14 calendar days?  No     Hospital Follow-up Visit:    Hospital/Nursing Home/ Rehab Facility: [facility]  Date of Admission: 7/23  Date of Discharge: 7/28  Reason(s) for Admission: COPD, thrush, heart     Was your hospitalization related to COVID-19? No   Problems taking medications regularly:   "None  Medication changes since discharge: None  Problems adhering to non-medication therapy:  None    Summary of hospitalization:  [facility] discharge summary reviewed  Diagnostic Tests/Treatments reviewed.  Follow up needed: None  Other Healthcare Providers Involved in Patient s Care:         None  Update since discharge: stable.       Plan of care communicated with patient     Review of Systems   Constitutional:  Negative for activity change, appetite change, fatigue and fever.   Respiratory:  Positive for cough and shortness of breath. Negative for wheezing.    Cardiovascular:  Negative for chest pain, palpitations and peripheral edema.   Gastrointestinal:  Negative for nausea and vomiting.   Genitourinary:  Negative for decreased urine volume, difficulty urinating, dysuria, frequency and urgency.   Neurological:  Negative for dizziness, weakness and headaches.          Objective    /58 (BP Location: Right arm, Patient Position: Sitting, Cuff Size: Adult Small)   Pulse 79   Temp 98.3  F (36.8  C) (Oral)   Resp 20   Ht 1.575 m (5' 2\")   Wt 47.3 kg (104 lb 4.8 oz)   LMP  (LMP Unknown)   SpO2 93%   Breastfeeding No   BMI 19.08 kg/m    Body mass index is 19.08 kg/m .  Physical Exam  Constitutional:       Appearance: She is cachectic.   HENT:      Head: Normocephalic.      Right Ear: There is impacted cerumen.      Left Ear: There is impacted cerumen.      Nose: No congestion.      Mouth/Throat:      Mouth: Mucous membranes are moist.      Pharynx: Oropharynx is clear. No oropharyngeal exudate or posterior oropharyngeal erythema.   Eyes:      Extraocular Movements: Extraocular movements intact.      Pupils: Pupils are equal, round, and reactive to light.   Cardiovascular:      Rate and Rhythm: Normal rate and regular rhythm.      Pulses: Normal pulses.      Heart sounds: Normal heart sounds. No murmur heard.  Pulmonary:      Effort: Prolonged expiration present. No tachypnea, accessory " muscle usage or respiratory distress.      Breath sounds: Decreased breath sounds present. No wheezing, rhonchi or rales.   Abdominal:      General: Bowel sounds are normal.      Palpations: Abdomen is soft.   Musculoskeletal:      Cervical back: Normal range of motion. No rigidity or tenderness.      Right lower leg: No edema.      Left lower leg: No edema.   Lymphadenopathy:      Cervical: No cervical adenopathy.   Skin:     General: Skin is warm and dry.   Neurological:      General: No focal deficit present.      Mental Status: She is alert. Mental status is at baseline.   Psychiatric:         Mood and Affect: Mood normal.         Thought Content: Thought content normal.        At the end of the visit, I confirmed understanding of what was discussed. Patient has no further questions or concerns that were brought up at this time.     Link Charlton, TRAVIS, APRN, FNP-C

## 2023-08-11 NOTE — TELEPHONE ENCOUNTER
Spoke patient regarding ear flushing she stated she will continue with drops for now and see if it will help.

## 2023-08-26 NOTE — TELEPHONE ENCOUNTER
Patient calling about Eliquis medication. Patient prescription changed to mail order pharmacy. Patient is needing a bridge fill to local pharmacy to cover while she waits for mail order refill to arrive.   Patient requesting [facility] which is open on Saturdays.  Page to on call provider who is PCP Dr. Maradiaga at 9:56 am.   Dr. Maradiaga giving verbal order to refill Eliquis 5 mg tablet BID for 14 tablets.   Call to patient to notify of refill. Electronic transfer to [facility] not going through. Call to pharmacy to give verbal order to pharmacist Jazmin.   Mery Ribera RN   08/26/23 10:07 AM  [facility] Nurse Advisor          Reason for Disposition   [1] Prescription refill request for ESSENTIAL medicine (i.e., likelihood of harm to patient if not taken) AND [2] triager unable to refill per department policy    Additional Information   Negative: New-onset or worsening symptoms, see that guideline (e.g., diarrhea, runny nose, sore throat)   Negative: Medicine question not related to refill or renewal   Negative: Caller (e.g., patient or pharmacist) requesting information about a new medicine   Negative: Caller requesting information unrelated to medicine    Protocols used: Medication Refill and Renewal Call-A-

## 2023-08-29 NOTE — TELEPHONE ENCOUNTER
[facility] Call Center    Phone Message    May a detailed message be left on voicemail: yes     Reason for Call: Medication Question or concern regarding medication   Prescription Clarification  Name of Medication: azithromycin (ZITHROMAX) 250 MG tablet   Prescribing Provider: Randall Lorenz MD   Pharmacy: [facility] - [place], [place] - [facility] [address]   What on the order needs clarification? Pharmacist states mixing this medication with pt's current meds can cause QT prolongation, potential heart arhythmia. Looking to confirm rx with provider. Please review and advise. Thank you.       Action Taken: Other: Pulm    Travel Screening: Not Applicable

## 2023-08-30 NOTE — TELEPHONE ENCOUNTER
Medication Question or Refill    Contacts         Type Contact Phone/Fax    08/30/2023 01:10 PM CDT Phone (Incoming) [facility] - [place], [place] - [address] (Pharmacy) [phone]            What medication are you calling about (include dose and sig)?:     ELIQUIS 5 mg tablet    Preferred Pharmacy:    [facility] - [place], [place] - [address] [place] [id number]  Phone: [phone] Fax: [phone]      Controlled Substance Agreement on file:   CSA -- Patient Level:    CSA: None found at the patient level.       Who prescribed the medication?: PCP Dr. Neida Maradiaga    Do you have any questions or concerns?  Yes: Jesenia with [facility] calling.  [facility] has reached out to patient regarding new RX for ELLIQUIS.    [facility] is unable to fulfill RX due to drug interaction.  Need to speak to PCP or RN - requesting call is made to [facility] at  please reference invoice [id number]

## 2023-08-30 NOTE — TELEPHONE ENCOUNTER
Spoke to [facility], wanted to verify pt allergies  Verified allergies on file with Paolo (pharmacist)

## 2023-08-31 NOTE — TELEPHONE ENCOUNTER
Pharmacy called and is requesting a 1 time prescription for a 30 day supply.  Patient will be out of medication by Saturday, and the mail order pharmacy just confirmed that they are unable to get the medication to the patient by then.    Pharmacy says that the prescription does not need to have refills since it is a 1 time request, and moving forward the patient will continue to use the mail order pharmacy.

## 2023-09-13 NOTE — TELEPHONE ENCOUNTER
Jesenia MARTINEZ w/[facility]    Requesting RX for ELIQUIS is sent to [facility].  Patient prefers mail order and 90 days supply with refills    The mail order pharmacy is most cost effective for patient.

## 2023-09-13 NOTE — TELEPHONE ENCOUNTER
Call pharmacy back to confirm    It looks like patient should have enough Eliquis to last her till the end of October    Patient has an appointment with pulmonology in October and they can decide if she needs to continue the Eliquis past the October date.

## 2023-09-14 NOTE — TELEPHONE ENCOUNTER
Contacted patient and reviewed PCP message below.  With pulmonology coming up and the chance she may be able to discontinue the Eliquis, care team trying to avoid her having to pay for 3 months more of medication unless it will be needed.  Patient very thankful for awareness of PCP to catch this and look out for patient.  Ayse thinks she only got 60 tabs on the 8/31 fill, likely [facility] was trying to save her money by issuing 1 month then contacting care team to request 3 month mail order for cost savings.  Will call [facility] to ensure they have 60 more tabs for patient to ensure she is covered until pulmonology.  Patient again thankful for care team efforts.

## 2023-09-14 NOTE — TELEPHONE ENCOUNTER
Contacted Cindy at [facility] who confirmed patient has 1 fill of 60 tabs remaining.  Informed Cindy patient will be needing it as care team is holding off on mail order as patient may be able to discontinue medication after pulmonology in October.

## 2023-09-20 NOTE — TELEPHONE ENCOUNTER
Call back     Does she have enough until 10/13 - that will be the 3 month shon from her clot and pulmonology can decide if she needs to keep on the blood thinner.

## 2023-09-29 NOTE — TELEPHONE ENCOUNTER
Order/Referral Request    Who is requesting: Daphnie duarte [facility] home care    Orders being requested: Physical Therapy Eval for In home, Occupational Treatment     When are orders needed by: asap     Has this been discussed with Provider: No    Does patient have a preference on a Group/Provider/Facility? In home PT     Does patient have an appointment scheduled?: No    Where to send orders: Fax [phone]    Could we send this information to you in [facility] or would you prefer to receive a phone call?:   No preference   Okay to leave a detailed message?: Yes at Other phone number:  [phone]

## 2023-10-02 NOTE — TELEPHONE ENCOUNTER
Spoke to Daphnie, pt was discharged from homecare 9/15/2023 but patient would benefit from the outpatient PT/OT in her assisted living.  Daphnie states it is due to strengthening due to COPD, resp failure

## 2023-10-02 NOTE — TELEPHONE ENCOUNTER
A message was taken in regards to below. Writer relayed below- PCP asking for associated dx for PT and OT referrals. When they return call, please get this information and route back to pool or directly to PCP for approval.     Thank you

## 2023-10-13 NOTE — PROGRESS NOTES
Virtual Visit Details    Type of service:  Video Visit   Video Start Time:  1330  Video End Time: 1400    Originating Location (pt. Location): Home    Distant Location (provider location):  On-site  Platform used for Video Visit: [facility]          LUNG NODULE & INTERVENTIONAL PULMONARY CLINIC  [facility], [facility], [facility]     Fiordaliza Najera Sr. MRN# [id number]   Age: 76 year old YOB: 1945       Requesting Physician: No referring provider defined for this encounter.       Assessment and Plan:    1.  Bronchiectasis-no exacerbation.  Assessment from previous: On a reasonable inhaler and nebulizer regimen.  Unfortunately action plan does not seem to help much but will make sure she has it available.  She has required IV antibiotics twice in the last 18 months or so.  Previous attempts at getting culture data including bronchoscopy have not been very informative.  We have never isolated AFBs.    Continue current treatment plan.    2.  Shortness of breath.  Still an issue.  Deconditioning and loss of muscle mass are part of this picture.  She will continue with at home physical therapy and Occupational Therapy.  We discussed pulmonary rehab but this has not seemed a good option for her.    3.  Chronic hypoxic respiratory failure and shortness of breath with exertion.  As above.  Goal oxygen saturation 89% or greater at rest.  Her oxygen can be titrated up and down a little bit as needed.  If she finds herself needing a little bit more with activity that is fine.    4.  Lung nodules.  Waxing waning.  Consistent with bronchiectasis and persistent inflammation.  I do not think these need to be followed again.    5.  Pulmonary embolism.  Unprovoked.  We discussed the risks and benefits of continuing therapy.  I mention specifically that her hemarthrosis without a clear cause was certainly due to the apixaban.  I explained that her risk of recurrence is  probably 10 to 15 %/year if she stops her anticoagulation.  Given her concerns about shortness of breath she prefers to stay on anticoagulation at this time.  She will discuss her hemarthrosis with orthopedic surgery and her primary care doctor in the next couple weeks.    More than 40 minutes spent on this patient encounter during this calendar day.  This includes interpretation of CT data, review of previous culture records, face-to-face time with the patient and time spent on documentation.         History:     Fiordaliza Najera Sr. is a 78 year old female with sig h/o for bronchiectasis.  She is here for follow-up.  She continues to deal with similar challenges-specifically shortness of breath with activity.  She has some low oxygen levels and is on 2 L/min at rest.  She was recently treated with medication for a possible exacerbation and this did not change her symptoms much.  She is not having any difficulty mobilizing secretions.  Unfortunately she was hospitalized and found to have a pulmonary embolism in late July.  She has been on Eliquis.  She had some right shoulder swelling and pain and had a hemarthrosis aspirated recently.  No other issues with anticoagulation.           Past Medical History:      Past Medical History:   Diagnosis Date    Anxiety 09/30/2021    COPD (chronic obstructive pulmonary disease) (H)     Diverticulosis     Hiatal hernia     Mild asthma     Neuropathy     Osteopenia     Temporomandibular joint (TMJ) pain            Past Surgical History:      Past Surgical History:   Procedure Laterality Date    ANTERIOR / POSTERIOR COMBINED FUSION LUMBAR SPINE      BRONCHOSCOPY (RIGID OR FLEXIBLE), DIAGNOSTIC N/A 09/28/2021    Procedure: BRONCHOSCOPY, WITH BRONCHOALVEOLAR LAVAGE;  Surgeon: Randall Lorenz MD;  Location: [facility]    HYSTERECTOMY      PICC SINGLE LUMEN PLACEMENT  11/04/2021         PICC SINGLE LUMEN PLACEMENT Right 12/22/2022    Right basilic, 38 cm    RETINAL LASER  PROCEDURE Left 10/04/2016    SALPINGOOPHORECTOMY      TOTAL KNEE ARTHROPLASTY  2008          Social History:     Social History     Tobacco Use    Smoking status: Never     Passive exposure: Past    Smokeless tobacco: Never   Substance Use Topics    Alcohol use: No          Family History:     Family History   Problem Relation Age of Onset    Dementia Mother         passed age 88    Chronic Obstructive Pulmonary Disease Father         passed age 78           Allergies:      Allergies   Allergen Reactions    Codeine Unknown    Morphine Itching          Medications:     Current Outpatient Medications   Medication Sig    albuterol (PROAIR HFA/PROVENTIL HFA/VENTOLIN HFA) 108 (90 Base) MCG/ACT inhaler Inhale 2 puffs into the lungs every 6 hours as needed    albuterol (PROVENTIL) (2.5 MG/3ML) 0.083% neb solution Take 1 vial (2.5 mg) by nebulization every 4 hours as needed for shortness of breath or wheezing    apixaban ANTICOAGULANT (ELIQUIS) 5 MG tablet Take 1 tablet (5 mg) by mouth 2 times daily for 60 days    apixaban ANTICOAGULANT (ELIQUIS) 5 MG tablet Take 1 tablet (5 mg) by mouth 2 times daily    benzonatate (TESSALON) 100 MG capsule Take 1 capsule (100 mg) by mouth 3 times daily as needed for cough    Biotin 5000 MCG TABS Take 1 tablet by mouth daily    buPROPion (WELLBUTRIN XL) 150 MG 24 hr tablet Take 1 tablet (150 mg) by mouth every morning    calcium-vitamin D (CALCIUM-VITAMIN D) 500 mg(1,250mg) -200 unit per tablet Take 1 tablet by mouth 2 times daily     cetirizine (ZYRTEC) 5 MG tablet Take 5 mg by mouth daily    cholecalciferol, vitamin D3, (VITAMIN D3) 1,000 unit capsule [CHOLECALCIFEROL, VITAMIN D3, (VITAMIN D3) 1,000 UNIT CAPSULE] Take 1,000 Units by mouth daily.    escitalopram (LEXAPRO) 20 MG tablet Take 1 tablet (20 mg) by mouth daily    Fluticasone-Umeclidin-Vilant (TRELEGY ELLIPTA) 100-62.5-25 MCG/ACT oral inhaler Inhale 1 puff into the lungs daily    Lactobacillus rhamnosus GG (CULTURELLE) 10-15  Billion cell capsule Take 1 capsule by mouth every evening     levothyroxine (SYNTHROID/LEVOTHROID) 25 MCG tablet Take 1 tablet (25 mcg) by mouth daily    metoprolol succinate ER (TOPROL XL) 25 MG 24 hr tablet Take 1 tablet (25 mg) by mouth daily    montelukast (SINGULAIR) 10 MG tablet [MONTELUKAST (SINGULAIR) 10 MG TABLET] TAKE 1 TABLET(10 MG) BY MOUTH AT BEDTIME    multivitamin therapeutic (THERAGRAN) tablet [MULTIVITAMIN THERAPEUTIC (THERAGRAN) TABLET] Take 1 tablet by mouth daily.    oxyBUTYnin ER (DITROPAN XL) 5 MG 24 hr tablet Take 1 tablet (5 mg) by mouth daily    OXYGEN-AIR DELIVERY SYSTEMS MISC [OXYGEN-AIR DELIVERY SYSTEMS MISC] Use 2 L As Directed. 2L with activity and at night  [employer]    pregabalin (LYRICA) 50 MG capsule Take 1 tab in the AM and 2 in the evening    sodium chloride (NEBUSAL) 3 % neb solution Take 3 mLs by nebulization 2 times daily    SUMAtriptan (IMITREX) 50 MG tablet Take 1 tablet (50 mg) by mouth as needed for migraine,may repeat after 2 hours if needed;  mg/24 hours     No current facility-administered medications for this visit.          Review of Systems:     See HPI         Physical Exam:   LMP  (LMP Unknown)     Constitutional - looks well, in no apparent distress  Eyes - no redness or discharge  Respiratory -breathing appears comfortable.  Some small basilar crackles  Skin - No appreciable discoloration or lesions (very limited exam)  Neurological - No apparent tremors. Speech fluent and articlate  Psychiatric - no signs of delirium or anxiety          Current Laboratory Data:   All laboratory and imaging data reviewed.    No results found. However, due to the size of the patient record, not all encounters were searched. Please check Results Review for a complete set of results.

## 2023-10-13 NOTE — NURSING NOTE
Is the patient currently in the state of [place]? YES    Visit mode:VIDEO    If the visit is dropped, the patient can be reconnected by: VIDEO VISIT: Text to cell phone:   Telephone Information:   Mobile [phone]       Will anyone else be joining the visit? Yes, nurse Lucy  (If patient encounters technical issues they should call [phone] :[phone])    How would you like to obtain your AVS? MyChart    Are changes needed to the allergy or medication list? Yes Remove Levaquin and Deltasone    Reason for visit: RULA BOWERS

## 2023-10-17 NOTE — TELEPHONE ENCOUNTER
Pharmacy calling to get a medication refill on medication(s) attached      Pharmacy stated provider wouldn't send in till patient had a pulmonary appt     -Pulmonary appt was had 10/13/2023 and due to that appt she should continue the eliquis    90 day supply

## 2023-10-24 NOTE — TELEPHONE ENCOUNTER
Refill request for bupropion, escitalopram, metoprolol      Tavo Chaudhari Jr., CMA on 10/24/2023 at 11:49 AM

## 2023-11-03 NOTE — PROGRESS NOTES
"  Assessment & Plan       ICD-10-CM    1. Bronchiectasis without complication (H)  J47.9       2. Tachycardia  R00.0 metoprolol succinate ER (TOPROL XL) 50 MG 24 hr tablet      3. ANDREW (generalized anxiety disorder)  F41.1 buPROPion (WELLBUTRIN XL) 300 MG 24 hr tablet      4. Chronic fatigue  R53.82 Ferritin     TSH with free T4 reflex     Vitamin D Deficiency     Vitamin B12     Comprehensive metabolic panel (BMP + Alb, Alk Phos, ALT, AST, Total. Bili, TP)      5. Leukocytosis, unspecified type  D72.829 CBC with platelets and differential      6. Need for vaccination against respiratory syncytial virus  Z29.11       7. Encounter for immunization  Z23 COVID-19 12+ (2023-24) (PFIZER)     RSV VACCINE (ABRYSVO)     INFLUENZA VACCINE 65+ (FLUZONE HD)      8. Shortness of breath  R06.02       9. Anxiety  F41.9       10. Chronic respiratory failure with hypoxia, on home O2 therapy (H)  J96.11     Z99.81               Bronchiectasis:  Tachycardia/anxiety  Chronic hypoxic resp failure   Currently following with pulmonology who feels she is stable from a inhaler and nebulizer regiment  She notes that she has been instructed to increase her baseline O2 to 3 L  Reports increased shortness of breath and general feeling that she cannot \"get a breath in\"  She feels like she has more frequent episodes of \"air hunger\" and when that happens, she will get very anxious and hyperventilate  She is doing home PT/OT and try to stay as active as possible  Feels that the metoprolol keeps her from feeling chest tightness but her pulse still goes up significantly during these episodes of shortness of breath  Plan:  - Will increase metoprolol to 50 mg  - There is also an anxiety component with her shortness of breath episodes-we will increase her Wellbutrin to 300 mg  - We will see her back in 2 months for follow-up  - Should continue with PT/OT at home  - Did also review with patient that air hunger may increase as bronchiectasis progresses.  " "If she feels minimal relief with interventions discussed today and is already maxed out on pulmonary regiment, may consider palliative.  She is open to that.    Lung nodules:  Pulmonology felt it was consistent with bronchiectasis and they do not think it needs to be followed any longer    Pulmonary embolism:  Left hemarthrosis  PE was unprovoked and pulmonology feels that she should stay on lifelong anticoagulation  Since she started the anticoagulation, she has had 2 episodes of hemarthrosis of the right shoulder needing drainage  [facility] has recommended possible shoulder replacement or surgery but she does not want to go through surgery at this time  She would like to monitor for now and continue anticoagulation    Chronic hyponatremia  Chronic fatigue  Patient feels that her baseline fatigue has gotten worse recently  Is unsure if that is in the setting of increased oxygen demand from her bronchiectasis  Is a generalized fatigue that last throughout the day  Is not rapidly progressing  Plan:  - We will get TSH, BMP, vitamin D and B12 to rule out secondary etiologies  - I suspect that it is due to chronic disease burden progression    43 minutes spent by me on the date of the encounter doing chart review, history and exam, documentation and further activities per the note    Neida Maradiaga DO  [facility]    Marietta Tavera is a 78 year old, presenting for the following health issues:  RECHECK      11/3/2023    10:19 AM   Additional Questions   Roomed by magy         Review of Systems   As per HPI       Objective    /62 (BP Location: Right arm, Patient Position: Sitting)   Pulse 81   Temp 98.1  F (36.7  C) (Oral)   Resp 22   Ht 1.575 m (5' 2\")   Wt 48.1 kg (106 lb)   LMP  (LMP Unknown)   SpO2 94%   BMI 19.39 kg/m    Body mass index is 19.39 kg/m .  Physical Exam   GENERAL:  thin, chronically ill appearing  RESP: Decreased breath sounds in all lung fields, no " obvious wheezing noted on exam today, prolonged end expiratory phase, on 3 L of NC O2 with SPO2 94%.  Lungs clear to auscultation - no rales, rhonchi or wheezes  CV: regular rate and rhythm, no murmur, click or rub, no peripheral edema and peripheral pulses strong  PSYCH: mentation appears normal, affect  anxious

## 2023-11-08 NOTE — LETTER
11/8/2023         RE: Fiordaliza Najera  [address] [place] [address]        Dear Colleague,    Thank you for referring your patient, Fiordaliza Najera, to the [facility] [place]. Please see a copy of my visit note below.    [facility] Hematology and Oncology Consult Note    Patient: Fiordaliza Najera  MRN: [id number]  Date of Service: 11/08/2023      Reason for Visit    Chief Complaint   Patient presents with     Hematology     New patient consult related to Leukocytosis, unspecified type         Assessment/Plan    Problem List Items Addressed This Visit    None  Visit Diagnoses       Leukocytosis, unspecified type              Chronic leukocytosis, predominant neutrophilia  I reviewed her chart in detail.  I personally reviewed her lab results and interpreted the results independently.  She has had elevated total white count since July which is predominantly neutrophils.  No other blood count abnormalities.  At the time she was hospitalized with COPD exacerbation was also found to have a PE.  Significant history of COPD with frequent exacerbations.  Has received systemic steroids.  Based on the available information, I think her leukocytosis can be considered as reactive and also due to prednisone use which is known to cause elevated white count due to demargination.  There is no evidence of any other blood count abnormalities.  No concern for any primary pulmonary disorder at this point in time.  No further interventions or follow-up required at this point in time.  Would recommend continued monitoring.  If she develops any other blood count abnormalities or cytopenias then we can consider further evaluation.    ECOG Performance    1 - Can't do physically strenuous work, but fully ambyulatory and can do light sedentary work    Problem List    Patient Active Problem List   Diagnosis     Osteopenia     Bronchiectasis with (acute) exacerbation (H)     Idiopathic peripheral  neuropathy     COPD (chronic obstructive pulmonary disease) (H)     Unspecified abnormalities of gait and mobility     Back pain     Diaphragmatic hernia     Other kyphoscoliosis and scoliosis     Chronic respiratory failure with hypoxia, on home O2 therapy (H)     Anxiety     Migraine without aura and without status migrainosus, not intractable     Carotid artery aneurysm (H24)     Bronchiectasis (H)     Mood disorder (H24)     Chronic pain     Nontraumatic tear of rotator cuff, unspecified laterality, unspecified tear extent     Panlobular emphysema (H)     Dependence on supplemental oxygen     Difficulty in walking, not elsewhere classified     Hypoxemia     Muscle weakness (generalized)     Unsteadiness on feet     Aortic dilatation (H24)     Subclinical hypothyroidism     Hot flashes     Shortness of breath     Hyponatremia     ______________________________________________________________________________    Staging History     Cancer Staging   No matching staging information was found for the patient.        History of presenting illness:  Fiordaliza Najera is a 78-year-old female with history of COPD, recent history of unprovoked PE on anticoagulation with Eliquis among other medical problems who is seen in the hematology clinic for further evaluation management of chronic leukocytosis.    She was hospitalized in July with worsening shortness of breath.  Was diagnosed with COPD exacerbation.  She had a CT angio of the chest at the time which also showed a small burden of bilateral segmental PE.  She received antibiotics and steroids and also was put on Eliquis for anticoagulation.  Lab studies done on July 23 showed a total white count of 21,000 which was predominantly neutrophils.  Prior to that she has had intermittent elevations in the white counts.  Subsequent labs since July have shown persistently elevated total white count.  Again predominantly neutrophils.  No other blood count abnormalities.  It is to  be noted that she has had a few other episodes of COPD exacerbations and has received oral prednisone.  Denies any fevers or chills today.    She is a never smoker.      Past History    Past Medical History:   Diagnosis Date     Anxiety 09/30/2021     COPD (chronic obstructive pulmonary disease) (H)      Diverticulosis      Hiatal hernia      Mild asthma      Neuropathy      Osteopenia      Temporomandibular joint (TMJ) pain     Family History   Problem Relation Age of Onset     Dementia Mother         passed age 88     Chronic Obstructive Pulmonary Disease Father         passed age 78      Past Surgical History:   Procedure Laterality Date     ANTERIOR / POSTERIOR COMBINED FUSION LUMBAR SPINE       BRONCHOSCOPY (RIGID OR FLEXIBLE), DIAGNOSTIC N/A 09/28/2021    Procedure: BRONCHOSCOPY, WITH BRONCHOALVEOLAR LAVAGE;  Surgeon: Randall Lorenz MD;  Location: [facility]     HYSTERECTOMY       PICC SINGLE LUMEN PLACEMENT  11/04/2021          PICC SINGLE LUMEN PLACEMENT Right 12/22/2022    Right basilic, 38 cm     RETINAL LASER PROCEDURE Left 10/04/2016     SALPINGOOPHORECTOMY       TOTAL KNEE ARTHROPLASTY  2008    Social History     Socioeconomic History     Marital status: Single     Spouse name: Not on file     Number of children: Not on file     Years of education: Not on file     Highest education level: Not on file   Occupational History     Not on file   Tobacco Use     Smoking status: Never     Passive exposure: Past     Smokeless tobacco: Never   Vaping Use     Vaping Use: Never used   Substance and Sexual Activity     Alcohol use: No     Drug use: Never     Sexual activity: Never   Other Topics Concern     Parent/sibling w/ CABG, MI or angioplasty before 65F 55M? 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.  She has a sister and brother-in-law, 2 nieces, 3 grand nieces and nephews in [place].  She came to  [place] for a sabbatical, and then  stayed on for a teaching job.  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  She lives alone in a long-term facility. - Dr. Nguyễn 01/04/21       Social Determinants of Health     Financial Resource Strain: Low Risk  (11/2/2023)    Financial Resource Strain      Within the past 12 months, have you or your family members you live with been unable to get utilities (heat, electricity) when it was really needed?: No   Food Insecurity: Low Risk  (11/2/2023)    Food Insecurity      Within the past 12 months, did you worry that your food would run out before you got money to buy more?: No      Within the past 12 months, did the food you bought just not last and you didn t have money to get more?: No   Transportation Needs: Low Risk  (11/2/2023)    Transportation Needs      Within the past 12 months, has lack of transportation kept you from medical appointments, getting your medicines, non-medical meetings or appointments, work, or from getting things that you need?: No   Physical Activity: Not on file   Stress: Not on file   Social Connections: Not on file   Interpersonal Safety: Low Risk  (11/3/2023)    Interpersonal Safety      Do you feel physically and emotionally safe where you currently live?: Yes      Within the past 12 months, have you been hit, slapped, kicked or otherwise physically hurt by someone?: No      Within the past 12 months, have you been humiliated or emotionally abused in other ways by your partner or ex-partner?: No   Housing Stability: Low Risk  (11/2/2023)    Housing Stability      Do you have housing? : Yes      Are you worried about losing your housing?: No        Allergies    Allergies   Allergen Reactions     Codeine Unknown     Morphine Itching       Review of Systems    Pertinent items are noted in HPI.      Physical Exam        11/8/2023     1:52 PM   Oncology Vitals   Height 158 cm   Weight 48.807 kg   BSA (m2) 1.46 m2   /59   Pulse 86  "  Temp 97.5  F (36.4  C)   Temp src Tympanic   SpO2 94 %   Pain Score 7 (Severe)   Pain Loc SHOULDER       General: alert and cooperative  HEENT: Head: Normal, normocephalic, atraumatic.  Eye: Normal external eye, conjunctiva, lids cornea, NATHAN.  Extremities: atraumatic, no peripheral edema  Skin: no rashes  CNS: Alert and oriented x3, neurologic exam grossly normal.  Lymphatics: No bilateral cervical, axillary, supraclavicular adenopathy noted      Lab Results    No results found for this or any previous visit (from the past 168 hour(s)).      Imaging Results    No results found.    A total of 45 minutes was spent today on this visit including face to face conversation with the patient, EMR review (labs, imaging studies, pathology reports and outside records), counseling and care co-ordination and documentation.    Signed by: Kaley Puckett MD      Oncology Rooming Note    November 8, 2023 2:04 PM   Fiordaliza Najera is a 78 year old female who presents for:    Chief Complaint   Patient presents with     Hematology     New patient consult related to Leukocytosis, unspecified type     Initial Vitals: /59 (BP Location: Left arm, Patient Position: Sitting, Cuff Size: Adult Small)   Pulse 86   Temp 97.5  F (36.4  C) (Tympanic)   Resp 18   Ht 1.575 m (5' 2\")   Wt 48.8 kg (107 lb 9.6 oz)   LMP  (LMP Unknown)   SpO2 94%   BMI 19.68 kg/m   Estimated body mass index is 19.68 kg/m  as calculated from the following:    Height as of this encounter: 1.575 m (5' 2\").    Weight as of this encounter: 48.8 kg (107 lb 9.6 oz). Body surface area is 1.46 meters squared.  Severe Pain (7) Comment: right is worse than left, left shoulder rated 2/10   No LMP recorded (lmp unknown). Patient has had a hysterectomy.  Allergies reviewed: Yes  Medications reviewed: Yes    Medications: Medication refills not needed today.  Pharmacy name entered into [facility]:    [facility] - [place], [place] - [address]  [facility] - [place], [place] - [address]    Clinical concerns: New patient consult related to Leukocytosis, unspecified type      BASIL RUSSO CMA                Again, thank you for allowing me to participate in the care of your patient.        Sincerely,        Kaley Puckett MD

## 2023-11-08 NOTE — PROGRESS NOTES
[facility] Hematology and Oncology Consult Note    Patient: Fiordaliza Najera  MRN: [id number]  Date of Service: 11/08/2023      Reason for Visit    Chief Complaint   Patient presents with    Hematology     New patient consult related to Leukocytosis, unspecified type         Assessment/Plan    Problem List Items Addressed This Visit    None  Visit Diagnoses       Leukocytosis, unspecified type              Chronic leukocytosis, predominant neutrophilia  I reviewed her chart in detail.  I personally reviewed her lab results and interpreted the results independently.  She has had elevated total white count since July which is predominantly neutrophils.  No other blood count abnormalities.  At the time she was hospitalized with COPD exacerbation was also found to have a PE.  Significant history of COPD with frequent exacerbations.  Has received systemic steroids.  Based on the available information, I think her leukocytosis can be considered as reactive and also due to prednisone use which is known to cause elevated white count due to demargination.  There is no evidence of any other blood count abnormalities.  No concern for any primary pulmonary disorder at this point in time.  No further interventions or follow-up required at this point in time.  Would recommend continued monitoring.  If she develops any other blood count abnormalities or cytopenias then we can consider further evaluation.    ECOG Performance    1 - Can't do physically strenuous work, but fully ambyulatory and can do light sedentary work    Problem List    Patient Active Problem List   Diagnosis    Osteopenia    Bronchiectasis with (acute) exacerbation (H)    Idiopathic peripheral neuropathy    COPD (chronic obstructive pulmonary disease) (H)    Unspecified abnormalities of gait and mobility    Back pain    Diaphragmatic hernia    Other kyphoscoliosis and scoliosis    Chronic respiratory failure with hypoxia, on home O2 therapy (H)    Anxiety     Migraine without aura and without status migrainosus, not intractable    Carotid artery aneurysm (H24)    Bronchiectasis (H)    Mood disorder (H24)    Chronic pain    Nontraumatic tear of rotator cuff, unspecified laterality, unspecified tear extent    Panlobular emphysema (H)    Dependence on supplemental oxygen    Difficulty in walking, not elsewhere classified    Hypoxemia    Muscle weakness (generalized)    Unsteadiness on feet    Aortic dilatation (H24)    Subclinical hypothyroidism    Hot flashes    Shortness of breath    Hyponatremia     ______________________________________________________________________________    Staging History     Cancer Staging   No matching staging information was found for the patient.        History of presenting illness:  Fiordaliza Najera is a 78-year-old female with history of COPD, recent history of unprovoked PE on anticoagulation with Eliquis among other medical problems who is seen in the hematology clinic for further evaluation management of chronic leukocytosis.    She was hospitalized in July with worsening shortness of breath.  Was diagnosed with COPD exacerbation.  She had a CT angio of the chest at the time which also showed a small burden of bilateral segmental PE.  She received antibiotics and steroids and also was put on Eliquis for anticoagulation.  Lab studies done on July 23 showed a total white count of 21,000 which was predominantly neutrophils.  Prior to that she has had intermittent elevations in the white counts.  Subsequent labs since July have shown persistently elevated total white count.  Again predominantly neutrophils.  No other blood count abnormalities.  It is to be noted that she has had a few other episodes of COPD exacerbations and has received oral prednisone.  Denies any fevers or chills today.    She is a never smoker.      Past History    Past Medical History:   Diagnosis Date    Anxiety 09/30/2021    COPD (chronic obstructive pulmonary  disease) (H)     Diverticulosis     Hiatal hernia     Mild asthma     Neuropathy     Osteopenia     Temporomandibular joint (TMJ) pain     Family History   Problem Relation Age of Onset    Dementia Mother         passed age 88    Chronic Obstructive Pulmonary Disease Father         passed age 78      Past Surgical History:   Procedure Laterality Date    ANTERIOR / POSTERIOR COMBINED FUSION LUMBAR SPINE      BRONCHOSCOPY (RIGID OR FLEXIBLE), DIAGNOSTIC N/A 09/28/2021    Procedure: BRONCHOSCOPY, WITH BRONCHOALVEOLAR LAVAGE;  Surgeon: Randall Lorenz MD;  Location: [facility]    HYSTERECTOMY      PICC SINGLE LUMEN PLACEMENT  11/04/2021         PICC SINGLE LUMEN PLACEMENT Right 12/22/2022    Right basilic, 38 cm    RETINAL LASER PROCEDURE Left 10/04/2016    SALPINGOOPHORECTOMY      TOTAL KNEE ARTHROPLASTY  2008    Social History     Socioeconomic History    Marital status: Single     Spouse name: Not on file    Number of children: Not on file    Years of education: Not on file    Highest education level: Not on file   Occupational History    Not on file   Tobacco Use    Smoking status: Never     Passive exposure: Past    Smokeless tobacco: Never   Vaping Use    Vaping Use: Never used   Substance and Sexual Activity    Alcohol use: No    Drug use: Never    Sexual activity: Never   Other Topics Concern    Parent/sibling w/ CABG, MI or angioplasty before 65F 55M? 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.  She has a sister and brother-in-law, 2 nieces, 3 grand nieces and nephews in [place].  She came to [place] for a sabbatical, and then  stayed on for a teaching job.  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  She lives alone in a long-term facility. - Dr. Nguyễn 01/04/21       Social Determinants of Health     Financial Resource Strain: Low Risk  (11/2/2023)    Financial Resource Strain      Within the past 12 months, have you or your family members you live with been unable to get utilities (heat, electricity) when it was really needed?: No   Food Insecurity: Low Risk  (11/2/2023)    Food Insecurity     Within the past 12 months, did you worry that your food would run out before you got money to buy more?: No     Within the past 12 months, did the food you bought just not last and you didn t have money to get more?: No   Transportation Needs: Low Risk  (11/2/2023)    Transportation Needs     Within the past 12 months, has lack of transportation kept you from medical appointments, getting your medicines, non-medical meetings or appointments, work, or from getting things that you need?: No   Physical Activity: Not on file   Stress: Not on file   Social Connections: Not on file   Interpersonal Safety: Low Risk  (11/3/2023)    Interpersonal Safety     Do you feel physically and emotionally safe where you currently live?: Yes     Within the past 12 months, have you been hit, slapped, kicked or otherwise physically hurt by someone?: No     Within the past 12 months, have you been humiliated or emotionally abused in other ways by your partner or ex-partner?: No   Housing Stability: Low Risk  (11/2/2023)    Housing Stability     Do you have housing? : Yes     Are you worried about losing your housing?: No        Allergies    Allergies   Allergen Reactions    Codeine Unknown    Morphine Itching       Review of Systems    Pertinent items are noted in HPI.      Physical Exam        11/8/2023     1:52 PM   Oncology Vitals   Height 158 cm   Weight 48.807 kg   BSA (m2) 1.46 m2   /59   Pulse 86   Temp 97.5  F (36.4  C)   Temp src Tympanic   SpO2 94 %   Pain Score 7 (Severe)   Pain Loc SHOULDER       General: alert and cooperative  HEENT: Head: Normal, normocephalic, atraumatic.  Eye: Normal external eye, conjunctiva, lids cornea, NATHAN.  Extremities: atraumatic, no peripheral edema  Skin: no rashes  CNS: Alert  and oriented x3, neurologic exam grossly normal.  Lymphatics: No bilateral cervical, axillary, supraclavicular adenopathy noted      Lab Results    No results found for this or any previous visit (from the past 168 hour(s)).      Imaging Results    No results found.    A total of 45 minutes was spent today on this visit including face to face conversation with the patient, EMR review (labs, imaging studies, pathology reports and outside records), counseling and care co-ordination and documentation.    Signed by: Kaley Puckett MD

## 2023-11-08 NOTE — PROGRESS NOTES
"Oncology Rooming Note    November 8, 2023 2:04 PM   Fiordaliza Najera is a 78 year old female who presents for:    Chief Complaint   Patient presents with    Hematology     New patient consult related to Leukocytosis, unspecified type     Initial Vitals: /59 (BP Location: Left arm, Patient Position: Sitting, Cuff Size: Adult Small)   Pulse 86   Temp 97.5  F (36.4  C) (Tympanic)   Resp 18   Ht 1.575 m (5' 2\")   Wt 48.8 kg (107 lb 9.6 oz)   LMP  (LMP Unknown)   SpO2 94%   BMI 19.68 kg/m   Estimated body mass index is 19.68 kg/m  as calculated from the following:    Height as of this encounter: 1.575 m (5' 2\").    Weight as of this encounter: 48.8 kg (107 lb 9.6 oz). Body surface area is 1.46 meters squared.  Severe Pain (7) Comment: right is worse than left, left shoulder rated 2/10   No LMP recorded (lmp unknown). Patient has had a hysterectomy.  Allergies reviewed: Yes  Medications reviewed: Yes    Medications: Medication refills not needed today.  Pharmacy name entered into [facility]:    [facility] - [place], [place] - [address]  [facility] - [facility], [place] - [address]    Clinical concerns: New patient consult related to Leukocytosis, unspecified type      BASIL RUSSO CMA              "

## 2023-12-06 NOTE — TELEPHONE ENCOUNTER
RN with [facility] calling.  Attempting to help patient with medication renewal.  Requesting RX for 90 days is sent to [facility] for patients.  Needs 90 day supply with refills.  Mail Order takes a little longer, and requesting New RX is sent with High Priority for New RX to [facility].

## 2023-12-18 NOTE — TELEPHONE ENCOUNTER
Tez msg:   I tried to renew my prescription for Evothyroxine to take with me to [place] on Dec. 22-26, but they say it is too early to reflll.    I should have enough, unless weather is bad and I have to stay there longer.  Also the pharmacist said the bottle should say I take 2 on Sundays.   Can you please help with this, Dr Maradiaga.?   I hope you have very happy holidays.   See you in [place].    MARYAM Hobbs       Result notes:   Thyroid levels are above goal. I would like you to take 1 tab of the synthroid daily x 6 days and then double up on the dose the 7th day of the week     Unsure if you are wanting to change script that is sent to the pharmacy. Writer key'd up medication and changed sig, if that is appropriate.       Tavo Chaudhari Jr., CMA on 12/18/2023 at 11:20 AM

## 2024-01-01 ENCOUNTER — PATIENT OUTREACH (OUTPATIENT)
Dept: CARE COORDINATION | Facility: CLINIC | Age: 79
End: 2024-01-01
Payer: COMMERCIAL

## 2024-01-01 ENCOUNTER — TELEPHONE (OUTPATIENT)
Dept: GERIATRICS | Facility: CLINIC | Age: 79
End: 2024-01-01
Payer: COMMERCIAL

## 2024-01-01 ENCOUNTER — MYC MEDICAL ADVICE (OUTPATIENT)
Dept: FAMILY MEDICINE | Facility: CLINIC | Age: 79
End: 2024-01-01
Payer: COMMERCIAL

## 2024-01-01 ENCOUNTER — APPOINTMENT (OUTPATIENT)
Dept: CT IMAGING | Facility: CLINIC | Age: 79
DRG: 193 | End: 2024-01-01
Attending: EMERGENCY MEDICINE
Payer: COMMERCIAL

## 2024-01-01 ENCOUNTER — TELEPHONE (OUTPATIENT)
Dept: FAMILY MEDICINE | Facility: CLINIC | Age: 79
End: 2024-01-01
Payer: COMMERCIAL

## 2024-01-01 ENCOUNTER — TRANSFERRED RECORDS (OUTPATIENT)
Dept: HEALTH INFORMATION MANAGEMENT | Facility: CLINIC | Age: 79
End: 2024-01-01
Payer: COMMERCIAL

## 2024-01-01 ENCOUNTER — OFFICE VISIT (OUTPATIENT)
Dept: FAMILY MEDICINE | Facility: CLINIC | Age: 79
End: 2024-01-01
Payer: COMMERCIAL

## 2024-01-01 ENCOUNTER — TRANSITIONAL CARE UNIT VISIT (OUTPATIENT)
Dept: GERIATRICS | Facility: CLINIC | Age: 79
End: 2024-01-01
Payer: COMMERCIAL

## 2024-01-01 ENCOUNTER — APPOINTMENT (OUTPATIENT)
Dept: PHYSICAL THERAPY | Facility: CLINIC | Age: 79
DRG: 193 | End: 2024-01-01
Payer: COMMERCIAL

## 2024-01-01 ENCOUNTER — DOCUMENTATION ONLY (OUTPATIENT)
Dept: GERIATRICS | Facility: CLINIC | Age: 79
End: 2024-01-01
Payer: COMMERCIAL

## 2024-01-01 ENCOUNTER — LAB REQUISITION (OUTPATIENT)
Dept: LAB | Facility: CLINIC | Age: 79
End: 2024-01-01
Payer: COMMERCIAL

## 2024-01-01 ENCOUNTER — APPOINTMENT (OUTPATIENT)
Dept: OCCUPATIONAL THERAPY | Facility: CLINIC | Age: 79
DRG: 193 | End: 2024-01-01
Payer: COMMERCIAL

## 2024-01-01 ENCOUNTER — TELEPHONE (OUTPATIENT)
Dept: ENDOCRINOLOGY | Facility: CLINIC | Age: 79
End: 2024-01-01
Payer: COMMERCIAL

## 2024-01-01 ENCOUNTER — HOSPITAL ENCOUNTER (INPATIENT)
Facility: CLINIC | Age: 79
LOS: 5 days | Discharge: NURSING, REHAB OR LONG-TERM CARE | DRG: 193 | End: 2024-01-31
Attending: EMERGENCY MEDICINE | Admitting: STUDENT IN AN ORGANIZED HEALTH CARE EDUCATION/TRAINING PROGRAM
Payer: COMMERCIAL

## 2024-01-01 ENCOUNTER — APPOINTMENT (OUTPATIENT)
Dept: GENERAL RADIOLOGY | Facility: CLINIC | Age: 79
DRG: 193 | End: 2024-01-01
Payer: COMMERCIAL

## 2024-01-01 ENCOUNTER — APPOINTMENT (OUTPATIENT)
Dept: ULTRASOUND IMAGING | Facility: CLINIC | Age: 79
DRG: 193 | End: 2024-01-01
Attending: EMERGENCY MEDICINE
Payer: COMMERCIAL

## 2024-01-01 VITALS
TEMPERATURE: 98 F | SYSTOLIC BLOOD PRESSURE: 111 MMHG | HEART RATE: 91 BPM | WEIGHT: 102.95 LBS | OXYGEN SATURATION: 100 % | DIASTOLIC BLOOD PRESSURE: 64 MMHG | BODY MASS INDEX: 18.83 KG/M2 | RESPIRATION RATE: 22 BRPM

## 2024-01-01 VITALS
WEIGHT: 101 LBS | RESPIRATION RATE: 24 BRPM | HEIGHT: 62 IN | HEART RATE: 104 BPM | OXYGEN SATURATION: 92 % | BODY MASS INDEX: 18.58 KG/M2 | TEMPERATURE: 98.1 F | DIASTOLIC BLOOD PRESSURE: 103 MMHG | SYSTOLIC BLOOD PRESSURE: 157 MMHG

## 2024-01-01 VITALS
OXYGEN SATURATION: 99 % | HEIGHT: 62 IN | RESPIRATION RATE: 22 BRPM | TEMPERATURE: 98.1 F | SYSTOLIC BLOOD PRESSURE: 94 MMHG | BODY MASS INDEX: 18.58 KG/M2 | HEART RATE: 64 BPM | WEIGHT: 101 LBS | DIASTOLIC BLOOD PRESSURE: 63 MMHG

## 2024-01-01 VITALS
BODY MASS INDEX: 18.53 KG/M2 | DIASTOLIC BLOOD PRESSURE: 52 MMHG | HEART RATE: 82 BPM | SYSTOLIC BLOOD PRESSURE: 118 MMHG | WEIGHT: 101.3 LBS | OXYGEN SATURATION: 94 % | TEMPERATURE: 98.6 F

## 2024-01-01 DIAGNOSIS — J43.1 PANLOBULAR EMPHYSEMA (H): ICD-10-CM

## 2024-01-01 DIAGNOSIS — E03.9 HYPOTHYROIDISM, UNSPECIFIED TYPE: ICD-10-CM

## 2024-01-01 DIAGNOSIS — J41.1 MUCOPURULENT CHRONIC BRONCHITIS (H): ICD-10-CM

## 2024-01-01 DIAGNOSIS — J47.1 BRONCHIECTASIS WITH ACUTE EXACERBATION (H): ICD-10-CM

## 2024-01-01 DIAGNOSIS — J96.11 CHRONIC RESPIRATORY FAILURE WITH HYPOXIA, ON HOME O2 THERAPY (H): ICD-10-CM

## 2024-01-01 DIAGNOSIS — R25.1 TREMOR: ICD-10-CM

## 2024-01-01 DIAGNOSIS — Z99.81 CHRONIC RESPIRATORY FAILURE WITH HYPOXIA, ON HOME O2 THERAPY (H): ICD-10-CM

## 2024-01-01 DIAGNOSIS — M85.80 OSTEOPENIA, UNSPECIFIED LOCATION: ICD-10-CM

## 2024-01-01 DIAGNOSIS — F41.9 ANXIETY: ICD-10-CM

## 2024-01-01 DIAGNOSIS — R63.0 DECREASED APPETITE: ICD-10-CM

## 2024-01-01 DIAGNOSIS — J96.11 CHRONIC RESPIRATORY FAILURE WITH HYPOXIA AND HYPERCAPNIA (H): ICD-10-CM

## 2024-01-01 DIAGNOSIS — J47.9 BRONCHIECTASIS WITHOUT COMPLICATION (H): ICD-10-CM

## 2024-01-01 DIAGNOSIS — J96.21 ACUTE ON CHRONIC RESPIRATORY FAILURE WITH HYPOXIA AND HYPERCAPNIA (H): Primary | ICD-10-CM

## 2024-01-01 DIAGNOSIS — J44.1 CHRONIC OBSTRUCTIVE PULMONARY DISEASE WITH (ACUTE) EXACERBATION (H): ICD-10-CM

## 2024-01-01 DIAGNOSIS — N39.46 MIXED STRESS AND URGE URINARY INCONTINENCE: ICD-10-CM

## 2024-01-01 DIAGNOSIS — E44.0 MODERATE PROTEIN-CALORIE MALNUTRITION (H): ICD-10-CM

## 2024-01-01 DIAGNOSIS — I26.93 SINGLE SUBSEGMENTAL PULMONARY EMBOLISM WITHOUT ACUTE COR PULMONALE (H): ICD-10-CM

## 2024-01-01 DIAGNOSIS — R09.89 AIR HUNGER: ICD-10-CM

## 2024-01-01 DIAGNOSIS — J47.9 BRONCHIECTASIS (H): ICD-10-CM

## 2024-01-01 DIAGNOSIS — J96.02 ACUTE RESPIRATORY FAILURE WITH HYPOXIA AND HYPERCAPNIA (H): ICD-10-CM

## 2024-01-01 DIAGNOSIS — R06.09 DOE (DYSPNEA ON EXERTION): ICD-10-CM

## 2024-01-01 DIAGNOSIS — G60.9 IDIOPATHIC PERIPHERAL NEUROPATHY: Chronic | ICD-10-CM

## 2024-01-01 DIAGNOSIS — G89.4 CHRONIC PAIN SYNDROME: ICD-10-CM

## 2024-01-01 DIAGNOSIS — F41.1 GAD (GENERALIZED ANXIETY DISORDER): ICD-10-CM

## 2024-01-01 DIAGNOSIS — J44.1 COPD EXACERBATION (H): ICD-10-CM

## 2024-01-01 DIAGNOSIS — G43.009 MIGRAINE WITHOUT AURA AND WITHOUT STATUS MIGRAINOSUS, NOT INTRACTABLE: ICD-10-CM

## 2024-01-01 DIAGNOSIS — J44.1 ACUTE EXACERBATION OF CHRONIC OBSTRUCTIVE PULMONARY DISEASE (H): ICD-10-CM

## 2024-01-01 DIAGNOSIS — R00.0 TACHYCARDIA: ICD-10-CM

## 2024-01-01 DIAGNOSIS — E87.1 CHRONIC HYPONATREMIA: ICD-10-CM

## 2024-01-01 DIAGNOSIS — J96.22 ACUTE ON CHRONIC RESPIRATORY FAILURE WITH HYPOXIA AND HYPERCAPNIA (H): Primary | ICD-10-CM

## 2024-01-01 DIAGNOSIS — M81.8 OTHER OSTEOPOROSIS WITHOUT CURRENT PATHOLOGICAL FRACTURE: ICD-10-CM

## 2024-01-01 DIAGNOSIS — J44.9 CHRONIC OBSTRUCTIVE PULMONARY DISEASE, UNSPECIFIED (H): ICD-10-CM

## 2024-01-01 DIAGNOSIS — Z86.711 PERSONAL HISTORY OF PE (PULMONARY EMBOLISM): ICD-10-CM

## 2024-01-01 DIAGNOSIS — R25.1 TREMOR: Primary | ICD-10-CM

## 2024-01-01 DIAGNOSIS — R06.02 SHORTNESS OF BREATH: Primary | ICD-10-CM

## 2024-01-01 DIAGNOSIS — F39 MOOD DISORDER (H): ICD-10-CM

## 2024-01-01 DIAGNOSIS — J96.01 ACUTE RESPIRATORY FAILURE WITH HYPOXIA AND HYPERCAPNIA (H): ICD-10-CM

## 2024-01-01 DIAGNOSIS — M62.84 SARCOPENIA: ICD-10-CM

## 2024-01-01 DIAGNOSIS — R06.09 DOE (DYSPNEA ON EXERTION): Primary | ICD-10-CM

## 2024-01-01 DIAGNOSIS — R04.2 HEMOPTYSIS: ICD-10-CM

## 2024-01-01 DIAGNOSIS — I26.99 OTHER ACUTE PULMONARY EMBOLISM WITHOUT ACUTE COR PULMONALE (H): ICD-10-CM

## 2024-01-01 DIAGNOSIS — J44.9 CHRONIC OBSTRUCTIVE PULMONARY DISEASE, UNSPECIFIED COPD TYPE (H): Primary | ICD-10-CM

## 2024-01-01 DIAGNOSIS — J44.1 CHRONIC OBSTRUCTIVE PULMONARY DISEASE WITH ACUTE EXACERBATION (H): ICD-10-CM

## 2024-01-01 DIAGNOSIS — R63.4 WEIGHT LOSS: ICD-10-CM

## 2024-01-01 DIAGNOSIS — Z79.01 CHRONIC ANTICOAGULATION: ICD-10-CM

## 2024-01-01 DIAGNOSIS — M25.00 HEMARTHROSIS: ICD-10-CM

## 2024-01-01 DIAGNOSIS — J96.12 CHRONIC RESPIRATORY FAILURE WITH HYPOXIA AND HYPERCAPNIA (H): ICD-10-CM

## 2024-01-01 DIAGNOSIS — R00.0 TACHYCARDIA, UNSPECIFIED: ICD-10-CM

## 2024-01-01 DIAGNOSIS — M79.89 SWELLING OF LIMB: ICD-10-CM

## 2024-01-01 DIAGNOSIS — Z79.01 LONG TERM (CURRENT) USE OF ANTICOAGULANTS: ICD-10-CM

## 2024-01-01 DIAGNOSIS — I45.10 RIGHT BUNDLE BRANCH BLOCK: ICD-10-CM

## 2024-01-01 DIAGNOSIS — N32.81 OAB (OVERACTIVE BLADDER): ICD-10-CM

## 2024-01-01 DIAGNOSIS — Z99.81 DEPENDENCE ON SUPPLEMENTAL OXYGEN: ICD-10-CM

## 2024-01-01 DIAGNOSIS — R63.4 UNINTENTIONAL WEIGHT LOSS: ICD-10-CM

## 2024-01-01 DIAGNOSIS — J43.9 EMPHYSEMA, UNSPECIFIED (H): ICD-10-CM

## 2024-01-01 LAB
ABO/RH(D): NORMAL
ALBUMIN SERPL BCG-MCNC: 2.9 G/DL (ref 3.5–5.2)
ALBUMIN SERPL BCG-MCNC: 3.3 G/DL (ref 3.5–5.2)
ALBUMIN SERPL BCG-MCNC: 3.4 G/DL (ref 3.5–5.2)
ALP SERPL-CCNC: 66 U/L (ref 40–150)
ALP SERPL-CCNC: 77 U/L (ref 40–150)
ALP SERPL-CCNC: 79 U/L (ref 40–150)
ALT SERPL W P-5'-P-CCNC: 14 U/L (ref 0–50)
ALT SERPL W P-5'-P-CCNC: 23 U/L (ref 0–50)
ALT SERPL W P-5'-P-CCNC: 31 U/L (ref 0–50)
ANA SER QL IF: NEGATIVE
ANCA AB PATTERN SER IF-IMP: ABNORMAL
ANION GAP SERPL CALCULATED.3IONS-SCNC: 10 MMOL/L (ref 7–15)
ANION GAP SERPL CALCULATED.3IONS-SCNC: 2 MMOL/L (ref 7–15)
ANION GAP SERPL CALCULATED.3IONS-SCNC: 5 MMOL/L (ref 7–15)
ANION GAP SERPL CALCULATED.3IONS-SCNC: 5 MMOL/L (ref 7–15)
ANION GAP SERPL CALCULATED.3IONS-SCNC: 7 MMOL/L (ref 7–15)
ANION GAP SERPL CALCULATED.3IONS-SCNC: 8 MMOL/L (ref 7–15)
ANTIBODY SCREEN: NEGATIVE
AST SERPL W P-5'-P-CCNC: 44 U/L (ref 0–45)
AST SERPL W P-5'-P-CCNC: 56 U/L (ref 0–45)
AST SERPL W P-5'-P-CCNC: 71 U/L (ref 0–45)
ATRIAL RATE - MUSE: 111 BPM
ATRIAL RATE - MUSE: 133 BPM
ATRIAL RATE - MUSE: 133 BPM
BASE EXCESS BLDV CALC-SCNC: 13.2 MMOL/L (ref -3–3)
BASE EXCESS BLDV CALC-SCNC: 2.6 MMOL/L (ref -3–3)
BASE EXCESS BLDV CALC-SCNC: 3.8 MMOL/L (ref -3–3)
BASE EXCESS BLDV CALC-SCNC: 4 MMOL/L (ref -3–3)
BASE EXCESS BLDV CALC-SCNC: 5.7 MMOL/L (ref -3–3)
BASE EXCESS BLDV CALC-SCNC: 6.2 MMOL/L (ref -3–3)
BASOPHILS # BLD AUTO: 0 10E3/UL (ref 0–0.2)
BASOPHILS # BLD AUTO: 0 10E3/UL (ref 0–0.2)
BASOPHILS NFR BLD AUTO: 0 %
BASOPHILS NFR BLD AUTO: 0 %
BILIRUB SERPL-MCNC: 0.3 MG/DL
BILIRUB SERPL-MCNC: 0.3 MG/DL
BILIRUB SERPL-MCNC: 0.4 MG/DL
BUN SERPL-MCNC: 17 MG/DL (ref 8–23)
BUN SERPL-MCNC: 17 MG/DL (ref 8–23)
BUN SERPL-MCNC: 20.5 MG/DL (ref 8–23)
BUN SERPL-MCNC: 21.1 MG/DL (ref 8–23)
BUN SERPL-MCNC: 24.1 MG/DL (ref 8–23)
BUN SERPL-MCNC: 25 MG/DL (ref 8–23)
C PNEUM DNA SPEC QL NAA+PROBE: NOT DETECTED
C-ANCA TITR SER IF: ABNORMAL {TITER}
CALCIUM SERPL-MCNC: 8 MG/DL (ref 8.8–10.2)
CALCIUM SERPL-MCNC: 8.2 MG/DL (ref 8.8–10.2)
CALCIUM SERPL-MCNC: 8.4 MG/DL (ref 8.8–10.2)
CALCIUM SERPL-MCNC: 9 MG/DL (ref 8.8–10.2)
CALCIUM SERPL-MCNC: 9.2 MG/DL (ref 8.8–10.2)
CALCIUM SERPL-MCNC: 9.8 MG/DL (ref 8.8–10.2)
CHLORIDE SERPL-SCNC: 101 MMOL/L (ref 98–107)
CHLORIDE SERPL-SCNC: 102 MMOL/L (ref 98–107)
CHLORIDE SERPL-SCNC: 93 MMOL/L (ref 98–107)
CHLORIDE SERPL-SCNC: 97 MMOL/L (ref 98–107)
CHLORIDE SERPL-SCNC: 98 MMOL/L (ref 98–107)
CHLORIDE SERPL-SCNC: 99 MMOL/L (ref 98–107)
CREAT BLD-MCNC: 0.5 MG/DL (ref 0.5–1)
CREAT SERPL-MCNC: 0.4 MG/DL (ref 0.51–0.95)
CREAT SERPL-MCNC: 0.41 MG/DL (ref 0.51–0.95)
CREAT SERPL-MCNC: 0.43 MG/DL (ref 0.51–0.95)
CREAT SERPL-MCNC: 0.48 MG/DL (ref 0.51–0.95)
CREAT SERPL-MCNC: 0.53 MG/DL (ref 0.51–0.95)
CREAT SERPL-MCNC: 0.56 MG/DL (ref 0.51–0.95)
CRP SERPL-MCNC: 63.7 MG/L
CYSTATIN C (ROCHE): 1 MG/L (ref 0.6–1)
DEPRECATED HCO3 PLAS-SCNC: 27 MMOL/L (ref 22–29)
DEPRECATED HCO3 PLAS-SCNC: 30 MMOL/L (ref 22–29)
DEPRECATED HCO3 PLAS-SCNC: 31 MMOL/L (ref 22–29)
DEPRECATED HCO3 PLAS-SCNC: 37 MMOL/L (ref 22–29)
DEPRECATED HCO3 PLAS-SCNC: 40 MMOL/L (ref 22–29)
DEPRECATED HCO3 PLAS-SCNC: 40 MMOL/L (ref 22–29)
DIASTOLIC BLOOD PRESSURE - MUSE: NORMAL MMHG
EGFRCR SERPLBLD CKD-EPI 2021: >60 ML/MIN/1.73M2
EGFRCR SERPLBLD CKD-EPI 2021: >90 ML/MIN/1.73M2
EOSINOPHIL # BLD AUTO: 0 10E3/UL (ref 0–0.7)
EOSINOPHIL # BLD AUTO: 0 10E3/UL (ref 0–0.7)
EOSINOPHIL NFR BLD AUTO: 0 %
EOSINOPHIL NFR BLD AUTO: 0 %
ERYTHROCYTE [DISTWIDTH] IN BLOOD BY AUTOMATED COUNT: 13.6 % (ref 10–15)
ERYTHROCYTE [DISTWIDTH] IN BLOOD BY AUTOMATED COUNT: 13.6 % (ref 10–15)
ERYTHROCYTE [DISTWIDTH] IN BLOOD BY AUTOMATED COUNT: 13.7 % (ref 10–15)
ERYTHROCYTE [DISTWIDTH] IN BLOOD BY AUTOMATED COUNT: 13.9 % (ref 10–15)
ERYTHROCYTE [DISTWIDTH] IN BLOOD BY AUTOMATED COUNT: 13.9 % (ref 10–15)
FLUAV H1 2009 PAND RNA SPEC QL NAA+PROBE: NOT DETECTED
FLUAV H1 RNA SPEC QL NAA+PROBE: NOT DETECTED
FLUAV H3 RNA SPEC QL NAA+PROBE: NOT DETECTED
FLUAV RNA SPEC QL NAA+PROBE: NOT DETECTED
FLUBV RNA SPEC QL NAA+PROBE: NOT DETECTED
GFR SERPL CREATININE-BSD FRML MDRD: 67 ML/MIN/1.73M2
GLUCOSE BLDC GLUCOMTR-MCNC: 131 MG/DL (ref 70–99)
GLUCOSE BLDC GLUCOMTR-MCNC: 141 MG/DL (ref 70–99)
GLUCOSE BLDC GLUCOMTR-MCNC: 166 MG/DL (ref 70–99)
GLUCOSE SERPL-MCNC: 100 MG/DL (ref 70–99)
GLUCOSE SERPL-MCNC: 101 MG/DL (ref 70–99)
GLUCOSE SERPL-MCNC: 121 MG/DL (ref 70–99)
GLUCOSE SERPL-MCNC: 125 MG/DL (ref 70–99)
GLUCOSE SERPL-MCNC: 145 MG/DL (ref 70–99)
GLUCOSE SERPL-MCNC: 92 MG/DL (ref 70–99)
HADV DNA SPEC QL NAA+PROBE: NOT DETECTED
HCO3 BLDV-SCNC: 32 MMOL/L (ref 21–28)
HCO3 BLDV-SCNC: 33 MMOL/L (ref 21–28)
HCO3 BLDV-SCNC: 34 MMOL/L (ref 21–28)
HCO3 BLDV-SCNC: 34 MMOL/L (ref 21–28)
HCO3 BLDV-SCNC: 35 MMOL/L (ref 21–28)
HCO3 BLDV-SCNC: 35 MMOL/L (ref 21–28)
HCO3 BLDV-SCNC: 42 MMOL/L (ref 21–28)
HCOV PNL SPEC NAA+PROBE: NOT DETECTED
HCT VFR BLD AUTO: 33.5 % (ref 35–47)
HCT VFR BLD AUTO: 37.9 % (ref 35–47)
HCT VFR BLD AUTO: 38.5 % (ref 35–47)
HCT VFR BLD AUTO: 38.5 % (ref 35–47)
HCT VFR BLD AUTO: 38.6 % (ref 35–47)
HGB BLD-MCNC: 10 G/DL (ref 11.7–15.7)
HGB BLD-MCNC: 11 G/DL (ref 11.7–15.7)
HGB BLD-MCNC: 11.6 G/DL (ref 11.7–15.7)
HGB BLD-MCNC: 11.7 G/DL (ref 11.7–15.7)
HGB BLD-MCNC: 11.7 G/DL (ref 11.7–15.7)
HMPV RNA SPEC QL NAA+PROBE: NOT DETECTED
HOLD SPECIMEN: NORMAL
HPIV1 RNA SPEC QL NAA+PROBE: NOT DETECTED
HPIV2 RNA SPEC QL NAA+PROBE: NOT DETECTED
HPIV3 RNA SPEC QL NAA+PROBE: NOT DETECTED
HPIV4 RNA SPEC QL NAA+PROBE: NOT DETECTED
IGG SERPL-MCNC: 917 MG/DL (ref 610–1616)
IGG SERPL-MCNC: 917 MG/DL (ref 610–1616)
IGG1 SER-MCNC: 509 MG/DL (ref 382–929)
IGG2 SER-MCNC: 260 MG/DL (ref 242–700)
IGG3 SER-MCNC: 83 MG/DL (ref 22–176)
IGG4 SER-MCNC: 45 MG/DL (ref 4–86)
IMM GRANULOCYTES # BLD: 0.1 10E3/UL
IMM GRANULOCYTES # BLD: 0.1 10E3/UL
IMM GRANULOCYTES NFR BLD: 0 %
IMM GRANULOCYTES NFR BLD: 1 %
INR PPP: 1.35 (ref 0.85–1.15)
INTERPRETATION ECG - MUSE: NORMAL
LACTATE BLD-SCNC: 0.7 MMOL/L
LACTATE SERPL-SCNC: 0.9 MMOL/L (ref 0.7–2)
LACTATE SERPL-SCNC: 0.9 MMOL/L (ref 0.7–2)
LACTATE SERPL-SCNC: 2.4 MMOL/L (ref 0.7–2)
LYMPHOCYTES # BLD AUTO: 0.2 10E3/UL (ref 0.8–5.3)
LYMPHOCYTES # BLD AUTO: 0.5 10E3/UL (ref 0.8–5.3)
LYMPHOCYTES NFR BLD AUTO: 1 %
LYMPHOCYTES NFR BLD AUTO: 2 %
M PNEUMO DNA SPEC QL NAA+PROBE: NOT DETECTED
MCH RBC QN AUTO: 28.6 PG (ref 26.5–33)
MCH RBC QN AUTO: 28.7 PG (ref 26.5–33)
MCH RBC QN AUTO: 28.9 PG (ref 26.5–33)
MCH RBC QN AUTO: 29 PG (ref 26.5–33)
MCH RBC QN AUTO: 29.3 PG (ref 26.5–33)
MCHC RBC AUTO-ENTMCNC: 29 G/DL (ref 31.5–36.5)
MCHC RBC AUTO-ENTMCNC: 29.9 G/DL (ref 31.5–36.5)
MCHC RBC AUTO-ENTMCNC: 30.1 G/DL (ref 31.5–36.5)
MCHC RBC AUTO-ENTMCNC: 30.4 G/DL (ref 31.5–36.5)
MCHC RBC AUTO-ENTMCNC: 30.4 G/DL (ref 31.5–36.5)
MCV RBC AUTO: 100 FL (ref 78–100)
MCV RBC AUTO: 95 FL (ref 78–100)
MCV RBC AUTO: 95 FL (ref 78–100)
MCV RBC AUTO: 96 FL (ref 78–100)
MCV RBC AUTO: 98 FL (ref 78–100)
MONOCYTES # BLD AUTO: 0.5 10E3/UL (ref 0–1.3)
MONOCYTES # BLD AUTO: 2.5 10E3/UL (ref 0–1.3)
MONOCYTES NFR BLD AUTO: 4 %
MONOCYTES NFR BLD AUTO: 9 %
MRSA DNA SPEC QL NAA+PROBE: NEGATIVE
NEUTROPHILS # BLD AUTO: 13.4 10E3/UL (ref 1.6–8.3)
NEUTROPHILS # BLD AUTO: 23.6 10E3/UL (ref 1.6–8.3)
NEUTROPHILS NFR BLD AUTO: 88 %
NEUTROPHILS NFR BLD AUTO: 95 %
NRBC # BLD AUTO: 0 10E3/UL
NRBC # BLD AUTO: 0 10E3/UL
NRBC BLD AUTO-RTO: 0 /100
NRBC BLD AUTO-RTO: 0 /100
O2/TOTAL GAS SETTING VFR VENT: 32 %
O2/TOTAL GAS SETTING VFR VENT: 50 %
O2/TOTAL GAS SETTING VFR VENT: 50 %
O2/TOTAL GAS SETTING VFR VENT: 65 %
OXYHGB MFR BLDV: 22 % (ref 70–75)
OXYHGB MFR BLDV: 63 % (ref 70–75)
OXYHGB MFR BLDV: 68 % (ref 70–75)
OXYHGB MFR BLDV: 77 % (ref 70–75)
OXYHGB MFR BLDV: 87 % (ref 70–75)
OXYHGB MFR BLDV: 97 % (ref 70–75)
P AXIS - MUSE: 101 DEGREES
P AXIS - MUSE: 73 DEGREES
P AXIS - MUSE: 96 DEGREES
PCO2 BLDV: 61 MM HG (ref 40–50)
PCO2 BLDV: 65 MM HG (ref 40–50)
PCO2 BLDV: 66 MM HG (ref 40–50)
PCO2 BLDV: 75 MM HG (ref 40–50)
PCO2 BLDV: 77 MM HG (ref 40–50)
PCO2 BLDV: 78 MM HG (ref 40–50)
PCO2 BLDV: 91 MM HG (ref 40–50)
PH BLDV: 7.19 [PH] (ref 7.32–7.43)
PH BLDV: 7.24 [PH] (ref 7.32–7.43)
PH BLDV: 7.24 [PH] (ref 7.32–7.43)
PH BLDV: 7.32 [PH] (ref 7.32–7.43)
PH BLDV: 7.32 [PH] (ref 7.32–7.43)
PH BLDV: 7.34 [PH] (ref 7.32–7.43)
PH BLDV: 7.36 [PH] (ref 7.32–7.43)
PLATELET # BLD AUTO: 281 10E3/UL (ref 150–450)
PLATELET # BLD AUTO: 302 10E3/UL (ref 150–450)
PLATELET # BLD AUTO: 318 10E3/UL (ref 150–450)
PLATELET # BLD AUTO: 324 10E3/UL (ref 150–450)
PLATELET # BLD AUTO: 376 10E3/UL (ref 150–450)
PO2 BLDV: 110 MM HG (ref 25–47)
PO2 BLDV: 21 MM HG (ref 25–47)
PO2 BLDV: 33 MM HG (ref 25–47)
PO2 BLDV: 43 MM HG (ref 25–47)
PO2 BLDV: 44 MM HG (ref 25–47)
PO2 BLDV: 48 MM HG (ref 25–47)
PO2 BLDV: 55 MM HG (ref 25–47)
POTASSIUM SERPL-SCNC: 4.1 MMOL/L (ref 3.4–5.3)
POTASSIUM SERPL-SCNC: 4.6 MMOL/L (ref 3.4–5.3)
POTASSIUM SERPL-SCNC: 4.8 MMOL/L (ref 3.4–5.3)
POTASSIUM SERPL-SCNC: 4.8 MMOL/L (ref 3.4–5.3)
POTASSIUM SERPL-SCNC: 5.1 MMOL/L (ref 3.4–5.3)
POTASSIUM SERPL-SCNC: 5.5 MMOL/L (ref 3.4–5.3)
POTASSIUM SERPL-SCNC: 5.6 MMOL/L (ref 3.4–5.3)
PR INTERVAL - MUSE: 148 MS
PR INTERVAL - MUSE: 162 MS
PR INTERVAL - MUSE: 172 MS
PROT SERPL-MCNC: 5.8 G/DL (ref 6.4–8.3)
PROT SERPL-MCNC: 6.4 G/DL (ref 6.4–8.3)
PROT SERPL-MCNC: 6.6 G/DL (ref 6.4–8.3)
QRS DURATION - MUSE: 68 MS
QRS DURATION - MUSE: 68 MS
QRS DURATION - MUSE: 78 MS
QT - MUSE: 262 MS
QT - MUSE: 280 MS
QT - MUSE: 338 MS
QTC - MUSE: 389 MS
QTC - MUSE: 416 MS
QTC - MUSE: 459 MS
R AXIS - MUSE: -44 DEGREES
R AXIS - MUSE: -52 DEGREES
R AXIS - MUSE: 207 DEGREES
RBC # BLD AUTO: 3.5 10E6/UL (ref 3.8–5.2)
RBC # BLD AUTO: 3.8 10E6/UL (ref 3.8–5.2)
RBC # BLD AUTO: 3.96 10E6/UL (ref 3.8–5.2)
RBC # BLD AUTO: 4.04 10E6/UL (ref 3.8–5.2)
RBC # BLD AUTO: 4.07 10E6/UL (ref 3.8–5.2)
RHEUMATOID FACT SERPL-ACNC: <10 IU/ML
RSV RNA SPEC QL NAA+PROBE: NOT DETECTED
RSV RNA SPEC QL NAA+PROBE: NOT DETECTED
RV+EV RNA SPEC QL NAA+PROBE: NOT DETECTED
SA TARGET DNA: NEGATIVE
SAO2 % BLDV: 22.1 % (ref 70–75)
SAO2 % BLDV: 63.1 % (ref 70–75)
SAO2 % BLDV: 69.2 % (ref 70–75)
SAO2 % BLDV: 76 % (ref 70–75)
SAO2 % BLDV: 77.6 % (ref 70–75)
SAO2 % BLDV: 87.6 % (ref 70–75)
SAO2 % BLDV: 98.9 % (ref 70–75)
SARS-COV-2 RNA RESP QL NAA+PROBE: NEGATIVE
SODIUM SERPL-SCNC: 136 MMOL/L (ref 135–145)
SODIUM SERPL-SCNC: 137 MMOL/L (ref 135–145)
SODIUM SERPL-SCNC: 137 MMOL/L (ref 135–145)
SODIUM SERPL-SCNC: 139 MMOL/L (ref 135–145)
SODIUM SERPL-SCNC: 140 MMOL/L (ref 135–145)
SODIUM SERPL-SCNC: 143 MMOL/L (ref 135–145)
SPECIMEN EXPIRATION DATE: NORMAL
SUBCLASSES, PERCENT: 98 %
SYSTOLIC BLOOD PRESSURE - MUSE: NORMAL MMHG
T AXIS - MUSE: 124 DEGREES
T AXIS - MUSE: 78 DEGREES
T AXIS - MUSE: 87 DEGREES
T4 FREE SERPL-MCNC: 1.11 NG/DL (ref 0.9–1.7)
TROPONIN T SERPL HS-MCNC: 18 NG/L
TSH SERPL DL<=0.005 MIU/L-ACNC: 6.48 UIU/ML (ref 0.3–4.2)
VENTRICULAR RATE- MUSE: 111 BPM
VENTRICULAR RATE- MUSE: 133 BPM
VENTRICULAR RATE- MUSE: 133 BPM
WBC # BLD AUTO: 13.9 10E3/UL (ref 4–11)
WBC # BLD AUTO: 14.1 10E3/UL (ref 4–11)
WBC # BLD AUTO: 16.9 10E3/UL (ref 4–11)
WBC # BLD AUTO: 23 10E3/UL (ref 4–11)
WBC # BLD AUTO: 26.7 10E3/UL (ref 4–11)

## 2024-01-01 PROCEDURE — 99223 1ST HOSP IP/OBS HIGH 75: CPT | Mod: GC | Performed by: STUDENT IN AN ORGANIZED HEALTH CARE EDUCATION/TRAINING PROGRAM

## 2024-01-01 PROCEDURE — 96361 HYDRATE IV INFUSION ADD-ON: CPT | Performed by: EMERGENCY MEDICINE

## 2024-01-01 PROCEDURE — 250N000011 HC RX IP 250 OP 636

## 2024-01-01 PROCEDURE — 120N000003 HC R&B IMCU UMMC

## 2024-01-01 PROCEDURE — 97116 GAIT TRAINING THERAPY: CPT | Mod: GP

## 2024-01-01 PROCEDURE — 97535 SELF CARE MNGMENT TRAINING: CPT | Mod: GO

## 2024-01-01 PROCEDURE — 250N000009 HC RX 250

## 2024-01-01 PROCEDURE — 82784 ASSAY IGA/IGD/IGG/IGM EACH: CPT | Performed by: STUDENT IN AN ORGANIZED HEALTH CARE EDUCATION/TRAINING PROGRAM

## 2024-01-01 PROCEDURE — P9604 ONE-WAY ALLOW PRORATED TRIP: HCPCS | Performed by: FAMILY MEDICINE

## 2024-01-01 PROCEDURE — 94660 CPAP INITIATION&MGMT: CPT

## 2024-01-01 PROCEDURE — 80048 BASIC METABOLIC PNL TOTAL CA: CPT | Performed by: FAMILY MEDICINE

## 2024-01-01 PROCEDURE — 84484 ASSAY OF TROPONIN QUANT: CPT | Performed by: EMERGENCY MEDICINE

## 2024-01-01 PROCEDURE — 96375 TX/PRO/DX INJ NEW DRUG ADDON: CPT | Performed by: EMERGENCY MEDICINE

## 2024-01-01 PROCEDURE — 82787 IGG 1 2 3 OR 4 EACH: CPT | Performed by: STUDENT IN AN ORGANIZED HEALTH CARE EDUCATION/TRAINING PROGRAM

## 2024-01-01 PROCEDURE — 250N000012 HC RX MED GY IP 250 OP 636 PS 637

## 2024-01-01 PROCEDURE — 99233 SBSQ HOSP IP/OBS HIGH 50: CPT | Performed by: STUDENT IN AN ORGANIZED HEALTH CARE EDUCATION/TRAINING PROGRAM

## 2024-01-01 PROCEDURE — 97530 THERAPEUTIC ACTIVITIES: CPT | Mod: GP

## 2024-01-01 PROCEDURE — 83605 ASSAY OF LACTIC ACID: CPT

## 2024-01-01 PROCEDURE — 99233 SBSQ HOSP IP/OBS HIGH 50: CPT | Mod: GC | Performed by: INTERNAL MEDICINE

## 2024-01-01 PROCEDURE — 36415 COLL VENOUS BLD VENIPUNCTURE: CPT

## 2024-01-01 PROCEDURE — 250N000013 HC RX MED GY IP 250 OP 250 PS 637

## 2024-01-01 PROCEDURE — 86431 RHEUMATOID FACTOR QUANT: CPT | Performed by: STUDENT IN AN ORGANIZED HEALTH CARE EDUCATION/TRAINING PROGRAM

## 2024-01-01 PROCEDURE — 82610 CYSTATIN C: CPT | Performed by: FAMILY MEDICINE

## 2024-01-01 PROCEDURE — 84132 ASSAY OF SERUM POTASSIUM: CPT

## 2024-01-01 PROCEDURE — 250N000009 HC RX 250: Performed by: STUDENT IN AN ORGANIZED HEALTH CARE EDUCATION/TRAINING PROGRAM

## 2024-01-01 PROCEDURE — 999N000157 HC STATISTIC RCP TIME EA 10 MIN

## 2024-01-01 PROCEDURE — 85025 COMPLETE CBC W/AUTO DIFF WBC: CPT

## 2024-01-01 PROCEDURE — 36415 COLL VENOUS BLD VENIPUNCTURE: CPT | Performed by: FAMILY MEDICINE

## 2024-01-01 PROCEDURE — 82805 BLOOD GASES W/O2 SATURATION: CPT

## 2024-01-01 PROCEDURE — 85027 COMPLETE CBC AUTOMATED: CPT

## 2024-01-01 PROCEDURE — 250N000011 HC RX IP 250 OP 636: Performed by: EMERGENCY MEDICINE

## 2024-01-01 PROCEDURE — 99232 SBSQ HOSP IP/OBS MODERATE 35: CPT | Mod: GC | Performed by: STUDENT IN AN ORGANIZED HEALTH CARE EDUCATION/TRAINING PROGRAM

## 2024-01-01 PROCEDURE — 99310 SBSQ NF CARE HIGH MDM 45: CPT | Performed by: FAMILY MEDICINE

## 2024-01-01 PROCEDURE — 94640 AIRWAY INHALATION TREATMENT: CPT

## 2024-01-01 PROCEDURE — 94640 AIRWAY INHALATION TREATMENT: CPT | Mod: 76

## 2024-01-01 PROCEDURE — 87641 MR-STAPH DNA AMP PROBE: CPT

## 2024-01-01 PROCEDURE — 86038 ANTINUCLEAR ANTIBODIES: CPT | Performed by: STUDENT IN AN ORGANIZED HEALTH CARE EDUCATION/TRAINING PROGRAM

## 2024-01-01 PROCEDURE — 71275 CT ANGIOGRAPHY CHEST: CPT

## 2024-01-01 PROCEDURE — 258N000003 HC RX IP 258 OP 636

## 2024-01-01 PROCEDURE — 99207 PR NO BILLABLE SERVICE THIS VISIT: CPT | Performed by: STUDENT IN AN ORGANIZED HEALTH CARE EDUCATION/TRAINING PROGRAM

## 2024-01-01 PROCEDURE — 99223 1ST HOSP IP/OBS HIGH 75: CPT | Mod: GC | Performed by: INTERNAL MEDICINE

## 2024-01-01 PROCEDURE — 258N000003 HC RX IP 258 OP 636: Performed by: EMERGENCY MEDICINE

## 2024-01-01 PROCEDURE — 85610 PROTHROMBIN TIME: CPT | Performed by: EMERGENCY MEDICINE

## 2024-01-01 PROCEDURE — 84155 ASSAY OF PROTEIN SERUM: CPT

## 2024-01-01 PROCEDURE — 80048 BASIC METABOLIC PNL TOTAL CA: CPT | Performed by: STUDENT IN AN ORGANIZED HEALTH CARE EDUCATION/TRAINING PROGRAM

## 2024-01-01 PROCEDURE — 87635 SARS-COV-2 COVID-19 AMP PRB: CPT

## 2024-01-01 PROCEDURE — 5A09357 ASSISTANCE WITH RESPIRATORY VENTILATION, LESS THAN 24 CONSECUTIVE HOURS, CONTINUOUS POSITIVE AIRWAY PRESSURE: ICD-10-PCS | Performed by: STUDENT IN AN ORGANIZED HEALTH CARE EDUCATION/TRAINING PROGRAM

## 2024-01-01 PROCEDURE — 85027 COMPLETE CBC AUTOMATED: CPT | Performed by: FAMILY MEDICINE

## 2024-01-01 PROCEDURE — 250N000013 HC RX MED GY IP 250 OP 250 PS 637: Performed by: EMERGENCY MEDICINE

## 2024-01-01 PROCEDURE — 99418 PROLNG IP/OBS E/M EA 15 MIN: CPT | Performed by: FAMILY MEDICINE

## 2024-01-01 PROCEDURE — 82565 ASSAY OF CREATININE: CPT

## 2024-01-01 PROCEDURE — 84439 ASSAY OF FREE THYROXINE: CPT | Performed by: STUDENT IN AN ORGANIZED HEALTH CARE EDUCATION/TRAINING PROGRAM

## 2024-01-01 PROCEDURE — 94799 UNLISTED PULMONARY SVC/PX: CPT

## 2024-01-01 PROCEDURE — 97530 THERAPEUTIC ACTIVITIES: CPT | Mod: GO

## 2024-01-01 PROCEDURE — 36415 COLL VENOUS BLD VENIPUNCTURE: CPT | Performed by: STUDENT IN AN ORGANIZED HEALTH CARE EDUCATION/TRAINING PROGRAM

## 2024-01-01 PROCEDURE — 82247 BILIRUBIN TOTAL: CPT

## 2024-01-01 PROCEDURE — 82040 ASSAY OF SERUM ALBUMIN: CPT | Performed by: EMERGENCY MEDICINE

## 2024-01-01 PROCEDURE — 99238 HOSP IP/OBS DSCHRG MGMT 30/<: CPT | Mod: GC | Performed by: STUDENT IN AN ORGANIZED HEALTH CARE EDUCATION/TRAINING PROGRAM

## 2024-01-01 PROCEDURE — 93005 ELECTROCARDIOGRAM TRACING: CPT | Performed by: EMERGENCY MEDICINE

## 2024-01-01 PROCEDURE — 96368 THER/DIAG CONCURRENT INF: CPT | Performed by: EMERGENCY MEDICINE

## 2024-01-01 PROCEDURE — 99291 CRITICAL CARE FIRST HOUR: CPT | Mod: 25 | Performed by: EMERGENCY MEDICINE

## 2024-01-01 PROCEDURE — 80053 COMPREHEN METABOLIC PANEL: CPT

## 2024-01-01 PROCEDURE — 250N000009 HC RX 250: Performed by: EMERGENCY MEDICINE

## 2024-01-01 PROCEDURE — 86900 BLOOD TYPING SEROLOGIC ABO: CPT | Performed by: EMERGENCY MEDICINE

## 2024-01-01 PROCEDURE — 82803 BLOOD GASES ANY COMBINATION: CPT

## 2024-01-01 PROCEDURE — 87581 M.PNEUMON DNA AMP PROBE: CPT

## 2024-01-01 PROCEDURE — 85025 COMPLETE CBC W/AUTO DIFF WBC: CPT | Performed by: EMERGENCY MEDICINE

## 2024-01-01 PROCEDURE — 96365 THER/PROPH/DIAG IV INF INIT: CPT | Mod: 59 | Performed by: EMERGENCY MEDICINE

## 2024-01-01 PROCEDURE — 93970 EXTREMITY STUDY: CPT

## 2024-01-01 PROCEDURE — 99222 1ST HOSP IP/OBS MODERATE 55: CPT

## 2024-01-01 PROCEDURE — 86140 C-REACTIVE PROTEIN: CPT | Performed by: EMERGENCY MEDICINE

## 2024-01-01 PROCEDURE — 999N000128 HC STATISTIC PERIPHERAL IV START W/O US GUIDANCE

## 2024-01-01 PROCEDURE — 71275 CT ANGIOGRAPHY CHEST: CPT | Mod: 26 | Performed by: RADIOLOGY

## 2024-01-01 PROCEDURE — 96367 TX/PROPH/DG ADDL SEQ IV INF: CPT | Performed by: EMERGENCY MEDICINE

## 2024-01-01 PROCEDURE — 36415 COLL VENOUS BLD VENIPUNCTURE: CPT | Performed by: EMERGENCY MEDICINE

## 2024-01-01 PROCEDURE — 97161 PT EVAL LOW COMPLEX 20 MIN: CPT | Mod: GP

## 2024-01-01 PROCEDURE — 93010 ELECTROCARDIOGRAM REPORT: CPT | Performed by: EMERGENCY MEDICINE

## 2024-01-01 PROCEDURE — 84443 ASSAY THYROID STIM HORMONE: CPT | Performed by: STUDENT IN AN ORGANIZED HEALTH CARE EDUCATION/TRAINING PROGRAM

## 2024-01-01 PROCEDURE — 99214 OFFICE O/P EST MOD 30 MIN: CPT | Performed by: STUDENT IN AN ORGANIZED HEALTH CARE EDUCATION/TRAINING PROGRAM

## 2024-01-01 PROCEDURE — 71045 X-RAY EXAM CHEST 1 VIEW: CPT

## 2024-01-01 PROCEDURE — 86037 ANCA TITER EACH ANTIBODY: CPT | Performed by: STUDENT IN AN ORGANIZED HEALTH CARE EDUCATION/TRAINING PROGRAM

## 2024-01-01 PROCEDURE — 97165 OT EVAL LOW COMPLEX 30 MIN: CPT | Mod: GO

## 2024-01-01 PROCEDURE — 71045 X-RAY EXAM CHEST 1 VIEW: CPT | Mod: 26 | Performed by: RADIOLOGY

## 2024-01-01 PROCEDURE — 80048 BASIC METABOLIC PNL TOTAL CA: CPT

## 2024-01-01 PROCEDURE — 93970 EXTREMITY STUDY: CPT | Mod: 26 | Performed by: STUDENT IN AN ORGANIZED HEALTH CARE EDUCATION/TRAINING PROGRAM

## 2024-01-01 PROCEDURE — 87206 SMEAR FLUORESCENT/ACID STAI: CPT | Performed by: STUDENT IN AN ORGANIZED HEALTH CARE EDUCATION/TRAINING PROGRAM

## 2024-01-01 RX ORDER — OLANZAPINE 5 MG/1
5 TABLET, ORALLY DISINTEGRATING ORAL 2 TIMES DAILY
DISCHARGE
Start: 2024-01-01

## 2024-01-01 RX ORDER — SODIUM CHLORIDE FOR INHALATION 3 %
3 VIAL, NEBULIZER (ML) INHALATION
Status: DISCONTINUED | OUTPATIENT
Start: 2024-01-01 | End: 2024-01-01 | Stop reason: HOSPADM

## 2024-01-01 RX ORDER — LEVALBUTEROL INHALATION SOLUTION 1.25 MG/3ML
1.25 SOLUTION RESPIRATORY (INHALATION) EVERY 4 HOURS PRN
Status: DISCONTINUED | OUTPATIENT
Start: 2024-01-01 | End: 2024-01-01 | Stop reason: HOSPADM

## 2024-01-01 RX ORDER — CETIRIZINE HYDROCHLORIDE 5 MG/1
5 TABLET ORAL DAILY
DISCHARGE
Start: 2024-01-01

## 2024-01-01 RX ORDER — IPRATROPIUM BROMIDE AND ALBUTEROL SULFATE 2.5; .5 MG/3ML; MG/3ML
3 SOLUTION RESPIRATORY (INHALATION)
Status: DISCONTINUED | OUTPATIENT
Start: 2024-01-01 | End: 2024-01-01

## 2024-01-01 RX ORDER — IPRATROPIUM BROMIDE AND ALBUTEROL SULFATE 2.5; .5 MG/3ML; MG/3ML
SOLUTION RESPIRATORY (INHALATION)
Status: DISCONTINUED
Start: 2024-01-01 | End: 2024-01-01 | Stop reason: HOSPADM

## 2024-01-01 RX ORDER — CEFEPIME HYDROCHLORIDE 2 G/1
2 INJECTION, POWDER, FOR SOLUTION INTRAVENOUS EVERY 12 HOURS
Status: DISCONTINUED | OUTPATIENT
Start: 2024-01-01 | End: 2024-01-01

## 2024-01-01 RX ORDER — SUMATRIPTAN 50 MG/1
TABLET, FILM COATED ORAL
COMMUNITY
Start: 2023-01-01 | End: 2024-01-01

## 2024-01-01 RX ORDER — ESCITALOPRAM OXALATE 20 MG/1
20 TABLET ORAL DAILY
Qty: 90 TABLET | Refills: 3 | Status: ON HOLD | OUTPATIENT
Start: 2024-01-01 | End: 2024-01-01

## 2024-01-01 RX ORDER — LORAZEPAM 2 MG/ML
0.5 INJECTION INTRAMUSCULAR ONCE
Status: COMPLETED | OUTPATIENT
Start: 2024-01-01 | End: 2024-01-01

## 2024-01-01 RX ORDER — AZITHROMYCIN 250 MG/1
500 TABLET, FILM COATED ORAL DAILY
Qty: 6 TABLET | Refills: 0 | Status: DISCONTINUED | OUTPATIENT
Start: 2024-01-01 | End: 2024-01-01

## 2024-01-01 RX ORDER — PREGABALIN 50 MG/1
CAPSULE ORAL
Qty: 30 CAPSULE | Refills: 1 | Status: SHIPPED | OUTPATIENT
Start: 2024-01-01

## 2024-01-01 RX ORDER — CETIRIZINE HYDROCHLORIDE 5 MG/1
TABLET ORAL
COMMUNITY
Start: 2023-01-01 | End: 2024-01-01

## 2024-01-01 RX ORDER — METHYLPREDNISOLONE SODIUM SUCCINATE 125 MG/2ML
125 INJECTION, POWDER, LYOPHILIZED, FOR SOLUTION INTRAMUSCULAR; INTRAVENOUS ONCE
Status: COMPLETED | OUTPATIENT
Start: 2024-01-01 | End: 2024-01-01

## 2024-01-01 RX ORDER — LEVALBUTEROL INHALATION SOLUTION 1.25 MG/3ML
1.25 SOLUTION RESPIRATORY (INHALATION) ONCE
Status: COMPLETED | OUTPATIENT
Start: 2024-01-01 | End: 2024-01-01

## 2024-01-01 RX ORDER — MONTELUKAST SODIUM 10 MG/1
10 TABLET ORAL AT BEDTIME
Status: DISCONTINUED | OUTPATIENT
Start: 2024-01-01 | End: 2024-01-01 | Stop reason: HOSPADM

## 2024-01-01 RX ORDER — SUMATRIPTAN 50 MG/1
TABLET, FILM COATED ORAL
DISCHARGE
Start: 2024-01-01

## 2024-01-01 RX ORDER — VITAMIN B COMPLEX
25 TABLET ORAL DAILY
Status: DISCONTINUED | OUTPATIENT
Start: 2024-01-01 | End: 2024-01-01

## 2024-01-01 RX ORDER — OLANZAPINE 5 MG/1
5 TABLET ORAL ONCE
Status: DISCONTINUED | OUTPATIENT
Start: 2024-01-01 | End: 2024-01-01 | Stop reason: DRUGHIGH

## 2024-01-01 RX ORDER — PREGABALIN 25 MG/1
50 CAPSULE ORAL EVERY MORNING
Status: DISCONTINUED | OUTPATIENT
Start: 2024-01-01 | End: 2024-01-01 | Stop reason: HOSPADM

## 2024-01-01 RX ORDER — LORAZEPAM 2 MG/ML
0.5 INJECTION INTRAMUSCULAR
Status: COMPLETED | OUTPATIENT
Start: 2024-01-01 | End: 2024-01-01

## 2024-01-01 RX ORDER — SODIUM CHLORIDE FOR INHALATION 3 %
3 VIAL, NEBULIZER (ML) INHALATION 3 TIMES DAILY
DISCHARGE
Start: 2024-01-01

## 2024-01-01 RX ORDER — LORAZEPAM 2 MG/ML
0.5 INJECTION INTRAMUSCULAR EVERY 4 HOURS PRN
Status: DISCONTINUED | OUTPATIENT
Start: 2024-01-01 | End: 2024-01-01

## 2024-01-01 RX ORDER — LEVOTHYROXINE SODIUM 50 UG/1
50 TABLET ORAL DAILY
Qty: 90 TABLET | Refills: 1 | Status: SHIPPED | OUTPATIENT
Start: 2024-01-01 | End: 2024-01-01

## 2024-01-01 RX ORDER — OLANZAPINE 5 MG/1
5 TABLET, ORALLY DISINTEGRATING ORAL DAILY PRN
Status: DISCONTINUED | OUTPATIENT
Start: 2024-01-01 | End: 2024-01-01

## 2024-01-01 RX ORDER — ALBUTEROL SULFATE 0.83 MG/ML
SOLUTION RESPIRATORY (INHALATION)
COMMUNITY
Start: 2023-01-01 | End: 2024-01-01

## 2024-01-01 RX ORDER — LEVALBUTEROL INHALATION SOLUTION 1.25 MG/3ML
1.25 SOLUTION RESPIRATORY (INHALATION)
Status: DISCONTINUED | OUTPATIENT
Start: 2024-01-01 | End: 2024-01-01 | Stop reason: HOSPADM

## 2024-01-01 RX ORDER — OXYBUTYNIN CHLORIDE 5 MG/1
5 TABLET, EXTENDED RELEASE ORAL DAILY
DISCHARGE
Start: 2024-01-01

## 2024-01-01 RX ORDER — METOPROLOL SUCCINATE 50 MG/1
50 TABLET, EXTENDED RELEASE ORAL DAILY
DISCHARGE
Start: 2024-01-01

## 2024-01-01 RX ORDER — OXYBUTYNIN CHLORIDE 5 MG/1
TABLET ORAL
COMMUNITY
Start: 2023-01-01 | End: 2024-01-01

## 2024-01-01 RX ORDER — BUPROPION HYDROCHLORIDE 300 MG/1
300 TABLET ORAL EVERY MORNING
DISCHARGE
Start: 2024-01-01

## 2024-01-01 RX ORDER — BUPROPION HYDROCHLORIDE 150 MG/1
300 TABLET ORAL EVERY MORNING
Status: DISCONTINUED | OUTPATIENT
Start: 2024-01-01 | End: 2024-01-01 | Stop reason: HOSPADM

## 2024-01-01 RX ORDER — BUPROPION HYDROCHLORIDE 150 MG/1
TABLET ORAL
COMMUNITY
Start: 2023-01-01 | End: 2024-01-01

## 2024-01-01 RX ORDER — IPRATROPIUM BROMIDE AND ALBUTEROL SULFATE 2.5; .5 MG/3ML; MG/3ML
3 SOLUTION RESPIRATORY (INHALATION) ONCE
Status: COMPLETED | OUTPATIENT
Start: 2024-01-01 | End: 2024-01-01

## 2024-01-01 RX ORDER — LEVOTHYROXINE SODIUM 25 UG/1
CAPSULE ORAL
COMMUNITY
Start: 2023-01-01 | End: 2024-01-01

## 2024-01-01 RX ORDER — METOPROLOL SUCCINATE 50 MG/1
50 TABLET, EXTENDED RELEASE ORAL DAILY
Status: DISCONTINUED | OUTPATIENT
Start: 2024-01-01 | End: 2024-01-01 | Stop reason: HOSPADM

## 2024-01-01 RX ORDER — NYSTATIN 100000/ML
SUSPENSION, ORAL (FINAL DOSE FORM) ORAL
Status: ON HOLD | COMMUNITY
Start: 2023-01-01 | End: 2024-01-01

## 2024-01-01 RX ORDER — MONTELUKAST SODIUM 10 MG/1
TABLET ORAL
COMMUNITY
Start: 2023-01-01 | End: 2024-01-01

## 2024-01-01 RX ORDER — PREGABALIN 50 MG/1
CAPSULE ORAL
COMMUNITY
Start: 2023-01-01 | End: 2024-01-01

## 2024-01-01 RX ORDER — ACETAMINOPHEN 325 MG/1
650 TABLET ORAL EVERY 4 HOURS PRN
Status: DISCONTINUED | OUTPATIENT
Start: 2024-01-01 | End: 2024-01-01 | Stop reason: HOSPADM

## 2024-01-01 RX ORDER — FLUTICASONE FUROATE, UMECLIDINIUM BROMIDE AND VILANTEROL TRIFENATATE 100; 62.5; 25 UG/1; UG/1; UG/1
POWDER RESPIRATORY (INHALATION)
COMMUNITY
Start: 2023-01-01 | End: 2024-01-01

## 2024-01-01 RX ORDER — CEFTRIAXONE 2 G/1
2 INJECTION, POWDER, FOR SOLUTION INTRAMUSCULAR; INTRAVENOUS DAILY
Status: COMPLETED | OUTPATIENT
Start: 2024-01-01 | End: 2024-01-01

## 2024-01-01 RX ORDER — OXYBUTYNIN CHLORIDE 5 MG/1
5 TABLET, EXTENDED RELEASE ORAL DAILY
Status: DISCONTINUED | OUTPATIENT
Start: 2024-01-01 | End: 2024-01-01

## 2024-01-01 RX ORDER — ESCITALOPRAM OXALATE 10 MG/1
20 TABLET ORAL DAILY
Status: DISCONTINUED | OUTPATIENT
Start: 2024-01-01 | End: 2024-01-01 | Stop reason: HOSPADM

## 2024-01-01 RX ORDER — ALBUTEROL SULFATE 90 UG/1
2 AEROSOL, METERED RESPIRATORY (INHALATION) ONCE
Status: COMPLETED | OUTPATIENT
Start: 2024-01-01 | End: 2024-01-01

## 2024-01-01 RX ORDER — FLUTICASONE FUROATE AND VILANTEROL 100; 25 UG/1; UG/1
1 POWDER RESPIRATORY (INHALATION) DAILY
Status: DISCONTINUED | OUTPATIENT
Start: 2024-01-01 | End: 2024-01-01 | Stop reason: HOSPADM

## 2024-01-01 RX ORDER — LORAZEPAM 2 MG/ML
0.5 INJECTION INTRAMUSCULAR
Status: DISCONTINUED | OUTPATIENT
Start: 2024-01-01 | End: 2024-01-01

## 2024-01-01 RX ORDER — PREGABALIN 50 MG/1
CAPSULE ORAL
Status: SHIPPED | DISCHARGE
Start: 2024-01-01 | End: 2024-01-01 | Stop reason: DRUGHIGH

## 2024-01-01 RX ORDER — PREGABALIN 50 MG/1
CAPSULE ORAL
COMMUNITY
End: 2024-01-01

## 2024-01-01 RX ORDER — AZITHROMYCIN 250 MG/1
500 TABLET, FILM COATED ORAL DAILY
Qty: 2 TABLET | Refills: 0 | Status: COMPLETED | OUTPATIENT
Start: 2024-01-01 | End: 2024-01-01

## 2024-01-01 RX ORDER — PREDNISONE 20 MG/1
40 TABLET ORAL DAILY
Status: COMPLETED | OUTPATIENT
Start: 2024-01-01 | End: 2024-01-01

## 2024-01-01 RX ORDER — ESCITALOPRAM OXALATE 20 MG/1
20 TABLET ORAL DAILY
DISCHARGE
Start: 2024-01-01

## 2024-01-01 RX ORDER — ACETAMINOPHEN 650 MG/1
650 SUPPOSITORY RECTAL EVERY 4 HOURS PRN
Status: DISCONTINUED | OUTPATIENT
Start: 2024-01-01 | End: 2024-01-01 | Stop reason: HOSPADM

## 2024-01-01 RX ORDER — MONTELUKAST SODIUM 10 MG/1
TABLET ORAL
DISCHARGE
Start: 2024-01-01

## 2024-01-01 RX ORDER — LEVALBUTEROL INHALATION SOLUTION 1.25 MG/3ML
1.25 SOLUTION RESPIRATORY (INHALATION) 4 TIMES DAILY
Status: DISCONTINUED | OUTPATIENT
Start: 2024-01-01 | End: 2024-01-01

## 2024-01-01 RX ORDER — VITAMIN B COMPLEX
TABLET ORAL
Status: ON HOLD | COMMUNITY
Start: 2023-01-01 | End: 2024-01-01

## 2024-01-01 RX ORDER — CEFTRIAXONE 2 G/1
2 INJECTION, POWDER, FOR SOLUTION INTRAMUSCULAR; INTRAVENOUS DAILY
Qty: 60 ML | Refills: 0 | Status: DISCONTINUED | OUTPATIENT
Start: 2024-01-01 | End: 2024-01-01

## 2024-01-01 RX ORDER — LEVALBUTEROL INHALATION SOLUTION 1.25 MG/3ML
1.25 SOLUTION RESPIRATORY (INHALATION) EVERY 4 HOURS PRN
DISCHARGE
Start: 2024-01-01

## 2024-01-01 RX ORDER — LEVALBUTEROL INHALATION SOLUTION 1.25 MG/3ML
1.25 SOLUTION RESPIRATORY (INHALATION)
Status: DISCONTINUED | OUTPATIENT
Start: 2024-01-01 | End: 2024-01-01

## 2024-01-01 RX ORDER — CALCIUM CARBONATE 500 MG/1
1000 TABLET, CHEWABLE ORAL 4 TIMES DAILY PRN
Status: DISCONTINUED | OUTPATIENT
Start: 2024-01-01 | End: 2024-01-01 | Stop reason: HOSPADM

## 2024-01-01 RX ORDER — PREDNISONE 20 MG/1
40 TABLET ORAL DAILY
Qty: 4 TABLET | Refills: 0 | Status: SHIPPED | OUTPATIENT
Start: 2024-01-01 | End: 2024-01-01

## 2024-01-01 RX ORDER — AMOXICILLIN 250 MG
1 CAPSULE ORAL 2 TIMES DAILY PRN
Status: DISCONTINUED | OUTPATIENT
Start: 2024-01-01 | End: 2024-01-01 | Stop reason: HOSPADM

## 2024-01-01 RX ORDER — ESCITALOPRAM OXALATE 20 MG/1
TABLET ORAL
COMMUNITY
Start: 2023-01-01 | End: 2024-01-01

## 2024-01-01 RX ORDER — OLANZAPINE 5 MG/1
5 TABLET, ORALLY DISINTEGRATING ORAL 2 TIMES DAILY
Status: DISCONTINUED | OUTPATIENT
Start: 2024-01-01 | End: 2024-01-01 | Stop reason: HOSPADM

## 2024-01-01 RX ORDER — METHYLPREDNISOLONE SODIUM SUCCINATE 125 MG/2ML
60 INJECTION, POWDER, LYOPHILIZED, FOR SOLUTION INTRAMUSCULAR; INTRAVENOUS EVERY 6 HOURS
Status: DISCONTINUED | OUTPATIENT
Start: 2024-01-01 | End: 2024-01-01

## 2024-01-01 RX ORDER — MAGNESIUM SULFATE HEPTAHYDRATE 40 MG/ML
2 INJECTION, SOLUTION INTRAVENOUS ONCE
Status: COMPLETED | OUTPATIENT
Start: 2024-01-01 | End: 2024-01-01

## 2024-01-01 RX ORDER — CEFTRIAXONE 2 G/1
2 INJECTION, POWDER, FOR SOLUTION INTRAMUSCULAR; INTRAVENOUS EVERY 24 HOURS
Qty: 100 ML | Refills: 0 | Status: DISCONTINUED | OUTPATIENT
Start: 2024-01-01 | End: 2024-01-01

## 2024-01-01 RX ORDER — BIOTIN 800 MCG
TABLET ORAL
Status: ON HOLD | COMMUNITY
Start: 2023-01-01 | End: 2024-01-01

## 2024-01-01 RX ORDER — LEVOTHYROXINE SODIUM 50 UG/1
50 TABLET ORAL DAILY
DISCHARGE
Start: 2024-01-01

## 2024-01-01 RX ORDER — IOPAMIDOL 755 MG/ML
47 INJECTION, SOLUTION INTRAVASCULAR ONCE
Status: COMPLETED | OUTPATIENT
Start: 2024-01-01 | End: 2024-01-01

## 2024-01-01 RX ORDER — LEVOTHYROXINE SODIUM 50 UG/1
50 TABLET ORAL DAILY
Status: DISCONTINUED | OUTPATIENT
Start: 2024-01-01 | End: 2024-01-01 | Stop reason: HOSPADM

## 2024-01-01 RX ORDER — OLANZAPINE 5 MG/1
TABLET, ORALLY DISINTEGRATING ORAL
Status: COMPLETED
Start: 2024-01-01 | End: 2024-01-01

## 2024-01-01 RX ORDER — POLYETHYLENE GLYCOL 3350 17 G/17G
17 POWDER, FOR SOLUTION ORAL 2 TIMES DAILY PRN
Status: DISCONTINUED | OUTPATIENT
Start: 2024-01-01 | End: 2024-01-01 | Stop reason: HOSPADM

## 2024-01-01 RX ORDER — LEVALBUTEROL INHALATION SOLUTION 1.25 MG/3ML
1.25 SOLUTION RESPIRATORY (INHALATION) ONCE
Status: DISCONTINUED | OUTPATIENT
Start: 2024-01-01 | End: 2024-01-01

## 2024-01-01 RX ORDER — OLANZAPINE 5 MG/1
5 TABLET ORAL
Qty: 20 TABLET | Refills: 0 | Status: SHIPPED | OUTPATIENT
Start: 2024-01-01 | End: 2024-02-20

## 2024-01-01 RX ORDER — LIDOCAINE 40 MG/G
CREAM TOPICAL
Status: DISCONTINUED | OUTPATIENT
Start: 2024-01-01 | End: 2024-01-01 | Stop reason: HOSPADM

## 2024-01-01 RX ORDER — VANCOMYCIN HYDROCHLORIDE 1 G/200ML
1000 INJECTION, SOLUTION INTRAVENOUS
Status: DISCONTINUED | OUTPATIENT
Start: 2024-01-01 | End: 2024-01-01

## 2024-01-01 RX ORDER — ALBUTEROL SULFATE 90 UG/1
2 AEROSOL, METERED RESPIRATORY (INHALATION) EVERY 6 HOURS PRN
Status: DISCONTINUED | OUTPATIENT
Start: 2024-01-01 | End: 2024-01-01 | Stop reason: HOSPADM

## 2024-01-01 RX ORDER — SUMATRIPTAN 50 MG/1
50 TABLET, FILM COATED ORAL
Status: COMPLETED | OUTPATIENT
Start: 2024-01-01 | End: 2024-01-01

## 2024-01-01 RX ORDER — OLANZAPINE 5 MG/1
5 TABLET, ORALLY DISINTEGRATING ORAL AT BEDTIME
Status: DISCONTINUED | OUTPATIENT
Start: 2024-01-01 | End: 2024-01-01

## 2024-01-01 RX ORDER — LEVALBUTEROL INHALATION SOLUTION 1.25 MG/3ML
1.25 SOLUTION RESPIRATORY (INHALATION) EVERY 4 HOURS PRN
Qty: 90 ML | Refills: 0 | Status: SHIPPED | OUTPATIENT
Start: 2024-01-01 | End: 2024-01-01

## 2024-01-01 RX ORDER — AMOXICILLIN 250 MG
2 CAPSULE ORAL 2 TIMES DAILY PRN
Status: DISCONTINUED | OUTPATIENT
Start: 2024-01-01 | End: 2024-01-01 | Stop reason: HOSPADM

## 2024-01-01 RX ORDER — LEVOTHYROXINE SODIUM 50 UG/1
50 TABLET ORAL DAILY
Qty: 90 TABLET | Refills: 1 | Status: ON HOLD | OUTPATIENT
Start: 2024-01-01 | End: 2024-01-01

## 2024-01-01 RX ADMIN — METHYLPREDNISOLONE SODIUM SUCCINATE 62.5 MG: 125 INJECTION INTRAMUSCULAR; INTRAVENOUS at 20:32

## 2024-01-01 RX ADMIN — METOPROLOL SUCCINATE 50 MG: 50 TABLET, EXTENDED RELEASE ORAL at 07:47

## 2024-01-01 RX ADMIN — PREGABALIN 100 MG: 75 CAPSULE ORAL at 20:26

## 2024-01-01 RX ADMIN — PREGABALIN 50 MG: 25 CAPSULE ORAL at 08:29

## 2024-01-01 RX ADMIN — OLANZAPINE 5 MG: 5 TABLET, ORALLY DISINTEGRATING ORAL at 01:12

## 2024-01-01 RX ADMIN — OLANZAPINE 5 MG: 5 TABLET, ORALLY DISINTEGRATING ORAL at 19:09

## 2024-01-01 RX ADMIN — OLANZAPINE 5 MG: 5 TABLET, ORALLY DISINTEGRATING ORAL at 14:56

## 2024-01-01 RX ADMIN — ESCITALOPRAM OXALATE 20 MG: 10 TABLET ORAL at 08:52

## 2024-01-01 RX ADMIN — FLUTICASONE FUROATE AND VILANTEROL TRIFENATATE 1 PUFF: 100; 25 POWDER RESPIRATORY (INHALATION) at 08:29

## 2024-01-01 RX ADMIN — APIXABAN 5 MG: 5 TABLET, FILM COATED ORAL at 07:47

## 2024-01-01 RX ADMIN — LEVALBUTEROL HYDROCHLORIDE 1.25 MG: 1.25 SOLUTION RESPIRATORY (INHALATION) at 19:28

## 2024-01-01 RX ADMIN — METOPROLOL SUCCINATE 50 MG: 50 TABLET, EXTENDED RELEASE ORAL at 08:02

## 2024-01-01 RX ADMIN — MONTELUKAST 10 MG: 10 TABLET, FILM COATED ORAL at 20:27

## 2024-01-01 RX ADMIN — PREGABALIN 50 MG: 25 CAPSULE ORAL at 07:47

## 2024-01-01 RX ADMIN — PREGABALIN 100 MG: 75 CAPSULE ORAL at 20:34

## 2024-01-01 RX ADMIN — ESCITALOPRAM OXALATE 20 MG: 10 TABLET ORAL at 07:47

## 2024-01-01 RX ADMIN — LEVOTHYROXINE SODIUM 50 MCG: 0.05 TABLET ORAL at 07:47

## 2024-01-01 RX ADMIN — OLANZAPINE 5 MG: 5 TABLET, ORALLY DISINTEGRATING ORAL at 08:29

## 2024-01-01 RX ADMIN — APIXABAN 5 MG: 5 TABLET, FILM COATED ORAL at 19:11

## 2024-01-01 RX ADMIN — BUPROPION HYDROCHLORIDE 300 MG: 150 TABLET, EXTENDED RELEASE ORAL at 08:52

## 2024-01-01 RX ADMIN — Medication 25 MCG: at 08:51

## 2024-01-01 RX ADMIN — METOPROLOL SUCCINATE 50 MG: 50 TABLET, EXTENDED RELEASE ORAL at 15:28

## 2024-01-01 RX ADMIN — BUPROPION HYDROCHLORIDE 300 MG: 150 TABLET, EXTENDED RELEASE ORAL at 07:46

## 2024-01-01 RX ADMIN — IPRATROPIUM BROMIDE AND ALBUTEROL SULFATE 3 ML: .5; 3 SOLUTION RESPIRATORY (INHALATION) at 14:02

## 2024-01-01 RX ADMIN — LEVALBUTEROL HYDROCHLORIDE 1.25 MG: 1.25 SOLUTION RESPIRATORY (INHALATION) at 16:50

## 2024-01-01 RX ADMIN — APIXABAN 5 MG: 5 TABLET, FILM COATED ORAL at 08:22

## 2024-01-01 RX ADMIN — FLUTICASONE FUROATE AND VILANTEROL TRIFENATATE 1 PUFF: 100; 25 POWDER RESPIRATORY (INHALATION) at 08:02

## 2024-01-01 RX ADMIN — FLUTICASONE FUROATE AND VILANTEROL TRIFENATATE 1 PUFF: 100; 25 POWDER RESPIRATORY (INHALATION) at 07:47

## 2024-01-01 RX ADMIN — ALBUTEROL SULFATE 2 PUFF: 108 INHALANT RESPIRATORY (INHALATION) at 14:57

## 2024-01-01 RX ADMIN — SODIUM CHLORIDE, POTASSIUM CHLORIDE, SODIUM LACTATE AND CALCIUM CHLORIDE 500 ML: 600; 310; 30; 20 INJECTION, SOLUTION INTRAVENOUS at 22:19

## 2024-01-01 RX ADMIN — LEVALBUTEROL HYDROCHLORIDE 1.25 MG: 1.25 SOLUTION RESPIRATORY (INHALATION) at 12:41

## 2024-01-01 RX ADMIN — BUPROPION HYDROCHLORIDE 300 MG: 150 TABLET, EXTENDED RELEASE ORAL at 08:29

## 2024-01-01 RX ADMIN — ESCITALOPRAM OXALATE 20 MG: 10 TABLET ORAL at 08:02

## 2024-01-01 RX ADMIN — LEVALBUTEROL HYDROCHLORIDE 1.25 MG: 1.25 SOLUTION RESPIRATORY (INHALATION) at 08:46

## 2024-01-01 RX ADMIN — PREGABALIN 50 MG: 25 CAPSULE ORAL at 08:02

## 2024-01-01 RX ADMIN — ACETAMINOPHEN 650 MG: 325 TABLET, FILM COATED ORAL at 09:12

## 2024-01-01 RX ADMIN — LEVALBUTEROL HYDROCHLORIDE 1.25 MG: 1.25 SOLUTION RESPIRATORY (INHALATION) at 20:52

## 2024-01-01 RX ADMIN — ESCITALOPRAM OXALATE 20 MG: 10 TABLET ORAL at 08:29

## 2024-01-01 RX ADMIN — LEVALBUTEROL HYDROCHLORIDE 1.25 MG: 1.25 SOLUTION RESPIRATORY (INHALATION) at 13:18

## 2024-01-01 RX ADMIN — UMECLIDINIUM 1 PUFF: 62.5 AEROSOL, POWDER ORAL at 08:29

## 2024-01-01 RX ADMIN — SODIUM CHLORIDE, POTASSIUM CHLORIDE, SODIUM LACTATE AND CALCIUM CHLORIDE 500 ML: 600; 310; 30; 20 INJECTION, SOLUTION INTRAVENOUS at 01:39

## 2024-01-01 RX ADMIN — OLANZAPINE 5 MG: 5 TABLET, ORALLY DISINTEGRATING ORAL at 02:30

## 2024-01-01 RX ADMIN — OLANZAPINE 5 MG: 5 TABLET, ORALLY DISINTEGRATING ORAL at 12:32

## 2024-01-01 RX ADMIN — LEVALBUTEROL HYDROCHLORIDE 1.25 MG: 1.25 SOLUTION RESPIRATORY (INHALATION) at 00:22

## 2024-01-01 RX ADMIN — LEVOTHYROXINE SODIUM 50 MCG: 0.05 TABLET ORAL at 08:53

## 2024-01-01 RX ADMIN — BUPROPION HYDROCHLORIDE 300 MG: 150 TABLET, EXTENDED RELEASE ORAL at 08:22

## 2024-01-01 RX ADMIN — AZITHROMYCIN 500 MG: 250 TABLET, FILM COATED ORAL at 13:34

## 2024-01-01 RX ADMIN — LEVALBUTEROL HYDROCHLORIDE 1.25 MG: 1.25 SOLUTION RESPIRATORY (INHALATION) at 12:08

## 2024-01-01 RX ADMIN — FLUTICASONE FUROATE AND VILANTEROL TRIFENATATE 1 PUFF: 100; 25 POWDER RESPIRATORY (INHALATION) at 09:03

## 2024-01-01 RX ADMIN — PREDNISONE 40 MG: 20 TABLET ORAL at 08:22

## 2024-01-01 RX ADMIN — METHYLPREDNISOLONE SODIUM SUCCINATE 62.5 MG: 125 INJECTION INTRAMUSCULAR; INTRAVENOUS at 19:49

## 2024-01-01 RX ADMIN — SODIUM CHLORIDE, POTASSIUM CHLORIDE, SODIUM LACTATE AND CALCIUM CHLORIDE 500 ML: 600; 310; 30; 20 INJECTION, SOLUTION INTRAVENOUS at 21:36

## 2024-01-01 RX ADMIN — SODIUM CHLORIDE SOLN NEBU 3% 3 ML: 3 NEBU SOLN at 13:22

## 2024-01-01 RX ADMIN — LEVOTHYROXINE SODIUM 50 MCG: 0.05 TABLET ORAL at 08:20

## 2024-01-01 RX ADMIN — LEVALBUTEROL HYDROCHLORIDE 1.25 MG: 1.25 SOLUTION RESPIRATORY (INHALATION) at 16:30

## 2024-01-01 RX ADMIN — PREGABALIN 100 MG: 75 CAPSULE ORAL at 19:10

## 2024-01-01 RX ADMIN — VANCOMYCIN HYDROCHLORIDE 1000 MG: 1 INJECTION, SOLUTION INTRAVENOUS at 17:27

## 2024-01-01 RX ADMIN — PREGABALIN 50 MG: 25 CAPSULE ORAL at 08:52

## 2024-01-01 RX ADMIN — MONTELUKAST 10 MG: 10 TABLET, FILM COATED ORAL at 22:12

## 2024-01-01 RX ADMIN — ACETAMINOPHEN 650 MG: 325 TABLET, FILM COATED ORAL at 02:49

## 2024-01-01 RX ADMIN — OLANZAPINE 5 MG: 5 TABLET, ORALLY DISINTEGRATING ORAL at 17:19

## 2024-01-01 RX ADMIN — LORAZEPAM 0.5 MG: 2 INJECTION INTRAMUSCULAR; INTRAVENOUS at 18:03

## 2024-01-01 RX ADMIN — PREGABALIN 100 MG: 75 CAPSULE ORAL at 20:11

## 2024-01-01 RX ADMIN — APIXABAN 5 MG: 5 TABLET, FILM COATED ORAL at 08:29

## 2024-01-01 RX ADMIN — UMECLIDINIUM 1 PUFF: 62.5 AEROSOL, POWDER ORAL at 07:47

## 2024-01-01 RX ADMIN — AZITHROMYCIN 500 MG: 250 TABLET, FILM COATED ORAL at 14:11

## 2024-01-01 RX ADMIN — METHYLPREDNISOLONE SODIUM SUCCINATE 62.5 MG: 125 INJECTION INTRAMUSCULAR; INTRAVENOUS at 14:11

## 2024-01-01 RX ADMIN — LEVALBUTEROL HYDROCHLORIDE 1.25 MG: 1.25 SOLUTION RESPIRATORY (INHALATION) at 18:30

## 2024-01-01 RX ADMIN — LEVALBUTEROL HYDROCHLORIDE 1.25 MG: 1.25 SOLUTION RESPIRATORY (INHALATION) at 08:13

## 2024-01-01 RX ADMIN — OLANZAPINE 5 MG: 5 TABLET, ORALLY DISINTEGRATING ORAL at 09:21

## 2024-01-01 RX ADMIN — UMECLIDINIUM 1 PUFF: 62.5 AEROSOL, POWDER ORAL at 08:30

## 2024-01-01 RX ADMIN — LEVALBUTEROL HYDROCHLORIDE 1.25 MG: 1.25 SOLUTION RESPIRATORY (INHALATION) at 20:54

## 2024-01-01 RX ADMIN — ALBUTEROL SULFATE 2 PUFF: 90 AEROSOL, METERED RESPIRATORY (INHALATION) at 16:26

## 2024-01-01 RX ADMIN — LEVOTHYROXINE SODIUM 50 MCG: 0.05 TABLET ORAL at 08:29

## 2024-01-01 RX ADMIN — LEVALBUTEROL HYDROCHLORIDE 1.25 MG: 1.25 SOLUTION RESPIRATORY (INHALATION) at 19:45

## 2024-01-01 RX ADMIN — ALBUTEROL SULFATE 2 PUFF: 90 AEROSOL, METERED RESPIRATORY (INHALATION) at 09:21

## 2024-01-01 RX ADMIN — OLANZAPINE 5 MG: 5 TABLET, ORALLY DISINTEGRATING ORAL at 07:47

## 2024-01-01 RX ADMIN — APIXABAN 5 MG: 5 TABLET, FILM COATED ORAL at 20:10

## 2024-01-01 RX ADMIN — METHYLPREDNISOLONE SODIUM SUCCINATE 125 MG: 125 INJECTION, POWDER, FOR SOLUTION INTRAMUSCULAR; INTRAVENOUS at 14:06

## 2024-01-01 RX ADMIN — ACETAMINOPHEN 650 MG: 325 TABLET, FILM COATED ORAL at 03:35

## 2024-01-01 RX ADMIN — PREGABALIN 100 MG: 75 CAPSULE ORAL at 19:49

## 2024-01-01 RX ADMIN — SODIUM CHLORIDE SOLN NEBU 3% 3 ML: 3 NEBU SOLN at 08:13

## 2024-01-01 RX ADMIN — SODIUM CHLORIDE 1000 ML: 9 INJECTION, SOLUTION INTRAVENOUS at 14:54

## 2024-01-01 RX ADMIN — LEVALBUTEROL HYDROCHLORIDE 1.25 MG: 1.25 SOLUTION RESPIRATORY (INHALATION) at 16:34

## 2024-01-01 RX ADMIN — MONTELUKAST 10 MG: 10 TABLET, FILM COATED ORAL at 19:10

## 2024-01-01 RX ADMIN — LEVALBUTEROL HYDROCHLORIDE 1.25 MG: 1.25 SOLUTION RESPIRATORY (INHALATION) at 17:31

## 2024-01-01 RX ADMIN — METHYLPREDNISOLONE SODIUM SUCCINATE 62.5 MG: 125 INJECTION INTRAMUSCULAR; INTRAVENOUS at 09:06

## 2024-01-01 RX ADMIN — SODIUM CHLORIDE SOLN NEBU 3% 3 ML: 3 NEBU SOLN at 19:45

## 2024-01-01 RX ADMIN — CEFTRIAXONE SODIUM 2 G: 2 INJECTION, POWDER, FOR SOLUTION INTRAMUSCULAR; INTRAVENOUS at 14:57

## 2024-01-01 RX ADMIN — METHYLPREDNISOLONE SODIUM SUCCINATE 62.5 MG: 125 INJECTION INTRAMUSCULAR; INTRAVENOUS at 01:37

## 2024-01-01 RX ADMIN — PREDNISONE 40 MG: 20 TABLET ORAL at 07:47

## 2024-01-01 RX ADMIN — OXYBUTYNIN CHLORIDE 5 MG: 5 TABLET, EXTENDED RELEASE ORAL at 08:53

## 2024-01-01 RX ADMIN — LEVALBUTEROL HYDROCHLORIDE 1.25 MG: 1.25 SOLUTION RESPIRATORY (INHALATION) at 08:40

## 2024-01-01 RX ADMIN — APIXABAN 5 MG: 5 TABLET, FILM COATED ORAL at 20:26

## 2024-01-01 RX ADMIN — METOPROLOL SUCCINATE 50 MG: 50 TABLET, EXTENDED RELEASE ORAL at 08:29

## 2024-01-01 RX ADMIN — LEVALBUTEROL HYDROCHLORIDE 1.25 MG: 1.25 SOLUTION RESPIRATORY (INHALATION) at 08:00

## 2024-01-01 RX ADMIN — MAGNESIUM SULFATE HEPTAHYDRATE 2 G: 40 INJECTION, SOLUTION INTRAVENOUS at 17:50

## 2024-01-01 RX ADMIN — LEVALBUTEROL HYDROCHLORIDE 1.25 MG: 1.25 SOLUTION RESPIRATORY (INHALATION) at 17:01

## 2024-01-01 RX ADMIN — APIXABAN 5 MG: 5 TABLET, FILM COATED ORAL at 08:02

## 2024-01-01 RX ADMIN — AZITHROMYCIN 500 MG: 250 TABLET, FILM COATED ORAL at 08:02

## 2024-01-01 RX ADMIN — CEFTRIAXONE SODIUM 2 G: 2 INJECTION, POWDER, FOR SOLUTION INTRAMUSCULAR; INTRAVENOUS at 08:30

## 2024-01-01 RX ADMIN — PREGABALIN 50 MG: 25 CAPSULE ORAL at 08:22

## 2024-01-01 RX ADMIN — PREDNISONE 40 MG: 20 TABLET ORAL at 08:29

## 2024-01-01 RX ADMIN — LEVALBUTEROL HYDROCHLORIDE 1.25 MG: 1.25 SOLUTION RESPIRATORY (INHALATION) at 08:31

## 2024-01-01 RX ADMIN — LORAZEPAM 0.5 MG: 2 INJECTION INTRAMUSCULAR; INTRAVENOUS at 20:09

## 2024-01-01 RX ADMIN — CEFEPIME HYDROCHLORIDE 2 G: 2 INJECTION, POWDER, FOR SOLUTION INTRAVENOUS at 05:34

## 2024-01-01 RX ADMIN — IOPAMIDOL 47 ML: 755 INJECTION, SOLUTION INTRAVENOUS at 14:15

## 2024-01-01 RX ADMIN — CEFTRIAXONE SODIUM 2 G: 2 INJECTION, POWDER, FOR SOLUTION INTRAMUSCULAR; INTRAVENOUS at 12:49

## 2024-01-01 RX ADMIN — METHYLPREDNISOLONE SODIUM SUCCINATE 62.5 MG: 125 INJECTION INTRAMUSCULAR; INTRAVENOUS at 02:12

## 2024-01-01 RX ADMIN — LEVALBUTEROL HYDROCHLORIDE 1.25 MG: 1.25 SOLUTION RESPIRATORY (INHALATION) at 20:53

## 2024-01-01 RX ADMIN — LEVOTHYROXINE SODIUM 50 MCG: 0.05 TABLET ORAL at 08:02

## 2024-01-01 RX ADMIN — CEFEPIME HYDROCHLORIDE 2 G: 2 INJECTION, POWDER, FOR SOLUTION INTRAVENOUS at 16:05

## 2024-01-01 RX ADMIN — CEFTRIAXONE SODIUM 2 G: 2 INJECTION, POWDER, FOR SOLUTION INTRAMUSCULAR; INTRAVENOUS at 13:34

## 2024-01-01 RX ADMIN — UMECLIDINIUM 1 PUFF: 62.5 AEROSOL, POWDER ORAL at 09:03

## 2024-01-01 RX ADMIN — BUPROPION HYDROCHLORIDE 300 MG: 150 TABLET, EXTENDED RELEASE ORAL at 08:01

## 2024-01-01 RX ADMIN — UMECLIDINIUM 1 PUFF: 62.5 AEROSOL, POWDER ORAL at 08:02

## 2024-01-01 RX ADMIN — SODIUM CHLORIDE SOLN NEBU 3% 3 ML: 3 NEBU SOLN at 08:46

## 2024-01-01 RX ADMIN — ESCITALOPRAM OXALATE 20 MG: 10 TABLET ORAL at 08:22

## 2024-01-01 RX ADMIN — SUMATRIPTAN SUCCINATE 50 MG: 50 TABLET ORAL at 04:09

## 2024-01-01 RX ADMIN — PREDNISONE 40 MG: 20 TABLET ORAL at 08:02

## 2024-01-01 ASSESSMENT — ACTIVITIES OF DAILY LIVING (ADL)
ADLS_ACUITY_SCORE: 36
ADLS_ACUITY_SCORE: 32
ADLS_ACUITY_SCORE: 28
ADLS_ACUITY_SCORE: 47
ADLS_ACUITY_SCORE: 28
ADLS_ACUITY_SCORE: 37
ADLS_ACUITY_SCORE: 29
ADLS_ACUITY_SCORE: 32
ADLS_ACUITY_SCORE: 36
ADLS_ACUITY_SCORE: 28
ADLS_ACUITY_SCORE: 29
ADLS_ACUITY_SCORE: 28
ADLS_ACUITY_SCORE: 29
ADLS_ACUITY_SCORE: 32
ADLS_ACUITY_SCORE: 29
ADLS_ACUITY_SCORE: 36
ADLS_ACUITY_SCORE: 35
ADLS_ACUITY_SCORE: 36
ADLS_ACUITY_SCORE: 35
ADLS_ACUITY_SCORE: 32
ADLS_ACUITY_SCORE: 32
ADLS_ACUITY_SCORE: 29
ADLS_ACUITY_SCORE: 29
ADLS_ACUITY_SCORE: 28
ADLS_ACUITY_SCORE: 29
ADLS_ACUITY_SCORE: 35
ADLS_ACUITY_SCORE: 28
ADLS_ACUITY_SCORE: 32
ADLS_ACUITY_SCORE: 32
ADLS_ACUITY_SCORE: 28
ADLS_ACUITY_SCORE: 32
ADLS_ACUITY_SCORE: 28
ADLS_ACUITY_SCORE: 36
ADLS_ACUITY_SCORE: 29
ADLS_ACUITY_SCORE: 32
ADLS_ACUITY_SCORE: 28
ADLS_ACUITY_SCORE: 29
ADLS_ACUITY_SCORE: 32
ADLS_ACUITY_SCORE: 35
ADLS_ACUITY_SCORE: 32
ADLS_ACUITY_SCORE: 35
ADLS_ACUITY_SCORE: 28
ADLS_ACUITY_SCORE: 32
ADLS_ACUITY_SCORE: 35
ADLS_ACUITY_SCORE: 29
ADLS_ACUITY_SCORE: 29
ADLS_ACUITY_SCORE: 28
DEPENDENT_IADLS:: CLEANING;COOKING;LAUNDRY;SHOPPING;TRANSPORTATION
ADLS_ACUITY_SCORE: 28
ADLS_ACUITY_SCORE: 35
ADLS_ACUITY_SCORE: 29
ADLS_ACUITY_SCORE: 29
ADLS_ACUITY_SCORE: 35
ADLS_ACUITY_SCORE: 28
ADLS_ACUITY_SCORE: 37
ADLS_ACUITY_SCORE: 29
ADLS_ACUITY_SCORE: 28
ADLS_ACUITY_SCORE: 28

## 2024-01-01 ASSESSMENT — PAIN SCALES - GENERAL: PAINLEVEL: EXTREME PAIN (8)

## 2024-01-12 NOTE — PROGRESS NOTES
"  Assessment & Plan       ICD-10-CM    1. GARCIA (dyspnea on exertion)  R06.09       2. Chronic hyponatremia  E87.1 Basic metabolic panel  (Ca, Cl, CO2, Creat, Gluc, K, Na, BUN)      3. Hypothyroidism, unspecified type  E03.9 TSH with free T4 reflex      4. Hemarthrosis  M25.00       5. Chronic anticoagulation  Z79.01       6. Bronchiectasis without complication (H)  J47.9       7. Chronic respiratory failure with hypoxia, on home O2 therapy (H)  J96.11     Z99.81       8. ANDREW (generalized anxiety disorder)  F41.1       9. Unintentional weight loss  R63.4       10. Decreased appetite  R63.0       11. Tremor  R25.1 Adult Neurology  Referral            Bronchiectasis  GARCIA  Chronic hypoxic respiratory failure  Anxiety  Last time She was here, she felt like she was getting more short of breath and had more episodes of \"air hunger\".  She was getting more anxious and hyperventilating during these episodes  We had increased her metoprolol to 50 and increased her Wellbutrin to 300  Today, patient feels like the anxiety component to her breathing is improved.  She does not feel like her heart is racing at much.  Overall though she continues to feel like her breathing has gotten worse  She is now joint 3 L NC O2 in the daytime and 2 L at night  Feels extremely wiped out with any kind of movement  Has been using her inhalers consistently  Plan:  - Reviewed possibility of doing pulmonary rehab.  Had declined pulmonary rehab in the past because she was doing \"all the same things with PT\".  She is also anxious about having to secure a ride and leaving the house several times a week.  She is not sure if she would like to pursue pulmonary rehab at this time.  She will discuss this further with her pulmonologist.  - Patient seems improved from an anxiety standpoint.  Will not make any changes.  - Continue inhalers and oxygen.    Right arm hemarthrosis:  Ongoing issue for the patient  Is getting the joint aspirated once every 3 " months because she does not want to go in for shoulder replacement surgery.  Has an appointment coming up on the 22nd for an aspiration.    Chronic hyponatremia:  Will recheck sodium levels    Bilateral hand tremor (right worse than the left)  Patient notes that she has started to notice worsening of her tremors in her hands.  The right one is gotten much worse than the left.  She notices it at rest but gets worse with movement.  Plan:  - Will send to neurology    Unintentional weight loss:  Decreased appetite  Since last time of seeing her, patient has lost approximately 6 pounds  Patient knows she has very little appetite and early satiety  Has no fevers or chills associated with it  Takes a daily protein shake  Has been trying to keep up with eating but has been difficult for her  Plan:  - Reviewed with patient that her energy consumption is much higher than the average individual due to end-stage bronchiectasis and chronic hypoxic respiratory failure  - As such, patient should be focusing on eating often and consuming high protein foods  - Patient will work on her food intake.  If weight is not improved or worsened at our next meeting (2/9/2024), can perhaps add mirtazapine  -If patient develops systemic symptoms, would also need to consider malignancy rule out at that time    Hypothyroidism:  Last TSH was slightly elevated  Patient was asked to do Synthroid 25 mcg x 6 days and double up on the dose on the seventh day  Plan:  - Will recheck TSH levels       Neida Maradiaga DO  [facility] is a 78 year old, presenting for the following health issues:  Follow Up (Patient is here for a follow up on thyroid, sodium and heart rate. Patient states she has lost more weight since she was in last. She eats pretty well and try to put a protein with every meal. She is also getting shaky in her arms and hands and thinks that related to her anxiety. )      1/12/2024     9:52 AM    Additional Questions   Roomed by Bharat RIOS MA   Accompanied by Self       Review of Systems   As per HPI       Objective    /52 (BP Location: Right arm, Patient Position: Sitting, Cuff Size: Adult Regular)   Pulse 82   Temp 98.6  F (37  C) (Oral)   Wt 45.9 kg (101 lb 4.8 oz)   LMP  (LMP Unknown)   SpO2 94%   BMI 18.53 kg/m    Body mass index is 18.53 kg/m .  Physical Exam   GENERAL:  thin, chronically ill appearing  RESP: Decreased breath sounds in all lung fields, no obvious wheezing noted on exam today, , on 3 L of NC O2 with SPO2 94%.Lungs clear to auscultation - no rales, rhonchi or wheezes  CV: regular rate and rhythm, no murmur, click or rub, no peripheral edema and peripheral pulses strong  PSYCH: mentation appears normal, affect  anxious

## 2024-01-15 NOTE — TELEPHONE ENCOUNTER
Reason for Call:  Medication Directions - Clarification Request    Name of the medication : levothyroxine (SYNTHROID/LEVOTHROID) 50 MCG tablet     Other : Want to verify if dose has changed for patient?  Previous RX was for 25 mcg daily and then on 7th day patient to 50 mcg.    Issue with the new RX received is the instruction dose does not add up. If patient takes 2 tablets on 7th day that would total 100 mcg.    Sig - Route: Take 1 tablet (50 mcg) by mouth daily Then take 2 tablets (50mcg) by mouth on the 7th day. - Oral     [facility] is Requesting new RX is sent.        Call taken on 1/15/2024 at 9:29 AM by Antonette Vega

## 2024-01-20 PROBLEM — G60.9 HEREDITARY AND IDIOPATHIC NEUROPATHY, UNSPECIFIED: Status: ACTIVE | Noted: 2023-01-01

## 2024-01-20 PROBLEM — E87.1 HYPO-OSMOLALITY AND HYPONATREMIA: Status: ACTIVE | Noted: 2023-01-01

## 2024-01-20 PROBLEM — J47.9 BRONCHIECTASIS WITHOUT COMPLICATION (H): Status: ACTIVE | Noted: 2023-01-01

## 2024-01-20 PROBLEM — J96.00 ACUTE RESPIRATORY FAILURE (H): Status: ACTIVE | Noted: 2023-01-01

## 2024-01-20 PROBLEM — K44.9 DIAPHRAGMATIC HERNIA WITHOUT OBSTRUCTION: Status: ACTIVE | Noted: 2023-01-01

## 2024-01-20 PROBLEM — I26.99 OTHER PULMONARY EMBOLISM WITHOUT ACUTE COR PULMONALE (H): Status: ACTIVE | Noted: 2023-01-01

## 2024-01-20 PROBLEM — J44.9 CHRONIC OBSTRUCTIVE PULMONARY DISEASE (H): Status: ACTIVE | Noted: 2023-01-01

## 2024-01-20 PROBLEM — K57.91: Status: ACTIVE | Noted: 2023-01-01

## 2024-01-20 PROBLEM — J44.1 ACUTE EXACERBATION OF CHRONIC OBSTRUCTIVE PULMONARY DISEASE (H): Status: ACTIVE | Noted: 2023-01-01

## 2024-01-20 PROBLEM — F41.8 OTHER SPECIFIED ANXIETY DISORDERS: Status: ACTIVE | Noted: 2023-01-01

## 2024-01-20 PROBLEM — I10 ESSENTIAL HYPERTENSION: Status: ACTIVE | Noted: 2023-01-01

## 2024-01-20 PROBLEM — M85.9 DISORDER OF BONE DENSITY AND STRUCTURE, UNSPECIFIED: Status: ACTIVE | Noted: 2023-01-01

## 2024-01-20 PROBLEM — J43.9 EMPHYSEMA, UNSPECIFIED (H): Status: ACTIVE | Noted: 2023-01-01

## 2024-01-20 PROBLEM — E03.9 HYPOTHYROIDISM, UNSPECIFIED: Status: ACTIVE | Noted: 2023-01-01

## 2024-01-21 NOTE — PROGRESS NOTES
"      Scheduled per patient/ referred by Neida Churchill, DO/Tremor [R25.1], records in epic [address] [place] [address]     [phone] (H)   [phone] (M)     [email or web address]    Fiordaliza Huizar friend  [phone]    Roya Rocha     Brain MRI for disequilibrium 7/1/2018  IMPRESSION:  CONCLUSION:  HEAD MRI:   1.  Normal Head MRI.   2.  No acute infarcts, mass lesions or hemorrhage.     HEAD MRA:   1.  There is 2 mm inferomedially directed outpouching involving the distal left internal carotid artery which may represent either a small infundibulum versus a paraclinoid aneurysm. Patent intracranial arterial vasculature without flow-limiting   stenosis.        1. GARCIA (dyspnea on exertion)  R06.09         2. Chronic hyponatremia  E87.1 Basic metabolic panel  (Ca, Cl, CO2, Creat, Gluc, K, Na, BUN)       3. Hypothyroidism, unspecified type  E03.9 TSH with free T4 reflex       4. Hemarthrosis  M25.00         5. Chronic anticoagulation  Z79.01         6. Bronchiectasis without complication (H)  J47.9         7. Chronic respiratory failure with hypoxia, on home O2 therapy (H)  J96.11       Z99.81         8. ANDREW (generalized anxiety disorder)  F41.1         9. Unintentional weight loss  R63.4         10. Decreased appetite  R63.0         11. Tremor  R25.1 Adult Neurology [facility] Referral                Bronchiectasis  GARCIA  Chronic hypoxic respiratory failure  Anxiety  Last time She was here, she felt like she was getting more short of breath and had more episodes of \"air hunger\".  She was getting more anxious and hyperventilating during these episodes  We had increased her metoprolol to 50 and increased her Wellbutrin to 300  Today, patient feels like the anxiety component to her breathing is improved.  She does not feel like her heart is racing at much.  Overall though she continues to feel like her breathing has gotten worse  She is now joint 3 L NC O2 in the daytime and " "2 L at night  Feels extremely wiped out with any kind of movement  Has been using her inhalers consistently  Plan:  - Reviewed possibility of doing pulmonary rehab.  Had declined pulmonary rehab in the past because she was doing \"all the same things with PT\".  She is also anxious about having to secure a ride and leaving the house several times a week.  She is not sure if she would like to pursue pulmonary rehab at this time.  She will discuss this further with her pulmonologist.  - Patient seems improved from an anxiety standpoint.  Will not make any changes.  - Continue inhalers and oxygen.     Right arm hemarthrosis:  Ongoing issue for the patient  Is getting the joint aspirated once every 3 months because she does not want to go in for shoulder replacement surgery.  Has an appointment coming up on the 22nd for an aspiration.     Chronic hyponatremia:  Will recheck sodium levels     Bilateral hand tremor (right worse than the left)  Patient notes that she has started to notice worsening of her tremors in her hands.  The right one is gotten much worse than the left.  She notices it at rest but gets worse with movement.  Plan:  - Will send to neurology     Unintentional weight loss:  Decreased appetite  Since last time of seeing her, patient has lost approximately 6 pounds  Patient knows she has very little appetite and early satiety  Has no fevers or chills associated with it  Takes a daily protein shake  Has been trying to keep up with eating but has been difficult for her  Plan:  - Reviewed with patient that her energy consumption is much higher than the average individual due to end-stage bronchiectasis and chronic hypoxic respiratory failure  - As such, patient should be focusing on eating often and consuming high protein foods  - Patient will work on her food intake.  If weight is not improved or worsened at our next meeting (2/9/2024), can perhaps add mirtazapine  -If patient develops systemic symptoms, " would also need to consider malignancy rule out at that time     Hypothyroidism:  Last TSH was slightly elevated  Patient was asked to do Synthroid 25 mcg x 6 days and double up on the dose on the seventh day  Plan:  - Will recheck TSH levels         DO LULU [facility] HUE Tavera is a 78 year old, presenting for the following health issues:  Follow Up (Patient is here for a follow up on thyroid, sodium and heart rate. Patient states she has lost more weight since she was in last. She eats pretty well and try to put a protein with every meal. She is also getting shaky in her arms and hands and thinks that related to her anxiety. )       1/12/2024     9:52 AM   Additional Questions   Roomed by Bharat RIOS MA   Accompanied by Self         Review of Systems   As per HPI            Objective   /52 (BP Location: Right arm, Patient Position: Sitting, Cuff Size: Adult Regular)   Pulse 82   Temp 98.6  F (37  C) (Oral)   Wt 45.9 kg (101 lb 4.8 oz)   LMP  (LMP Unknown)   SpO2 94%   BMI 18.53 kg/m    Body mass index is 18.53 kg/m .  Physical Exam   GENERAL:  thin, chronically ill appearing  RESP: Decreased breath sounds in all lung fields, no obvious wheezing noted on exam today, , on 3 L of NC O2 with SPO2 94%.Lungs clear to auscultation - no rales, rhonchi or wheezes  CV: regular rate and rhythm, no murmur, click or rub, no peripheral edema and peripheral pulses strong  PSYCH: mentation appears normal, affect  anxious

## 2024-01-22 NOTE — TELEPHONE ENCOUNTER
Refill Request: Lexapro    Regarding the levothyroxine should patient still be taking 2 doses on Sunday's?

## 2024-01-26 PROBLEM — J44.1 COPD EXACERBATION (H): Status: ACTIVE | Noted: 2024-01-01

## 2024-01-26 PROBLEM — J96.01 ACUTE RESPIRATORY FAILURE WITH HYPOXIA AND HYPERCAPNIA (H): Status: ACTIVE | Noted: 2024-01-01

## 2024-01-26 PROBLEM — R04.2 HEMOPTYSIS: Status: ACTIVE | Noted: 2024-01-01

## 2024-01-26 PROBLEM — J96.02 ACUTE RESPIRATORY FAILURE WITH HYPOXIA AND HYPERCAPNIA (H): Status: ACTIVE | Noted: 2024-01-01

## 2024-01-26 NOTE — PHARMACY-VANCOMYCIN DOSING SERVICE
Pharmacy Vancomycin Initial Note  Date of Service 2024  Patient's  1945  78 year old, female    Indication: Community Acquired Pneumonia    Current estimated CrCl = Estimated Creatinine Clearance: 67.2 mL/min (based on SCr of 0.5 mg/dL).    Creatinine for last 3 days  2024:  1:40 PM Creatinine 0.40 mg/dL;  2:08 PM Creatinine POCT 0.5 mg/dL    Recent Vancomycin Level(s) for last 3 days  No results found for requested labs within last 3 days.      Vancomycin IV Administrations (past 72 hours)        No vancomycin orders with administrations in past 72 hours.                    Nephrotoxins and other renal medications (From now, onward)      Start     Dose/Rate Route Frequency Ordered Stop    24 1630  vancomycin (VANCOCIN) 1,000 mg in 200 mL dextrose intermittent infusion         1,000 mg  200 mL/hr over 1 Hours Intravenous EVERY 18 HOURS 24 1525              Contrast Orders - past 72 hours (72h ago, onward)      Start     Dose/Rate Route Frequency Stop    24 1415  iopamidol (ISOVUE-370) solution 47 mL         47 mL Intravenous ONCE 24 1415            [employer] Prediction of Planned Initial Vancomycin Regimen    Regimen: 1000 mg IV every 18 hours.  Start time: 15:24 on 2024  Exposure target: AUC24 (range)400-600 mg/L.hr   AUC24,ss: 453 mg/L.hr  Probability of AUC24 > 400: 65 %  Ctrough,ss: 12.4 mg/L  Probability of Ctrough,ss > 20: 13 %  Probability of nephrotoxicity (Lodise JONAH ): 8 %        Plan:  Start vancomycin 1000 mg IV q18h.   Vancomycin monitoring method: AUC  Vancomycin therapeutic monitoring goal: 400-600 mg*h/L  Pharmacy will check vancomycin levels as appropriate in 1-3 Days.    Serum creatinine levels will be ordered daily for the first week of therapy and at least twice weekly for subsequent weeks.      Pratima Odonnell, PharmD

## 2024-01-26 NOTE — ED TRIAGE NOTES
BIBA for SOB and Hemoptysis, 02 stats in the low 80's. Pt has had SOB for a week and then started coughing up blood this morning.  EMS thought she may have aspirated     EMS prehospital treatment     1 DUO NEB     NRB 15LPM

## 2024-01-26 NOTE — LETTER
[facility] [address] [address]  Phone: [phone]    January 29, 2024        To whom it may concern:        Roya Stiles's sister  was admitted to [facility] on 1/26/23. She and her  were present throughout her hospitalization, especially as her condition was originally guarded. Please excuse them from prior obligations during this time.       Please contact me for questions or concerns.      Sincerely,      Kyree Bailey MD

## 2024-01-26 NOTE — H&P
Resident/Fellow Attestation   I, Shira Aiken MD, was present with the medical/KRYSTAL student who participated in the service and in the documentation of the note.  I have verified the history and personally performed the physical exam and medical decision making.  I agree with the assessment and plan of care as documented in the note.      Shira Aiken MD  PGY2  Date of Service (when I saw the patient): 01/26/24    [facility]    History and Physical - Medicine Service, [facility] TEAM 3       Date of Admission:  1/26/2024    Assessment & Plan      Fiordaliza Najera is a 78 year old female with history of COPD, pulmonary embolism (on eliquis), right arm hemarthrosis, chronic hyponatremia, hypothyroidism, and anxiety admitted on 1/26/2024 for shortness of breath and hemoptysis concerning for COPD exacerbation and community acquired pneumonia.    # COPD Exacerbation  # Acute on chronic  hypoxic hypercarbic respiratory failure  # History of Achromobacter Xylosoxidans Respiratory Infections   # Leukocytosis  # Hemoptysis  # Lung nodules 2/2 to bronchiectasis  Patient presenting with increasing oxygen needs over the past week with recent increase in home oxygen from 2 to 3 L. Patient brought to ED by ambulance due to development of significant shortness or breath and acute hemoptysis on 1/26. ED workup included CT chest showing diffuse areas of mucus plugging and other nodularity mostly unchanged from previous CT from July 2023; no evidence of pulmonary embolism. ECG showing sinus tachycardia and left axis deviation. Troponin 18. Labs remarkable for elevated WBC at 26.7. In ED, patient given IV methylprednisolone 125 mg, 3 doses of Duoneb, 2 doses of Xopenex, IV fluids, and placed on high-flow nasal cannula given concern for COPD exacerbation. Initially on HFNC but transitioned to BiPAP for work of breathing.   - IV Methylprednisolone 62.5 mg q6hrs  - Duoneb x 3 in ED, continue  with Xopenex q4h with RT   - Abx:IV Cefepime 2000 mg q12hrs and IV Vancomycin 1000 mg q18hrs  - MRSA nares, can discontinue vancomycin if negative   - BiPAP via RT   - IV Magnesium sulfate 2 g for 1 dose    # Unilateral lower extremity swelling  # History of acute pulmonary embolism  History of pulmonary embolism on 7/28/2023 and was discharged on eliquis. Upon presenting to the ED on 1/26, patient with mild right lower extremity swelling without bruising or signs of infection. Lower extremity ultrasound in ED without evidence of DVT. CT PE showing no evidence of pulmonary embolism at present. Will continue to monitor for changes.  - Holding eliquis given her current hemoptysis    #Tachycardia   No documentation of prior arrhythmias. Appears to be taking primarily for sinus tachycardia.   - Continue PTA Metoprolol. 50 mg daily    # Anxiety  # Depression  - Continue PTA Bupropion 300 mg daily and Escitalopram 20 mg daily  - PRN Zyprexa 5 mg daily PRN for agitation    # Urinary retention  - Continue PTA Oxybutynin 5 mg daily    # Hypothyroidism  - Continue PTA levothyroxine 50 mcg daily    # Acute on Chronic Hyponatremia  Sodium within normal limits at admit at 136. Will continue to monitor.           Diet: Combination Diet Regular Diet Adult    DVT Prophylaxis: Pneumatic Compression Devices  Mobley Catheter: Not present  Fluids: None   Lines: None     Cardiac Monitoring: ACTIVE order. Indication: Tachyarrhythmias, acute (48 hours)  Code Status: No CPR- Do NOT Intubate      Clinically Significant Risk Factors Present on Admission          # Hypocalcemia: Lowest Ca = 8 mg/dL in last 2 days, will monitor and replace as appropriate     # Hypoalbuminemia: Lowest albumin = 3.3 g/dL at 1/26/2024  1:40 PM, will monitor as appropriate  # Drug Induced Coagulation Defect: home medication list includes an anticoagulant medication    # Hypertension: Noted on problem list   # Non-Invasive mechanical ventilation: current O2 Device:  High Flow Nasal Cannula (HFNC)  # Acute hypoxic respiratory failure: continue supplemental O2 as needed                Disposition Plan      Expected Discharge Date: 01/28/2024                The patient's care was discussed with the Attending Physician, Dr. Hernández, Chief Resident/Fellow Dr. Aiken, and Patient.      Sarah Stafford, MS3  Medical Student  Medicine Service, [address]  Securely message with [employer] (more info)  Text page via [facility] Paging/Directory   See signed in provider for up to date coverage information  ______________________________________________________________________    Chief Complaint   Shortness of Breath and Hemoptysis    History is obtained from the patient    History of Present Illness   Fiordaliza Najera is a 78 year old female who has a history of COPD, pulmonary embolism (on eliquis), right arm hemarthrosis, chronic hyponatremia, hypothyroidism, and anxiety admitted on 1/26/2024 after presenting to the emergency department by ambulance for shortness of breath and hemoptysis. The patient reports that this afternoon she developed worsening shortness of breath and started to cough up blood. In the past she has occasionally coughed up green colored sputum and mildly blood tinged sputum, but she has never coughed up significant amounts of blood before. This afternoon she reports filling a tissue box with frankly bloody sputum. She is on home oxygen and recently increased her home oxygen from 2 to 3 L.She is on an inhaler and nebulizer regimen at home. She denies bloody nose, abdominal pain, diarrhea/constipation, or blood in her urine. Denies recent fevers but reports feeling very warm in ED. Able to ambulate at home without walker but needs to sit and rest frequently. Uses a walker when outside of home.     ED Course: Afebrile. Heart rates 120-150s. Requiring 13 L NRB mask. Received 1 L NS bolus, IV solumedrol, and  duonebs x 3. Started on broad spectrum antibiotics. Chest CT negative for PE, active bleed, or airway mass.       Past Medical History    Past Medical History:   Diagnosis Date    Anxiety 2021    COPD (chronic obstructive pulmonary disease) (H)     Diverticulosis     Hiatal hernia     Mild asthma     Neuropathy     Osteopenia     Temporomandibular joint (TMJ) pain      Past Surgical History   Past Surgical History:   Procedure Laterality Date    ANTERIOR / POSTERIOR COMBINED FUSION LUMBAR SPINE      BRONCHOSCOPY (RIGID OR FLEXIBLE), DIAGNOSTIC N/A 2021    Procedure: BRONCHOSCOPY, WITH BRONCHOALVEOLAR LAVAGE;  Surgeon: Randall Lorenz MD;  Location: [facility]    HYSTERECTOMY      PICC SINGLE LUMEN PLACEMENT  2021         PICC SINGLE LUMEN PLACEMENT Right 2022    Right basilic, 38 cm    RETINAL LASER PROCEDURE Left 10/04/2016    SALPINGOOPHORECTOMY      TOTAL KNEE ARTHROPLASTY       Prior to Admission Medications   Prior to Admission Medications   Prescriptions Last Dose Informant Patient Reported? Taking?   Biotin 5000 MCG TABS   Yes No   Sig: Take 1 tablet by mouth daily   Biotin 800 MCG TABS   Yes No   Sig: Daily   Fluticasone-Umeclidin-Vilant (TRELEGY ELLIPTA) 100-62.5-25 MCG/ACT oral inhaler   No No   Sig: Inhale 1 puff into the lungs daily   Lactobacillus rhamnosus GG (CULTURELLE) 10-15 Billion cell capsule  Self Yes No   Sig: Take 1 capsule by mouth every evening    NONFORMULARY   Yes No   Si liters continuous oxygen   OXYGEN-AIR DELIVERY SYSTEMS MISC  Self Yes No   Sig: [OXYGEN-AIR DELIVERY SYSTEMS MISC] Use 2 L As Directed. 2L with activity and at night  [employer]   SUMAtriptan (IMITREX) 50 MG tablet   No No   Sig: Take 1 tablet (50 mg) by mouth as needed for migraine,may repeat after 2 hours if needed;  mg/24 hours   Vitamin D3 (CHOLECALCIFEROL) 25 mcg (1000 units) tablet   Yes No   Sig: daily   albuterol (PROAIR HFA/PROVENTIL HFA/VENTOLIN HFA) 108 (90 Base) MCG/ACT inhaler    No No   Sig: Inhale 2 puffs into the lungs every 6 hours as needed   albuterol (PROVENTIL) (2.5 MG/3ML) 0.083% neb solution   No No   Sig: Take 1 vial (2.5 mg) by nebulization every 4 hours as needed for shortness of breath or wheezing   apixaban ANTICOAGULANT (ELIQUIS) 5 MG tablet   No No   Sig: Take 1 tablet (5 mg) by mouth 2 times daily   buPROPion (WELLBUTRIN XL) 300 MG 24 hr tablet   No No   Sig: Take 1 tablet (300 mg) by mouth every morning   calcium-vitamin D (CALCIUM-VITAMIN D) 500 mg(1,250mg) -200 unit per tablet  Self Yes No   Sig: Take 1 tablet by mouth 2 times daily    carbamide peroxide (DEBROX) 6.5 % otic solution   Yes No   Si drops bedtime right ear   cetirizine (ZYRTEC) 5 MG tablet  Self Yes No   Sig: Take 5 mg by mouth daily   cholecalciferol (VITAMIN D3) 25 mcg (1000 units) capsule   Yes No   Sig: Take 1 capsule (25 mcg) by mouth daily   escitalopram (LEXAPRO) 20 MG tablet   No No   Sig: Take 1 tablet (20 mg) by mouth daily   levothyroxine (SYNTHROID/LEVOTHROID) 50 MCG tablet   No No   Sig: Take 1 tablet (50 mcg) by mouth daily   metoprolol succinate ER (TOPROL XL) 50 MG 24 hr tablet   No No   Sig: Take 1 tablet (50 mg) by mouth daily   montelukast (SINGULAIR) 10 MG tablet   No No   Sig: [MONTELUKAST (SINGULAIR) 10 MG TABLET] TAKE 1 TABLET(10 MG) BY MOUTH AT BEDTIME   multivitamin therapeutic (THERAGRAN) tablet  Self Yes No   Sig: [MULTIVITAMIN THERAPEUTIC (THERAGRAN) TABLET] Take 1 tablet by mouth daily.   nystatin (MYCOSTATIN) 242959 UNIT/ML suspension   Yes No   Siml 4/day   oxyBUTYnin ER (DITROPAN XL) 5 MG 24 hr tablet   No No   Sig: Take 1 tablet (5 mg) by mouth daily   pregabalin (LYRICA) 50 MG capsule   No No   Sig: Take 1 tab in the AM and 2 in the evening   sodium chloride (NEBUSAL) 3 % neb solution   No No   Sig: Take 3 mLs by nebulization 2 times daily   sodium chloride (OCEAN) 0.65 % nasal spray   Yes No   Si sprays in each nostril daily      Facility-Administered  Medications: None        Social History   I have reviewed this patient's social history and updated it with pertinent information if needed.  Social History     Tobacco Use    Smoking status: Never     Passive exposure: Past    Smokeless tobacco: Never   Vaping Use    Vaping Use: Never used   Substance Use Topics    Alcohol use: No    Drug use: Never    She is a sister of [employer]. Lives in her own apartment at an assisted living facility.     Physical Exam   Vital Signs: Temp: 98.6  F (37  C) Temp src: Oral BP: (!) 139/95 Pulse: 118   Resp: 29 SpO2: 100 % O2 Device: High Flow Nasal Cannula (HFNC) Oxygen Delivery: 13 LPM  Weight: 0 lbs 0 oz    Constitutional: awake, alert, cooperative  Respiratory: tachypnea, rhonchi throughout, and mild wheezing  Cardiovascular: tachycardia, regular rhythm, no murmur noted  Skin: no bruising, rash, redness on lower extremities bilaterally  Musculoskeletal: mild swelling of right lower extremity compared to left  Neurologic: Awake, alert, oriented to situation; appropriately answers questions  Neuropsychiatric: General: normal and normal eye contact    Medical Decision Making       Please see A&P for additional details of medical decision making.      Data     I have personally reviewed the following data over the past 24 hrs:    26.7 (H)  \   11.7   / 376     136 101 20.5 /  121 (H)   4.1 27 0.5 \     ALT: 14 AST: 44 AP: 77 TBILI: 0.3   ALB: 3.3 (L) TOT PROTEIN: 6.4 LIPASE: N/A     Trop: 18 (H) BNP: N/A     Procal: N/A CRP: 63.70 (H) Lactic Acid: 0.7       INR:  1.35 (H) PTT:  N/A   D-dimer:  N/A Fibrinogen:  N/A       Imaging results reviewed over the past 24 hrs:   Recent Results (from the past 24 hour(s))   CT Chest Pulmonary Embolism w Contrast    Narrative    CT chest pulmonary angiogram with contrast    INDICATION: Hemoptysis. Shortness of breath.    COMPARISON: Similar study dated 7/24/2023    Contrast: 47 mL intravenous Isovue-370    FINDINGS:  shows extensive spinal  rodding.  Severe upper thoracic kyphosis. Mild descending thoracic aortic  atherosclerotic calcifications. Aorta normal size. Pulmonary artery  caliber is normal at 2.2 cm. Pulmonary artery system was  well-opacified with contrast. No hypodense filling defect to indicate  pulmonary artery embolus.  No pleural or pericardial effusion. Heart size normal.  No enlarged lymph nodes of the chest.  The included upper abdomen show some reflux of contrast into the  hepatic veins in the IVC. There is no right heart enlargement or  paradoxical interventricular septal bowing, however. Mild abdominal  aortic atherosclerotic calcification. Small calcifications in the  expected location of the gallbladder which may represent gallstones.    Bone detail in addition to the spinal rodding and the kyphosis shows  diffuse osteopenia in the spine. Degenerative disc disease in the  lower cervical spine with anterolisthesis at multiple consecutive  levels in the cervical spine at what appear to be C4 upon C5 and C3  upon C4.    Detail of the lungs shows diffuse fibrotic changes in the lungs with  airways thickening and bronchiectasis as well. Areas of distal mucous  plugging bilaterally. This overall appears quite similar to previous.  Decreased nodularity in the right middle lobe immediately adjacent to  the major fissure from measurement of 7 mm to barely perceptible  currently.      Impression    IMPRESSION:  1. No CT evidence of pulmonary embolus.  2. Atherosclerosis.  3. Degenerative changes in the spine with severe kyphosis in the upper  thoracic spine as well as spinal rodding. Cervical degenerative disc  disease with consecutive areas of upper to mid anterolisthesis.  Multiple diffuse areas of distal mucous plugging. Other nodularity  mostly unchanged with decrease of one of the previous right middle  lobe nodules. Follow-up routine chest CT within 12 months.    IMPRESSION:.    CLYDE GLASER MD         SYSTEM ID:  [id number]    US Lower Extremity Venous Duplex Bilateral    Narrative    EXAMINATION: DOPPLER VENOUS ULTRASOUND OF BILATERAL LOWER EXTREMITIES,  1/26/2024 2:57 PM     COMPARISON: Ultrasound lower extremity 7/25/2023    HISTORY: leg swelling, increased SOB    TECHNIQUE:  Gray-scale evaluation with compression, spectral flow and  color Doppler assessment of the deep venous system of both legs from  groin to knee, and then at the ankles.    FINDINGS:    In both lower extremities, the common femoral, femoral, popliteal,  posterior tibial veins, peroneal veins demonstrate normal  compressibility and blood flow. Bilateral greater saphenous-common  femoral vein junctions are fully compressible.      Impression    IMPRESSION:    No evidence of deep venous thrombosis in either lower extremity.    I have personally reviewed the examination and initial interpretation  and I agree with the findings.    TTIO MARTINEZ MD         SYSTEM ID:  [id number]

## 2024-01-26 NOTE — LETTER
Recipient:    [facility]  [address] [place]  [address]  P: [phone]  Admissions: [phone]  F: [phone]         Sender:    LORETO Mak, LGSW  6B   Ph: [phone]  Pager: [phone]       Date: January 31, 2024  Patient Name:  Fiordaliza Najera  Patient YOB: 1945  Routing Message:  Please see attached discharge orders. Thanks!      The documents accompanying this notice contain confidential information belonging to the sender.  This information is intended only for the use of the individual or entity named above.  The authorized recipient of this information is prohibited from disclosing this information to any other party and is required to destroy the information after its stated need has been fulfilled, unless otherwise required by state law.    If you are not the intended recipient, you are hereby notified that any disclosure, copy, distribution or action taken in reliance on the contents of these documents is strictly prohibited.  If you have received this document in error, please return it by fax to [phone] with a note on the cover sheet explaining why you are returning it (e.g. not your patient, not your provider, etc.).  If you need further assistance, please call .  Documents may also be returned by mail to Health Information Management, , [facility] [address], [place], [place] [address].

## 2024-01-26 NOTE — PROGRESS NOTES
1345: RT called by ED RN stating that new pt admitted to ED needed HFNC so RN had started pt on HFNC without RT at bedside.     1457: RT called to start BiPAP on pt due to increase WOB with MD tam despite pt having some hemoptysis.    Pt was then moved to a different room without notifying RT (unaware so BiPAP was not started on pt)    1650: RT brought BiPAP to put on pt, but providers present in the room told RT that BiPAP was not needed so RT left without putting pt on BiPAP.    Roughly around 1750: RT called again to start pt on BiPAP. Caller told RT that pt was on oxymask.    1812: RT received call again from ED staff about BiPAP and that pt has been on HFNC 60% 60L for over an hour.    Pt has now been placed on BiPAP 10/5, 14, 60%.    Karin Alicia, RT on 1/26/2024 at 6:49 PM

## 2024-01-26 NOTE — LETTER
Recipient:    [facility]  [address] [place]  [address]  P: [phone]  P: [phone] - Admissions  F: [phone]       Sender:    LORETO Mak, LGSW  6B   Ph: [phone]  Pager: [phone]       Date: January 31, 2024  Patient Name:  Fiordaliza Najera  Patient YOB: 1945  Routing Message:          The documents accompanying this notice contain confidential information belonging to the sender.  This information is intended only for the use of the individual or entity named above.  The authorized recipient of this information is prohibited from disclosing this information to any other party and is required to destroy the information after its stated need has been fulfilled, unless otherwise required by state law.    If you are not the intended recipient, you are hereby notified that any disclosure, copy, distribution or action taken in reliance on the contents of these documents is strictly prohibited.  If you have received this document in error, please return it by fax to [phone] with a note on the cover sheet explaining why you are returning it (e.g. not your patient, not your provider, etc.).  If you need further assistance, please call .  Documents may also be returned by mail to Health Information Management, , [address], [place], [place] [address].

## 2024-01-26 NOTE — ED PROVIDER NOTES
ED Provider Note  [facility]      History     Chief Complaint   Patient presents with    Shortness of Breath    Hemoptysis     HPI  Fiordaliza Najera is a 78 year old female with history of PE (on eliquis), COPD, chronic hyponatremia, hypothyroidism, anxiety, right arm hemarthrosis and chronic hypoxic respiratory failure who presents to the emergency department with complaints of SOB and hemoptysis. Patient reports that this afternoon she developed worsening SOB and acutely started coughing up blood and called an ambulance. In ambulance patient had sats into low 80s. The volume of blood was enough for her to use an entire tissue box. She states that she has chronic SOB and is on 3L at home, however states that her SOB is currently worse than baseline. She also states she finished a prednisone burst ~5 days ago. Denies fevers, chest pain, increased edema, melena, nausea, or vomiting. En route patient received douneb x1, with minimal improvement in symptoms.     Past Medical History  Past Medical History:   Diagnosis Date    Anxiety 09/30/2021    COPD (chronic obstructive pulmonary disease) (H)     Diverticulosis     Hiatal hernia     Mild asthma     Neuropathy     Osteopenia     Temporomandibular joint (TMJ) pain      Past Surgical History:   Procedure Laterality Date    ANTERIOR / POSTERIOR COMBINED FUSION LUMBAR SPINE      BRONCHOSCOPY (RIGID OR FLEXIBLE), DIAGNOSTIC N/A 09/28/2021    Procedure: BRONCHOSCOPY, WITH BRONCHOALVEOLAR LAVAGE;  Surgeon: Randall Lorenz MD;  Location: [facility]    HYSTERECTOMY      PICC SINGLE LUMEN PLACEMENT  11/04/2021         PICC SINGLE LUMEN PLACEMENT Right 12/22/2022    Right basilic, 38 cm    RETINAL LASER PROCEDURE Left 10/04/2016    SALPINGOOPHORECTOMY      TOTAL KNEE ARTHROPLASTY  2008     albuterol (PROAIR HFA/PROVENTIL HFA/VENTOLIN HFA) 108 (90 Base) MCG/ACT inhaler  albuterol (PROVENTIL) (2.5 MG/3ML) 0.083% neb solution  apixaban ANTICOAGULANT (ELIQUIS)  5 MG tablet  Biotin 5000 MCG TABS  Biotin 800 MCG TABS  buPROPion (WELLBUTRIN XL) 300 MG 24 hr tablet  calcium-vitamin D (CALCIUM-VITAMIN D) 500 mg(1,250mg) -200 unit per tablet  carbamide peroxide (DEBROX) 6.5 % otic solution  cetirizine (ZYRTEC) 5 MG tablet  cholecalciferol (VITAMIN D3) 25 mcg (1000 units) capsule  escitalopram (LEXAPRO) 20 MG tablet  Fluticasone-Umeclidin-Vilant (TRELEGY ELLIPTA) 100-62.5-25 MCG/ACT oral inhaler  Lactobacillus rhamnosus GG (CULTURELLE) 10-15 Billion cell capsule  levothyroxine (SYNTHROID/LEVOTHROID) 50 MCG tablet  metoprolol succinate ER (TOPROL XL) 50 MG 24 hr tablet  montelukast (SINGULAIR) 10 MG tablet  multivitamin therapeutic (THERAGRAN) tablet  NONFORMULARY  nystatin (MYCOSTATIN) 105806 UNIT/ML suspension  oxyBUTYnin ER (DITROPAN XL) 5 MG 24 hr tablet  OXYGEN-AIR DELIVERY SYSTEMS MISC  pregabalin (LYRICA) 50 MG capsule  sodium chloride (NEBUSAL) 3 % neb solution  sodium chloride (OCEAN) 0.65 % nasal spray  SUMAtriptan (IMITREX) 50 MG tablet  Vitamin D3 (CHOLECALCIFEROL) 25 mcg (1000 units) tablet      Allergies   Allergen Reactions    Codeine Unknown    Morphine Itching     Family History  Family History   Problem Relation Age of Onset    Dementia Mother         passed age 88    Chronic Obstructive Pulmonary Disease Father         passed age 78     Social History   Social History     Tobacco Use    Smoking status: Never     Passive exposure: Past    Smokeless tobacco: Never   Vaping Use    Vaping Use: Never used   Substance Use Topics    Alcohol use: No    Drug use: Never         A medically appropriate review of systems was performed with pertinent positives and negatives noted in the HPI, and all other systems negative.    Physical Exam   BP: (!) 139/95  Pulse: 118  Temp: 98.6  F (37  C)  Resp: 29  SpO2: 100 %  Physical Exam  Exam:  Constitutional: alert and no distress  Head: Normocephalic. No masses, lesions, tenderness or abnormalities  Cardiovascular: RRR. No murmurs,  clicks gallops or rub  Respiratory: mild tachypnea, rhonchi throughout, and mild wheezing throughout  Gastrointestinal: Abdomen soft, non-tender. BS normal. No masses, organomegaly  Extremities: Mild unilateral swelling of right lower extremity. No pain, warmth, or erythema present.   Neurologic: alert and oriented x 3   Psychiatric: mentation appears normal and affect normal/bright    ED Course, Procedures, & Data      Procedures            EKG Interpretation:      Interpreted by Karime Griffin MD  Time reviewed: 1400  Symptoms at time of EKG: dyspnea   Rhythm: sinus tachycardia  Rate: Tachycardia  Axis: Right Axis Deviation  Ectopy: none  Conduction: right bundle branch block (incomplete)  ST Segments/ T Waves: Non-specific ST-T wave changes  Q Waves: none  Comparison to prior: tachycardia, incomplete RBBB    Clinical Impression: sinus tachycardia                 No results found for any visits on 01/26/24.  Medications - No data to display  Labs Ordered and Resulted from Time of ED Arrival to Time of ED Departure - No data to display  No orders to display          Critical care was performed.   Critical Care Addendum  My initial assessment, based on my review of nursing observations, review of vital signs, focused history, physical exam, and 12 lead ECG analysis, established a high suspicion that Fiordaliza Najera has respiratory distress, which requires immediate intervention, and therefore she is critically ill.     After the initial assessment, the care team initiated multiple lab tests, initiated IV fluid administration, initiated medication therapy with duoneb, solumedrol, xopenex, zosyn, initiated intensive non-invasive respiratory support, and consulted with IM  to provide stabilization care. Due to the critical nature of this patient, I reassessed nursing observations, vital signs, physical exam, and respiratory status multiple times prior to her disposition.     Time also spent performing  documentation, reviewing test results, discussion with consultants, and coordination of care.     Critical care time (excluding teaching time and procedures): 60 minutes.     Assessment & Plan    Fiordaliza Najera is a 78 year old female with history of PE (on eliquis), COPD, chronic hyponatremia, hypothyroidism, anxiety, right arm hemarthrosis and chronic hypoxic respiratory failure who presents to the emergency department with complaints of SOB and hemoptysis. Patient is tachycardic but vitally stable on presentation. Patient started on IV methylprednisolone, Duoneb x1, and IVF in the ED. Patient requiring supplemental O2 in ED up to 13 LPM initially, however continued to experience air hunger so started on High flow NC and also given xopenex neb. Patient also given zyprexa 5 mg for anxiety. Labs remarkable for elevated wbc at 26.7. Patient started on cefepime and vancomycin. ECG showing sinus tach and LAD. Due to SOB and unilateral leg swelling LE ultrasound was done showing no evidence of DVT and CT PE showing no evidence of PE.     --    ED Attending Physician Attestation    I Karime Griffin MD, cared for this patient with the Medical Student. I performed, or re-performed, the physical exam and medical decision-making. I have verified the accuracy of all the medical student findings and documentation above, and have edited as necessary.    Summary of HPI, PE, ED Course   Patient is a 78 year old female evaluated in the emergency department for increased respiratory distress and hemoptysis.  Stable activation was called and patient was placed on high flow nasal cannula.  She was given DuoNeb x 3 followed by Xopenex as well as IV Solu-Medrol.  Attempted BiPAP however patient did not tolerate well.  She was given sublingual Zyprexa.  She was taken for a stat CT which shows no evidence of significant pulmonary hemorrhage or PE.  She does have findings consistent with COPD exacerbation and mucous plugging.   Additionally she was given IV fluids and cefepime as she does have an elevated leukocytosis and concerns for secondary infection.  Confirmed with the patient she is DNR/DNI.  Plan to admit to medicine for ongoing respiratory support.    Karime Griffin MD  Emergency Medicine       I have reviewed the nursing notes. I have reviewed the findings, diagnosis, plan and need for follow up with the patient.        Final diagnoses:   COPD exacerbation (H)   Acute respiratory failure with hypoxia and hypercapnia (H)   Hemoptysis     Danna Long   MS4 [facility] [employer]     Karime Griffin MD  [facility] EMERGENCY DEPARTMENT  1/26/2024     Karime Griffin MD  01/27/24 0913

## 2024-01-27 NOTE — PLAN OF CARE
Neuro: A&Ox4. Anxious - prn Zyprexa given x 1 at pts request.   Cardiac: SR/ST  VSS.   Respiratory: Sating 98 on 3L NC   GI/: Adequate urine output. Urgency. Bedside commode in use. No bm this shift.   Diet/appetite: Tolerating regular diet. Eating well.  Activity:  Assist of 1 w/ GB + Walker, up to chair and in halls.  Pain: denies.   Skin: mepilex on sacrum & bilateral heels .  LDA's:  R PIV - SL   Plan: Continue with POC. Notify primary team with changes.      Transfer  Transferred from: ED   Via:bed  Reason for transfer: Pt appropriate for 6B-   Family: Aware of transfer  Belongings: Received with pt  Chart: Received with pt  Medications: Meds received from old unit with pt  Code Status verified on armband: yes  2 RN Skin Assessment Completed By: Wilma & Sarah RN. Redness blanchable on sacrum. Mepilex applied to sacrum & Bilateral heels.   Med rec completed: yes/no  Suction/Ambu bag/Flowmeter at bedside: yes/no    Report received from: [facility]   Pt status: /66 (BP Location: Left arm)   Pulse 98   Temp 97.7  F (36.5  C) (Oral)   Resp 22   LMP  (LMP Unknown)   SpO2 97%

## 2024-01-27 NOTE — PROGRESS NOTES
RT was called to place patient on a BIPAP in ED room 1. Patient was on HFNC with an oxyplus mask with Increased work of breathing and use of accessory muscle.     Bipap setting IPAP 10, EPAP 5, rate 14, FiO2 60%. SpO2 98%  Breath sounds were crackles (fine). Xopenex given via BIPAP  Patient looks more comfortable on the BIPAP.  Last VBG showed hypercapnia  Venous Blood Gas  Recent Labs   Lab 01/26/24  1753 01/26/24  1417   PHV 7.24* 7.36   PCO2V 75* 61*   PO2V 43 44   HCO3V 32* 35*   MARLY 2.6  --    O2PER 50  --        Will continue to monitor closely and provide support.    Maxi Talley, RRT

## 2024-01-27 NOTE — PROGRESS NOTES
"  Physician Attestation   I, Kyree Bailey MD, was present with the medical/KRYSTAL student who participated in the service and in the documentation of the note.  I have verified the history and personally performed the physical exam and medical decision making.  I agree with the assessment and plan of care as documented in the note.      Key findings: Patient here with COPD exacerbation. Some clinical decompensation overnight, requiring initiation of BiPAP. Vitally and clinically improved this morning and throughout the day, tolerated 3L NC throughout. Plan for BiPAP overnight and when asleep. Narrowed antibiotics. Will switch to PO steroids tomorrow. Duonebs Q4H to continue.     Please see A&P for additional details of medical decision making.    I have personally reviewed the following data over the past 24 hrs:    14.1 (H)  \   10.0 (L)   / 281     137 102 24.1 (H) /  125 (H)   4.8 30 (H) 0.41 (L) \       ALT: 31 AST: 71 (H) AP: 66 TBILI: 0.3   ALB: 2.9 (L) TOT PROTEIN: 5.8 (L) LIPASE: N/A       Procal: N/A CRP: N/A Lactic Acid: 0.9           Kyree Bailey MD  Date of Service (when I saw the patient): 01/27/24    [facility]    Progress Note - Medicine Service, [facility] TEAM 3       Date of Admission:  1/26/2024    Assessment & Plan   Fiordaliza Najera (\"Ayse\") is a 78 year old female with history of COPD, pulmonary embolism (on eliquis), right arm hemarthrosis, chronic hyponatremia, hypothyroidism, and anxiety admitted on 1/26/2024 for shortness of breath and hemoptysis concerning for COPD exacerbation and community acquired pneumonia.     # COPD Exacerbation  # Acute on chronic  hypoxic hypercarbic respiratory failure  # History of Achromobacter Xylosoxidans Respiratory Infections   # Leukocytosis, improving  # Hemoptysis, improving  # Lung nodules 2/2 to bronchiectasis  Patient presenting with increasing oxygen needs over the past week with recent " increase in home oxygen from 2 to 3 L. Patient brought to ED by ambulance due to development of significant shortness or breath and acute hemoptysis on 1/26. ED workup included CT chest showing diffuse areas of mucus plugging and other nodularity mostly unchanged from previous CT from July 2023; no evidence of pulmonary embolism. ECG showing sinus tachycardia and left axis deviation. Troponin 18. Labs remarkable at admit for elevated WBC at 26.7. In ED, patient given IV methylprednisolone 125 mg, 3 doses of Duoneb, 2 doses of Xopenex, IV fluids, and placed on high-flow nasal cannula given concern for COPD exacerbation. Transitioned to BiPAP 10/5 the evening of 1/26 for increased work of breathing. VBGs overnight notable for pH 7.19 / pCO2 91 (vs 7.24 / 75 at admit) and lactic acid 2.4. Adjusted BiPAP per respiratory therapy with increase in IPAP to 16 with improved VBGs (pH 7.32 / pCO2 66) and lactic acid (0.9) this morning. Patient's O2 saturations stable this morning, so transitioned back to HFNC; patient tolerating this well during rounding this morning.  - IV Methylprednisolone 62.5 mg q6hrs  - Duoneb x 3 in ED, continue with Xopenex q4h with RT   - Abx: Begin IV Ceftriaxone 1000 mg daily for 5 days and PO Azithromycin 500 mg for 3 days  - Low risk factors for pseudomonas infection -- discontinue IV Cefepime and switch to Ceftriaxone (effective 1/27)  - MRSA/MSSA nasal swab negative -- discontinue IV Vancomycin (effective 1/27)  - High flow nasal cannula  - If SpO2 drops below 88% and/or patient becomes more confused or somnolent, restart BiPAP via RT and check venous blood gases  - IV Magnesium sulfate 2 g for 1 dose (1/26)     # Unilateral lower extremity swelling  # History of acute pulmonary embolism  History of pulmonary embolism on 7/28/2023 and was discharged on eliquis. Upon presenting to the ED on 1/26, patient with mild right lower extremity swelling without bruising or signs of infection. Lower  extremity ultrasound in ED without evidence of DVT. CT PE showing no evidence of pulmonary embolism at present. Will continue to monitor for changes.  - Holding eliquis given hemoptysis at admit     # Tachycardia  # Hypotension  No documentation of prior arrhythmias. Appears to be taking Metoprolol primarily for sinus tachycardia. Holding Metoprolol effective the evening of 1/26 due to episodes of hypotension (as low as 76/50). Heart rates have remained stable overnight ranging primarily in the 80s-110s.   - Holding PTA Metoprolol 50 mg daily     # Anxiety  # Depression  - Continue PTA Bupropion 300 mg daily and Escitalopram 20 mg daily  - PRN Zyprexa 5 mg daily PRN for agitation    # Acute on Chronic Hyponatremia  Sodium within normal limits at admit at 136.  - Daily CMP     # Urinary urgency  Patient without urinary urgency symptoms since hospitalization.  - Hold PTA Oxybutynin 5 mg daily     # Hypothyroidism  - Continue PTA levothyroxine 50 mcg daily    # Peripheral neuropathy s/p fusion lumbar spine  -Continue PTA Pregabalin          Diet: NPO for Medical/Clinical Reasons Except for: Meds, Ice Chips    DVT Prophylaxis: Pneumatic Compression Devices  Mobley Catheter: Not present  Fluids: None  Lines: None     Cardiac Monitoring: ACTIVE order. Indication: Tachyarrhythmias, acute (48 hours)  Code Status: No CPR- Do NOT Intubate      Clinically Significant Risk Factors Present on Admission        # Hyperkalemia: Highest K = 5.6 mmol/L in last 2 days, will monitor as appropriate   # Hypocalcemia: Lowest Ca = 8 mg/dL in last 2 days, will monitor and replace as appropriate     # Hypoalbuminemia: Lowest albumin = 3.3 g/dL at 1/26/2024  1:40 PM, will monitor as appropriate  # Drug Induced Coagulation Defect: home medication list includes an anticoagulant medication    # Hypertension: Noted on problem list   # Non-Invasive mechanical ventilation: current O2 Device: BiPAP/CPAP  # Acute hypoxic respiratory failure: continue  supplemental O2 as needed                Disposition Plan      Expected Discharge Date: 01/28/2024                The patient's care was discussed with the Attending Physician, Dr. Bailey, Chief Resident/Fellow Dr. Aiken, and Patient.    Sarah Stafford, MS3  Medical Student  Medicine Service, [facility] TEAM 3  [facility]  Securely message with PT [place] [employer] [facility] (more info)  Text page via [facility] Paging/Directory   See signed in provider for up to date coverage information  ______________________________________________________________________    Interval History   Overnight staff and nursing notes reviewed.  Patient comfortable work of breathing on BIPAP 10/5 early in the evening last night. Ongoing hypotension at 2200 last night despite receiving 500 ml LR earlier in the evening. Received additional 500 ml LR. POCUS performed showing hyperdynamic cardiac function and IVC 1.7 cm without respiratory variation. Labs notable for VBG 7.19/91 (vs 7.24/75 at admit) and lactic acid 2.4. Respiratory therapy consulted and BiPAP adjusted to optimize ventilation (increased IPAP from 10 to 16). Patient with some confusion (e.g. not following commands last night; she did receive lorazepam and olanzapine last night for agitation (e.g. pulling at oxygen mask) and sitter was requested at bedside.  Blood pressure improved to 90s/60s-70s, able to maintain tidal volumes with change in IPAP, and patient more alert and oriented. VBGs improved to 7.32/65 and lactic acid normalized.    During rounding this morning, patient reports improvement in her breathing. She is no longer coughing up blood. She tolerated the BiPAP overnight but notes that the mask is uncomfortable; she prefers the nasal cannula. She urinated overnight. She denies chest pain or abdominal pain. She would like to eat a meal if possible.    Physical Exam   Vital Signs: Temp: 98.1  F (36.7  C) Temp src: Oral BP: 92/73 Pulse: 111    Resp: 24 SpO2: 100 % O2 Device: BiPAP/CPAP Oxygen Delivery: 60 LPM  Weight: 0 lbs 0 oz    Constitutional: awake, alert, cooperative, no apparent distress  Respiratory: mild tachypnea; breathing on HFNC without use of accessory muscles  Cardiovascular: regular rate and rhythm, no murmurs auscultated  Skin: no bruising, rash, redness of upper or lower extremities bilaterally  Musculoskeletal: mild swelling of right lower extremity compared to left  Neurologic: Awake, alert and oriented to situation at present. Appropriately answers questions. Forgetful at times, specifically not remembering events overnight.      Data     I have personally reviewed the following data over the past 24 hrs:    14.1 (H)  \   10.0 (L)   / 281     137 102 24.1 (H) /  125 (H)   4.8 30 (H) 0.41 (L) \     ALT: 31 AST: 71 (H) AP: 66 TBILI: 0.3   ALB: 2.9 (L) TOT PROTEIN: 5.8 (L) LIPASE: N/A     Trop: 18 (H) BNP: N/A     Procal: N/A CRP: 63.70 (H) Lactic Acid: 0.9       INR:  1.35 (H) PTT:  N/A   D-dimer:  N/A Fibrinogen:  N/A

## 2024-01-27 NOTE — PROGRESS NOTES
[facility] Cover Note    2200: Paged regarding ongoing hypotension (88/65). Patient was hypotensive earlier tonight and previously given 500cc LR. Ordered labs and additional 500cc LR. POCUS performed w/ hyperdynamic cardiac function and IVC approximately 1.7cm w/o respiratory variation. Comfortable WOB on BiPAP 10/5 w/ tidal volumes approximately 600 and RR 18. Patient opened eyes to voice and sternal rub, but did not follow commands. Previously received lorazepam and olanzapine. Requested sitter for intermittent agitation on BiPAP (eg, pulling at mask) and will avoid sedating medications.    Labs notable for VBG 7.19/91 (vs 7.24/75 earlier today), lactic acid 2.4. Discussed BiPAP adjustments w/ RT to optimize ventilation; will increase IPAP from 10 to 16 and recheck VBG.    Reviewed patient's advance directive. Called health care agent (Roya Stiles) to provide medical update. Roya requests full medical support (including peripheral pressors if needed) until family is able to come in the morning (currently en route). Aware of DNR/DNI.    2330: Touched base w/ RN. BP improved to 99/66. Patient now A&O x4. Maintaining tidal volumes w/ change in IPAP.    0000: VBG improved to 7.24/77 and lactic acid normalized. Will recheck VBG @ 0300 and in AM.    Corry Au MD  Internal Medicine, PGY-2

## 2024-01-27 NOTE — PHARMACY-ADMISSION MEDICATION HISTORY
Pharmacy Intern Admission Medication History    Admission medication history is complete. The information provided in this note is only as accurate as the sources available at the time of the update.    Information Source(s): Patient and CareEverywhere/SureScripts via in-person    Pertinent Information:   - Spoke with pt who was knowledgeable about most of her meds  - Stated her escitalopram was recently increased from 20 mg --> 40 mg every day, however, there is no fill hx or records of this    Changes made to PTA medication list:  Added:   Calcium PO  Deleted:   Biotin 800 mcg tabs  Calcium-Vitamin D 500 mg - 200 units tab  Nystatin suspension  Vitamin D3 1000 units tab (duplicate)  Changed:   Debrox otic solution: right ear at bedtime --> both ears once a week at bedtime on Sundays       Allergies reviewed with patient and updates made in EHR: yes - added garlic    Medication History Completed By: Dolores Ozuna 1/27/2024 5:34 PM    PTA Med List   Medication Sig Last Dose    albuterol (PROAIR HFA/PROVENTIL HFA/VENTOLIN HFA) 108 (90 Base) MCG/ACT inhaler Inhale 2 puffs into the lungs every 6 hours as needed 1/26/2024 at AM    albuterol (PROVENTIL) (2.5 MG/3ML) 0.083% neb solution Take 1 vial (2.5 mg) by nebulization every 4 hours as needed for shortness of breath or wheezing 1/26/2024 at AM    apixaban ANTICOAGULANT (ELIQUIS) 5 MG tablet Take 1 tablet (5 mg) by mouth 2 times daily 1/26/2024 at AM    Biotin 5000 MCG TABS Take 1 tablet by mouth daily 1/26/2024 at AM    buPROPion (WELLBUTRIN XL) 300 MG 24 hr tablet Take 1 tablet (300 mg) by mouth every morning 1/26/2024 at AM    CALCIUM PO Take 1 tablet by mouth daily 1/26/2024 at AM    carbamide peroxide (DEBROX) 6.5 % otic solution Place 5 drops into both ears once a week At bedtime on sundays 1/21/2024 at PM    cetirizine (ZYRTEC) 5 MG tablet Take 5 mg by mouth daily 1/25/2024 at PM    cholecalciferol (VITAMIN D3) 25 mcg (1000 units) capsule Take 1 capsule (25 mcg)  by mouth daily 1/26/2024 at AM    escitalopram (LEXAPRO) 20 MG tablet Take 1 tablet (20 mg) by mouth daily 1/26/2024 at AM    Fluticasone-Umeclidin-Vilant (TRELEGY ELLIPTA) 100-62.5-25 MCG/ACT oral inhaler Inhale 1 puff into the lungs daily 1/26/2024 at AM    Lactobacillus rhamnosus GG (CULTURELLE) 10-15 Billion cell capsule Take 1 capsule by mouth every evening  1/25/2024 at PM    levothyroxine (SYNTHROID/LEVOTHROID) 50 MCG tablet Take 1 tablet (50 mcg) by mouth daily 1/26/2024 at AM    metoprolol succinate ER (TOPROL XL) 50 MG 24 hr tablet Take 1 tablet (50 mg) by mouth daily 1/26/2024 at AM    montelukast (SINGULAIR) 10 MG tablet [MONTELUKAST (SINGULAIR) 10 MG TABLET] TAKE 1 TABLET(10 MG) BY MOUTH AT BEDTIME 1/25/2024 at PM    multivitamin therapeutic (THERAGRAN) tablet [MULTIVITAMIN THERAPEUTIC (THERAGRAN) TABLET] Take 1 tablet by mouth daily. 1/26/2024 at AM    NONFORMULARY 2 liters continuous oxygen 1/26/2024    oxyBUTYnin ER (DITROPAN XL) 5 MG 24 hr tablet Take 1 tablet (5 mg) by mouth daily 1/26/2024 at AM    pregabalin (LYRICA) 50 MG capsule Take 1 tab in the AM and 2 in the evening 1/26/2024 at AM    sodium chloride (NEBUSAL) 3 % neb solution Take 3 mLs by nebulization 2 times daily 1/26/2024 at AM    sodium chloride (OCEAN) 0.65 % nasal spray 2 sprays in each nostril daily 1/26/2024 at AM    SUMAtriptan (IMITREX) 50 MG tablet Take 1 tablet (50 mg) by mouth as needed for migraine,may repeat after 2 hours if needed;  mg/24 hours Past Week

## 2024-01-28 NOTE — PROVIDER NOTIFICATION
Time of notification: 2:42 AM  Provider notified: Dr. Rickey Doan  Patient status: Pt was requesting albuterol inhaler after feeling short of breath. Not available in MAR. Can you order PRN? Gave PRN zyprexa as pt was having anxiety at the time. PT resting comfortably now.    Temp:  [97.7  F (36.5  C)-98.5  F (36.9  C)] 98.5  F (36.9  C)  Pulse:  [] 96  Resp:  [13-29] 15  BP: ()/(51-86) 114/70  FiO2 (%):  [40 %-65 %] 40 %  SpO2:  [97 %-100 %] 100 %  Orders received:  PRN order placed

## 2024-01-28 NOTE — PLAN OF CARE
Shift: [id number]        Team: [facility]  Neuro: A&Ox4, denies numbness and tingling  Resp: >95% on 2 L nasal canula , dyspnea on exertion   Cardiac: Sinus tach, afebrile, palpable pulses, vital signs stable   GI/: Voids spontaneously, bowel sounds audible, no bowel movement this shift   Skin: Preventative mepilex over sacurm and bilateral heels   PRN: Zyprexa given once for anxiety due to shortness of breath  Activity: Assist of 1 gait belt and walker  Endo: 3 times before meals  LDA's: R PIV saline locked   Diet: Regular       Plan: Continue plan of care and notify team with changes.

## 2024-01-28 NOTE — CONSULTS
Care Management Initial Consult    General Information  Assessment completed with: PatientAyse  Type of CM/SW Visit: Initial Assessment  Primary Care Provider verified and updated as needed: Yes   Readmission within the last 30 days: no previous admission in last 30 days   Reason for Consult: discharge planning  Advance Care Planning: Advance Care Planning Reviewed: present on chart; verified with patient that she wants her sister, Roya, as her primary health care agent     Communication Assessment  Patient's communication style: spoken language (English)    Hearing Difficulty or Deaf: no   Wear Glasses or Blind: yes    Cognitive  Cognitive/Neuro/Behavioral: WDL                      Living Environment:   People in home: alone     Current living Arrangements: independent living facility      Able to return to prior arrangements: yes     Family/Social Support:  Care provided by: other (see comments) (facility staff)  Provides care for: no one  Marital Status: Single  Facility resident(s)/Staff, Sibling(s)          Description of Support System: Supportive, Involved - Pt reports her sister and brother-in-law living in [place] but are very supportive. She has many friends at her residence.  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  No    Lifestyle & Psychosocial Needs:  Social Determinants of Health     Food Insecurity: Low Risk  (1/11/2024)    Food Insecurity     Within the past 12 months, did you worry that your food would run out before you got money to buy more?: No     Within the past 12 months, did the food you bought just not last and you didn t have money to get more?: No   Depression: Not at risk (1/12/2024)    PHQ-2     PHQ-2 Score: 0   Housing Stability: Low Risk  (1/11/2024)    Housing Stability     Do you have housing? : Yes     Are you worried about losing your housing?: No   Tobacco Use: Low Risk  (1/14/2024)    Patient History     Smoking Tobacco Use: Never     Smokeless Tobacco Use: Never     Passive Exposure: Past   Financial Resource Strain: Low Risk  (1/11/2024)    Financial Resource Strain     Within the past 12 months, have you or your family members you live with been unable to get utilities (heat, electricity) when it was really needed?: No   Alcohol Use: Not on file   Transportation Needs: Low Risk  (1/11/2024)    Transportation Needs     Within the past 12 months, has lack of transportation kept you from medical appointments, getting your medicines, non-medical meetings or appointments, work, or from getting things that you need?: No   Physical Activity: Not on file   Interpersonal Safety: Low Risk  (11/3/2023)    Interpersonal Safety     Do you feel physically and emotionally safe where you currently live?: Yes     Within the past 12 months, have you been hit, slapped, kicked or otherwise physically hurt by someone?: No     Within the past 12 months, have you been humiliated or emotionally abused in other ways by your partner or ex-partner?: No   Stress: Not on file   Social Connections: Not on file     Functional Status:  Prior to admission patient needed assistance:   Dependent ADLs: Ambulation-walker  Dependent IADLs: Cleaning, Cooking, Laundry, Shopping,  Transportation     Mental Health Status:  Mental Health Status: Past Concern    Mental Health Management: Medication    Chemical Dependency Status:  Chemical Dependency Status: No Current Concerns           Values/Beliefs:  Spiritual, Cultural Beliefs, Taoism Practices, Values that affect care: no       Cultural/Taoism Practices Patient Routinely Participates In: prayer  Values/Beliefs Comment: Mandaen russell    Additional Information:  SW met with pt at bedside to complete initial assessment. Pt is in the [facility] [place] [employer] ([facility]), and her care home community has many other sisters (nuns) as there is some sort of partnership between her [facility] and [facility].     Pt reports she gets her oxygen from [facility]. She is on 3L continuous during the day and 2L at night. Pt reports in her building there is an on call phone # that she can dial if she needs assistance. There is a  she calls if she needs a ride somewhere. She gets cleaning services 2x/month. She manages her medications independently. She eats breakfast and lunch with others in the dining room but usually has supper on her own (something quick to prepare like a microwave meal).    Pt reports that she is seeing her pulmonologist tomorrow via virtual visit; however, SW checked with supervisor Marissa Vail and pt cannot attend outpatient visits while hospitalized (due to insurance only being able to pay for one type of service - inpatient or outpatient).    SW re-met with pt and asked it it would be okay to reschedule her pulmonologist appt that was supposed to be tomorrow at 10:15am. Pt asked if I could call Lucy (RN) at [facility] as Lucy would like to attend the appt with her, so Lucy should reschedule it to a day/time that works for her. BEREKET called [facility] @ [phone] and was transferred to Lucy's ; BEREKET left message.    JALEEL Rogers   1/28/2024        Social Work and Care Management Department     SEARCHABLE in [facility]COM - search SOCIAL WORK     [place] ([id number]) Saturday and Sunday     Units: 4A, 4C, & 4E Pager: [phone]     Units: 5A & 5C Pager: [phone]     Units: 6A & 6B   Pager: [phone]     Units: 6C Pager: [phone]     Units: 7A & 7B  Pager: [phone]     Units: 7C Pager: [phone]     Unit: [place] [facility] Pager: [phone]

## 2024-01-28 NOTE — PROGRESS NOTES
"  Shira Aiken MD  Date of Service (when I saw the patient): 01/27/24    [facility]    Progress Note - Medicine Service, [facility] TEAM 3       Date of Admission:  1/26/2024    Assessment & Plan   Fiordaliza Najera (\"Ayse\") is a 78 year old female with history of COPD, pulmonary embolism (on eliquis), right arm hemarthrosis, chronic hyponatremia, hypothyroidism, and anxiety admitted on 1/26/2024 for shortness of breath and hemoptysis concerning for COPD exacerbation and community acquired pneumonia. Now improving and on home oxygen.      COPD Exacerbation  Acute on Chronic Hypoxic Hypercarbic Respiratory Failure-Improved   History of Achromobacter Xylosoxidans Respiratory Infections   Leukocytosis-Improved   Hemoptysis-Resolved   Lung nodules 2/2 to bronchiectasis  Patient uses 3L of oxygen at baseline. Uses Trelegy inhaler (ICS/LAMA/LABA) daily in addition to daily albuterol and 3% saline nebs. Had worsening dyspnea and cough on 1/26 that resulted in significant bright red hemoptysis. On presentation to ED on 1/26, she required 13-15 L NRB and received 3 duonebs and IV steroids. CT Chest showed chronic diffuse fibrotic changes in the lungs with airways thickening and bronchiectasis but no focal consolidations.  On assessment by medicine team on evening of 1/26, she was transferred to BiPAP for work of breathing and hypoxia. Overnight 1/26-1/27, required escalation of BiPAP settings to IPAP of 16 given worsening respiratory acidosis. Gases improved on BiPAP 16/5 overnight, and she was able to wean back to 3-4 L O2 on morning of 1/27. Started on broad spectrum antibiotics (Cefepime, Vancomycin) to cover for potential pneumonia. Antibiotics de-escalated on 1/27 given clinical improvement.   -Steroids:    -Prednisone 40 mg PO daily (1/28-1/31)   -Methylprednisolone 60 mg q6h (1/26-1/28)  -Anti-biotics:    -CTX 2g q24h (1/27-), Azithromycin 500 mg PO daily (1/27-1/29) "    -Vancomycin (1/26-1/27)   -Cefepime q12h (1/26-1/27)  -Nebulizers: Xopenex QID   -Oxygen Support: 4 L NC       Unilateral lower extremity swelling  History of acute pulmonary embolism  History of pulmonary embolism on 7/28/2023 and was discharged on eliquis. Upon presenting to the ED on 1/26, patient with mild right lower extremity swelling without bruising or signs of infection. Lower extremity ultrasound in ED without evidence of DVT. CT PE showing no evidence of pulmonary embolism at present.   - PTA apixaban 5 mg-Unheld 1/28      Tachycardia  Hypotension-Resolved   No documentation of prior arrhythmias. Appears to be taking Metoprolol primarily for sinus tachycardia. Holding Metoprolol effective the evening of 1/26 due to episodes of hypotension (as low as 76/50). Heart rates have remained stable overnight ranging primarily in the 80s-110s.   PTA Metoprolol 50 mg daily-Holding, can consider restarting on discharge      Anxiety  Depression  - Continue PTA Bupropion 300 mg daily and Escitalopram 20 mg daily  - PRN Zyprexa 5 mg daily PRN     Urinary urgency  - Hold PTA Oxybutynin 5 mg daily given decreased UOP on admission. Consider restarting on discharge      Hypothyroidism  - PTA levothyroxine 50 mcg daily    Peripheral neuropathy s/p fusion lumbar spine  -PTA Pregabalin      Diet: Regular Diet Adult    DVT Prophylaxis: Pneumatic Compression Devices  Mobley Catheter: Not present  Fluids: None  Lines: None     Cardiac Monitoring: ACTIVE order. Indication: Tachyarrhythmias, acute (48 hours)  Code Status: No CPR- Do NOT Intubate      Clinically Significant Risk Factors        # Hyperkalemia: Highest K = 5.6 mmol/L in last 2 days, will monitor as appropriate       # Hypoalbuminemia: Lowest albumin = 2.9 g/dL at 1/27/2024  7:43 AM, will monitor as appropriate    # Coagulation Defect: INR = 1.35 (Ref range: 0.85 - 1.15) and/or PTT = N/A, will monitor for bleeding    # Hypertension: Noted on problem list                    Disposition Plan      Expected Discharge Date: 01/29/2024                  Patient was discussed with attending Dr. Bailey.     Shira Aiken MD  Internal Medicine-Pediatrics, PGY-2    Medicine Service, [facility] TEAM 3  [facility]  Securely message with [employer] (more info)  Text page via [facility] Paging/Directory   See signed in provider for up to date coverage information  ______________________________________________________________________    Interval History   Anxious yesterday afternoon with PRN Zyprexa given one time. Received additional neb upon request for SOB. Saturating well on 3-4 L of oxygen.     Physical Exam   Vital Signs: Temp: 98.3  F (36.8  C) Temp src: Oral BP: (!) 150/93 Pulse: 120   Resp: 24 SpO2: 100 % O2 Device: Nasal cannula Oxygen Delivery: 4 LPM  Weight: 0 lbs 0 oz    Constitutional: thin, elderly woman, alert and pleasant, rollers in hair   Respiratory: mild tachypnea on 4L via NC, using accessory neck muscles, decreased breath sounds throughout but no wheezes, prolonged expiratory phase   Cardiovascular: regular rhythm, tachycardic, no murmurs auscultated, well-perfused   Skin: no bruising, rash, redness of upper or lower extremities bilaterally  Musculoskeletal: mild swelling of right lower extremity compared to left, not warm or tender   Neurologic: Awake, alert and oriented to situation at present. Appropriately answers questions.      Data     I have personally reviewed the following data over the past 24 hrs:    N/A  \   N/A   / N/A     N/A N/A N/A /  166 (H)   N/A N/A N/A \     ALT: N/A AST: N/A AP: N/A TBILI: N/A   ALB: N/A TOT PROTEIN: N/A LIPASE: N/A     Procal: N/A CRP: N/A Lactic Acid: N/A

## 2024-01-28 NOTE — PLAN OF CARE
Goal Outcome Evaluation:    Plan of Care Reviewed With: patient    Overall Patient Progress: no change    Outcome Evaluation: Pt's goal is to discharge to her apartment at [facility].

## 2024-01-29 NOTE — PLAN OF CARE
Neuro: A&Ox4.   Cardiac: Tele discontinued, but is tachycardic with heart rate 100's. VSS.   Respiratory: Sating 88-94% on 3-5 liters via oxy mask, is having increased shortness of breath and work of breathing today especially after activity or coughing up secretions. MD made aware, orders placed for VBG, CXR, and nebusal PRN.   GI/: Up to commode to void.   Diet/appetite: Tolerating regular diet. Fair intake.  Activity:  Assist of 1, up to edge of bed.   Pain: At acceptable level on current regimen. Denies pain.   Skin: No new deficits noted.  LDA's: PIV.    Plan: Continue with POC. Notify primary team with changes.

## 2024-01-29 NOTE — DISCHARGE INSTRUCTIONS
Dear Ms. Najera,    You were admitted to the hospital for a worsening of your COPD. In addition, you were treated for a potential bacterial pneumonia as this is often present with COPD exacerbations. You completed a course of antibiotics but you will continue taking an oral steroid medication for two more days to help alleviate the inflammation in your lungs. Additionally, given how high your heart rate was after albuterol nebulizers in the emergency department, you have been switched to a different nebulizer medication called Xopenex. It works exactly the same as albuterol but causes less heart rate elevation. These are the two new medications you will leave the hospital with. Please see your pulmonologist soon to discuss the control of your COPD and to see if any adjustments to other medications are needed.     New Medications:   -Prednisone 40 mg x 2 days (Last day to be 1/31/24)  -Xopenex nebulizer up to 4 times a day as needed ( 1 month supply, please see your pulomonologist for refills).

## 2024-01-29 NOTE — PROGRESS NOTES
"Resident/Fellow Attestation   I, Shira Aiken MD, was present with the medical/KRYSTAL student who participated in the service and in the documentation of the note.  I have verified the history and personally performed the physical exam and medical decision making.  I agree with the assessment and plan of care as documented in the note.      Shira Aiken MD  PGY2  Date of Service (when I saw the patient): 01/29/24     [facility]    Progress Note - Medicine Service, [facility] TEAM 3       Date of Admission:  1/26/2024    Assessment & Plan   Fiordaliza Najera (\"Ayse\") is a 78 year old female with history of COPD, pulmonary embolism (on eliquis), right arm hemarthrosis, chronic hyponatremia, hypothyroidism, and anxiety admitted on 1/26/2024 for shortness of breath and hemoptysis concerning for COPD exacerbation and community acquired pneumonia. Now improving and on home oxygen.      Changes today:  - Likely discharge tomorrow (1/30)  - Patient was supposed to see her outpatient pulmonologist (Dr. Lorenz) today. Inpatient general pulmonology consult ordered to optimize her plan prior to discharge.  - Occupational and physical therapy consult to assess for discharge planning -- return to assisted living facility vs. transitional care facility  - Re-checked VBGs and chest X-ray today due to episode of increased work of breathing and oxygen needs this morning. On chest X-ray -- no pneumothorax or pleural effusion or new revealing findings. VBGs notable for hypercarbia with adequate compensation.  - Increased PRN Zyprexa from 5 mg daily PRN to 5 mg BID PRN given ongoing episodes of increased anxiety; may consider sending her home with Zyprexa    COPD Exacerbation  Acute on Chronic Hypoxic Hypercarbic Respiratory Failure, improved   History of Achromobacter Xylosoxidans Respiratory Infections   Leukocytosis, improved  Hemoptysis, resolved   Lung nodules 2/2 to " bronchiectasis  Patient uses 3L of oxygen at baseline. Uses Trelegy inhaler (ICS/LAMA/LABA) daily in addition to daily albuterol and 3% saline nebs. Had worsening dyspnea and cough on 1/26 that resulted in significant bright red hemoptysis. On presentation to ED on 1/26, she required 13-15 L NRB and received 3 duonebs and IV steroids. CT Chest showed chronic diffuse fibrotic changes in the lungs with airways thickening and bronchiectasis but no focal consolidations.  On assessment by medicine team on evening of 1/26, she was transferred to BiPAP for work of breathing and hypoxia. Overnight 1/26-1/27, required escalation of BiPAP settings to IPAP of 16 given worsening respiratory acidosis. Gases improved on BiPAP 16/5 overnight, and she was able to wean back to 3-4 L O2 on morning of 1/27. Started on broad spectrum antibiotics (Cefepime, Vancomycin) to cover for potential pneumonia. Antibiotics de-escalated on 1/27 given clinical improvement. Patient with episode of increased work of breathing and anxiety this morning requiring temporary additional oxygen. Chest X-ray reassuring for no new pathology. Improved with additional nebulizer and PRN Zyprexa 5 mg.  -General pulmonology consulted to optimize plan prior to discharge (was supposed to have outpatient pulmonology appointment today).  -Steroids:    -Prednisone 40 mg PO daily (1/28-1/31)   -Methylprednisolone 60 mg q6h (1/26-1/28)  -Antibiotics:    -CTX 2g q24h (1/27-), Azithromycin 500 mg PO daily (1/27-1/29)    -Vancomycin (1/26-1/27)   -Cefepime q12h (1/26-1/27)  -Nebulizers: Xopenex QID  -Oxygen Support: 3 L NC    Unilateral lower extremity swelling  History of acute pulmonary embolism  History of pulmonary embolism on 7/28/2023 and was discharged on eliquis. Upon presenting to the ED on 1/26, patient with mild right lower extremity swelling without bruising or signs of infection. Lower extremity ultrasound in ED without evidence of DVT. CT PE showing no  evidence of pulmonary embolism at present.   - PTA apixaban 5 mg-Unheld 1/28      Tachycardia, stable  Hypotension, resolved   No documentation of prior arrhythmias. Appears to be taking Metoprolol primarily for sinus tachycardia. Held Metoprolol effective the evening of 1/26 due to episodes of hypotension (as low as 76/50); hypotension has since improved, so Metoprolol was restarted on 1/28. Heart rates have remained stable the past two days ranging primarily in the 80s-120s.  - PTA Metoprolol 50 mg daily     Anxiety  Depression  - Continue PTA Bupropion 300 mg daily and Escitalopram 20 mg daily  - Zyprexa 5 mg BID PRN for agitation    Urinary urgency  - Hold PTA Oxybutynin 5 mg daily given decreased UOP on admission. Consider restarting on discharge      Hypothyroidism  - PTA levothyroxine 50 mcg daily    Peripheral neuropathy s/p fusion lumbar spine  - PTA Pregabalin      Diet: Regular Diet Adult  Diet    DVT Prophylaxis: Pneumatic Compression Devices  Mobley Catheter: Not present  Fluids: None  Lines: None     Cardiac Monitoring: None  Code Status: No CPR- Do NOT Intubate      Clinically Significant Risk Factors              # Hypoalbuminemia: Lowest albumin = 2.9 g/dL at 1/27/2024  7:43 AM, will monitor as appropriate       # Hypertension: Noted on problem list            # Financial/Environmental Concerns: none         Disposition Plan      Expected Discharge Date: 01/30/2024      Destination: home with help/services  Discharge Comments: IV Rocephin for 5 days          The patient's care was discussed with the Attending Physician, Dr. Bailey, Chief Resident/Fellow Dr. Aiken, and Patient.    Sarah Stafford, MS3  Medical Student  Medicine Service, [facility] TEAM 3  [facility]  Securely message with [employer] (more info)  Text page via [facility] Paging/Directory   See signed in provider for up to date coverage  information  ______________________________________________________________________    Interval History   Overnight nursing notes reviewed. No acute events overnight. Oxygen saturations > 95% on 3 L nasal cannula overnight. Sinus tachycardia 95-130s and blood pressure 100-130 systolic.    During rounding this morning, patient reports increased anxiety and work of breathing despite adequate oxygen saturations on 3 L oxygen. Coughing episode this morning made her feel particularly short of breath. Gave PRN Zyprexa one time and additional neb with improvement in her work of breathing and anxiety.    Physical Exam   Vital Signs: Temp: 97.9  F (36.6  C) Temp src: Oral BP: 126/77 Pulse: 109   Resp: (!) 45 SpO2: 98 % O2 Device: Nasal cannula Oxygen Delivery: 3 LPM  Weight: 101 lbs 13.64 oz    Constitutional: thin, elderly woman, alert and pleasant, rollers in hair   Respiratory: moderate tachypnea on 3L via NC, using accessory neck muscles, decreased breath sounds throughout but no wheezes, prolonged expiratory phase   Cardiovascular: regular rhythm, tachycardic, no murmurs auscultated, well-perfused   Skin: no bruising, rash, redness of upper or lower extremities bilaterally  Musculoskeletal: symmetric lower extremities without swelling at present   Neurologic: Awake, alert and oriented to situation. Appropriately answers questions.      Data

## 2024-01-29 NOTE — PLAN OF CARE
Neuro: A&Ox4.   Cardiac: ST on tele.  Metoprolol restarted today for HR sustaining above 130s, provider notified.   Respiratory: Sating >92% at 3lpm via nasal cannula.  GI/: Adequate urine output. No BM this shift.  Diet/appetite: Tolerating regular diet. Eating well.  Activity:  Assist of 1, up to chair and in halls.  Pain: At acceptable level on current regimen.   Skin: No new deficits noted.  LDA's: R arm PIV, saline lock.    Plan: Continue with POC. Notify primary team with changes.

## 2024-01-29 NOTE — PROGRESS NOTES
"   01/29/24 1435   Appointment Info   Signing Clinician's Name / Credentials (PT) Sarbjit Burgos, PT, DPT   Living Environment   People in Home alone   Current Living Arrangements independent living facility   Home Accessibility no concerns   Self-Care   Usual Activity Tolerance good   Current Activity Tolerance fair   Regular Exercise Yes   Activity/Exercise/Self-Care Comment IND with mobility, reports she has been using her 4ww more often due to fatigue. Showers and dresses IND. Gets assist with ironing, setting up clothes and cooking. Pt reports she used to walk to the  but hasn't recently due to increased fatigue. 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   General Information   Onset of Illness/Injury or Date of Surgery 01/26/24   Referring Physician Shira Aiken MD   Patient/Family Therapy Goals Statement (PT) return home   Pertinent History of Current Problem (include personal factors and/or comorbidities that impact the POC) Per EMR: \"78 year old female with history of COPD, pulmonary embolism (on eliquis), right arm hemarthrosis, chronic hyponatremia, hypothyroidism, and anxiety admitted on 1/26/2024 for shortness of breath and hemoptysis concerning for COPD exacerbation and community acquired pneumonia\"   Existing Precautions/Restrictions fall   Cognition   Cognitive Status Comments Followed all commands appropriately, provided subjective information   Integumentary/Edema   Integumentary/Edema no deficits were identifed   Posture    Posture Protracted shoulders;Forward head position   Range of Motion (ROM)   Range of Motion ROM is WFL   Strength (Manual Muscle Testing)   Strength (Manual Muscle Testing) strength is WFL   Strength Comments Low muscle bulk   Bed Mobility   Bed Mobility supine-sit   Comment, (Bed Mobility) IND   Transfers   Transfers sit-stand transfer   Sit-Stand Transfer   Sit-Stand [place] (Transfers) supervision   Comment, (Sit-Stand Transfer) " Appropriate speed, fatiguing   Gait/Stairs (Locomotion)   [place] Level (Gait) contact guard   Assistive Device (Gait)   (4ww)   Distance in Feet (Gait) 2   Comment, (Gait/Stairs) Appropriate step length, safe management of walker brakes   Balance   Balance Comments Unsupported sitting: IND, Standing: intiially no UE support, requested walker to assist with breathing   Clinical Impression   Criteria for Skilled Therapeutic Intervention Yes, treatment indicated   PT Diagnosis (PT) impaired mobility   Influenced by the following impairments activity tolerance, SOB   Functional limitations due to impairments gait, transfers   Clinical Presentation (PT Evaluation Complexity) stable   Clinical Presentation Rationale clinical judgement   Clinical Decision Making (Complexity) low complexity   Planned Therapy Interventions (PT) gait training;strengthening;transfer training;progressive activity/exercise   Risk & Benefits of therapy have been explained evaluation/treatment results reviewed;care plan/treatment goals reviewed;risks/benefits reviewed;participants voiced agreement with care plan;current/potential barriers reviewed;participants included;patient;sibling  (sister and CRISTOBAL)   PT Total Evaluation Time   PT Eval, Low Complexity Minutes (13862) 10   Physical Therapy Goals   PT Frequency 5x/week   PT Predicted Duration/Target Date for Goal Attainment 02/23/24   PT Goals Bed Mobility;Transfers;Gait   PT: Bed Mobility Independent;Goal Met   PT: Transfers Modified independent   PT: Gait Modified independent;25 feet   Interventions   Interventions Quick Adds Therapeutic Activity   Therapeutic Activity   Therapeutic Activities: dynamic activities to improve functional performance Minutes (75632) 24   Treatment Detail/Skilled Intervention Pt supine upon arrival, agreeable to therapy session, RN ok'd mobility. Pt on 3L/min O2 via NC, maintained sats above 90%. To progress functional independence, pt completed transfers.  When pt supine, pt noted with high work of breathing, pt reported SOB. Pt states this is baseline after activity but can normally recover with focused breathing. Pt demonstrated variety of breathing strategies with little to no improvement in symptoms of SOB. Pts maximal tolerance was completing a pivot, continued to have heavy WOB and SOB. Mobility goals established for acute setting, discussion held with pt and family on dc rec and reasoning, pt agreeable. Pt upright in chair at end of session, call light in reach, denied further needs.   PT Discharge Planning   PT Plan Progress gait with 4ww (baseline), overall activity tolerance   PT Discharge Recommendation (DC Rec) Transitional Care Facility   PT Rationale for DC Rec Pt primarily limited by SOB, maximal tolerance of pivoting. At baseline, pt resides in ILF, mobilzing IND to mod I with 4ww. Recommend TCU to progress functional independence to level that pt and facility can manage.   PT Brief overview of current status Ax1 FWW   Total Session Time   Timed Code Treatment Minutes 24   Total Session Time (sum of timed and untimed services) 34

## 2024-01-29 NOTE — PLAN OF CARE
Neuro: A&Ox4.   Cardiac: Sinus tach 95-130s. -130 systolic.  Respiratory: Sating >95% on 3L NC.   GI/: up to commode  Diet/appetite: Tolerating regular diet  Activity:  Assist of 1  Pain: At acceptable level on current regimen.   Skin: No new deficits noted.  LDA's: 1 PIV saline locked    Plan:  Possible discharge today, continue to help with breathing exercises.

## 2024-01-29 NOTE — PROGRESS NOTES
"CLINICAL NUTRITION SERVICES - ASSESSMENT NOTE     Nutrition Prescription    RECOMMENDATIONS FOR MDs/PROVIDERS TO ORDER:  None at this time     Recommendations already ordered by Registered Dietitian (RD):  PRN supplements    Future/Additional Recommendations:  Monitor PO intakes, wt trends; inpatient RD will continue to follow per protocol     REASON FOR ASSESSMENT  Fiordaliza Najera is a/an 78 year old female assessed by the dietitian for Admission Nutrition Risk Screen for positive    NUTRITION HISTORY  Per chart, PMH includes COPD, pulmonary embolism, and chronic hyponatremia; pt with with SOB and hemoptysis concerning for COPD exacerbation and community acquired pneumonia. Pt with other cares during attempts to visit today.    CURRENT NUTRITION ORDERS  Diet: Regular  Intake/Tolerance: tolerating diet, fair-good appetite per progress notes. Eating mostly % of meals documented in flowsheets. Ordering 2 meals/day since admit per [facility], did order Ensure Enlive today.    LABS  Labs reviewed    MEDICATIONS  Medications reviewed    ANTHROPOMETRICS  Height: 157.5 cm (5' 2\")  Most Recent Weight: 46.2 kg (101 lb 13.6 oz)    IBW: 50 kg   BMI: Normal BMI  Weight History: 5 lb wt loss since 11/3 (4.7% in 2-3 months); overall down 4 lb from 1 year ago (3.8%)   Wt Readings from Last 20 Encounters:   01/29/24 46.2 kg (101 lb 13.6 oz)   01/12/24 45.9 kg (101 lb 4.8 oz)   11/08/23 48.8 kg (107 lb 9.6 oz)   11/03/23 48.1 kg (106 lb)   08/11/23 47.3 kg (104 lb 4.8 oz)   07/26/23 47.5 kg (104 lb 11.5 oz)   07/14/23 49.1 kg (108 lb 4.8 oz)   07/03/23 49.1 kg (108 lb 4.8 oz)   06/09/23 49.4 kg (108 lb 12.8 oz)   06/06/23 47.6 kg (105 lb)   05/01/23 48.3 kg (106 lb 8 oz)   02/28/23 47.6 kg (105 lb)   02/16/23 47.2 kg (104 lb)   01/30/23 47.7 kg (105 lb 3.2 oz)   01/05/23 47.2 kg (104 lb)   01/02/23 47.2 kg (104 lb)   12/27/22 47.2 kg (104 lb)   11/12/22 47.6 kg (105 lb)   07/22/22 47.6 kg (105 lb)   06/24/22 48.5 kg (107 lb) "      Dosing Weight: 46.2 kg (actual)    ASSESSED NUTRITION NEEDS  Estimated Energy Needs: 0679-2358 kcals/day (25 - 30 kcals/kg)  Justification: Maintenance  Estimated Protein Needs: 46-55 grams protein/day (1 - 1.2 grams of pro/kg)  Justification: Hypercatabolism with acute illness  Estimated Fluid Needs: (1 mL/kcal)   Justification: Maintenance, or other per provider pending fluid status    PHYSICAL FINDINGS  See malnutrition section below.    MALNUTRITION  % Intake: Unable to assess  % Weight Loss: Weight loss does not meet criteria  Subcutaneous Fat Loss: Unable to assess  Muscle Loss: Unable to assess  Fluid Accumulation/Edema: None noted per chart review  Malnutrition Diagnosis: Unable to determine due to pt with other cares during attempts to visit    NUTRITION DIAGNOSIS  Predicted inadequate nutrient intake (protein-energy) related to some weight loss noted PTA (not within parameters towards malnutrition dx) but tolerating diet since admit per chart review, but potential for PO intake decline    INTERVENTIONS  Implementation  Nutrition Education: Unable to complete due to pt with other cares   Medical food supplement therapy     Goals  Patient to consume % of nutritionally adequate meal trays TID, or the equivalent with supplements/snacks.     Monitoring/Evaluation  Progress toward goals will be monitored and evaluated per protocol.   Ana Mena, RD, , LD  Weekday Units covered: 5A (non-Heme Onc pts) and 7B (1123-1240)  Reach by astrid

## 2024-01-29 NOTE — TELEPHONE ENCOUNTER
RECORDS RECEIVED FROM: internal   REASON FOR VISIT: Tremor    Date of Appt: 2/23/24   NOTES (FOR ALL VISITS) STATUS DETAILS   OFFICE NOTE from referring provider Internal Dr Neida Maradiaga @ [facility]:  1/12/24   MEDICATION LIST Internal    IMAGING  (FOR ALL VISITS)     MRI (HEAD, NECK, SPINE) Internal [facility]:  MRA Brain COW 7/1/18   CT (HEAD, NECK, SPINE) Internal [facility]:  CT Head 2/28/23  CTA Head 6/20/22

## 2024-01-29 NOTE — PROGRESS NOTES
"Care Management Follow Up    Length of Stay (days): 3    Expected Discharge Date: 01/30/2024?     Concerns to be Addressed: discharge planning     Patient plan of care discussed at interdisciplinary rounds: Yes    Anticipated Discharge Disposition: TBD, return to [facility] vs TCU.   Anticipated Discharge Services: TBD  Anticipated Discharge DME: Oxygen    Patient/family educated on Medicare website which has current facility and service quality ratings: NA  Education Provided on the Discharge Plan: Yes  Patient/Family in Agreement with the Plan: Yes    Referrals Placed by CM/SW:  No new referrals at this time.   Private pay costs discussed: Not applicable    Additional Information:  Update received from the primary team, they are hopeful to discharge the patient today. Writer met with patient who confirmed that her sister and CRISTOBAL are staying in her apt and would provide transportation as well as a transport O2 tank. Patient confirmed that she manages her own medications. Patient provided the contact number for Lucy, a nurse at [facility]. Writer contacted Lucy who voiced concerns about patient's return to her independent living apartment, requested PT/OT see patient to confirm discharge plan. Lucy noted that patient would be resistive to TCU, but they would strongly encourage it if it is recommended by therapy. Primary team updated, will order therapy evals. Patient updated, she is understanding of making sure she is safe to return to her apartment but did note, \"I don't want to go to assisted living, I like my apartment.\"    Discharge resources:    [facility]   Phone: [phone]    [facility]HELLE Ayala w/[facility]: [phone]    [facility] (3L cont during the day, 2L at night)  Phone: [phone]  Fax: [phone]    [facility] (RN, PT and OT)  Phone: [phone]    Care coordination will continue to follow for discharge planning.     Jazmin Daniels, RNCC, BSN    [facility]  "   [address]  [address] [place] [address]    [email or web address]  [email or web address]    Office: [phone] Pager: [phone]

## 2024-01-29 NOTE — CONSULTS
[facility] Pulmonology Consultation    Fiordaliza Najera  MRN: [id number]  : 1945    Date of Admission: 2024  Date of Service: 24    Reason for consult:   bronchiectasis, COPD exacerbation  Requesting physician:  Dr. Bailey    Assessment:  Bronchiectasis  Obstructive lung disease secondary to above    Ms. Najera is a 78 year old with bronchiectasis, obstructive lung disease due to bronchiectasis, hx of PE on DOAC who was admitted on  for shortness of breath and hemoptysis, and has been treated as COPD exacerbation. She has improved clinically reporting dyspnea is at baseline, and is back to home oxygen of 2-3L NC.   In regards to optimization of her underlying conditions we recommend the following: Further infectious workup with COVID, RSV, flu and respiratory viral panel.  We should also send 3 AFB sputum samples to evaluate for underlying nontuberculous Mycobacterium which may appear similar to CT images.  Unable to find lab testing for etiology of bronchiectasis, would send initial workup as below.  She is using some nebulizers now, but we encouraged her to use both albuterol followed by hypertonic saline 3 times a day in conjunction with Aerobika valve for better airway clearance.  She would benefit from RT evaluation whether or not she can generate enough negative inspiratory force to utilize her home Trelegy, if not we recommend transitioning to an ICS/LABA inhaler that she could pair with her spacer for better delivery.  Seems unlikely she would be willing to transition to nebulizers fully as she already spends too much time nebbing for airway clearance.  As an outpatient, may also want to consider life 2000 for work of breathing, or consultation with palliative care for management of air hunger.  We also discussed pulmonary rehab as an outpatient however it is too difficult for her to get there twice a day.  She works with therapists including both occupational and  physical therapists in her building.    Recommendations:  -- check COVID, RSV, flu and respiratory viral panel  --Obtain ANCA, rheumatoid factor, SINGH, IgG with subclasses for workup of bronchiectasis  -- obtain AFB samples x 3 (for non-tuberculous mycobacterium, no need for respiratory isolation)  -- aerobika + albuterol nebs followed by 3% HS nebs three times a day for airway clearance  -- evaluation by RT for ? Inspiratory effort enough to use trelegy?.  If not, consider transition to ICS/LABA that could be used with a spacer for better delivery  -- consider [facility] as outpatient  -- consider palliative care consult for air hunger  -- ok to continue prednisone 40mg daily to complete 5d course, and course of antibiotics given increased sputum production PTA  -- follow up with Dr. Lorenz as outpatient with full PFTs prior    Chief complaint:  shortness of breath    HPI:    Presented on 1/26 after a few days of increased cough and sputum production, with new onset hemoptysis on morning of admission.  Reports she has had some hemoptysis in the past however it has never been as much as it was.  The blood has decreased now, and is just a little bit of darkness in her sputum.  Her sputum production was mostly an increase in amount of sputum, no change in color.  Currently she uses albuterol nebs once per day, and hypertonic saline nebs twice per day, and having for a total of 3 times a day.  She has a bunch of aerobic of valves and incentive spirometer is at home.  She uses the aerobic of valve and thinks it helps her clear secretions.  She does not use incentive spirometer because she is unable to use it adequately.  She also uses trilogy inhaler, but wonders if it mostly just lines in her mouth.  She uses an albuterol inhaler which she uses with a spacer.  She is quite limited in activities of daily living, large portion due to her shortness of breath.    During admission:  Antibiotics: Azithromycin x 3d, 1/27-29,  Cefepime-->ceftriaxone 1/26-29, vanc x 1 on 1/26.   Steroids: 187.5 of methylpred on 1/26, 250 MP on 1/27, 62.5 MP + 40 pred on 1/28, 40 pred 1/29    Review of systems: complete 10 point review of systems completed and negative except as noted above in HPI.    Social history: Never smoker.  No known exposures.  Worked as a teacher.    Medical history:    Past Medical History:   Diagnosis Date    Anxiety 09/30/2021    COPD (chronic obstructive pulmonary disease) (H)     Diverticulosis     Hiatal hernia     Mild asthma     Neuropathy     Osteopenia     Temporomandibular joint (TMJ) pain      Surgical history:    Past Surgical History:   Procedure Laterality Date    ANTERIOR / POSTERIOR COMBINED FUSION LUMBAR SPINE      BRONCHOSCOPY (RIGID OR FLEXIBLE), DIAGNOSTIC N/A 09/28/2021    Procedure: BRONCHOSCOPY, WITH BRONCHOALVEOLAR LAVAGE;  Surgeon: Randall Lorenz MD;  Location: [facility]    HYSTERECTOMY      PICC SINGLE LUMEN PLACEMENT  11/04/2021         PICC SINGLE LUMEN PLACEMENT Right 12/22/2022    Right basilic, 38 cm    RETINAL LASER PROCEDURE Left 10/04/2016    SALPINGOOPHORECTOMY      TOTAL KNEE ARTHROPLASTY  2008     Family history: Niece has bronchiectasis.  Whole father side had lung disease.  Family History   Problem Relation Age of Onset    Dementia Mother         passed age 88    Chronic Obstructive Pulmonary Disease Father         passed age 78       Allergies:    Allergies   Allergen Reactions    Codeine Unknown    Garlic     Morphine Itching     Medications:    Current Facility-Administered Medications   Medication    acetaminophen (TYLENOL) tablet 650 mg    Or    acetaminophen (TYLENOL) Suppository 650 mg    albuterol (PROVENTIL HFA/VENTOLIN HFA) inhaler    apixaban ANTICOAGULANT (ELIQUIS) tablet 5 mg    buPROPion (WELLBUTRIN XL) 24 hr tablet 300 mg    calcium carbonate (TUMS) chewable tablet 1,000 mg    cefTRIAXone (ROCEPHIN) 2 g vial to attach to  ml bag for ADULTS or NS 50 ml bag for PEDS     escitalopram (LEXAPRO) tablet 20 mg    fluticasone-vilanterol (BREO ELLIPTA) 100-25 MCG/ACT inhaler 1 puff    And    umeclidinium (INCRUSE ELLIPTA) 62.5 MCG/ACT inhaler 1 puff    levalbuterol (XOPENEX) neb solution 1.25 mg    levalbuterol (XOPENEX) neb solution 1.25 mg    levothyroxine (SYNTHROID/LEVOTHROID) tablet 50 mcg    lidocaine (LMX4) cream    lidocaine 1 % 0.1-1 mL    metoprolol succinate ER (TOPROL XL) 24 hr tablet 50 mg    montelukast (SINGULAIR) tablet 10 mg    OLANZapine zydis (zyPREXA) ODT tab 5 mg    Patient may continue current oral medications    polyethylene glycol (MIRALAX) Packet 17 g    predniSONE (DELTASONE) tablet 40 mg    pregabalin (LYRICA) capsule 100 mg    pregabalin (LYRICA) capsule 50 mg    senna-docusate (SENOKOT-S/PERICOLACE) 8.6-50 MG per tablet 1 tablet    Or    senna-docusate (SENOKOT-S/PERICOLACE) 8.6-50 MG per tablet 2 tablet    sodium chloride (NEBUSAL) 3 % neb solution 3 mL    sodium chloride (PF) 0.9% PF flush 3 mL    sodium chloride (PF) 0.9% PF flush 3 mL    SUMAtriptan (IMITREX) tablet 50 mg     Objective:  /69 (BP Location: Right arm)   Pulse 113   Temp 98.4  F (36.9  C) (Oral)   Resp 24   Wt 46.2 kg (101 lb 13.6 oz)   LMP  (LMP Unknown)   SpO2 96%   BMI 18.63 kg/m      Gen: in no acute distress, sitting upright in chair, thin  HEENT: MMM, no oral lesions appreciated  CV: RRR, no peripheral edema  Pulm: no increased work of breathing, rhonchi bilaterally  MSK: no gross deformities, no joint swelling  Integ: no rashes or lesions appreciated  Neuro: speech clear, alert and oriented  Psych: calm, appropriate    Data:    I have personally reviewed laboratory data. Notably:  Admission- neut predom leukocytosis, CRP 63, VBG 7.24/75, lactate 2.4.    I have personally reviewed imaging available. Notably:   1/26/24 CT chest IMPRESSION:  1. No CT evidence of pulmonary embolus.  2. Atherosclerosis.  3. Degenerative changes in the spine with severe kyphosis in the upper  thoracic spine as well as spinal rodding. Cervical degenerative disc disease with consecutive areas of upper to mid anterolisthesis. Multiple diffuse areas of distal mucous plugging. Other nodularity mostly unchanged with decrease of one of the previous right middle lobe nodules. Follow-up routine chest CT within 12 months.    I have personally reviewed cardiac studies. Notably:  3/7/2023 Left ventricular size, wall motion and function are normal. The ejection  fraction is 60-65%.  Normal right ventricle size and systolic function.  No hemodynamically significant valvular abnormalities on 2D or color flow  imaging.    Most recent PFTs 8/2019     Karo Diggs DO  Pulmonary fellow  Patient discussed and seen with Dr. Curtis.

## 2024-01-29 NOTE — DISCHARGE SUMMARY
Physician Attestation   I, Kyree Bailey MD, was present with the resident who participated in the service and in the documentation of the note.  I have verified the history and personally performed the physical exam and medical decision making.  I agree with the assessment and plan of care as documented in the note.      Kyree Bailey MD  Date of Service (when I saw the patient): 1/31/24      [facility]  Discharge Summary - Medicine & Pediatrics       Date of Admission:  1/26/2024  Date of Discharge:  1/31/2024  Discharging Provider: Kyree Bailey   Discharge Service: Medicine Service, [facility] TEAM 3    Discharge Diagnoses   COPD Exacerbation  Community Acquired Pneumonia   Acute on Chronic Hypoxic Hypercarbic Respiratory Failure  History of Achromobacter Xylosoxidans Respiratory Infections   Leukocytosis  Hemoptysis  Anxiety    Follow-ups Needed After Discharge   Follow-up Appointments     Follow Up and recommended labs and tests      You should make an appointment with you pulmonologist Dr. Lorenz for   pulmonary function tests as you are able.     You have an appointment with your primary care physician, Dr. Maradiaga on   2/9/2024 at 2 PM.            Unresulted Labs Ordered in the Past 30 Days of this Admission       Date and Time Order Name Status Description    1/30/2024 12:47 PM Acid-Fast Bacilli Culture and Stain In process     1/30/2024 10:53 AM Acid-Fast Bacilli Culture and Stain In process     1/30/2024  9:15 AM Acid-Fast Bacilli Culture and Stain In process     1/29/2024 10:01 PM Anti Nuclear Debbie IgG by IFA with Reflex In process     1/29/2024 10:01 PM ANCA IgG by IFA with Reflex to Titer In process     1/29/2024  8:52 PM Acid-Fast Bacilli Culture and Stain In process     1/29/2024  8:52 PM Acid-Fast Bacilli Culture and Stain In process     1/29/2024  8:52 PM Acid-Fast Bacilli Culture and Stain In process             Discharge  "Disposition   Discharged to assisted living  Condition at discharge: Stable    Hospital Course   Fiordaliza Najera (\"Ayse\") is a 78 year old female with history of COPD, pulmonary embolism (on eliquis), right arm hemarthrosis, chronic hyponatremia, hypothyroidism, and anxiety admitted on 1/26/2024 for shortness of breath and hemoptysis concerning for COPD exacerbation and community acquired pneumonia. Despite initial need for BiPAP, she has returned to respiratory baseline following steroids and antibiotics. Pulmonology consulted for disease optimization and palliative care consulted for management of air hunger. Will discharge to TCU.      COPD Exacerbation  Acute on Chronic Hypoxic Hypercarbic Respiratory Failure, improved   History of Achromobacter Xylosoxidans Respiratory Infections   Leukocytosis, improved  Hemoptysis, resolved   Lung nodules 2/2 to bronchiectasis  Patient uses 3L of oxygen at baseline. Uses Trelegy inhaler (ICS/LAMA/LABA) daily in addition to daily albuterol and 3% saline nebs. Had worsening dyspnea and cough on 1/26 that resulted in significant bright red hemoptysis. On presentation to ED on 1/26, she required 13-15 L NRB and received 3 duonebs and IV steroids. CT Chest showed chronic diffuse fibrotic changes in the lungs with airways thickening and bronchiectasis but no focal consolidations.  On assessment by medicine team on evening of 1/26, she was transferred to BiPAP for work of breathing and hypoxia. Overnight 1/26-1/27, required escalation of BiPAP settings to IPAP of 16 given worsening respiratory acidosis. Gases improved on BiPAP 16/5 overnight, and she was able to wean back to 3-4 L O2 on morning of 1/27. Started on broad spectrum antibiotics (Cefepime, Vancomycin) to cover for potential pneumonia. Antibiotics de-escalated on 1/27 given clinical improvement. Patient with episode of increased work of breathing and anxiety this morning requiring temporary additional oxygen. Chest X-ray " reassuring for no new pathology. Improved with additional nebulizer and PRN Zyprexa 5 mg.  -Pulmonology Consulted 1/29               -Bronchiectasis Work-UP: ANCA, SINGH, IgG pending. RF wnl.   -AFB samples x 3 (for non-tuberculous mycobacterium, no need for respiratory isolation)              -Airway Clearance: Xopenex + 3% saline + Aerobika TID              -Consider Life 2000 as outpatient  -Follow up with Dr. Lorenz as outpatient with full PFTs prior   -RT to assess if patient has inspiratory effort required for Trelegy. May need to transition to ICS/LABA with spacer.   -Palliative Care consulted on 1/30 for air hunger. Outpatient referral placed.   -Steroids:               -Prednisone 40 mg PO daily (1/28-1/31)              -Methylprednisolone 60 mg q6h (1/26-1/28)  -Antibiotics:               -CTX 2g q24h (1/27-1/30), Azithromycin 500 mg PO daily (1/27-1/29)               -Vancomycin (1/26-1/27)              -Cefepime q12h (1/26-1/27)  -PTA ICS/LABA + LAMA   -Oxygen Support: 3-4 L NC     Unilateral lower extremity swelling  History of acute pulmonary embolism  History of pulmonary embolism on 7/28/2023 and was discharged on eliquis. Upon presenting to the ED on 1/26, patient with mild right lower extremity swelling without bruising or signs of infection. Lower extremity ultrasound in ED without evidence of DVT. CT PE showing no evidence of pulmonary embolism at present.   - PTA apixaban 5 mg-Unheld 1/28      Tachycardia, stable  Hypotension, resolved   No documentation of prior arrhythmias. Appears to be taking Metoprolol primarily for sinus tachycardia. Held Metoprolol effective the evening of 1/26 due to episodes of hypotension (as low as 76/50); hypotension has since improved, so Metoprolol was restarted on 1/28. Heart rates have remained stable the past two days ranging primarily in the 80s-120s.  - PTA Metoprolol 50 mg daily     Anxiety  Depression  - Continue PTA Bupropion 300 mg daily and Escitalopram 20  mg daily  - Zyprexa 5 mg BID PRN for agitation     Urinary urgency  - Hold PTA Oxybutynin 5 mg daily given decreased UOP on admission. Consider restarting on discharge      Hypothyroidism  - PTA levothyroxine 50 mcg daily     Peripheral neuropathy s/p fusion lumbar spine  - PTA Pregabalin      Consultations This Hospital Stay   PHARMACY TO DOSE VANCO  PHARMACY TO DOSE VANCO  CARE MANAGEMENT / SOCIAL WORK IP CONSULT  PHYSICAL THERAPY ADULT IP CONSULT  OCCUPATIONAL THERAPY ADULT IP CONSULT  PHYSICAL THERAPY ADULT IP CONSULT  OCCUPATIONAL THERAPY ADULT IP CONSULT  PULMONARY GENERAL ADULT IP CONSULT  NURSING TO CONSULT FOR VASCULAR ACCESS CARE IP CONSULT  PALLIATIVE CARE ADULT IP CONSULT  OCCUPATIONAL THERAPY ADULT IP CONSULT  PHYSICAL THERAPY ADULT IP CONSULT    Code Status   No CPR- Do NOT Intubate       Kyree Bailey MD  Chief Resident  [facility] service  ______________________________________________________________________    Physical Exam   Vital Signs: Temp: 98.6  F (37  C) Temp src: Oral BP: 104/76 Pulse: 95   Resp: 18 SpO2: 94 % O2 Device: Nasal cannula Oxygen Delivery: 3 LPM  Weight: 103 lbs 9.6 oz    Constitutional: thin, elderly woman, alert and pleasant   Respiratory: moderate tachypnea on 3L via NC, using some accessory neck muscles, improved aeration with less coarse rhonchi than day prior, prolonged expiratory phase   Cardiovascular: regular rhythm, regular rate, no murmurs auscultated, well-perfused   Skin: no bruising, rash, or redness of upper or lower extremities bilaterally  Musculoskeletal: symmetric lower extremities without swelling   Neurologic: Awake, alert and oriented to situation. Appropriately answers questions. Appears less anxious today.       Primary Care Physician   Neida Maradiaga    Discharge Orders      General info for SNF    Length of Stay Estimate: Long Term Care  Condition at Discharge: Improving  Level of care:board and care  Rehabilitation Potential: Good  Admission H&P  remains valid and up-to-date: Yes  Recent Chemotherapy: N/A  Use Nursing Home Standing Orders: Yes     Activity - Up with assistive device     Follow Up and recommended labs and tests    You should make an appointment with you pulmonologist Dr. Lorenz for pulmonary function tests as you are able.     You have an appointment with your primary care physician, Dr. Maradiaga on 2/9/2024 at 2 PM.     Reason for your hospital stay    Dear Ms. Najera,     You were admitted to [facility] from 1/26-1/31 due to a worsening of your COPD. You originally came to the emergency room for coughing up blood. This was likely due to irritation/dryness of your throat due to coughing. The bleeding very quickly resolved. Your worsened shortness of breath, however, continued for the first few days you were here. You were treated with antibiotics, steroids, and nebulizers to improve your breathing. You also needed a BiPAP mask for the first day of admission due to your work of breathing. You improved on treatments enough to return to your home oxygen level of 3 L/min after 1 day. You completed a course of steroids and antibiotics. We will also send you home with a new nebulizer called Xopenex to use instead of albuterol as it does not raise your heart rate as much. Pulmonology saw you during this hospitalization and suggested a new nebulizer regimen. You will use this xopenex nebulizer three times a day with your Aerobika. Afterward you will use a sodium chloride nebulizer.     No CPR- Do NOT Intubate     Occupational Therapy Adult Consult    Evaluate and treat as clinically indicated.    Reason:  79 yo woman with end-stage COPD with decreasing stamina, requiring one assist with walker and gait belt during admission.     Physical Therapy Adult Consult    Evaluate and treat as clinically indicated.    Reason:  79 yo woman with end-stage COPD with decreasing stamina, requiring one assist with walker and gait belt during admission.     Diet     Follow this diet upon discharge: Regular Diet Adult       Significant Results and Procedures   Most Recent 3 CBC's:  Recent Labs   Lab Test 01/30/24  0549 01/27/24  0743 01/26/24  2226   WBC 13.9* 14.1* 23.0*   HGB 11.6* 10.0* 11.7   MCV 98 96 95    281 324     Most Recent 3 BMP's:  Recent Labs   Lab Test 01/30/24  0549 01/28/24  0817 01/27/24  2331 01/27/24  1835 01/27/24  0743 01/27/24  0131 01/26/24  2226     --   --   --  137  --  137   POTASSIUM 4.6  --   --   --  4.8 5.6* 5.5*   CHLORIDE 98  --   --   --  102  --  99   CO2 40*  --   --   --  30*  --  31*   BUN 21.1  --   --   --  24.1*  --  25.0*   CR 0.43*  --   --   --  0.41*  --  0.53   ANIONGAP 2*  --   --   --  5*  --  7   MAGAN 9.0  --   --   --  8.2*  --  8.4*   GLC 92 131* 166*   < > 125*  --  145*    < > = values in this interval not displayed.   ,   Results for orders placed or performed during the hospital encounter of 01/26/24   CT Chest Pulmonary Embolism w Contrast    Narrative    CT chest pulmonary angiogram with contrast    INDICATION: Hemoptysis. Shortness of breath.    COMPARISON: Similar study dated 7/24/2023    Contrast: 47 mL intravenous [id number]    FINDINGS:  shows extensive spinal rodding.  Severe upper thoracic kyphosis. Mild descending thoracic aortic  atherosclerotic calcifications. Aorta normal size. Pulmonary artery  caliber is normal at 2.2 cm. Pulmonary artery system was  well-opacified with contrast. No hypodense filling defect to indicate  pulmonary artery embolus.  No pleural or pericardial effusion. Heart size normal.  No enlarged lymph nodes of the chest.  The included upper abdomen show some reflux of contrast into the  hepatic veins in the IVC. There is no right heart enlargement or  paradoxical interventricular septal bowing, however. Mild abdominal  aortic atherosclerotic calcification. Small calcifications in the  expected location of the gallbladder which may represent gallstones.    Bone detail in  addition to the spinal rodding and the kyphosis shows  diffuse osteopenia in the spine. Degenerative disc disease in the  lower cervical spine with anterolisthesis at multiple consecutive  levels in the cervical spine at what appear to be C4 upon C5 and C3  upon C4.    Detail of the lungs shows diffuse fibrotic changes in the lungs with  airways thickening and bronchiectasis as well. Areas of distal mucous  plugging bilaterally. This overall appears quite similar to previous.  Decreased nodularity in the right middle lobe immediately adjacent to  the major fissure from measurement of 7 mm to barely perceptible  currently.      Impression    IMPRESSION:  1. No CT evidence of pulmonary embolus.  2. Atherosclerosis.  3. Degenerative changes in the spine with severe kyphosis in the upper  thoracic spine as well as spinal rodding. Cervical degenerative disc  disease with consecutive areas of upper to mid anterolisthesis.  Multiple diffuse areas of distal mucous plugging. Other nodularity  mostly unchanged with decrease of one of the previous right middle  lobe nodules. Follow-up routine chest CT within 12 months.    IMPRESSION:.    CLYDE GLASER MD         SYSTEM ID:  [id number]   US Lower Extremity Venous Duplex Bilateral    Narrative    EXAMINATION: DOPPLER VENOUS ULTRASOUND OF BILATERAL LOWER EXTREMITIES,  1/26/2024 2:57 PM     COMPARISON: Ultrasound lower extremity 7/25/2023    HISTORY: leg swelling, increased SOB    TECHNIQUE:  Gray-scale evaluation with compression, spectral flow and  color Doppler assessment of the deep venous system of both legs from  groin to knee, and then at the ankles.    FINDINGS:    In both lower extremities, the common femoral, femoral, popliteal,  posterior tibial veins, peroneal veins demonstrate normal  compressibility and blood flow. Bilateral greater saphenous-common  femoral vein junctions are fully compressible.      Impression    IMPRESSION:    No evidence of deep venous  thrombosis in either lower extremity.    I have personally reviewed the examination and initial interpretation  and I agree with the findings.    TITO MARTINEZ MD         SYSTEM ID:  [id number]     *Note: Due to a large number of results and/or encounters for the requested time period, some results have not been displayed. A complete set of results can be found in Results Review.       Discharge Medications   Current Discharge Medication List        START taking these medications    Details   levalbuterol (XOPENEX) 1.25 MG/3ML neb solution Take 3 mLs (1.25 mg) by nebulization every 4 hours as needed for shortness of breath or wheezing    Associated Diagnoses: Panlobular emphysema (H); Acute exacerbation of chronic obstructive pulmonary disease (H)      OLANZapine zydis (ZYPREXA) 5 MG ODT Take 1 tablet (5 mg) by mouth 2 times daily    Associated Diagnoses: Anxiety           CONTINUE these medications which have CHANGED    Details   apixaban ANTICOAGULANT (ELIQUIS) 5 MG tablet Take 1 tablet (5 mg) by mouth 2 times daily    Associated Diagnoses: Single subsegmental pulmonary embolism without acute cor pulmonale (H)      buPROPion (WELLBUTRIN XL) 300 MG 24 hr tablet Take 1 tablet (300 mg) by mouth every morning    Associated Diagnoses: ANDREW (generalized anxiety disorder)      cetirizine (ZYRTEC) 5 MG tablet Take 1 tablet (5 mg) by mouth daily    Associated Diagnoses: Bronchiectasis without complication (H); Mucopurulent chronic bronchitis (H)      cholecalciferol (VITAMIN D3) 25 mcg (1000 units) capsule Take 1 capsule (25 mcg) by mouth daily    Associated Diagnoses: Osteopenia, unspecified location      escitalopram (LEXAPRO) 20 MG tablet Take 1 tablet (20 mg) by mouth daily    Associated Diagnoses: ANDREW (generalized anxiety disorder)      Fluticasone-Umeclidin-Vilant (TRELEGY ELLIPTA) 100-62.5-25 MCG/ACT oral inhaler Inhale 1 puff into the lungs daily    Associated Diagnoses: Bronchiectasis with acute exacerbation (H);  Chronic obstructive pulmonary disease with acute exacerbation (H)      levothyroxine (SYNTHROID/LEVOTHROID) 50 MCG tablet Take 1 tablet (50 mcg) by mouth daily    Associated Diagnoses: Hypothyroidism, unspecified type      metoprolol succinate ER (TOPROL XL) 50 MG 24 hr tablet Take 1 tablet (50 mg) by mouth daily    Associated Diagnoses: Tachycardia      montelukast (SINGULAIR) 10 MG tablet [MONTELUKAST (SINGULAIR) 10 MG TABLET] TAKE 1 TABLET(10 MG) BY MOUTH AT BEDTIME    Associated Diagnoses: Panlobular emphysema (H)      oxyBUTYnin ER (DITROPAN XL) 5 MG 24 hr tablet Take 1 tablet (5 mg) by mouth daily    Associated Diagnoses: OAB (overactive bladder)      pregabalin (LYRICA) 50 MG capsule Take 1 tab in the AM and 2 in the evening    Associated Diagnoses: Chronic pain syndrome      sodium chloride (NEBUSAL) 3 % neb solution Take 3 mLs by nebulization 3 times daily    Associated Diagnoses: Panlobular emphysema (H); Bronchiectasis without complication (H); Acute exacerbation of chronic obstructive pulmonary disease (H)      SUMAtriptan (IMITREX) 50 MG tablet Take 1 tablet (50 mg) by mouth as needed for migraine,may repeat after 2 hours if needed;  mg/24 hours    Associated Diagnoses: Migraine without aura and without status migrainosus, not intractable           CONTINUE these medications which have NOT CHANGED    Details   OXYGEN-AIR DELIVERY SYSTEMS MISC [OXYGEN-AIR DELIVERY SYSTEMS MISC] Use 2 L As Directed. 2L with activity and at night  [employer]           STOP taking these medications       albuterol (PROAIR HFA/PROVENTIL HFA/VENTOLIN HFA) 108 (90 Base) MCG/ACT inhaler Comments:   Reason for Stopping:         albuterol (PROVENTIL) (2.5 MG/3ML) 0.083% neb solution Comments:   Reason for Stopping:         Biotin 5000 MCG TABS Comments:   Reason for Stopping:         CALCIUM PO Comments:   Reason for Stopping:         carbamide peroxide (DEBROX) 6.5 % otic solution Comments:   Reason for Stopping:          Lactobacillus rhamnosus GG (CULTURELLE) 10-15 Billion cell capsule Comments:   Reason for Stopping:         multivitamin therapeutic (THERAGRAN) tablet Comments:   Reason for Stopping:         NONFORMULARY Comments:   Reason for Stopping:         sodium chloride (OCEAN) 0.65 % nasal spray Comments:   Reason for Stopping:             Allergies   Allergies   Allergen Reactions     Codeine Unknown     Garlic      Morphine Itching

## 2024-01-30 NOTE — PROGRESS NOTES
[facility] Pulmonology Follow up    Fiordaliza Najera  MRN: [id number]  : 1945    Date of Admission: 2024  Date of Service: 2024    Assessment:  Bronchiectasis  Obstructive lung disease secondary to above     Ms. Najera is a 78 year old with bronchiectasis, obstructive lung disease due to bronchiectasis, hx of PE on DOAC who was admitted on  for shortness of breath and hemoptysis, and has been treated as COPD exacerbation. She has improved clinically reporting dyspnea is at baseline, and is back to home oxygen of 2-3L NC.    Recommendations:  -- as outpatient, would consider Life 2000 for work of breathing, and sleep study for BiPAP support at night  -- additional recs as per consult note including:  - aerobika + albuterol nebs followed by 3% HS nebs three times a day for airway clearance  - evaluation by RT for ? Inspiratory effort enough to use trelegy?.  If not, consider transition to ICS/LABA that could be used with a spacer for better delivery  - consider Life 2000 as outpatient  - consider palliative care consult for air hunger   - ok to continue prednisone 40mg daily to complete 5d course, and course of antibiotics given increased sputum production PTA  - follow up with Dr. Lorenz as outpatient with full PFTs prior    Subjective:  feeling ok. Eating late lunch. No new concerns. Continues to cough.    Review of systems: focused ROS performed and noted as above.    Objective:  /76 (BP Location: Right arm)   Pulse 95   Temp 98.6  F (37  C) (Oral)   Resp 18   Wt 47 kg (103 lb 9.6 oz)   LMP  (LMP Unknown)   SpO2 96%   BMI 18.95 kg/m      Gen: in no acute distress, sitting upright in chair  HEENT: MMM, NC in place  CV: RRR, no peripheral edema  Pulm: no increased work of breathing, few rhonchi, no wheezing  MSK: no gross deformities, no joint swelling  Integ: no rashes or lesions appreciated  Neuro: speech clear, alert and oriented  Psych: calm, appropriate    Data:   I have personally reviewed new labs.    Karo Diggs DO  Pulmonary fellow  Patient discussed and seen with Dr. Curtis.

## 2024-01-30 NOTE — PLAN OF CARE
Neuro: A&Ox4.   Cardiac: VSS.   Respiratory: Sating > 90% on baseline 2-3 liters via NC. Productive cough present. Dyspneic with exertion requiring up to 4-5 liters, takes about 5-10 minutes to recover.   GI/: Adequate urine output. No BM this shift.   Diet/appetite: Tolerating regular diet.   Activity:  Assist of 1, up to chair and in halls.  Pain: At acceptable level on current regimen.   Skin: No new deficits noted.  LDA's:PIV    Plan: Continue with POC. Notify primary team with changes.

## 2024-01-30 NOTE — PROGRESS NOTES
Care Management Follow Up    Length of Stay (days): 4    Expected Discharge Date: 01/31/2024     Concerns to be Addressed: discharge planning     Patient plan of care discussed at interdisciplinary rounds: Yes    Anticipated Discharge Disposition: Transitional Care Unit     Anticipated Discharge Services:  TBD  Anticipated Discharge DME: Oxygen    Patient/family educated on Medicare website which has current facility and service quality ratings: no  Education Provided on the Discharge Plan: yes  Patient/Family in Agreement with the Plan: yes    Referrals Placed by CM/SW:   Accepted:  [facility]  [address] [place]  [address]  P: [phone]  P: [phone] - Admissions  F: [phone]   1/30: SW sent a referral. Facility accepted pt and they are     [facility]  [address] [place] [address]  Admissions: [phone]  P: [phone]   F: [phone]   1/30: Una in admissions let SW know that there is no bed availability for pt in the TCU.      Private pay costs discussed: Not applicable    Additional Information:    Per chart review, pt now has PT TCU recs. SW called [facility] to see if they can take pt with just PT recs. Una in admissions let SW know that there is no bed availability for pt in the TCU section, but if pt were to discharge back to her home in the assisted living, they can take pt back today. Una also recommended that SW send over a referral to [facility] and BEREKET let Una know that is pt's second choice. SW sent a referral to [facility]. Care management will follow for updates.    Addendum 3:12 pm: SW received a VM from [facility] and they accepted pt. Since pt has [employer] Medicare Advantage, admissions facility requested that SW complete [employer] authorization. SW met pt and pt's family at bedside. Introduced self and role. SW let pt know that [facility] accepted pt and they can  take pt tomorrow. Pt and pt's family were excited, because pt has been to [facility] twice before and she has had a good experience. SW also let pt and family know that SW will be completing [employer] Medicare [employer] authorization. Pt understood. Pt's family will be providing transport, as they have oxygen support in the car and pt feels comfortable sitting in a wheelchair/car. SW completed [employer] authorization and faxed it to [phone]. BEREKET resendiz messaged pt's provider Shira Aiken to complete discharge orders. SW will follow for discharge. Care management will follow for updates.    LORETO Mak, LGSW  6B   Ph: [phone]  Pager: [phone]

## 2024-01-30 NOTE — PLAN OF CARE
-CTPE with extensive bilateral pulmonary arterial filling defects consistent with PE with the calculated ratio of right ventricular to left ventricular diameter (RV/LV ratio) is 1 2  -admitted to the ICU and started on heparin gtt  -Had DVT of upper extremity? in past related to a shoulder surgery - patient believes he had a short course of Xarelto and stopped  -This event is seemingly unprovoked - some recent mild inactivity due to what he thought was a calf strain, but given extensive DVT/PE, may need further coagulability studies to determine length of treatment  -hematology consulted, will need hypercoagulable workup up as outpt  -transitioned from heparin drip to Eliquis and discharged home with follow-up as an outpatient with Hematology    Care Provided 19:00 - 07:00    Neuro: A&Ox4. Anxious at times, PRN Zyprexa given x1 with good relief. Pt sometimes sleeping peacefully between cares.  Cardiac: not on tele. HR 70s-80s when asleep, up to 120s when awake and anxious. Other VSS.   Respiratory: Sating 93-99% on 2L NC when asleep. 3L NC when awake. LS coarse/crackles. Cough congested, unproductive - need sputum sample. Intermittently c/o SOB with increased WOB, relieved by rest and repositioning.  GI/: Adequate urine output via commode. no BM this shift.  Diet/appetite: Tolerating regular diet, appetite poor this milad.  Activity:  SBA/Ax1 for pivot to commode.  Pain: At acceptable level on current regimen. C/o headache beginning this morning, PRN Imitrex and Tylenol given with some relief.   Skin: No new deficits noted. Mepilex to coccyx in place.  LDA's: R PIV:SL.    Awaiting sputum samples.    Plan: Discharge plan in progress, Continue with POC. Notify primary team with changes.

## 2024-01-30 NOTE — PROGRESS NOTES
"[facility]    Progress Note - Medicine Service, ANITA TEAM 3       Date of Admission:  1/26/2024    Assessment & Plan   Fiordaliza Najera (\"Ayse\") is a 78 year old female with history of COPD, pulmonary embolism (on eliquis), right arm hemarthrosis, chronic hyponatremia, hypothyroidism, and anxiety admitted on 1/26/2024 for shortness of breath and hemoptysis concerning for COPD exacerbation and community acquired pneumonia. Despite initial need for BiPAP, she has returned to respiratory baseline following steroids and antibiotics. Pulmonology consulted for disease optimization and palliative care consulted for management of air hunger. Will discharge to TCU.      Changes today:  -Xopenex + 3% saline + Aerobika TID  -Palliative Care consulted on 1/30 for air hunger.   -PT recommending TCU on discharge.     COPD Exacerbation  Acute on Chronic Hypoxic Hypercarbic Respiratory Failure, improved   History of Achromobacter Xylosoxidans Respiratory Infections   Leukocytosis, improved  Hemoptysis, resolved   Lung nodules 2/2 to bronchiectasis  Patient uses 3L of oxygen at baseline. Uses Trelegy inhaler (ICS/LAMA/LABA) daily in addition to daily albuterol and 3% saline nebs. Had worsening dyspnea and cough on 1/26 that resulted in significant bright red hemoptysis. On presentation to ED on 1/26, she required 13-15 L NRB and received 3 duonebs and IV steroids. CT Chest showed chronic diffuse fibrotic changes in the lungs with airways thickening and bronchiectasis but no focal consolidations.  On assessment by medicine team on evening of 1/26, she was transferred to BiPAP for work of breathing and hypoxia. Overnight 1/26-1/27, required escalation of BiPAP settings to IPAP of 16 given worsening respiratory acidosis. Gases improved on BiPAP 16/5 overnight, and she was able to wean back to 3-4 L O2 on morning of 1/27. Started on broad spectrum antibiotics (Cefepime, Vancomycin) to " cover for potential pneumonia. Antibiotics de-escalated on 1/27 given clinical improvement. Patient with episode of increased work of breathing and anxiety this morning requiring temporary additional oxygen. Chest X-ray reassuring for no new pathology. Improved with additional nebulizer and PRN Zyprexa 5 mg.  -Pulmonology Consulted 1/29    -Bronchiectasis Work-UP: ANCA, SINGH, IgG pending. RF wnl.   -AFB samples x 3 (for non-tuberculous mycobacterium, no need for respiratory isolation)   -Airway Clearance: Xopenex + 3% saline + Aerobika TID   -Consider Life 2000 as outpatient  -Follow up with Dr. Lorenz as outpatient with full PFTs prior   -RT to assess if patient has inspiratory effort required for Trelegy. May need to transition to ICS/LABA with spacer.   -Palliative Care consulted on 1/30 for air hunger.   -Steroids:    -Prednisone 40 mg PO daily (1/28-1/31)   -Methylprednisolone 60 mg q6h (1/26-1/28)  -Antibiotics:    -CTX 2g q24h (1/27-1/30), Azithromycin 500 mg PO daily (1/27-1/29)    -Vancomycin (1/26-1/27)   -Cefepime q12h (1/26-1/27)  -PTA ICS/LABA + LAMA   -Oxygen Support: 3-4 L NC    Unilateral lower extremity swelling  History of acute pulmonary embolism  History of pulmonary embolism on 7/28/2023 and was discharged on eliquis. Upon presenting to the ED on 1/26, patient with mild right lower extremity swelling without bruising or signs of infection. Lower extremity ultrasound in ED without evidence of DVT. CT PE showing no evidence of pulmonary embolism at present.   - PTA apixaban 5 mg-Unheld 1/28      Tachycardia, stable  Hypotension, resolved   No documentation of prior arrhythmias. Appears to be taking Metoprolol primarily for sinus tachycardia. Held Metoprolol effective the evening of 1/26 due to episodes of hypotension (as low as 76/50); hypotension has since improved, so Metoprolol was restarted on 1/28. Heart rates have remained stable the past two days ranging primarily in the 80s-120s.  - PTA  Metoprolol 50 mg daily     Anxiety  Depression  - Continue PTA Bupropion 300 mg daily and Escitalopram 20 mg daily  - Zyprexa 5 mg BID PRN for agitation    Urinary urgency  - Hold PTA Oxybutynin 5 mg daily given decreased UOP on admission. Consider restarting on discharge      Hypothyroidism  - PTA levothyroxine 50 mcg daily    Peripheral neuropathy s/p fusion lumbar spine  - PTA Pregabalin      Diet: Regular Diet Adult  Diet  Snacks/Supplements Adult: Other; PRN; Between Meals    DVT Prophylaxis: Pneumatic Compression Devices  Mobley Catheter: Not present  Fluids: None  Lines: None     Cardiac Monitoring: None  Code Status: No CPR- Do NOT Intubate      Clinically Significant Risk Factors              # Hypoalbuminemia: Lowest albumin = 2.9 g/dL at 1/27/2024  7:43 AM, will monitor as appropriate       # Hypertension: Noted on problem list            # Financial/Environmental Concerns: none         Disposition Plan     Expected Discharge Date: 01/30/2024      Destination: home with help/services  Discharge Comments: IV Rocephin for 5 days          The patient's care was discussed with the Attending Physician, Dr. Bailey.     Shira Aiken MD  Internal Medicine-Pediatrics, PGY-2    Medicine Service, [address]  Securely message with [employer] (more info)  Text page via [facility] Paging/Directory   See signed in provider for up to date coverage information  ______________________________________________________________________    Interval History   On 2-3L overnight. HR seems to increase to 120s when awake and anxious. Continues to cough up thick, yellow-pink secretions.     Physical Exam   Vital Signs: Temp: 98.3  F (36.8  C) Temp src: Oral BP: 133/80 Pulse: 101   Resp: 26 SpO2: 99 % O2 Device: Nasal cannula Oxygen Delivery: 2 LPM  Weight: 103 lbs 9.6 oz    Constitutional: thin, elderly woman, alert and pleasant, sister and brother-in-law at bedside    Respiratory: moderate tachypnea on 3L via NC, using accessory neck muscles, improved aeration with less coarse rhonchi than day prior, prolonged expiratory phase   Cardiovascular: regular rhythm, regular rate, no murmurs auscultated, well-perfused   Skin: no bruising, rash, or redness of upper or lower extremities bilaterally  Musculoskeletal: symmetric lower extremities without swelling   Neurologic: Awake, alert and oriented to situation. Appropriately answers questions. Appears less anxious today.       Data     I have personally reviewed the following data over the past 24 hrs:    13.9 (H)  \   11.6 (L)   / 302     140 98 21.1 /  92   4.6 40 (H) 0.43 (L) \

## 2024-01-30 NOTE — PROGRESS NOTES
AOx4.  No complaints of pain, nausea or vomiting.  Assist x 1 to commode.  Patient had unmeasured BM this shift.  Feeling anxious and short of breath.  Respiratory swab sent to lab. Team notified.

## 2024-01-30 NOTE — PROGRESS NOTES
"   01/30/24 1500   Appointment Info   Signing Clinician's Name / Credentials (OT) Kristine Narayan, OTRL   Living Environment   People in Home alone   Current Living Arrangements independent living facility   Home Accessibility no concerns   Transportation Anticipated family or friend will provide   Living Environment Comments Pt lives in an independent living community. Pt reports no accessibility concerns.   Self-Care   Usual Activity Tolerance good   Current Activity Tolerance fair   Regular Exercise Yes   Activity/Exercise/Self-Care Comment {Pt reports IND-mod I with mobility and BADLs. Pt gets assist with ironing, setting up clothes and cooking. pt reports walking for exercise, she also enjoys reading and coloring. Pt has good family support.   General Information   Onset of Illness/Injury or Date of Surgery 01/26/24   Referring Physician Shira Aiken MD   Patient/Family Therapy Goal Statement (OT) to go home   Additional Occupational Profile Info/Pertinent History of Current Problem Fiordaliza Najera (\"Ayse\") is a 78 year old female with history of COPD, pulmonary embolism (on eliquis), right arm hemarthrosis, chronic hyponatremia, hypothyroidism, and anxiety admitted on 1/26/2024 for shortness of breath and hemoptysis concerning for COPD exacerbation and community acquired pneumonia. Despite initial need for BiPAP, she has returned to respiratory baseline following steroids and antibiotics.   Existing Precautions/Restrictions fall;oxygen therapy device and L/min   Cognitive Status Examination   Orientation Status orientation to person, place and time   Affect/Mental Status (Cognitive) anxious   Cognitive Status Comments Appears WFL, pt anxious though with good strategies to manage   Visual Perception   Visual Impairment/Limitations corrective lenses full-time   Sensory   Sensory Quick Adds sensation intact   Posture   Posture forward head position;protracted shoulders   Range of Motion Comprehensive   Comment, " General Range of Motion pt reports bilat rotator cuff injuries, ROM limited though functional in BUEs   Strength Comprehensive (MMT)   Comment, General Manual Muscle Testing (MMT) Assessment generalized weakness, BUE WFL   Coordination   Upper Extremity Coordination No deficits were identified   Bed Mobility   Bed Mobility supine-sit   Supine-Sit [place] (Bed Mobility) supervision   Transfers   Transfers sit-stand transfer;toilet transfer;shower transfer   Sit-Stand Transfer   Sit-Stand [place] (Transfers) contact guard   Shower Transfer   Type (Shower Transfer) lateral   [place] Level (Shower Transfer) minimum assist (75% patient effort)   Shower Transfer Comments per clinical judgement   Toilet Transfer   Type (Toilet Transfer) sit-stand   [place] Level (Toilet Transfer) contact guard   Balance   Balance Comments good seated balance EOB, standing balance with CGA   Activities of Daily Living   BADL Assessment/Intervention bathing;lower body dressing;toileting   Bathing Assessment/Intervention   [place] Level (Bathing) moderate assist (50% patient effort)   Comment, (Bathing) per clinical judgement   Lower Body Dressing Assessment/Training   Comment, (Lower Body Dressing) per clinical judgement   [place] Level (Lower Body Dressing) minimum assist (75% patient effort)   Toileting   [place] Level (Toileting) minimum assist (75% patient effort)   Clinical Impression   Criteria for Skilled Therapeutic Interventions Met (OT) Yes, treatment indicated   OT Diagnosis Pt is below baseline for ADLs   OT Problem List-Impairments impacting ADL problems related to;activity tolerance impaired;balance;strength   Assessment of Occupational Performance 3-5 Performance Deficits   Identified Performance Deficits dressing, bathing, toileting, functional mobility   Planned Therapy Interventions (OT) ADL retraining;IADL retraining;transfer training;home program guidelines;progressive  activity/exercise;strengthening   Clinical Decision Making Complexity (OT) problem focused assessment/low complexity   Risk & Benefits of therapy have been explained evaluation/treatment results reviewed;care plan/treatment goals reviewed;risks/benefits reviewed;current/potential barriers reviewed;participants voiced agreement with care plan;participants included;patient   Clinical Impression Comments Pt presents below baseline, limited primarily by decreased activity tolerance and generalized weakness. Pt will benefit from skilled OT to progress toward PLOF   OT Total Evaluation Time   OT Eval, Low Complexity Minutes (94818) 8   OT Goals   Therapy Frequency (OT) 5 times/week   OT Predicted Duration/Target Date for Goal Attainment 02/09/24   OT Goals Lower Body Dressing;Lower Body Bathing;Toilet Transfer/Toileting;Hygiene/Grooming   OT: Hygiene/Grooming while standing;modified independent   OT: Lower Body Dressing Independent   OT: Lower Body Bathing Modified independent   OT: Toilet Transfer/Toileting Independent;toilet transfer;cleaning and garment management   Interventions   Interventions Quick Adds Therapeutic Activity;Self-Care/Home Management   Self-Care/Home Management   Self-Care/Home Mgmt/ADL, Compensatory, Meal Prep Minutes (75571) 10   Symptoms Noted During/After Treatment (Meal Preparation/Planning Training) fatigue   Treatment Detail/Skilled Intervention Faciliated seated g/h to progress pt IND with ADLs. Pt washing face, combing hair, and brushing teeth with set up A, A for toothpaste caps. At pt request pt changed from oxymask <> NC for oral hygiene, appears more comfortable with oxymask.   Therapeutic Activities   Therapeutic Activity Minutes (22472) 24   Symptoms noted during/after treatment fatigue   Treatment Detail/Skilled Intervention Facilitated functional transfers to progress pt activity tolerance for ADLs. Pt pivoting to EOB from commode on OT entrance, CGA. Pt with SOB, appearing anxious,  cued for slowed, controlled breaths, with pt tracing fingers as visual cue for breath management/anxiety. Pt sitting at EOB for approx 15 mins to improve breathing and improve anxiety. Pt returning to supine IND'ly, however, noting chest discomfort supine, returning to EOB. Pt again taking time for slowing breath, using visual cue. Pt then STS and pivoting to nearby chair with CGA. Pt declining further activity. Pt SPO2 89-97% pm 3-4 L via oxymask.   OT Discharge Planning   OT Plan toilet transfers, standing ADLs   OT Discharge Recommendation (DC Rec) Transitional Care Facility   OT Rationale for DC Rec Pt well below functional baseline, tolerating pivoting this date w/OT. Pt likely to require rehab stay to progress activity tolerance prior to d/c home.   OT Brief overview of current status CGA for pivot   Total Session Time   Timed Code Treatment Minutes 34   Total Session Time (sum of timed and untimed services) 42

## 2024-01-31 NOTE — CONSULTS
"Palliative Care Consultation Note  [facility]      Patient: Fiordaliza Najera  Date of Admission:  1/26/2024    Requesting Clinician / Team: IM  Reason for consult: Symptom management       Recommendations & Counseling     GOALS OF CARE:   Restorative with limits     ADVANCE CARE PLANNING:  Patient has an advance directive dated 1/2019.  Primary Health Care Agent Roya (sister).  Alternate(s) Fiordaliza Huizar.   There is no POLST form on file, defer to patient and/or next of kin for decisions  Could consider filling out form outpatient given patient's wishes.   Code status: No CPR- Do NOT Intubate    MEDICAL MANAGEMENT:   #Anxiety   Continue PTA Bupropion 300 mg daily and Escitalopram 20 mg daily   Zyprexa 5 mg BID PRN for agitation - please continue outpatient as patient notes this has helped with anxiety/air hunger the most.      #Dyspnea,COPD  Air hunger,   Oxygen therapy 2-3L   Recommend fan at bedside  Zyprexa for air hunger/anxiety as above   Continue aerobika + albuterol nebs followed by 3% HS nebs three times a day for airway clearance   Consider Life 2000 as outpatient   Per pulm - evaluation by RT for ? Inspiratory effort enough to use trelegy?.  If not, consider transition to ICS/LABA that could be used with a spacer for better delivery - unsure if this has been done inpatient today.     PSYCHOSOCIAL/SPIRITUAL SUPPORT:  Family sister and brother in law at bedside, very active in her care.     Palliative Care will continue to follow - referral placed for outpatient and patient updated. Thank you for the consult and allowing us to aid in the care of Fiordaliza Najera.    These recommendations have been discussed with primary team.    BATOOL Mckeon CNP  Securely message with [employer] (more info)  Text page via [facility] Paging/Directory       Assessment      Fiordaliza Najera (\"Ayse\") is a 78 year old female with history of COPD, pulmonary embolism (on eliquis), right " arm hemarthrosis, chronic hyponatremia, hypothyroidism, and anxiety admitted on 1/26/2024 for shortness of breath and hemoptysis concerning for COPD exacerbation and community acquired pneumonia. Despite initial need for BiPAP, she has returned to respiratory baseline following steroids and antibiotics. Pulmonology consulted for disease optimization and palliative care consulted for management of air hunger. Will discharge to TCU.     Today, the patient was seen for:  Palliative care  Air hunger  Anxiety  Dyspnea  COPD    Palliative Care Summary:   Met with patient, sister, and brother in law.   Introduced the role of palliative care as an interdisciplinary team that cares for patients with serious illness to help support symptom management, communication, coping for patients and their families as well as support with medical decision making. and discussed follow-up outpatient.     Prognosis, Goals, & Planning:    Functional Status just prior to this current hospitalization:  Patient was previously independent in her apartment at her group home facility, on 2-3L oxygen baseline. Does receive quite a bit of supplemental assistance from facility (laundry, cleaning, others).     Prognosis, Goals, and/or Advance Care Planning:  Not discussed as patient discharging this AM    Code Status was addressed today:   No not addressed    Patient's decision making preferences: shared with support from loved ones        Patient has decision-making capacity today for complex decisions:Intact            Coping, Meaning, & Spirituality:   Mood, coping, and/or meaning in the context of serious illness were addressed today: No    Social:   Living situation:lives alone  Important relationships/caregivers:sisterCRISTOBAL     Medications:  I have reviewed this patient's medication profile and medications from this hospitalization. Notable medications: zyprexa, prednisone, duonebs, trelegy, lexapro, wellbutrin      ROS:  Comprehensive ROS is  reviewed and is negative except as here & per HPI:     Physical Exam   Vital Signs with Ranges  Temp:  [97.3  F (36.3  C)-98.6  F (37  C)] 98  F (36.7  C)  Pulse:  [] 91  Resp:  [18-25] 22  BP: (103-122)/(63-86) 111/64  Cuff Mean (mmHg):  [86] 86  SpO2:  [94 %-100 %] 100 %  102 lbs 15.28 oz    PHYSICAL EXAM:  General: NAD, sitting up in chair. Appears comfortable.   Pulmonary: Unlabored. Speaking in full sentences.  Neuro: Speech fluent. Alert and oriented x4.  Mental status/Psych: Asks/answers questions appropriately. Affect is bright      Data reviewed:  Recent Labs   Lab 01/30/24  0549 01/28/24  0817 01/27/24  2331 01/27/24  1835 01/27/24  0743 01/27/24  0131 01/26/24  2226 01/26/24  1408 01/26/24  1340   WBC 13.9*  --   --   --  14.1*  --  23.0*  --  26.7*   HGB 11.6*  --   --   --  10.0*  --  11.7  --  11.7   MCV 98  --   --   --  96  --  95  --  95     --   --   --  281  --  324  --  376   INR  --   --   --   --   --   --   --   --  1.35*     --   --   --  137  --  137  --  136   POTASSIUM 4.6  --   --   --  4.8 5.6* 5.5*  --  4.1   CHLORIDE 98  --   --   --  102  --  99  --  101   CO2 40*  --   --   --  30*  --  31*  --  27   BUN 21.1  --   --   --  24.1*  --  25.0*  --  20.5   CR 0.43*  --   --   --  0.41*  --  0.53   < > 0.40*   ANIONGAP 2*  --   --   --  5*  --  7  --  8   MAGAN 9.0  --   --   --  8.2*  --  8.4*  --  8.0*   GLC 92 131* 166*   < > 125*  --  145*  --  121*   ALBUMIN  --   --   --   --  2.9*  --  3.4*  --  3.3*   PROTTOTAL  --   --   --   --  5.8*  --  6.6  --  6.4   BILITOTAL  --   --   --   --  0.3  --  0.4  --  0.3   ALKPHOS  --   --   --   --  66  --  79  --  77   ALT  --   --   --   --  31  --  23  --  14   AST  --   --   --   --  71*  --  56*  --  44    < > = values in this interval not displayed.       Medical Decision Making     MANAGEMENT DISCUSSED with the following over the past 24 hours: internal medicine resident   NOTE(S)/MEDICAL RECORDS REVIEWED over the past  24 hours: IM, pulmonology, nursing notes.   Medical complexity over the past 24 hours:  - Introductory visit today

## 2024-01-31 NOTE — PROGRESS NOTES
DISCHARGE                         No discharge date for patient encounter.  ----------------------------------------------------------------------------  Discharged to: [facility]  Via: private transportation  Accompanied by: Family  Discharge Instructions: regular diet, activity as tolerated, medications, follow up appointments, when to call the MD, aftercare instructions.  Prescriptions: medication list reviewed & sent with pt/Faxed to TCU  Follow Up Appointments: information given  Belongings: All sent with pt  IV: d/c'd  Telemetry: d/c'd  Pt exhibits understanding of above discharge instructions; all questions answered.    Discharge Paperwork: Signed, copied, and sent home with patient.

## 2024-01-31 NOTE — PROGRESS NOTES
Care Management Discharge Note    Discharge Date: 01/31/2024     Discharge Disposition: Transitional Care, Skilled Nursing Facility    Discharge Services:      [facility]  [address] [place]  [address]  P: [phone]  P: [phone] - Admissions  F: [phone]     Discharge DME: Oxygen    Discharge Transportation: family or friend will provide    Private pay costs discussed: Not applicable    Does the patient's insurance plan have a 3 day qualifying hospital stay waiver?  No    PAS Confirmation Code: [id number]  Patient/family educated on Medicare website which has current facility and service quality ratings: No    Education Provided on the Discharge Plan: Yes  Persons Notified of Discharge Plans: Yes  Patient/Family in Agreement with the Plan: Yes    Handoff Referral Completed: Yes    Additional Information:    Pt was accepted to [facility] for TCU placement and requested for pt to discharge from the hospital at 2:30 pm. BEREKET completed [employer] authorization and approval confirmation number is [id number]. BEREKET faxed over discharge orders, [employer] auth, and medication hard script to [facility]. BEREKET met pt at bedside to let pt know about discharge plan to [facility] at 2:30 pm, as well as insurance authorization being approved. Pt's family are providing transportation for pt to facility and can provide oxygen support in the car. KIKOW completed [id number]. BEREKET completed IHO. Care management will follow for updates.    LORETO Mak, JALEEL  6B   Ph: [phone]  Pager: [phone]

## 2024-01-31 NOTE — TELEPHONE ENCOUNTER
[facility] ORDER  ENCOUNTER    Fiordaliza Najera is a 78 year old  (1945),    ORDER given to Camilla RN / TCU from Dr Rosenstein:  Do not push O2 level over 90-92%; pt has hx of O2 retention. Provider will see pt onsite on 1/31      Electronically signed by:   Nirmala Leo RN

## 2024-01-31 NOTE — PROGRESS NOTES
Care Management Follow Up    Length of Stay (days): 5    Expected Discharge Date: 01/31/2024     Concerns to be Addressed: discharge planning     Patient plan of care discussed at interdisciplinary rounds: Yes    Anticipated Discharge Disposition: Transitional Care Unit     Anticipated Discharge Services:  tbd  Anticipated Discharge DME: Oxygen    Patient/family educated on Medicare website which has current facility and service quality ratings: no  Education Provided on the Discharge Plan: yes  Patient/Family in Agreement with the Plan: yes    Referrals Placed by CM/SW:      Accepted:  [facility]  [address] [place]  [address]  P: [phone]  P: [phone] - Admissions  F: [phone]   1/30: SW sent a referral. Facility accepted pt.      Denied:  [facility] (SNF)  [address] [place] [address]  Admissions: [phone]  P: [phone]   F: [phone]   1/30: Una in admissions let BEREKET know that there is no bed availability for pt in the TCU.    Private pay costs discussed: Not applicable    Additional Information:    Pt's provider filled out the pregablin hard script and the hard script is located in the hard drive folder.     SW called [employer] authorization to see if they received SW's fax of completed [employer] forms. The  let BEREKET know that the pt is currently being reviewed and the case number is [id number]. SW will be waiting to hear back from [employer] and waiting for insurance authorization. BEERKET will follow through with discharge plans for pt to discharge to [facility] when appropriate. Care management will follow for updates.    Addendum 9:13 am: SW received a call from [facility], stating that they would like pt to come to facility around 2:30-3:00 pm and for discharge orders/hard script be faxed to them as soon as fully complete. Admissions are aware [facility]     Addendum 11:32 am:  SW received [employer] approval for pt through 6B fax machine and approval number is [id number]. SW faxed over discharge orders, [employer] auth, and medication hard script. SW updated pt and family at bedside and they are all in agreement for discharge. SW will follow for discharge.    LORETO Mak, LGSW  6B   Ph: [phone]  Pager: [phone]

## 2024-01-31 NOTE — PLAN OF CARE
Occupational Therapy Discharge Summary    Reason for therapy discharge:    Discharged to transitional care facility.    Progress towards therapy goal(s). See goals on Care Plan in [facility] electronic health record for goal details.  Goals partially met.  Barriers to achieving goals:   discharge from facility.    Therapy recommendation(s):    Continued therapy is recommended.  Rationale/Recommendations:  TCU to progress pt IND and functional endurance.

## 2024-01-31 NOTE — PLAN OF CARE
Neuro: A&Ox4. Anxious.  Cardiac: Not on tele. VSS. Tachycardia noted on exertion.  Respiratory: Tachypneic w/ GARCIA. Sating >92% on O2 at 2 lpm via NC at night. 3 lpm during the day.   GI/: Adequate urine output via commode. No BM this shift.  Diet/appetite: Tolerating regular diet. Eating well. Meds w/ applesauce.  Activity:  Assist of 1 w/gait belt and walker, up to commode.  Pain: At acceptable level on current regimen.   Skin: No new deficits noted.  LDA's: R PIV SL    Plan: Continue with POC. Notify primary team with changes.

## 2024-01-31 NOTE — PROGRESS NOTES
[id number] completed by CHW.    Debbie Arauz   6A/B/C Community Health Worker   Phone: [phone]

## 2024-01-31 NOTE — PROVIDER NOTIFICATION
Time of notification: 6:18 PM  Provider notified: KAROLINA ROMAN   Patient status: Pt is asking if she can have [place] Nasal spray and AYR nasal gel for tonight for nasal dryness. Said she has been using this at home.   Orders received: None

## 2024-01-31 NOTE — PLAN OF CARE
Physical Therapy Discharge Summary    Reason for therapy discharge:    Discharged to transitional care facility.    Progress towards therapy goal(s). See goals on Care Plan in [facility] electronic health record for goal details.  Goals partially met.  Barriers to achieving goals:   discharge from facility.    Therapy recommendation(s):    Continued therapy is recommended.  Rationale/Recommendations:  Pt would benefit from continued therapy to address functional mobility, strength, and activity tolerance deficits..

## 2024-01-31 NOTE — TELEPHONE ENCOUNTER
"Fiordaliza Najera \"Ayse\" [phone]     LVM and MyChart message sent related to Writer does not see follow up with Endocrinology recommendation in discharge notes.   Mile Morales RN on 1/31/2024 at 10:47 AM         [facility]    Phone Message    May a detailed message be left on voicemail: yes     Reason for Call: Other: patient currently schedule 6/17/2024 for 1 yr follow up but needs to be seen sooner for post hosp follow up per discharge instructions, please assist     Action Taken: Other: endo    Travel Screening: Not Applicable                                                                   "

## 2024-02-01 NOTE — PROGRESS NOTES
Clinic Care Coordination Contact  Care Coordination Transition Communication    Clinical Data: Patient was hospitalized at  [facility]  Discharge Summary - Medicine & Pediatrics       Date of Admission:  1/26/2024  Date of Discharge:  1/31/2024        Discharge Diagnoses  COPD Exacerbation  Community Acquired Pneumonia   Acute on Chronic Hypoxic Hypercarbic Respiratory Failure  History of Achromobacter Xylosoxidans Respiratory Infections   Leukocytosis  Hemoptysis  Anxiety     Assessment: Patient has transitioned to TCU/ARU for short term rehabilitation:    Facility Name: [facility]  Transition Communication:  Notified facility of Primary Care- Care Coordination support via Epic in basket message.    Plan: Care Coordinator will await notification from facility staff informing of patient's discharge plans/needs. Care Coordinator will review chart and outreach to facility staff every 4 weeks and as needed.     Juliet Pizano,   [facility]  [phone]

## 2024-02-01 NOTE — PROGRESS NOTES
[facility]    PRIMARY CARE PROVIDER AND CLINIC:  Neida Maradiaga DO, [address] [facility] [address]  Chief Complaint   Patient presents with    Hospital F/U      [facility] Record Number:  [id number]  Place of Service where encounter took place:  [facility] ([facility]) [[id number]]    Fiordaliza Najera  is a 78 year old retired ,  (1945), with pmhx including   severe COPD and bronchiectasis with chronic hypoxic and hypercapnic respiratory failure of chronic O2  History of PE on apixaban  Recurrent right shoulder hemarthrosis  ANDREW on excitalopram and bupropion    Who was  admitted to the above facility from  [facility]. Hospital stay 24 through 24..   HPI:    Hospital course:    She presented to the hospital with worsening shortness of breath and started coughing up blood.  Had received a prednisone burst 5 days prior to her presentation due to worsening SOB, however, continued to have progression of shortness of breath. Received duoneb en route.  In the ED, she was placed on HFNC, received duoneb x3 and xopenx.  BiPAP was attempted but she did not tolerate it due to anxiety.  Was given supplemental Zyprexa to assist with her hunger and anxiety with improved aeration.  Underwent stat CT scan which did not show pulmonary hemorrhage or PE.  Imaging with findings consistent with significant COPD exacerbation, also noted mucous plugging.  Started on IV fluids and cefepime.  Labs notable for hyperkalemia 5.5, mildly elevated lactate 2.4.  Elevated CO2.  CBC with significant leukocytosis to 26 7, otherwise relatively normal (suspect hemoconcentrated).  Blood gas consistent with significant respiratory acidosis with pH 7.19, pCO2 91 (consistent with acute on chronic respiratory acidosis).  Admitted for ongoing management of acute hypoxic, hypercarbic respiratory failure with COPD exacerbation.  Started on  steroids, methylprednisone every 6 hours.  Continued on cefepime and vancomycin.  Also received magnesium.  Tests for COVID, RSV, influenza negative.  Respiratory support with high flow nasal cannula and BiPAP as she tolerated.  In addition, she was noted to have lower extremity swelling of the right leg.  Had been on apixaban, and a lower extremity DVT was negative for DVT. Her condition stabilized relatively quickly.  Transitioned to p.o. prednisone daily.  Given a dose of ceftriaxone and continued on azithromycin.  Was seen by pulmonology during her admission. Obtained further labs for evaluation of bronchiectasis including ANCA, SINGH, IgG, and RF.  Also AFB to test for nontuberculous Mycobacterium. IgG, RF, SINGH have sincre returned negative, AFBs with NGTD (remain in process).  Did recommend consideration of transitioning from Trelegy inhaler to ICS/LABA that may use with a spacer for improved delivery.  Recommended to follow-up with Dr. Andrea her primary pulmonologist.  Palliative care was consulted for air hunger.  Confirmed her CODE STATUS of DNR/DNI.  Recommended to continue Zyprexa 5 mg twice daily for air hunger and anxiety.  The will plan to continue to follow outpatient.    At time of discharge, sent with xopenex nebulizer, olanzapine BID for anxiety and air hunger. Previuolsy held metoprolol and oxybutynin were resumed. Other medications including Trelegy were continued.     Today  Continues to feel SOB very easily. Walking across the room here she felt out of breath and tired. At home, she could walk down to the mail box without too much difficulty. Says about since christmas she's been having more trouble walking and feeling more short of breath.   She is feeling short of breath while she is laying in bed during our encounter, but says because she had just walked over from the bathroom and gets out of breath with talking also. Says at hoem typically uses 3L O2 during the day and 2L O2 at night - noted  she is currently on 2.5L, Says her breathing is actually about her baseline at rest but continues to have more GARCIA. She does get very anxious with breathing difficulty, and she squeezes sponges to help with anxiety. She thought the zyprexa was helping before but not as much yesterday and today.     Functional Review  Lives in the [facility]. Gets assistance with various chores around the apartment. She has been receiving assistance with meals also. Manages her own medication. Does wear a pad for urinary incontinence and finds oxybutynin helpful. Continues to use trelegy, not sure if it's getting to her lungs or making any difference. Does find current xopenex nebulizer helpful.     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 Enjoys puzzles and reading, cooking and baking. Enjoys writing letters. Previously .     Matters Most: Says doesn't want her pain to get awful. Most important to her is maintaining relationships and friends. Would like to stay in her apartment, would be interested in live-in caregiver if needed, and would likely not wish to move to [facility] or NH if needed as her condition progresses - would likely consider hospice type supports at that point. She values symptom control.     CODE STATUS/ADVANCE DIRECTIVES DISCUSSION:  No CPR- Do NOT Intubate  DNR / DNI  ALLERGIES:   Allergies   Allergen Reactions    Codeine Unknown    Garlic     Morphine Itching      PAST MEDICAL HISTORY:   Past Medical History:   Diagnosis Date    Anxiety 09/30/2021    COPD (chronic obstructive pulmonary disease) (H)     Diverticulosis     Hiatal hernia     Mild asthma     Neuropathy     Osteopenia     Temporomandibular joint (TMJ) pain       PAST SURGICAL HISTORY:   has a past surgical history that includes Hysterectomy; Salpingoophorectomy; Anterior / Posterior Combined Fusion Lumbar Spine; Total Knee Arthroplasty (2008); Retinal Laser Procedure (Left, 10/04/2016);  Bronchoscopy (rigid or flexible), diagnostic (N/A, 09/28/2021); PICC/Midline Placement (11/04/2021); and PICC/Midline Placement (Right, 12/22/2022).  FAMILY HISTORY: family history includes Chronic Obstructive Pulmonary Disease in her father; Dementia in her mother.  SOCIAL HISTORY:   reports that she has never smoked. She has been exposed to tobacco smoke. She has never used smokeless tobacco. She reports that she does not drink alcohol and does not use drugs.  Patient's living condition: lives alone    Post Discharge Medication Reconciliation Status:   MED REC REQUIRED  Post Medication Reconciliation Status: discharge medications reconciled and changed, per note/orders       Current Outpatient Medications   Medication Sig    apixaban ANTICOAGULANT (ELIQUIS) 5 MG tablet Take 1 tablet (5 mg) by mouth 2 times daily    buPROPion (WELLBUTRIN XL) 300 MG 24 hr tablet Take 1 tablet (300 mg) by mouth every morning    cetirizine (ZYRTEC) 5 MG tablet Take 1 tablet (5 mg) by mouth daily    cholecalciferol (VITAMIN D3) 25 mcg (1000 units) capsule Take 1 capsule (25 mcg) by mouth daily    escitalopram (LEXAPRO) 20 MG tablet Take 1 tablet (20 mg) by mouth daily    Fluticasone-Umeclidin-Vilant (TRELEGY ELLIPTA) 100-62.5-25 MCG/ACT oral inhaler Inhale 1 puff into the lungs daily    levalbuterol (XOPENEX) 1.25 MG/3ML neb solution Take 3 mLs (1.25 mg) by nebulization every 4 hours as needed for shortness of breath or wheezing    levothyroxine (SYNTHROID/LEVOTHROID) 50 MCG tablet Take 1 tablet (50 mcg) by mouth daily    metoprolol succinate ER (TOPROL XL) 50 MG 24 hr tablet Take 1 tablet (50 mg) by mouth daily    montelukast (SINGULAIR) 10 MG tablet [MONTELUKAST (SINGULAIR) 10 MG TABLET] TAKE 1 TABLET(10 MG) BY MOUTH AT BEDTIME    OLANZapine (ZYPREXA) 5 MG tablet Take 1 tablet (5 mg) by mouth nightly as needed    OLANZapine zydis (ZYPREXA) 5 MG ODT Take 1 tablet (5 mg) by mouth 2 times daily    oxyBUTYnin ER (DITROPAN XL) 5 MG 24 hr  "tablet Take 1 tablet (5 mg) by mouth daily    OXYGEN-AIR DELIVERY SYSTEMS MISC [OXYGEN-AIR DELIVERY SYSTEMS MISC] Use 2 L As Directed. 2L with activity and at night  Sherri    pregabalin (LYRICA) 50 MG capsule Take 1 capsule (50 mg) by mouth every morning AND 2 capsules (100 mg) every evening. Take 50mg Q AM and 100mg Q PM    sodium chloride (NEBUSAL) 3 % neb solution Take 3 mLs by nebulization 3 times daily    SUMAtriptan (IMITREX) 50 MG tablet Take 1 tablet (50 mg) by mouth as needed for migraine,may repeat after 2 hours if needed;  mg/24 hours     No current facility-administered medications for this visit.       Vitals:  BP 94/63   Pulse 64   Temp 98.1  F (36.7  C)   Resp 22   Ht 1.575 m (5' 2\")   Wt 45.8 kg (101 lb)   LMP  (LMP Unknown)   SpO2 99%   BMI 18.47 kg/m    Exam:    GENERAL APPEARANCE: Laying in bed comfortably, moderate distress (but can be redirected)  HENT:  NCAT, mildly [place], good dentition  EYES:  Conjunctiva clear, anicteric, EOMI, PERRL  PULM  Increased WOB with pursed lip breathing and rapid breaths at times, on 2.5L O2, lungs with poor air movements, coarse sounds, significantly increased E:I ratio  CV:  RRR, S1/S2 distant,  trace bilateral LE edema  ABDOMEN: Abdomen soft, not tender, not distended, BS normal and active throughout   M/S:  Moderate atrophy of chest wall and UEs  SKIN: No erythema LEs  NEURO: Alert, answering questions appropriately, normal thought processes; CN II-XII grossly intact, 5/5 strength distal/proximal extremities throughotu  PSYCH: Mood significantly anxious with congruent affect, insight/judgment fair    Lab/Diagnostic data:  Recent labs/imaging in [facility] reviewed by me today.     ASSESSMENT/PLAN:    (J44.9) Chronic obstructive pulmonary disease, unspecified COPD type (H)  (primary encounter diagnosis)  (J47.9) Bronchiectasis without complication (H)  (J96.11,  J96.12) Chronic respiratory failure with hypoxia and hypercapnia (H)  Comment: Primary " reason for admission to the hospital with COPD exacerbation.  Has significant history of severe COPD for which she follows with Dr. Anurag Lorenz.  She had a visit with him last in October 2023.  Relatively stable at that time, though had noted requiring antibiotics twice in the past year then.  Previously have not isolated AFBs, current AFBs without growth.  Other test for bronchiectasis otherwise negative, suspect primarily related to parenchymal disease.  Notably prior alpha-1 antitrypsin negative.  Continues to have significant dyspnea on exertion, expect likely slow recovery from recent admission.  Does not appear RT was able to obtain a NIF, and would consider transitioning from inhaler to nebulizer treatments primarily versus alternative inhalers for which she can use a spacer as suggested by inpatient pulmonology.   Plan:   -Azithromycin and prednisone completed  -Advised staff to not push oxygen levels greater than 92%  -PT/OT  -Continue Trelegy for now, consider alternatives  -Monitor bronchiectasis labs  -CBC  -Ongoing goals of care discussions    (R09.89) Air hunger  (R06.09) GARCIA (dyspnea on exertion)  Comment: She does have significant air hunger related to COPD as well as anxiety -both playing into each other.  She did have some improvement with Zyprexa, though this likely was primarily related to anxiety component.  Sounds as though currently less effective.  Plan:   -Continue Zyprexa for now, 5 mg twice daily plus as needed for acute anxiety  -Start oxycodone 2.5 mg every 6 hours as needed for air hunger and shortness of breath.  If more effective, likely transition off Zyprexa    (I26.99) Other acute pulmonary embolism without acute cor pulmonale (H)  Comment: History of PE with acute cor pulmonale.  This is complicated by recurrent hemarthrosis of her right shoulder.  She was wondering about discontinuing apixaban.  Sounds as though her PE was unprovoked, and discussed she may be at significant risk  of recurrent PE particular given her underlying inflammatory condition.  She wishes to continue apixaban at this time.    (F41.1) ANDREW (generalized anxiety disorder)  (F39) Mood disorder (H24)  Comment: Significant anxiety in part related to chronic dyspnea.  She is currently on escitalopram and bupropion.  Zyprexa added by palliative medicine to assist.  Given her level of anxiety, and already low muscle mass, could consider decreasing and/or discontinuing bupropion.    (R63.4) Weight loss  (M62.84) Sarcopenia  (E44.0) Moderate protein-calorie malnutrition (H24)  Comment: Related to her COPD/bronchiectasis and significantly increased work of breathing.  Weights have been slowly trending downward in the past year from approximately 105 pounds, now 100 pounds.   Plan:   -BMP with Cystatin C    (N39.46) Mixed stress and urge urinary incontinence  Comment: Manages currently with extended release oxybutynin and briefs.  Currently tolerating this well, certainly could consider alternative to oxybutynin given age and anticholinergic burden.    (M81.8) Other osteoporosis without current pathological fracture  Comment: Significant history osteoporosis by DEXA scan.  Last received Reclast June 2023.  Likely multifactorial related to age as well as steroid exposure given her COPD and exacerbations.    (G60.9) Idiopathic peripheral neuropathy  Comment: Currently well-controlled with daily pregabalin.  Would consider discontinuing if oxycodone effective for this as well    Orders:  -BMP w/Cys-C  -CBC  -Continue zyprexa BID + once PRN   -Started Oxycodone 2.5mg q6h prn for air hunger    Electronically signed by:    Benjamin Rosenstein, MD, MA  [facility]     This note was completed with the assistance of dictation software. Typos and word substitution-errors are expected and unintended.      93 MINUTES SPENT BY ME on the date of service doing chart review, history, exam, documentation & further activities  per the note.

## 2024-02-01 NOTE — LETTER
2024        RE: Fiordaliza Najera  [address] [place] [address]        [facility] GERIATRICS    PRIMARY CARE PROVIDER AND CLINIC:  Neida Maradiaga DO, [address] [facility] [address]  Chief Complaint   Patient presents with     Hospital F/U      [facility] Medical Record Number:  [id number]  Place of Service where encounter took place:  [facility] (SNF) [[id number]]    Fiordaliza Najera  is a 78 year old retired , del (1945), with pmhx including   severe COPD and bronchiectasis with chronic hypoxic and hypercapnic respiratory failure of chronic O2  History of PE on apixaban  Recurrent right shoulder hemarthrosis  ANDREW on excitalopram and bupropion    Who was  admitted to the above facility from  [facility]. Hospital stay 24 through 24..   HPI:    Hospital course:    She presented to the hospital with worsening shortness of breath and started coughing up blood.  Had received a prednisone burst 5 days prior to her presentation due to worsening SOB, however, continued to have progression of shortness of breath. Received duoneb en route.  In the ED, she was placed on HFNC, received duoneb x3 and xopenx.  BiPAP was attempted but she did not tolerate it due to anxiety.  Was given supplemental Zyprexa to assist with her hunger and anxiety with improved aeration.  Underwent stat CT scan which did not show pulmonary hemorrhage or PE.  Imaging with findings consistent with significant COPD exacerbation, also noted mucous plugging.  Started on IV fluids and cefepime.  Labs notable for hyperkalemia 5.5, mildly elevated lactate 2.4.  Elevated CO2.  CBC with significant leukocytosis to 26 7, otherwise relatively normal (suspect hemoconcentrated).  Blood gas consistent with significant respiratory acidosis with pH 7.19, pCO2 91 (consistent with acute on chronic respiratory acidosis).  Admitted for ongoing management of  acute hypoxic, hypercarbic respiratory failure with COPD exacerbation.  Started on steroids, methylprednisone every 6 hours.  Continued on cefepime and vancomycin.  Also received magnesium.  Tests for COVID, RSV, influenza negative.  Respiratory support with high flow nasal cannula and BiPAP as she tolerated.  In addition, she was noted to have lower extremity swelling of the right leg.  Had been on apixaban, and a lower extremity DVT was negative for DVT. Her condition stabilized relatively quickly.  Transitioned to p.o. prednisone daily.  Given a dose of ceftriaxone and continued on azithromycin.  Was seen by pulmonology during her admission. Obtained further labs for evaluation of bronchiectasis including ANCA, SINGH, IgG, and RF.  Also AFB to test for nontuberculous Mycobacterium. IgG, RF, SINGH have sincre returned negative, AFBs with NGTD (remain in process).  Did recommend consideration of transitioning from Trelegy inhaler to ICS/LABA that may use with a spacer for improved delivery.  Recommended to follow-up with Dr. Andrea her primary pulmonologist.  Palliative care was consulted for air hunger.  Confirmed her CODE STATUS of DNR/DNI.  Recommended to continue Zyprexa 5 mg twice daily for air hunger and anxiety.  The will plan to continue to follow outpatient.    At time of discharge, sent with xopenex nebulizer, olanzapine BID for anxiety and air hunger. Previuolsy held metoprolol and oxybutynin were resumed. Other medications including Trelegy were continued.     Today  Continues to feel SOB very easily. Walking across the room here she felt out of breath and tired. At home, she could walk down to the mail box without too much difficulty. Says about since elmira she's been having more trouble walking and feeling more short of breath.   She is feeling short of breath while she is laying in bed during our encounter, but says because she had just walked over from the bathroom and gets out of breath with  talking also. Says at [facility] typically uses 3L O2 during the day and 2L O2 at night - noted she is currently on 2.5L, Says her breathing is actually about her baseline at rest but continues to have more GARCIA. She does get very anxious with breathing difficulty, and she squeezes sponges to help with anxiety. She thought the zyprexa was helping before but not as much yesterday and today.     Functional Review  Lives in the [facility]. Gets assistance with various chores around the apartment. She has been receiving assistance with meals also. Manages her own medication. Does wear a pad for urinary incontinence and finds oxybutynin helpful. Continues to use trelegy, not sure if it's getting to her lungs or making any difference. Does find current xopenex nebulizer helpful.     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 Enjoys puzzles and reading, cooking and baking. Enjoys writing letters. Previously .     Matters Most: Says doesn't want her pain to get awful. Most important to her is maintaining relationships and friends. Would like to stay in her apartment, would be interested in live-in caregiver if needed, and would likely not wish to move to penitentiary or NH if needed as her condition progresses - would likely consider hospice type supports at that point. She values symptom control.     CODE STATUS/ADVANCE DIRECTIVES DISCUSSION:  No CPR- Do NOT Intubate  DNR / DNI  ALLERGIES:   Allergies   Allergen Reactions     Codeine Unknown     Garlic      Morphine Itching      PAST MEDICAL HISTORY:   Past Medical History:   Diagnosis Date     Anxiety 09/30/2021     COPD (chronic obstructive pulmonary disease) (H)      Diverticulosis      Hiatal hernia      Mild asthma      Neuropathy      Osteopenia      Temporomandibular joint (TMJ) pain       PAST SURGICAL HISTORY:   has a past surgical history that includes Hysterectomy; Salpingoophorectomy; Anterior / Posterior  Combined Fusion Lumbar Spine; Total Knee Arthroplasty (2008); Retinal Laser Procedure (Left, 10/04/2016); Bronchoscopy (rigid or flexible), diagnostic (N/A, 09/28/2021); PICC/Midline Placement (11/04/2021); and PICC/Midline Placement (Right, 12/22/2022).  FAMILY HISTORY: family history includes Chronic Obstructive Pulmonary Disease in her father; Dementia in her mother.  SOCIAL HISTORY:   reports that she has never smoked. She has been exposed to tobacco smoke. She has never used smokeless tobacco. She reports that she does not drink alcohol and does not use drugs.  Patient's living condition: lives alone    Post Discharge Medication Reconciliation Status:   MED REC REQUIRED  Post Medication Reconciliation Status: discharge medications reconciled and changed, per note/orders       Current Outpatient Medications   Medication Sig     apixaban ANTICOAGULANT (ELIQUIS) 5 MG tablet Take 1 tablet (5 mg) by mouth 2 times daily     buPROPion (WELLBUTRIN XL) 300 MG 24 hr tablet Take 1 tablet (300 mg) by mouth every morning     cetirizine (ZYRTEC) 5 MG tablet Take 1 tablet (5 mg) by mouth daily     cholecalciferol (VITAMIN D3) 25 mcg (1000 units) capsule Take 1 capsule (25 mcg) by mouth daily     escitalopram (LEXAPRO) 20 MG tablet Take 1 tablet (20 mg) by mouth daily     Fluticasone-Umeclidin-Vilant (TRELEGY ELLIPTA) 100-62.5-25 MCG/ACT oral inhaler Inhale 1 puff into the lungs daily     levalbuterol (XOPENEX) 1.25 MG/3ML neb solution Take 3 mLs (1.25 mg) by nebulization every 4 hours as needed for shortness of breath or wheezing     levothyroxine (SYNTHROID/LEVOTHROID) 50 MCG tablet Take 1 tablet (50 mcg) by mouth daily     metoprolol succinate ER (TOPROL XL) 50 MG 24 hr tablet Take 1 tablet (50 mg) by mouth daily     montelukast (SINGULAIR) 10 MG tablet [MONTELUKAST (SINGULAIR) 10 MG TABLET] TAKE 1 TABLET(10 MG) BY MOUTH AT BEDTIME     OLANZapine (ZYPREXA) 5 MG tablet Take 1 tablet (5 mg) by mouth nightly as needed      "OLANZapine zydis (ZYPREXA) 5 MG ODT Take 1 tablet (5 mg) by mouth 2 times daily     oxyBUTYnin ER (DITROPAN XL) 5 MG 24 hr tablet Take 1 tablet (5 mg) by mouth daily     OXYGEN-AIR DELIVERY SYSTEMS MISC [OXYGEN-AIR DELIVERY SYSTEMS MISC] Use 2 L As Directed. 2L with activity and at night  [employer]     pregabalin (LYRICA) 50 MG capsule Take 1 capsule (50 mg) by mouth every morning AND 2 capsules (100 mg) every evening. Take 50mg Q AM and 100mg Q PM     sodium chloride (NEBUSAL) 3 % neb solution Take 3 mLs by nebulization 3 times daily     SUMAtriptan (IMITREX) 50 MG tablet Take 1 tablet (50 mg) by mouth as needed for migraine,may repeat after 2 hours if needed;  mg/24 hours     No current facility-administered medications for this visit.       Vitals:  BP 94/63   Pulse 64   Temp 98.1  F (36.7  C)   Resp 22   Ht 1.575 m (5' 2\")   Wt 45.8 kg (101 lb)   LMP  (LMP Unknown)   SpO2 99%   BMI 18.47 kg/m    Exam:    GENERAL APPEARANCE: Laying in bed comfortably, moderate distress (but can be redirected)  HENT:  NCAT, mildly [place], good dentition  EYES:  Conjunctiva clear, anicteric, EOMI, PERRL  PULM  Increased WOB with pursed lip breathing and rapid breaths at times, on 2.5L O2, lungs with poor air movements, coarse sounds, significantly increased E:I ratio  CV:  RRR, S1/S2 distant,  trace bilateral LE edema  ABDOMEN: Abdomen soft, not tender, not distended, BS normal and active throughout   M/S:  Moderate atrophy of chest wall and UEs  SKIN: No erythema LEs  NEURO: Alert, answering questions appropriately, normal thought processes; CN II-XII grossly intact, 5/5 strength distal/proximal extremities throughotu  PSYCH: Mood significantly anxious with congruent affect, insight/judgment fair    Lab/Diagnostic data:  Recent labs/imaging in [facility] reviewed by me today.     ASSESSMENT/PLAN:    (J44.9) Chronic obstructive pulmonary disease, unspecified COPD type (H)  (primary encounter diagnosis)  (J47.9) Bronchiectasis " without complication (H)  (J96.11,  J96.12) Chronic respiratory failure with hypoxia and hypercapnia (H)  Comment: Primary reason for admission to the hospital with COPD exacerbation.  Has significant history of severe COPD for which she follows with Dr. Anurag Lorenz.  She had a visit with him last in October 2023.  Relatively stable at that time, though had noted requiring antibiotics twice in the past year then.  Previously have not isolated AFBs, current AFBs without growth.  Other test for bronchiectasis otherwise negative, suspect primarily related to parenchymal disease.  Notably prior alpha-1 antitrypsin negative.  Continues to have significant dyspnea on exertion, expect likely slow recovery from recent admission.  Does not appear RT was able to obtain a NIF, and would consider transitioning from inhaler to nebulizer treatments primarily versus alternative inhalers for which she can use a spacer as suggested by inpatient pulmonology.   Plan:   -Azithromycin and prednisone completed  -Advised staff to not push oxygen levels greater than 92%  -PT/OT  -Continue Trelegy for now, consider alternatives  -Monitor bronchiectasis labs  -CBC  -Ongoing goals of care discussions    (R09.89) Air hunger  (R06.09) GARCIA (dyspnea on exertion)  Comment: She does have significant air hunger related to COPD as well as anxiety -both playing into each other.  She did have some improvement with Zyprexa, though this likely was primarily related to anxiety component.  Sounds as though currently less effective.  Plan:   -Continue Zyprexa for now, 5 mg twice daily plus as needed for acute anxiety  -Start oxycodone 2.5 mg every 6 hours as needed for air hunger and shortness of breath.  If more effective, likely transition off Zyprexa    (I26.99) Other acute pulmonary embolism without acute cor pulmonale (H)  Comment: History of PE with acute cor pulmonale.  This is complicated by recurrent hemarthrosis of her right shoulder.  She was  wondering about discontinuing apixaban.  Sounds as though her PE was unprovoked, and discussed she may be at significant risk of recurrent PE particular given her underlying inflammatory condition.  She wishes to continue apixaban at this time.    (F41.1) ANDREW (generalized anxiety disorder)  (F39) Mood disorder (H24)  Comment: Significant anxiety in part related to chronic dyspnea.  She is currently on escitalopram and bupropion.  Zyprexa added by palliative medicine to assist.  Given her level of anxiety, and already low muscle mass, could consider decreasing and/or discontinuing bupropion.    (R63.4) Weight loss  (M62.84) Sarcopenia  (E44.0) Moderate protein-calorie malnutrition (H24)  Comment: Related to her COPD/bronchiectasis and significantly increased work of breathing.  Weights have been slowly trending downward in the past year from approximately 105 pounds, now 100 pounds.   Plan:   -BMP with Cystatin C    (N39.46) Mixed stress and urge urinary incontinence  Comment: Manages currently with extended release oxybutynin and briefs.  Currently tolerating this well, certainly could consider alternative to oxybutynin given age and anticholinergic burden.    (M81.8) Other osteoporosis without current pathological fracture  Comment: Significant history osteoporosis by DEXA scan.  Last received Reclast June 2023.  Likely multifactorial related to age as well as steroid exposure given her COPD and exacerbations.    (G60.9) Idiopathic peripheral neuropathy  Comment: Currently well-controlled with daily pregabalin.  Would consider discontinuing if oxycodone effective for this as well    Orders:  -BMP w/Cys-C  -CBC  -Continue zyprexa BID + once PRN   -Started Oxycodone 2.5mg q6h prn for air hunger    Electronically signed by:    Benjamin Rosenstein, MD, MA  [facility]     This note was completed with the assistance of dictation software. Typos and word substitution-errors are expected and  unintended.      93 MINUTES SPENT BY ME on the date of service doing chart review, history, exam, documentation & further activities per the note.          Sincerely,        Benjamin Rosenstein, MD

## 2024-02-01 NOTE — LETTER
[facility]       To:  [facility]            Please give to facility      From:   Juliet Pizano [facility]  Care Coordinator   [facility]   P: [phone]  [email or web address]        Patient Name:    Fiordaliza Najera   YOB: 1945   Admit date: 1-          Information Needed:  Please contact me when the patient will discharge (or if they will move to long term care)- include the discharge date, disposition, and main diagnosis       If the patient is discharged with home care services, please provide the name of the agency    Also- Please inform me if a care conference is being held.     Phone or Email with information                              Thank you

## 2024-02-04 NOTE — TELEPHONE ENCOUNTER
"[facility] TELEPHONE ENCOUNTER    Chief Complaint   Patient presents with    Patient Request            Fiordaliza Najera is a 78 year old  (1945),Nurse paged today which states, \"Hallucinating and oxygen levels down.\" Requested called back. Per Registered Nurse, PRN nebulization given with PRN oxycodone with good relief. Oxygen trends noted to be 77-88%. Patient requests for staff to call pulmonology. Does not want to go to hospital. Goal oxygen to not be >92%    Electronically signed by:     Continue current PRN oxycodone  Continue PRN nebulization as directed  May go to ED if symptoms persists or if patient request    Angelina Wang, APRN CNP    "

## 2024-02-05 NOTE — LETTER
"    2024        RE: Fiordaliza Najera  [address] [place] [address]        [facility] GERIATR[facility]    Chief Complaint   Patient presents with     RECHECK     HPI:  Fiordaliza Najera is a 78 year old  (1945), retired , del (1945), with pmhx including   severe COPD and bronchiectasis with chronic hypoxic and hypercapnic respiratory failure on chronic O2  History of PE on apixaban  Recurrent right shoulder hemarthrosis  ANDREW on excitalopram and bupropion  who is being seen today for an episodic care visit at: [facility] [[id number]].     Briefly, had been admitted to the hospital for COPD exacerbation and possible pneumonia.  Had discharged to this facility for ongoing therapy and management.  I saw her 2024 for initial visit at which time she was still having significant shortness of breath particularly with ambulation.  She had been started in the hospital on Zyprexa for air hunger.  I had added oxycodone as well to assist with air hunger she was noticing less effect.  Sounds as though did was doing fairly well for couple days.    I was asked to see her acutely today due to her passing out.  Upon being seen in the room, she was being assisted back into the wheelchair.  Her SpO2 was 50 to 55%.  She had difficulty following conversation initially, though did improve as she rested.  However even over time, her sats only improved to 69-71%.  Discussed her status with her and that evaluation of this would require going to the hospital to do further testing for pneumonia and/or acute treatment of COPD exacerbation.  She was consistent in saying she did not want to go to the hospital, that she is \"sick of this\" and \"I done that 100 times\" in reference to hospital evaluation for causes of shortness of breath.  She just wanted help breathing and to feel better.  Discussed managing her symptoms with hospice care.  She was most interested in this.  Again, she was able to " understand hospice cares would mean not going to the hospital and rather controlling her respiratory symptoms.  Additionally, this was consistent with the values she has expressed to me in my prior encounter with her in which she valued symptom control.    I called her sister and brother-in-law to review her current status.  Discussed what evaluation could be done at the hospital.  They were concerned about the effects another transition to the hospital would have as they noted this most recent hospitalization, and the transitions seem, to have taken much out of her.    Had a conference with Ayse and her family on the phone, again reviewing potentially pursuing further evaluation and treatment in the hospital or staying at the facility and instead pursuing hospice cares like we had discussed my prior visit.  All were in agreement that she wished to primarily focus on symptom management, particular breathlessness, and to place a referral for hospice care.  At this time, her SpO2 had improved to approximately 80%, with increased O2, though still fluctuating between 70 to 80% particularly when talking.  She was wondering about hospice at her prior living at [facility].  Given her current level of care, reviewed that I suspect she would stay at [facility].  If her condition stabilized, potentially she could return to her prior apartment with hospice.  Again, all were in agreement with this plan.  Her sister and brother-in-law were planning to visit early tomorrow morning.  Reviewed with staff, they will plan to make a call to hospice so they may see her hopefully today.    Staff also reported to me that this weekend, she was having episodes of hallucinations where she was seen people in her room, able to describe them in detail.  Also saying she was at [facility] rather than at [facility].  In review of facility notes, appears her sats had dropped into the 80s but remained at 90-91% on 2 L oxygen.  The on-call provider  "was called regarding this for possibility of being sent to the hospital.  However it sounds as though Ayse had not wished to go to the hospital.    Allergies, and PMH/PSH reviewed in EPIC today.    Objective:   BP (!) 157/103   Pulse 104   Temp 98.1  F (36.7  C)   Resp 24   Ht 1.575 m (5' 2\")   Wt 45.8 kg (101 lb)   LMP  (LMP Unknown)   SpO2 92%   BMI 18.47 kg/m      She is seen initially sent over in wheelchair and then transferred to the bed.  She is in moderate distress, complaining of feeling breathless.  As noted, sats 70 to 80% with rest.  Continues to have pursed lip breathing.  Pulmonary exam currently tight with very little air movement.  Tachycardic, no obvious murmurs.  Legs with trace swelling.  Cognition mildly impaired likely related to hypoxia, however as noted, very consistent with her thoughts as far as treatment goals and hospice.    Assessment/Plan:      ICD-10-CM    1. Acute on chronic respiratory failure with hypoxia and hypercapnia (H)  J96.21     J96.22       2. Acute exacerbation of chronic obstructive pulmonary disease (H)  J44.1       3. Panlobular emphysema (H)  J43.1       4. Bronchiectasis with acute exacerbation (H)  J47.1       5. Air hunger  R09.89       6. ANDREW (generalized anxiety disorder)  F41.1         Suspect this represents acute COPD exacerbation with significant hypoxia and hypercapnia, suspect due to hyperoxygenation (despite keeping sats 89-92%)..  In review with staff, symptoms over the weekend seem consistent with likely evolving hypercarbia.  Goals of care discussed with her and her family today, and despite cognitive impairment likely with hypercapnia and hypoxia, feel she has capacity to decide to pursue hospice as she is consistent with this and is consistent with her prior stated goals.  Additionally this is supported by her family.  She mainly wishes to stop experiencing breathlessness.  Will continue Zyprexa as previous to assist with air hunger and " anxiety.  Continue oxycodone as needed for air hunger.  Will start titrated lorazepam as well to assist with air hunger and anxiety.  Will place a hospice referral, though making sure I have palliative medicines in place as I expect her time to be short    MED REC REQUIRED  Post Medication Reconciliation Status: medication reconcilation previously completed during another office visit      Orders:  [x] Hospice Referral  [x] Start lorazepam 2mg/ml, 0.25mg q8h prn for anxiety    Electronically signed by:     Benjamin Rosenstein, MD, MA  [facility]     This note was completed with the assistance of dictation software. Typos and word substitution-errors are expected and unintended.      MDM: Severe exacerbation with threat to life, decision re: hospitalization       Sincerely,        Benjamin Rosenstein, MD

## 2024-02-05 NOTE — Clinical Note
Wanted to let you know, she had significant worsening of her condition and we transitioned her cares. She  today 24. Thank you for your care for her.   Ben Rosenstein

## 2024-02-05 NOTE — PROGRESS NOTES
"[facility] GERIATRICS    Chief Complaint   Patient presents with    RECHECK     HPI:  Fiordaliza Najera is a 78 year old  (1945), retired , del (1945), with pmhx including   severe COPD and bronchiectasis with chronic hypoxic and hypercapnic respiratory failure on chronic O2  History of PE on apixaban  Recurrent right shoulder hemarthrosis  ANDREW on excitalopram and bupropion  who is being seen today for an episodic care visit at: [facility] ([facility]) [[id number]].     Briefly, had been admitted to the hospital for COPD exacerbation and possible pneumonia.  Had discharged to this facility for ongoing therapy and management.  I saw her 2024 for initial visit at which time she was still having significant shortness of breath particularly with ambulation.  She had been started in the hospital on Zyprexa for air hunger.  I had added oxycodone as well to assist with air hunger she was noticing less effect.  Sounds as though did was doing fairly well for couple days.    I was asked to see her acutely today due to her passing out.  Upon being seen in the room, she was being assisted back into the wheelchair.  Her SpO2 was 50 to 55%.  She had difficulty following conversation initially, though did improve as she rested.  However even over time, her sats only improved to 69-71%.  Discussed her status with her and that evaluation of this would require going to the hospital to do further testing for pneumonia and/or acute treatment of COPD exacerbation.  She was consistent in saying she did not want to go to the hospital, that she is \"sick of this\" and \"I done that 100 times\" in reference to hospital evaluation for causes of shortness of breath.  She just wanted help breathing and to feel better.  Discussed managing her symptoms with hospice care.  She was most interested in this.  Again, she was able to understand hospice cares would mean not going to the hospital and rather controlling her " respiratory symptoms.  Additionally, this was consistent with the values she has expressed to me in my prior encounter with her in which she valued symptom control.    I called her sister and brother-in-law to review her current status.  Discussed what evaluation could be done at the hospital.  They were concerned about the effects another transition to the hospital would have as they noted this most recent hospitalization, and the transitions seem, to have taken much out of her.    Had a conference with Ayse and her family on the phone, again reviewing potentially pursuing further evaluation and treatment in the hospital or staying at the facility and instead pursuing hospice cares like we had discussed my prior visit.  All were in agreement that she wished to primarily focus on symptom management, particular breathlessness, and to place a referral for hospice care.  At this time, her SpO2 had improved to approximately 80%, with increased O2, though still fluctuating between 70 to 80% particularly when talking.  She was wondering about hospice at her prior living at [facility].  Given her current level of care, reviewed that I suspect she would stay at [facility].  If her condition stabilized, potentially she could return to her prior apartment with hospice.  Again, all were in agreement with this plan.  Her sister and brother-in-law were planning to visit early tomorrow morning.  Reviewed with staff, they will plan to make a call to hospice so they may see her hopefully today.    Staff also reported to me that this weekend, she was having episodes of hallucinations where she was seen people in her room, able to describe them in detail.  Also saying she was at [facility] rather than at [facility].  In review of facility notes, appears her sats had dropped into the 80s but remained at 90-91% on 2 L oxygen.  The on-call provider was called regarding this for possibility of being sent to the hospital.  However it  "sounds as though Ayse had not wished to go to the hospital.    Allergies, and PMH/PSH reviewed in EPIC today.    Objective:   BP (!) 157/103   Pulse 104   Temp 98.1  F (36.7  C)   Resp 24   Ht 1.575 m (5' 2\")   Wt 45.8 kg (101 lb)   LMP  (LMP Unknown)   SpO2 92%   BMI 18.47 kg/m      She is seen initially sent over in wheelchair and then transferred to the bed.  She is in moderate distress, complaining of feeling breathless.  As noted, sats 70 to 80% with rest.  Continues to have pursed lip breathing.  Pulmonary exam currently tight with very little air movement.  Tachycardic, no obvious murmurs.  Legs with trace swelling.  Cognition mildly impaired likely related to hypoxia, however as noted, very consistent with her thoughts as far as treatment goals and hospice.    Assessment/Plan:      ICD-10-CM    1. Acute on chronic respiratory failure with hypoxia and hypercapnia (H)  J96.21     J96.22       2. Acute exacerbation of chronic obstructive pulmonary disease (H)  J44.1       3. Panlobular emphysema (H)  J43.1       4. Bronchiectasis with acute exacerbation (H)  J47.1       5. Air hunger  R09.89       6. ANDREW (generalized anxiety disorder)  F41.1         Suspect this represents acute COPD exacerbation with significant hypoxia and hypercapnia, suspect due to hyperoxygenation (despite keeping sats 89-92%)..  In review with staff, symptoms over the weekend seem consistent with likely evolving hypercarbia.  Goals of care discussed with her and her family today, and despite cognitive impairment likely with hypercapnia and hypoxia, feel she has capacity to decide to pursue hospice as she is consistent with this and is consistent with her prior stated goals.  Additionally this is supported by her family.  She mainly wishes to stop experiencing breathlessness.  Will continue Zyprexa as previous to assist with air hunger and anxiety.  Continue oxycodone as needed for air hunger.  Will start titrated lorazepam as " well to assist with air hunger and anxiety.  Will place a hospice referral, though making sure I have palliative medicines in place as I expect her time to be short    MED REC REQUIRED  Post Medication Reconciliation Status: medication reconcilation previously completed during another office visit      Orders:  [x] Hospice Referral  [x] Start lorazepam 2mg/ml, 0.25mg q8h prn for anxiety    Electronically signed by:     Benjamin Rosenstein, MD, MA  [facility]     This note was completed with the assistance of dictation software. Typos and word substitution-errors are expected and unintended.      MDM: Severe exacerbation with threat to life, decision re: hospitalization

## 2024-02-06 NOTE — TELEPHONE ENCOUNTER
Geriatrics Notification of Patient Death    Provider: Rosenstein, Benjamin MD  Place of death: [facility]   Facility Type:  TCU    Caller: Tiana  Date of Death: 2/6/24 @ 0710    Patient was on Hospice care: Yes; please explain: Beyond hospice  Patient was seen by [facility] Geriatric[facility] provider: Yes; please explain: Dr. Rosenstein on 2/5/24        Giovanna Capellan RN

## 2024-02-23 ENCOUNTER — PRE VISIT (OUTPATIENT)
Dept: NEUROLOGY | Facility: CLINIC | Age: 79
End: 2024-02-23

## 2024-03-26 LAB
ACID FAST STAIN (ARUP): NORMAL

## 2024-06-13 NOTE — ED NOTES
Bed: ED09  Expected date:   Expected time:   Means of arrival:   Comments:  CHRIS  
Pt was assisted to commode per request at approximately 1700. After being assisted back to bed pt endorsed feeling SOB and was visibly anxious, therapeutic communication and PRN anxiolytic utilized. Pt continued to have increased SOB and work of breathing, changed from oxymask to high flow nasal cannula at 40L/ 40% O2 per provider order. Pt also given xopenex neb at this time w/o effect. Increased high flow to 50/50. Paged provider to request bedside rounding. Vanco paused and loading dose mag given per provider order. High flow then adjusted to 60/60. Pt continued to have increased WOB, order for BiPAP then placed. PRN ativan given per provider order for WOB and anxiety. Pt placed on BiPAP at approximately 1825, showed improvement immediately. Vanco infusion resumed. Pt now resting in bed, appears comfortable.   
Pt's sister Roya ( listed in pt's contact list) called for an update and was updated by writer.  
13-Jun-2024 12:37

## (undated) RX ORDER — LIDOCAINE HYDROCHLORIDE 10 MG/ML
INJECTION, SOLUTION EPIDURAL; INFILTRATION; INTRACAUDAL; PERINEURAL
Status: DISPENSED
Start: 2021-09-28

## (undated) RX ORDER — DIPHENHYDRAMINE HYDROCHLORIDE 50 MG/ML
INJECTION INTRAMUSCULAR; INTRAVENOUS
Status: DISPENSED
Start: 2021-09-28

## (undated) RX ORDER — LIDOCAINE HYDROCHLORIDE AND EPINEPHRINE 10; 10 MG/ML; UG/ML
INJECTION, SOLUTION INFILTRATION; PERINEURAL
Status: DISPENSED
Start: 2021-09-28

## (undated) RX ORDER — FENTANYL CITRATE 50 UG/ML
INJECTION, SOLUTION INTRAMUSCULAR; INTRAVENOUS
Status: DISPENSED
Start: 2021-09-28

## (undated) RX ORDER — LIDOCAINE HYDROCHLORIDE 20 MG/ML
SOLUTION OROPHARYNGEAL
Status: DISPENSED
Start: 2021-09-28

## (undated) RX ORDER — LIDOCAINE HYDROCHLORIDE 40 MG/ML
SOLUTION TOPICAL
Status: DISPENSED
Start: 2021-09-28

## (undated) RX ORDER — ONDANSETRON 2 MG/ML
INJECTION INTRAMUSCULAR; INTRAVENOUS
Status: DISPENSED
Start: 2021-09-28